# Patient Record
Sex: FEMALE | Race: WHITE | NOT HISPANIC OR LATINO | Employment: OTHER | URBAN - METROPOLITAN AREA
[De-identification: names, ages, dates, MRNs, and addresses within clinical notes are randomized per-mention and may not be internally consistent; named-entity substitution may affect disease eponyms.]

---

## 2017-05-08 ENCOUNTER — APPOINTMENT (OUTPATIENT)
Dept: RADIOLOGY | Facility: MEDICAL CENTER | Age: 82
DRG: 149 | End: 2017-05-08
Attending: EMERGENCY MEDICINE
Payer: MEDICARE

## 2017-05-08 ENCOUNTER — HOSPITAL ENCOUNTER (INPATIENT)
Facility: MEDICAL CENTER | Age: 82
LOS: 1 days | DRG: 149 | End: 2017-05-11
Attending: EMERGENCY MEDICINE | Admitting: FAMILY MEDICINE
Payer: MEDICARE

## 2017-05-08 ENCOUNTER — RESOLUTE PROFESSIONAL BILLING HOSPITAL PROF FEE (OUTPATIENT)
Dept: HOSPITALIST | Facility: MEDICAL CENTER | Age: 82
End: 2017-05-08
Payer: MEDICARE

## 2017-05-08 DIAGNOSIS — R55 SYNCOPE, UNSPECIFIED SYNCOPE TYPE: ICD-10-CM

## 2017-05-08 LAB
ALBUMIN SERPL BCP-MCNC: 3.7 G/DL (ref 3.2–4.9)
ALBUMIN/GLOB SERPL: 0.9 G/DL
ALP SERPL-CCNC: 93 U/L (ref 30–99)
ALT SERPL-CCNC: 13 U/L (ref 2–50)
ANION GAP SERPL CALC-SCNC: 15 MMOL/L (ref 0–11.9)
APPEARANCE UR: CLEAR
AST SERPL-CCNC: 22 U/L (ref 12–45)
BASOPHILS # BLD AUTO: 0.7 % (ref 0–1.8)
BASOPHILS # BLD: 0.09 K/UL (ref 0–0.12)
BILIRUB SERPL-MCNC: 0.7 MG/DL (ref 0.1–1.5)
BILIRUB UR QL STRIP.AUTO: NEGATIVE
BNP SERPL-MCNC: 96 PG/ML (ref 0–100)
BUN SERPL-MCNC: 23 MG/DL (ref 8–22)
CALCIUM SERPL-MCNC: 9.1 MG/DL (ref 8.4–10.2)
CHLORIDE SERPL-SCNC: 102 MMOL/L (ref 96–112)
CO2 SERPL-SCNC: 21 MMOL/L (ref 20–33)
COLOR UR: YELLOW
CREAT SERPL-MCNC: 1.43 MG/DL (ref 0.5–1.4)
CULTURE IF INDICATED INDCX: NO UA CULTURE
EKG IMPRESSION: NORMAL
EOSINOPHIL # BLD AUTO: 0.18 K/UL (ref 0–0.51)
EOSINOPHIL NFR BLD: 1.5 % (ref 0–6.9)
ERYTHROCYTE [DISTWIDTH] IN BLOOD BY AUTOMATED COUNT: 43.6 FL (ref 35.9–50)
GFR SERPL CREATININE-BSD FRML MDRD: 34 ML/MIN/1.73 M 2
GLOBULIN SER CALC-MCNC: 4.2 G/DL (ref 1.9–3.5)
GLUCOSE SERPL-MCNC: 156 MG/DL (ref 65–99)
GLUCOSE UR STRIP.AUTO-MCNC: NEGATIVE MG/DL
HCT VFR BLD AUTO: 40 % (ref 37–47)
HGB BLD-MCNC: 13.4 G/DL (ref 12–16)
IMM GRANULOCYTES # BLD AUTO: 0.07 K/UL (ref 0–0.11)
IMM GRANULOCYTES NFR BLD AUTO: 0.6 % (ref 0–0.9)
INR PPP: 1.05 (ref 0.87–1.13)
KETONES UR STRIP.AUTO-MCNC: NEGATIVE MG/DL
LACTATE BLD-SCNC: 1.49 MMOL/L (ref 0.5–2)
LACTATE BLD-SCNC: 2.17 MMOL/L (ref 0.5–2)
LEUKOCYTE ESTERASE UR QL STRIP.AUTO: NEGATIVE
LYMPHOCYTES # BLD AUTO: 2.19 K/UL (ref 1–4.8)
LYMPHOCYTES NFR BLD: 17.9 % (ref 22–41)
MCH RBC QN AUTO: 31 PG (ref 27–33)
MCHC RBC AUTO-ENTMCNC: 33.5 G/DL (ref 33.6–35)
MCV RBC AUTO: 92.6 FL (ref 81.4–97.8)
MICRO URNS: NORMAL
MONOCYTES # BLD AUTO: 0.87 K/UL (ref 0–0.85)
MONOCYTES NFR BLD AUTO: 7.1 % (ref 0–13.4)
NEUTROPHILS # BLD AUTO: 8.84 K/UL (ref 2–7.15)
NEUTROPHILS NFR BLD: 72.2 % (ref 44–72)
NITRITE UR QL STRIP.AUTO: NEGATIVE
NRBC # BLD AUTO: 0 K/UL
NRBC BLD AUTO-RTO: 0 /100 WBC
PH UR STRIP.AUTO: 5.5 [PH]
PLATELET # BLD AUTO: 240 K/UL (ref 164–446)
PMV BLD AUTO: 9.3 FL (ref 9–12.9)
POTASSIUM SERPL-SCNC: 3.6 MMOL/L (ref 3.6–5.5)
PROT SERPL-MCNC: 7.9 G/DL (ref 6–8.2)
PROT UR QL STRIP: NEGATIVE MG/DL
PROTHROMBIN TIME: 13.5 SEC (ref 12–14.6)
RBC # BLD AUTO: 4.32 M/UL (ref 4.2–5.4)
RBC UR QL AUTO: NEGATIVE
SODIUM SERPL-SCNC: 138 MMOL/L (ref 135–145)
SP GR UR STRIP.AUTO: 1.01
SPECIMEN DRAWN FROM PATIENT: ABNORMAL
SPECIMEN DRAWN FROM PATIENT: NORMAL
TROPONIN I SERPL-MCNC: <0.02 NG/ML (ref 0–0.04)
WBC # BLD AUTO: 12.2 K/UL (ref 4.8–10.8)

## 2017-05-08 PROCEDURE — G0378 HOSPITAL OBSERVATION PER HR: HCPCS

## 2017-05-08 PROCEDURE — 94760 N-INVAS EAR/PLS OXIMETRY 1: CPT

## 2017-05-08 PROCEDURE — 70450 CT HEAD/BRAIN W/O DYE: CPT

## 2017-05-08 PROCEDURE — 99220 PR INITIAL OBSERVATION CARE,LEVL III: CPT | Performed by: HOSPITALIST

## 2017-05-08 PROCEDURE — 99285 EMERGENCY DEPT VISIT HI MDM: CPT

## 2017-05-08 PROCEDURE — 700105 HCHG RX REV CODE 258: Performed by: EMERGENCY MEDICINE

## 2017-05-08 PROCEDURE — 93005 ELECTROCARDIOGRAM TRACING: CPT | Performed by: EMERGENCY MEDICINE

## 2017-05-08 PROCEDURE — 84484 ASSAY OF TROPONIN QUANT: CPT

## 2017-05-08 PROCEDURE — 71010 DX-CHEST-PORTABLE (1 VIEW): CPT

## 2017-05-08 PROCEDURE — 80053 COMPREHEN METABOLIC PANEL: CPT

## 2017-05-08 PROCEDURE — 85025 COMPLETE CBC W/AUTO DIFF WBC: CPT

## 2017-05-08 PROCEDURE — 83880 ASSAY OF NATRIURETIC PEPTIDE: CPT

## 2017-05-08 PROCEDURE — 96360 HYDRATION IV INFUSION INIT: CPT

## 2017-05-08 PROCEDURE — A9270 NON-COVERED ITEM OR SERVICE: HCPCS | Performed by: HOSPITALIST

## 2017-05-08 PROCEDURE — 700101 HCHG RX REV CODE 250: Performed by: HOSPITALIST

## 2017-05-08 PROCEDURE — 85610 PROTHROMBIN TIME: CPT

## 2017-05-08 PROCEDURE — 81003 URINALYSIS AUTO W/O SCOPE: CPT

## 2017-05-08 PROCEDURE — 700105 HCHG RX REV CODE 258: Performed by: HOSPITALIST

## 2017-05-08 PROCEDURE — 83605 ASSAY OF LACTIC ACID: CPT

## 2017-05-08 PROCEDURE — 700102 HCHG RX REV CODE 250 W/ 637 OVERRIDE(OP): Performed by: HOSPITALIST

## 2017-05-08 RX ORDER — BISACODYL 10 MG
10 SUPPOSITORY, RECTAL RECTAL
Status: DISCONTINUED | OUTPATIENT
Start: 2017-05-08 | End: 2017-05-11 | Stop reason: HOSPADM

## 2017-05-08 RX ORDER — MECLIZINE HYDROCHLORIDE 25 MG/1
12.5 TABLET ORAL 3 TIMES DAILY PRN
Status: DISCONTINUED | OUTPATIENT
Start: 2017-05-08 | End: 2017-05-11 | Stop reason: HOSPADM

## 2017-05-08 RX ORDER — ERYTHROMYCIN 5 MG/G
1 OINTMENT OPHTHALMIC NIGHTLY
COMMUNITY
End: 2017-11-01

## 2017-05-08 RX ORDER — ACETAMINOPHEN 500 MG
500 TABLET ORAL EVERY 6 HOURS PRN
Status: DISCONTINUED | OUTPATIENT
Start: 2017-05-08 | End: 2017-05-08

## 2017-05-08 RX ORDER — ACETAMINOPHEN 500 MG
500 TABLET ORAL EVERY 6 HOURS PRN
COMMUNITY
End: 2017-05-13

## 2017-05-08 RX ORDER — LATANOPROST 50 UG/ML
1 SOLUTION/ DROPS OPHTHALMIC NIGHTLY
Status: DISCONTINUED | OUTPATIENT
Start: 2017-05-08 | End: 2017-05-11 | Stop reason: HOSPADM

## 2017-05-08 RX ORDER — ONDANSETRON 4 MG/1
4 TABLET, ORALLY DISINTEGRATING ORAL EVERY 4 HOURS PRN
Status: DISCONTINUED | OUTPATIENT
Start: 2017-05-08 | End: 2017-05-11 | Stop reason: HOSPADM

## 2017-05-08 RX ORDER — POLYETHYLENE GLYCOL 3350 17 G/17G
1 POWDER, FOR SOLUTION ORAL
Status: DISCONTINUED | OUTPATIENT
Start: 2017-05-08 | End: 2017-05-11 | Stop reason: HOSPADM

## 2017-05-08 RX ORDER — SODIUM CHLORIDE 9 MG/ML
INJECTION, SOLUTION INTRAVENOUS CONTINUOUS
Status: DISCONTINUED | OUTPATIENT
Start: 2017-05-08 | End: 2017-05-11 | Stop reason: HOSPADM

## 2017-05-08 RX ORDER — ACETAMINOPHEN 325 MG/1
650 TABLET ORAL EVERY 6 HOURS PRN
Status: DISCONTINUED | OUTPATIENT
Start: 2017-05-08 | End: 2017-05-11 | Stop reason: HOSPADM

## 2017-05-08 RX ORDER — AMOXICILLIN 250 MG
2 CAPSULE ORAL 2 TIMES DAILY
Status: DISCONTINUED | OUTPATIENT
Start: 2017-05-08 | End: 2017-05-11 | Stop reason: HOSPADM

## 2017-05-08 RX ORDER — PROBENECID 500 MG/1
500 TABLET, FILM COATED ORAL DAILY
Status: DISCONTINUED | OUTPATIENT
Start: 2017-05-09 | End: 2017-05-11 | Stop reason: HOSPADM

## 2017-05-08 RX ORDER — ONDANSETRON 2 MG/ML
4 INJECTION INTRAMUSCULAR; INTRAVENOUS EVERY 4 HOURS PRN
Status: DISCONTINUED | OUTPATIENT
Start: 2017-05-08 | End: 2017-05-11 | Stop reason: HOSPADM

## 2017-05-08 RX ORDER — LATANOPROST 50 UG/ML
1 SOLUTION/ DROPS OPHTHALMIC NIGHTLY
Status: ON HOLD | COMMUNITY
End: 2019-06-05

## 2017-05-08 RX ORDER — SODIUM CHLORIDE 9 MG/ML
1000 INJECTION, SOLUTION INTRAVENOUS ONCE
Status: COMPLETED | OUTPATIENT
Start: 2017-05-08 | End: 2017-05-08

## 2017-05-08 RX ORDER — ERYTHROMYCIN 5 MG/G
1 OINTMENT OPHTHALMIC NIGHTLY
Status: DISCONTINUED | OUTPATIENT
Start: 2017-05-08 | End: 2017-05-11 | Stop reason: HOSPADM

## 2017-05-08 RX ADMIN — ERYTHROMYCIN 1 APPLICATION: 5 OINTMENT OPHTHALMIC at 21:43

## 2017-05-08 RX ADMIN — SODIUM CHLORIDE: 9 INJECTION, SOLUTION INTRAVENOUS at 17:00

## 2017-05-08 RX ADMIN — SODIUM CHLORIDE 1000 ML: 9 INJECTION, SOLUTION INTRAVENOUS at 14:19

## 2017-05-08 RX ADMIN — LATANOPROST 1 DROP: 50 SOLUTION/ DROPS OPHTHALMIC at 21:43

## 2017-05-08 RX ADMIN — MINERAL OIL AND WHITE PETROLATUM 1 APPLICATION: 150; 830 OINTMENT OPHTHALMIC at 21:43

## 2017-05-08 ASSESSMENT — LIFESTYLE VARIABLES
EVER_SMOKED: NEVER
ALCOHOL_USE: NO

## 2017-05-08 ASSESSMENT — PAIN SCALES - GENERAL
PAINLEVEL_OUTOF10: 0
PAINLEVEL_OUTOF10: 0

## 2017-05-08 NOTE — ED NOTES
Patient complains of being cold, re-took temperature, 98.0F, frequent wet productive cough with grey white thick sputum, patient states she feels better now, no dizziness

## 2017-05-08 NOTE — IP AVS SNAPSHOT
5/11/2017    Nora Hope  [address]  [place] [address]    Dear Nora:    [facility] wants to ensure your discharge home is safe and you or your loved ones have had all of your questions answered regarding your care after you leave the hospital.    Below is a list of resources and contact information should you have any questions regarding your hospital stay, follow-up instructions, or active medical symptoms.    Questions or Concerns Regarding… Contact   Medical Questions Related to Your Discharge  (7 days a week, 8am-5pm) Contact a Nurse Care Coordinator   [phone]   Medical Questions Not Related to Your Discharge  (24 hours a day / 7 days a week)  Contact the Nurse Health Line   [phone]    Medications or Discharge Instructions Refer to your discharge packet   or contact your [facility] Primary Care Provider   [phone]   Follow-up Appointment(s) Schedule your appointment via [employer]   or contact Scheduling [phone]   Billing Review your statement via [employer]  or contact Billing [phone]   Medical Records Review your records via [employer]   or contact Medical Records [phone]     You may receive a telephone call within two days of discharge. This call is to make certain you understand your discharge instructions and have the opportunity to have any questions answered. You can also easily access your medical information, test results and upcoming appointments via the [employer] free online health management tool. You can learn more and sign up at [employer]. For assistance setting up your [employer] account, please call [phone].    Once again, we want to ensure your discharge home is safe and that you have a clear understanding of any next steps in your care. If you have any questions or concerns, please do not hesitate to contact us, we are here for you. Thank you for choosing [facility] for your healthcare needs.    Sincerely,    Your [facility] Healthcare Team

## 2017-05-08 NOTE — IP AVS SNAPSHOT
[employer] Access Code: [email or web address]  Expires: 6/10/2017 12:21 PM    Your email address is not on file at [facility].  Email Addresses are required for you to sign up for [employer] please contact [phone] to verify your personal information and to provide your email address prior to attempting to register for [employer]    Nora Hope  [address] [facility] [address]  [place] [address]    [employer]  A secure, online tool to manage your health information     [employer]’s [employer]® is a secure, online tool that connects you to your personalized health information from the privacy of your home -- day or night - making it very easy for you to manage your healthcare. Once the activation process is completed, you can even access your medical information using the [employer] prosper, which is available for free in the Apple Prosper store or Google Play store.     To learn more about [employer] visit [email or web address] [email or web address] Document Preparation/[employer].    There are two levels of access available (as shown below):   My Chart Features  [facility] Primary Care Doctor [facility]  Specialists [facility]  Urgent  Care Non-[facility] Primary Care Doctor   Email your healthcare team securely and privately 24/7 X X X    Manage appointments: schedule your next appointment; view details of past/upcoming appointments X      Request prescription refills. X      View recent personal medical records, including lab and immunizations X X X X   View health record, including health history, allergies, medications X X X X   Read reports about your outpatient visits, procedures, consult and ER notes X X X X   See your discharge summary, which is a recap of your hospital and/or ER visit that includes your diagnosis, lab results, and care plan X X  X     How to register for [employer].:  Once your e-mail address has been verified, follow the following steps to sign up for [employer]     1. Go to  [email or web address]  2. Click on the Sign Up Now box, which takes you to the New Member Sign Up page. You  will need to provide the following information:  a. Enter your [employer] Access Code exactly as it appears at the top of this page. (You will not need to use this code after you’ve completed the sign-up process. If you do not sign up before the expiration date, you must request a new code.)   b. Enter your date of birth.   c. Enter your home email address.   d. Click Submit, and follow the next screen’s instructions.  3. Create a [employer] ID. This will be your [employer] login ID and cannot be changed, so think of one that is secure and easy to remember.  4. Create a [employer] password. You can change your password at any time.  5. Enter your Password Reset Question and Answer. This can be used at a later time if you forget your password.   6. Enter your e-mail address. This allows you to receive e-mail notifications when new information is available in [employer].  7. Click Sign Up. You can now view your health information.    For assistance activating your [employer] account, call [phone]

## 2017-05-08 NOTE — ED PROVIDER NOTES
ED Provider Note    CHIEF COMPLAINT  Chief Complaint   Patient presents with   • Weakness   • Syncope   • N/V       HPI  Nora Hope is a 89 y.o. female who presents for evaluation of passing out. The patient was playing slot machines at a local supermarket when she had the sudden onset of nausea vomiting and general weakness with  syncope. The patient is a very poor historian she appears somewhat confused. History is limited due to patient's poor historical ability. She is somewhat mumbling    REVIEW OF SYSTEMS  See HPI for further details. Limited due to patient's poor historical ability    PAST MEDICAL HISTORY  Past Medical History   Diagnosis Date   • Glaucoma    • Arthritis    • Cancer ([facility]) 2009     eye       FAMILY HISTORY  No family history on file. unobtainable at this time    SOCIAL HISTORY  Social History     Social History   • Marital Status: Single     Spouse Name: N/A   • Number of Children: N/A   • Years of Education: N/A     Social History Main Topics   • Smoking status: Never Smoker    • Smokeless tobacco: None   • Alcohol Use: No   • Drug Use: No   • Sexual Activity: Not Asked     Other Topics Concern   • None     Social History Narrative       SURGICAL HISTORY  Past Surgical History   Procedure Laterality Date   • Eye surgery       eye cancer 2009   • Other orthopedic surgery         CURRENT MEDICATIONS  Home Medications     Reviewed by Darshan Tiwari (Pharmacy Tech) on 05/08/17 at 1511  Med List Status: Complete    Medication Last Dose Status    acetaminophen (TYLENOL) 500 MG Tab > 2 days Active    artificial tear ointment (REFRESH,LACRI-LUBE) Ointment > 2 days Active    erythromycin 5 MG/GM Ointment 5/7/2017 Active    latanoprost (XALATAN) 0.005 % Solution 5/7/2017 Active    Multiple Vitamins-Minerals (MULTIVITAMIN PO) 5/8/2017 Active    probenecid (BENEMID) 500 MG TABS 5/8/2017 Active                 ALLERGIES  Allergies   Allergen Reactions   • Allopurinol Rash     Hot, red  face   • Aspirin      Cannot take due to medications   • Codeine Itching and Nausea   • Latex Rash     To tape/bandages containing latex       PHYSICAL EXAM  VITAL SIGNS: /68 mmHg  Pulse 83  Temp(Src) 36.7 °C (98 °F)  Resp 17  Wt 69.854 kg (154 lb)  SpO2 94%  Constitutional :  Well developed, thin elderly female No acute distress, Non-toxic appearance.   HENT: Head is atraumatic normocephalic dry mucous membranes  Eyes: No evidence of traumatic injury no evidence of infection  Neck: Normal range of motion, No tenderness, Supple, No stridor.   Lymphatic: No cervical adenopathy.   Cardiovascular: Normal heart rate, Normal rhythm, No murmurs, No rubs, No gallops.   Thorax & Lungs: Equal breath sounds bilaterally no rales rhonchi  Skin: Warm, Dry, No erythema, No rash.   Abdomen soft nontender no distention  Extremity no cyanosis or edema  Neurological the patient is awake. Somewhat somnolent and does not recall date or time initially this later clears  Psychiatric-flat affect denies hallucinations      DX-CHEST-PORTABLE (1 VIEW)   Final Result         1. No acute cardiopulmonary abnormalities are identified.      CT-HEAD W/O   Final Result         1. No acute intracranial findings. No evidence of acute intracranial hemorrhage or mass lesion.      2. White matter lucencies most consistent with chronic small vessel ischemic change (versus demyelination or gliosis).      3. Near-complete opacification of the paranasal sinuses, in keeping with sinusitis.      4. Please note, hyperacute ischemia can remain occult on CT. If ischemia is clinically suspected, MRI is suggested for further evaluation.          Results for orders placed or performed during the hospital encounter of 05/08/17   CBC w/ Differential   Result Value Ref Range    WBC 12.2 (H) 4.8 - 10.8 K/uL    RBC 4.32 4.20 - 5.40 M/uL    Hemoglobin 13.4 12.0 - 16.0 g/dL    Hematocrit 40.0 37.0 - 47.0 %    MCV 92.6 81.4 - 97.8 fL    MCH 31.0 27.0 - 33.0 pg     MCHC 33.5 (L) 33.6 - 35.0 g/dL    RDW 43.6 35.9 - 50.0 fL    Platelet Count 240 164 - 446 K/uL    MPV 9.3 9.0 - 12.9 fL    Neutrophils-Polys 72.20 (H) 44.00 - 72.00 %    Lymphocytes 17.90 (L) 22.00 - 41.00 %    Monocytes 7.10 0.00 - 13.40 %    Eosinophils 1.50 0.00 - 6.90 %    Basophils 0.70 0.00 - 1.80 %    Immature Granulocytes 0.60 0.00 - 0.90 %    Nucleated RBC 0.00 /100 WBC    Neutrophils (Absolute) 8.84 (H) 2.00 - 7.15 K/uL    Lymphs (Absolute) 2.19 1.00 - 4.80 K/uL    Monos (Absolute) 0.87 (H) 0.00 - 0.85 K/uL    Eos (Absolute) 0.18 0.00 - 0.51 K/uL    Baso (Absolute) 0.09 0.00 - 0.12 K/uL    Immature Granulocytes (abs) 0.07 0.00 - 0.11 K/uL    NRBC (Absolute) 0.00 K/uL   Complete Metabolic Panel (CMP)   Result Value Ref Range    Sodium 138 135 - 145 mmol/L    Potassium 3.6 3.6 - 5.5 mmol/L    Chloride 102 96 - 112 mmol/L    Co2 21 20 - 33 mmol/L    Anion Gap 15.0 (H) 0.0 - 11.9    Glucose 156 (H) 65 - 99 mg/dL    Bun 23 (H) 8 - 22 mg/dL    Creatinine 1.43 (H) 0.50 - 1.40 mg/dL    Calcium 9.1 8.4 - 10.2 mg/dL    AST(SGOT) 22 12 - 45 U/L    ALT(SGPT) 13 2 - 50 U/L    Alkaline Phosphatase 93 30 - 99 U/L    Total Bilirubin 0.7 0.1 - 1.5 mg/dL    Albumin 3.7 3.2 - 4.9 g/dL    Total Protein 7.9 6.0 - 8.2 g/dL    Globulin 4.2 (H) 1.9 - 3.5 g/dL    A-G Ratio 0.9 g/dL   Troponin STAT   Result Value Ref Range    Troponin I <0.02 0.00 - 0.04 ng/mL   Btype Natriuretic Peptide   Result Value Ref Range    B Natriuretic Peptide 96 0 - 100 pg/mL   LACTIC ACID   Result Value Ref Range    Lactic Acid 2.17 (H) 0.50 - 2.00 mmol/L    Specimen Venous    PT/INR   Result Value Ref Range    PT 13.5 12.0 - 14.6 sec    INR 1.05 0.87 - 1.13   Urinalysis, culture if indicated   Result Value Ref Range    Color Yellow     Character Clear     Specific Gravity 1.015 <1.035    Ph 5.5 5.0-8.0    Glucose Negative Negative mg/dL    Ketones Negative Negative mg/dL    Protein Negative Negative mg/dL    Bilirubin Negative Negative    Nitrite  Negative Negative    Leukocyte Esterase Negative Negative    Occult Blood Negative Negative    Micro Urine Req see below     Culture Indicated No UA Culture   ESTIMATED GFR   Result Value Ref Range    GFR If  42 (A) >60 mL/min/1.73 m 2    GFR If Non  34 (A) >60 mL/min/1.73 m 2   EKG NOW   Result Value Ref Range    Report       [facility] Emergency Dept.    Test Date:  2017  Pt Name:    DIAN RAMOS               Department: EDS  MRN:        [id number]                      [facility]  Gender:     F                            Technician: RADHA  :        [id number]                   Requested By:SABINA KINNEY  Order #:    [id number]                    Reading MD:    Measurements  Intervals                                Axis  Rate:       76                           P:          46  OR:         181                          QRS:        23  QRSD:       139                          T:          5  QT:         459  QTc:        517    Interpretive Statements  Sinus rhythm  Right bundle branch block  Compared to ECG 2015 16:42:50  Right bundle-branch block now present  Incomplete right bundle-branch block no longer present        EKG interpretation-12-lead tracing sinus rhythm rate 76 right bundle-branch pattern no acute ST elevation QT interval slightly elongated compared to EKG 2015 right bundle branch now present. No evidence of ischemia impression is abnormal EKG      COURSE & MEDICAL DECISION MAKING  Pertinent Labs & Imaging studies reviewed. (See chart for details)  The patient is presenting for evaluation of syncopal episode reportedly. Initially she is confused this may represent TIA, possible hypoxemia. She has no evidence of acute infarction or dysrhythmia. She does appear to have evidence of mild dehydration clinically and was treated with infusion of saline. This seemed to clear her mentation there is no evidence of obvious  infection. Given her age I recommend observation to rule out potential causes of syncope to include stroke possible dysrhythmia. I discussed case with the hospitalist will be admitting    FINAL IMPRESSION  1. Syncope etiology unclear  2. Dehydration  3.      Electronically signed by: Ken Jha, 5/8/2017

## 2017-05-08 NOTE — ED NOTES
Med rec updated and complete  Allergies reviewed  Called Brenda @ [phone] to verify all medications.  Went back and asked pt last time she took her medications.

## 2017-05-08 NOTE — ED NOTES
SEPIDEH roque from [employer]. Pt was sitiing playing a [id number] machine. Pt c/o sudden onset N/V and general weakness with near syncope. Pt denies CP/SOB.

## 2017-05-08 NOTE — IP AVS SNAPSHOT
" <p align=\"LEFT\"><IMG SRC=\"//EMRWB/blob$/[email or web address]\" alt=\"Image\" WIDTH=\"50%\" HEIGHT=\"200\" BORDER=\"\"></p>                   Name:Nora Hope  Medical Record Number:[id number]  CSN: [id number]    YOB: 1925   Age: 89 y.o.  Sex: female  HT:1.727 m (5' 7.99\") WT: 74 kg (163 lb 2.3 oz)          Admit Date: 5/8/2017     Discharge Date:   Today's Date: 5/11/2017  Attending Doctor:  Marcos Holden M.D.                  Allergies:  Allopurinol; Aspirin; Codeine; and Latex          Follow-up Information     1. Follow up with Joaquín SHERMAN M.D.. Schedule an appointment as soon as possible for a visit in 1 week.    Specialty:  Family Medicine    Why:   spoke with office. They request patient call to schedule follow up appointment.    Contact information    [address] [facility] [address] [phone]           Medication List      Take these Medications        Instructions    acetaminophen 500 MG Tabs   Commonly known as:  TYLENOL    Take 500 mg by mouth every 6 hours as needed for Moderate Pain.   Dose:  500 mg       amlodipine 2.5 MG Tabs   Commonly known as:  NORVASC    Take 1 Tab by mouth every day.   Dose:  2.5 mg       artificial tear ointment Oint   Commonly known as:  REFRESH,LACRI-LUBE    Place 1 Application in both eyes as needed (For Dry eye).   Dose:  1 Application       erythromycin 5 MG/GM Oint    Place 1 Application in both eyes every evening.   Dose:  1 Application       latanoprost 0.005 % Soln   Commonly known as:  XALATAN    Place 1 Drop in both eyes every evening.   Dose:  1 Drop       MULTIVITAMIN PO    Take 1 Tab by mouth every day.   Dose:  1 Tab       probenecid 500 MG Tabs   Commonly known as:  BENEMID    Take 1 Tab by mouth every day.   Dose:  500 mg         "

## 2017-05-08 NOTE — IP AVS SNAPSHOT
" Home Care Instructions                                                                                                                  Name:Nora Hope  Medical Record Number:[id number]  CSN: [id number]    YOB: 1925   Age: 89 y.o.  Sex: female  HT:1.727 m (5' 7.99\") WT: 74 kg (163 lb 2.3 oz)          Admit Date: 5/8/2017     Discharge Date:   Today's Date: 5/11/2017  Attending Doctor:  Marcos Holden M.D.                  Allergies:  Allopurinol; Aspirin; Codeine; and Latex            Discharge Instructions       Discharge Instructions    Discharged to home by [facility] van with self. Discharged via wheelchair, hospital escort: Yes.  Special equipment needed: Not Applicable    Be sure to schedule a follow-up appointment with your primary care doctor or any specialists as instructed.     Discharge Plan:   Diet Plan: Discussed  Activity Level: Discussed  Confirmed Follow up Appointment: Patient to Call and Schedule Appointment  Confirmed Symptoms Management: Discussed  Medication Reconciliation Updated: Yes  Influenza Vaccine Indication: Patient Refuses    I understand that a diet low in cholesterol, fat, and sodium is recommended for good health. Unless I have been given specific instructions below for another diet, I accept this instruction as my diet prescription.       Special Instructions: Syncope  Syncope is a medical term for fainting or passing out. This means you lose consciousness and drop to the ground. People are generally unconscious for less than 5 minutes. You may have some muscle twitches for up to 15 seconds before waking up and returning to normal. Syncope occurs more often in older adults, but it can happen to anyone. While most causes of syncope are not dangerous, syncope can be a sign of a serious medical problem. It is important to seek medical care.   CAUSES   Syncope is caused by a sudden drop in blood flow to the brain. The specific cause is often not determined. Factors that " can bring on syncope include:  · Taking medicines that lower blood pressure.  · Sudden changes in posture, such as standing up quickly.  · Taking more medicine than prescribed.  · Standing in one place for too long.  · Seizure disorders.  · Dehydration and excessive exposure to heat.  · Low blood sugar (hypoglycemia).  · Straining to have a bowel movement.  · Heart disease, irregular heartbeat, or other circulatory problems.  · Fear, emotional distress, seeing blood, or severe pain.  SYMPTOMS   Right before fainting, you may:  · Feel dizzy or light-headed.  · Feel nauseous.  · See all white or all black in your field of vision.  · Have cold, clammy skin.  DIAGNOSIS   Your health care provider will ask about your symptoms, perform a physical exam, and perform an electrocardiogram (ECG) to record the electrical activity of your heart. Your health care provider may also perform other heart or blood tests to determine the cause of your syncope which may include:  · Transthoracic echocardiogram (TTE). During echocardiography, sound waves are used to evaluate how blood flows through your heart.  · Transesophageal echocardiogram (DONNA).  · Cardiac monitoring. This allows your health care provider to monitor your heart rate and rhythm in real time.  · Holter monitor. This is a portable device that records your heartbeat and can help diagnose heart arrhythmias. It allows your health care provider to track your heart activity for several days, if needed.  · Stress tests by exercise or by giving medicine that makes the heart beat faster.  TREATMENT   In most cases, no treatment is needed. Depending on the cause of your syncope, your health care provider may recommend changing or stopping some of your medicines.  HOME CARE INSTRUCTIONS  · Have someone stay with you until you feel stable.  · Do not drive, use machinery, or play sports until your health care provider says it is okay.  · Keep all follow-up appointments as directed  by your health care provider.  · Lie down right away if you start feeling like you might faint. Breathe deeply and steadily. Wait until all the symptoms have passed.  · Drink enough fluids to keep your urine clear or pale yellow.  · If you are taking blood pressure or heart medicine, get up slowly and take several minutes to sit and then stand. This can reduce dizziness.  SEEK IMMEDIATE MEDICAL CARE IF:   · You have a severe headache.  · You have unusual pain in the chest, abdomen, or back.  · You are bleeding from your mouth or rectum, or you have black or tarry stool.  · You have an irregular or very fast heartbeat.  · You have pain with breathing.  · You have repeated fainting or seizure-like jerking during an episode.  · You faint when sitting or lying down.  · You have confusion.  · You have trouble walking.  · You have severe weakness.  · You have vision problems.  If you fainted, call your local emergency services (911 in [place] Do not drive yourself to the hospital.      This information is not intended to replace advice given to you by your health care provider. Make sure you discuss any questions you have with your health care provider.     Document Released: 12/18/2006 Document Revised: 05/03/2016 Document Reviewed: 02/15/2013  [employer] Interactive Patient Education ©2016 Elsevier Inc.  Amlodipine tablets  What is this medicine?  AMLODIPINE (am JEFFERY di penew) is a calcium-channel blocker. It affects the amount of calcium found in your heart and muscle cells. This relaxes your blood vessels, which can reduce the amount of work the heart has to do. This medicine is used to lower high blood pressure. It is also used to prevent chest pain.  This medicine may be used for other purposes; ask your health care provider or pharmacist if you have questions.  COMMON BRAND NAME(S): Norvasc  What should I tell my health care provider before I take this medicine?  They need to know if you have any of these  conditions:  -heart problems like heart failure or aortic stenosis  -liver disease  -an unusual or allergic reaction to amlodipine, other medicines, foods, dyes, or preservatives  -pregnant or trying to get pregnant  -breast-feeding  How should I use this medicine?  Take this medicine by mouth with a glass of water. Follow the directions on the prescription label. Take your medicine at regular intervals. Do not take more medicine than directed.  Talk to your pediatrician regarding the use of this medicine in children. Special care may be needed. This medicine has been used in children as young as 6.  Persons over 65 years old may have a stronger reaction to this medicine and need smaller doses.  Overdosage: If you think you have taken too much of this medicine contact a poison control center or emergency room at once.  NOTE: This medicine is only for you. Do not share this medicine with others.  What if I miss a dose?  If you miss a dose, take it as soon as you can. If it is almost time for your next dose, take only that dose. Do not take double or extra doses.  What may interact with this medicine?  -herbal or dietary supplements  -local or general anesthetics  -medicines for high blood pressure  -medicines for prostate problems  -rifampin  This list may not describe all possible interactions. Give your health care provider a list of all the medicines, herbs, non-prescription drugs, or dietary supplements you use. Also tell them if you smoke, drink alcohol, or use illegal drugs. Some items may interact with your medicine.  What should I watch for while using this medicine?  Visit your doctor or health care professional for regular check ups. Check your blood pressure and pulse rate regularly. Ask your health care professional what your blood pressure and pulse rate should be, and when you should contact him or her.  This medicine may make you feel confused, dizzy or lightheaded. Do not drive, use machinery, or do  anything that needs mental alertness until you know how this medicine affects you. To reduce the risk of dizzy or fainting spells, do not sit or stand up quickly, especially if you are an older patient. Avoid alcoholic drinks; they can make you more dizzy.  Do not suddenly stop taking amlodipine. Ask your doctor or health care professional how you can gradually reduce the dose.  What side effects may I notice from receiving this medicine?  Side effects that you should report to your doctor or health care professional as soon as possible:  -allergic reactions like skin rash, itching or hives, swelling of the face, lips, or tongue  -breathing problems  -changes in vision or hearing  -chest pain  -fast, irregular heartbeat  -swelling of legs or ankles  Side effects that usually do not require medical attention (report to your doctor or health care professional if they continue or are bothersome):  -dry mouth  -facial flushing  -nausea, vomiting  -stomach gas, pain  -tired, weak  -trouble sleeping  This list may not describe all possible side effects. Call your doctor for medical advice about side effects. You may report side effects to FDA at 4-783-FDA-3350.  Where should I keep my medicine?  Keep out of the reach of children.  Store at room temperature between 59 and 86 degrees F (15 and 30 degrees C). Protect from light. Keep container tightly closed. Throw away any unused medicine after the expiration date.  NOTE: This sheet is a summary. It may not cover all possible information. If you have questions about this medicine, talk to your doctor, pharmacist, or health care provider.  © 2014, Elsevier/Gold Standard. (11/15/2013 11:40:58 AM)        · Is patient discharged on Warfarin / Coumadin?   No     · Is patient Post Blood Transfusion?  No      Depression / Suicide Risk    As you are discharged from this [facility] facility, it is important to learn how to keep safe from harming yourself.    Recognize the warning  signs:  · Abrupt changes in personality, positive or negative- including increase in energy   · Giving away possessions  · Change in eating patterns- significant weight changes-  positive or negative  · Change in sleeping patterns- unable to sleep or sleeping all the time   · Unwillingness or inability to communicate  · Depression  · Unusual sadness, discouragement and loneliness  · Talk of wanting to die  · Neglect of personal appearance   · Rebelliousness- reckless behavior  · Withdrawal from people/activities they love  · Confusion- inability to concentrate     If you or a loved one observes any of these behaviors or has concerns about self-harm, here's what you can do:  · Talk about it- your feelings and reasons for harming yourself  · Remove any means that you might use to hurt yourself (examples: pills, rope, extension cords, firearm)  · Get professional help from the community (Mental Health, Substance Abuse, psychological counseling)  · Do not be alone:Call your Safe Contact- someone whom you trust who will be there for you.  · Call your local CRISIS HOTLINE 504-3319 or 462-129-6528  · Call your local Children's Mobile Crisis Response Team [place] (816) 567-8590 or www.[employer]  · Call the toll free National Suicide Prevention Hotlines   · National Suicide Prevention Lifeline 199-817-BOVA (5632)  · National Hope Line Network 800-SUICIDE (349-4245)        Follow-up Information     1. Follow up with Joaquín SHERMAN M.D.. Schedule an appointment as soon as possible for a visit in 1 week.    Specialty:  Family Medicine    Why:   spoke with office. They request patient call to schedule follow up appointment.    Contact information    [id number] [place] [id number] [phone]           Discharge Medication Instructions:    Below are the medications your physician expects you to take upon discharge:    Review all your home medications and newly ordered medications with your  doctor and/or pharmacist. Follow medication instructions as directed by your doctor and/or pharmacist.    Please keep your medication list with you and share with your physician.               Medication List      START taking these medications        Instructions    Morning Afternoon Evening Bedtime    amlodipine 2.5 MG Tabs   Commonly known as:  NORVASC        Take 1 Tab by mouth every day.   Dose:  2.5 mg                          CONTINUE taking these medications        Instructions    Morning Afternoon Evening Bedtime    acetaminophen 500 MG Tabs   Last time this was given:  650 mg on 5/10/2017  1:36 AM   Commonly known as:  TYLENOL        Take 500 mg by mouth every 6 hours as needed for Moderate Pain.   Dose:  500 mg                        artificial tear ointment Oint   Last time this was given:  1 Application on 5/10/2017  5:55 PM   Commonly known as:  REFRESH,LACRI-LUBE        Place 1 Application in both eyes as needed (For Dry eye).   Dose:  1 Application                        erythromycin 5 MG/GM Oint   Last time this was given:  1 Application on 5/10/2017  9:23 PM        Place 1 Application in both eyes every evening.   Dose:  1 Application                        latanoprost 0.005 % Soln   Last time this was given:  1 Drop on 5/10/2017  9:23 PM   Commonly known as:  XALATAN        Place 1 Drop in both eyes every evening.   Dose:  1 Drop                        MULTIVITAMIN PO        Take 1 Tab by mouth every day.   Dose:  1 Tab                        probenecid 500 MG Tabs   Last time this was given:  500 mg on 5/11/2017  9:08 AM   Commonly known as:  BENEMID        Take 1 Tab by mouth every day.   Dose:  500 mg                             Where to Get Your Medications      You can get these medications from any pharmacy     Bring a paper prescription for each of these medications    - amlodipine 2.5 MG Tabs            Orders for after discharge     REFERRAL TO HOME HEALTH    Complete by:  As directed     Home health will create and establish a plan of care             Instructions           Diet / Nutrition:    Follow any diet instructions given to you by your doctor or the dietician, including how much salt (sodium) you are allowed each day.    If you are overweight, talk to your doctor about a weight reduction plan.    Activity:    Remain physically active following your doctor's instructions about exercise and activity.    Rest often.     Any time you become even a little tired or short of breath, SIT DOWN and rest.    Worsening Symptoms:    Report any of the following signs and symptoms to the doctor's office immediately:    *Pain of jaw, arm, or neck  *Chest pain not relieved by medication                               *Dizziness or loss of consciousness  *Difficulty breathing even when at rest   *More tired than usual                                       *Bleeding drainage or swelling of surgical site  *Swelling of feet, ankles, legs or stomach                 *Fever (>100ºF)  *Pink or blood tinged sputum  *Weight gain (3lbs/day or 5lbs /week)           *Shock from internal defibrillator (if applicable)  *Palpitations or irregular heartbeats                *Cool and/or numb extremities    Stroke Awareness    Common Risk Factors for Stroke include:    Age  Atrial Fibrillation  Carotid Artery Stenosis  Diabetes Mellitus  Excessive alcohol consumption  High blood pressure  Overweight   Physical inactivity  Smoking    Warning signs and symptoms of a stroke include:    *Sudden numbness or weakness of the face, arm or leg (especially on one side of the body).  *Sudden confusion, trouble speaking or understanding.  *Sudden trouble seeing in one or both eyes.  *Sudden trouble walking, dizziness, loss of balance or coordination.Sudden severe headache with no known cause.    It is very important to get treatment quickly when a stroke occurs. If you experience any of the above warning signs, call 911 immediately.                    Disclaimer         Quit Smoking / Tobacco Use:    I understand the use of any tobacco products increases my chance of suffering from future heart disease or stroke and could cause other illnesses which may shorten my life. Quitting the use of tobacco products is the single most important thing I can do to improve my health. For further information on smoking / tobacco cessation call a Toll Free Quit Line at 1-215.719.7875 (*National Cancer [place]) or 1-426.909.9183 (American Lung Association) or you can access the web based program at www.lungusa.org.    [place] Tobacco Users Help Line:  (526) 469-5577       Toll Free: 1-349.439.8025  Quit Tobacco Program [facility] (127)308-3284    Crisis Hotline:    [place] Crisis Hotline:  2-960-YKFXNSN or 1-750.166.8261    [place] Crisis Hotline:    1-446.918.9234 or 838-653-4128    Discharge Survey:   Thank you for choosing [facility]. We hope we did everything we could to make your hospital stay a pleasant one. You may be receiving a phone survey and we would appreciate your time and participation in answering the questions. Your input is very valuable to us in our efforts to improve our service to our patients and their families.        My signature on this form indicates that:    1. I have reviewed and understand the above information.  2. My questions regarding this information have been answered to my satisfaction.  3. I have formulated a plan with my discharge nurse to obtain my prescribed medications for home.                  Disclaimer         __________________________________                     __________       ________                       Patient Signature                                                 Date                    Time

## 2017-05-09 PROCEDURE — G8988 SELF CARE GOAL STATUS: HCPCS | Mod: CI

## 2017-05-09 PROCEDURE — G0378 HOSPITAL OBSERVATION PER HR: HCPCS

## 2017-05-09 PROCEDURE — 700102 HCHG RX REV CODE 250 W/ 637 OVERRIDE(OP): Performed by: HOSPITALIST

## 2017-05-09 PROCEDURE — 99225 PR SUBSEQUENT OBSERVATION CARE,LEVEL II: CPT | Performed by: FAMILY MEDICINE

## 2017-05-09 PROCEDURE — G8987 SELF CARE CURRENT STATUS: HCPCS | Mod: CJ

## 2017-05-09 PROCEDURE — A9270 NON-COVERED ITEM OR SERVICE: HCPCS | Performed by: HOSPITALIST

## 2017-05-09 PROCEDURE — 97535 SELF CARE MNGMENT TRAINING: CPT

## 2017-05-09 PROCEDURE — 97165 OT EVAL LOW COMPLEX 30 MIN: CPT

## 2017-05-09 PROCEDURE — 700105 HCHG RX REV CODE 258: Performed by: HOSPITALIST

## 2017-05-09 PROCEDURE — 700111 HCHG RX REV CODE 636 W/ 250 OVERRIDE (IP): Performed by: FAMILY MEDICINE

## 2017-05-09 RX ORDER — HEPARIN SODIUM 5000 [USP'U]/ML
5000 INJECTION, SOLUTION INTRAVENOUS; SUBCUTANEOUS EVERY 8 HOURS
Status: DISCONTINUED | OUTPATIENT
Start: 2017-05-09 | End: 2017-05-11 | Stop reason: HOSPADM

## 2017-05-09 RX ADMIN — PROBENECID 500 MG: 500 TABLET, FILM COATED ORAL at 08:31

## 2017-05-09 RX ADMIN — MINERAL OIL AND WHITE PETROLATUM 1 APPLICATION: 150; 830 OINTMENT OPHTHALMIC at 13:35

## 2017-05-09 RX ADMIN — DOCUSATE SODIUM AND SENNOSIDES 1 TABLET: 8.6; 5 TABLET, FILM COATED ORAL at 21:16

## 2017-05-09 RX ADMIN — SODIUM CHLORIDE: 9 INJECTION, SOLUTION INTRAVENOUS at 21:11

## 2017-05-09 RX ADMIN — DOCUSATE SODIUM AND SENNOSIDES 1 TABLET: 8.6; 5 TABLET, FILM COATED ORAL at 08:29

## 2017-05-09 RX ADMIN — HEPARIN SODIUM 5000 UNITS: 5000 INJECTION, SOLUTION INTRAVENOUS; SUBCUTANEOUS at 13:36

## 2017-05-09 RX ADMIN — SODIUM CHLORIDE: 9 INJECTION, SOLUTION INTRAVENOUS at 06:15

## 2017-05-09 RX ADMIN — LATANOPROST 1 DROP: 50 SOLUTION/ DROPS OPHTHALMIC at 21:10

## 2017-05-09 RX ADMIN — HEPARIN SODIUM 5000 UNITS: 5000 INJECTION, SOLUTION INTRAVENOUS; SUBCUTANEOUS at 21:10

## 2017-05-09 RX ADMIN — SODIUM CHLORIDE: 9 INJECTION, SOLUTION INTRAVENOUS at 17:01

## 2017-05-09 RX ADMIN — ERYTHROMYCIN 1 APPLICATION: 5 OINTMENT OPHTHALMIC at 21:16

## 2017-05-09 RX ADMIN — ACETAMINOPHEN 650 MG: 325 TABLET, FILM COATED ORAL at 08:26

## 2017-05-09 ASSESSMENT — ENCOUNTER SYMPTOMS
ABDOMINAL PAIN: 0
PALPITATIONS: 0
WEIGHT LOSS: 0
DOUBLE VISION: 0
NAUSEA: 0
DIZZINESS: 0
TREMORS: 0
ORTHOPNEA: 0
NECK PAIN: 0
PHOTOPHOBIA: 0
HEADACHES: 0
COUGH: 0
TINGLING: 0
CHILLS: 0
BLURRED VISION: 0
SPUTUM PRODUCTION: 0
FEVER: 0
MYALGIAS: 0
BACK PAIN: 0
HEMOPTYSIS: 0
HEARTBURN: 0

## 2017-05-09 ASSESSMENT — ACTIVITIES OF DAILY LIVING (ADL): TOILETING: INDEPENDENT

## 2017-05-09 ASSESSMENT — PAIN SCALES - GENERAL
PAINLEVEL_OUTOF10: 0
PAINLEVEL_OUTOF10: 0

## 2017-05-09 NOTE — PROGRESS NOTES
Hospital Medicine Progress Note, Adult, Complex               Author: Marcos Holden Date & Time created: 5/9/2017  10:07 AM     Interval History:  89YR OLD WITH VERTIGO AND CONSTANTINO.    Review of Systems:  Review of Systems   Constitutional: Negative for fever, chills and weight loss.   HENT: Negative for ear pain and hearing loss.    Eyes: Negative for blurred vision, double vision and photophobia.   Respiratory: Negative for cough, hemoptysis and sputum production.    Cardiovascular: Negative for chest pain, palpitations and orthopnea.   Gastrointestinal: Negative for heartburn, nausea and abdominal pain.   Genitourinary: Negative for dysuria, urgency and frequency.   Musculoskeletal: Negative for myalgias, back pain and neck pain.   Skin: Negative for itching.   Neurological: Negative for dizziness, tingling, tremors and headaches.       Physical Exam:  Physical Exam   Constitutional: No distress.   HENT:   Head: Normocephalic and atraumatic.   Eyes: Right eye exhibits no discharge. Left eye exhibits no discharge.   Neck: Neck supple. No JVD present.   Cardiovascular: Normal rate and regular rhythm.    Pulmonary/Chest: No stridor. No respiratory distress. She has no wheezes. She has no rales.   Abdominal: She exhibits no distension. There is no tenderness. There is no rebound.   Neurological: She is alert.   Skin: Skin is warm and dry. She is not diaphoretic.       Labs:  Recent Labs      05/08/17   1420  05/08/17   1918   ISTATSPEC  Venous  Venous     Recent Labs      05/08/17   1420   TROPONINI  <0.02   BNPBTYPENAT  96     Recent Labs      05/08/17   1420   SODIUM  138   POTASSIUM  3.6   CHLORIDE  102   CO2  21   BUN  23*   CREATININE  1.43*   CALCIUM  9.1     Recent Labs      05/08/17   1420   ALTSGPT  13   ASTSGOT  22   ALKPHOSPHAT  93   TBILIRUBIN  0.7   GLUCOSE  156*     Recent Labs      05/08/17   1420   RBC  4.32   HEMOGLOBIN  13.4   HEMATOCRIT  40.0   PLATELETCT  240   PROTHROMBTM  13.5   INR  1.05      Recent Labs      17   1420   WBC  12.2*   NEUTSPOLYS  72.20*   LYMPHOCYTES  17.90*   MONOCYTES  7.10   EOSINOPHILS  1.50   BASOPHILS  0.70   ASTSGOT  22   ALTSGPT  13   ALKPHOSPHAT  93   TBILIRUBIN  0.7           Hemodynamics:  Temp (24hrs), Av.5 °C (97.7 °F), Min:36.1 °C (97 °F), Max:37.1 °C (98.7 °F)  Temperature: 36.3 °C (97.4 °F)  Pulse  Av.3  Min: 66  Max: 83Heart Rate (Monitored): 86  Blood Pressure : 114/76 mmHg, NIBP: 125/59 mmHg     Respiratory:    Respiration: 18, Pulse Oximetry: 92 %     Work Of Breathing / Effort: Increased Work of Breathing;Tachypnea  RUL Breath Sounds: Clear, RML Breath Sounds: Clear, RLL Breath Sounds: Diminished, MAGEN Breath Sounds: Clear, LLL Breath Sounds: Diminished  Fluids:    Intake/Output Summary (Last 24 hours) at 17 1007  Last data filed at 17 0900   Gross per 24 hour   Intake   1540 ml   Output      0 ml   Net   1540 ml     Weight: 71 kg (156 lb 8.4 oz)  GI/Nutrition:  Orders Placed This Encounter   Procedures   • Diet Order     Standing Status: Standing      Number of Occurrences: 1      Standing Expiration Date:      Order Specific Question:  Diet:     Answer:  Regular [1]     Medical Decision Making, by Problem:  Active Hospital Problems    Diagnosis   • Syncope [R55]     89YR OLD F WITH BENIGN POSITIONAL VERTIGO  HAS RESOLVED  MECLIZINE PRN  PT AND OT EVAL    ACUTE KIDNEY INJURY  MOST TRIANA 2ND TO DEHYDRATION  IV FLUID          DVT Prophylaxis: Heparin

## 2017-05-09 NOTE — PROGRESS NOTES
Bedside report received from Bridget ROLLE @ 0210. Assumed care. POC discussed. Pt resting comfortably in bed. Safety precautions in place.

## 2017-05-09 NOTE — PROGRESS NOTES
Bedside report received from Zenia ROLLE. Pt resting comfortably in bed. Safety precautions in place.

## 2017-05-09 NOTE — PROGRESS NOTES
Tele strip at 1854 shows sinus rhythm, RBBB w/ HR of 77.  Measurements: .18/.12/.46    Tele Shift Summary:  Rhythm: Sinus rhythm/sinus daniel, RBBB  Rate: 50's-60's  Ectopy: Per MT Harjinder, pt had no ectopy.    Telemetry monitoring strips placed in pt chart.

## 2017-05-09 NOTE — PROGRESS NOTES
Bedside report given to Delfino ROLLE. POC discussed. Pt resting comfortably in bed. Safety precautions in place.

## 2017-05-09 NOTE — H&P
"PRIMARY CARE PROVIDER:  None.    CHIEF COMPLAINT:  \"I am dizzy\".    HISTORY OF PRESENT ILLNESS:  This is a pleasant 89-year-old female who was   brought in by TABBY today from the local [employer] market.  Patient complained of   sudden onset dizziness associated with nausea and vomiting.  She did not lose   consciousness.  She says she has not been ill recently.  She has never had   this in the past.  She denies any headache.  She denies any ear ringing.    REVIEW OF SYSTEMS:  No fevers or chills.  She did vomit _____ during this   episode as above.  No change in bowel movements, no abdominal pain, and no   dysuria.    PAST MEDICAL HISTORY:  1.  Gout.  2.  Glaucoma.    ALLERGIES:  1.  ALLOPURINOL.  2.  ASPIRIN.  3.  CODEINE.  4.  LATEX.    CURRENT MEDICATIONS:  1.  Erythromycin ointment as needed to both eyes.  2.  Xalatan 0.005% one drop nightly, both eyes.  3.  Tylenol 500 mg every 6 hours as needed.  4.  Multivitamin.  5.  Probenecid one tablet a day.    PAST SURGICAL HISTORY:  1.  Hysterectomy.  2.  Right wrist surgery.  3.  Left leg surgery.  4.  Tonsillectomy.  5.  Appendectomy.    SOCIAL HISTORY:  No tobacco, alcohol, or drugs.    FAMILY HISTORY:  1.  Tuberculosis.  2.  Meningitis, unclear family member.    PHYSICAL EXAMINATION:  GENERAL:  Elderly female in no acute distress.  VITAL SIGNS:  Temperature 36.7, heart rate is 83, respirations are 17, pulse   oximetry 94% on 2 liters nasal cannula, and blood pressure 159/68.  LUNGS:  Clear to auscultation bilaterally.  HEART:  Regular rate and rhythm without murmur.  ABDOMEN:  Soft, nontender, and nondistended.  Normoactive bowel sounds.  No   organomegaly.  EXTREMITIES:  Joints are intact without drainage or effusion.  SKIN:  Intact without lesion or rash.  HEENT:  Oropharynx is clear.  Extraocular movements are intact.  NEUROLOGIC:  She is alert and oriented, otherwise nonfocal.  Sharla-Hallpike is   negative.    RESULTS REVIEW:  CBC shows a white blood cell count of " 12.2, creatinine is   1.43, BUN is 23, glucose 156, CO2 is 21, and lactic acid 2.17.  Cardiac   markers are normal including troponin and B peptide.  Coags are normal.    Urinalysis is normal.  CT of the head is normal.  Chest x-ray is unremarkable.    EKG shows normal sinus rhythm.  No ST changes.    ASSESSMENT:  1.  Acute onset dizziness associated with vomiting consistent with vertigo.  2.  Dehydration.  3.  Acute renal failure.  4.  Hyperglycemia with no history of diabetes, possibly reactive.  5.  Mild metabolic acidosis.  6.  Leukocytosis without evidence for bandemia or infection, likely reactive   versus hemoconcentration.    PLAN:  Patient will be admitted, hydrated.  She will be provided with as   needed antiemetics.  Laboratory work will be trended and creatinine will be   trended.  We will consider further workup without improvement.  She will be   continued on her previous medications.    Medical decision making is complex.       ____________________________________     MD LUISA ACOSTA / LEEANNA    DD:  05/08/2017 18:08:32  DT:  05/08/2017 20:09:04    D#:  [id number]  Job#:  [id number]

## 2017-05-09 NOTE — THERAPY
"Occupational Therapy Evaluation completed.   Functional Status:  A&Ox3. O2 @1L NC. Sup><sit with Sup. STS and ADL transfers with 4-wheeled walker, SBA. Dons socks with Luz. Toileting in BR with SBA. Grooming at sink with SBA.    Plan of Care: Plan to complete next treatment by 5/11.  Discharge Recommendations:  Equipment: TBD.  Life Alert? Meals on Wheels?    Discharge to home with home health for additional skilled therapy services.  Lives alone. Daughter lives in OR.  Fall hx.  Adament about returning to apt upon d/c.     See \"Rehab Therapy-Acute\" Patient Summary Report for complete documentation.    "

## 2017-05-09 NOTE — CARE PLAN
Problem: Communication  Goal: The ability to communicate needs accurately and effectively will improve  Outcome: PROGRESSING AS EXPECTED  Pt oriented to unit and call light system. POC discussed, questions/concerns addressed. Pt verbalizes understanding.    Problem: Safety  Goal: Will remain free from injury  Outcome: PROGRESSING AS EXPECTED  Safety precautions in place. Bed alarm on, CLIP, belongings in reach. Pt verbalizes understanding of safety.

## 2017-05-09 NOTE — CARE PLAN
Problem: Safety  Goal: Will remain free from falls  Outcome: PROGRESSING AS EXPECTED  Pt educated on importance of calling for assistance when needed. Furniture in place for ambulation. Safety precautions in place.    Problem: Bowel/Gastric:  Goal: Normal bowel function is maintained or improved  Outcome: PROGRESSING AS EXPECTED  POC discussed with pt. Concern raised with date of last BM. Pt agreed to take prescribed medication.

## 2017-05-09 NOTE — PROGRESS NOTES
Assessment and med pass completed. Pt AAOx4, no c/o pain. Admission completed by Nallely ROLLE. Pt provided with food/fluids, no problems swallowing. Skin intact. Pt states no other needs, bed alarm on, CLIP. Will monitor.

## 2017-05-09 NOTE — PROGRESS NOTES
Pt stated that she would like to rest for a while before ambulating again. Pt resting comfortably in bed. Safety precautions in place.

## 2017-05-09 NOTE — DISCHARGE PLANNING
Medical Social Work    Referral: Pt discussed at IDT rounds this AM.    Intervention: Per flowsheet, pt lives alone with children as support system and expects to d.c home.  Pt probable d.c home tomorrow.  No SS needs identified nor any requests for MARINA Gray during rounds.      Plan: SW available for any assistance with d.c planning.    Care Transition Team Assessment    Information Source  Orientation : Oriented x 4  Information Given By: Patient    Readmission Evaluation  Is this a readmission?: No    Elopement Risk  Legal Hold: No  Elopement Risk: Not at Risk for Elopement    Interdisciplinary Discharge Planning  Does Admitting Nurse Feel This Could be a Complex Discharge?: No  Primary Care Physician: N/A at this time  Lives with - Patient's Self Care Capacity: Alone and Able to Care For Self  Patient or legal guardian wants to designate a caregiver (see row info): No  Support Systems: Children  Housing / Facility: 1 Story Apartment / Condo  Do You Take your Prescribed Medications Regularly: Yes  Able to Return to Previous ADL's: Yes  Mobility Issues: Yes  Prior Services: None  Patient Expects to be Discharged to:: Home  Assistance Needed: Yes  Durable Medical Equipment: Walker    Discharge Preparedness  What is your plan after discharge?: Home with help  What are your discharge supports?: Child              Vision / Hearing Impairment  Vision Impairment : Yes  Right Eye Vision: Impaired, Wears Glasses (reading)  Left Eye Vision: Impaired, Wears Glasses (reading)  Hearing Impairment : No    Values / Beliefs / Concerns  Values / Beliefs Concerns : No  Special Hospitalization Concerns: N/A at this time    Advance Directive  Advance Directive?: None    Domestic Abuse  Have you ever been the victim of abuse or violence?: No  Physical Abuse or Sexual Abuse: No  Verbal Abuse or Emotional Abuse: No  Possible Abuse Reported to:: Not Applicable    Psychological Assessment  History of Substance Abuse: None          Anticipated Discharge Information  Anticipated discharge disposition: Home

## 2017-05-10 LAB
ANION GAP SERPL CALC-SCNC: 7 MMOL/L (ref 0–11.9)
BASOPHILS # BLD AUTO: 1 % (ref 0–1.8)
BASOPHILS # BLD: 0.08 K/UL (ref 0–0.12)
BUN SERPL-MCNC: 18 MG/DL (ref 8–22)
CALCIUM SERPL-MCNC: 8.3 MG/DL (ref 8.4–10.2)
CHLORIDE SERPL-SCNC: 112 MMOL/L (ref 96–112)
CO2 SERPL-SCNC: 21 MMOL/L (ref 20–33)
CREAT SERPL-MCNC: 1.25 MG/DL (ref 0.5–1.4)
EOSINOPHIL # BLD AUTO: 0.36 K/UL (ref 0–0.51)
EOSINOPHIL NFR BLD: 4.6 % (ref 0–6.9)
ERYTHROCYTE [DISTWIDTH] IN BLOOD BY AUTOMATED COUNT: 45.4 FL (ref 35.9–50)
GFR SERPL CREATININE-BSD FRML MDRD: 40 ML/MIN/1.73 M 2
GLUCOSE SERPL-MCNC: 105 MG/DL (ref 65–99)
HCT VFR BLD AUTO: 34.8 % (ref 37–47)
HGB BLD-MCNC: 11.5 G/DL (ref 12–16)
IMM GRANULOCYTES # BLD AUTO: 0.02 K/UL (ref 0–0.11)
IMM GRANULOCYTES NFR BLD AUTO: 0.3 % (ref 0–0.9)
LYMPHOCYTES # BLD AUTO: 2.58 K/UL (ref 1–4.8)
LYMPHOCYTES NFR BLD: 33.2 % (ref 22–41)
MCH RBC QN AUTO: 31 PG (ref 27–33)
MCHC RBC AUTO-ENTMCNC: 33 G/DL (ref 33.6–35)
MCV RBC AUTO: 93.8 FL (ref 81.4–97.8)
MONOCYTES # BLD AUTO: 1.03 K/UL (ref 0–0.85)
MONOCYTES NFR BLD AUTO: 13.3 % (ref 0–13.4)
NEUTROPHILS # BLD AUTO: 3.69 K/UL (ref 2–7.15)
NEUTROPHILS NFR BLD: 47.6 % (ref 44–72)
NRBC # BLD AUTO: 0 K/UL
NRBC BLD AUTO-RTO: 0 /100 WBC
PLATELET # BLD AUTO: 219 K/UL (ref 164–446)
PMV BLD AUTO: 9.7 FL (ref 9–12.9)
POTASSIUM SERPL-SCNC: 4.4 MMOL/L (ref 3.6–5.5)
RBC # BLD AUTO: 3.71 M/UL (ref 4.2–5.4)
SODIUM SERPL-SCNC: 140 MMOL/L (ref 135–145)
WBC # BLD AUTO: 7.8 K/UL (ref 4.8–10.8)

## 2017-05-10 PROCEDURE — 700105 HCHG RX REV CODE 258: Performed by: HOSPITALIST

## 2017-05-10 PROCEDURE — 94760 N-INVAS EAR/PLS OXIMETRY 1: CPT

## 2017-05-10 PROCEDURE — G8979 MOBILITY GOAL STATUS: HCPCS | Mod: CJ

## 2017-05-10 PROCEDURE — 97161 PT EVAL LOW COMPLEX 20 MIN: CPT

## 2017-05-10 PROCEDURE — 700111 HCHG RX REV CODE 636 W/ 250 OVERRIDE (IP): Performed by: FAMILY MEDICINE

## 2017-05-10 PROCEDURE — G8978 MOBILITY CURRENT STATUS: HCPCS | Mod: CJ

## 2017-05-10 PROCEDURE — 85025 COMPLETE CBC W/AUTO DIFF WBC: CPT

## 2017-05-10 PROCEDURE — 700102 HCHG RX REV CODE 250 W/ 637 OVERRIDE(OP): Performed by: HOSPITALIST

## 2017-05-10 PROCEDURE — 80048 BASIC METABOLIC PNL TOTAL CA: CPT

## 2017-05-10 PROCEDURE — 770020 HCHG ROOM/CARE - TELE (206)

## 2017-05-10 PROCEDURE — G8980 MOBILITY D/C STATUS: HCPCS | Mod: CJ

## 2017-05-10 PROCEDURE — A9270 NON-COVERED ITEM OR SERVICE: HCPCS | Performed by: HOSPITALIST

## 2017-05-10 PROCEDURE — 99232 SBSQ HOSP IP/OBS MODERATE 35: CPT | Performed by: FAMILY MEDICINE

## 2017-05-10 RX ADMIN — ACETAMINOPHEN 650 MG: 325 TABLET, FILM COATED ORAL at 01:36

## 2017-05-10 RX ADMIN — PROBENECID 500 MG: 500 TABLET, FILM COATED ORAL at 08:14

## 2017-05-10 RX ADMIN — LATANOPROST 1 DROP: 50 SOLUTION/ DROPS OPHTHALMIC at 21:23

## 2017-05-10 RX ADMIN — MINERAL OIL AND WHITE PETROLATUM 1 APPLICATION: 150; 830 OINTMENT OPHTHALMIC at 13:03

## 2017-05-10 RX ADMIN — SODIUM CHLORIDE: 9 INJECTION, SOLUTION INTRAVENOUS at 21:25

## 2017-05-10 RX ADMIN — HEPARIN SODIUM 5000 UNITS: 5000 INJECTION, SOLUTION INTRAVENOUS; SUBCUTANEOUS at 13:04

## 2017-05-10 RX ADMIN — POLYETHYLENE GLYCOL 3350 1 PACKET: 17 POWDER, FOR SOLUTION ORAL at 08:15

## 2017-05-10 RX ADMIN — MINERAL OIL AND WHITE PETROLATUM 1 APPLICATION: 150; 830 OINTMENT OPHTHALMIC at 08:15

## 2017-05-10 RX ADMIN — HEPARIN SODIUM 5000 UNITS: 5000 INJECTION, SOLUTION INTRAVENOUS; SUBCUTANEOUS at 06:10

## 2017-05-10 RX ADMIN — ERYTHROMYCIN 1 APPLICATION: 5 OINTMENT OPHTHALMIC at 21:23

## 2017-05-10 RX ADMIN — HEPARIN SODIUM 5000 UNITS: 5000 INJECTION, SOLUTION INTRAVENOUS; SUBCUTANEOUS at 21:23

## 2017-05-10 RX ADMIN — MINERAL OIL AND WHITE PETROLATUM 1 APPLICATION: 150; 830 OINTMENT OPHTHALMIC at 17:55

## 2017-05-10 ASSESSMENT — ENCOUNTER SYMPTOMS
CHILLS: 0
HEMOPTYSIS: 0
NECK PAIN: 0
DIZZINESS: 0
MYALGIAS: 0
PHOTOPHOBIA: 0
WEIGHT LOSS: 0
FEVER: 0
BLURRED VISION: 0
HEARTBURN: 0
NAUSEA: 0
HEADACHES: 0
VOMITING: 0
ORTHOPNEA: 0
DOUBLE VISION: 0
TREMORS: 0
TINGLING: 0
BACK PAIN: 0
COUGH: 0
PALPITATIONS: 0
SPUTUM PRODUCTION: 0

## 2017-05-10 ASSESSMENT — GAIT ASSESSMENTS
GAIT LEVEL OF ASSIST: SUPERVISED
DEVIATION: INCREASED BASE OF SUPPORT
ASSISTIVE DEVICE: 4 WHEEL WALKER
DISTANCE (FEET): 300

## 2017-05-10 ASSESSMENT — PAIN SCALES - GENERAL
PAINLEVEL_OUTOF10: 0
PAINLEVEL_OUTOF10: 0

## 2017-05-10 NOTE — DISCHARGE PLANNING
MARINA Gray met with pt to obtain  choice ([facility]) and faxed choice form to [facility] Miguelina for referral.  Pt to d.c tomorrow.  Pt needs SW to schedule a ride home with the [facility] van and a pair of pants size 14 or 16 tall.  MARINA called the  [phone] asking when they closet is available in order to see if they have her size pants for d.c tomorrow.

## 2017-05-10 NOTE — FACE TO FACE
Face to Face Supporting Documentation - Home Health    The encounter with this patient was in whole or in part the primary reason for home health admission.    Date of encounter:   Patient:                    MRN:                       YOB: 2017  Nora Hope  [id number]  8/6/1925     Home health to see patient for:  Skilled Nursing care for assessment, interventions & education    Skilled need for:  New Onset Medical Diagnosis FAILURE TO THRIVE    Skilled nursing interventions to include:  Comment: FAILURE TO THRIVE    Homebound status evidenced by:  Need the aid of supportive devices such as crutches, canes, wheelchairs or walkers. Leaving home requires a considerable and taxing effort. There is a normal inability to leave the home.    Community Physician to provide follow up care: Joaquín SHERMAN M.D.     Optional Interventions? Yes, Details: FAILURE TO THRIVE      I certify the face to face encounter for this home health care referral meets the CMS requirements and the encounter/clinical assessment with the patient was, in whole, or in part, for the medical condition(s) listed above, which is the primary reason for home health care. Based on my clinical findings: the service(s) are medically necessary, support the need for home health care, and the homebound criteria are met.  I certify that this patient has had a face to face encounter by myself.  Marcos Holden M.D. - NPI: [id number]

## 2017-05-10 NOTE — PROGRESS NOTES
Received report from day shift RN; care assumed. Pt is sitting up in bed-- denies current needs. CLIP, pt calling for assistance, fall precautions in place, will CTM.

## 2017-05-10 NOTE — THERAPY
"Physical Therapy Evaluation completed.   Bed Mobility:  Supine to Sit: Modified Independent  Transfers: Sit to Stand: Supervised  Gait: Level Of Assist: Supervised with 4-Wheel Walker       Plan of Care: Patient with no further skilled PT needs in the acute care setting at this time  Discharge Recommendations: Equipment: Will Continue to Assess for Equipment Needs. Post-acute therapy Discharge to home with outpatient or home health for additional skilled therapy services.    89 yo female admitted with syncope. Pt denies any symptoms at present. She is able to mobilize with her 4WW with supervision and appears to be near/at her baseline. She is adamant on refusing SNF rehab or increased services at home such as Meals on Wheels or Life Alert and appears to understand the benefits and risks. Pt to cont her HEP and will use her 4WW at all times. No further acute PT needs identified. Recommend Home Health Safety Evaluation to follow up. RN updated.     See \"Rehab Therapy-Acute\" Patient Summary Report for complete documentation.     "

## 2017-05-10 NOTE — PROGRESS NOTES
Hospital Medicine Progress Note, Adult, Complex               Author: Marcos Holden Date & Time created: 5/10/2017  11:56 AM     Interval History:  89YR OLD WITH VERTIGO AND CONSTANTINO    Review of Systems:  Review of Systems   Constitutional: Negative for fever, chills and weight loss.   HENT: Negative for hearing loss and tinnitus.    Eyes: Negative for blurred vision, double vision and photophobia.   Respiratory: Negative for cough, hemoptysis and sputum production.    Cardiovascular: Negative for chest pain, palpitations and orthopnea.   Gastrointestinal: Negative for heartburn, nausea and vomiting.   Genitourinary: Negative for dysuria, urgency and frequency.   Musculoskeletal: Negative for myalgias, back pain and neck pain.   Skin: Negative for itching and rash.   Neurological: Negative for dizziness, tingling, tremors and headaches.       Physical Exam:  Physical Exam   Constitutional: She is oriented to person, place, and time. No distress.   HENT:   Head: Normocephalic and atraumatic.   Eyes: Right eye exhibits no discharge. Left eye exhibits no discharge.   Neck: Neck supple. No JVD present.   Cardiovascular: Normal rate and regular rhythm.    Pulmonary/Chest: No stridor. No respiratory distress. She has no wheezes. She exhibits no tenderness.   Abdominal: She exhibits no distension. There is no tenderness. There is no rebound.   Musculoskeletal: She exhibits no edema or tenderness.   Neurological: She is alert and oriented to person, place, and time.   Skin: Skin is dry. She is not diaphoretic.       Labs:  Recent Labs      05/08/17   1420 05/08/17   1918   ISTATSPEC  Venous  Venous     Recent Labs      05/08/17   1420   TROPONINI  <0.02   BNPBTYPENAT  96     Recent Labs      05/08/17   1420  05/10/17   0424   SODIUM  138  140   POTASSIUM  3.6  4.4   CHLORIDE  102  112   CO2  21  21   BUN  23*  18   CREATININE  1.43*  1.25   CALCIUM  9.1  8.3*     Recent Labs      05/08/17   1420  05/10/17   0424    ALTSGPT  13   --    ASTSGOT  22   --    ALKPHOSPHAT  93   --    TBILIRUBIN  0.7   --    GLUCOSE  156*  105*     Recent Labs      17   1420  05/10/17   0424   RBC  4.32  3.71*   HEMOGLOBIN  13.4  11.5*   HEMATOCRIT  40.0  34.8*   PLATELETCT  240  219   PROTHROMBTM  13.5   --    INR  1.05   --      Recent Labs      17   1420  05/10/17   0424   WBC  12.2*  7.8   NEUTSPOLYS  72.20*  47.60   LYMPHOCYTES  17.90*  33.20   MONOCYTES  7.10  13.30   EOSINOPHILS  1.50  4.60   BASOPHILS  0.70  1.00   ASTSGOT  22   --    ALTSGPT  13   --    ALKPHOSPHAT  93   --    TBILIRUBIN  0.7   --            Hemodynamics:  Temp (24hrs), Av.4 °C (97.5 °F), Min:36.3 °C (97.3 °F), Max:36.4 °C (97.6 °F)  Temperature: 36.3 °C (97.3 °F)  Pulse  Av.4  Min: 60  Max: 83   Blood Pressure : 135/73 mmHg     Respiratory:    Respiration: 18, Pulse Oximetry: 94 %, O2 Daily Delivery Respiratory : Nasal Cannula     Work Of Breathing / Effort: Mild  RLL Breath Sounds: Diminished, LLL Breath Sounds: Diminished  Fluids:    Intake/Output Summary (Last 24 hours) at 05/10/17 1156  Last data filed at 05/10/17 0800   Gross per 24 hour   Intake   2960 ml   Output      0 ml   Net   2960 ml     Weight: 74 kg (163 lb 2.3 oz)  GI/Nutrition:  Orders Placed This Encounter   Procedures   • Diet Order     Standing Status: Standing      Number of Occurrences: 1      Standing Expiration Date:      Order Specific Question:  Diet:     Answer:  Regular [1]     Medical Decision Making, by Problem:  Active Hospital Problems    Diagnosis   • Syncope [R55]     89YR OLD F WITH BENIGN POSITIONAL VERTIGO  HAS RESOLVED  MECLIZINE PRN  PT AND OT EVAL    ACUTE KIDNEY INJURY  MOST LIKLEY 2ND TO DEHYDRATION  IV FLUID  RESOLVING    DISPOSITION  HOME TOMARROW  PT REFUSED ANY REHAB PLACEMENT        Valenzuela catheter: No Valenzuela      DVT Prophylaxis: Heparin

## 2017-05-10 NOTE — DISCHARGE PLANNING
Medical Social Work    Referral: Pt discussed at IDT rounds this AM.    Intervention: Per flowsheet, pt lives alone and expects to d.c home.  Pt recommended HH.  SW requested HH Face to face and order.  Pt probable d.c home tomorrow.      Plan: SW will obtain HH choice and submit once order is received.

## 2017-05-10 NOTE — PROGRESS NOTES
Bedside report received from [facility] nurseChayo RN. Assumed care. Pt resting in bed.

## 2017-05-10 NOTE — PROGRESS NOTES
NO acute changes in pt status. Pt is awake, watching tv in bed-- denies current needs. Pt verbalized tylenol and ice pack took care of her back pain. Call light in reach and bed alarm is on-- monitoring.     Telemetry Report: pt has been SR/SB.  HR: 50s-70s  Measurements: (16/10/40)  Ectopy: r PVC/PAC    Measurements and updates per Harjinder JACKSON.

## 2017-05-10 NOTE — CARE PLAN
Problem: Safety  Goal: Will remain free from injury  Outcome: PROGRESSING AS EXPECTED  Hourly rounding. Call light and personal possessions within reach. Pt encouraged to call for assistance. Bed in low position, bed alarm in use. Treaded slipper socks on pt. Appropriate signs in place.    Problem: Respiratory:  Goal: Respiratory status will improve  Outcome: PROGRESSING AS EXPECTED  Assess/monitor O2 saturation. Titrate oxygen to keep sats >90%. Encourage OOB and ambulate and use of IS. Pt encouraged to call for assistance for any respiratory distress.

## 2017-05-10 NOTE — CARE PLAN
"Problem: Safety  Goal: Will remain free from falls  Outcome: PROGRESSING AS EXPECTED  Pt educated regarding the use of call light when needing assistance. Pt demonstrated and verbalized the proper use of a call light and to call for assistance. Fall precautions in place, treaded slippers on, personal belongings/phone in reach. Pt is able to ambulate with SBA and agrees to call for assistance prior to getting OOB-- compliant thus far.     Problem: Venous Thromboembolism (VTW)/Deep Vein Thrombosis (DVT) Prevention:  Goal: Patient will participate in Venous Thrombosis (VTE)/Deep Vein Thrombosis (DVT)Prevention Measures  Outcome: PROGRESSING AS EXPECTED    05/10/17 0058   OTHER   Pharmacologic Prophylaxis Used Unfractionated Heparin         Problem: Bowel/Gastric:  Goal: Will not experience complications related to bowel motility  Outcome: PROGRESSING SLOWER THAN EXPECTED    05/09/17 2000   OTHER   Last BM 05/03/17  (per pt)     Pt claims she took multiple laxatives and had loose diarrhea on Wednesday and \"cleared her out\". Pt agreed to take 1 tablet of sennekot.         "

## 2017-05-10 NOTE — PROGRESS NOTES
Pt is a&oX4, pleasant and cooperative. No c/o pain or discomforts tonight. Pt verbalized she has not had a BM since last Wednesday and agrees to take one sennekot. RN attempted to wean pt off of supplemental O2, but pt is 88-89% on room air when sitting at the edge of the bed. Placed back on 2 L and pt is 92-94%. Denies any other needs or concerns. Call light is in reach.

## 2017-05-10 NOTE — PROGRESS NOTES
Assessment completed--see doc flowsheet. A&Ox4. Meds provided. Denies dizziness/lightheadedness at present. Miralax provided for constipation. Encouraged OOB and ambulate. POC discussed, verbalized understanding. Call light and personal possessions within reach, bed in low position, encouraged to call for assistance.

## 2017-05-11 ENCOUNTER — PATIENT OUTREACH (OUTPATIENT)
Dept: HEALTH INFORMATION MANAGEMENT | Facility: OTHER | Age: 82
End: 2017-05-11

## 2017-05-11 VITALS
HEART RATE: 81 BPM | DIASTOLIC BLOOD PRESSURE: 63 MMHG | WEIGHT: 163.14 LBS | SYSTOLIC BLOOD PRESSURE: 156 MMHG | BODY MASS INDEX: 24.73 KG/M2 | RESPIRATION RATE: 16 BRPM | TEMPERATURE: 97.3 F | OXYGEN SATURATION: 91 % | HEIGHT: 68 IN

## 2017-05-11 LAB
ANION GAP SERPL CALC-SCNC: 8 MMOL/L (ref 0–11.9)
BASOPHILS # BLD AUTO: 1 % (ref 0–1.8)
BASOPHILS # BLD: 0.09 K/UL (ref 0–0.12)
BUN SERPL-MCNC: 14 MG/DL (ref 8–22)
CALCIUM SERPL-MCNC: 8.8 MG/DL (ref 8.4–10.2)
CHLORIDE SERPL-SCNC: 111 MMOL/L (ref 96–112)
CO2 SERPL-SCNC: 21 MMOL/L (ref 20–33)
CREAT SERPL-MCNC: 1.25 MG/DL (ref 0.5–1.4)
EOSINOPHIL # BLD AUTO: 0.42 K/UL (ref 0–0.51)
EOSINOPHIL NFR BLD: 4.7 % (ref 0–6.9)
ERYTHROCYTE [DISTWIDTH] IN BLOOD BY AUTOMATED COUNT: 44.8 FL (ref 35.9–50)
GFR SERPL CREATININE-BSD FRML MDRD: 40 ML/MIN/1.73 M 2
GLUCOSE SERPL-MCNC: 94 MG/DL (ref 65–99)
HCT VFR BLD AUTO: 37.3 % (ref 37–47)
HGB BLD-MCNC: 12.4 G/DL (ref 12–16)
IMM GRANULOCYTES # BLD AUTO: 0.03 K/UL (ref 0–0.11)
IMM GRANULOCYTES NFR BLD AUTO: 0.3 % (ref 0–0.9)
LYMPHOCYTES # BLD AUTO: 3.53 K/UL (ref 1–4.8)
LYMPHOCYTES NFR BLD: 39.1 % (ref 22–41)
MCH RBC QN AUTO: 30.7 PG (ref 27–33)
MCHC RBC AUTO-ENTMCNC: 33.2 G/DL (ref 33.6–35)
MCV RBC AUTO: 92.3 FL (ref 81.4–97.8)
MONOCYTES # BLD AUTO: 0.96 K/UL (ref 0–0.85)
MONOCYTES NFR BLD AUTO: 10.6 % (ref 0–13.4)
NEUTROPHILS # BLD AUTO: 4 K/UL (ref 2–7.15)
NEUTROPHILS NFR BLD: 44.3 % (ref 44–72)
NRBC # BLD AUTO: 0 K/UL
NRBC BLD AUTO-RTO: 0 /100 WBC
PLATELET # BLD AUTO: 251 K/UL (ref 164–446)
PMV BLD AUTO: 9.9 FL (ref 9–12.9)
POTASSIUM SERPL-SCNC: 4.3 MMOL/L (ref 3.6–5.5)
RBC # BLD AUTO: 4.04 M/UL (ref 4.2–5.4)
SODIUM SERPL-SCNC: 140 MMOL/L (ref 135–145)
WBC # BLD AUTO: 9 K/UL (ref 4.8–10.8)

## 2017-05-11 PROCEDURE — A9270 NON-COVERED ITEM OR SERVICE: HCPCS | Performed by: HOSPITALIST

## 2017-05-11 PROCEDURE — 700111 HCHG RX REV CODE 636 W/ 250 OVERRIDE (IP): Performed by: FAMILY MEDICINE

## 2017-05-11 PROCEDURE — 85025 COMPLETE CBC W/AUTO DIFF WBC: CPT

## 2017-05-11 PROCEDURE — 99239 HOSP IP/OBS DSCHRG MGMT >30: CPT | Performed by: FAMILY MEDICINE

## 2017-05-11 PROCEDURE — 700102 HCHG RX REV CODE 250 W/ 637 OVERRIDE(OP): Performed by: HOSPITALIST

## 2017-05-11 PROCEDURE — 700105 HCHG RX REV CODE 258: Performed by: HOSPITALIST

## 2017-05-11 PROCEDURE — 80048 BASIC METABOLIC PNL TOTAL CA: CPT

## 2017-05-11 RX ORDER — AMLODIPINE BESYLATE 2.5 MG/1
2.5 TABLET ORAL DAILY
Qty: 30 TAB | Refills: 0 | Status: SHIPPED | OUTPATIENT
Start: 2017-05-11 | End: 2017-06-14 | Stop reason: SDUPTHER

## 2017-05-11 RX ADMIN — POLYETHYLENE GLYCOL 3350 1 PACKET: 17 POWDER, FOR SOLUTION ORAL at 08:55

## 2017-05-11 RX ADMIN — PROBENECID 500 MG: 500 TABLET, FILM COATED ORAL at 09:08

## 2017-05-11 RX ADMIN — SODIUM CHLORIDE: 9 INJECTION, SOLUTION INTRAVENOUS at 06:00

## 2017-05-11 RX ADMIN — HEPARIN SODIUM 5000 UNITS: 5000 INJECTION, SOLUTION INTRAVENOUS; SUBCUTANEOUS at 06:00

## 2017-05-11 ASSESSMENT — PAIN SCALES - GENERAL: PAINLEVEL_OUTOF10: 0

## 2017-05-11 ASSESSMENT — LIFESTYLE VARIABLES
EVER_SMOKED: NEVER
EVER_SMOKED: NEVER

## 2017-05-11 NOTE — PROGRESS NOTES
"Pt is a&ox4, pleasant and cooperative. Denies any pain or discomforts tonight. Pt has not had a BM \"since last Wednesday\", but is adamantly refusing to escalate the bowel protocol. RN attempted to educate the pt regarding bowel protocol but pt continues to refuse stating, \"I don't want to mix different medications! I want to take miralax\". Pt has taken miralax today and is only ordered as once daily. Pt requires 1 person assistance to and from bathroom. NO acute changes or concerns. Call light is in reach and BA is set.    "

## 2017-05-11 NOTE — PROGRESS NOTES
NO acute changes in pt status. Pt is assisted to and from the bathroom with CNA-- denies other needs. Call light in reach and bed alarm is set once back in bed-- monitoring.     Telemetry Report: pt has been SR/SB.  HR: 50s-70s  Measurements: (20/08/44)  Ectopy: r PVC    Measurements and updates per Ally JACKSON.

## 2017-05-11 NOTE — DISCHARGE PLANNING
MARINA Gray received a call from [facility] stating [facility] transport will  pt at 1330.  MARINA informed bs RN Idalia.

## 2017-05-11 NOTE — PROGRESS NOTES
Report given to NOC nurse, Chayo ROLLE. Pt remains calm and cooperative. All lines remain patent. Safety precautions remain in place. POC discussed with pt and RN at the bedside.

## 2017-05-11 NOTE — PROGRESS NOTES
Received report from day shift RN; care assumed. Pt is sitting up in bed, drinking tea-- denies current needs. CLIP, pt calling for assistance, fall precautions in place (bed alarm is set), will CTM.

## 2017-05-11 NOTE — DISCHARGE PLANNING
MARINA Gray received a call from Marina,  who offered to bring up a couple of pairs of pants in pt's size to see which ones fit best.  MARINA gave her pt's room number.

## 2017-05-11 NOTE — DISCHARGE SUMMARY
DATE OF ADMISSION:  05/08/2017    DATE OF DISCHARGE:  05/11/2017    HISTORY OF PRESENT ILLNESS:  This is a 89-year-old female who was brought to   the emergency room from [place].  Patient apparently had complaint of   sudden onset of dizziness associated with nausea and vomiting.  Patient was   diagnosed with probable benign positional vertigo, also was noted to be   dehydrated.  Admitted to the hospital for further management.    HOSPITAL PRESENTATION:  During the hospital stay, patient was found to have   acute kidney injury, was started on IV fluid.  Patient also was started on   meclizine, but has not required any meclizine since dizziness has resolved.    Patient also is noted to have elevated blood pressure, patient is started on   amlodipine 2.5 mg p.o. daily.    Patient worked with physical therapist; however, she is adamant that she is   not to undergo to any rehab place, I have spoken to her multiple times with   the nurses in the room, she does not want to go to the rehab, she has refused.    At this time, patient will be discharged home in stable condition.    TODAY'S PHYSICAL EXAMINATION:  VITAL SIGNS:  Temperature of 36.5, pulse of 70, respiratory rate of 18, blood   pressure of 152/64, O2 saturation 96% on room air.  GENERAL APPEARANCE:  Patient is awake, alert, oriented x3, in no acute   distress.  NEUROLOGIC:  Cranial nerves II-XII intact.  HEENT/NECK:  Supple.  Oral cavity is moist.  No jugular venous distention   noted.  No icterus noted in both eyes.  CARDIOVASCULAR:  S1, S2, regular.  LUNGS:  Decreased breath sounds, otherwise clear.  ABDOMEN:  Soft, nontender.  EXTREMITIES:  Lower extremities, no edema or rash noted.    LABORATORY DATA:  WBC of 9000, H and H 12.4 and 37.3, platelets of 251,000.    Sodium is 140, potassium 4.3, chloride 111, bicarbonate 21, BUN of 14,   creatinine 1.25, at the time of presentation was 1.43.  Troponins are negative   x2.  UA was done and it was negative on  the day of presentation.    CT of the head was done, no acute intracranial findings, no evidence of acute   intracranial hemorrhage or mass lesion.  White matter lucency most consistent   with chronic small vessel ischemic change versus demyelination or gliosis.    Near complete opacification of the paranasal sinuses in keeping with the   sinusitis.  Also, patient had a chest x-ray that was also negative.    ASSESSMENT AND PLAN:  This is a 89-year-old female with dizziness, acute   kidney injury and also question of benign positional vertigo and hypertension.  1.  For benign positional vertigo:  Has resolved.  2.  For dizziness:  Most likely secondary to dehydration.  3.  For cute kidney injury:  A.  Has improved.  B.  Status post IV hydration.  4.  For hypertension:  A.  Patient will be on Norvasc 2.5 mg p.o. daily.  B.  Needs to follow up with her primary care physician in 1 week.  5.  For rehab placement:  Patient adamantly refused to go to any rehab.    Patient is to followup with PCP in 1 week.       ____________________________________     MD ALEKSANDRA Marcos / LEEANNA    DD:  05/11/2017 09:03:26  DT:  05/11/2017 09:20:39    D#:  [id number]  Job#:  [id number]    cc: _____ _____

## 2017-05-11 NOTE — DISCHARGE INSTRUCTIONS
Discharge Instructions    Discharged to home by [facility] with self. Discharged via wheelchair, hospital escort: Yes.  Special equipment needed: Not Applicable    Be sure to schedule a follow-up appointment with your primary care doctor or any specialists as instructed.     Discharge Plan:   Diet Plan: Discussed  Activity Level: Discussed  Confirmed Follow up Appointment: Patient to Call and Schedule Appointment  Confirmed Symptoms Management: Discussed  Medication Reconciliation Updated: Yes  Influenza Vaccine Indication: Patient Refuses    I understand that a diet low in cholesterol, fat, and sodium is recommended for good health. Unless I have been given specific instructions below for another diet, I accept this instruction as my diet prescription.       Special Instructions: Syncope  Syncope is a medical term for fainting or passing out. This means you lose consciousness and drop to the ground. People are generally unconscious for less than 5 minutes. You may have some muscle twitches for up to 15 seconds before waking up and returning to normal. Syncope occurs more often in older adults, but it can happen to anyone. While most causes of syncope are not dangerous, syncope can be a sign of a serious medical problem. It is important to seek medical care.   CAUSES   Syncope is caused by a sudden drop in blood flow to the brain. The specific cause is often not determined. Factors that can bring on syncope include:  · Taking medicines that lower blood pressure.  · Sudden changes in posture, such as standing up quickly.  · Taking more medicine than prescribed.  · Standing in one place for too long.  · Seizure disorders.  · Dehydration and excessive exposure to heat.  · Low blood sugar (hypoglycemia).  · Straining to have a bowel movement.  · Heart disease, irregular heartbeat, or other circulatory problems.  · Fear, emotional distress, seeing blood, or severe pain.  SYMPTOMS   Right before fainting, you may:  · Feel  dizzy or light-headed.  · Feel nauseous.  · See all white or all black in your field of vision.  · Have cold, clammy skin.  DIAGNOSIS   Your health care provider will ask about your symptoms, perform a physical exam, and perform an electrocardiogram (ECG) to record the electrical activity of your heart. Your health care provider may also perform other heart or blood tests to determine the cause of your syncope which may include:  · Transthoracic echocardiogram (TTE). During echocardiography, sound waves are used to evaluate how blood flows through your heart.  · Transesophageal echocardiogram (DONNA).  · Cardiac monitoring. This allows your health care provider to monitor your heart rate and rhythm in real time.  · Holter monitor. This is a portable device that records your heartbeat and can help diagnose heart arrhythmias. It allows your health care provider to track your heart activity for several days, if needed.  · Stress tests by exercise or by giving medicine that makes the heart beat faster.  TREATMENT   In most cases, no treatment is needed. Depending on the cause of your syncope, your health care provider may recommend changing or stopping some of your medicines.  HOME CARE INSTRUCTIONS  · Have someone stay with you until you feel stable.  · Do not drive, use machinery, or play sports until your health care provider says it is okay.  · Keep all follow-up appointments as directed by your health care provider.  · Lie down right away if you start feeling like you might faint. Breathe deeply and steadily. Wait until all the symptoms have passed.  · Drink enough fluids to keep your urine clear or pale yellow.  · If you are taking blood pressure or heart medicine, get up slowly and take several minutes to sit and then stand. This can reduce dizziness.  SEEK IMMEDIATE MEDICAL CARE IF:   · You have a severe headache.  · You have unusual pain in the chest, abdomen, or back.  · You are bleeding from your mouth or  rectum, or you have black or tarry stool.  · You have an irregular or very fast heartbeat.  · You have pain with breathing.  · You have repeated fainting or seizure-like jerking during an episode.  · You faint when sitting or lying down.  · You have confusion.  · You have trouble walking.  · You have severe weakness.  · You have vision problems.  If you fainted, call your local emergency services (911 in [place] Do not drive yourself to the hospital.      This information is not intended to replace advice given to you by your health care provider. Make sure you discuss any questions you have with your health care provider.     Document Released: 12/18/2006 Document Revised: 05/03/2016 Document Reviewed: 02/15/2013  [employer] Patient Education ©2016 [employer]  Amlodipine tablets  What is this medicine?  AMLODIPINE (am JEFFERY di peen) is a calcium-channel blocker. It affects the amount of calcium found in your heart and muscle cells. This relaxes your blood vessels, which can reduce the amount of work the heart has to do. This medicine is used to lower high blood pressure. It is also used to prevent chest pain.  This medicine may be used for other purposes; ask your health care provider or pharmacist if you have questions.  COMMON BRAND NAME(S): Norvasc  What should I tell my health care provider before I take this medicine?  They need to know if you have any of these conditions:  -heart problems like heart failure or aortic stenosis  -liver disease  -an unusual or allergic reaction to amlodipine, other medicines, foods, dyes, or preservatives  -pregnant or trying to get pregnant  -breast-feeding  How should I use this medicine?  Take this medicine by mouth with a glass of water. Follow the directions on the prescription label. Take your medicine at regular intervals. Do not take more medicine than directed.  Talk to your pediatrician regarding the use of this medicine in children. Special care may be  needed. This medicine has been used in children as young as 6.  Persons over 65 years old may have a stronger reaction to this medicine and need smaller doses.  Overdosage: If you think you have taken too much of this medicine contact a poison control center or emergency room at once.  NOTE: This medicine is only for you. Do not share this medicine with others.  What if I miss a dose?  If you miss a dose, take it as soon as you can. If it is almost time for your next dose, take only that dose. Do not take double or extra doses.  What may interact with this medicine?  -herbal or dietary supplements  -local or general anesthetics  -medicines for high blood pressure  -medicines for prostate problems  -rifampin  This list may not describe all possible interactions. Give your health care provider a list of all the medicines, herbs, non-prescription drugs, or dietary supplements you use. Also tell them if you smoke, drink alcohol, or use illegal drugs. Some items may interact with your medicine.  What should I watch for while using this medicine?  Visit your doctor or health care professional for regular check ups. Check your blood pressure and pulse rate regularly. Ask your health care professional what your blood pressure and pulse rate should be, and when you should contact him or her.  This medicine may make you feel confused, dizzy or lightheaded. Do not drive, use machinery, or do anything that needs mental alertness until you know how this medicine affects you. To reduce the risk of dizzy or fainting spells, do not sit or stand up quickly, especially if you are an older patient. Avoid alcoholic drinks; they can make you more dizzy.  Do not suddenly stop taking amlodipine. Ask your doctor or health care professional how you can gradually reduce the dose.  What side effects may I notice from receiving this medicine?  Side effects that you should report to your doctor or health care professional as soon as  possible:  -allergic reactions like skin rash, itching or hives, swelling of the face, lips, or tongue  -breathing problems  -changes in vision or hearing  -chest pain  -fast, irregular heartbeat  -swelling of legs or ankles  Side effects that usually do not require medical attention (report to your doctor or health care professional if they continue or are bothersome):  -dry mouth  -facial flushing  -nausea, vomiting  -stomach gas, pain  -tired, weak  -trouble sleeping  This list may not describe all possible side effects. Call your doctor for medical advice about side effects. You may report side effects to FDA at 4-655-MWR-6534.  Where should I keep my medicine?  Keep out of the reach of children.  Store at room temperature between 59 and 86 degrees F (15 and 30 degrees C). Protect from light. Keep container tightly closed. Throw away any unused medicine after the expiration date.  NOTE: This sheet is a summary. It may not cover all possible information. If you have questions about this medicine, talk to your doctor, pharmacist, or health care provider.  © 2014, [employer]vier/Gold Standard. (11/15/2013 11:40:58 AM)        · Is patient discharged on Warfarin / Coumadin?   No     · Is patient Post Blood Transfusion?  No      Depression / Suicide Risk    As you are discharged from this [facility] facility, it is important to learn how to keep safe from harming yourself.    Recognize the warning signs:  · Abrupt changes in personality, positive or negative- including increase in energy   · Giving away possessions  · Change in eating patterns- significant weight changes-  positive or negative  · Change in sleeping patterns- unable to sleep or sleeping all the time   · Unwillingness or inability to communicate  · Depression  · Unusual sadness, discouragement and loneliness  · Talk of wanting to die  · Neglect of personal appearance   · Rebelliousness- reckless behavior  · Withdrawal from people/activities they  love  · Confusion- inability to concentrate     If you or a loved one observes any of these behaviors or has concerns about self-harm, here's what you can do:  · Talk about it- your feelings and reasons for harming yourself  · Remove any means that you might use to hurt yourself (examples: pills, rope, extension cords, firearm)  · Get professional help from the community (Mental Health, Substance Abuse, psychological counseling)  · Do not be alone:Call your Safe Contact- someone whom you trust who will be there for you.  · Call your local CRISIS HOTLINE 722-9338 or 766-897-7760  · Call your local Children's Mobile Crisis Response Team [place] (699) 796-9408 or www.PublicStuff  · Call the toll free National Suicide Prevention Hotlines   · National Suicide Prevention Lifeline 312-625-EUEB (5235)  · National Hope Line Network 800-SUICIDE (661-3597)

## 2017-05-11 NOTE — DISCHARGE PLANNING
[facility] has accepted patient. Received transport form from Marina(MARINA). Arranged patient's transport home at 1330 via [facility]. [facility](MARINA) notified of transport time via phone.    Task ID#[id number]

## 2017-05-11 NOTE — DISCHARGE PLANNING
Medical Social Work    Referral: Pt discussed at IDT rounds this AM.    Intervention: [facility] is pending.  Pt needs pants and disposable underwear prior to d.c.    Plan: MARINA will follow up to make sure [facility] accepted and will arrange transport via [facility] for 1100.   MARINA Gray faxed and then called [facility] Kaylee to request follow up with [facility] and to set up  time with [facility].  [facility] Kaylee will set up and call MARINA back.

## 2017-05-11 NOTE — CARE PLAN
"Problem: Safety  Goal: Will remain free from falls  Outcome: PROGRESSING AS EXPECTED  Pt educated regarding the use of call light when needing assistance. Pt demonstrated and verbalized the proper use of a call light and to call for assistance. Fall precautions in place, treaded slippers on, personal belongings/phone in reach. Pt has a steady gate ambulating with a FWW and one person assistance.  Bed alarm is set.     Problem: Bowel/Gastric:  Goal: Normal bowel function is maintained or improved  Outcome: PROGRESSING SLOWER THAN EXPECTED  Pt is constipated and has not had a BM since \"last Wednesday\". Pt refused escalating bowel protocol tonight.        "

## 2017-05-11 NOTE — PROGRESS NOTES
0815Report received, plan of care reviewed and discussed, assessment complete, oriented to room, bed alarm on, nonskid socks applied, advised to call for assistance.   1015 Discussed plan of care, encouraged and answered all questions, will continue to monitor.  Cardiac summary.  Sinus rhythm (0.18/0.10/0.40)

## 2017-05-13 ENCOUNTER — RESOLUTE PROFESSIONAL BILLING HOSPITAL PROF FEE (OUTPATIENT)
Dept: HOSPITALIST | Facility: MEDICAL CENTER | Age: 82
End: 2017-05-13
Payer: MEDICARE

## 2017-05-13 ENCOUNTER — APPOINTMENT (OUTPATIENT)
Dept: RADIOLOGY | Facility: MEDICAL CENTER | Age: 82
DRG: 312 | End: 2017-05-13
Attending: INTERNAL MEDICINE
Payer: MEDICARE

## 2017-05-13 ENCOUNTER — HOSPITAL ENCOUNTER (INPATIENT)
Facility: MEDICAL CENTER | Age: 82
LOS: 3 days | DRG: 312 | End: 2017-05-16
Attending: EMERGENCY MEDICINE | Admitting: INTERNAL MEDICINE
Payer: MEDICARE

## 2017-05-13 ENCOUNTER — APPOINTMENT (OUTPATIENT)
Dept: RADIOLOGY | Facility: MEDICAL CENTER | Age: 82
DRG: 312 | End: 2017-05-13
Attending: EMERGENCY MEDICINE
Payer: MEDICARE

## 2017-05-13 DIAGNOSIS — Z91.81 AT RISK FOR FALLS: ICD-10-CM

## 2017-05-13 DIAGNOSIS — R55 POSTURAL DIZZINESS WITH PRESYNCOPE: ICD-10-CM
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
## 2017-05-13 DIAGNOSIS — Z85.840 HISTORY OF EYE CANCER: ICD-10-CM

## 2017-05-13 DIAGNOSIS — R42 POSTURAL DIZZINESS WITH PRESYNCOPE: ICD-10-CM
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
## 2017-05-13 DIAGNOSIS — H35.30 MACULAR DEGENERATION OF LEFT EYE: ICD-10-CM

## 2017-05-13 LAB
ALBUMIN SERPL BCP-MCNC: 3.1 G/DL (ref 3.2–4.9)
ALBUMIN/GLOB SERPL: 0.8 G/DL
ALP SERPL-CCNC: 89 U/L (ref 30–99)
ALT SERPL-CCNC: 23 U/L (ref 2–50)
ANION GAP SERPL CALC-SCNC: 8 MMOL/L (ref 0–11.9)
AST SERPL-CCNC: 34 U/L (ref 12–45)
BASOPHILS # BLD AUTO: 0.7 % (ref 0–1.8)
BASOPHILS # BLD: 0.07 K/UL (ref 0–0.12)
BILIRUB SERPL-MCNC: 0.5 MG/DL (ref 0.1–1.5)
BUN SERPL-MCNC: 16 MG/DL (ref 8–22)
CALCIUM SERPL-MCNC: 9.4 MG/DL (ref 8.4–10.2)
CHLORIDE SERPL-SCNC: 107 MMOL/L (ref 96–112)
CO2 SERPL-SCNC: 23 MMOL/L (ref 20–33)
CREAT SERPL-MCNC: 1.19 MG/DL (ref 0.5–1.4)
EOSINOPHIL # BLD AUTO: 0.21 K/UL (ref 0–0.51)
EOSINOPHIL NFR BLD: 2.2 % (ref 0–6.9)
ERYTHROCYTE [DISTWIDTH] IN BLOOD BY AUTOMATED COUNT: 45 FL (ref 35.9–50)
GFR SERPL CREATININE-BSD FRML MDRD: 42 ML/MIN/1.73 M 2
GLOBULIN SER CALC-MCNC: 3.7 G/DL (ref 1.9–3.5)
GLUCOSE SERPL-MCNC: 96 MG/DL (ref 65–99)
HCT VFR BLD AUTO: 38.9 % (ref 37–47)
HGB BLD-MCNC: 12.9 G/DL (ref 12–16)
IMM GRANULOCYTES # BLD AUTO: 0.05 K/UL (ref 0–0.11)
IMM GRANULOCYTES NFR BLD AUTO: 0.5 % (ref 0–0.9)
LYMPHOCYTES # BLD AUTO: 2.65 K/UL (ref 1–4.8)
LYMPHOCYTES NFR BLD: 28.3 % (ref 22–41)
MAGNESIUM SERPL-MCNC: 2 MG/DL (ref 1.5–2.5)
MCH RBC QN AUTO: 30.9 PG (ref 27–33)
MCHC RBC AUTO-ENTMCNC: 33.2 G/DL (ref 33.6–35)
MCV RBC AUTO: 93.1 FL (ref 81.4–97.8)
MONOCYTES # BLD AUTO: 1.09 K/UL (ref 0–0.85)
MONOCYTES NFR BLD AUTO: 11.6 % (ref 0–13.4)
NEUTROPHILS # BLD AUTO: 5.29 K/UL (ref 2–7.15)
NEUTROPHILS NFR BLD: 56.7 % (ref 44–72)
NRBC # BLD AUTO: 0 K/UL
NRBC BLD AUTO-RTO: 0 /100 WBC
PLATELET # BLD AUTO: 243 K/UL (ref 164–446)
PMV BLD AUTO: 9.6 FL (ref 9–12.9)
POTASSIUM SERPL-SCNC: 3.8 MMOL/L (ref 3.6–5.5)
PROT SERPL-MCNC: 6.8 G/DL (ref 6–8.2)
RBC # BLD AUTO: 4.18 M/UL (ref 4.2–5.4)
SODIUM SERPL-SCNC: 138 MMOL/L (ref 135–145)
TSH SERPL DL<=0.005 MIU/L-ACNC: 2.66 UIU/ML (ref 0.35–5.5)
WBC # BLD AUTO: 9.4 K/UL (ref 4.8–10.8)

## 2017-05-13 PROCEDURE — 72131 CT LUMBAR SPINE W/O DYE: CPT

## 2017-05-13 PROCEDURE — 770020 HCHG ROOM/CARE - TELE (206)

## 2017-05-13 PROCEDURE — 99223 1ST HOSP IP/OBS HIGH 75: CPT | Mod: AI | Performed by: INTERNAL MEDICINE

## 2017-05-13 PROCEDURE — 700101 HCHG RX REV CODE 250: Performed by: INTERNAL MEDICINE

## 2017-05-13 PROCEDURE — 70450 CT HEAD/BRAIN W/O DYE: CPT

## 2017-05-13 PROCEDURE — 85025 COMPLETE CBC W/AUTO DIFF WBC: CPT

## 2017-05-13 PROCEDURE — 84443 ASSAY THYROID STIM HORMONE: CPT

## 2017-05-13 PROCEDURE — 71010 DX-CHEST-PORTABLE (1 VIEW): CPT

## 2017-05-13 PROCEDURE — 80053 COMPREHEN METABOLIC PANEL: CPT

## 2017-05-13 PROCEDURE — 700111 HCHG RX REV CODE 636 W/ 250 OVERRIDE (IP): Performed by: INTERNAL MEDICINE

## 2017-05-13 PROCEDURE — 83036 HEMOGLOBIN GLYCOSYLATED A1C: CPT

## 2017-05-13 PROCEDURE — 83735 ASSAY OF MAGNESIUM: CPT

## 2017-05-13 PROCEDURE — 36415 COLL VENOUS BLD VENIPUNCTURE: CPT

## 2017-05-13 PROCEDURE — 99285 EMERGENCY DEPT VISIT HI MDM: CPT

## 2017-05-13 PROCEDURE — 94760 N-INVAS EAR/PLS OXIMETRY 1: CPT

## 2017-05-13 RX ORDER — BISACODYL 10 MG
10 SUPPOSITORY, RECTAL RECTAL
Status: DISCONTINUED | OUTPATIENT
Start: 2017-05-13 | End: 2017-05-16 | Stop reason: HOSPADM

## 2017-05-13 RX ORDER — AMLODIPINE BESYLATE 2.5 MG/1
2.5 TABLET ORAL EVERY EVENING
COMMUNITY
End: 2018-03-16

## 2017-05-13 RX ORDER — POLYETHYLENE GLYCOL 3350 17 G/17G
1 POWDER, FOR SOLUTION ORAL
Status: DISCONTINUED | OUTPATIENT
Start: 2017-05-13 | End: 2017-05-16 | Stop reason: HOSPADM

## 2017-05-13 RX ORDER — ERYTHROMYCIN 5 MG/G
1 OINTMENT OPHTHALMIC NIGHTLY
Status: DISCONTINUED | OUTPATIENT
Start: 2017-05-13 | End: 2017-05-16 | Stop reason: HOSPADM

## 2017-05-13 RX ORDER — ERYTHROMYCIN 5 MG/G
OINTMENT OPHTHALMIC
Status: DISPENSED
Start: 2017-05-13 | End: 2017-05-14

## 2017-05-13 RX ORDER — HEPARIN SODIUM 5000 [USP'U]/ML
5000 INJECTION, SOLUTION INTRAVENOUS; SUBCUTANEOUS EVERY 8 HOURS
Status: DISCONTINUED | OUTPATIENT
Start: 2017-05-13 | End: 2017-05-16 | Stop reason: HOSPADM

## 2017-05-13 RX ORDER — AMOXICILLIN 250 MG
2 CAPSULE ORAL 2 TIMES DAILY PRN
Status: DISCONTINUED | OUTPATIENT
Start: 2017-05-13 | End: 2017-05-16 | Stop reason: HOSPADM

## 2017-05-13 RX ORDER — AMLODIPINE BESYLATE 5 MG/1
2.5 TABLET ORAL DAILY
Status: DISCONTINUED | OUTPATIENT
Start: 2017-05-14 | End: 2017-05-16 | Stop reason: HOSPADM

## 2017-05-13 RX ORDER — PROBENECID 500 MG/1
500 TABLET, FILM COATED ORAL DAILY
Status: DISCONTINUED | OUTPATIENT
Start: 2017-05-14 | End: 2017-05-16 | Stop reason: HOSPADM

## 2017-05-13 RX ORDER — ONDANSETRON 4 MG/1
4 TABLET, ORALLY DISINTEGRATING ORAL EVERY 4 HOURS PRN
Status: DISCONTINUED | OUTPATIENT
Start: 2017-05-13 | End: 2017-05-16 | Stop reason: HOSPADM

## 2017-05-13 RX ORDER — LATANOPROST 50 UG/ML
1 SOLUTION/ DROPS OPHTHALMIC NIGHTLY
Status: DISCONTINUED | OUTPATIENT
Start: 2017-05-13 | End: 2017-05-16 | Stop reason: HOSPADM

## 2017-05-13 RX ORDER — ONDANSETRON 2 MG/ML
4 INJECTION INTRAMUSCULAR; INTRAVENOUS EVERY 4 HOURS PRN
Status: DISCONTINUED | OUTPATIENT
Start: 2017-05-13 | End: 2017-05-16 | Stop reason: HOSPADM

## 2017-05-13 RX ADMIN — ERYTHROMYCIN 1 APPLICATION: 5 OINTMENT OPHTHALMIC at 22:31

## 2017-05-13 RX ADMIN — HEPARIN SODIUM 5000 UNITS: 5000 INJECTION, SOLUTION INTRAVENOUS; SUBCUTANEOUS at 21:57

## 2017-05-13 ASSESSMENT — PAIN SCALES - GENERAL
PAINLEVEL_OUTOF10: 2
PAINLEVEL_OUTOF10: 2

## 2017-05-13 ASSESSMENT — LIFESTYLE VARIABLES
EVER_SMOKED: NEVER
ALCOHOL_USE: NO
EVER_SMOKED: NEVER

## 2017-05-13 NOTE — ED PROVIDER NOTES
ED Provider Note    CHIEF COMPLAINT  Chief Complaint   Patient presents with   • T-5000 GLF       HPI  Nora Hope is a 89 y.o. female who presents for evaluation of ground-level fall. The patient is 89 years old. She was recently admitted at this facility and is discharged back home a few days ago. She rarely had a syncopal event and fall. Here she reports that she felt unsteady in her feet fell back and hit her head and low back. She does not take any anticoagulants. She lives alone and has no family in the area. She reports headache and low back pain but no numbness weakness tingling to the arms or legs or face. No chest pain or preceding shortness of breath and numbness weakness or tingling    REVIEW OF SYSTEMS  See HPI for further details. No night sweats weight loss numbness weakness or tingling All other systems are negative.     PAST MEDICAL HISTORY  Past Medical History   Diagnosis Date   • Glaucoma    • Arthritis    • Cancer ([facility]) 2009     eye       FAMILY HISTORY  No history of bleeding disorder    SOCIAL HISTORY  Social History     Social History   • Marital Status: Single     Spouse Name: N/A   • Number of Children: N/A   • Years of Education: N/A     Social History Main Topics   • Smoking status: Never Smoker    • Smokeless tobacco: Not on file   • Alcohol Use: No   • Drug Use: No   • Sexual Activity: Not on file     Other Topics Concern   • Not on file     Social History Narrative     Nonsmoker lives alone  SURGICAL HISTORY  Past Surgical History   Procedure Laterality Date   • Eye surgery       eye cancer 2009   • Other orthopedic surgery         CURRENT MEDICATIONS  No current facility-administered medications for this encounter.    Current outpatient prescriptions:   •  amlodipine (NORVASC) 2.5 MG Tab, Take 2.5 mg by mouth every evening., Disp: , Rfl:   •  amlodipine (NORVASC) 2.5 MG Tab, Take 1 Tab by mouth every day., Disp: 30 Tab, Rfl: 0  •  erythromycin 5 MG/GM Ointment, Place 1  "Application in both eyes every evening., Disp: , Rfl:   •  latanoprost (XALATAN) 0.005 % Solution, Place 1 Drop in both eyes every evening., Disp: , Rfl:   •  artificial tear ointment (REFRESH,LACRI-LUBE) Ointment, Place 1 Application in both eyes as needed (For Dry eye)., Disp: , Rfl:   •  Multiple Vitamins-Minerals (MULTIVITAMIN PO), Take 1 Tab by mouth every day., Disp: , Rfl:   •  probenecid (BENEMID) 500 MG TABS, Take 1 Tab by mouth every day., Disp: 30 Tab, Rfl: 2      ALLERGIES  Allergies   Allergen Reactions   • Allopurinol Rash     Hot, red face   • Aspirin      Cannot take due to medications   • Codeine Itching and Nausea   • Latex Rash     To tape/bandages containing latex       PHYSICAL EXAM  VITAL SIGNS: /75 mmHg  Pulse 88  Temp(Src) 37 °C (98.6 °F)  Resp 18  Ht 1.702 m (5' 7\")  Wt 73.936 kg (163 lb)  BMI 25.52 kg/m2  SpO2 95%      Constitutional: Well developed, Well nourished, No acute distress, Non-toxic appearance.   HENT: Normocephalic, Atraumatic, Bilateral external ears normal, Oropharynx moist, No oral exudates, Nose normal. No hemotympanum negative Ambrosio sign   Eyes: PERRLA, EOMI, Conjunctiva normal, No discharge.   Neck: Normal range of motion, No tenderness, Supple, No stridor.   Cardiovascular: Normal heart rate, Normal rhythm, No murmurs, No rubs, No gallops.   Thorax & Lungs: Normal breath sounds, No respiratory distress, No wheezing, No chest tenderness.   Abdomen: Bowel sounds normal, Soft, No tenderness, No masses, No pulsatile masses.   Skin: Warm, Dry, No erythema, No rash.   Back: Bony midline tenderness at L3-4 and 5 tenderness, No CVA tenderness.   Extremities: Superficial abrasion to the right elbow no bony tenderness   Neurologic: Alert & oriented x 3, Normal motor function, Normal sensory function, No focal deficits noted.   Psychiatric: Affect normal, Judgment normal, Mood normal.       RADIOLOGY/PROCEDURES  CT-LSPINE W/O PLUS RECONS   Final Result      1.  " Multilevel compression fractures which involve the T12, L1, L2, L3, and L5 vertebral bodies as described above. Discrete fracture lines are not seen suggesting presence of chronic fractures.      2.  Severe multilevel degenerative disc disease and facet degeneration.      3.  Osteopenia.      4.  Scoliosis.      CT-HEAD W/O   Final Result      No acute intracranial abnormality is identified.      Atrophy      There are periventricular and subcortical white matter changes present.  This finding is nonspecific and could be from previous small vessel ischemia, demyelination, or gliosis.      Right parietal and right cerebellar encephalomalacia is again seen.      Acute on chronic paranasal sinus disease.      Fluid in the mastoid air cells bilaterally.        Results for orders placed or performed during the hospital encounter of 05/13/17   CBC WITH DIFFERENTIAL   Result Value Ref Range    WBC 9.4 4.8 - 10.8 K/uL    RBC 4.18 (L) 4.20 - 5.40 M/uL    Hemoglobin 12.9 12.0 - 16.0 g/dL    Hematocrit 38.9 37.0 - 47.0 %    MCV 93.1 81.4 - 97.8 fL    MCH 30.9 27.0 - 33.0 pg    MCHC 33.2 (L) 33.6 - 35.0 g/dL    RDW 45.0 35.9 - 50.0 fL    Platelet Count 243 164 - 446 K/uL    MPV 9.6 9.0 - 12.9 fL    Neutrophils-Polys 56.70 44.00 - 72.00 %    Lymphocytes 28.30 22.00 - 41.00 %    Monocytes 11.60 0.00 - 13.40 %    Eosinophils 2.20 0.00 - 6.90 %    Basophils 0.70 0.00 - 1.80 %    Immature Granulocytes 0.50 0.00 - 0.90 %    Nucleated RBC 0.00 /100 WBC    Neutrophils (Absolute) 5.29 2.00 - 7.15 K/uL    Lymphs (Absolute) 2.65 1.00 - 4.80 K/uL    Monos (Absolute) 1.09 (H) 0.00 - 0.85 K/uL    Eos (Absolute) 0.21 0.00 - 0.51 K/uL    Baso (Absolute) 0.07 0.00 - 0.12 K/uL    Immature Granulocytes (abs) 0.05 0.00 - 0.11 K/uL    NRBC (Absolute) 0.00 K/uL   COMP METABOLIC PANEL   Result Value Ref Range    Sodium 138 135 - 145 mmol/L    Potassium 3.8 3.6 - 5.5 mmol/L    Chloride 107 96 - 112 mmol/L    Co2 23 20 - 33 mmol/L    Anion Gap 8.0 0.0  - 11.9    Glucose 96 65 - 99 mg/dL    Bun 16 8 - 22 mg/dL    Creatinine 1.19 0.50 - 1.40 mg/dL    Calcium 9.4 8.4 - 10.2 mg/dL    AST(SGOT) 34 12 - 45 U/L    ALT(SGPT) 23 2 - 50 U/L    Alkaline Phosphatase 89 30 - 99 U/L    Total Bilirubin 0.5 0.1 - 1.5 mg/dL    Albumin 3.1 (L) 3.2 - 4.9 g/dL    Total Protein 6.8 6.0 - 8.2 g/dL    Globulin 3.7 (H) 1.9 - 3.5 g/dL    A-G Ratio 0.8 g/dL   ESTIMATED GFR   Result Value Ref Range    GFR If  51 (A) >60 mL/min/1.73 m 2    GFR If Non  42 (A) >60 mL/min/1.73 m 2        COURSE & MEDICAL DECISION MAKING  Pertinent Labs & Imaging studies reviewed. (See chart for details)  Differential diagnosis included skull fracture and intracranial hemorrhage epidural hemorrhage subdural hemorrhage, acute fracture to the lumbar spine. Here her CT scan that is negative. CT scan of lumbar spine demonstrates where more likely chronic but rather severe compression fractures. The patient is 89 years old was alone she was recently admitted for syncope and falls earlier this week. She was home alone again and had another syncope versus likely mechanical fall. She is unable to ambulate and given her age and repeated falls she is a candidate for admission for pain management, visible therapy and potentially placement in a different living situation as her current living situation does not appear to be working out    FINAL IMPRESSION  1. Subacute lumbar compression fractures  2. Severe debility with repeated falls    Admission         Electronically signed by: Valerio Salomon, 5/13/2017 3:39 PM

## 2017-05-13 NOTE — ED NOTES
The Medication Reconciliation process has been completed by interviewing the patient    Allergies have been reviewed  Antibiotic use in 30 days - ERYC ointment every night    Home Pharmacy:  [facility]

## 2017-05-13 NOTE — IP AVS SNAPSHOT
" Home Care Instructions                                                                                                                  Name:Nora Hope  Medical Record Number:[id number]  CSN: [id number]    YOB: 1925   Age: 89 y.o.  Sex: female  HT:1.702 m (5' 7\") WT: 73.936 kg (163 lb)          Admit Date: 5/13/2017     Discharge Date:   Today's Date: 5/16/2017  Attending Doctor:  Brian Vizcaino M.D.                  Allergies:  Allopurinol; Aspirin; Codeine; and Latex            Discharge Instructions       Discharge Instructions    Discharged to home by car with relative. Discharged via wheelchair, hospital escort: Yes.  Special equipment needed: Not Applicable    Be sure to schedule a follow-up appointment with your primary care doctor or any specialists as instructed.     Discharge Plan:   Diet Plan: Discussed  Activity Level: Discussed  Confirmed Follow up Appointment: Patient to Call and Schedule Appointment  Confirmed Symptoms Management: Discussed  Medication Reconciliation Updated: Yes  Influenza Vaccine Indication: Patient Refuses    I understand that a diet low in cholesterol, fat, and sodium is recommended for good health. Unless I have been given specific instructions below for another diet, I accept this instruction as my diet prescription.   Other diet: Regular    Special Instructions: None    · Is patient discharged on Warfarin / Coumadin?   No     · Is patient Post Blood Transfusion?  No    Depression / Suicide Risk    As you are discharged from this [facility] facility, it is important to learn how to keep safe from harming yourself.    Recognize the warning signs:  · Abrupt changes in personality, positive or negative- including increase in energy   · Giving away possessions  · Change in eating patterns- significant weight changes-  positive or negative  · Change in sleeping patterns- unable to sleep or sleeping all the time   · Unwillingness or inability to " communicate  · Depression  · Unusual sadness, discouragement and loneliness  · Talk of wanting to die  · Neglect of personal appearance   · Rebelliousness- reckless behavior  · Withdrawal from people/activities they love  · Confusion- inability to concentrate     If you or a loved one observes any of these behaviors or has concerns about self-harm, here's what you can do:  · Talk about it- your feelings and reasons for harming yourself  · Remove any means that you might use to hurt yourself (examples: pills, rope, extension cords, firearm)  · Get professional help from the community (Mental Health, Substance Abuse, psychological counseling)  · Do not be alone:Call your Safe Contact- someone whom you trust who will be there for you.  · Call your local CRISIS HOTLINE 328-9302 or 977-481-5214  · Call your local Children's Mobile Crisis Response Team [place] (171) 768-9023 or www.Oonair  · Call the toll free National Suicide Prevention Hotlines   · National Suicide Prevention Lifeline 595-453-IXYF (9618)  · [place] [facility] Line Network 800-SUICIDE (022-1761)           Discharge Medication Instructions:    Below are the medications your physician expects you to take upon discharge:    Review all your home medications and newly ordered medications with your doctor and/or pharmacist. Follow medication instructions as directed by your doctor and/or pharmacist.    Please keep your medication list with you and share with your physician.               Medication List      CONTINUE taking these medications        Instructions    Morning Afternoon Evening Bedtime    * amlodipine 2.5 MG Tabs   Last time this was given:  2.5 mg on 5/16/2017  8:00 AM   Commonly known as:  NORVASC        Take 2.5 mg by mouth every evening.   Dose:  2.5 mg                        * amlodipine 2.5 MG Tabs   Last time this was given:  2.5 mg on 5/16/2017  8:00 AM   Commonly known as:  NORVASC        Take 1 Tab by mouth every day.   Dose:   2.5 mg                        artificial tear ointment Oint   Last time this was given:  1 Application on 5/15/2017  3:18 PM   Commonly known as:  REFRESH,LACRI-LUBE        Place 1 Application in both eyes as needed (For Dry eye).   Dose:  1 Application                        erythromycin 5 MG/GM Oint   Last time this was given:  1 Application on 5/15/2017  9:25 PM        Place 1 Application in both eyes every evening.   Dose:  1 Application                        latanoprost 0.005 % Soln   Last time this was given:  1 Drop on 5/15/2017  9:24 PM   Commonly known as:  XALATAN        Place 1 Drop in both eyes every evening.   Dose:  1 Drop                        MULTIVITAMIN PO        Take 1 Tab by mouth every day.   Dose:  1 Tab                        probenecid 500 MG Tabs   Last time this was given:  500 mg on 5/16/2017  8:00 AM   Commonly known as:  BENEMID        Take 1 Tab by mouth every day.   Dose:  500 mg                        * Notice:  This list has 2 medication(s) that are the same as other medications prescribed for you. Read the directions carefully, and ask your doctor or other care provider to review them with you.            Orders for after discharge     REFERRAL TO HOME HEALTH    Complete by:  As directed    Home health will create and establish a plan of care       REFERRAL TO HOME HEALTH    Complete by:  As directed    Home health will create and establish a plan of care       REFERRAL TO SKILLED NURSING FACILITY    Complete by:  As directed              Instructions           Diet / Nutrition:    Follow any diet instructions given to you by your doctor or the dietician, including how much salt (sodium) you are allowed each day.    If you are overweight, talk to your doctor about a weight reduction plan.    Activity:    Remain physically active following your doctor's instructions about exercise and activity.    Rest often.     Any time you become even a little tired or short of breath, SIT DOWN  and rest.    Worsening Symptoms:    Report any of the following signs and symptoms to the doctor's office immediately:    *Pain of jaw, arm, or neck  *Chest pain not relieved by medication                               *Dizziness or loss of consciousness  *Difficulty breathing even when at rest   *More tired than usual                                       *Bleeding drainage or swelling of surgical site  *Swelling of feet, ankles, legs or stomach                 *Fever (>100ºF)  *Pink or blood tinged sputum  *Weight gain (3lbs/day or 5lbs /week)           *Shock from internal defibrillator (if applicable)  *Palpitations or irregular heartbeats                *Cool and/or numb extremities    Stroke Awareness    Common Risk Factors for Stroke include:    Age  Atrial Fibrillation  Carotid Artery Stenosis  Diabetes Mellitus  Excessive alcohol consumption  High blood pressure  Overweight   Physical inactivity  Smoking    Warning signs and symptoms of a stroke include:    *Sudden numbness or weakness of the face, arm or leg (especially on one side of the body).  *Sudden confusion, trouble speaking or understanding.  *Sudden trouble seeing in one or both eyes.  *Sudden trouble walking, dizziness, loss of balance or coordination.Sudden severe headache with no known cause.    It is very important to get treatment quickly when a stroke occurs. If you experience any of the above warning signs, call 911 immediately.                   Disclaimer         Quit Smoking / Tobacco Use:    I understand the use of any tobacco products increases my chance of suffering from future heart disease or stroke and could cause other illnesses which may shorten my life. Quitting the use of tobacco products is the single most important thing I can do to improve my health. For further information on smoking / tobacco cessation call a Toll Free Quit Line at 1-530.538.3643 (*National Cancer [place]) or 1-791.200.7067 (American Lung Association) or  you can access the web based program at www.lungusa.org.    [place] Tobacco Users Help Line:  (229) 606-2359       Toll Free: 1-920.318.2406  Quit Tobacco Program [facility] (600)360-4139    Crisis Hotline:    [place] Crisis Hotline:  8-036-OMNROLD or 1-756.751.1382    [place] Crisis Hotline:    1-675.930.9290 or 099-123-3542    Discharge Survey:   Thank you for choosing [facility]. We hope we did everything we could to make your hospital stay a pleasant one. You may be receiving a phone survey and we would appreciate your time and participation in answering the questions. Your input is very valuable to us in our efforts to improve our service to our patients and their families.        My signature on this form indicates that:    1. I have reviewed and understand the above information.  2. My questions regarding this information have been answered to my satisfaction.  3. I have formulated a plan with my discharge nurse to obtain my prescribed medications for home.                  Disclaimer         __________________________________                     __________       ________                       Patient Signature                                                 Date                    Time

## 2017-05-13 NOTE — ED NOTES
Pt to ed by mya. Pt states had mechanical glf , fell backwards hit head . Denies neck ain. No trauma noted   Small abrasion to right elbow, c/o low back pain

## 2017-05-13 NOTE — IP AVS SNAPSHOT
5/16/2017    Nora Hope  [address]  [place] [address]    Dear Nora:    [facility] wants to ensure your discharge home is safe and you or your loved ones have had all of your questions answered regarding your care after you leave the hospital.    Below is a list of resources and contact information should you have any questions regarding your hospital stay, follow-up instructions, or active medical symptoms.    Questions or Concerns Regarding… Contact   Medical Questions Related to Your Discharge  (7 days a week, 8am-5pm) Contact a Nurse Care Coordinator   [phone]   Medical Questions Not Related to Your Discharge  (24 hours a day / 7 days a week)  Contact the Nurse Health Line   [phone]    Medications or Discharge Instructions Refer to your discharge packet   or contact your [facility] Primary Care Provider   [phone]   Follow-up Appointment(s) Schedule your appointment via [email or web address]   or contact Scheduling [phone]   Billing Review your statement via [email or web address]  or contact Billing [phone]   Medical Records Review your records via [email or web address]   or contact Medical Records [phone]     You may receive a telephone call within two days of discharge. This call is to make certain you understand your discharge instructions and have the opportunity to have any questions answered. You can also easily access your medical information, test results and upcoming appointments via the [email or web address].tv free online health management tool. You can learn more and sign up at [email or web address] - [email or web address] With [email or web address]. For assistance setting up your [email or web address].tv account, please call [phone].    Once again, we want to ensure your discharge home is safe and that you have a clear understanding of any next steps in your care. If you have any questions or concerns, please do not hesitate to contact us, we are here for you. Thank you for choosing [facility] for your healthcare needs.    Sincerely,    Your [facility] Healthcare Team

## 2017-05-13 NOTE — ED NOTES
1632All results back, chart up for MD for re evaluation. poc update given to pt. No further questions at this time. Further orders and dispo pending  1635 md at bedside, new orders recieved

## 2017-05-13 NOTE — IP AVS SNAPSHOT
Bootleg Market Access Code: [email or web address]  Expires: 6/10/2017 12:21 PM    Your email address is not on file at [facility].  Email Addresses are required for you to sign up for Bootleg Market, please contact [phone] to verify your personal information and to provide your email address prior to attempting to register for Bootleg Market.    Nora Hope  [address] [facility] [address]  [place] [address]    Attributort  A secure, online tool to manage your health information     [facility]’s Bootleg Market® is a secure, online tool that connects you to your personalized health information from the privacy of your home -- day or night - making it very easy for you to manage your healthcare. Once the activation process is completed, you can even access your medical information using the Bootleg Market prosper, which is available for free in the Apple Prosper store or Google Play store.     To learn more about Bootleg Market, visit [email or web address]    There are two levels of access available (as shown below):   My Chart Features  [facility] Primary Care Doctor [facility]  Specialists [facility]  Urgent  Care Non-[facility] Primary Care Doctor   Email your healthcare team securely and privately 24/7 X X X    Manage appointments: schedule your next appointment; view details of past/upcoming appointments X      Request prescription refills. X      View recent personal medical records, including lab and immunizations X X X X   View health record, including health history, allergies, medications X X X X   Read reports about your outpatient visits, procedures, consult and ER notes X X X X   See your discharge summary, which is a recap of your hospital and/or ER visit that includes your diagnosis, lab results, and care plan X X  X     How to register for Bootleg Market:  Once your e-mail address has been verified, follow the following steps to sign up for Bootleg Market.     1. Go to  [email or web address]  2. Click on the Sign Up Now box, which takes you to the New Member Sign Up page. You  will need to provide the following information:  a. Enter your [email or web address] Access Code exactly as it appears at the top of this page. (You will not need to use this code after you’ve completed the sign-up process. If you do not sign up before the expiration date, you must request a new code.)   b. Enter your date of birth.   c. Enter your home email address.   d. Click Submit, and follow the next screen’s instructions.  3. Create a [employer]t ID. This will be your [email or web address] login ID and cannot be changed, so think of one that is secure and easy to remember.  4. Create a Idc917 password. You can change your password at any time.  5. Enter your Password Reset Question and Answer. This can be used at a later time if you forget your password.   6. Enter your e-mail address. This allows you to receive e-mail notifications when new information is available in Idc917.  7. Click Sign Up. You can now view your health information.    For assistance activating your [email or web address] account, call [phone]

## 2017-05-13 NOTE — IP AVS SNAPSHOT
" <p align=\"LEFT\"><IMG SRC=\"//[email or web address]$/[email or web address]\" alt=\"Image\" WIDTH=\"50%\" HEIGHT=\"200\" BORDER=\"\"></p>                   Name:Nora Hope  Medical Record Number:[id number]  CSN: [id number]    YOB: 1925   Age: 89 y.o.  Sex: female  HT:1.702 m (5' 7\") WT: 73.936 kg (163 lb)          Admit Date: 5/13/2017     Discharge Date:   Today's Date: 5/16/2017  Attending Doctor:  Brian Vizcaino M.D.                  Allergies:  Allopurinol; Aspirin; Codeine; and Latex             Medication List      Take these Medications        Instructions    * amlodipine 2.5 MG Tabs   Commonly known as:  NORVASC    Take 2.5 mg by mouth every evening.   Dose:  2.5 mg       * amlodipine 2.5 MG Tabs   Commonly known as:  NORVASC    Take 1 Tab by mouth every day.   Dose:  2.5 mg       artificial tear ointment Oint   Commonly known as:  REFRESH,LACRI-LUBE    Place 1 Application in both eyes as needed (For Dry eye).   Dose:  1 Application       erythromycin 5 MG/GM Oint    Place 1 Application in both eyes every evening.   Dose:  1 Application       latanoprost 0.005 % Soln   Commonly known as:  XALATAN    Place 1 Drop in both eyes every evening.   Dose:  1 Drop       MULTIVITAMIN PO    Take 1 Tab by mouth every day.   Dose:  1 Tab       probenecid 500 MG Tabs   Commonly known as:  BENEMID    Take 1 Tab by mouth every day.   Dose:  500 mg       * Notice:  This list has 2 medication(s) that are the same as other medications prescribed for you. Read the directions carefully, and ask your doctor or other care provider to review them with you.      "

## 2017-05-14 LAB
ANION GAP SERPL CALC-SCNC: 8 MMOL/L (ref 0–11.9)
APPEARANCE UR: CLEAR
BASOPHILS # BLD AUTO: 1.1 % (ref 0–1.8)
BASOPHILS # BLD: 0.09 K/UL (ref 0–0.12)
BILIRUB UR QL STRIP.AUTO: NEGATIVE
BUN SERPL-MCNC: 14 MG/DL (ref 8–22)
CALCIUM SERPL-MCNC: 8.7 MG/DL (ref 8.4–10.2)
CHLORIDE SERPL-SCNC: 108 MMOL/L (ref 96–112)
CO2 SERPL-SCNC: 24 MMOL/L (ref 20–33)
COLOR UR: YELLOW
CREAT SERPL-MCNC: 1.15 MG/DL (ref 0.5–1.4)
CULTURE IF INDICATED INDCX: NO UA CULTURE
EOSINOPHIL # BLD AUTO: 0.43 K/UL (ref 0–0.51)
EOSINOPHIL NFR BLD: 5.2 % (ref 0–6.9)
ERYTHROCYTE [DISTWIDTH] IN BLOOD BY AUTOMATED COUNT: 45.1 FL (ref 35.9–50)
EST. AVERAGE GLUCOSE BLD GHB EST-MCNC: 111 MG/DL
GFR SERPL CREATININE-BSD FRML MDRD: 44 ML/MIN/1.73 M 2
GLUCOSE SERPL-MCNC: 99 MG/DL (ref 65–99)
GLUCOSE UR STRIP.AUTO-MCNC: NEGATIVE MG/DL
HBA1C MFR BLD: 5.5 % (ref 0–5.6)
HCT VFR BLD AUTO: 34.3 % (ref 37–47)
HGB BLD-MCNC: 11.7 G/DL (ref 12–16)
IMM GRANULOCYTES # BLD AUTO: 0.03 K/UL (ref 0–0.11)
IMM GRANULOCYTES NFR BLD AUTO: 0.4 % (ref 0–0.9)
KETONES UR STRIP.AUTO-MCNC: NEGATIVE MG/DL
LEUKOCYTE ESTERASE UR QL STRIP.AUTO: NEGATIVE
LV EJECT FRACT  99904: 60
LV EJECT FRACT MOD 2C 99903: 59.5
LV EJECT FRACT MOD 4C 99902: 54.24
LV EJECT FRACT MOD BP 99901: 57.29
LYMPHOCYTES # BLD AUTO: 2.62 K/UL (ref 1–4.8)
LYMPHOCYTES NFR BLD: 31.6 % (ref 22–41)
MCH RBC QN AUTO: 31.7 PG (ref 27–33)
MCHC RBC AUTO-ENTMCNC: 34.1 G/DL (ref 33.6–35)
MCV RBC AUTO: 93 FL (ref 81.4–97.8)
MICRO URNS: NORMAL
MONOCYTES # BLD AUTO: 0.85 K/UL (ref 0–0.85)
MONOCYTES NFR BLD AUTO: 10.2 % (ref 0–13.4)
NEUTROPHILS # BLD AUTO: 4.28 K/UL (ref 2–7.15)
NEUTROPHILS NFR BLD: 51.5 % (ref 44–72)
NITRITE UR QL STRIP.AUTO: NEGATIVE
NRBC # BLD AUTO: 0 K/UL
NRBC BLD AUTO-RTO: 0 /100 WBC
PH UR STRIP.AUTO: 7.5 [PH]
PLATELET # BLD AUTO: 223 K/UL (ref 164–446)
PMV BLD AUTO: 9.8 FL (ref 9–12.9)
POTASSIUM SERPL-SCNC: 3.7 MMOL/L (ref 3.6–5.5)
PROT UR QL STRIP: NEGATIVE MG/DL
RBC # BLD AUTO: 3.69 M/UL (ref 4.2–5.4)
RBC UR QL AUTO: NEGATIVE
SODIUM SERPL-SCNC: 140 MMOL/L (ref 135–145)
SP GR UR STRIP.AUTO: 1.01
WBC # BLD AUTO: 8.3 K/UL (ref 4.8–10.8)

## 2017-05-14 PROCEDURE — 93306 TTE W/DOPPLER COMPLETE: CPT

## 2017-05-14 PROCEDURE — G8980 MOBILITY D/C STATUS: HCPCS | Mod: CJ

## 2017-05-14 PROCEDURE — G8979 MOBILITY GOAL STATUS: HCPCS | Mod: CJ

## 2017-05-14 PROCEDURE — 80048 BASIC METABOLIC PNL TOTAL CA: CPT

## 2017-05-14 PROCEDURE — 93306 TTE W/DOPPLER COMPLETE: CPT | Mod: 26 | Performed by: INTERNAL MEDICINE

## 2017-05-14 PROCEDURE — 700111 HCHG RX REV CODE 636 W/ 250 OVERRIDE (IP): Performed by: INTERNAL MEDICINE

## 2017-05-14 PROCEDURE — 700102 HCHG RX REV CODE 250 W/ 637 OVERRIDE(OP): Performed by: INTERNAL MEDICINE

## 2017-05-14 PROCEDURE — A9270 NON-COVERED ITEM OR SERVICE: HCPCS | Performed by: INTERNAL MEDICINE

## 2017-05-14 PROCEDURE — 770020 HCHG ROOM/CARE - TELE (206)

## 2017-05-14 PROCEDURE — 81003 URINALYSIS AUTO W/O SCOPE: CPT

## 2017-05-14 PROCEDURE — G8978 MOBILITY CURRENT STATUS: HCPCS | Mod: CJ

## 2017-05-14 PROCEDURE — 85025 COMPLETE CBC W/AUTO DIFF WBC: CPT

## 2017-05-14 PROCEDURE — 36415 COLL VENOUS BLD VENIPUNCTURE: CPT

## 2017-05-14 PROCEDURE — 99232 SBSQ HOSP IP/OBS MODERATE 35: CPT | Performed by: INTERNAL MEDICINE

## 2017-05-14 PROCEDURE — 97161 PT EVAL LOW COMPLEX 20 MIN: CPT

## 2017-05-14 RX ADMIN — LATANOPROST 1 DROP: 50 SOLUTION/ DROPS OPHTHALMIC at 08:39

## 2017-05-14 RX ADMIN — AMLODIPINE BESYLATE 2.5 MG: 5 TABLET ORAL at 08:35

## 2017-05-14 RX ADMIN — LATANOPROST 1 DROP: 50 SOLUTION/ DROPS OPHTHALMIC at 20:47

## 2017-05-14 RX ADMIN — ERYTHROMYCIN 1 APPLICATION: 5 OINTMENT OPHTHALMIC at 20:48

## 2017-05-14 RX ADMIN — PROBENECID 500 MG: 500 TABLET, FILM COATED ORAL at 08:35

## 2017-05-14 RX ADMIN — HEPARIN SODIUM 5000 UNITS: 5000 INJECTION, SOLUTION INTRAVENOUS; SUBCUTANEOUS at 20:48

## 2017-05-14 RX ADMIN — HEPARIN SODIUM 5000 UNITS: 5000 INJECTION, SOLUTION INTRAVENOUS; SUBCUTANEOUS at 06:06

## 2017-05-14 ASSESSMENT — ENCOUNTER SYMPTOMS
BACK PAIN: 0
HEADACHES: 0
DIZZINESS: 1
NAUSEA: 0
VOMITING: 0
ABDOMINAL PAIN: 0
LOSS OF CONSCIOUSNESS: 0
COUGH: 0
DIARRHEA: 0
SHORTNESS OF BREATH: 0
CHILLS: 0
FEVER: 0

## 2017-05-14 ASSESSMENT — GAIT ASSESSMENTS
DEVIATION: STEP TO
DISTANCE (FEET): 100
ASSISTIVE DEVICE: FRONT WHEEL WALKER
GAIT LEVEL OF ASSIST: STAND BY ASSIST

## 2017-05-14 ASSESSMENT — PATIENT HEALTH QUESTIONNAIRE - PHQ9
SUM OF ALL RESPONSES TO PHQ9 QUESTIONS 1 AND 2: 0
SUM OF ALL RESPONSES TO PHQ QUESTIONS 1-9: 0
1. LITTLE INTEREST OR PLEASURE IN DOING THINGS: NOT AT ALL

## 2017-05-14 ASSESSMENT — PAIN SCALES - GENERAL: PAINLEVEL_OUTOF10: 0

## 2017-05-14 NOTE — PROGRESS NOTES
Pt  admitted to room 209-1 via transport in [facility] at 8:15pm.  Pt is A&Ox4. Pt on 2L of oxygen via nasal cannula. Pt c/o of no pain on a scale of 0-10. Assessment completed, lab noted, VSS. Oriented to room call light and smoking policy. Reviewed plan of care (equipment, incentive spirometer, sequential compression devices, medications, activity, diet, fall precautions, skin care, and pain) with patient and family.

## 2017-05-14 NOTE — FLOWSHEET NOTE
05/13/17 213   Type of Assessment   Assessment Yes   Patient History   Pulmonary Diagnosis no pulm hx   Home O2 Use Prior To Admission? No   Home Treatments/Frequency No   COPD Risk Screening   Do you have a history of COPD? No   Smoking History   Have you Ever Smoked Never   Level Of Consciousness   Level of Consciousness Alert   Respiratory WDL   Respiratory (WDL) WDL   Chest Exam   Respiration 16   Pulse 74   Breath Sounds   RUL Breath Sounds Clear   RML Breath Sounds Clear   RLL Breath Sounds Clear   MAGEN Breath Sounds Clear   LLL Breath Sounds Clear   Protocol Pathways   Protocol Pathways Yes   O2 Protocol   Pulse Oximetry 95 %   O2 (LPM) 2   O2 Protocol   O2 Protocol Indications Room Air SpO2 Less Than 90%;Acute Care situation where Hypoxemia is suspected or possible   Continuous oxygen initiated for: SpO2 90-95% on Oxygen   O2 Protocol Goals/Outcome Normoxemia on Oxygen, SpO2 greater than 90%

## 2017-05-14 NOTE — PROGRESS NOTES
Hospital Medicine Progress Note, Adult, Complex               Author: Trey Baker Date & Time created: 5/14/2017  11:21 AM     Interval History:  89yo admitted 5/13 after GLF.  Admitted last week after another GLF.  Trauma w/u neg.      This am c/o some soreness in her L elbow where she fell otherwise has no complaints    Review of Systems:  Review of Systems   Constitutional: Negative for fever and chills.   Respiratory: Negative for cough and shortness of breath.    Cardiovascular: Negative for chest pain.   Gastrointestinal: Negative for nausea, vomiting, abdominal pain and diarrhea.   Musculoskeletal: Negative for back pain.   Skin: Negative for rash.   Neurological: Positive for dizziness. Negative for loss of consciousness and headaches.       Physical Exam:  Physical Exam   Constitutional: She is oriented to person, place, and time. She appears well-developed and well-nourished. No distress.   HENT:   Head: Normocephalic and atraumatic.   Neck: No JVD present.   Cardiovascular: Normal rate and regular rhythm.    Murmur heard.  Pulmonary/Chest: Effort normal. No stridor. No respiratory distress. She has no wheezes. She has no rales.   Abdominal: Soft. There is no tenderness. There is no rebound and no guarding.   Musculoskeletal: She exhibits no edema.   Neurological: She is oriented to person, place, and time.   Skin: Skin is warm and dry. No rash noted. She is not diaphoretic.   Psychiatric: She has a normal mood and affect. Thought content normal.   Nursing note and vitals reviewed.      Labs:        Invalid input(s): [id number]      Recent Labs      05/13/17   1723  05/14/17   0505   SODIUM  138  140   POTASSIUM  3.8  3.7   CHLORIDE  107  108   CO2  23  24   BUN  16  14   CREATININE  1.19  1.15   MAGNESIUM  2.0   --    CALCIUM  9.4  8.7     Recent Labs      05/13/17   1723  05/14/17   0505   ALTSGPT  23   --    ASTSGOT  34   --    ALKPHOSPHAT  89   --    TBILIRUBIN  0.5   --    GLUCOSE  96   99     Recent Labs      17   1723  17   0505   RBC  4.18*  3.69*   HEMOGLOBIN  12.9  11.7*   HEMATOCRIT  38.9  34.3*   PLATELETCT  243  223     Recent Labs      17   1723  17   0505   WBC  9.4  8.3   NEUTSPOLYS  56.70  51.50   LYMPHOCYTES  28.30  31.60   MONOCYTES  11.60  10.20   EOSINOPHILS  2.20  5.20   BASOPHILS  0.70  1.10   ASTSGOT  34   --    ALTSGPT  23   --    ALKPHOSPHAT  89   --    TBILIRUBIN  0.5   --            Hemodynamics:  Temp (24hrs), Av.7 °C (98.1 °F), Min:36.5 °C (97.7 °F), Max:37 °C (98.6 °F)  Temperature: 36.7 °C (98.1 °F)  Pulse  Av.9  Min: 62  Max: 88   Blood Pressure : 131/58 mmHg     Respiratory:    Respiration: 18, Pulse Oximetry: 94 %, O2 Daily Delivery Respiratory : Silicone Nasal Cannula        RUL Breath Sounds: Expiratory Wheezes, RML Breath Sounds: Clear, RLL Breath Sounds: Diminished, MAGEN Breath Sounds: Expiratory Wheezes, LLL Breath Sounds: Diminished  Fluids:    Intake/Output Summary (Last 24 hours) at 17 1121  Last data filed at 17 1029   Gross per 24 hour   Intake    440 ml   Output    651 ml   Net   -211 ml     Weight: 73.936 kg (163 lb)  GI/Nutrition:  Orders Placed This Encounter   Procedures   • Diet Order     Standing Status: Standing      Number of Occurrences: 1      Standing Expiration Date:      Order Specific Question:  Diet:     Answer:  Cardiac [6]     Medical Decision Making, by Problem:  Active Hospital Problems    Diagnosis   • Fall [W19.XXXA]  Now second fall in last 2 weeks  Had a long d/w pt.  I do not think she is safe at home and I don't think she will be anytime soon without extensive Rehab.  I explained to her that her odds of significant trauma (ICH, hip Fx etc) from further GLF's is exceedingly high and that should that occur her odds of death in the following 6 months is>80%.  I recommended ECF/Rehab or assisted living setting as my partner did last week.  She states it is in God's hands and refuses to go  anywhere but home on DC.    Pt is clearly cognizant of the risks involved and in my oppinion competent to make her own decisions however risky.  Unfortunately as noted I do not see anything we can fix to make her safe at home without a Rehab stay which she is declining  PT  Arrange HH on dc   • Postural dizziness with presyncope [R42, R55]  Cont Tele and therapies   • Glaucoma [H40.9]   • Gout [M10.9]       Medications reviewed, Labs reviewed, Radiology images reviewed and EKG reviewed  Valenzuela catheter: No Valenzuela      DVT Prophylaxis: Heparin  DVT prophylaxis - mechanical: SCDs  Ulcer prophylaxis: Not indicated

## 2017-05-14 NOTE — CARE PLAN
Problem: Venous Thromboembolism (VTW)/Deep Vein Thrombosis (DVT) Prevention:  Goal: Patient will participate in Venous Thrombosis (VTE)/Deep Vein Thrombosis (DVT)Prevention Measures    05/14/17 0712   OTHER   Risk Assessment Score 3   VTE RISK High   Pharmacologic Prophylaxis Used Unfractionated Heparin         Problem: Knowledge Deficit  Goal: Knowledge of disease process/condition, treatment plan, diagnostic tests, and medications will improve  The plan of care for today discussed with patient (Rest, movement with physical and occupational therapy today)

## 2017-05-14 NOTE — PROGRESS NOTES
Pt's tele summary: sinus rhythm w/rare PVC's, and PAC's.    HR 65        WV interval 0.20  QRS complex 0.10  QT interval 0.42      Primary nurse to perform primary monitoring and keep in communication w/monitor tech.

## 2017-05-14 NOTE — PROGRESS NOTES
Telemetry Summary    Rhythm Interpretation: Sinus Rhythm with occasional PVC's, PAC's  Heart Rate: 65  NM Interval: 0.20  QRS Interval: 0.10  QT Interval: 0.42

## 2017-05-14 NOTE — PROGRESS NOTES
Tele Note:    Rhythm: Sinus rhythm  Rate: 50-70s  WI: 0.16  QRS: 0.10  QT: 0.40  Ectopy: None    Primary pt care not given. Interpretation of strip only.

## 2017-05-14 NOTE — DISCHARGE PLANNING
Care Transition Team Assessment    Information Source  Orientation : Oriented x 4  Information Given By: Patient  Who is responsible for making decisions for patient? : Patient    Readmission Evaluation  Is this a readmission?: No    Elopement Risk  Legal Hold: No  Ambulatory or Self Mobile in Wheelchair: Yes  Disoriented: No  Psychiatric Symptoms: None  History of Wandering: No  Elopement this Admit: No  Vocalizing Wanting to Leave: No  Displays Behaviors, Body Language Wanting to Leave: No-Not at Risk for Elopement  Elopement Risk: Not at Risk for Elopement    Interdisciplinary Discharge Planning  Does Admitting Nurse Feel This Could be a Complex Discharge?: No  Lives with - Patient's Self Care Capacity: Alone and Able to Care For Self  Patient or legal guardian wants to designate a caregiver (see row info): No  Support Systems: None (Pt had daughter living in [place] )  Housing / Facility: [address]  Do You Take your Prescribed Medications Regularly: Yes  Able to Return to Previous ADL's: Yes  Mobility Issues: Yes  Prior Services: None    Discharge Preparedness  What is your plan after discharge?: Home with help, Home health care  What are your discharge supports?: Child (who lives in [place])  Prior Functional Level: Independent with Activities of Daily Living    Functional Assesment  Prior Functional Level: Independent with Activities of Daily Living    Finances  Financial Barriers to Discharge: No  Prescription Coverage: Yes    Vision / Hearing Impairment  Vision Impairment : Yes  Right Eye Vision: Impaired, Wears Glasses  Left Eye Vision: Impaired, Wears Glasses  Hearing Impairment : No         Advance Directive  Advance Directive?: None    Domestic Abuse  Have you ever been the victim of abuse or violence?: No    Psychological Assessment  History of Substance Abuse: None  History of Psychiatric Problems: No         Anticipated Discharge Information  Anticipated discharge disposition: Home

## 2017-05-14 NOTE — H&P
ADMITTING ATTENDING:  Brian Gaston MD    PRIMARY CARE PHYSICIAN:  Joaquín Langston MD    CHIEF COMPLAINT:  Dizziness and fall.    HISTORY OF PRESENTING ILLNESS:  This is a 89-year-old female with a past   medical history of arthritis, who presents with complaints of dizziness and   fall.  Patient states she was in the bathroom when she felt slightly dizzy and   her walker slipped causing her to fall, hitting the back of her head.    Patient denies any loss of consciousness.  At that time, she reported some   headache and also right elbow pain.  She denies any chest pain, shortness of   breath or palpitations.  She also denies any numbness, tingling, weakness of   her muscles or slurring of speech.  She does report a productive cough that   started 3 days ago.  She denies any fevers.  Patient lives alone and has no   family in the area.  In the ER, her vitals were found to be stable.  Patient   was recently admitted to the hospital on 5/8/17 for dizziness with vomiting   concerning for vertigo along with some dehydration and acute kidney injury and   she was discharged on the 13th with recommendations for further physical   therapy and rehab placement; however, the patient declined and wanted to go   home.    Pertinent workup in the ER revealed WBC of 9.4, hemoglobin of 12.9, platelets   of 243.  Sodium 138, potassium 3.8, creatinine 1.19, BUN 16.  CT head without   contrast revealed no acute intracranial abnormality.  CT lumbar spine revealed   chronic T12-L1, L2- L3 and L5 vertebral compression fractures and severe   multilevel degenerative disk disease with facet degeneration.  REVIEW OF SYSTEMS  A complete review of system was done. All other systems were negative.    PMH/PSH/FMH: I personally reviewed all ancillary histories as noted.    PAST MEDICAL HISTORY:  Past Medical History   Diagnosis Date   • Glaucoma    • Arthritis    • Cancer ([facility]) 2009     eye       PAST SURGICAL HISTORY:  Past Surgical  "History   Procedure Laterality Date   • Eye surgery       eye cancer 2009   • Other orthopedic surgery         PERSONAL/SOCIAL HISTORY:  Social History   Substance Use Topics   • Smoking status: Never Smoker    • Smokeless tobacco: Not on file   • Alcohol Use: No       FAMILY MEDICAL HISTORY:  No pertinent family history    ALLERGIES:  Allopurinol; Aspirin; Codeine; and Latex    HOME MEDICATIONS:  Prior to Admission medications    Medication Sig Start Date End Date Taking? Authorizing Provider   amlodipine (NORVASC) 2.5 MG Tab Take 2.5 mg by mouth every evening.   Yes Not In System Provider   amlodipine (NORVASC) 2.5 MG Tab Take 1 Tab by mouth every day. 5/11/17   Marcos Holden M.D.   erythromycin 5 MG/GM Ointment Place 1 Application in both eyes every evening.    Not In System Provider   latanoprost (XALATAN) 0.005 % Solution Place 1 Drop in both eyes every evening.    Not In System Provider   artificial tear ointment (REFRESH,LACRI-LUBE) Ointment Place 1 Application in both eyes as needed (For Dry eye).    Not In System Provider   Multiple Vitamins-Minerals (MULTIVITAMIN PO) Take 1 Tab by mouth every day.    Not In System Provider   probenecid (BENEMID) 500 MG TABS Take 1 Tab by mouth every day. 6/22/15   Mary Medina A.P.R.N.         PHYSICAL EXAMINATION:  Vital Signs: Last Filed Vitals Signs: 24 Hour Range  Blood pressure 138/71, pulse 74, temperature 37 °C (98.6 °F), resp. rate 16, height 1.702 m (5' 7\"), weight 73.936 kg (163 lb), SpO2 95 %.    General: Elderly female in no apparent distress  HEENT: Atraumatic head, PERRLA, normal appearing sclera and conjunctiva, external ears normal appearing, hearing grossly intact. Nares patent and free of discharge. Mucous membranes moist.   Neck: Supple with no lymphadenopathy.  Respiratory: Normal effort. Clear to auscultation bilaterally, no wheezes or crackles.  Cardiovascular: Regular rate and rhythm no murmurs audible.  GI/Abdomen: Abdomen is soft, non " tender and non distended. Positive bowel sounds in all four quadrants. No masses, organomegaly or hernias.  Extremities:  Radial and Dorsalis Pedis pulses 2+. No cyanosis or edema noted.   Neurology: CN II - XII intact, no focal deficits. Motor strength 5/5 bilaterally. Sensation intact and equal bilaterally. Rapid alternating movements and finger to nose normal.   Psychiatry: Alert and oriented to time, place and person. Normal mood and effect.  Skin: No rashes or ulcers seen. Capillary refill less than 3 seconds  Musculoskeletal: No gross deformities or misalignment of the joints of upper and lower extremities. Normal ROM without pain.    LAB DATA REVIEW:  Objective  Recent Results (from the past 24 hour(s))   CBC WITH DIFFERENTIAL    Collection Time: 05/13/17  5:23 PM   Result Value Ref Range    WBC 9.4 4.8 - 10.8 K/uL    RBC 4.18 (L) 4.20 - 5.40 M/uL    Hemoglobin 12.9 12.0 - 16.0 g/dL    Hematocrit 38.9 37.0 - 47.0 %    MCV 93.1 81.4 - 97.8 fL    MCH 30.9 27.0 - 33.0 pg    MCHC 33.2 (L) 33.6 - 35.0 g/dL    RDW 45.0 35.9 - 50.0 fL    Platelet Count 243 164 - 446 K/uL    MPV 9.6 9.0 - 12.9 fL    Neutrophils-Polys 56.70 44.00 - 72.00 %    Lymphocytes 28.30 22.00 - 41.00 %    Monocytes 11.60 0.00 - 13.40 %    Eosinophils 2.20 0.00 - 6.90 %    Basophils 0.70 0.00 - 1.80 %    Immature Granulocytes 0.50 0.00 - 0.90 %    Nucleated RBC 0.00 /100 WBC    Neutrophils (Absolute) 5.29 2.00 - 7.15 K/uL    Lymphs (Absolute) 2.65 1.00 - 4.80 K/uL    Monos (Absolute) 1.09 (H) 0.00 - 0.85 K/uL    Eos (Absolute) 0.21 0.00 - 0.51 K/uL    Baso (Absolute) 0.07 0.00 - 0.12 K/uL    Immature Granulocytes (abs) 0.05 0.00 - 0.11 K/uL    NRBC (Absolute) 0.00 K/uL   COMP METABOLIC PANEL    Collection Time: 05/13/17  5:23 PM   Result Value Ref Range    Sodium 138 135 - 145 mmol/L    Potassium 3.8 3.6 - 5.5 mmol/L    Chloride 107 96 - 112 mmol/L    Co2 23 20 - 33 mmol/L    Anion Gap 8.0 0.0 - 11.9    Glucose 96 65 - 99 mg/dL    Bun 16 8 - 22  mg/dL    Creatinine 1.19 0.50 - 1.40 mg/dL    Calcium 9.4 8.4 - 10.2 mg/dL    AST(SGOT) 34 12 - 45 U/L    ALT(SGPT) 23 2 - 50 U/L    Alkaline Phosphatase 89 30 - 99 U/L    Total Bilirubin 0.5 0.1 - 1.5 mg/dL    Albumin 3.1 (L) 3.2 - 4.9 g/dL    Total Protein 6.8 6.0 - 8.2 g/dL    Globulin 3.7 (H) 1.9 - 3.5 g/dL    A-G Ratio 0.8 g/dL   ESTIMATED GFR    Collection Time: 05/13/17  5:23 PM   Result Value Ref Range    GFR If  51 (A) >60 mL/min/1.73 m 2    GFR If Non  42 (A) >60 mL/min/1.73 m 2   TSH (Thyroid Stimulating Hormone)    Collection Time: 05/13/17  5:23 PM   Result Value Ref Range    TSH 2.660 0.350 - 5.500 uIU/mL   Magnesium    Collection Time: 05/13/17  5:23 PM   Result Value Ref Range    Magnesium 2.0 1.5 - 2.5 mg/dL          IMAGING REVIEW:  DX-CHEST-PORTABLE (1 VIEW)   Final Result         1. Low lung volume with hypoventilatory change.      2. Left basilar opacity, atelectasis versus consolidation      CT-LSPINE W/O PLUS RECONS   Final Result      1.  Multilevel compression fractures which involve the T12, L1, L2, L3, and L5 vertebral bodies as described above. Discrete fracture lines are not seen suggesting presence of chronic fractures.      2.  Severe multilevel degenerative disc disease and facet degeneration.      3.  Osteopenia.      4.  Scoliosis.      CT-HEAD W/O   Final Result      No acute intracranial abnormality is identified.      Atrophy      There are periventricular and subcortical white matter changes present.  This finding is nonspecific and could be from previous small vessel ischemia, demyelination, or gliosis.      Right parietal and right cerebellar encephalomalacia is again seen.      Acute on chronic paranasal sinus disease.      Fluid in the mastoid air cells bilaterally.      Echocardiogram Comp W/O Cont    (Results Pending)         ASSESSMENT AND PLAN:  1.  Dizziness and fall.  There is concern for presyncope.  Patient will be   admitted to the  inpatient service on the telemetry unit with close cardiac   monitoring.  We will check for orthostatics.  We will place on cardiac   monitoring and evaluate for any dysrhythmias.  We will also order a 2D echo.    At this time, her neuro exam is intact.  She does not appear to complain of   any vertigo.  We will provide the patient with gentle IV fluid hydration.  The   patient will undergo PT, OT evaluation and may benefit from placement to an   assisted living or a skilled nursing facility.  We will provide the patient   with pain control.  2.  Hypertension.  Currently, patient's blood pressure is well controlled.  We   will continue her on amlodipine.  3.  Cough.  This is likely from bronchitis.  I will check a chest x-ray and if   there is any concern for pneumonia, we will start her on antibiotics.  4.  Deep vein thrombosis prophylaxis with heparin subQ 5000 units t.i.d.  5.  Code status:  Do not resuscitate.       ____________________________________     MD LV Floyd / LEEANNA    DD:  05/13/2017 18:47:57  DT:  05/13/2017 19:18:45    D#:  [id number]  Job#:  [id number]

## 2017-05-15 PROCEDURE — 770020 HCHG ROOM/CARE - TELE (206)

## 2017-05-15 PROCEDURE — 99231 SBSQ HOSP IP/OBS SF/LOW 25: CPT | Performed by: HOSPITALIST

## 2017-05-15 PROCEDURE — G8988 SELF CARE GOAL STATUS: HCPCS | Mod: CI

## 2017-05-15 PROCEDURE — A9270 NON-COVERED ITEM OR SERVICE: HCPCS | Performed by: INTERNAL MEDICINE

## 2017-05-15 PROCEDURE — 700111 HCHG RX REV CODE 636 W/ 250 OVERRIDE (IP): Performed by: INTERNAL MEDICINE

## 2017-05-15 PROCEDURE — 97165 OT EVAL LOW COMPLEX 30 MIN: CPT

## 2017-05-15 PROCEDURE — G8987 SELF CARE CURRENT STATUS: HCPCS | Mod: CJ

## 2017-05-15 PROCEDURE — 97535 SELF CARE MNGMENT TRAINING: CPT

## 2017-05-15 PROCEDURE — A6206 CONTACT LAYER <= 16 SQ IN: HCPCS | Performed by: HOSPITALIST

## 2017-05-15 PROCEDURE — 700102 HCHG RX REV CODE 250 W/ 637 OVERRIDE(OP): Performed by: INTERNAL MEDICINE

## 2017-05-15 RX ADMIN — HEPARIN SODIUM 5000 UNITS: 5000 INJECTION, SOLUTION INTRAVENOUS; SUBCUTANEOUS at 06:51

## 2017-05-15 RX ADMIN — PROBENECID 500 MG: 500 TABLET, FILM COATED ORAL at 08:10

## 2017-05-15 RX ADMIN — MINERAL OIL AND WHITE PETROLATUM 1 APPLICATION: 150; 830 OINTMENT OPHTHALMIC at 15:18

## 2017-05-15 RX ADMIN — HEPARIN SODIUM 5000 UNITS: 5000 INJECTION, SOLUTION INTRAVENOUS; SUBCUTANEOUS at 21:24

## 2017-05-15 RX ADMIN — AMLODIPINE BESYLATE 2.5 MG: 5 TABLET ORAL at 08:09

## 2017-05-15 RX ADMIN — HEPARIN SODIUM 5000 UNITS: 5000 INJECTION, SOLUTION INTRAVENOUS; SUBCUTANEOUS at 14:10

## 2017-05-15 RX ADMIN — ERYTHROMYCIN 1 APPLICATION: 5 OINTMENT OPHTHALMIC at 21:25

## 2017-05-15 RX ADMIN — LATANOPROST 1 DROP: 50 SOLUTION/ DROPS OPHTHALMIC at 21:24

## 2017-05-15 ASSESSMENT — ENCOUNTER SYMPTOMS
HEADACHES: 0
VOMITING: 0
ABDOMINAL PAIN: 0
CHILLS: 0
FEVER: 0
BACK PAIN: 0
LOSS OF CONSCIOUSNESS: 0
DIZZINESS: 1
DIARRHEA: 0
NAUSEA: 0
COUGH: 0
SHORTNESS OF BREATH: 0

## 2017-05-15 ASSESSMENT — ACTIVITIES OF DAILY LIVING (ADL): TOILETING: INDEPENDENT

## 2017-05-15 ASSESSMENT — PAIN SCALES - GENERAL
PAINLEVEL_OUTOF10: 0
PAINLEVEL_OUTOF10: 0

## 2017-05-15 ASSESSMENT — LIFESTYLE VARIABLES: DO YOU DRINK ALCOHOL: NO

## 2017-05-15 NOTE — FACE TO FACE
Face to Face Supporting Documentation - Home Health    The encounter with this patient was in whole or in part the primary reason for home health admission.    Date of encounter:   Patient:                    MRN:                       YOB: 2017  Nora Hope  [id number]  8/6/1925     Home health to see patient for:  Physical Therapy evaluation and treatment    Skilled need for:  Recent Deterioration of Health Status repeated ground level falls and Comment: repeated ground level falls    Skilled nursing interventions to include:  Comment: physical therapy: home safety assesment, gait and mobility training    Homebound status evidenced by:  Need the aid of supportive devices such as crutches, canes, wheelchairs or walkers or Require the use of special transportation. Leaving home requires a considerable and taxing effort. There is a normal inability to leave the home.    Community Physician to provide follow up care: Joaquín SHERMAN M.D.     Optional Interventions? No      I certify the face to face encounter for this home health care referral meets the CMS requirements and the encounter/clinical assessment with the patient was, in whole, or in part, for the medical condition(s) listed above, which is the primary reason for home health care. Based on my clinical findings: the service(s) are medically necessary, support the need for home health care, and the homebound criteria are met.  I certify that this patient has had a face to face encounter by myself.  Trey Baker D.O. - NPI: [id number]

## 2017-05-15 NOTE — CARE PLAN
Problem: Mobility  Goal: Risk for activity intolerance will decrease  Intervention: Assess and monitor signs of activity intolerance  Patient up to edge of bed and standing at edge of bed a reports a fear of falling  Intervention: Provide rest periods  Provided rest in between mobilization from edge of bed to standing  Intervention: Encourage patient to increase activity level in collaboration with Interdisciplinary Team  Patient encouraged to mobilize with assistance of staff

## 2017-05-15 NOTE — PROGRESS NOTES
Encouraged patient to mobilize to chair at edge of bed for lunchtime, patient declined, will reattempt

## 2017-05-15 NOTE — PROGRESS NOTES
Tele strip at 1901 shows sinus rhythm w/ HR of 71.  Measurements: .16/.10/.42    Tele Shift Summary:  Rhythm: Sinus rhythm/sinus bradycardia  Rate: 60's-70's, down to 52  Ectopy: Per MANUEL [place], pt had occasional PAC, rare PVC's.    Telemetry monitoring strips faxed to primary RN. Tele strip summary only. Further assessment and monitoring to be done by primary RN.

## 2017-05-15 NOTE — PROGRESS NOTES
"Hospital Medicine Progress Note, Adult, Complex               Author: Trey Baker Date & Time created: 5/15/2017  11:16 AM     Interval History:  89yo admitted 5/13 after GLF.  Admitted last week after another GLF.  Trauma w/u neg.      Pt states she feels \"OK\".  Elbow still sore but she's able to use it without issues.  No other complaints    Review of Systems:  Review of Systems   Constitutional: Negative for fever and chills.   Respiratory: Negative for cough and shortness of breath.    Cardiovascular: Negative for chest pain.   Gastrointestinal: Negative for nausea, vomiting, abdominal pain and diarrhea.   Musculoskeletal: Negative for back pain.   Skin: Negative for rash.   Neurological: Positive for dizziness. Negative for loss of consciousness and headaches.       Physical Exam:  Physical Exam   Constitutional: She is oriented to person, place, and time. She appears well-developed and well-nourished. No distress.   HENT:   Head: Normocephalic and atraumatic.   Neck: No JVD present.   Cardiovascular: Normal rate and regular rhythm.    Murmur heard.  Pulmonary/Chest: Effort normal. No stridor. No respiratory distress. She has no wheezes. She has no rales.   Abdominal: Soft. There is no tenderness. There is no rebound and no guarding.   Musculoskeletal: She exhibits no edema.   Neurological: She is oriented to person, place, and time.   Skin: Skin is warm and dry. No rash noted. She is not diaphoretic.   Psychiatric: She has a normal mood and affect. Thought content normal.   Nursing note and vitals reviewed.      Labs:        Invalid input(s): [id number]      Recent Labs      05/13/17   1723  05/14/17   0505   SODIUM  138  140   POTASSIUM  3.8  3.7   CHLORIDE  107  108   CO2  23  24   BUN  16  14   CREATININE  1.19  1.15   MAGNESIUM  2.0   --    CALCIUM  9.4  8.7     Recent Labs      05/13/17   1723  05/14/17   0505   ALTSGPT  23   --    ASTSGOT  34   --    ALKPHOSPHAT  89   --    TBILIRUBIN  " "0.5   --    GLUCOSE  96  99     Recent Labs      17   1723  17   0505   RBC  4.18*  3.69*   HEMOGLOBIN  12.9  11.7*   HEMATOCRIT  38.9  34.3*   PLATELETCT  243  223     Recent Labs      17   1723  17   0505   WBC  9.4  8.3   NEUTSPOLYS  56.70  51.50   LYMPHOCYTES  28.30  31.60   MONOCYTES  11.60  10.20   EOSINOPHILS  2.20  5.20   BASOPHILS  0.70  1.10   ASTSGOT  34   --    ALTSGPT  23   --    ALKPHOSPHAT  89   --    TBILIRUBIN  0.5   --            Hemodynamics:  Temp (24hrs), Av.8 °C (98.2 °F), Min:36.6 °C (97.9 °F), Max:37.2 °C (98.9 °F)  Temperature: 36.6 °C (97.9 °F)  Pulse  Av.2  Min: 58  Max: 88   Blood Pressure : 116/57 mmHg     Respiratory:    Respiration: 19, Pulse Oximetry: 96 %        RUL Breath Sounds: Diminished, RML Breath Sounds: Diminished, RLL Breath Sounds: Diminished, MAGEN Breath Sounds: Diminished, LLL Breath Sounds: Diminished  Fluids:    Intake/Output Summary (Last 24 hours) at 05/15/17 1116  Last data filed at 05/15/17 0849   Gross per 24 hour   Intake    980 ml   Output    450 ml   Net    530 ml        GI/Nutrition:  Orders Placed This Encounter   Procedures   • Diet Order     Standing Status: Standing      Number of Occurrences: 1      Standing Expiration Date:      Order Specific Question:  Diet:     Answer:  Cardiac [6]     Medical Decision Making, by Problem:  Active Hospital Problems    Diagnosis   • Fall [W19.XXXA]  Now second fall in last 2 weeks  Again reviewed exceedingly high risk of fall and subsquent serious injury with pt.  She verbalized understanding but stated she will not go to Rehab or ECF.  \"It's in God's hands\"  PT  Arrange HH on dc   • Postural dizziness with presyncope [R42, R55]  Cont Tele and therapies   • Glaucoma [H40.9]   • Gout [M10.9]       Medications reviewed, Labs reviewed, Radiology images reviewed and EKG reviewed  Valenzuela catheter: No Valenzuela      DVT Prophylaxis: Heparin  DVT prophylaxis - mechanical: SCDs  Ulcer prophylaxis: Not " indicated

## 2017-05-15 NOTE — THERAPY
"Occupational Therapy Evaluation completed.   Functional Status:  Met with pt early this morning as she was getting setup for breakfast.  Pt c/o weakness in hands and inability to cut her food.  Set pt up with built up  to try and help her overcome inability to manage her silverware, but she didn't like the built up  and asked OT to remove them.  Returned later and pt was up in chair with nursing.  Pt agreeable to therapy eval.  She was able to stand from chair with SBA, walk with FWW with SBA, and toilet with SBA on elevated toilet with B rails.  She stood 5 min at sink to wash hands with SBA.  Pt demo's fair safety awareness with basic mobility and ADL's, but is forgetful.  Over the course of the eval, she repeated herself at least 3 times, describing the setup in her bathroom in great detail.  Given this, pt may be questionable with managing meds etc at home.  She would be a good candidate for Meals on Wheels as well, as she expressed frustration and physical hardship with having to use RTC access to go grocery shopping and being limited to only 3 bags of groceries per trip.  Plan of Care: Will benefit from Occupational Therapy 3 times per week  Discharge Recommendations:  Equipment: No Equipment Needed. Post-acute therapy Discharge to home with outpatient or home health for additional skilled therapy services.    Pt would greatly benefit from home health follow-up if she goes home.  She will need oversight with her med mgmt, bill pay, IADL's, and simple ADL's at home.  She was supposed to have home health after her last discharge, however she reports the nurse never came to the apt.  OT will follow while in house.    See \"Rehab Therapy-Acute\" Patient Summary Report for complete documentation.    "

## 2017-05-15 NOTE — DISCHARGE PLANNING
Received choice form for Home Health services, referral has been sent to [facility] per patient and choice form request.

## 2017-05-15 NOTE — PROGRESS NOTES
Assumed care of patient, complains of discomfort on right arm where skin tear present [id number] applied, A&O x4, orthostatic BP performed, bed in lowest locked position with bed alarm in use

## 2017-05-15 NOTE — DISCHARGE PLANNING
CCS spoke to Pretty at [facility] at home. Per Pretty the order will need to include an RN. SW on floor Tamela has been notified.

## 2017-05-15 NOTE — DISCHARGE PLANNING
Patient discussed in morning rounds.  Per Hospitalist this patient would like to have HH again, but does not want to have the same RN come out.  MAGY met with this patient bedside.  She stated that she believes that [employer] is a good company however she does not want to have Maria Luz come out.  Magy completed the choice form verbally for [employer].  Magy called [employer] and informed them of this patients request.  This SW will fax choice form to [facility] once order is received.

## 2017-05-15 NOTE — PROGRESS NOTES
Patient uses refresh optiv gel for dry eyes and the supplied refresh we carry is all that is available from Pharmacy

## 2017-05-16 VITALS
TEMPERATURE: 97.7 F | DIASTOLIC BLOOD PRESSURE: 49 MMHG | OXYGEN SATURATION: 92 % | BODY MASS INDEX: 25.58 KG/M2 | RESPIRATION RATE: 18 BRPM | WEIGHT: 163 LBS | SYSTOLIC BLOOD PRESSURE: 111 MMHG | HEART RATE: 63 BPM | HEIGHT: 67 IN

## 2017-05-16 PROBLEM — R55 POSTURAL DIZZINESS WITH PRESYNCOPE: Status: RESOLVED | Noted: 2017-05-13 | Resolved: 2017-05-16

## 2017-05-16 PROBLEM — R42 POSTURAL DIZZINESS WITH PRESYNCOPE: Status: RESOLVED | Noted: 2017-05-13 | Resolved: 2017-05-16

## 2017-05-16 PROCEDURE — 99239 HOSP IP/OBS DSCHRG MGMT >30: CPT | Performed by: INTERNAL MEDICINE

## 2017-05-16 PROCEDURE — 700102 HCHG RX REV CODE 250 W/ 637 OVERRIDE(OP): Performed by: INTERNAL MEDICINE

## 2017-05-16 PROCEDURE — 700111 HCHG RX REV CODE 636 W/ 250 OVERRIDE (IP): Performed by: INTERNAL MEDICINE

## 2017-05-16 PROCEDURE — A9270 NON-COVERED ITEM OR SERVICE: HCPCS | Performed by: INTERNAL MEDICINE

## 2017-05-16 RX ADMIN — HEPARIN SODIUM 5000 UNITS: 5000 INJECTION, SOLUTION INTRAVENOUS; SUBCUTANEOUS at 05:12

## 2017-05-16 RX ADMIN — PROBENECID 500 MG: 500 TABLET, FILM COATED ORAL at 08:00

## 2017-05-16 RX ADMIN — AMLODIPINE BESYLATE 2.5 MG: 5 TABLET ORAL at 08:00

## 2017-05-16 ASSESSMENT — ENCOUNTER SYMPTOMS
FEVER: 0
LOSS OF CONSCIOUSNESS: 0
ABDOMINAL PAIN: 0
DIARRHEA: 0
COUGH: 0
BACK PAIN: 0
SHORTNESS OF BREATH: 0
CHILLS: 0
DIZZINESS: 0
HEADACHES: 0
VOMITING: 0
NAUSEA: 0

## 2017-05-16 ASSESSMENT — PAIN SCALES - GENERAL: PAINLEVEL_OUTOF10: 0

## 2017-05-16 NOTE — DISCHARGE PLANNING
MARINA informed by floor Rn that this patient does not have a way home.  MARINA met with this patient at bedside and confirmed that she will need a ride home.  MARINA completed transportation request and faxed to [facility] for follow up.

## 2017-05-16 NOTE — CARE PLAN
Problem: Safety  Goal: Will remain free from falls  Outcome: PROGRESSING AS EXPECTED  Pt educated on fall risks and to call staff before getting out of bed. Fall precautions in place, hourly rounding implemented, bed alarm on, call light in reach.    Problem: Knowledge Deficit  Goal: Knowledge of the prescribed therapeutic regimen will improve  Outcome: PROGRESSING AS EXPECTED  Discussed POC with pt, answered all questions, educated pt on medications and side effects. Pt verbalized understanding.

## 2017-05-16 NOTE — PROGRESS NOTES
Hospital Medicine Progress Note, Adult, Complex               Author: Brian Vizcaino Date & Time created: 5/16/2017  8:29 AM     Interval History:  89yo admitted 5/13 after GLF.  Admitted last week after another GLF.  Trauma w/u neg.      Resting comfortably.  No acute issues or complaints overnight.     Review of Systems:  Review of Systems   Constitutional: Negative for fever and chills.   Respiratory: Negative for cough and shortness of breath.    Cardiovascular: Negative for chest pain.   Gastrointestinal: Negative for nausea, vomiting, abdominal pain and diarrhea.   Musculoskeletal: Negative for back pain.   Skin: Negative for rash.   Neurological: Negative for dizziness, loss of consciousness and headaches.       Physical Exam:  Physical Exam   Constitutional: She is oriented to person, place, and time. She appears well-developed and well-nourished. No distress.   HENT:   Head: Normocephalic and atraumatic.   Neck: No JVD present.   Cardiovascular: Normal rate and regular rhythm.    Murmur heard.  Pulmonary/Chest: Effort normal. No stridor. No respiratory distress. She has no wheezes. She has no rales.   Abdominal: Soft. There is no tenderness. There is no rebound and no guarding.   Musculoskeletal: She exhibits no edema.   Neurological: She is oriented to person, place, and time.   Skin: Skin is warm and dry. No rash noted. She is not diaphoretic.   Psychiatric: She has a normal mood and affect. Thought content normal.   Nursing note and vitals reviewed.      Labs:        Invalid input(s): [id number]      Recent Labs      05/13/17   1723  05/14/17   0505   SODIUM  138  140   POTASSIUM  3.8  3.7   CHLORIDE  107  108   CO2  23  24   BUN  16  14   CREATININE  1.19  1.15   MAGNESIUM  2.0   --    CALCIUM  9.4  8.7     Recent Labs      05/13/17   1723  05/14/17   0505   ALTSGPT  23   --    ASTSGOT  34   --    ALKPHOSPHAT  89   --    TBILIRUBIN  0.5   --    GLUCOSE  96  99     Recent Labs      05/13/17   1723   "17   0505   RBC  4.18*  3.69*   HEMOGLOBIN  12.9  11.7*   HEMATOCRIT  38.9  34.3*   PLATELETCT  243  223     Recent Labs      17   1723  17   0505   WBC  9.4  8.3   NEUTSPOLYS  56.70  51.50   LYMPHOCYTES  28.30  31.60   MONOCYTES  11.60  10.20   EOSINOPHILS  2.20  5.20   BASOPHILS  0.70  1.10   ASTSGOT  34   --    ALTSGPT  23   --    ALKPHOSPHAT  89   --    TBILIRUBIN  0.5   --            Hemodynamics:  Temp (24hrs), Av.6 °C (97.9 °F), Min:36.4 °C (97.5 °F), Max:37.1 °C (98.7 °F)  Temperature: 36.5 °C (97.7 °F)  Pulse  Av.2  Min: 58  Max: 88   Blood Pressure : 111/49 mmHg     Respiratory:    Respiration: 18, Pulse Oximetry: 92 %        RUL Breath Sounds: Clear;Diminished, RML Breath Sounds: Diminished, RLL Breath Sounds: Diminished, MAGEN Breath Sounds: Clear;Diminished, LLL Breath Sounds: Diminished  Fluids:    Intake/Output Summary (Last 24 hours) at 17 0829  Last data filed at 17 0600   Gross per 24 hour   Intake   1040 ml   Output   1300 ml   Net   -260 ml        GI/Nutrition:  Orders Placed This Encounter   Procedures   • Diet Order     Standing Status: Standing      Number of Occurrences: 1      Standing Expiration Date:      Order Specific Question:  Diet:     Answer:  Cardiac [6]     Medical Decision Making, by Problem:  Active Hospital Problems    Diagnosis   • Fall [W19.XXXA]  Now second fall in last 2 weeks  Again reviewed exceedingly high risk of fall and subsquent serious injury with pt.  She verbalized understanding but stated she will not go to Rehab or ECF.  \"It's in God's hands\"  PT  HHC arranged; to be d/c'ed      • Postural dizziness with presyncope [R42, R55]  - resolved     • Glaucoma [H40.9]   • Gout [M10.9]       Medications reviewed, Labs reviewed, Radiology images reviewed and EKG reviewed  Valenzuela catheter: No Valenzuela      DVT Prophylaxis: Heparin  DVT prophylaxis - mechanical: SCDs  Ulcer prophylaxis: Not indicated            "

## 2017-05-16 NOTE — PROGRESS NOTES
Pt discharged to home. Discharge instructions given to pt. Pt verbalizes understanding of instructions at this time. IV removed per policy. Dressing applied to site. Pt tolerated well. Escorted to facility entrance by staff via wheelchair.  Transported via private vehicle.

## 2017-05-16 NOTE — DISCHARGE PLANNING
CCS received a transport form to discharge the patient home. [facility] has emailed the transport form to the dispatch team. [facility] waiting for a call from Dispatch

## 2017-05-16 NOTE — PROGRESS NOTES
Received report from day RN. Pt resting in bed no signs of distress. VSS. AOx4 discussed POC, pt denies pain and needs at this time. Educated on fall risks and to use call light before getting out of bed. Fall precautions in place, call light and personal belongings in reach will monitor.

## 2017-05-16 NOTE — DISCHARGE INSTRUCTIONS
Discharge Instructions    Discharged to home by car with relative. Discharged via wheelchair, hospital escort: Yes.  Special equipment needed: Not Applicable    Be sure to schedule a follow-up appointment with your primary care doctor or any specialists as instructed.     Discharge Plan:   Diet Plan: Discussed  Activity Level: Discussed  Confirmed Follow up Appointment: Patient to Call and Schedule Appointment  Confirmed Symptoms Management: Discussed  Medication Reconciliation Updated: Yes  Influenza Vaccine Indication: Patient Refuses    I understand that a diet low in cholesterol, fat, and sodium is recommended for good health. Unless I have been given specific instructions below for another diet, I accept this instruction as my diet prescription.   Other diet: Regular    Special Instructions: None    · Is patient discharged on Warfarin / Coumadin?   No     · Is patient Post Blood Transfusion?  No    Depression / Suicide Risk    As you are discharged from this [facility] facility, it is important to learn how to keep safe from harming yourself.    Recognize the warning signs:  · Abrupt changes in personality, positive or negative- including increase in energy   · Giving away possessions  · Change in eating patterns- significant weight changes-  positive or negative  · Change in sleeping patterns- unable to sleep or sleeping all the time   · Unwillingness or inability to communicate  · Depression  · Unusual sadness, discouragement and loneliness  · Talk of wanting to die  · Neglect of personal appearance   · Rebelliousness- reckless behavior  · Withdrawal from people/activities they love  · Confusion- inability to concentrate     If you or a loved one observes any of these behaviors or has concerns about self-harm, here's what you can do:  · Talk about it- your feelings and reasons for harming yourself  · Remove any means that you might use to hurt yourself (examples: pills, rope, extension cords,  firearm)  · Get professional help from the community (Mental Health, Substance Abuse, psychological counseling)  · Do not be alone:Call your Safe Contact- someone whom you trust who will be there for you.  · Call your local CRISIS HOTLINE 632-6889 or 541-172-9856  · Call your local Children's Mobile Crisis Response Team [place] (660) 900-5181 or www.[employer]  · Call the toll free National Suicide Prevention Hotlines   · National Suicide Prevention Lifeline 681-714-ZBEQ (6928)  · National Hope Line Network 800-SUICIDE (986-0937)

## 2017-05-16 NOTE — DISCHARGE PLANNING
[facility]S received a message from Jeremi in [facility] dispatch. Transportation has been arranged to discharge the patient home via the [facility] van at [address] [id number]. SW on floor Tamela has been notified via voicemail.

## 2017-05-16 NOTE — DISCHARGE SUMMARY
DISCHARGE SUMMARY     ADMIT DATE:  5/13/2017         DISCHARGE DATE:  5/16/2017    PATIENT ID:  Name: Nora Hope   YOB: 1925  Age: 89 y.o. female   MRN: [id number]  Address: [address] [place] [id number]  Phone: [phone] (home)    DISCHARGE DIAGNOSIS:  Recurrent Ground Level Fall  Postural Dizziness with Presyncope  Glaucoma  Gout    CONSULTANTS:  NONE    CONDITION: Fair    DISPOSITION:Home with Home Health Care    DIET: Regular    ACTIVITY: As tolerated     HPI/HOSPITAL COURSE:  Please see H&P for details regarding initial hospital presentation.  In summary, 89yo F with PMHx of Gout, glaucoma, and recurrent ground level falls was admitted for another ground level fall (2nd time in two week span) with associated dizziness prior to fall.  Initial evaluation, labs, and imaging were unremarkable for acute abnormalities associated with dizziness and ground level fall.  Patient was evaluated by PT and OT who recommended for patient to be discharged to SNF/rehab.  Given recurrence of falls at home, we did not think it would be safe for patient to be discharged home.  Our concerns were verbalized with patient.  She was able to demonstrate capacity and confirm understanding of the risks and benefits of going home versus SNF/rehab.  She decided to go home.  Therefore home health care has been provided for the patient upon discharge. All other issues were stable during hospital course.     PROCEDURES:  NONE    RADIOLOGY:  Echocardiogram Comp W/O Cont   Final Result      DX-CHEST-PORTABLE (1 VIEW)   Final Result         1. Low lung volume with hypoventilatory change.      2. Left basilar opacity, atelectasis versus consolidation      CT-LSPINE W/O PLUS RECONS   Final Result      1.  Multilevel compression fractures which involve the T12, L1, L2, L3, and L5 vertebral bodies as described above. Discrete fracture lines are not seen suggesting presence of chronic fractures.      2.  Severe  multilevel degenerative disc disease and facet degeneration.      3.  Osteopenia.      4.  Scoliosis.      CT-HEAD W/O   Final Result      No acute intracranial abnormality is identified.      Atrophy      There are periventricular and subcortical white matter changes present.  This finding is nonspecific and could be from previous small vessel ischemia, demyelination, or gliosis.      Right parietal and right cerebellar encephalomalacia is again seen.      Acute on chronic paranasal sinus disease.      Fluid in the mastoid air cells bilaterally.          DISCHARGE LABS:    Recent Labs      05/13/17   1723  05/14/17   0505   WBC  9.4  8.3   RBC  4.18*  3.69*   HEMOGLOBIN  12.9  11.7*   HEMATOCRIT  38.9  34.3*   MCV  93.1  93.0   MCH  30.9  31.7   RDW  45.0  45.1   PLATELETCT  243  223   MPV  9.6  9.8   NEUTSPOLYS  56.70  51.50   LYMPHOCYTES  28.30  31.60   MONOCYTES  11.60  10.20   EOSINOPHILS  2.20  5.20   BASOPHILS  0.70  1.10     No results found for: [id number], FOLATE, FERRITIN, IRON, TOTIRONBC    Estimated GFR/CRCL = Estimated Creatinine Clearance: 31 mL/min (by C-G formula based on Cr of 1.15).  Recent Labs      05/13/17   1723  05/14/17   0505   SODIUM  138  140   POTASSIUM  3.8  3.7   CHLORIDE  107  108   CO2  23  24   BUN  16  14   CREATININE  1.19  1.15   CALCIUM  9.4  8.7   MAGNESIUM  2.0   --    ALBUMIN  3.1*   --        Recent Labs      05/13/17   1723   ALTSGPT  23   ASTSGOT  34   ALKPHOSPHAT  89   TBILIRUBIN  0.5   ALBUMIN  3.1*   GLOBULIN  3.7*       @PNLABRCNT(GLUCOSE:3,POCGLUCOSE:3)  )  Lab Results   Component Value Date    HBA1C 5.5 05/13/2017       DISCHARGE MEDICATIONS:  (X)  Medication Reconciliation Completed   Nora Hope   Home Medication Instructions BONNIE:[id number]    Printed on:05/16/17 0956   Medication Information                      amlodipine (NORVASC) 2.5 MG Tab  Take 1 Tab by mouth every day.             amlodipine (NORVASC) 2.5 MG Tab  Take 2.5 mg by mouth every evening.              artificial tear ointment (REFRESH,LACRI-LUBE) Ointment  Place 1 Application in both eyes as needed (For Dry eye).             erythromycin 5 MG/GM Ointment  Place 1 Application in both eyes every evening.             latanoprost (XALATAN) 0.005 % Solution  Place 1 Drop in both eyes every evening.             Multiple Vitamins-Minerals (MULTIVITAMIN PO)  Take 1 Tab by mouth every day.             probenecid (BENEMID) 500 MG TABS  Take 1 Tab by mouth every day.                 INSTRUCTIONS:   The patient was instructed to return to the ER in the event of worsening symptoms. I have counseled the patient on the importance of compliance and the patient has agreed to proceed with all medical recommendations and follow up plan indicated above.   The patient understands that all medications come with benefits and risks. Risks may include permanent injury or death and these risks can be minimized with close reassessment and monitoring.        Follow up appointment details :     F/U with PCP in one week    PENDING STUDIES:  NONE    Primary Care Provider: Joaquín Rodrigez MD      Time spent on discharge day patient visit, preparing discharge paperwork and arranging for patient follow up 43 mins.

## 2017-05-17 NOTE — DOCUMENTATION QUERY
DOCUMENTATION QUERY    PROVIDERS: Please select “Cosign w/ note”to reply to query.    To better represent the severity of illness of your patient, please review the following information and exercise your independent professional judgment in responding to this query.     Cough, likely from bronchitis; concern for PNA  is documented in the History and Physical. Based upon the clinical findings, risk factors, and treatment, please specify if this condition was present on admission or developed after admission    • Bronchitis/Pneumonia was present on admission.  • Bronchitis/Pneumonia developed after admission  • Bronchitis/Pneumonia has been ruled out   • Unable to determine        The medical record reflects the following:   Clinical Findings  89 yr old female admitted for dizziness ? Presyncope and GLF w/o loss of consciousness.  Noted cough likely from bronchitis, concern for Pneumonia   Treatment Abx, support care    Risk Factors  pt age, repeated falls, concern for syncope    Location within medical record  History and Physical     Thank you,   Nusrat Sandoval RN  Clinical   Ext [phone]

## 2017-05-30 NOTE — DOCUMENTATION QUERY
"DOCUMENTATION QUERY    PROVIDERS: Please select “Cosign w/ note”to reply to query.    To better represent the severity of illness of your patient, please review the following information and exercise your independent professional judgment in responding to this query.     \"Bronchitis/pneumonia was present on admission\" is documented in response to [facility] documentation query. However, bronchitis and pneumonia are separate diagnoses and denote 2 different codes. \"if there is any concern for pneumonia, we will start her on antibiotics.\" is documented in the H&P but not elsewhere in the medical record.  Based upon the clinical findings, risk factors, and treatment, can you specify if patient had bronchitis or pneumonia?    • Bronchitis  (please specify if acute, chronic, viral, etc)   • Pneumonia (please specify organism if known)    • Other explanation of clinical findings (please document)  • Unable to determine      The medical record reflects the following:   Clinical Findings  [facility] Documentation Query, Nusrat Sandoval RN  Attestation signed by Brian Gaston M.D. at 5/24/2017 7:37 PM   Bronchitis/Pneumonia was present on admission.            H&P 5/13, Brian Gaston MD  3.  Cough.  This is likely from bronchitis.  I will check a chest x-ray and if there is any concern for pneumonia, we will start her on antibiotics.    DX-CHEST-PORTABLE (1 VIEW) [[id number]]   FINDINGS:    Low lung volume. Patchy left basilar opacity.  No pleural effusion. No pneumothorax.  Stable cardiopericardial silhouette.     Impression:          1. Low lung volume with hypoventilatory change.  2. Left basilar opacity, atelectasis versus consolidation        Treatment  CXR, no record of antibx given   Risk Factors  Bronchitis, cough   Location within medical record  H&P     Thank you,   [email or web address]        "

## 2017-06-14 ENCOUNTER — OFFICE VISIT (OUTPATIENT)
Dept: MEDICAL GROUP | Facility: MEDICAL CENTER | Age: 82
End: 2017-06-14
Payer: MEDICARE

## 2017-06-14 VITALS
WEIGHT: 151 LBS | OXYGEN SATURATION: 98 % | TEMPERATURE: 98.5 F | DIASTOLIC BLOOD PRESSURE: 64 MMHG | RESPIRATION RATE: 18 BRPM | HEART RATE: 82 BPM | HEIGHT: 68 IN | SYSTOLIC BLOOD PRESSURE: 112 MMHG | BODY MASS INDEX: 22.88 KG/M2

## 2017-06-14 DIAGNOSIS — I10 ESSENTIAL HYPERTENSION: ICD-10-CM

## 2017-06-14 PROCEDURE — 99214 OFFICE O/P EST MOD 30 MIN: CPT | Performed by: PHYSICIAN ASSISTANT

## 2017-06-14 RX ORDER — AMLODIPINE BESYLATE 2.5 MG/1
2.5 TABLET ORAL DAILY
Qty: 90 TAB | Refills: 3 | Status: SHIPPED | OUTPATIENT
Start: 2017-06-14 | End: 2017-10-26

## 2017-06-14 ASSESSMENT — PATIENT HEALTH QUESTIONNAIRE - PHQ9: CLINICAL INTERPRETATION OF PHQ2 SCORE: 0

## 2017-06-14 NOTE — PROGRESS NOTES
Nora Hope is a 89 y.o. new female here for establishing care and amlodipine 2.5 refill.  HPI:  Patient is extremely upset to be at our office and refuses to provide history and states she just needs her amlodipine 2.5 mg refill refilled. per patient she was put on amlodipine during hospital stay. Currently she has no dizziness or lower leg swelling. She had no prior PCP and I assume she was sent here for hospital follow-up and establishing care. Patient walks with a walker care and has bad balance with instability. States she had no means of getting here and she had to get a taxi which was expensive and also pay for office visits and this whole thing has been a huge inconvenience to her.   Patient lives alone at home and refuses any further labs to workup or preventive care such as mammogram.        Current medicines (including changes today)  Current Outpatient Prescriptions   Medication Sig Dispense Refill   • amlodipine (NORVASC) 2.5 MG Tab Take 1 Tab by mouth every day. 90 Tab 3   • erythromycin 5 MG/GM Ointment Place 1 Application in both eyes every evening.     • latanoprost (XALATAN) 0.005 % Solution Place 1 Drop in both eyes every evening.     • artificial tear ointment (REFRESH,LACRI-LUBE) Ointment Place 1 Application in both eyes as needed (For Dry eye).     • Multiple Vitamins-Minerals (MULTIVITAMIN PO) Take 1 Tab by mouth every day.     • probenecid (BENEMID) 500 MG TABS Take 1 Tab by mouth every day. 30 Tab 2   • amlodipine (NORVASC) 2.5 MG Tab Take 2.5 mg by mouth every evening.       No current facility-administered medications for this visit.     She  has a past medical history of Glaucoma; Arthritis; and Cancer (CMS-HCC) (2009). She also has no past medical history of Hyperlipidemia or Hypertension.  She  has past surgical history that includes eye surgery and other orthopedic surgery.  Social History   Substance Use Topics   • Smoking status: Never Smoker    • Smokeless tobacco: Not on file  "  • Alcohol Use: No     Social History     Social History Narrative     No family history on file.  No family status information on file.       Past medical, surgical, is reviewed in Epic chart by me today.   Medications and allergies reviewed in Epic chart by me today.       ROS    As documented in history of present illness               Objective:     Blood pressure 112/64, pulse 82, temperature 36.9 °C (98.5 °F), resp. rate 18, height 1.727 m (5' 7.99\"), weight 68.493 kg (151 lb), SpO2 98 %. Body mass index is 22.96 kg/(m^2).  Physical Exam:    Constitutional: Well-developed  In no acute distress. Very upset  Cardiovascular: Regular rate and rhythm, distant heart sounds No lower extremity edema.   Lung:  Clear to auscultation throughout w/o any wheeze or rhonchi. No adventitious sounds.        Assessment and Plan:   The following treatment plan was discussed    1. Essential hypertension       I didn't find any prior diagnosis of hypertension in patient's charts. She was started on amlodipine in hospital due to elevated blood pressure in hospital after a ground level fall.  I don't think patient is hypertensive. Patient is on a very low-dose of amlodipine 2.5 and doesn't have any dizziness or lower leg edema.    Prescription for amlodipine was sent to her [facility] pharmacy.          Followup: Return if symptoms worsen or fail to improve.         Please note that this dictation was created using voice recognition software. I have made every reasonable attempt to correct obvious errors, but I expect that there are errors of grammar and possibly content that I did not discover before finalizing the note    "

## 2017-06-14 NOTE — MR AVS SNAPSHOT
"Nora Hope   2017 10:40 AM   Office Visit   MRN: [id number]    Department:  [facility]   Dept Phone:  [phone]    Description:  Female : 1925   Provider:  Ifeoma Jorge PA-C           Reason for Visit     Other PT. here to get a prescription refill for BP medication.      Allergies as of 2017     Allergen Noted Reactions    Allopurinol 2015   Rash    Hot, red face    Aspirin 2015       Cannot take due to medications    Codeine 2015   Itching, Nausea    Latex 2015   Rash    To tape/bandages containing latex      Vital Signs     Blood Pressure Pulse Temperature Respirations Height Weight    112/64 mmHg 82 36.9 °C (98.5 °F) 18 1.727 m (5' 7.99\") 68.493 kg (151 lb)    Body Mass Index Oxygen Saturation Smoking Status             22.96 kg/m2 98% Never Smoker          Basic Information     Date Of Birth Sex Race Ethnicity Preferred Language    1925 Female White Non- English      Your appointments     2017 10:40 AM   New Patient with Ifeoma Jorge PA-C   [facility] (--)    [address]  [address]  [facility] [id number] [phone]           Please bring Photo ID, Insurance Cards, All Medication Bottles and copies of any legal documents (such as Living Will, Power of ) If speaking a language besides English please bring an adult . Please arrive 30 minutes prior for check in and registration. You will be receiving a confirmation call a few days before your appointment from our automated call confirmation system.              Problem List              ICD-10-CM Priority Class Noted - Resolved    Glaucoma H40.9   2015 - Present    History of eye cancer Z85.840   2015 - Present    Macular degeneration of left eye H35.30   2015 - Present    At risk for falls Z91.81   2015 - Present    Gout M10.9   2015 - Present    Chest pain R07.9   2015 - Present    Syncope " R55   5/8/2017 - Present    Fall W19.XXXA   5/13/2017 - Present      Health Maintenance        Date Due Completion Dates    IMM DTaP/Tdap/Td Vaccine (1 - Tdap) 8/6/1944 ---    MAMMOGRAM 8/6/1965 ---    COLONOSCOPY 8/6/1975 ---    IMM ZOSTER VACCINE 8/6/1985 ---    BONE DENSITY 8/6/1990 ---    IMM PNEUMOCOCCAL 65+ (ADULT) LOW/MEDIUM RISK SERIES (1 of 2 - PCV13) 8/6/1990 ---            Current Immunizations     No immunizations on file.      Below and/or attached are the medications your provider expects you to take. Review all of your home medications and newly ordered medications with your provider and/or pharmacist. Follow medication instructions as directed by your provider and/or pharmacist. Please keep your medication list with you and share with your provider. Update the information when medications are discontinued, doses are changed, or new medications (including over-the-counter products) are added; and carry medication information at all times in the event of emergency situations     Allergies:  ALLOPURINOL - Rash     ASPIRIN - (reactions not documented)     CODEINE - Itching,Nausea     LATEX - Rash               Medications  Valid as of: June 14, 2017 - 10:29 AM    Generic Name Brand Name Tablet Size Instructions for use    AmLODIPine Besylate (Tab) NORVASC 2.5 MG Take 2.5 mg by mouth every evening.        AmLODIPine Besylate (Tab) NORVASC 2.5 MG Take 1 Tab by mouth every day.        Artificial Tear Ointment (Ointment) REFRESH,LACRI-LUBE  Place 1 Application in both eyes as needed (For Dry eye).        Erythromycin (Ointment) erythromycin 5 MG/GM Place 1 Application in both eyes every evening.        Latanoprost (Solution) XALATAN 0.005 % Place 1 Drop in both eyes every evening.        Multiple Vitamins-Minerals   Take 1 Tab by mouth every day.        Probenecid (Tab) BENEMID 500 MG Take 1 Tab by mouth every day.        .                 Medicines prescribed today were sent to:     [id number] -  [place], [place] - [address]    [address] [place] [address]    Phone: [phone] Fax: [phone]    Open 24 Hours?: No      Medication refill instructions:       If your prescription bottle indicates you have medication refills left, it is not necessary to call your provider’s office. Please contact your pharmacy and they will refill your medication.    If your prescription bottle indicates you do not have any refills left, you may request refills at any time through one of the following ways: The online Player X system (except Urgent Care), by calling your provider’s office, or by asking your pharmacy to contact your provider’s office with a refill request. Medication refills are processed only during regular business hours and may not be available until the next business day. Your provider may request additional information or to have a follow-up visit with you prior to refilling your medication.   *Please Note: Medication refills are assigned a new Rx number when refilled electronically. Your pharmacy may indicate that no refills were authorized even though a new prescription for the same medication is available at the pharmacy. Please request the medicine by name with the pharmacy before contacting your provider for a refill.           [employer] Status: Patient Declined

## 2017-09-12 RX ORDER — PROBENECID 500 MG/1
500 TABLET, FILM COATED ORAL DAILY
Qty: 30 TAB | Refills: 2 | Status: SHIPPED | OUTPATIENT
Start: 2017-09-12 | End: 2018-03-01 | Stop reason: SDUPTHER

## 2017-09-14 ENCOUNTER — TELEPHONE (OUTPATIENT)
Dept: MEDICAL GROUP | Age: 82
End: 2017-09-14

## 2017-09-14 NOTE — TELEPHONE ENCOUNTER
ESTABLISHED PATIENT PRE-VISIT PLANNING     Note: Patient will not be contacted if there is no indication to call.     1.  Reviewed notes from the last few office visits within the medical group: Yes    2.  If any orders were placed at last visit or intended to be done for this visit (i.e. 6 mos follow-up), do we have Results/Consult Notes?        •  Labs - Labs were not ordered at last office visit.       •  Imaging - Imaging was not ordered at last office visit.       •  Referrals - No referrals were ordered at last office visit.    3. Is this appointment scheduled as a Hospital Follow-Up? No    4.  Immunizations were updated in Epic using WebIZ?: Epic matches WebIZ       •  Web Iz Recommendations: FLU, PREVNAR (PCV13) , TDAP and ZOSTAVAX (Shingles)    5.  Patient is due for the following Health Maintenance Topics:   Health Maintenance Due   Topic Date Due   • Annual Wellness Visit  08/06/1925   • IMM DTaP/Tdap/Td Vaccine (1 - Tdap) 08/06/1944   • MAMMOGRAM  08/06/1965   • COLONOSCOPY  08/06/1975   • BONE DENSITY  08/06/1990   • IMM PNEUMOCOCCAL 65+ (ADULT) LOW/MEDIUM RISK SERIES (1 of 2 - PCV13) 08/06/1990   • IMM INFLUENZA (1) 09/01/2017           6.  Patient was NOT informed to arrive 15 min prior to their scheduled appointment and bring in their medication bottles.

## 2017-10-26 ENCOUNTER — OFFICE VISIT (OUTPATIENT)
Dept: MEDICAL GROUP | Age: 82
End: 2017-10-26
Payer: MEDICARE

## 2017-10-26 VITALS
HEART RATE: 80 BPM | BODY MASS INDEX: 22.28 KG/M2 | HEIGHT: 68 IN | SYSTOLIC BLOOD PRESSURE: 130 MMHG | DIASTOLIC BLOOD PRESSURE: 74 MMHG | WEIGHT: 147 LBS | TEMPERATURE: 98.1 F | OXYGEN SATURATION: 95 %

## 2017-10-26 DIAGNOSIS — Z91.81 AT RISK FOR FALLS: ICD-10-CM

## 2017-10-26 DIAGNOSIS — Z00.00 PREVENTATIVE HEALTH CARE: ICD-10-CM

## 2017-10-26 DIAGNOSIS — M10.00 ACUTE IDIOPATHIC GOUT, UNSPECIFIED SITE: ICD-10-CM

## 2017-10-26 DIAGNOSIS — Z23 NEED FOR VACCINATION: ICD-10-CM

## 2017-10-26 DIAGNOSIS — I10 ESSENTIAL HYPERTENSION: ICD-10-CM

## 2017-10-26 PROCEDURE — 99214 OFFICE O/P EST MOD 30 MIN: CPT | Performed by: FAMILY MEDICINE

## 2017-10-26 NOTE — ASSESSMENT & PLAN NOTE
Amlodipine 2.5mg po q24h      Patient has been diagnosed with essential hypertension for years. Has been compliant with medications and tolerating them with no mention of any adverse events.   The patient has been tollerating the BP meds without any issues,also monitors BP at home. No tunnel vision, no cough, no changes in vision, no lightheadedness, no fatigue, no syncopal or presyncopal episodes, no edema, no new rashes. Patient also  denied chest pain, headache, change in vision, dyspnea on exertion, shortness of breath, PND, back pain, numbness or tingling anywhere. He is tolerating his medication well, he denies any lightheadedness, no tunnel vision, no syncopal episodes, no fatigue, no leg weakness no falls.

## 2017-10-26 NOTE — ASSESSMENT & PLAN NOTE
Lobster and shrimp trigger this  The probenecid prevents any attacks  She also avoids sea food  Her last gout attack was   Years ago        She believes it was from spinach

## 2017-10-26 NOTE — PROGRESS NOTES
This medical record contains text that has been entered with the assistance of computer voice recognition and dictation software.  Therefore, it may contain unintended errors in text, spelling, punctuation, or grammar    Chief Complaint   Patient presents with   • Other     see reason for visit       Nora Hope is a 89 y.o. female here evaluation and management of: Establish care, gout, risk for falls, hypertension      HPI:     Preventative health care  The patient is a very pleasant 89-year-old female who presents to clinic to establish care. She has a significant past medical history of gout glaucoma essential hypertension history of falls.   The patient denied any chest pain, no sob, no tuttle, no  pnd, no orthopnea, no headache, no changes in vision, no numbness or tingling, no nausea, no diarrhea, no abdominal pain, no fevers, no chills, no bright red blood per rectum, no  difficulty urinating, no burning during micturition, no depressed mood, no other concerns.        Lives alone, all of her friends have passed on  No she has aquaintances who are in ''worse shape than me, she laughs''  She does the [place] for exercise     Her   at age 61     Gout  Lobster and shrimp trigger this  The probenecid prevents any attacks  She also avoids sea food  Her last gout attack was   Years ago        She believes it was from spinach    At risk for falls  Use 4 wheel walker with brakes  Last fall was in 2015        Essential hypertension  Amlodipine 2.5mg po q24h      Patient has been diagnosed with essential hypertension for years. Has been compliant with medications and tolerating them with no mention of any adverse events.   The patient has been tollerating the BP meds without any issues,also monitors BP at home. No tunnel vision, no cough, no changes in vision, no lightheadedness, no fatigue, no syncopal or presyncopal episodes, no edema, no new rashes. Patient also  denied chest pain, headache, change  "in vision, dyspnea on exertion, shortness of breath, PND, back pain, numbness or tingling anywhere. He is tolerating his medication well, he denies any lightheadedness, no tunnel vision, no syncopal episodes, no fatigue, no leg weakness no falls.        Current medicines (including changes today)  Current Outpatient Prescriptions   Medication Sig Dispense Refill   • probenecid (BENEMID) 500 MG Tab Take 1 Tab by mouth every day. 30 Tab 2   • latanoprost (XALATAN) 0.005 % Solution Place 1 Drop in both eyes every evening.     • Multiple Vitamins-Minerals (MULTIVITAMIN PO) Take 1 Tab by mouth every day.     • amlodipine (NORVASC) 2.5 MG Tab Take 2.5 mg by mouth every evening.     • erythromycin 5 MG/GM Ointment Place 1 Application in both eyes every evening.     • artificial tear ointment (REFRESH,LACRI-LUBE) Ointment Place 1 Application in both eyes as needed (For Dry eye).       No current facility-administered medications for this visit.      She  has a past medical history of Arthritis; Cancer (CMS-[facility]) (2009); Essential hypertension (10/26/2017); and Glaucoma. She also has no past medical history of Hyperlipidemia.  She  has a past surgical history that includes eye surgery and other orthopedic surgery.  Social History   Substance Use Topics   • Smoking status: Never Smoker   • Smokeless tobacco: Never Used   • Alcohol use No     Social History     Social History Narrative   • No narrative on file     History reviewed. No pertinent family history.  No family status information on file.         ROS    Please see hpi     All other systems reviewed and are negative     Objective:     Blood pressure 130/74, pulse 80, temperature 36.7 °C (98.1 °F), height 1.727 m (5' 7.99\"), weight 66.7 kg (147 lb), SpO2 95 %. Body mass index is 22.36 kg/m².  Physical Exam:    Constitutional: Alert, no distress.  Skin: Warm, dry, good turgor, no rashes in visible areas.  Eye: Equal, round and reactive, conjunctiva clear, lids " normal.  ENMT: Lips without lesions, good dentition, oropharynx clear.  Neck: Trachea midline, no masses, no thyromegaly. No cervical or supraclavicular lymphadenopathy.  Respiratory: Unlabored respiratory effort, lungs clear to auscultation, no wheezes, no ronchi.  Cardiovascular: Normal S1, S2, no murmur, no edema.  Abdomen: Soft, non-tender, no masses, no hepatosplenomegaly.  Psych: Alert and oriented x3, normal affect and mood.          Assessment and Plan:   The following treatment plan was discussed      1. Need for vaccination  Refused all vaccines    2. Acute idiopathic gout, unspecified site  encouraged to avoid known triggers     3. At risk for falls  She will need a home health safety evaluation    - REFERRAL TO HOME HEALTH    4. Preventative health care  Care has been established  We need baseline labs to establish a clinical profile    Requested Medical records to be sent to us  Denies intimate partner viloence        5. Essential hypertension  Patient has been stable with current management  We will make no changes for now          HEALTH MAINTENANCE:    Instructed to Follow up in clinic or ER for worsening symptoms, difficulty breathing, lack of expected recovery, or should new symptoms or problems arise.    Followup: Return in about 3 months (around 1/26/2018) for Reevaluation.       Once again this medical record contains text that has been entered with the assistance of computer voice recognition and dictation software.  Therefore, it may contain unintended errors in text, spelling, punctuation, or grammar

## 2017-10-26 NOTE — ASSESSMENT & PLAN NOTE
The patient is a very pleasant 89-year-old female who presents to clinic to establish care. She has a significant past medical history of gout glaucoma essential hypertension history of falls.   The patient denied any chest pain, no sob, no tuttle, no  pnd, no orthopnea, no headache, no changes in vision, no numbness or tingling, no nausea, no diarrhea, no abdominal pain, no fevers, no chills, no bright red blood per rectum, no  difficulty urinating, no burning during micturition, no depressed mood, no other concerns.        Lives alone, all of her friends have passed on  No she has aquaintances who are in ''worse shape than me, she laughs''  She does the [place] for exercise     Her   at age 61

## 2017-10-30 ENCOUNTER — HOME HEALTH ADMISSION (OUTPATIENT)
Dept: HOME HEALTH SERVICES | Facility: HOME HEALTHCARE | Age: 82
End: 2017-10-30
Payer: MEDICARE

## 2017-11-01 ENCOUNTER — HOME CARE VISIT (OUTPATIENT)
Dept: HOME HEALTH SERVICES | Facility: HOME HEALTHCARE | Age: 82
End: 2017-11-01
Payer: MEDICARE

## 2017-11-01 VITALS
HEART RATE: 74 BPM | TEMPERATURE: 99.3 F | HEIGHT: 68 IN | RESPIRATION RATE: 18 BRPM | DIASTOLIC BLOOD PRESSURE: 62 MMHG | OXYGEN SATURATION: 97 % | WEIGHT: 147 LBS | BODY MASS INDEX: 22.28 KG/M2 | SYSTOLIC BLOOD PRESSURE: 126 MMHG

## 2017-11-01 PROCEDURE — 665998 HH PPS REVENUE CREDIT

## 2017-11-01 PROCEDURE — 665999 HH PPS REVENUE DEBIT

## 2017-11-01 PROCEDURE — G0162 HHC RN E&M PLAN SVS, 15 MIN: HCPCS

## 2017-11-01 PROCEDURE — 665001 SOC-HOME HEALTH

## 2017-11-01 ASSESSMENT — ENCOUNTER SYMPTOMS
POOR JUDGMENT: 1
DIFFICULTY THINKING: 1

## 2017-11-02 PROCEDURE — 665998 HH PPS REVENUE CREDIT

## 2017-11-02 PROCEDURE — 665999 HH PPS REVENUE DEBIT

## 2017-11-03 ENCOUNTER — HOME CARE VISIT (OUTPATIENT)
Dept: HOME HEALTH SERVICES | Facility: HOME HEALTHCARE | Age: 82
End: 2017-11-03
Payer: MEDICARE

## 2017-11-03 VITALS
OXYGEN SATURATION: 96 % | RESPIRATION RATE: 16 BRPM | HEART RATE: 67 BPM | DIASTOLIC BLOOD PRESSURE: 60 MMHG | TEMPERATURE: 99.3 F | SYSTOLIC BLOOD PRESSURE: 110 MMHG

## 2017-11-03 VITALS
TEMPERATURE: 99.3 F | SYSTOLIC BLOOD PRESSURE: 110 MMHG | DIASTOLIC BLOOD PRESSURE: 60 MMHG | OXYGEN SATURATION: 96 % | RESPIRATION RATE: 16 BRPM | HEART RATE: 67 BPM

## 2017-11-03 PROCEDURE — 665999 HH PPS REVENUE DEBIT

## 2017-11-03 PROCEDURE — 665998 HH PPS REVENUE CREDIT

## 2017-11-03 PROCEDURE — G0299 HHS/HOSPICE OF RN EA 15 MIN: HCPCS

## 2017-11-03 PROCEDURE — G0151 HHCP-SERV OF PT,EA 15 MIN: HCPCS

## 2017-11-03 SDOH — ECONOMIC STABILITY: HOUSING INSECURITY: UNSAFE APPLIANCES: 0

## 2017-11-03 SDOH — ECONOMIC STABILITY: HOUSING INSECURITY
HOME SAFETY: VE A FIRE ESCAPE PLAN DEVELOPED. PATIENT DOES NOT HAVE FLAMMABLE MATERIALS PRESENT IN THE HOME PRESENTING A FIRE HAZARD. NO EVIDENCE FOUND OF SMOKING MATERIALS PRESENT IN THE HOME. IS NOT ON SUPPLIMENTAL O2.

## 2017-11-03 SDOH — ECONOMIC STABILITY: HOUSING INSECURITY
HOME SAFETY: OXYGEN SAFETY RISK ASSESSMENT PERFORMED. PATIENT DOES NOT HAVE A NO SMOKING SIGN POSTED IN THE HOME. PATIENT DOES HAVE A WORKING FIRE EXTINGUISHER PRESENT IN THE HOME. SMOKE ALARMS ARE PRESENT AND FUNCTIONAL ON EACH LEVEL OF THE HOME. PATIENT DOES HA

## 2017-11-03 SDOH — ECONOMIC STABILITY: HOUSING INSECURITY: UNSAFE COOKING RANGE AREA: 0

## 2017-11-03 ASSESSMENT — ACTIVITIES OF DAILY LIVING (ADL)
HOME_HEALTH_OASIS: 04
DRESSING_LB_ASSISTANCE: 0
LAUNDRY_ASSISTANCE: 0
OASIS_M1830: 02
HOUSEKEEPING_ASSISTANCE: 0
SHOPPING_ASSISTANCE: 0
DRESSING_UB_ASSISTANCE: 0
EATING_ASSISTANCE: 0
TOILETING_ASSISTANCE: 0
BATHING_ASSISTANCE: 0
TELEPHONE_ASSISTANCE: 0
GROOMING_ASSISTANCE: 0
HOME_HEALTH_OASIS: 02
ORAL_CARE_ASSISTANCE: 0
MEAL_PREP_ASSISTANCE: 0

## 2017-11-03 ASSESSMENT — ENCOUNTER SYMPTOMS
NAUSEA: DENIES
VOMITING: DENIES
NAUSEA: DENIES

## 2017-11-03 ASSESSMENT — PATIENT HEALTH QUESTIONNAIRE - PHQ9
1. LITTLE INTEREST OR PLEASURE IN DOING THINGS: 00
2. FEELING DOWN, DEPRESSED, IRRITABLE, OR HOPELESS: 00

## 2017-11-04 PROCEDURE — 665998 HH PPS REVENUE CREDIT

## 2017-11-04 PROCEDURE — 665999 HH PPS REVENUE DEBIT

## 2017-11-05 PROCEDURE — 665999 HH PPS REVENUE DEBIT

## 2017-11-05 PROCEDURE — 665998 HH PPS REVENUE CREDIT

## 2017-11-06 PROCEDURE — 665998 HH PPS REVENUE CREDIT

## 2017-11-06 PROCEDURE — 665999 HH PPS REVENUE DEBIT

## 2017-11-07 ENCOUNTER — HOME CARE VISIT (OUTPATIENT)
Dept: HOME HEALTH SERVICES | Facility: HOME HEALTHCARE | Age: 82
End: 2017-11-07
Payer: MEDICARE

## 2017-11-07 VITALS
RESPIRATION RATE: 16 BRPM | OXYGEN SATURATION: 97 % | DIASTOLIC BLOOD PRESSURE: 70 MMHG | TEMPERATURE: 98.7 F | HEART RATE: 68 BPM | SYSTOLIC BLOOD PRESSURE: 112 MMHG

## 2017-11-07 PROCEDURE — 665998 HH PPS REVENUE CREDIT

## 2017-11-07 PROCEDURE — 665999 HH PPS REVENUE DEBIT

## 2017-11-07 PROCEDURE — G0494 LPN CARE EA 15MIN HH/HOSPICE: HCPCS

## 2017-11-08 PROCEDURE — 665999 HH PPS REVENUE DEBIT

## 2017-11-08 PROCEDURE — 665998 HH PPS REVENUE CREDIT

## 2017-11-09 ENCOUNTER — HOME CARE VISIT (OUTPATIENT)
Dept: HOME HEALTH SERVICES | Facility: HOME HEALTHCARE | Age: 82
End: 2017-11-09
Payer: MEDICARE

## 2017-11-09 VITALS
TEMPERATURE: 97.6 F | HEART RATE: 77 BPM | RESPIRATION RATE: 16 BRPM | OXYGEN SATURATION: 99 % | SYSTOLIC BLOOD PRESSURE: 118 MMHG | DIASTOLIC BLOOD PRESSURE: 62 MMHG

## 2017-11-09 PROCEDURE — 665998 HH PPS REVENUE CREDIT

## 2017-11-09 PROCEDURE — G0299 HHS/HOSPICE OF RN EA 15 MIN: HCPCS

## 2017-11-09 PROCEDURE — 665999 HH PPS REVENUE DEBIT

## 2017-11-09 ASSESSMENT — ENCOUNTER SYMPTOMS
RESPIRATORY SYMPTOMS COMMENTS: DENIES SOB, RESP EVEN AND UNLABORED, BILAT CHEST RISE
DIFFICULTY THINKING: 1
NAUSEA: DENIES
POOR JUDGMENT: 1

## 2017-11-10 ENCOUNTER — HOME CARE VISIT (OUTPATIENT)
Dept: HOME HEALTH SERVICES | Facility: HOME HEALTHCARE | Age: 82
End: 2017-11-10
Payer: MEDICARE

## 2017-11-10 ENCOUNTER — TELEPHONE (OUTPATIENT)
Dept: VASCULAR LAB | Facility: MEDICAL CENTER | Age: 82
End: 2017-11-10

## 2017-11-10 PROCEDURE — 665999 HH PPS REVENUE DEBIT

## 2017-11-10 PROCEDURE — 665998 HH PPS REVENUE CREDIT

## 2017-11-10 NOTE — TELEPHONE ENCOUNTER
11/10/2017    Medication list reviewed. No significant drug/drug interactions or medication related issues noted.    Bolivar Scherer, PharmD

## 2017-11-11 PROCEDURE — 665998 HH PPS REVENUE CREDIT

## 2017-11-11 PROCEDURE — 665999 HH PPS REVENUE DEBIT

## 2017-11-12 PROCEDURE — 665999 HH PPS REVENUE DEBIT

## 2017-11-12 PROCEDURE — 665998 HH PPS REVENUE CREDIT

## 2017-11-13 ENCOUNTER — HOME CARE VISIT (OUTPATIENT)
Dept: HOME HEALTH SERVICES | Facility: HOME HEALTHCARE | Age: 82
End: 2017-11-13
Payer: MEDICARE

## 2017-11-13 PROCEDURE — 665999 HH PPS REVENUE DEBIT

## 2017-11-13 PROCEDURE — 665998 HH PPS REVENUE CREDIT

## 2017-11-13 PROCEDURE — G0162 HHC RN E&M PLAN SVS, 15 MIN: HCPCS

## 2017-11-14 VITALS
RESPIRATION RATE: 16 BRPM | HEART RATE: 66 BPM | TEMPERATURE: 97.9 F | DIASTOLIC BLOOD PRESSURE: 62 MMHG | SYSTOLIC BLOOD PRESSURE: 120 MMHG | OXYGEN SATURATION: 98 %

## 2017-11-14 PROCEDURE — 665999 HH PPS REVENUE DEBIT

## 2017-11-14 PROCEDURE — 665998 HH PPS REVENUE CREDIT

## 2017-11-14 SDOH — ECONOMIC STABILITY: HOUSING INSECURITY: UNSAFE APPLIANCES: 0

## 2017-11-14 SDOH — ECONOMIC STABILITY: HOUSING INSECURITY: UNSAFE COOKING RANGE AREA: 0

## 2017-11-14 SDOH — ECONOMIC STABILITY: HOUSING INSECURITY
HOME SAFETY: VE A FIRE ESCAPE PLAN DEVELOPED. PATIENT DOES NOT HAVE FLAMMABLE MATERIALS PRESENT IN THE HOME PRESENTING A FIRE HAZARD. NO EVIDENCE FOUND OF SMOKING MATERIALS PRESENT IN THE HOME.

## 2017-11-14 ASSESSMENT — ACTIVITIES OF DAILY LIVING (ADL)
HOME_HEALTH_OASIS: 00
OASIS_M1830: 01

## 2017-11-14 ASSESSMENT — ENCOUNTER SYMPTOMS
POOR JUDGMENT: 1
DIFFICULTY THINKING: 1

## 2017-11-15 PROCEDURE — 665999 HH PPS REVENUE DEBIT

## 2017-11-15 PROCEDURE — 665998 HH PPS REVENUE CREDIT

## 2017-11-16 PROCEDURE — 665998 HH PPS REVENUE CREDIT

## 2017-11-16 PROCEDURE — 665999 HH PPS REVENUE DEBIT

## 2017-11-17 PROCEDURE — 665999 HH PPS REVENUE DEBIT

## 2017-11-17 PROCEDURE — 665998 HH PPS REVENUE CREDIT

## 2017-11-18 PROCEDURE — 665999 HH PPS REVENUE DEBIT

## 2017-11-18 PROCEDURE — 665998 HH PPS REVENUE CREDIT

## 2017-11-19 PROCEDURE — 665998 HH PPS REVENUE CREDIT

## 2017-11-19 PROCEDURE — 665999 HH PPS REVENUE DEBIT

## 2017-11-20 PROCEDURE — 665998 HH PPS REVENUE CREDIT

## 2017-11-20 PROCEDURE — 665999 HH PPS REVENUE DEBIT

## 2017-11-21 PROCEDURE — 665999 HH PPS REVENUE DEBIT

## 2017-11-21 PROCEDURE — 665998 HH PPS REVENUE CREDIT

## 2017-11-21 PROCEDURE — G0180 MD CERTIFICATION HHA PATIENT: HCPCS | Performed by: FAMILY MEDICINE

## 2017-11-22 PROCEDURE — 665999 HH PPS REVENUE DEBIT

## 2017-11-22 PROCEDURE — 665998 HH PPS REVENUE CREDIT

## 2017-11-23 PROCEDURE — 665998 HH PPS REVENUE CREDIT

## 2017-11-23 PROCEDURE — 665999 HH PPS REVENUE DEBIT

## 2017-11-24 PROCEDURE — 665999 HH PPS REVENUE DEBIT

## 2017-11-24 PROCEDURE — 665998 HH PPS REVENUE CREDIT

## 2017-11-25 PROCEDURE — 665998 HH PPS REVENUE CREDIT

## 2017-11-25 PROCEDURE — 665999 HH PPS REVENUE DEBIT

## 2017-11-26 PROCEDURE — 665998 HH PPS REVENUE CREDIT

## 2017-11-26 PROCEDURE — 665999 HH PPS REVENUE DEBIT

## 2017-11-27 PROCEDURE — 665998 HH PPS REVENUE CREDIT

## 2017-11-27 PROCEDURE — 665999 HH PPS REVENUE DEBIT

## 2018-01-30 ENCOUNTER — OFFICE VISIT (OUTPATIENT)
Dept: MEDICAL GROUP | Age: 83
End: 2018-01-30
Payer: MEDICARE

## 2018-01-30 VITALS
HEIGHT: 68 IN | WEIGHT: 145 LBS | BODY MASS INDEX: 21.98 KG/M2 | SYSTOLIC BLOOD PRESSURE: 136 MMHG | OXYGEN SATURATION: 95 % | DIASTOLIC BLOOD PRESSURE: 80 MMHG | HEART RATE: 84 BPM | TEMPERATURE: 98.1 F

## 2018-01-30 DIAGNOSIS — L57.0 AK (ACTINIC KERATOSIS): ICD-10-CM

## 2018-01-30 DIAGNOSIS — Z91.81 AT RISK FOR FALLS: ICD-10-CM

## 2018-01-30 DIAGNOSIS — I10 ESSENTIAL HYPERTENSION: ICD-10-CM

## 2018-01-30 DIAGNOSIS — R60.9 PERIPHERAL EDEMA: ICD-10-CM

## 2018-01-30 DIAGNOSIS — B07.8 FLAT WART: ICD-10-CM

## 2018-01-30 PROCEDURE — 17110 DESTRUCTION B9 LES UP TO 14: CPT | Performed by: FAMILY MEDICINE

## 2018-01-30 PROCEDURE — 17003 DESTRUCT PREMALG LES 2-14: CPT | Performed by: FAMILY MEDICINE

## 2018-01-30 PROCEDURE — 17000 DESTRUCT PREMALG LESION: CPT | Mod: 59 | Performed by: FAMILY MEDICINE

## 2018-01-30 PROCEDURE — 99214 OFFICE O/P EST MOD 30 MIN: CPT | Mod: 25 | Performed by: FAMILY MEDICINE

## 2018-01-30 NOTE — ASSESSMENT & PLAN NOTE
Patient states that she had this wart like growth on her right hand which keeps him picking at it it itches and flakes.

## 2018-01-30 NOTE — ASSESSMENT & PLAN NOTE
Amlodipine 2.5 mg by mouth daily  The patient has been tollerating the BP meds without any issues. No tunnel vision, no cough, no changes in vision, no lightheadedness, no fatigue, no syncopal or presyncopal episodes, no edema, no new rashes.

## 2018-01-30 NOTE — ASSESSMENT & PLAN NOTE
The patient continues to use a 4 wheeled walker with brakes  Her last fall was in 2015 and November.  She states that she does have home health care and they did do a safety evaluation.

## 2018-01-30 NOTE — PROGRESS NOTES
This medical record contains text that has been entered with the assistance of computer voice recognition and dictation software.  Therefore, it may contain unintended errors in text, spelling, punctuation, or grammar    Chief Complaint   Patient presents with   • Other     see reason for visit       Nora Hope is a 89 y.o. female here evaluation and management of: edema, htn, risk for falls, AK      HPI:     Peripheral edema    She states that bilateral foot swelling is annoying, left  Greater than right.   She is on a calcium channel blocker she has no history of congestive heart failure liver disease or renal disease. She states she's been using compression stockings and trying to elevate her leg when she can.    Essential hypertension  Amlodipine 2.5 mg by mouth daily  The patient has been tollerating the BP meds without any issues. No tunnel vision, no cough, no changes in vision, no lightheadedness, no fatigue, no syncopal or presyncopal episodes, no edema, no new rashes.       At risk for falls  The patient continues to use a 4 wheeled walker with brakes  Her last fall was in 2015 and November.  She states that she does have home health care and they did do a safety evaluation.    Flat wart  Patient states that she had this wart like growth on her right hand which keeps him picking at it it itches and flakes.    Current medicines (including changes today)  Current Outpatient Prescriptions   Medication Sig Dispense Refill   • probenecid (BENEMID) 500 MG Tab Take 1 Tab by mouth every day. 30 Tab 2   • amlodipine (NORVASC) 2.5 MG Tab Take 2.5 mg by mouth every evening.     • latanoprost (XALATAN) 0.005 % Solution Place 1 Drop in both eyes every evening.     • artificial tear ointment (REFRESH,LACRI-LUBE) Ointment Place 1 Application in both eyes as needed (For Dry eye).     • Multiple Vitamins-Minerals (MULTIVITAMIN PO) Take 1 Tab by mouth every day.       No current facility-administered medications for  "this visit.      She  has a past medical history of Arthritis; Cancer (CMS-HCC) (2009); Essential hypertension (10/26/2017); and Glaucoma. She also has no past medical history of Hyperlipidemia.  She  has a past surgical history that includes eye surgery and other orthopedic surgery.  Social History   Substance Use Topics   • Smoking status: Never Smoker   • Smokeless tobacco: Never Used   • Alcohol use No     Social History     Social History Narrative   • No narrative on file     History reviewed. No pertinent family history.  No family status information on file.         ROS    Please see hpi     All other systems reviewed and are negative     Objective:     Blood pressure 136/80, pulse 84, temperature 36.7 °C (98.1 °F), height 1.727 m (5' 7.99\"), weight 65.8 kg (145 lb), SpO2 95 %. Body mass index is 22.05 kg/m².  Physical Exam:    Constitutional: Alert, no distress.  Skin: Warm, dry, good turgor, no rashes in visible areas.  Eye: Equal, round and reactive, conjunctiva clear, lids normal.  ENMT: Lips without lesions, good dentition, oropharynx clear.  Neck: Trachea midline, no masses, no thyromegaly. No cervical or supraclavicular lymphadenopathy.  Respiratory: Unlabored respiratory effort, lungs clear to auscultation, no wheezes, no ronchi.  Cardiovascular: Normal S1, S2, no murmur, no edema.  Abdomen: Soft, non-tender, no masses, no hepatosplenomegaly.  Psych: Alert and oriented x3, normal affect and mood.      SKIN EXAM  Several well-demarcated,  verrucous surface and irregular stuck-on appearance on right hand      multiple lesions on bilateral forearms, hands, and chest, with evidence of of solar damage present , spotty hyperpigmentation, scattered telangiectasias, and  Xerosis      PROCEDURE: CRYOTHERAPY  Discussed risks and benefits of cryotherapy including but not limited to scarring, hyperpigmentation, hypopigmentation, hypertrophic scarring, keiloid scarring, incomplete or no resolution of lesions " treated,pain, undesirable cosemetic result, blistering, potential need for additional treatment including more invasive treatment. Patient expresses understanding and verbally acknowledges risks and consent to treatment. 2  applications of cryotherapy with 3 second freeze thaw cycle was applied to  5AK's and one flat wart Patient tolerated procedure well. There were no complications. Aftercare instructions given.            Assessment and Plan:   The following treatment plan was discussed      1. At risk for falls  Recommended repeating PT    2. Peripheral edema  She is not interested in removing CCB  Continue with compression stockings  And elevating legs    3. Essential hypertension    Patient has been stable with current management  We will make no changes for now    - COMP METABOLIC PANEL    4. AK (actinic keratosis)  Patient tollerrated procedure well  There were no adverse events  Patient was given post procedure precautions     5. Flat wart  Patient tollerrated procedure well  There were no adverse events  Patient was given post procedure precautions         HEALTH MAINTENANCE:    Instructed to Follow up in clinic or ER for worsening symptoms, difficulty breathing, lack of expected recovery, or should new symptoms or problems arise.    Followup: Return in about 2 weeks (around 2/13/2018) for EXCISION.       Once again this medical record contains text that has been entered with the assistance of computer voice recognition and dictation software.  Therefore, it may contain unintended errors in text, spelling, punctuation, or grammar

## 2018-01-30 NOTE — ASSESSMENT & PLAN NOTE
She states that bilateral foot swelling is annoying, left  Greater than right.   She is on a calcium channel blocker she has no history of congestive heart failure liver disease or renal disease. She states she's been using compression stockings and trying to elevate her leg when she can.

## 2018-02-01 ENCOUNTER — TELEPHONE (OUTPATIENT)
Dept: MEDICAL GROUP | Age: 83
End: 2018-02-01

## 2018-02-01 NOTE — TELEPHONE ENCOUNTER
----- Message from Altagracia Lozano sent at 1/30/2018  5:08 PM PST -----  Patient states you were going to write a script for feet swelling   Please send to pharmacy,

## 2018-02-02 RX ORDER — FUROSEMIDE 20 MG/1
TABLET ORAL
Qty: 30 TAB | Refills: 0 | Status: ON HOLD | OUTPATIENT
Start: 2018-02-02 | End: 2018-03-17

## 2018-02-02 RX ORDER — POTASSIUM CHLORIDE 750 MG/1
TABLET, FILM COATED, EXTENDED RELEASE ORAL
Qty: 60 TAB | Refills: 0 | Status: ON HOLD | OUTPATIENT
Start: 2018-02-02 | End: 2018-03-17

## 2018-02-08 ENCOUNTER — TELEPHONE (OUTPATIENT)
Dept: MEDICAL GROUP | Age: 83
End: 2018-02-08

## 2018-02-09 NOTE — TELEPHONE ENCOUNTER
1. Caller Name: Nora L Allison                                         Call Back Number: [phone] (home)         Patient approves a detailed voicemail message: N\A    Patient called stating she would like a different blood pressure medication that will not cause swelling please advise.

## 2018-02-14 ENCOUNTER — HOME HEALTH ADMISSION (OUTPATIENT)
Dept: HOME HEALTH SERVICES | Facility: HOME HEALTHCARE | Age: 83
End: 2018-02-14
Payer: MEDICARE

## 2018-02-14 ENCOUNTER — TELEPHONE (OUTPATIENT)
Dept: MEDICAL GROUP | Age: 83
End: 2018-02-14

## 2018-02-14 DIAGNOSIS — R41.0 CONFUSION: ICD-10-CM

## 2018-02-14 RX ORDER — HYDROCHLOROTHIAZIDE 12.5 MG/1
12.5 CAPSULE, GELATIN COATED ORAL DAILY
Qty: 90 CAP | Refills: 0 | Status: SHIPPED | OUTPATIENT
Start: 2018-02-14 | End: 2018-03-16

## 2018-02-14 NOTE — TELEPHONE ENCOUNTER
1. Caller Name: Nora L Allison                                           Call Back Number: [phone] (home)         Patient approves a detailed voicemail message: yes    Patient called very upset because she stated that Dr. Boyd told her he was going to perscribre a new blood pressure medication that would stop her feet from swelling. The telephone encouter that was to you on 2/1/18 was for feet swelling, patient states that at that time you sent a RX for Laxis for the swelling. Patient states that she can not take this medication as she can not walk very fast and she has accidents and has to wear pads. On 2/8/18 another call was made requesting a differnt BP medication that would not make her feet swell. Patient states that she has not heard from anyone in regards to this request.  patient also stated that at her appt on 1/30/18 she discussed this issue with you and her understanding was you were going to perscribe something differnt. Patient was very upset and requests that this be addressed  as soon as possible.   Patient also stated that she could not complete labs because it was going to cost her to much money and she did not understand why she needs this test. Please advise

## 2018-02-14 NOTE — TELEPHONE ENCOUNTER
Called patient and informed her that a new RX was sent to [facility] for her BP medication.  Dr. Boyd wants patient to monitor BP for the next 2 weeks and bring in at future appt. Patient stated that she can not do that as she lives by herself. Patient seems somewhat confused and is repeating the same information over and backwards . Patient stated that she wanted to cancel appt for biopsy as she went to a specialist at [facility] and she was told that it was not cancer. Patient has scheduled new appt with Dr. Cali on 3/15/18. Patient stated that she understood instructions from Dr. Boyd regarding when she should start taking new medication.

## 2018-03-01 RX ORDER — PROBENECID 500 MG/1
500 TABLET, FILM COATED ORAL DAILY
Qty: 30 TAB | Refills: 0 | Status: ON HOLD | OUTPATIENT
Start: 2018-03-01 | End: 2018-03-17

## 2018-03-15 ENCOUNTER — TELEPHONE (OUTPATIENT)
Dept: MEDICAL GROUP | Age: 83
End: 2018-03-15

## 2018-03-15 ENCOUNTER — APPOINTMENT (OUTPATIENT)
Dept: MEDICAL GROUP | Age: 83
End: 2018-03-15
Payer: MEDICARE

## 2018-03-15 NOTE — TELEPHONE ENCOUNTER
VOICEMAIL  1. Caller Name: Nora Allison                      Call Back Number: [phone]    2. Message: Pt was very upset and wishes to receive a call prior to any Rx being sent to the pharmacy.    3. Patient approves office to leave a detailed voicemail/MyChart message: N\A

## 2018-03-16 ENCOUNTER — RESOLUTE PROFESSIONAL BILLING HOSPITAL PROF FEE (OUTPATIENT)
Dept: HOSPITALIST | Facility: MEDICAL CENTER | Age: 83
End: 2018-03-16
Payer: MEDICARE

## 2018-03-16 ENCOUNTER — APPOINTMENT (OUTPATIENT)
Dept: RADIOLOGY | Facility: MEDICAL CENTER | Age: 83
DRG: 683 | End: 2018-03-16
Attending: EMERGENCY MEDICINE
Payer: MEDICARE

## 2018-03-16 ENCOUNTER — HOSPITAL ENCOUNTER (INPATIENT)
Facility: MEDICAL CENTER | Age: 83
LOS: 3 days | DRG: 683 | End: 2018-03-19
Attending: EMERGENCY MEDICINE | Admitting: INTERNAL MEDICINE
Payer: MEDICARE

## 2018-03-16 DIAGNOSIS — N28.9 RENAL INSUFFICIENCY: ICD-10-CM

## 2018-03-16 DIAGNOSIS — H35.30 MACULAR DEGENERATION OF LEFT EYE: ICD-10-CM

## 2018-03-16 DIAGNOSIS — S50.311A ELBOW ABRASION, RIGHT, INITIAL ENCOUNTER: ICD-10-CM

## 2018-03-16 DIAGNOSIS — S09.90XA CLOSED HEAD INJURY, INITIAL ENCOUNTER: ICD-10-CM

## 2018-03-16 DIAGNOSIS — N39.0 URINARY TRACT INFECTION WITHOUT HEMATURIA, SITE UNSPECIFIED: ICD-10-CM
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
## 2018-03-16 DIAGNOSIS — Z91.81 AT RISK FOR FALLS: ICD-10-CM

## 2018-03-16 DIAGNOSIS — M25.551 HIP PAIN, ACUTE, RIGHT: ICD-10-CM

## 2018-03-16 DIAGNOSIS — R42 DIZZY: ICD-10-CM

## 2018-03-16 PROBLEM — Z66 DNR (DO NOT RESUSCITATE): Status: ACTIVE | Noted: 2018-03-16

## 2018-03-16 PROBLEM — N17.9 ACUTE KIDNEY INJURY (HCC): Status: ACTIVE | Noted: 2018-03-16

## 2018-03-16 PROBLEM — F41.9 ANXIETY: Status: ACTIVE | Noted: 2018-03-16

## 2018-03-16 LAB
ALBUMIN SERPL BCP-MCNC: 3.5 G/DL (ref 3.2–4.9)
ALBUMIN/GLOB SERPL: 0.9 G/DL
ALP SERPL-CCNC: 92 U/L (ref 30–99)
ALT SERPL-CCNC: 11 U/L (ref 2–50)
ANION GAP SERPL CALC-SCNC: 8 MMOL/L (ref 0–11.9)
APPEARANCE UR: CLEAR
AST SERPL-CCNC: 22 U/L (ref 12–45)
BACTERIA #/AREA URNS HPF: ABNORMAL /HPF
BASOPHILS # BLD AUTO: 1.1 % (ref 0–1.8)
BASOPHILS # BLD: 0.09 K/UL (ref 0–0.12)
BILIRUB SERPL-MCNC: 0.9 MG/DL (ref 0.1–1.5)
BILIRUB UR QL STRIP.AUTO: NEGATIVE
BUN SERPL-MCNC: 25 MG/DL (ref 8–22)
CALCIUM SERPL-MCNC: 9.4 MG/DL (ref 8.4–10.2)
CHLORIDE SERPL-SCNC: 108 MMOL/L (ref 96–112)
CK SERPL-CCNC: 69 U/L (ref 0–154)
CO2 SERPL-SCNC: 23 MMOL/L (ref 20–33)
COLOR UR: YELLOW
CREAT SERPL-MCNC: 1.49 MG/DL (ref 0.5–1.4)
EOSINOPHIL # BLD AUTO: 0.26 K/UL (ref 0–0.51)
EOSINOPHIL NFR BLD: 3.2 % (ref 0–6.9)
EPI CELLS #/AREA URNS HPF: ABNORMAL /HPF
ERYTHROCYTE [DISTWIDTH] IN BLOOD BY AUTOMATED COUNT: 41.6 FL (ref 35.9–50)
GLOBULIN SER CALC-MCNC: 3.8 G/DL (ref 1.9–3.5)
GLUCOSE SERPL-MCNC: 101 MG/DL (ref 65–99)
GLUCOSE UR STRIP.AUTO-MCNC: NEGATIVE MG/DL
HCT VFR BLD AUTO: 40.4 % (ref 37–47)
HGB BLD-MCNC: 13.8 G/DL (ref 12–16)
IMM GRANULOCYTES # BLD AUTO: 0.02 K/UL (ref 0–0.11)
IMM GRANULOCYTES NFR BLD AUTO: 0.2 % (ref 0–0.9)
KETONES UR STRIP.AUTO-MCNC: NEGATIVE MG/DL
LEUKOCYTE ESTERASE UR QL STRIP.AUTO: ABNORMAL
LYMPHOCYTES # BLD AUTO: 2.09 K/UL (ref 1–4.8)
LYMPHOCYTES NFR BLD: 25.6 % (ref 22–41)
MCH RBC QN AUTO: 30.8 PG (ref 27–33)
MCHC RBC AUTO-ENTMCNC: 34.2 G/DL (ref 33.6–35)
MCV RBC AUTO: 90.2 FL (ref 81.4–97.8)
MICRO URNS: ABNORMAL
MONOCYTES # BLD AUTO: 0.75 K/UL (ref 0–0.85)
MONOCYTES NFR BLD AUTO: 9.2 % (ref 0–13.4)
MUCOUS THREADS #/AREA URNS HPF: ABNORMAL /HPF
NEUTROPHILS # BLD AUTO: 4.94 K/UL (ref 2–7.15)
NEUTROPHILS NFR BLD: 60.7 % (ref 44–72)
NITRITE UR QL STRIP.AUTO: NEGATIVE
NRBC # BLD AUTO: 0 K/UL
NRBC BLD-RTO: 0 /100 WBC
PH UR STRIP.AUTO: 6 [PH]
PLATELET # BLD AUTO: 253 K/UL (ref 164–446)
PMV BLD AUTO: 9.2 FL (ref 9–12.9)
POTASSIUM SERPL-SCNC: 3.9 MMOL/L (ref 3.6–5.5)
PROT SERPL-MCNC: 7.3 G/DL (ref 6–8.2)
PROT UR QL STRIP: NEGATIVE MG/DL
RBC # BLD AUTO: 4.48 M/UL (ref 4.2–5.4)
RBC # URNS HPF: ABNORMAL /HPF
RBC UR QL AUTO: NEGATIVE
SODIUM SERPL-SCNC: 139 MMOL/L (ref 135–145)
SP GR UR STRIP.AUTO: 1.01
TROPONIN I SERPL-MCNC: <0.02 NG/ML (ref 0–0.04)
TSH SERPL DL<=0.005 MIU/L-ACNC: 3.87 UIU/ML (ref 0.38–5.33)
WBC # BLD AUTO: 8.2 K/UL (ref 4.8–10.8)
WBC #/AREA URNS HPF: ABNORMAL /HPF

## 2018-03-16 PROCEDURE — 84443 ASSAY THYROID STIM HORMONE: CPT

## 2018-03-16 PROCEDURE — 700105 HCHG RX REV CODE 258: Performed by: EMERGENCY MEDICINE

## 2018-03-16 PROCEDURE — 96374 THER/PROPH/DIAG INJ IV PUSH: CPT

## 2018-03-16 PROCEDURE — 99285 EMERGENCY DEPT VISIT HI MDM: CPT

## 2018-03-16 PROCEDURE — 700105 HCHG RX REV CODE 258: Performed by: INTERNAL MEDICINE

## 2018-03-16 PROCEDURE — 70450 CT HEAD/BRAIN W/O DYE: CPT

## 2018-03-16 PROCEDURE — 93005 ELECTROCARDIOGRAM TRACING: CPT | Performed by: EMERGENCY MEDICINE

## 2018-03-16 PROCEDURE — 99223 1ST HOSP IP/OBS HIGH 75: CPT | Mod: AI,25 | Performed by: INTERNAL MEDICINE

## 2018-03-16 PROCEDURE — 700102 HCHG RX REV CODE 250 W/ 637 OVERRIDE(OP): Performed by: INTERNAL MEDICINE

## 2018-03-16 PROCEDURE — 73522 X-RAY EXAM HIPS BI 3-4 VIEWS: CPT

## 2018-03-16 PROCEDURE — 94760 N-INVAS EAR/PLS OXIMETRY 1: CPT

## 2018-03-16 PROCEDURE — 82550 ASSAY OF CK (CPK): CPT

## 2018-03-16 PROCEDURE — 96361 HYDRATE IV INFUSION ADD-ON: CPT

## 2018-03-16 PROCEDURE — 85025 COMPLETE CBC W/AUTO DIFF WBC: CPT

## 2018-03-16 PROCEDURE — 700111 HCHG RX REV CODE 636 W/ 250 OVERRIDE (IP): Performed by: INTERNAL MEDICINE

## 2018-03-16 PROCEDURE — 770020 HCHG ROOM/CARE - TELE (206)

## 2018-03-16 PROCEDURE — A9270 NON-COVERED ITEM OR SERVICE: HCPCS | Performed by: INTERNAL MEDICINE

## 2018-03-16 PROCEDURE — 71045 X-RAY EXAM CHEST 1 VIEW: CPT

## 2018-03-16 PROCEDURE — 84484 ASSAY OF TROPONIN QUANT: CPT

## 2018-03-16 PROCEDURE — 99497 ADVNCD CARE PLAN 30 MIN: CPT | Performed by: INTERNAL MEDICINE

## 2018-03-16 PROCEDURE — 80053 COMPREHEN METABOLIC PANEL: CPT

## 2018-03-16 PROCEDURE — 700101 HCHG RX REV CODE 250: Performed by: INTERNAL MEDICINE

## 2018-03-16 PROCEDURE — 81001 URINALYSIS AUTO W/SCOPE: CPT

## 2018-03-16 RX ORDER — LORAZEPAM 2 MG/ML
INJECTION INTRAMUSCULAR
Status: COMPLETED
Start: 2018-03-16 | End: 2018-03-16

## 2018-03-16 RX ORDER — LABETALOL HYDROCHLORIDE 5 MG/ML
10 INJECTION, SOLUTION INTRAVENOUS EVERY 4 HOURS PRN
Status: DISCONTINUED | OUTPATIENT
Start: 2018-03-16 | End: 2018-03-19 | Stop reason: HOSPADM

## 2018-03-16 RX ORDER — CEFTRIAXONE 500 MG/1
INJECTION, POWDER, FOR SOLUTION INTRAMUSCULAR; INTRAVENOUS
Status: DISPENSED
Start: 2018-03-16 | End: 2018-03-17

## 2018-03-16 RX ORDER — ONDANSETRON 2 MG/ML
4 INJECTION INTRAMUSCULAR; INTRAVENOUS EVERY 4 HOURS PRN
Status: DISCONTINUED | OUTPATIENT
Start: 2018-03-16 | End: 2018-03-19 | Stop reason: HOSPADM

## 2018-03-16 RX ORDER — BISACODYL 10 MG
10 SUPPOSITORY, RECTAL RECTAL
Status: DISCONTINUED | OUTPATIENT
Start: 2018-03-16 | End: 2018-03-19 | Stop reason: HOSPADM

## 2018-03-16 RX ORDER — POTASSIUM CHLORIDE 750 MG/1
10 TABLET, FILM COATED, EXTENDED RELEASE ORAL DAILY
Status: DISCONTINUED | OUTPATIENT
Start: 2018-03-16 | End: 2018-03-16

## 2018-03-16 RX ORDER — POLYETHYLENE GLYCOL 3350 17 G/17G
1 POWDER, FOR SOLUTION ORAL
Status: DISCONTINUED | OUTPATIENT
Start: 2018-03-16 | End: 2018-03-19 | Stop reason: HOSPADM

## 2018-03-16 RX ORDER — HEPARIN SODIUM 5000 [USP'U]/ML
5000 INJECTION, SOLUTION INTRAVENOUS; SUBCUTANEOUS EVERY 8 HOURS
Status: DISCONTINUED | OUTPATIENT
Start: 2018-03-16 | End: 2018-03-19 | Stop reason: HOSPADM

## 2018-03-16 RX ORDER — SODIUM CHLORIDE 9 MG/ML
500 INJECTION, SOLUTION INTRAVENOUS ONCE
Status: COMPLETED | OUTPATIENT
Start: 2018-03-16 | End: 2018-03-16

## 2018-03-16 RX ORDER — ACETAMINOPHEN 325 MG/1
650 TABLET ORAL EVERY 6 HOURS PRN
Status: DISCONTINUED | OUTPATIENT
Start: 2018-03-16 | End: 2018-03-19 | Stop reason: HOSPADM

## 2018-03-16 RX ORDER — POTASSIUM CHLORIDE 20 MEQ/1
10 TABLET, EXTENDED RELEASE ORAL DAILY
Status: DISCONTINUED | OUTPATIENT
Start: 2018-03-16 | End: 2018-03-18

## 2018-03-16 RX ORDER — LATANOPROST 50 UG/ML
1 SOLUTION/ DROPS OPHTHALMIC NIGHTLY
Status: DISCONTINUED | OUTPATIENT
Start: 2018-03-16 | End: 2018-03-19 | Stop reason: HOSPADM

## 2018-03-16 RX ORDER — AMOXICILLIN 250 MG
2 CAPSULE ORAL 2 TIMES DAILY
Status: DISCONTINUED | OUTPATIENT
Start: 2018-03-16 | End: 2018-03-19 | Stop reason: HOSPADM

## 2018-03-16 RX ORDER — CEFTRIAXONE 1 G/1
INJECTION, POWDER, FOR SOLUTION INTRAMUSCULAR; INTRAVENOUS
Status: DISPENSED
Start: 2018-03-16 | End: 2018-03-17

## 2018-03-16 RX ORDER — SODIUM CHLORIDE 9 MG/ML
INJECTION, SOLUTION INTRAVENOUS CONTINUOUS
Status: DISCONTINUED | OUTPATIENT
Start: 2018-03-16 | End: 2018-03-17

## 2018-03-16 RX ORDER — ONDANSETRON 4 MG/1
4 TABLET, ORALLY DISINTEGRATING ORAL EVERY 4 HOURS PRN
Status: DISCONTINUED | OUTPATIENT
Start: 2018-03-16 | End: 2018-03-19 | Stop reason: HOSPADM

## 2018-03-16 RX ADMIN — SODIUM CHLORIDE: 9 INJECTION, SOLUTION INTRAVENOUS at 13:52

## 2018-03-16 RX ADMIN — POLYETHYLENE GLYCOL 3350 1 PACKET: 17 POWDER, FOR SOLUTION ORAL at 23:14

## 2018-03-16 RX ADMIN — HEPARIN SODIUM 5000 UNITS: 5000 INJECTION, SOLUTION INTRAVENOUS; SUBCUTANEOUS at 13:45

## 2018-03-16 RX ADMIN — ACETAMINOPHEN 650 MG: 325 TABLET, FILM COATED ORAL at 23:10

## 2018-03-16 RX ADMIN — HEPARIN SODIUM 5000 UNITS: 5000 INJECTION, SOLUTION INTRAVENOUS; SUBCUTANEOUS at 20:40

## 2018-03-16 RX ADMIN — ACETAMINOPHEN 650 MG: 325 TABLET, FILM COATED ORAL at 13:41

## 2018-03-16 RX ADMIN — SODIUM CHLORIDE: 9 INJECTION, SOLUTION INTRAVENOUS at 23:04

## 2018-03-16 RX ADMIN — POTASSIUM CHLORIDE 10 MEQ: 1500 TABLET, EXTENDED RELEASE ORAL at 13:42

## 2018-03-16 RX ADMIN — DOCUSATE SODIUM AND SENNOSIDES 2 TABLET: 8.6; 5 TABLET, FILM COATED ORAL at 20:38

## 2018-03-16 RX ADMIN — DOCUSATE SODIUM AND SENNOSIDES 1 TABLET: 8.6; 5 TABLET, FILM COATED ORAL at 13:39

## 2018-03-16 RX ADMIN — LATANOPROST 1 DROP: 50 SOLUTION/ DROPS OPHTHALMIC at 15:43

## 2018-03-16 RX ADMIN — SODIUM CHLORIDE 500 ML: 9 INJECTION, SOLUTION INTRAVENOUS at 10:17

## 2018-03-16 RX ADMIN — WATER 1 G: 1000 INJECTION, SOLUTION INTRAVENOUS at 12:11

## 2018-03-16 RX ADMIN — LATANOPROST 1 DROP: 50 SOLUTION/ DROPS OPHTHALMIC at 20:43

## 2018-03-16 ASSESSMENT — ENCOUNTER SYMPTOMS
FEVER: 0
RESPIRATORY NEGATIVE: 1
VOMITING: 0
MUSCULOSKELETAL NEGATIVE: 1
ABDOMINAL PAIN: 0
COUGH: 0
NEUROLOGICAL NEGATIVE: 1
NAUSEA: 0
NERVOUS/ANXIOUS: 1
SHORTNESS OF BREATH: 0
GASTROINTESTINAL NEGATIVE: 1
EYES NEGATIVE: 1

## 2018-03-16 ASSESSMENT — LIFESTYLE VARIABLES
EVER_SMOKED: NEVER
ALCOHOL_USE: NO

## 2018-03-16 ASSESSMENT — PATIENT HEALTH QUESTIONNAIRE - PHQ9
SUM OF ALL RESPONSES TO PHQ QUESTIONS 1-9: 0
2. FEELING DOWN, DEPRESSED, IRRITABLE, OR HOPELESS: NOT AT ALL
SUM OF ALL RESPONSES TO PHQ9 QUESTIONS 1 AND 2: 0
1. LITTLE INTEREST OR PLEASURE IN DOING THINGS: NOT AT ALL

## 2018-03-16 ASSESSMENT — PAIN SCALES - GENERAL: PAINLEVEL_OUTOF10: 6

## 2018-03-16 NOTE — ED NOTES
"Chief Complaint   Patient presents with   • GLF     pt fell last night, was unable to get off floor. Neighbors called EMS this AM. pt doesn't remember when she fell.   • Hip Pain     BLE pain     /56   Pulse 72   Temp 36.8 °C (98.2 °F)   Resp 18   Ht 1.727 m (5' 8\")   Wt 65.8 kg (145 lb)   SpO2 96%   BMI 22.05 kg/m²     "

## 2018-03-16 NOTE — PROGRESS NOTES
Pt arrived from ED about 1245 and transferred using a slide board; pt asking where her walker was.  Maite, ED RN, followed up with the nurse who accepted her and she did not have her walker or her upper dentures.  Admit profile and poc discussed with pt about 1300.  Bedside swallow eval done and pt had no issues with water.  Pt swallowed her medications with no difficulty.  Pt has scabbed over scraps on her LE from where she hit the bed frame.  Her LE is red and pt states she recently went to the doctor for the leg swelling and he put her on water pills.  Medicated with tylenol for her right hip pain.  Pt has been incontinent of urine x3 thus far.  Waffle mattress pad placed.  VSS.      Tele strip at 1327 shows SR at 67.      Measurements from am strip were as follows:  OH=0.20  QRS=0.12  QT=0.44    Tele Shift Summary:    Rhythm : SR  Rate : 60-70s  Ectopy : Per CCT Angie, pt had frequent PVCs and rare bigeminy.       Telemetry monitoring strips placed in pt chart.

## 2018-03-16 NOTE — ED PROVIDER NOTES
ED Provider Note    ED Provider Note    Primary care provider: Pio Reid M.D.  Means of arrival: EMS  History obtained from: Patient  History limited by: none    CHIEF COMPLAINT  Chief Complaint   Patient presents with   • GLF     pt fell last night, was unable to get off floor. Neighbors called EMS this AM. pt doesn't remember when she fell.   • Hip Pain     BLE pain       HPI  Nora Hope is a 89 y.o. female who presents to the Emergency Department EMS with a chief complaint of a ground-level fall, right head pain and right hip pain. Patient states she fell yesterday. She denies loss of consciousness or preceding symptoms. She states she was sitting on her bed, changing the sheets when she fell forward, landing on her right side. She was unable to get up and walk. She tried opening and closing the closet door which was within reach, to alert someone nearby at her apartment complex that she could not walk area and they were able to talk to her through the door and called 911.    REVIEW OF SYSTEMS  Review of Systems   Constitutional: Negative for fever.   HENT: Negative for congestion.    Respiratory: Negative for cough and shortness of breath.    Cardiovascular: Negative for chest pain.   Gastrointestinal: Negative for abdominal pain, nausea and vomiting.       PAST MEDICAL HISTORY   has a past medical history of Arthritis; Cancer (CMS-HCC) (2009); Essential hypertension (10/26/2017); and Glaucoma.    SURGICAL HISTORY   has a past surgical history that includes eye surgery and other orthopedic surgery.    SOCIAL HISTORY  Social History   Substance Use Topics   • Smoking status: Never Smoker   • Smokeless tobacco: Never Used   • Alcohol use No      History   Drug Use No       FAMILY HISTORY  No family history on file.    CURRENT MEDICATIONS  Home Medications     Reviewed by Darshan Ross (Pharmacy Tech) on 03/16/18 at 1045  Med List Status: Complete   Medication Last Dose Status  "  artificial tear ointment (REFRESH,LACRI-LUBE) Ointment 3/15/2018 Active   furosemide (LASIX) 20 MG Tab 3/15/2018 Active   latanoprost (XALATAN) 0.005 % Solution 3/15/2018 Active   Multiple Vitamins-Minerals (MULTIVITAMIN PO) 3/15/2018 Active   potassium chloride ER (KLOR-CON) 10 MEQ tablet 3/15/2018 Active   probenecid (BENEMID) 500 MG Tab 3/15/2018 Active                ALLERGIES  Allergies   Allergen Reactions   • Allopurinol Rash     Hot, red face   • Aspirin      Cannot take due to medications   • Codeine Itching and Nausea   • Latex Rash     To tape/bandages containing latex       PHYSICAL EXAM  VITAL SIGNS: /56   Pulse 75   Temp 36.8 °C (98.2 °F)   Resp 18   Ht 1.727 m (5' 8\")   Wt 68 kg (149 lb 14.6 oz)   SpO2 99%   BMI 22.79 kg/m²   Vitals reviewed.  Constitutional: Patient is oriented to person, place. Given age Appears well-developed and well-nourished. Mild distress, anxious.    Head: Normocephalic and atraumatic. Tenderness to the right side of the head but no overlying contusions or abrasions.   Ears: Normal external ears bilaterally.   Mouth/Throat: Oropharynx is clear with dry mucous membranes.  Eyes: Conjunctivae are normal. Pupils are equal, round, and reactive to light.   Neck: Normal range of motion. Neck supple. Midline tenderness or step-offs  Cardiovascular: Normal rate, regular rhythm and normal heart sounds. Normal peripheral pulses.  Pulmonary/Chest: Effort normal and breath sounds normal. No respiratory distress, no wheezes, rhonchi, or rales. No chest wall tenderness.  Abdominal: Soft. Bowel sounds are normal. There is no tenderness. No rebound or guarding, or peritoneal signs.   Musculoskeletal: No edema. There Is diffuse tenderness over her bilateral hips, she has limited flexion. No shortening or rotation.  Lymphadenopathy: No cervical adenopathy.   Neurological: No focal deficits.  normal motor and sensory exam grossly. Cn2-12 intact.  Skin: Skin is warm and dry. No " erythema. No pallor.   Psychiatric: Patient has a normal mood and affect.     LABS  Results for orders placed or performed during the hospital encounter of 03/16/18   CBC WITH DIFFERENTIAL   Result Value Ref Range    WBC 8.2 4.8 - 10.8 K/uL    RBC 4.48 4.20 - 5.40 M/uL    Hemoglobin 13.8 12.0 - 16.0 g/dL    Hematocrit 40.4 37.0 - 47.0 %    MCV 90.2 81.4 - 97.8 fL    MCH 30.8 27.0 - 33.0 pg    MCHC 34.2 33.6 - 35.0 g/dL    RDW 41.6 35.9 - 50.0 fL    Platelet Count 253 164 - 446 K/uL    MPV 9.2 9.0 - 12.9 fL    Neutrophils-Polys 60.70 44.00 - 72.00 %    Lymphocytes 25.60 22.00 - 41.00 %    Monocytes 9.20 0.00 - 13.40 %    Eosinophils 3.20 0.00 - 6.90 %    Basophils 1.10 0.00 - 1.80 %    Immature Granulocytes 0.20 0.00 - 0.90 %    Nucleated RBC 0.00 /100 WBC    Neutrophils (Absolute) 4.94 2.00 - 7.15 K/uL    Lymphs (Absolute) 2.09 1.00 - 4.80 K/uL    Monos (Absolute) 0.75 0.00 - 0.85 K/uL    Eos (Absolute) 0.26 0.00 - 0.51 K/uL    Baso (Absolute) 0.09 0.00 - 0.12 K/uL    Immature Granulocytes (abs) 0.02 0.00 - 0.11 K/uL    NRBC (Absolute) 0.00 K/uL   TROPONIN   Result Value Ref Range    Troponin I <0.02 0.00 - 0.04 ng/mL   CMP   Result Value Ref Range    Sodium 139 135 - 145 mmol/L    Potassium 3.9 3.6 - 5.5 mmol/L    Chloride 108 96 - 112 mmol/L    Co2 23 20 - 33 mmol/L    Anion Gap 8.0 0.0 - 11.9    Glucose 101 (H) 65 - 99 mg/dL    Bun 25 (H) 8 - 22 mg/dL    Creatinine 1.49 (H) 0.50 - 1.40 mg/dL    Calcium 9.4 8.4 - 10.2 mg/dL    AST(SGOT) 22 12 - 45 U/L    ALT(SGPT) 11 2 - 50 U/L    Alkaline Phosphatase 92 30 - 99 U/L    Total Bilirubin 0.9 0.1 - 1.5 mg/dL    Albumin 3.5 3.2 - 4.9 g/dL    Total Protein 7.3 6.0 - 8.2 g/dL    Globulin 3.8 (H) 1.9 - 3.5 g/dL    A-G Ratio 0.9 g/dL   URINALYSIS   Result Value Ref Range    Color Yellow     Character Clear     Specific Gravity 1.010 <1.035    Ph 6.0 5.0 - 8.0    Glucose Negative Negative mg/dL    Ketones Negative Negative mg/dL    Protein Negative Negative mg/dL     Bilirubin Negative Negative    Nitrite Negative Negative    Leukocyte Esterase Moderate (A) Negative    Occult Blood Negative Negative    Micro Urine Req Microscopic    URINE MICROSCOPIC (W/UA)   Result Value Ref Range    WBC 5-10 (A) /hpf    RBC 0-2 /hpf    Bacteria Few (A) None /hpf    Epithelial Cells Rare Few /hpf    Mucous Threads Few /hpf   ESTIMATED GFR   Result Value Ref Range    GFR If  40 (A) >60 mL/min/1.73 m 2    GFR If Non  33 (A) >60 mL/min/1.73 m 2   CREATINE KINASE   Result Value Ref Range    CPK Total 69 0 - 154 U/L   TSH (Thyroid Stimulating Hormone)   Result Value Ref Range    TSH 3.870 0.380 - 5.330 uIU/mL       All labs reviewed by me.    EKG Interpretation  Interpreted by me    Rhythm: normal sinus   Rate: 75  Axis: normal  Ectopy: none  Conduction: Right bundle branch block  ST Segments: no acute change  T Waves: no acute change  Q Waves: none    Clinical Impression: Comparison made to prior EKG from May 2017. At that time, patient was in a sinus rhythm at a rate of 76. Right bundle branch block present at that time as well. Compared with today, no significant changes noted.    RADIOLOGY  DX-CHEST-PORTABLE (1 VIEW)   Final Result      Mild cardiomegaly with interstitial opacities, unchanged from comparison.      DX-HIP-BILATERAL-WITH PELVIS-3/4 VIEWS   Final Result      1.  No evidence of acute fracture.      2.  Old posttraumatic and surgical changes of the left proximal femur, stable.      3.  Mild degenerative change of the hips.      4.  Degenerative disc disease involving the lumbar spine.      CT-HEAD W/O   Final Result      1.  No evidence of acute intracranial process.      2.  Cerebral atrophy as well as periventricular chronic small vessel ischemic change.        The radiologist's interpretation of all radiological studies have been reviewed by me.    COURSE & MEDICAL DECISION MAKING  Pertinent Labs & Imaging studies reviewed. (See chart for  "details)    Obtained and reviewed past medical records. Multiple outpatient encounters, no prior ED visits.     8:08 AM - Patient seen and examined at bedside. Is a pleasant albeit, anxious, 89-year-old female who suffered a fall last night. She states she slipped off the end of her bed. She was not syncopal. However, she was unable to get up and walk and she alerted neighbors in her apartment complex which is a senior living center, through banging open and close, the closet door. Patient presents with chief complaint of bilateral hip pain, right greater than left. She has right sided head pain. No neck pain. She does have dry mucous membranes and for this, she'll be given IV fluids.    Will obtain a CT of her head as well as x-rays of her hips.    The differential diagnoses include but are not limited to: Cranial hemorrhage, closed head injury, dehydration, UTI, hip fracture versus strain    On return from imaging, patient complained of having \"an anxiety attack and feeling dizzy \", she reports pressure in her chest. At this time, an IV was established, labs drawn and EKG done. EKG was negative acutely. Awaiting labs.     Labs reviewed, there is evidence of urinary tract infection. In addition, she has an elevated creatinine which is above her baseline per review of prior labs. Now, patient's complaining of shortness of breath. I have ordered a chest x-ray. As time, I do not think the patient will be able to be discharged home to her care facility as she is not able to do the things she previously was including simple ambulation and activities of daily living.    10:36 AM, hospitalist paged    11:08 AM hospitalist at bedside    This with the hospitalist, patient's condition and concern for returning to previous living situation. She does have evidence of a urinary tract infection. Likely some dehydration as her mucous membranes are dry and her creatinine is elevated. CPK normal. Chest x-ray unrevealing. No " evidence of hip fracture. CT shows atrophy but otherwise negative acutely. He agrees to admit the patient to their service.    A she is admitted in stable but guarded condition.    FINAL IMPRESSION  1. Dizzy    2. Urinary tract infection without hematuria, site unspecified    3. Fall, initial encounter    4. Hip pain, acute, right    5. Closed head injury, initial encounter    6. Elbow abrasion, right, initial encounter    7. Renal insufficiency

## 2018-03-16 NOTE — PROGRESS NOTES
Received patient after receiving report from ED. Patient is awake and alert with periods of confusion. Concerned about eye gtts for glaucoma, called pharmacy. Will continue to monitor.

## 2018-03-16 NOTE — ED NOTES
Med rec complete per patient and calling [facility]  Allergies reviewed  No PO antibiotics in last 30 days    Patient is not on blood pressure medications, she was told by parametics that she has anxiety not a blood pressure issue. Called pharmacy and it's been several months that a BP med was filled

## 2018-03-17 LAB
25(OH)D3 SERPL-MCNC: 18 NG/ML (ref 30–100)
ALBUMIN SERPL BCP-MCNC: 2.8 G/DL (ref 3.2–4.9)
ALBUMIN/GLOB SERPL: 0.8 G/DL
ALP SERPL-CCNC: 80 U/L (ref 30–99)
ALT SERPL-CCNC: 13 U/L (ref 2–50)
ANION GAP SERPL CALC-SCNC: 8 MMOL/L (ref 0–11.9)
AST SERPL-CCNC: 26 U/L (ref 12–45)
BASOPHILS # BLD AUTO: 1.3 % (ref 0–1.8)
BASOPHILS # BLD: 0.11 K/UL (ref 0–0.12)
BILIRUB SERPL-MCNC: 0.6 MG/DL (ref 0.1–1.5)
BUN SERPL-MCNC: 26 MG/DL (ref 8–22)
CALCIUM SERPL-MCNC: 8.7 MG/DL (ref 8.4–10.2)
CHLORIDE SERPL-SCNC: 114 MMOL/L (ref 96–112)
CO2 SERPL-SCNC: 19 MMOL/L (ref 20–33)
CREAT SERPL-MCNC: 1.28 MG/DL (ref 0.5–1.4)
EOSINOPHIL # BLD AUTO: 0.5 K/UL (ref 0–0.51)
EOSINOPHIL NFR BLD: 6 % (ref 0–6.9)
ERYTHROCYTE [DISTWIDTH] IN BLOOD BY AUTOMATED COUNT: 44.2 FL (ref 35.9–50)
GLOBULIN SER CALC-MCNC: 3.4 G/DL (ref 1.9–3.5)
GLUCOSE SERPL-MCNC: 96 MG/DL (ref 65–99)
HCT VFR BLD AUTO: 37.8 % (ref 37–47)
HGB BLD-MCNC: 12.3 G/DL (ref 12–16)
IMM GRANULOCYTES # BLD AUTO: 0.01 K/UL (ref 0–0.11)
IMM GRANULOCYTES NFR BLD AUTO: 0.1 % (ref 0–0.9)
LYMPHOCYTES # BLD AUTO: 2.96 K/UL (ref 1–4.8)
LYMPHOCYTES NFR BLD: 35.2 % (ref 22–41)
MAGNESIUM SERPL-MCNC: 2 MG/DL (ref 1.5–2.5)
MCH RBC QN AUTO: 30.4 PG (ref 27–33)
MCHC RBC AUTO-ENTMCNC: 32.5 G/DL (ref 33.6–35)
MCV RBC AUTO: 93.6 FL (ref 81.4–97.8)
MONOCYTES # BLD AUTO: 0.96 K/UL (ref 0–0.85)
MONOCYTES NFR BLD AUTO: 11.4 % (ref 0–13.4)
NEUTROPHILS # BLD AUTO: 3.86 K/UL (ref 2–7.15)
NEUTROPHILS NFR BLD: 46 % (ref 44–72)
NRBC # BLD AUTO: 0 K/UL
NRBC BLD-RTO: 0 /100 WBC
PLATELET # BLD AUTO: 227 K/UL (ref 164–446)
PMV BLD AUTO: 9.9 FL (ref 9–12.9)
POTASSIUM SERPL-SCNC: 4 MMOL/L (ref 3.6–5.5)
PROT SERPL-MCNC: 6.2 G/DL (ref 6–8.2)
RBC # BLD AUTO: 4.04 M/UL (ref 4.2–5.4)
SODIUM SERPL-SCNC: 141 MMOL/L (ref 135–145)
VIT B12 SERPL-MCNC: 354 PG/ML (ref 211–911)
WBC # BLD AUTO: 8.4 K/UL (ref 4.8–10.8)

## 2018-03-17 PROCEDURE — 97165 OT EVAL LOW COMPLEX 30 MIN: CPT

## 2018-03-17 PROCEDURE — G8978 MOBILITY CURRENT STATUS: HCPCS | Mod: CI

## 2018-03-17 PROCEDURE — 80053 COMPREHEN METABOLIC PANEL: CPT

## 2018-03-17 PROCEDURE — 97161 PT EVAL LOW COMPLEX 20 MIN: CPT

## 2018-03-17 PROCEDURE — G8989 SELF CARE D/C STATUS: HCPCS | Mod: CJ

## 2018-03-17 PROCEDURE — 82607 VITAMIN B-12: CPT

## 2018-03-17 PROCEDURE — 82306 VITAMIN D 25 HYDROXY: CPT

## 2018-03-17 PROCEDURE — 83735 ASSAY OF MAGNESIUM: CPT

## 2018-03-17 PROCEDURE — 97535 SELF CARE MNGMENT TRAINING: CPT

## 2018-03-17 PROCEDURE — 99232 SBSQ HOSP IP/OBS MODERATE 35: CPT | Performed by: INTERNAL MEDICINE

## 2018-03-17 PROCEDURE — 700105 HCHG RX REV CODE 258: Performed by: INTERNAL MEDICINE

## 2018-03-17 PROCEDURE — 700111 HCHG RX REV CODE 636 W/ 250 OVERRIDE (IP): Performed by: INTERNAL MEDICINE

## 2018-03-17 PROCEDURE — G8980 MOBILITY D/C STATUS: HCPCS | Mod: CI

## 2018-03-17 PROCEDURE — A9270 NON-COVERED ITEM OR SERVICE: HCPCS | Performed by: INTERNAL MEDICINE

## 2018-03-17 PROCEDURE — G8987 SELF CARE CURRENT STATUS: HCPCS | Mod: CJ

## 2018-03-17 PROCEDURE — 770020 HCHG ROOM/CARE - TELE (206)

## 2018-03-17 PROCEDURE — G8988 SELF CARE GOAL STATUS: HCPCS | Mod: CJ

## 2018-03-17 PROCEDURE — G8979 MOBILITY GOAL STATUS: HCPCS | Mod: CI

## 2018-03-17 PROCEDURE — 85025 COMPLETE CBC W/AUTO DIFF WBC: CPT

## 2018-03-17 PROCEDURE — 700101 HCHG RX REV CODE 250: Performed by: INTERNAL MEDICINE

## 2018-03-17 PROCEDURE — 700102 HCHG RX REV CODE 250 W/ 637 OVERRIDE(OP): Performed by: INTERNAL MEDICINE

## 2018-03-17 RX ORDER — CEFUROXIME AXETIL 250 MG/1
250 TABLET ORAL 2 TIMES DAILY
Qty: 4 TAB | Refills: 0 | Status: SHIPPED | OUTPATIENT
Start: 2018-03-17 | End: 2018-03-19

## 2018-03-17 RX ORDER — LISINOPRIL 5 MG/1
5 TABLET ORAL DAILY
Qty: 30 TAB | Refills: 0 | Status: SHIPPED | OUTPATIENT
Start: 2018-03-17 | End: 2018-04-02 | Stop reason: SDUPTHER

## 2018-03-17 RX ADMIN — HEPARIN SODIUM 5000 UNITS: 5000 INJECTION, SOLUTION INTRAVENOUS; SUBCUTANEOUS at 05:35

## 2018-03-17 RX ADMIN — WATER 1 G: 1000 INJECTION, SOLUTION INTRAVENOUS at 08:15

## 2018-03-17 RX ADMIN — LATANOPROST 1 DROP: 50 SOLUTION/ DROPS OPHTHALMIC at 20:27

## 2018-03-17 RX ADMIN — HEPARIN SODIUM 5000 UNITS: 5000 INJECTION, SOLUTION INTRAVENOUS; SUBCUTANEOUS at 20:25

## 2018-03-17 RX ADMIN — MAGNESIUM HYDROXIDE 30 ML: 400 SUSPENSION ORAL at 08:12

## 2018-03-17 RX ADMIN — HEPARIN SODIUM 5000 UNITS: 5000 INJECTION, SOLUTION INTRAVENOUS; SUBCUTANEOUS at 16:07

## 2018-03-17 RX ADMIN — SODIUM CHLORIDE: 9 INJECTION, SOLUTION INTRAVENOUS at 09:04

## 2018-03-17 RX ADMIN — POTASSIUM CHLORIDE 10 MEQ: 1500 TABLET, EXTENDED RELEASE ORAL at 08:15

## 2018-03-17 ASSESSMENT — GAIT ASSESSMENTS
ASSISTIVE DEVICE: FRONT WHEEL WALKER
DISTANCE (FEET): 200
GAIT LEVEL OF ASSIST: CONTACT GUARD ASSIST
DEVIATION: SHUFFLED GAIT;DECREASED HEEL STRIKE;DECREASED TOE OFF;DECREASED BASE OF SUPPORT

## 2018-03-17 ASSESSMENT — PATIENT HEALTH QUESTIONNAIRE - PHQ9
2. FEELING DOWN, DEPRESSED, IRRITABLE, OR HOPELESS: NOT AT ALL
1. LITTLE INTEREST OR PLEASURE IN DOING THINGS: NOT AT ALL
SUM OF ALL RESPONSES TO PHQ QUESTIONS 1-9: 0
SUM OF ALL RESPONSES TO PHQ9 QUESTIONS 1 AND 2: 0

## 2018-03-17 ASSESSMENT — ENCOUNTER SYMPTOMS
MUSCULOSKELETAL NEGATIVE: 1
NEUROLOGICAL NEGATIVE: 1
EYES NEGATIVE: 1
CONSTITUTIONAL NEGATIVE: 1
GASTROINTESTINAL NEGATIVE: 1
CARDIOVASCULAR NEGATIVE: 1
PSYCHIATRIC NEGATIVE: 1
RESPIRATORY NEGATIVE: 1

## 2018-03-17 ASSESSMENT — ACTIVITIES OF DAILY LIVING (ADL): TOILETING: INDEPENDENT

## 2018-03-17 ASSESSMENT — PAIN SCALES - GENERAL: PAINLEVEL_OUTOF10: 2

## 2018-03-17 NOTE — PROGRESS NOTES
Alert and oriented  to person,place and time.On 2 L of O2 sat 94%. Denied pain at this time.Due medication given per MAR.States understanding of POC. Bed in low position and locked, call light and belongings in reach.

## 2018-03-17 NOTE — H&P
Hospital Medicine History and Physical    Date of Service  3/16/2018    Chief Complaint  Chief Complaint   Patient presents with   • GLF     pt fell last night, was unable to get off floor. Neighbors called EMS this AM. pt doesn't remember when she fell.   • Hip Pain     BLE pain       History of Presenting Illness  89 y.o. female with history of hypertension, arthritis, glaucoma, anxiety, eye cancer who presented 3/16/2018 after ground level fall with right hip pain and right hip pain. She slipped from her bed and  Fell on the floor on the right side, hitting right hip and the right side of the head. She was unable to stand up because of osteoarthritis of both hips with history of hip replacement, therefore she kept crawling  over the floor, trying to open up the door, but it was locked and she was unable to reach the key   for long periods of time.  finally she was able to talk to somebody through the door and they called 911..   Patient denies loss of consciousness, seizure, focal weakness, vision changes, dizziness, lightheadedness, palpitation, chest pain. Is emergency Department, she had episodes of anxiety associated with rapid shallow breathing, which is resolved. She still looks anxious. She feels upset about whole situation inability to stand up and walk after she fell and needed to do seek help of neighbors.  Review of system positive for frequency, the right-sided dull headache and right hip pain. It is negative for chills, fever, etc. see above, negative for diarrhea, nausea, vomiting, abdominal pain    Primary Care Physician  Pio Reid M.D.    Consultants  None    Code Status  DNR    Review of Systems  Review of Systems   Constitutional: Positive for malaise/fatigue.   HENT: Negative.    Eyes: Negative.    Respiratory: Negative.    Cardiovascular: Positive for leg swelling.   Gastrointestinal: Negative.    Genitourinary: Positive for frequency.   Musculoskeletal: Negative.    Skin:  Negative.    Neurological: Negative.    Endo/Heme/Allergies: Negative.    Psychiatric/Behavioral: The patient is nervous/anxious.    All other systems reviewed and are negative.    Please see HPI, all other systems were reviewed and are negative (AMA/CMS criteria)       Past Medical History  Past Medical History:   Diagnosis Date   • Essential hypertension 10/26/2017   • Cancer (CMS-HCC)     eye   • Arthritis    • Glaucoma        Surgical History  Past Surgical History:   Procedure Laterality Date   • EYE SURGERY      eye cancer    • OTHER ORTHOPEDIC SURGERY         Medications  No current facility-administered medications on file prior to encounter.      Current Outpatient Prescriptions on File Prior to Encounter   Medication Sig Dispense Refill   • probenecid (BENEMID) 500 MG Tab Take 1 Tab by mouth every day. 30 Tab 0   • furosemide (LASIX) 20 MG Tab Take one tab po q24h prn swelling 30 Tab 0   • potassium chloride ER (KLOR-CON) 10 MEQ tablet Take one tab po bid when taking Lasix 60 Tab 0   • latanoprost (XALATAN) 0.005 % Solution Place 1 Drop in both eyes every evening.     • artificial tear ointment (REFRESH,LACRI-LUBE) Ointment Place 1 Application in both eyes as needed (For Dry eye).     • Multiple Vitamins-Minerals (MULTIVITAMIN PO) Take 1 Tab by mouth every day.         Family History    Patient denies family history of heart disease, diabetes or cancer.      Social History  Social History   Substance Use Topics   • Smoking status: Never Smoker   • Smokeless tobacco: Never Used   • Alcohol use No       Allergies  Allergies   Allergen Reactions   • Allopurinol Rash     Hot, red face   • Aspirin      Cannot take due to medications   • Codeine Itching and Nausea   • Latex Rash     To tape/bandages containing latex        Physical Exam  Laboratory   Hemodynamics  Temp (24hrs), Av.6 °C (97.9 °F), Min:36.2 °C (97.2 °F), Max:36.8 °C (98.2 °F)   Temperature: 36.2 °C (97.2 °F)  Pulse  Av.8  Min: 67   "Max: 78 Heart Rate (Monitored): 78  Blood Pressure : 151/57, NIBP: (!) 166/79      Respiratory      Respiration: 18, Pulse Oximetry: 95 %, O2 Daily Delivery Respiratory : Silicone Nasal Cannula        RLL Breath Sounds: Diminished, LLL Breath Sounds: Diminished    Physical Exam  Vitals/ General Appearance:   Weight/BMI: Body mass index is 23.2 kg/m².  Blood pressure 151/57, pulse 67, temperature 36.2 °C (97.2 °F), resp. rate 18, height 1.727 m (5' 8\"), weight 69.2 kg (152 lb 8.9 oz), SpO2 95 %. Vitals:    03/16/18 1151 03/16/18 1216 03/16/18 1300 03/16/18 1600   BP: 135/56   151/57   Pulse: 71 78  67   Resp: 16   18   Temp: 36.6 °C (97.9 °F)   36.2 °C (97.2 °F)   SpO2: 98% 98%  95%   Weight: 69.2 kg (152 lb 8.9 oz)      Height: 1.727 m (5' 8\")  1.727 m (5' 8\")     Oxygen Therapy:  Pulse Oximetry: 95 %, O2 (LPM): 2, O2 Delivery: Nasal Cannula    Constitutional:  well developed, well nourished  HENMT: Normocephalic, atraumatic, b/l ears normal, nose normal. Alopecia.  Eyes:  EOMI, conjunctiva normal, no discharge  Neck: no tracheal deviation, supple  Cardiovascular: normal heart rate,  Rhythm regular, no murmurs, no rubs or gallops; no cyanosis, clubbing or edema  Lungs: Respiratory effort is normal, normal breath sounds, breath sounds clear to auscultation b/l, no rales, rhonchi or wheezing  Abdomen: soft, non-tender, no guarding or rebound  Skin: warm, dry, no erythema, no rash  Neurologic: Alert and oriented, no focal deficits, CN II-XII normal  Psychiatric: Some anxiety or depression  MSK: Tenderness to palpation over the right lateral upper hip.   Recent Labs      03/16/18   0915   WBC  8.2   RBC  4.48   HEMOGLOBIN  13.8   HEMATOCRIT  40.4   MCV  90.2   MCH  30.8   MCHC  34.2   RDW  41.6   PLATELETCT  253   MPV  9.2     Recent Labs      03/16/18   0915   SODIUM  139   POTASSIUM  3.9   CHLORIDE  108   CO2  23   GLUCOSE  101*   BUN  25*   CREATININE  1.49*   CALCIUM  9.4     Recent Labs      03/16/18   0915 "   ALTSGPT  11   ASTSGOT  22   ALKPHOSPHAT  92   TBILIRUBIN  0.9   GLUCOSE  101*                 Lab Results   Component Value Date    TROPONINI <0.02 03/16/2018     Urinalysis:    Lab Results  Component Value Date/Time   SPECGRAVITY 1.010 03/16/2018 0915   GLUCOSEUR Negative 03/16/2018 0915   KETONES Negative 03/16/2018 0915   NITRITE Negative 03/16/2018 0915   WBCURINE 5-10 (A) 03/16/2018 0915   RBCURINE 0-2 03/16/2018 0915   BACTERIA Few (A) 03/16/2018 0915   EPITHELCELL Rare 03/16/2018 0915        Imaging  DX-CHEST-PORTABLE (1 VIEW)   Final Result      Mild cardiomegaly with interstitial opacities, unchanged from comparison.      DX-HIP-BILATERAL-WITH PELVIS-3/4 VIEWS   Final Result      1.  No evidence of acute fracture.      2.  Old posttraumatic and surgical changes of the left proximal femur, stable.      3.  Mild degenerative change of the hips.      4.  Degenerative disc disease involving the lumbar spine.      CT-HEAD W/O   Final Result      1.  No evidence of acute intracranial process.      2.  Cerebral atrophy as well as periventricular chronic small vessel ischemic change.         Assessment/Plan     I anticipate this patient will require at least two midnights for appropriate medical management, necessitating inpatient admission.    Fall- (present on admission)   Assessment & Plan    Fell and was unable to stand up. Has chronic osteoarthritis, history of hip replacement. Will need to be evaluated by PT and OT for home safety. Full precautions        DNR (do not resuscitate)   Assessment & Plan    Total time spent on advanced care planning, excluding time spent on daily care: 12 minutes.    1st 30 minutes [id number]   Each add’l 30 minutes [id number]              Anxiety   Assessment & Plan    Comfort patient verbally. Avoid benzodiazepines        UTI (urinary tract infection)   Assessment & Plan    UA positive. Start ceftriaxone. 3 days        Acute kidney injury (CMS-HCC)   Assessment & Plan    Question from  mild hypovolemia. He'll hold Lasix. Give IV hydration. Repeat chemistry tomorrow morning. Check bladder scan        Essential hypertension- (present on admission)   Assessment & Plan    Will start lisinopril. Continue to monitor. Labetalol as needed            Prophylaxis: heparin drip       I spent 80 minutes evaluating Nora Hope, reviewing the chart, vitals, labs and imaging, discussing the case with ED physician, medication reconciliation, placing orders and enacting the plan above.    Sections of this note have been dictated using Dragon Speech recognition software. While care and attention has been placed to ensure the accuracy of this note, there can be occasional typographical errors that may be missed and I apologize if this situation occurs. Please take these errors into context. If questions occur please do not hesitate to call or notify the author of this note for clarification.

## 2018-03-17 NOTE — THERAPY
"Occupational Therapy Evaluation completed.   Functional Status:  SBA supine to sit up to L side of bed.  CGA sit to stand.  Close SBA/CGA with FWW to walk in room and approx 200 ft out into hallway with slow, slightly shuffled gait.  Pt reports this is her \"normal\"  Pt c/o mild pain with AROM to RLE in bed, but no c/o of pain once up and moving.  Pt seated up in chair after therapy, grooming with setup.  Pt lives alone in a senior apt.  She reports that it is setup well for her with AE in the bathroom etc for safety.  She manages all her own ADL's and IADL's including using [employer] to get groceries.  She reports several falls over the last year, and the last 2 involved \"sliding off the edge of the bed.\"  This current fall occurred while she was sitting on EOB, leaning over to try and change the fitted sheet on her queen size bed.  She slid off and landed on her R side.  A previous fall happened when she was sitting EOB to get dressed and she slid off.  She reports that her bed is slightly higher than average.  Pt has family in town, but refuses to ask or \"burden\" them with the task of helping her change her linens on a weekly or biweekly basis.  Pt would benefit from home health intervention to check safety with dynamic ADL's (laundry, linens, cooking etc) and her help her formulate a safer routine.  She also would benefit from assist with finding long term assist for bed linens etc.  No further skilled OT needs in this setting but pt would benefit from OT inpt in the home setting.    Plan of Care: Patient with no further skilled OT needs in the acute care setting at this time  Discharge Recommendations:  Equipment: No Equipment Needed. Post-acute therapy Discharge to home with outpatient or home health for additional skilled therapy services.    See \"Rehab Therapy-Acute\" Patient Summary Report for complete documentation.    "

## 2018-03-17 NOTE — ASSESSMENT & PLAN NOTE
Total time spent on advanced care planning, excluding time spent on daily care: 12 minutes.    1st 30 minutes [id number]   Each add’l 30 minutes [id number]

## 2018-03-17 NOTE — DISCHARGE PLANNING
Medical Social Work    Referral: HHC referral     Intervention: [facility] is pending and a confirmation can be achieved until 3/19/2018 at the earliest. THis writer spoke to attending who reports that pt is having getting gout of her bed unassisted and the pt will not be medically cleared until 3/19/2018    Plan: Wait for [facility] accept the pt.

## 2018-03-17 NOTE — PROGRESS NOTES
Telemetry Report: pt has been SR/SB.  HR: 56-74  Measurements: (.20/.14/.46)  Ectopy: f PVC, r Bigem.    Measurements and updates per Ally JACKSON

## 2018-03-17 NOTE — PROGRESS NOTES
Bedside report given to  Leigha ROLLE.Pt  Resting in the bed.Safety precautions in place and all the lines remain patent.

## 2018-03-17 NOTE — ASSESSMENT & PLAN NOTE
Fell and was unable to stand up. Has chronic osteoarthritis, history of hip replacement.  Fall precautions  Evaluated by PT, recommended home health. We will discharge home after home health set up

## 2018-03-17 NOTE — DISCHARGE PLANNING
Medical Social Work    Referral: HHC referral      Intervention: Pt signed HHC choice for [facility]. Pt signed HHC choice. Per nurse pt is alert and oriented times 4 . This writer faxed choice to [facility]       Plan: Wait for [facility] to accept the pt.

## 2018-03-17 NOTE — FACE TO FACE
Face to Face Supporting Documentation - Home Health    The encounter with this patient was in whole or in part the primary reason for home health admission.    Date of encounter:   Patient:                    MRN:                       YOB: 2018  Nora Hpoe  [id number]  8/6/1925     Home health to see patient for:  Skilled Nursing care for assessment, interventions & education, Physical Therapy evaluation and treatment and Occupational therapy evaluation and treatment    Skilled need for:  Comment: GLF    Skilled nursing interventions to include:  Comment: P/OT    Homebound status evidenced by:  Need the aid of supportive devices such as crutches, canes, wheelchairs or walkers. Leaving home requires a considerable and taxing effort. There is a normal inability to leave the home.    Community Physician to provide follow up care: Pio Reid M.D.     Optional Interventions? No      I certify the face to face encounter for this home health care referral meets the CMS requirements and the encounter/clinical assessment with the patient was, in whole, or in part, for the medical condition(s) listed above, which is the primary reason for home health care. Based on my clinical findings: the service(s) are medically necessary, support the need for home health care, and the homebound criteria are met.  I certify that this patient has had a face to face encounter by myself.  Giorgi Pena M.D. - NPI: [id number]

## 2018-03-17 NOTE — THERAPY
"Physical Therapy Evaluation completed.   Bed Mobility:  Supine to Sit: Stand by Assist  Transfers: Sit to Stand: Contact Guard Assist  Gait: Level Of Assist: Contact Guard Assist with Front-Wheel Walker     X 200 ft  Plan of Care: Patient with no further skilled PT needs in the acute care setting at this time  Discharge Recommendations: Equipment: No Equipment Needed. Post-acute therapy Discharge to home with outpatient or home health for additional skilled therapy services.    See \"Rehab Therapy-Acute\" Patient Summary Report for complete documentation.   Pt is a 89 y.o.f. referred to PT secondary to impaired mobility s/p fall with c/o right hip pain.  At time of eval the pt had minimal c/o pain.  She is alert and able to follow all commands.  The pt states she falls at home, but not weekly.  Pt presents with mild LE weakness bilat.  Pt demonstrated a shuffling gait pattern, with wide MARINA and lacking heelstrike to toe off.  Pt stated this is her normal gait.  Pt had no c/o pain during bed mobility.  Pt pulls on walker with sit to stand, but she was not receptive to instruction for safer technique.  Pt had no LOB with gait.  Pt able to change directions with fww w/o LOB.  Pt at PLOF.  No further skilled PT indicated in acute care setting, but PT would recommend HHPT for safety eval d/t reported falls in bedroom.    "

## 2018-03-17 NOTE — PROGRESS NOTES
[facility] Hospitalist Progress Note    Date of Service: 3/17/2018    Chief Complaint  89 y.o. female with history of anxiety, admitted 3/16/2018 with ground-level fall, acute kidney injury, UTI    Interval Problem Update  Patient feels okay. Up to chair. PT OT evaluation done, patient needs home health for discharge.  Home health referral placed.    Consultants/Specialty  None    Disposition  Home when home health set up        Review of Systems   Constitutional: Negative.    HENT: Negative.    Eyes: Negative.    Respiratory: Negative.    Cardiovascular: Negative.    Gastrointestinal: Negative.    Genitourinary: Negative.    Musculoskeletal: Negative.    Skin: Negative.    Neurological: Negative.    Endo/Heme/Allergies: Negative.    Psychiatric/Behavioral: Negative.       Physical Exam  Laboratory/Imaging   Hemodynamics  Temp (24hrs), Av.4 °C (97.5 °F), Min:36.3 °C (97.4 °F), Max:36.4 °C (97.6 °F)   Temperature: 36.3 °C (97.4 °F)  Pulse  Av.8  Min: 60  Max: 78    Blood Pressure : 112/92      Respiratory      Respiration: 20, Pulse Oximetry: 96 %        RLL Breath Sounds: Diminished, LLL Breath Sounds: Diminished    Fluids    Intake/Output Summary (Last 24 hours) at 18 1635  Last data filed at 18 1500   Gross per 24 hour   Intake             2660 ml   Output                0 ml   Net             2660 ml       Nutrition  Orders Placed This Encounter   Procedures   • Diet Order     Standing Status:   Standing     Number of Occurrences:   1     Order Specific Question:   Diet:     Answer:   Regular [1]     Physical Exam   Constitutional: She is oriented to person, place, and time. She appears well-developed and well-nourished.   HENT:   Head: Normocephalic and atraumatic.   Eyes: EOM are normal. Pupils are equal, round, and reactive to light.   Neck: Normal range of motion. Neck supple.   Cardiovascular: Normal rate and regular rhythm.    Pulmonary/Chest: Effort normal and breath sounds normal.    Abdominal: Soft. Bowel sounds are normal. There is no tenderness.   Musculoskeletal: Normal range of motion.   Neurological: She is alert and oriented to person, place, and time. No cranial nerve deficit.   Skin: Skin is warm and dry.   Nursing note and vitals reviewed.      Recent Labs      03/16/18   0915  03/17/18   0520   WBC  8.2  8.4   RBC  4.48  4.04*   HEMOGLOBIN  13.8  12.3   HEMATOCRIT  40.4  37.8   MCV  90.2  93.6   MCH  30.8  30.4   MCHC  34.2  32.5*   RDW  41.6  44.2   PLATELETCT  253  227   MPV  9.2  9.9     Recent Labs      03/16/18   0915  03/17/18   0520   SODIUM  139  141   POTASSIUM  3.9  4.0   CHLORIDE  108  114*   CO2  23  19*   GLUCOSE  101*  96   BUN  25*  26*   CREATININE  1.49*  1.28   CALCIUM  9.4  8.7                      Assessment/Plan     Fall- (present on admission)   Assessment & Plan    Fell and was unable to stand up. Has chronic osteoarthritis, history of hip replacement.  Fall precautions  Evaluated by PT, recommended home health. We will discharge home after home health set up        DNR (do not resuscitate)   Assessment & Plan    Total time spent on advanced care planning, excluding time spent on daily care: 12 minutes.    1st 30 minutes [id number]   Each add’l 30 minutes 21795              Anxiety   Assessment & Plan    Comfort patient verbally. Avoid benzodiazepines        UTI (urinary tract infection)   Assessment & Plan    UA positive. Start ceftriaxone. 3 days        Acute kidney injury (CMS-HCC)   Assessment & Plan    Improved with IV hydration. dc Lasix.         Essential hypertension- (present on admission)   Assessment & Plan    Will start lisinopril. Continue to monitor. Labetalol as needed          Quality-Core Measures

## 2018-03-18 PROCEDURE — A9270 NON-COVERED ITEM OR SERVICE: HCPCS | Performed by: INTERNAL MEDICINE

## 2018-03-18 PROCEDURE — 770006 HCHG ROOM/CARE - MED/SURG/GYN SEMI*

## 2018-03-18 PROCEDURE — 700111 HCHG RX REV CODE 636 W/ 250 OVERRIDE (IP): Performed by: INTERNAL MEDICINE

## 2018-03-18 PROCEDURE — 700102 HCHG RX REV CODE 250 W/ 637 OVERRIDE(OP): Performed by: INTERNAL MEDICINE

## 2018-03-18 PROCEDURE — 99232 SBSQ HOSP IP/OBS MODERATE 35: CPT | Performed by: INTERNAL MEDICINE

## 2018-03-18 PROCEDURE — 700101 HCHG RX REV CODE 250: Performed by: INTERNAL MEDICINE

## 2018-03-18 RX ADMIN — LATANOPROST 1 DROP: 50 SOLUTION/ DROPS OPHTHALMIC at 21:05

## 2018-03-18 RX ADMIN — HEPARIN SODIUM 5000 UNITS: 5000 INJECTION, SOLUTION INTRAVENOUS; SUBCUTANEOUS at 05:35

## 2018-03-18 RX ADMIN — WATER 1 G: 1000 INJECTION, SOLUTION INTRAVENOUS at 08:10

## 2018-03-18 RX ADMIN — HEPARIN SODIUM 5000 UNITS: 5000 INJECTION, SOLUTION INTRAVENOUS; SUBCUTANEOUS at 15:08

## 2018-03-18 RX ADMIN — POTASSIUM CHLORIDE 10 MEQ: 1500 TABLET, EXTENDED RELEASE ORAL at 08:04

## 2018-03-18 RX ADMIN — HEPARIN SODIUM 5000 UNITS: 5000 INJECTION, SOLUTION INTRAVENOUS; SUBCUTANEOUS at 21:05

## 2018-03-18 ASSESSMENT — ENCOUNTER SYMPTOMS
NEUROLOGICAL NEGATIVE: 1
RESPIRATORY NEGATIVE: 1
PSYCHIATRIC NEGATIVE: 1
GASTROINTESTINAL NEGATIVE: 1
CONSTITUTIONAL NEGATIVE: 1
MUSCULOSKELETAL NEGATIVE: 1
EYES NEGATIVE: 1
CARDIOVASCULAR NEGATIVE: 1

## 2018-03-18 ASSESSMENT — PATIENT HEALTH QUESTIONNAIRE - PHQ9
SUM OF ALL RESPONSES TO PHQ9 QUESTIONS 1 AND 2: 0
1. LITTLE INTEREST OR PLEASURE IN DOING THINGS: NOT AT ALL
2. FEELING DOWN, DEPRESSED, IRRITABLE, OR HOPELESS: NOT AT ALL
SUM OF ALL RESPONSES TO PHQ QUESTIONS 1-9: 0

## 2018-03-18 ASSESSMENT — PAIN SCALES - GENERAL
PAINLEVEL_OUTOF10: 0
PAINLEVEL_OUTOF10: 0

## 2018-03-18 ASSESSMENT — LIFESTYLE VARIABLES: DO YOU DRINK ALCOHOL: NO

## 2018-03-18 NOTE — PROGRESS NOTES
Pt brushed teeth this AM, up to bathroom, cleaned up. Back to bed safely. Sitting up for lunch, refusing to get back into chair. No other needs, will CTM.

## 2018-03-18 NOTE — PROGRESS NOTES
Bedside report given to  Zenia ROLLE.Pt  Resting in the bed.Safety precautions in place and all the lines remain patent.

## 2018-03-18 NOTE — PROGRESS NOTES
Telemetry Report: pt has been SR.  HR: 60s-70  Measurements: (.18/.10/.40)  Ectopy: o PAC, o PVC.    Measurements and updates per Tessa JACKSON

## 2018-03-18 NOTE — PROGRESS NOTES
[facility] Hospitalist Progress Note    Date of Service: 3/18/2018    Chief Complaint  89 y.o. female with history of anxiety, admitted 3/16/2018 with ground-level fall, acute kidney injury, UTI    Interval Problem Update  Patient feels okay. Up to chair. PT OT evaluation done, patient needs home health for discharge.  Home health referral placed.  3/18. Patient feels okay. No new issues, except chronic episodes of anxiety, associated with gasps for breathing, which the patient  learned to self manage by holding her breath.  Vitals stable. We'll be planning for discharge tomorrow home with home health    Consultants/Specialty  None    Disposition  Home with home health tomorrow        Review of Systems   Constitutional: Negative.    HENT: Negative.    Eyes: Negative.    Respiratory: Negative.    Cardiovascular: Negative.    Gastrointestinal: Negative.    Genitourinary: Negative.    Musculoskeletal: Negative.    Skin: Negative.    Neurological: Negative.    Endo/Heme/Allergies: Negative.    Psychiatric/Behavioral: Negative.       Physical Exam  Laboratory/Imaging   Hemodynamics  Temp (24hrs), Av.6 °C (97.9 °F), Min:36.5 °C (97.7 °F), Max:36.8 °C (98.3 °F)   Temperature: 36.5 °C (97.7 °F)  Pulse  Av.9  Min: 60  Max: 78    Blood Pressure : 102/67      Respiratory      Respiration: 18, Pulse Oximetry: 96 %        RLL Breath Sounds: Diminished, LLL Breath Sounds: Diminished    Fluids    Intake/Output Summary (Last 24 hours) at 18 1622  Last data filed at 18 1300   Gross per 24 hour   Intake              660 ml   Output                0 ml   Net              660 ml       Nutrition  Orders Placed This Encounter   Procedures   • Diet Order     Standing Status:   Standing     Number of Occurrences:   1     Order Specific Question:   Diet:     Answer:   Regular [1]     Physical Exam   Constitutional: She is oriented to person, place, and time. She appears well-developed and well-nourished.   HENT:   Head:  Normocephalic and atraumatic.   Eyes: EOM are normal. Pupils are equal, round, and reactive to light.   Neck: Normal range of motion. Neck supple.   Cardiovascular: Normal rate and regular rhythm.    Pulmonary/Chest: Effort normal and breath sounds normal.   Abdominal: Soft. Bowel sounds are normal. There is no tenderness.   Musculoskeletal: Normal range of motion.   Neurological: She is alert and oriented to person, place, and time. No cranial nerve deficit.   Skin: Skin is warm and dry.   Nursing note and vitals reviewed.      Recent Labs      03/16/18   0915  03/17/18   0520   WBC  8.2  8.4   RBC  4.48  4.04*   HEMOGLOBIN  13.8  12.3   HEMATOCRIT  40.4  37.8   MCV  90.2  93.6   MCH  30.8  30.4   MCHC  34.2  32.5*   RDW  41.6  44.2   PLATELETCT  253  227   MPV  9.2  9.9     Recent Labs      03/16/18   0915  03/17/18   0520   SODIUM  139  141   POTASSIUM  3.9  4.0   CHLORIDE  108  114*   CO2  23  19*   GLUCOSE  101*  96   BUN  25*  26*   CREATININE  1.49*  1.28   CALCIUM  9.4  8.7                      Assessment/Plan     Fall- (present on admission)   Assessment & Plan    Fell and was unable to stand up. Has chronic osteoarthritis, history of hip replacement.  Fall precautions  Evaluated by PT, recommended home health. We will discharge home after home health set up        DNR (do not resuscitate)   Assessment & Plan    Total time spent on advanced care planning, excluding time spent on daily care: 12 minutes.    1st 30 minutes 00051   Each add’l 30 minutes 56797              Anxiety   Assessment & Plan    Comfort patient verbally. Avoid benzodiazepines        UTI (urinary tract infection)   Assessment & Plan    UA positive. Start ceftriaxone. 3 days        Acute kidney injury (CMS-HCC)   Assessment & Plan    Improved with IV hydration. dc Lasix.         Essential hypertension- (present on admission)   Assessment & Plan    Will start lisinopril. Continue to monitor. Labetalol as needed          Quality-Core  Measures

## 2018-03-18 NOTE — PROGRESS NOTES
Alert and oriented  to person,place and time.on room air sat 97%.Denied pain at this time.Due medication given per MAR. States understanding of POC.

## 2018-03-18 NOTE — PROGRESS NOTES
Assessment completed. Pt AAOx4, knows year but slightly confused on how long she's been at the hospital. Due meds given. No c/o of pain, n/v. Pt up in cardiac chair eating breakfast. Call light in reach. Warm blankets provided. No other needs, will monitor.

## 2018-03-18 NOTE — DISCHARGE PLANNING
Medical Social Work    SW reviewed chart and met with pt at bedside.  Pt reports concerns about d/c transportation.  Pt states she normally takes [facility], however they drop her off 2 1/2 blocks away from her apartment and she does not have her FWW with her at this time.  Pt reports no family/friends that can assist with transport, SW attempted to contact [facility] however no answer and VM is full.  SW to leave in report request for transportation tomorrow at d/c.  SW to monitor.

## 2018-03-18 NOTE — PROGRESS NOTES
Tele strip at 0734 shows SR w/ HR of 66  Measurements: .18/.12/.44    Tele Shift Summary:  Rhythm: SR BBB  Rate: 60's-70's  Ectopy: Per CCT Makenzie, pt had occ to freq PVC, rare bigeminy.    Telemetry monitoring strips placed in pt chart.

## 2018-03-18 NOTE — PROGRESS NOTES
Bedside report received from Tyson ROLLE. Assumed care. POC discussed. Pt resting comfortably in bed. Safety precautions in place.

## 2018-03-19 VITALS
SYSTOLIC BLOOD PRESSURE: 127 MMHG | HEIGHT: 68 IN | HEART RATE: 69 BPM | TEMPERATURE: 97.4 F | DIASTOLIC BLOOD PRESSURE: 57 MMHG | OXYGEN SATURATION: 96 % | RESPIRATION RATE: 16 BRPM | WEIGHT: 152.56 LBS | BODY MASS INDEX: 23.12 KG/M2

## 2018-03-19 PROCEDURE — A9270 NON-COVERED ITEM OR SERVICE: HCPCS | Performed by: INTERNAL MEDICINE

## 2018-03-19 PROCEDURE — 700101 HCHG RX REV CODE 250: Performed by: INTERNAL MEDICINE

## 2018-03-19 PROCEDURE — 99239 HOSP IP/OBS DSCHRG MGMT >30: CPT | Performed by: INTERNAL MEDICINE

## 2018-03-19 PROCEDURE — 700102 HCHG RX REV CODE 250 W/ 637 OVERRIDE(OP): Performed by: INTERNAL MEDICINE

## 2018-03-19 PROCEDURE — 700111 HCHG RX REV CODE 636 W/ 250 OVERRIDE (IP): Performed by: INTERNAL MEDICINE

## 2018-03-19 RX ADMIN — DOCUSATE SODIUM AND SENNOSIDES 2 TABLET: 8.6; 5 TABLET, FILM COATED ORAL at 08:01

## 2018-03-19 RX ADMIN — HEPARIN SODIUM 5000 UNITS: 5000 INJECTION, SOLUTION INTRAVENOUS; SUBCUTANEOUS at 05:50

## 2018-03-19 RX ADMIN — WATER 1 G: 1000 INJECTION, SOLUTION INTRAVENOUS at 08:01

## 2018-03-19 RX ADMIN — ACETAMINOPHEN 650 MG: 325 TABLET, FILM COATED ORAL at 09:54

## 2018-03-19 ASSESSMENT — PAIN SCALES - GENERAL
PAINLEVEL_OUTOF10: 0
PAINLEVEL_OUTOF10: 4

## 2018-03-19 NOTE — PROGRESS NOTES
Pt d/c'd with [facility] transport via . IV removed. D/c instructions reviewed with pt including Rx and appts. Questions/concerns addressed.

## 2018-03-19 NOTE — PROGRESS NOTES
Received report from day shift nurse. Assumed patient's cares Pt is up in bed. Pt denies any pain, discomfort, or any needs at this time. Encouraged Pt to call for assistance if needed. Bed alarm is on.

## 2018-03-19 NOTE — CARE PLAN
Problem: Discharge Barriers/Planning  Goal: Patient's continuum of care needs will be met  Outcome: PROGRESSING AS EXPECTED  Pt to d/c home with home health today. [facility] accepted. Transportation arranged with [facility] van. Pt verbalizes understanding of discharge plans.    Problem: Mobility  Goal: Risk for activity intolerance will decrease  Outcome: PROGRESSING AS EXPECTED  Pt up to bathroom and cardiac chair x1 assist with FWW. Tolerates well. Calls appropriately. Rest periods provided. Pt needs some reinforcement on mobility/safety needs.      
Problem: Safety  Goal: Will remain free from falls    Intervention: Implement fall precautions  Patient instructed to call for assistance when needed.      Problem: Knowledge Deficit  Goal: Knowledge of the prescribed therapeutic regimen will improve    Intervention: Discuss information regarding therpeutic regimen and document in education  Discussed plan of care.        
Problem: Safety  Goal: Will remain free from falls  Outcome: PROGRESSING AS EXPECTED  Pt encouraged to call for assistance as needed. Bed in low position, upper side rails up, anti slippery socks on, call light within reach.       Problem: Infection  Goal: Will remain free from infection  Outcome: PROGRESSING AS EXPECTED  Assess for signs and symptoms of infection. Implement standard precautions. Perform hand washing.      
Problem: Safety  Goal: Will remain free from injury  Outcome: PROGRESSING AS EXPECTED  Hourly rounding.  Non-skid socks. Bed locked & in low position. Personal belongings and call light  within reach. .      Problem: Skin Integrity  Goal: Risk for impaired skin integrity will decrease  Outcome: PROGRESSING AS EXPECTED  Kept dry and clean. place pillows on bony prominences to prevent skin breakdown.         
Problem: Safety  Goal: Will remain free from injury  Outcome: PROGRESSING AS EXPECTED  Hourly rounding.  Non-skid socks. Bed locked & in low position. Personal belongings and call light  within reach. .      Problem: Skin Integrity  Goal: Risk for impaired skin integrity will decrease  Outcome: PROGRESSING AS EXPECTED  Patient able to turn self,,kept dry and clean. place pillows on bony prominences to prevent skin breakdown.         
Problem: Safety  Goal: Will remain free from injury  Outcome: PROGRESSING AS EXPECTED  Safety precautions in place. Pt assisted to bed, chair and up to bathroom with x1 assistance. HH ordered for d/c. Pt calls appropriately. Verbalizes understanding of safety.    Problem: Mobility  Goal: Risk for activity intolerance will decrease  Outcome: PROGRESSING AS EXPECTED  Pt encouraged to get up to chair for meals. Assisted x1 person when mobilizing. Provided with rest periods as needed. Pt needs some reinforcement with mobility needs.      
Problem: Skin Integrity  Goal: Risk for impaired skin integrity will decrease    Intervention: Assess and monitor skin integrity, appearance and/or temperature  Pt has scraps down both shins from where she hit the bed frame      Problem: Urinary Elimination:  Goal: Ability to reestablish a normal urinary elimination pattern will improve    Intervention: Assess and monitor for signs and symptoms of urinary retention  Pt has urine incontinence and not willing to transfer to [facility].  Been using barrier cream when finished cleaning.        
no abdominal distension/no hematuria/no chills/no diarrhea/no fever/no blood in stool/no burning urination

## 2018-03-19 NOTE — PROGRESS NOTES
Assessment completed. Due meds given. Pt AAOx4, no complaints this AM. Sitting up in cardiac chair for breakfast. No needs, will monitor.

## 2018-03-19 NOTE — PROGRESS NOTES
Pt up to bathroom x1 assist FWW. Back to bed. Refused to sit in chair for dinner. Sitting up in bed. C/o food, RN attempted several times to order pt new food and provide snacks. No other changes in pt status. Bedside report given to Nora ROLLE. POC discussed. Pt resting comfortably in bed. Safety precautions in place.

## 2018-03-19 NOTE — PROGRESS NOTES
Bedside report received from Nora ROLLE. Assumed care. POC discussed. Pt resting comfortably in bed. Safety precautions in place.

## 2018-03-19 NOTE — DISCHARGE INSTRUCTIONS
Discharge Instructions    Discharged to home by [facility] with self. Discharged via wheelchair, hospital escort: Yes.  Special equipment needed: Not Applicable    Be sure to schedule a follow-up appointment with your primary care doctor or any specialists as instructed.     Discharge Plan:   Diet Plan: Discussed  Activity Level: Discussed  Confirmed Follow up Appointment: Appointment Scheduled  Confirmed Symptoms Management: Discussed  Medication Reconciliation Updated: Yes  Influenza Vaccine Indication: Patient Refuses    I understand that a diet low in cholesterol, fat, and sodium is recommended for good health. Unless I have been given specific instructions below for another diet, I accept this instruction as my diet prescription.   Other diet: Regular    Special Instructions: None    · Is patient discharged on Warfarin / Coumadin?   No         Urinary Tract Infection, Adult  Introduction  A urinary tract infection (UTI) is an infection of any part of the urinary tract. The urinary tract includes the:  · Kidneys.  · Ureters.  · Bladder.  · Urethra.  These organs make, store, and get rid of pee (urine) in the body.  Follow these instructions at home:  · Take over-the-counter and prescription medicines only as told by your doctor.  · If you were prescribed an antibiotic medicine, take it as told by your doctor. Do not stop taking the antibiotic even if you start to feel better.  · Avoid the following drinks:  ¨ Alcohol.  ¨ Caffeine.  ¨ Tea.  ¨ Carbonated drinks.  · Drink enough fluid to keep your pee clear or pale yellow.  · Keep all follow-up visits as told by your doctor. This is important.  · Make sure to:  ¨ Empty your bladder often and completely. Do not to hold pee for long periods of time.  ¨ Empty your bladder before and after sex.  ¨ Wipe from front to back after a bowel movement if you are female. Use each tissue one time when you wipe.  Contact a doctor if:  · You have back pain.  · You have a  fever.  · You feel sick to your stomach (nauseous).  · You throw up (vomit).  · Your symptoms do not get better after 3 days.  · Your symptoms go away and then come back.  Get help right away if:  · You have very bad back pain.  · You have very bad lower belly (abdominal) pain.  · You are throwing up and cannot keep down any medicines or water.  This information is not intended to replace advice given to you by your health care provider. Make sure you discuss any questions you have with your health care provider.  Document Released: 06/05/2009 Document Revised: 05/25/2017 Document Reviewed: 11/07/2016  © 2017 [employer]      Acute Kidney Injury, Adult  Acute kidney injury (CONSTANTINO) occurs when there is sudden (acute) damage to the kidneys. A small amount of kidney damage may not cause problems, but a large amount of damage may make it difficult or impossible for the kidneys to work the way they should. CONSTANTINO may develop into long-lasting (chronic) kidney disease. Early detection and treatment of CONSTANTINO may prevent kidney damage from becoming permanent or getting worse.  What are the causes?  Common causes of this condition include:  · A problem with blood flow to the kidneys. This may be caused by:  ¨ Blood loss.  ¨ Heart and blood vessel (cardiovascular) disease.  ¨ Severe burns.  ¨ Liver disease.  · Direct damage to the kidneys. This may be caused by:  ¨ Some medicines.  ¨ A kidney infection.  ¨ Poisoning.  ¨ Being around or in contact with poisonous (toxic) substances.  ¨ A surgical wound.  ¨ A hard, direct force to the kidney area.  · A sudden blockage of urine flow. This may be caused by:  ¨ Cancer.  ¨ Kidney stones.  ¨ Enlarged prostate in males.  What are the signs or symptoms?  Symptoms develop slowly and may not be obvious until the kidney damage becomes severe. It is possible to have CONSTANTINO for years without showing any symptoms. Symptoms of this condition can include:  · Swelling (edema) of the face, legs, ankles, or  feet.  · Numbness, tingling, or loss of feeling (sensation) in the hands or feet.  · Tiredness (lethargy).  · Nausea or vomiting.  · Confusion or trouble concentrating.  · Problems with urination, such as:  ¨ Painful or burning feeling during urination.  ¨ Decreased urine production.  ¨ Frequent urination, especially at night.  ¨ Bloody urine.  · Muscle twitches and cramps, especially in the legs.  · Shortness of breath.  · Weakness.  · Constant itchiness.  · Loss of appetite.  · Metallic taste in the mouth.  · Trouble sleeping.  · Pale lining of the eyelids and surface of the eye (conjunctiva).  How is this diagnosed?  This condition may be diagnosed with various tests. Tests may include:  · Blood tests.  · Urine tests.  · Imaging tests.  · A test in which a sample of tissue is removed from the kidneys to be looked at under a microscope (kidney biopsy).  How is this treated?  Treatment of CONSTANTINO varies depending on the cause and severity of the kidney damage. In mild cases, treatment may not be needed. The kidneys may heal on their own.  If CONSTANTINO is more severe, your health care provider will treat the cause of the kidney damage, help the kidneys heal, and prevent problems from occurring. Severe cases may require a procedure to remove toxic wastes from the body (dialysis) or surgery to repair kidney damage. Surgery may involve:  · Repair of a torn kidney.  · Removal of a urine flow obstruction.  Follow these instructions at home:  · Follow your prescribed diet.  · Take over-the-counter and prescription medicines only as told by your health care provider.  ¨ Do not take any new medicines unless approved by your health care provider. Many medicines can worsen your kidney damage.  ¨ Do not take any vitamin and mineral supplements unless approved by your health care provider. Many nutritional supplements can worsen your kidney damage.  ¨ The dose of some medicines that you take may need to be adjusted.  · Do not use any  tobacco products, such as cigarettes, chewing tobacco, and e-cigarettes. If you need help quitting, ask your health care provider.  · Keep all follow-up visits as told by your health care provider. This is important.  · Keep track of your blood pressure. Report changes in your blood pressure as told by your health care provider.  · Achieve and maintain a healthy weight. If you need help with this, ask your health care provider.  · Start or continue an exercise plan. Try to exercise at least 30 minutes a day, 5 days a week.  · Stay current with immunizations as told by your health care provider.  Where to find more information:  · American Association of Kidney Patients: www.aakp.org  · National Kidney Foundation: www.kidney.org  · American Kidney Fund: www.akfinc.org  · Life Options Rehabilitation Program: www.lifeoptions.org and www.kidneyschool.org  Contact a health care provider if:  · Your symptoms get worse.  · You develop new symptoms.  Get help right away if:  · You develop symptoms of end-stage kidney disease, which include:  ¨ Headaches.  ¨ Abnormally dark or light skin.  ¨ Numbness in the hands or feet.  ¨ Easy bruising.  ¨ Frequent hiccups.  ¨ Chest pain.  ¨ Shortness of breath.  ¨ End of menstruation in women.  · You have a fever.  · You have decreased urine production.  · You have pain or bleeding when you urinate.  This information is not intended to replace advice given to you by your health care provider. Make sure you discuss any questions you have with your health care provider.  Document Released: 07/02/2012 Document Revised: 07/27/2017 Document Reviewed: 08/16/2013  Elsevier Interactive Patient Education © 2017 Elsevier Inc.    Depression / Suicide Risk    As you are discharged from this [facility] facility, it is important to learn how to keep safe from harming yourself.    Recognize the warning signs:  · Abrupt changes in personality, positive or negative- including increase in energy    · Giving away possessions  · Change in eating patterns- significant weight changes-  positive or negative  · Change in sleeping patterns- unable to sleep or sleeping all the time   · Unwillingness or inability to communicate  · Depression  · Unusual sadness, discouragement and loneliness  · Talk of wanting to die  · Neglect of personal appearance   · Rebelliousness- reckless behavior  · Withdrawal from people/activities they love  · Confusion- inability to concentrate     If you or a loved one observes any of these behaviors or has concerns about self-harm, here's what you can do:  · Talk about it- your feelings and reasons for harming yourself  · Remove any means that you might use to hurt yourself (examples: pills, rope, extension cords, firearm)  · Get professional help from the community (Mental Health, Substance Abuse, psychological counseling)  · Do not be alone:Call your Safe Contact- someone whom you trust who will be there for you.  · Call your local CRISIS HOTLINE 597-5769 or 676-063-1348  · Call your local Children's Mobile Crisis Response Team [place] (013) 799-1541 or www.Sibaritus  · Call the toll free National Suicide Prevention Hotlines   · National Suicide Prevention Lifeline 960-125-RPZR (6653)  · National Hope Line Network 800-SUICIDE (674-1170)

## 2018-03-19 NOTE — DISCHARGE PLANNING
Per Sarah Beth at [facility], referral has been accepted. [facility] Gissell notified.

## 2018-03-19 NOTE — DISCHARGE PLANNING
Transport has been arranged with Layne at [facility] for 11:00 AM today. Pt to discharge to [facility] in [place]. [facility] Gissell notified.

## 2018-03-19 NOTE — DISCHARGE PLANNING
Received Transport Communication form,     E-mail sent to [facility] to arrange transport.

## 2018-03-20 ENCOUNTER — PATIENT OUTREACH (OUTPATIENT)
Dept: HEALTH INFORMATION MANAGEMENT | Facility: OTHER | Age: 83
End: 2018-03-20

## 2018-03-20 NOTE — DISCHARGE SUMMARY
CHIEF COMPLAINT ON ADMISSION  Chief Complaint   Patient presents with   • GLF     pt fell last night, was unable to get off floor. Neighbors called EMS this AM. pt doesn't remember when she fell.   • Hip Pain     BLE pain       CODE STATUS  DNR    HPI & HOSPITAL COURSE  89 y.o. female with history of anxiety, admitted 3/16/2018 with ground-level fall, acute kidney injury, UTI. For details see H&P note. Imagings of the head, cervical C-spine, and chest were negative for acute findings  Patient was treated with antibiotics, fluids. Patient's hospital stay was uncomplicated. Physical therapy evaluated her. Recommended home health physical therapy. Referral was placed. When home health was arranged, patient was discharged home    The patient met 2-midnight criteria for an inpatient stay at the time of discharge.    Therefore, she is discharged in fair and stable condition with close outpatient follow-up.    SPECIFIC OUTPATIENT FOLLOW-UP  pcp    DISCHARGE PROBLEM LIST  Active Problems:    Fall POA: Yes    Essential hypertension POA: Yes    Acute kidney injury (CMS-HCC) POA: Unknown    UTI (urinary tract infection) POA: Unknown    Anxiety POA: Unknown    DNR (do not resuscitate) POA: Unknown  Resolved Problems:    * No resolved hospital problems. *      FOLLOW UP  Future Appointments  Date Time Provider Department Center   3/20/2018 2:40 PM CARE MANAGER [facility]   3/21/2018 11:00 AM VENITA Diana   4/2/2018 1:00 PM Kori Cali M.D. 25M [facility]  [address] [place] [id number] [phone]          MEDICATIONS ON DISCHARGE   Nora Hope   Home Medication Instructions BONNIE:[id number]    Printed on:03/19/18 4608   Medication Information                      artificial tear ointment (REFRESH,LACRI-LUBE) Ointment  Place 1 Application in both eyes as needed (For Dry eye).             latanoprost (XALATAN) 0.005 % Solution  Place 1 Drop in both eyes every evening.              lisinopril (PRINIVIL) 5 MG Tab  Take 1 Tab by mouth every day.             Multiple Vitamins-Minerals (MULTIVITAMIN PO)  Take 1 Tab by mouth every day.                 DIET  No orders of the defined types were placed in this encounter.      ACTIVITY  As tolerated.  Weight bearing as tolerated      CONSULTATIONS  none    PROCEDURES  none    LABORATORY  Lab Results   Component Value Date/Time    SODIUM 141 03/17/2018 05:20 AM    POTASSIUM 4.0 03/17/2018 05:20 AM    CHLORIDE 114 (H) 03/17/2018 05:20 AM    CO2 19 (L) 03/17/2018 05:20 AM    GLUCOSE 96 03/17/2018 05:20 AM    BUN 26 (H) 03/17/2018 05:20 AM    CREATININE 1.28 03/17/2018 05:20 AM        Lab Results   Component Value Date/Time    WBC 8.4 03/17/2018 05:20 AM    HEMOGLOBIN 12.3 03/17/2018 05:20 AM    HEMATOCRIT 37.8 03/17/2018 05:20 AM    PLATELETCT 227 03/17/2018 05:20 AM      DX-CHEST-PORTABLE (1 VIEW)   Final Result      Mild cardiomegaly with interstitial opacities, unchanged from comparison.      DX-HIP-BILATERAL-WITH PELVIS-3/4 VIEWS   Final Result      1.  No evidence of acute fracture.      2.  Old posttraumatic and surgical changes of the left proximal femur, stable.      3.  Mild degenerative change of the hips.      4.  Degenerative disc disease involving the lumbar spine.      CT-HEAD W/O   Final Result      1.  No evidence of acute intracranial process.      2.  Cerebral atrophy as well as periventricular chronic small vessel ischemic change.            Total time of the discharge process exceeds 45 minutes

## 2018-03-26 LAB — EKG IMPRESSION: NORMAL

## 2018-03-27 RX ORDER — AMLODIPINE BESYLATE 2.5 MG/1
2.5 TABLET ORAL EVERY EVENING
Qty: 30 TAB | Refills: 0 | Status: SHIPPED | OUTPATIENT
Start: 2018-03-27 | End: 2018-04-02

## 2018-03-30 ENCOUNTER — TELEPHONE (OUTPATIENT)
Dept: MEDICAL GROUP | Age: 83
End: 2018-03-30

## 2018-03-30 NOTE — TELEPHONE ENCOUNTER
Future Appointments       Provider Department Center    4/2/2018 1:00 PM Kori Cali M.D. [facility] [address]        ESTABLISHED PATIENT PRE-VISIT PLANNING     Note: Patient will not be contacted if there is no indication to call.     1.  Reviewed notes from the last few office visits within the medical group: Yes    2.  If any orders were placed at last visit or intended to be done for this visit (i.e. 6 mos follow-up), do we have Results/Consult Notes?        •  Labs - Labs ordered, completed on 3/18/18 and results are in chart.   Note: If patient appointment is for lab review and patient did not complete labs, check with provider if OK to reschedule patient until labs completed.       •  Imaging - Imaging was not ordered at last office visit.       •  Referrals - No referrals were ordered at last office visit.    3. Is this appointment scheduled as a Hospital Follow-Up? No    4.  Immunizations were updated in Epic using WebIZ?: Epic matches WebIZ       •  Web Iz Recommendations: Patient is up to date on all vaccines    5.  Patient is due for the following Health Maintenance Topics:   Health Maintenance Due   Topic Date Due   • Annual Wellness Visit  08/06/1925       - Patient is up-to-date on all Health Maintenance topics. No records have been requested at this time.    6.  MDX printed for Provider? NO    7.  Patient was NOT informed to arrive 15 min prior to their scheduled appointment and bring in their medication bottles.

## 2018-04-01 PROBLEM — E55.9 VITAMIN D DEFICIENCY: Status: ACTIVE | Noted: 2018-04-01

## 2018-04-02 ENCOUNTER — OFFICE VISIT (OUTPATIENT)
Dept: MEDICAL GROUP | Age: 83
End: 2018-04-02
Payer: MEDICARE

## 2018-04-02 VITALS
HEIGHT: 66 IN | DIASTOLIC BLOOD PRESSURE: 60 MMHG | HEART RATE: 77 BPM | SYSTOLIC BLOOD PRESSURE: 130 MMHG | WEIGHT: 149 LBS | TEMPERATURE: 97.7 F | BODY MASS INDEX: 23.95 KG/M2 | OXYGEN SATURATION: 90 %

## 2018-04-02 DIAGNOSIS — M10.00 ACUTE IDIOPATHIC GOUT, UNSPECIFIED SITE: ICD-10-CM

## 2018-04-02 DIAGNOSIS — E55.9 VITAMIN D DEFICIENCY: ICD-10-CM

## 2018-04-02 DIAGNOSIS — I10 ESSENTIAL HYPERTENSION: ICD-10-CM

## 2018-04-02 PROBLEM — B07.8 FLAT WART: Status: RESOLVED | Noted: 2018-01-30 | Resolved: 2018-04-02

## 2018-04-02 PROBLEM — R55 SYNCOPE: Status: RESOLVED | Noted: 2017-05-08 | Resolved: 2018-04-02

## 2018-04-02 PROBLEM — N17.9 ACUTE KIDNEY INJURY (HCC): Status: RESOLVED | Noted: 2018-03-16 | Resolved: 2018-04-02

## 2018-04-02 PROBLEM — N39.0 UTI (URINARY TRACT INFECTION): Status: RESOLVED | Noted: 2018-03-16 | Resolved: 2018-04-02

## 2018-04-02 PROBLEM — L57.0 AK (ACTINIC KERATOSIS): Status: RESOLVED | Noted: 2018-01-30 | Resolved: 2018-04-02

## 2018-04-02 PROCEDURE — 99204 OFFICE O/P NEW MOD 45 MIN: CPT | Performed by: INTERNAL MEDICINE

## 2018-04-02 RX ORDER — LISINOPRIL 2.5 MG/1
2.5 TABLET ORAL DAILY
Qty: 90 TAB | Refills: 3 | Status: ON HOLD | OUTPATIENT
Start: 2018-04-02 | End: 2019-06-05

## 2018-04-02 RX ORDER — ERGOCALCIFEROL 1.25 MG/1
50000 CAPSULE ORAL
Qty: 12 CAP | Refills: 3 | Status: SHIPPED | OUTPATIENT
Start: 2018-04-02 | End: 2018-11-13

## 2018-04-03 NOTE — PROGRESS NOTES
Nora Hope is a 89 y.o. female here to establish care and the evaluation and management of:      HPI:    Vitamin D deficiency  Patient has very low vitamin D level at 18 on recent blood test on 3/17/18. She is not taking vitamin D supplements, except multivitamins once a day. I discussed with patient to try ergocalciferol 50,000 units to take one capsule once a week. Patient agreed to try ergocalciferol.    Gout  She reported that she used to take probenecid for gout. She was told to stop taking it. She has not had gone attack for many years. She has not been checked for uric acid and she wants to know her uric acid level.    Essential hypertension  Patient was treated with amlodipine 2.5 mg in the past and lisinopril 5 mg daily. She stopped taking all her blood pressure medications for a few months and stated that her blood pressure is not high anymore. She stated that she has blood pressure monitor at home, but she does not check her blood pressure regularly. Given her age, I do not want her blood pressure too low. I discussed with patient to discontinue amlodipine and only take lisinopril 2.5 mg once a day. I also advised patient to check her blood pressure and possible regularly at home. We discussed to hold lisinopril if her blood pressure less than 120/60. I also advised patient to increase the dose of lisinopril to 5 mg daily if her blood pressure is above 150/90.    Current medicines (including changes today)  Current Outpatient Prescriptions   Medication Sig Dispense Refill   • lisinopril (PRINIVIL) 2.5 MG Tab Take 1 Tab by mouth every day. 90 Tab 3   • ergocalciferol (DRISDOL) 43995 UNIT capsule Take 1 Cap by mouth every 7 days. 12 Cap 3   • latanoprost (XALATAN) 0.005 % Solution Place 1 Drop in both eyes every evening.     • artificial tear ointment (REFRESH,LACRI-LUBE) Ointment Place 1 Application in both eyes as needed (For Dry eye).     • Multiple Vitamins-Minerals (MULTIVITAMIN PO) Take 1 Tab by  "mouth every day.       No current facility-administered medications for this visit.      She  has a past medical history of Arthritis; Cancer (CMS-[facility]) (2009); Essential hypertension (10/26/2017); and Glaucoma. She also has no past medical history of Hyperlipidemia.  She  has a past surgical history that includes eye surgery; other orthopedic surgery; and sinus trephine frontal.  Social History   Substance Use Topics   • Smoking status: Never Smoker   • Smokeless tobacco: Never Used   • Alcohol use No     Social History     Social History Narrative   • No narrative on file     Family History   Problem Relation Age of Onset   • Cancer Mother      Family Status   Relation Status   • Mother  at age 33    Tuberculosis     Health Maintenance Topics with due status: Overdue       Topic Date Due    Annual Wellness Visit 1925         ROS    Gen.: Denied weight change, appetite change, fatigue.  ENT: Denied sinus tenderness, nasal congestion, runny nose, or sore throat  CVS: Denied chest pain, palpitations, legs swelling.  Respiratory: Denied cough, shortness of breath, wheezing.  GI: Denied abdominal pain, constipation or diarrhea.  Endocrine: Denied temperature intolerance, increased frequency of urination, polyphagia or polydipsia.  Musculoskeletal: Denied back pain or joint pain.    All other systems reviewed and are negative     Objective:     Blood pressure 130/60, pulse 77, temperature 36.5 °C (97.7 °F), height 1.676 m (5' 6\"), weight 67.6 kg (149 lb), SpO2 90 %, not currently breastfeeding. Body mass index is 24.05 kg/m².  Physical Exam:    Constitutional: Well nourished and Well developed, Alert, no distress.  Skin: Warm, dry, good turgor, no rashes in visible areas.  Eye: Equal, round and reactive, conjunctiva clear, lids normal.  ENMT: Lips without lesions, good dentition, oropharynx clear.  Neck: Trachea midline, no masses, no thyromegaly. No cervical or supraclavicular " lymphadenopathy.  Respiratory: Unlabored respiratory effort, lungs clear to auscultation, no wheezes, no ronchi.  Cardiovascular: Normal S1, S2, no murmur, no edema.   Abdomen: Soft, non distended, non-tender, no masses, no hepatosplenomegaly. Bowel sound normal.  Extremities: No edema, no clubbing, no cyanosis.  Psych: Alert and oriented x3, normal affect and mood.        Assessment and Plan:   The following treatment plan was discussed       1. Essential hypertension  - Well-controlled. Continue lisinopril 2.5 mg daily. I reviewed and discussed with patient that the target blood pressure for her age is 150/90 and below.  - Patient is advised to increase lisinopril to 5 mg daily if her blood pressure is above 150/90.  - Patient is advised to hold lisinopril if her blood pressure is less than 120/60.   - lisinopril (PRINIVIL) 2.5 MG Tab; Take 1 Tab by mouth every day.  Dispense: 90 Tab; Refill: 3  - COMP METABOLIC PANEL; Future    2. Vitamin D deficiency  - Prescribed ergocalciferol to take 50,000 units one capsule once a week. Recheck vitamin D in 6 months.  - ergocalciferol (DRISDOL) 27038 UNIT capsule; Take 1 Cap by mouth every 7 days.  Dispense: 12 Cap; Refill: 3  - VITAMIN D,25 HYDROXY; Future    3. Acute idiopathic gout, unspecified site  - Discussed to avoid eating high purine diet. Recommend to increase water intake.  - She does not have any recent gout attack. Advised patient to take Tylenol as needed when she has joint pain or ankle pain. She can also apply ice on her joint if she has any acute pain. We will have uric acid level checked in 6 months.  - URIC ACID; Future        Records requested.  Followup: Return in about 6 months (around 10/2/2018), or if symptoms worsen or fail to improve, for hypertension, gout, vitamin D insufficiency, lab review. sooner should new symptoms or problems arise.      Please note that this dictation was created using voice recognition software. I have made every reasonable  attempt to correct obvious errors, but I expect that there may have unintended errors in text, spelling, punctuation, or grammar that I did not discover.

## 2018-04-03 NOTE — ASSESSMENT & PLAN NOTE
Patient has very low vitamin D level at 18 on recent blood test on 3/17/18. She is not taking vitamin D supplements, except multivitamins once a day. I discussed with patient to try ergocalciferol 50,000 units to take one capsule once a week. Patient agreed to try ergocalciferol.

## 2018-04-03 NOTE — ASSESSMENT & PLAN NOTE
Patient was treated with amlodipine 2.5 mg in the past and lisinopril 5 mg daily. She stopped taking all her blood pressure medications for a few months and stated that her blood pressure is not high anymore. She stated that she has blood pressure monitor at home, but she does not check her blood pressure regularly. Given her age, I do not want her blood pressure too low. I discussed with patient to discontinue amlodipine and only take lisinopril 2.5 mg once a day. I also advised patient to check her blood pressure and possible regularly at home. We discussed to hold lisinopril if her blood pressure less than 120/60. I also advised patient to increase the dose of lisinopril to 5 mg daily if her blood pressure is above 150/90.

## 2018-04-03 NOTE — ASSESSMENT & PLAN NOTE
She reported that she used to take probenecid for gout. She was told to stop taking it. She has not had gone attack for many years. She has not been checked for uric acid and she wants to know her uric acid level.

## 2018-04-30 ENCOUNTER — TELEPHONE (OUTPATIENT)
Dept: MEDICAL GROUP | Age: 83
End: 2018-04-30

## 2018-04-30 NOTE — TELEPHONE ENCOUNTER
1. Caller Name: Plan of care- [facility]                                       Fax Back Number: [phone]       2. [facility] paperwork received from Plan of Care requiring provider signature.     3. All appropriate fields completed by Medical Assistant: N/A CMA printed and distributed to MA    4. Paperwork placed in MA folder/basket to process.

## 2018-05-03 NOTE — TELEPHONE ENCOUNTER
2. Home Health paperwork received from [facility] requiring provider signature.     3. All appropriate fields completed by Medical Assistant: Yes    4. Paperwork handed to provider to sign then scanned to patient's chart and faxed to [facility]

## 2018-05-04 ENCOUNTER — TELEPHONE (OUTPATIENT)
Dept: MEDICAL GROUP | Age: 83
End: 2018-05-04

## 2018-05-04 NOTE — TELEPHONE ENCOUNTER
1. Caller Name: [facility]                                         Call Back Number: [phone]        2. Physical Therapy paperwork received from [facility] requiring provider signature.     3. All appropriate fields completed by Medical Assistant: Yes    4. Paperwork Ppaerwork ready to be signed by provide. Placed in Dr. Cali's basket 05/04/18

## 2018-05-04 NOTE — TELEPHONE ENCOUNTER
2. Home Health paperwork received from [facility] requiring provider signature.     3. All appropriate fields completed by Medical Assistant: Yes    4. Paperwork handed to provider to sign then scanned to patient's chart and faxed to [phone]. MA scanned and faxed on 5/3/18

## 2018-07-09 ENCOUNTER — APPOINTMENT (OUTPATIENT)
Dept: RADIOLOGY | Facility: MEDICAL CENTER | Age: 83
End: 2018-07-09
Attending: EMERGENCY MEDICINE
Payer: MEDICARE

## 2018-07-09 ENCOUNTER — HOSPITAL ENCOUNTER (EMERGENCY)
Facility: MEDICAL CENTER | Age: 83
End: 2018-07-09
Attending: EMERGENCY MEDICINE
Payer: MEDICARE

## 2018-07-09 VITALS
TEMPERATURE: 99.1 F | RESPIRATION RATE: 16 BRPM | HEIGHT: 68 IN | OXYGEN SATURATION: 95 % | SYSTOLIC BLOOD PRESSURE: 124 MMHG | DIASTOLIC BLOOD PRESSURE: 67 MMHG | HEART RATE: 78 BPM

## 2018-07-09 DIAGNOSIS — S09.90XA CLOSED HEAD INJURY, INITIAL ENCOUNTER: ICD-10-CM

## 2018-07-09 DIAGNOSIS — S50.02XA CONTUSION OF LEFT ELBOW, INITIAL ENCOUNTER: ICD-10-CM

## 2018-07-09 PROCEDURE — 70450 CT HEAD/BRAIN W/O DYE: CPT

## 2018-07-09 PROCEDURE — 71045 X-RAY EXAM CHEST 1 VIEW: CPT

## 2018-07-09 PROCEDURE — 99284 EMERGENCY DEPT VISIT MOD MDM: CPT

## 2018-07-09 PROCEDURE — 73080 X-RAY EXAM OF ELBOW: CPT | Mod: LT

## 2018-07-09 ASSESSMENT — PAIN SCALES - GENERAL: PAINLEVEL_OUTOF10: 6

## 2018-07-10 ENCOUNTER — HOSPITAL ENCOUNTER (EMERGENCY)
Facility: MEDICAL CENTER | Age: 83
End: 2018-07-10
Attending: EMERGENCY MEDICINE
Payer: MEDICARE

## 2018-07-10 ENCOUNTER — APPOINTMENT (OUTPATIENT)
Dept: RADIOLOGY | Facility: MEDICAL CENTER | Age: 83
End: 2018-07-10
Attending: EMERGENCY MEDICINE
Payer: MEDICARE

## 2018-07-10 ENCOUNTER — TELEPHONE (OUTPATIENT)
Dept: MEDICAL GROUP | Age: 83
End: 2018-07-10

## 2018-07-10 VITALS
BODY MASS INDEX: 22.43 KG/M2 | SYSTOLIC BLOOD PRESSURE: 166 MMHG | DIASTOLIC BLOOD PRESSURE: 80 MMHG | TEMPERATURE: 97 F | WEIGHT: 148 LBS | OXYGEN SATURATION: 97 % | HEIGHT: 68 IN | HEART RATE: 86 BPM | RESPIRATION RATE: 16 BRPM
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
## 2018-07-10 DIAGNOSIS — Z91.81 AT RISK FOR FALLS: ICD-10-CM

## 2018-07-10 DIAGNOSIS — S40.022A CONTUSION OF LEFT UPPER EXTREMITY, INITIAL ENCOUNTER: ICD-10-CM

## 2018-07-10 DIAGNOSIS — M54.50 ACUTE MIDLINE LOW BACK PAIN WITHOUT SCIATICA: ICD-10-CM

## 2018-07-10 LAB
ALBUMIN SERPL BCP-MCNC: 3.8 G/DL (ref 3.2–4.9)
ALBUMIN/GLOB SERPL: 1 G/DL
ALP SERPL-CCNC: 83 U/L (ref 30–99)
ALT SERPL-CCNC: 10 U/L (ref 2–50)
ANION GAP SERPL CALC-SCNC: 12 MMOL/L (ref 0–11.9)
AST SERPL-CCNC: 24 U/L (ref 12–45)
BASOPHILS # BLD AUTO: 1.1 % (ref 0–1.8)
BASOPHILS # BLD: 0.1 K/UL (ref 0–0.12)
BILIRUB SERPL-MCNC: 1 MG/DL (ref 0.1–1.5)
BUN SERPL-MCNC: 28 MG/DL (ref 8–22)
CALCIUM SERPL-MCNC: 9.1 MG/DL (ref 8.4–10.2)
CHLORIDE SERPL-SCNC: 108 MMOL/L (ref 96–112)
CO2 SERPL-SCNC: 17 MMOL/L (ref 20–33)
CREAT SERPL-MCNC: 1.47 MG/DL (ref 0.5–1.4)
EKG IMPRESSION: NORMAL
EOSINOPHIL # BLD AUTO: 0.19 K/UL (ref 0–0.51)
EOSINOPHIL NFR BLD: 2 % (ref 0–6.9)
ERYTHROCYTE [DISTWIDTH] IN BLOOD BY AUTOMATED COUNT: 44.7 FL (ref 35.9–50)
GLOBULIN SER CALC-MCNC: 3.8 G/DL (ref 1.9–3.5)
GLUCOSE SERPL-MCNC: 100 MG/DL (ref 65–99)
HCT VFR BLD AUTO: 38.9 % (ref 37–47)
HGB BLD-MCNC: 12.9 G/DL (ref 12–16)
IMM GRANULOCYTES # BLD AUTO: 0.03 K/UL (ref 0–0.11)
IMM GRANULOCYTES NFR BLD AUTO: 0.3 % (ref 0–0.9)
LACTATE BLD-SCNC: 1.9 MMOL/L (ref 0.5–2)
LYMPHOCYTES # BLD AUTO: 2.57 K/UL (ref 1–4.8)
LYMPHOCYTES NFR BLD: 27 % (ref 22–41)
MCH RBC QN AUTO: 30.4 PG (ref 27–33)
MCHC RBC AUTO-ENTMCNC: 33.2 G/DL (ref 33.6–35)
MCV RBC AUTO: 91.7 FL (ref 81.4–97.8)
MONOCYTES # BLD AUTO: 0.75 K/UL (ref 0–0.85)
MONOCYTES NFR BLD AUTO: 7.9 % (ref 0–13.4)
NEUTROPHILS # BLD AUTO: 5.87 K/UL (ref 2–7.15)
NEUTROPHILS NFR BLD: 61.7 % (ref 44–72)
NRBC # BLD AUTO: 0 K/UL
NRBC BLD-RTO: 0 /100 WBC
PLATELET # BLD AUTO: 246 K/UL (ref 164–446)
PMV BLD AUTO: 9.1 FL (ref 9–12.9)
POTASSIUM SERPL-SCNC: 3.7 MMOL/L (ref 3.6–5.5)
PROT SERPL-MCNC: 7.6 G/DL (ref 6–8.2)
RBC # BLD AUTO: 4.24 M/UL (ref 4.2–5.4)
SODIUM SERPL-SCNC: 137 MMOL/L (ref 135–145)
WBC # BLD AUTO: 9.5 K/UL (ref 4.8–10.8)

## 2018-07-10 PROCEDURE — 93005 ELECTROCARDIOGRAM TRACING: CPT | Performed by: EMERGENCY MEDICINE

## 2018-07-10 PROCEDURE — 83605 ASSAY OF LACTIC ACID: CPT

## 2018-07-10 PROCEDURE — 87040 BLOOD CULTURE FOR BACTERIA: CPT

## 2018-07-10 PROCEDURE — 99284 EMERGENCY DEPT VISIT MOD MDM: CPT

## 2018-07-10 PROCEDURE — 72131 CT LUMBAR SPINE W/O DYE: CPT

## 2018-07-10 PROCEDURE — 80053 COMPREHEN METABOLIC PANEL: CPT

## 2018-07-10 PROCEDURE — 36415 COLL VENOUS BLD VENIPUNCTURE: CPT

## 2018-07-10 PROCEDURE — 85025 COMPLETE CBC W/AUTO DIFF WBC: CPT

## 2018-07-10 NOTE — ED PROVIDER NOTES
ED Provider Note    CHIEF COMPLAINT  Chief Complaint   Patient presents with   • Other   • Failure to Thrive       HPI  Nora Hope is a 89 y.o. female who presents with history of fall yesterday, slipped, lost her balance, no loss of consciousness no neck pain, complains of left elbow pain and low back pain. Long history of low back pain is somewhat worse after the fall. No IV drug use. No fever no chills. No bowel or bladder problems.. Evidently she was seen here yesterday taken home by ambulance put in a chair. She says she was too weak to get out of the chair so sat in the chair all night long until then and was came back and picked her up again and no fever no chills no dysuria urgency or frequency. No coughing no headache no neck pain    REVIEW OF SYSTEMS  See HPI for further details. History of arthritis cancer hypertension glaucoma Denies other G.I., G.U.. endrocine, cardiovascular, respriatory or neurological problems. All other systems are negative.     PAST MEDICAL HISTORY  Past Medical History:   Diagnosis Date   • Arthritis    • Cancer (HCC) 2009    eye   • Essential hypertension 10/26/2017   • Glaucoma        FAMILY HISTORY  Family History   Problem Relation Age of Onset   • Cancer Mother        SOCIAL HISTORY she lives by herself and is unable to care for herself at home  Social History     Social History   • Marital status: Single     Spouse name: N/A   • Number of children: N/A   • Years of education: N/A     Social History Main Topics   • Smoking status: Never Smoker   • Smokeless tobacco: Never Used   • Alcohol use No   • Drug use: No   • Sexual activity: Not Currently     Other Topics Concern   • Not on file     Social History Narrative   • No narrative on file       SURGICAL HISTORY  Past Surgical History:   Procedure Laterality Date   • EYE SURGERY      eye cancer 2009   • OTHER ORTHOPEDIC SURGERY     • SINUS TREPHINE FRONTAL      windows       CURRENT MEDICATIONS  Home Medications      "Reviewed by Darshan Calle (Pharmacy Tech) on 07/10/18 at 1412  Med List Status: Complete   Medication Last Dose Status   artificial tear ointment (REFRESH,LACRI-LUBE) Ointment unknown Active   ergocalciferol (DRISDOL) 82253 UNIT capsule unknown Active   latanoprost (XALATAN) 0.005 % Solution unknown Active   lisinopril (PRINIVIL) 2.5 MG Tab unknown Active   Multiple Vitamins-Minerals (MULTIVITAMIN PO) unknown Active                ALLERGIES  Allergies   Allergen Reactions   • Allopurinol Rash     Hot, red face   • Aspirin      Cannot take due to medications   • Codeine Itching and Nausea   • Latex Rash     To tape/bandages containing latex   • Tape Rash     adhessive in the tape, causes, swollen rash           PHYSICAL EXAM  VITAL SIGNS: /70   Pulse 68   Temp 37.1 °C (98.8 °F)   Resp 18   Ht 1.727 m (5' 8\")   Wt 67.1 kg (148 lb)   SpO2 98%   BMI 22.50 kg/m²    Constitutional: She appears to be chronically debilitated No acute distress, Non-toxic appearance.   HENT: Normocephalic, Atraumatic, Bilateral external ears normal, Oropharynx moist, No oral exudates, Nose normal.   Eyes: PERRL, EOMI, Conjunctiva normal, No discharge.   Neck: Normal range of motion, No tenderness, Supple, No stridor.   Lymphatic: No lymphadenopathy noted.   Cardiovascular: Normal heart rate, Normal rhythm, No murmurs, No rubs, No gallops.   Thorax & Lungs: Normal breath sounds, No respiratory distress, No wheezing, No chest tenderness.   Abdomen:  No tenderness, no guarding no rigidity and the abdomen is soft.  No masses, No pulsatile masses.  Skin: Warm, Dry, No erythema, No rash.   Back: No tenderness, No CVA tenderness.   Extremities: Intact distal pulses, No edema, No tenderness, No cyanosis, No clubbing.   Musculoskeletal: Good range of motion in all major joints. No tenderness to palpation or major deformities noted.   Neurologic: Alert & oriented x 3, Normal motor function, Normal sensory function, No focal " deficits noted.   Psychiatric: Affect normal, Judgment normal, Mood normal.   EKG Interpretation    Interpreted by me    Rhythm: normal sinus   Rate: normal  Axis: normal  Ectopy: none  Conduction: Right bundle branch block  ST Segments: no acute change  T Waves: no acute change  Q Waves: none    Clinical Impression: I do have an old EKG to compare to and I do not see any significant change      RADIOLOGY/PROCEDURES  CT-LSPINE W/O PLUS RECONS   Final Result      1.  Multilevel compression fractures seen at T12, L1, L2, L3 and L5. These are unchanged from comparison consistent with chronic process.      2.  Severe multilevel degenerative disc disease and facet degeneration.      3.  Scoliosis.      4.  Osteopenia.            COMPARISON: 3/16/2018.    FINDINGS:  There is cerebral volume loss.    There is stable right cerebellar encephalomalacia. Lesser encephalomalacia is again seen in the left frontal lobe. There are advanced white matter hypodensities scattered throughout.    The ventricular system is normal in size and position for the degree of cerebral volume loss.    No mass or hemorrhage is present.    Visualized paranasal sinuses are stable with chronic partial left maxillary and frontal opacification.   Impression       No noncontrast CT evidence of acute intracranial hemorrhage.    Stable advanced white matter disease, parenchymal atrophy and small areas of encephalomalacia    Chronic sinus inflammatory disease   Reading Provider Reading Date   Jaden Alcazar M.D. Jul 9, 2018   Signing Provider Signing Date Signing Time   Jaden Alcazar M.D. Jul 9, 2018  7:17 PM   Order Detail Report     CT-HEAD W/O (Order #[id number]) on 7/9/18      Narrative       7/9/2018 6:09 PM    HISTORY/REASON FOR EXAM: Pain/Deformity Following Trauma  Left elbow pain. Ground-level fall.    TECHNIQUE/EXAM DESCRIPTION AND NUMBER OF VIEWS:  3 views of the LEFT elbow.    COMPARISON:    FINDINGS: Bone mineralization is osteopenic.  There is no evidence of fracture or dislocation. Soft tissues are normal.   Impression       No evidence of acute fracture or dislocation     Narrative       7/9/2018 6:09 PM    HISTORY/REASON FOR EXAM: trauma. Follow-up. Left-sided pain.    TECHNIQUE/EXAM DESCRIPTION AND NUMBER OF VIEWS:  Single AP view of the chest.    COMPARISON: 3/16/2018    FINDINGS:  No acute displaced fracture is detected    Lungs: No consolidation detected. Large volumes    Pleura:  No pleural space process is seen.    Heart and mediastinum: The cardiomediastinal contours are stable.   Impression       Stable hyperinflation without acute consolidation is identified   Reading Provider Reading Date   Jaden Alcazar M.D. Jul 9, 2018   Signing Provider Signing Date Signing Time   Jaden Alcazar M.D. Jul 9, 2018  7:23 PM       CT-LSPINE W/O PLUS RECONS   Final Result      1.  Multilevel compression fractures seen at T12, L1, L2, L3 and L5. These are unchanged from comparison consistent with chronic process.      2.  Severe multilevel degenerative disc disease and facet degeneration.      3.  Scoliosis.      4.  Osteopenia.          COURSE & MEDICAL DECISION MAKING  Pertinent Labs & Imaging studies reviewed. (See chart for details) white count normal hematocrit and normal platelet count normal. There is no shift, electrolytes, bicarb low at 17. Renal function, BUN and creatinine both elevated.    Was here yesterday, taken home, but in a chair. Sat in a chair all night because of too weak to get out of the chair, called ambulance again today and they picked her up out of the same chair that they put her in a day ago    She was unable to tolerate going home so I have talked with the hospitalist about admission.  FINAL IMPRESSION  1.   1. Acute midline low back pain without sciatica    2. Contusion of left upper extremity, initial encounter        2.   3.     Disposition  Will be admitted by hospitalist  Electronically signed by: Horacio HUGHES  Phuong, 7/10/2018 2:13 PM

## 2018-07-10 NOTE — ED NOTES
Per [facility] crew the pt's house had rotten food scattered everywhere. Pt's house was disheveled and she hadn't taken any medications since being d/c'd. Pt also reporting that she was wearing soiled diapers because she couldn't walk to the bathroom.

## 2018-07-10 NOTE — DISCHARGE INSTRUCTIONS
Elbow Injury  You or your child has an elbow injury. X-rays and exam today do not show evidence of a fracture (broken bone). That means that only a sling or splint may be required for a brief period of time as directed by your caregiver.  HOME CARE INSTRUCTIONS  · Only take over-the-counter or prescription medicines for pain, discomfort, or fever as directed by your caregiver.   · If you have a splint held on with an elastic wrap or a cast, watch your hand or fingers. If they become numb or cold and blue, loosen the wrap and reapply more loosely. See your caregiver if there is no relief.   · You may use ice on your elbow for 15-20 minutes, 3-4 times per day, for the first 2 to 3 days.   · Use your elbow as directed.   · See your caregiver as directed. It is very important to keep all follow-up referrals and appointments in order to avoid any long-term problems with your elbow including chronic pain or inability to move the elbow normally.   SEEK IMMEDIATE MEDICAL CARE IF:   · There is swelling or increasing pain in your elbow which is not relieved with medications.   · You begin to lose feeling in your hand or fingers, or develop swelling of the hand and fingers.   · You get a cold or blue hand or fingers on injured side.   · If your elbow remains sore, your caregiver may want to x-ray it again. A hairline fracture may not show up on the first x-rays and may only be seen on repeat x-rays ten days to two weeks later. A specialist (radiologist) may examine your x-rays at a later time. In order to get results from the radiologist or their department, make sure you know how and when you are to get that information. It is your responsibility to get results of any tests you may have had.   MAKE SURE YOU:   · Understand these instructions.   · Will watch your condition.   · Will get help right away if you are not doing well or get worse.   Document Released: 03/09/2005 Document Revised: 03/11/2013 Document Reviewed:  08/05/2009  ExitCare® Patient Information ©2013 [employer]  Bone Bruise   A bone bruise is a small hidden fracture of the bone. It typically occurs with bones located close to the surface of the skin.   SYMPTOMS  · The pain lasts longer than a normal bruise.  · The bruised area is difficult to use.  · There may be discoloration or swelling of the bruised area.  · When a bone bruise is found with injury to the anterior cruciate ligament (in the knee) there is often an increased:  · Amount of fluid in the knee  · Time the fluid in the knee lasts.  · Number of days until you are walking normally and regaining the motion you had before the injury.  · Number of days with pain from the injury.  DIAGNOSIS   It can only be seen on X-rays known as MRIs. This stands for magnetic resonance imaging. A regular X-ray taken of a bone bruise would appear to be normal. A bone bruise is a common injury in the knee and the heel bone (calcaneus). The problems are similar to those produced by stress fractures, which are bone injuries caused by overuse. A bone bruise may also be a sign of other injuries. For example, bone bruises are commonly found where an anterior cruciate ligament (ACL) in the knee has been pulled away from the bone (ruptured). A ligament is a tough fibrous material that connects bones together to make our joints stable. Bruises of the bone last a lot longer than bruises of the muscle or tissues beneath the skin. Bone bruises can last from days to months and are often more severe and painful than other bruises.  TREATMENT  Because bone bruises are sudden injuries you cannot often prevent them, other than by being extremely careful. Some things you can do to improve the condition are:  · Apply ice to the sore area for 15-20 minutes, 3-4 times per day while awake for the first 2 days. Put the ice in a plastic bag, and place a towel between the bag of ice and your skin.  · Keep your bruised area raised (elevated)  when possible to lessen swelling.  · For activity:  · Use crutches when necessary; do not put weight on the injured leg until you are no longer tender.  · You may walk on your affected part as the pain allows, or as instructed.  · Start weight bearing gradually on the bruised part.  · Continue to use crutches or a cane until you can stand without causing pain, or as instructed.  · If a plaster splint was applied, wear the splint until you are seen for a follow-up examination. Rest it on nothing harder than a pillow the first 24 hours. Do not put weight on it. Do not get it wet. You may take it off to take a shower or bath.  · If an air splint was applied, more air may be blown into or out of the splint as needed for comfort. You may take it off at night and to take a shower or bath.  · Wiggle your toes in the splint several times per day if you are able.  · You may have been given an elastic bandage to use with the plaster splint or alone. The splint is too tight if you have numbness, tingling or if your foot becomes cold and blue. Adjust the bandage to make it comfortable.  · Only take over-the-counter or prescription medicines for pain, discomfort, or fever as directed by your caregiver.  · Follow all instructions for follow up with your caregiver. This includes any orthopedic referrals, physical therapy, and rehabilitation. Any delay in obtaining necessary care could result in a delay or failure of the bones to heal.  SEEK MEDICAL CARE IF:   · You have an increase in bruising, swelling, or pain.  · You notice coldness of your toes.  · You do not get pain relief with medications.  SEEK IMMEDIATE MEDICAL CARE IF:   · Your toes are numb or blue.  · You have severe pain not controlled with medications.  · If any of the problems that caused you to seek care are becoming worse.  Document Released: 03/09/2005 Document Revised: 03/11/2013 Document Reviewed: 07/22/2009  ExitCare® Patient Information ©2014 [facility]  [facility]

## 2018-07-10 NOTE — ED NOTES
"Raphael MCNAIR from [facility] for FTT. Pt was seen yesterday after a GLF and d/c back to home. Per [facility] neighbors called [facility] \"because she needs more help.\"   "

## 2018-07-10 NOTE — ED NOTES
DC instructions given to pt/[facility]. Pt transferred to [facility] by [facility] team. Pt dcd home via [facility] with 0 s/s distress with [facility] for non-emergent transport.

## 2018-07-10 NOTE — ED NOTES
[facility] non-emergent transport to be here about 2130. Pt resting in a position of comfort with eyes closed, and blankets pulled over face. 0 s/s distress noted.

## 2018-07-10 NOTE — TELEPHONE ENCOUNTER
1. Caller Name: Nora L Allison                                           Call Back Number: [phone] (home)         Patient approves a detailed voicemail message: yes    2. SPECIFIC Action To Be Taken: Referral pending, please sign.    3. Diagnosis/Clinical Reason for Request: ground level fall     4. Specialty & Provider Name/Lab/Imaging Location: [facility] at home    5. Is appointment scheduled for requested order/referral: no    Patient was informed they will receive a return phone call from the office ONLY if there are any questions before processing their request. Advised to call back if they haven't received a call from the referral department in 5 days.

## 2018-07-10 NOTE — ED NOTES
"Pt called RN in to room upset and angry stating, \"I know something phony is going on here! 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 Why?\" RN attempted to reorient pt to ED room and update her on her current status. Pt continuing to express her suspicions. Pt stating, \"There's the doctor. He was saying my name in the room over there. I heard him. There's something phony going on here and I don't like it. Why am I hear.\" Again RN reoriented pt and updated her on her current status. ERP notified   "

## 2018-07-10 NOTE — ED NOTES
Unit clerk arranging non-emergent transport home. Pt resting in a position of comfort with 0 s/s distress noted.

## 2018-07-10 NOTE — ED NOTES
Attempted to call [facility] - No answer.  Attempted to call Emergency Contact - Evita Galaviz - No anwer  Called [facility] to verify pick-up address.

## 2018-07-10 NOTE — ED PROVIDER NOTES
ED Provider Note    CHIEF COMPLAINT  Chief Complaint   Patient presents with   • GLF     Fell in kitchen at home ([place])  Hit head, Left elbow and Left back  Denies loc       HPI  Nora Hope is a 89 y.o. female who presents complaining of a ground-level fall at home.  Patient lives in assisted living.  She struck her head left elbow and right flank.  She denies neck pain or back pain.  She denies abdominal pain.  She is not sure if she passed out.  She does not fully remember the event.  No further detailed the history of present illness could be obtained within the constraints of the urgency of the patient's clinical condition    REVIEW OF SYSTEMS  See HPI for further details.  No fever no chills.  No cough or cold symptoms.  Positive chest pain.  No abdominal pain.  No neck or back pain.  All other systems are negative.    PAST MEDICAL HISTORY  Past Medical History:   Diagnosis Date   • Arthritis    • Cancer (HCC) 2009    eye   • Essential hypertension 10/26/2017   • Glaucoma        FAMILY HISTORY  Family History   Problem Relation Age of Onset   • Cancer Mother        SOCIAL HISTORY  Social History     Social History   • Marital status: Single     Spouse name: N/A   • Number of children: N/A   • Years of education: N/A     Social History Main Topics   • Smoking status: Never Smoker   • Smokeless tobacco: Never Used   • Alcohol use No   • Drug use: No   • Sexual activity: Not Currently     Other Topics Concern   • Not on file     Social History Narrative   • No narrative on file       SURGICAL HISTORY  Past Surgical History:   Procedure Laterality Date   • EYE SURGERY      eye cancer 2009   • OTHER ORTHOPEDIC SURGERY     • SINUS TREPHINE FRONTAL      windows       CURRENT MEDICATIONS  Home Medications    **Home medications have not yet been reviewed for this encounter**         ALLERGIES  Allergies   Allergen Reactions   • Allopurinol Rash     Hot, red face   • Aspirin      Cannot take  "due to medications   • Codeine Itching and Nausea   • Latex Rash     To tape/bandages containing latex   • Tape Rash     adhessive in the tape, causes, swollen rash           PHYSICAL EXAM  VITAL SIGNS: /73   Pulse 86   Temp 37 °C (98.6 °F)   Resp 16   Ht 1.727 m (5' 8\")   Constitutional:  Well developed, Well nourished, No acute distress, Non-toxic appearance.  Elderly.  Pleasant.  HENT: No hemotympanum.  Normocephalic atraumatic.  No head lacerations  Eyes: PERRLA, EOMI,   Neck: Normal range of motion, No tenderness,   Lymphatic: No lymphadenopathy noted.   Cardiovascular: Normal heart rate, Normal rhythm, No murmurs,   Thorax & Lungs: Normal breath sounds, No respiratory distress, No wheezing, positive right sided chest wall tenderness  Skin: Warm, Dry, No erythema, No rash.  Contusion over left olecranon process  Extremities: No edema, No tenderness, No cyanosis, No clubbing. Dorsalis pedis pulses 2+ equal bilaterally. Radial pulses 2+ equal bilaterally  Neurologic: Alert & oriented x 2, Normal motor function, Normal sensory function, No focal deficits noted.   Psychiatric: Affect normal, Judgment normal, Mood normal.     RADIOLOGY/PROCEDURES  DX-ELBOW-COMPLETE 3+ LEFT   Final Result      No evidence of acute fracture or dislocation.      DX-CHEST-PORTABLE (1 VIEW)   Final Result      Stable hyperinflation without acute consolidation is identified      CT-HEAD W/O   Final Result      No noncontrast CT evidence of acute intracranial hemorrhage.      Stable advanced white matter disease, parenchymal atrophy and small areas of encephalomalacia      Chronic sinus inflammatory disease            COURSE & MEDICAL DECISION MAKING  Pertinent Labs & Imaging studies reviewed. (See chart for details)  Patient is status post ground-level fall.  CT scan of the head was unremarkable.  Chest x-ray did not show any obvious displaced rib fracture.  Her left elbow is unremarkable.  Patient was reassured and will be " discharged home.    FINAL IMPRESSION  1.  Fall, recurrent  2.  Head injury   3.  elbow contusion        Electronically signed by: Calvin Suresh, 7/9/2018 8:04 PM

## 2018-07-11 NOTE — ED NOTES
"Pt yelling at RN asking, \"Why am I here! I want to go home. I have gout and you aren't giving me water. This is ridiculous.\" RN acknowledge pt concerns and apologized. RN reoriented pt and explained ED process. Pt still upset stating, \"I want to go home! I keep seeing the doctor go in and out of rooms. I want to talk to him. You tell him I want to talk to him immediately!\" ERP notified  Pt given water  "

## 2018-07-11 NOTE — ED NOTES
Case manger at [facility] called to discuss pt status and need for transportation home.  to call RN

## 2018-07-11 NOTE — ED NOTES
Attempted to call home phone, granddaughter, and [facility]. No answer from any number. Pt reporting she doesn't know the phone numbers or full names of a local friends and family to transport her home.

## 2018-07-11 NOTE — ED NOTES
"Pt calling RN stating, \"I want to go home! I know you're busy, but if you were in the same situation you'd say something too. I want to leave.\" RN acknowledged pt concerns. ERP notified.   "

## 2018-07-11 NOTE — DISCHARGE PLANNING
Medical Social Work    MSW received report from VINCENT Travis who states that bedside RN called from [facility] requesting assistance with pt requesting to leave AMA but needs a ride.  MSW reviewed pt's chart and attempted to contact [facility] ([phone]) with no answer.  MSW contacted bedside RN and informed him that pt cannot be provided with [facility] transport or other transport if leaving AMA.  Per RN pt is in need of wheelchair and walker for D/C home.  Pt has no friends or family able to pick her up at this time.  Due to triage note of condition of pt's home and self MSW advised bedside RN that an EPS report should be made as well.

## 2018-07-11 NOTE — DISCHARGE INSTRUCTIONS
Back Exercises  Back exercises help treat and prevent back injuries. The goal is to increase your strength in your belly (abdominal) and back muscles. These exercises can also help with flexibility. Start these exercises when told by your doctor.  HOME CARE  Back exercises include:  Pelvic Tilt.  · Lie on your back with your knees bent. Tilt your pelvis until the lower part of your back is against the floor. Hold this position 5 to 10 sec. Repeat this exercise 5 to 10 times.  Knee to Chest.  · Pull 1 knee up against your chest and hold for 20 to 30 seconds. Repeat this with the other knee. This may be done with the other leg straight or bent, whichever feels better. Then, pull both knees up against your chest.  Sit-Ups or Curl-Ups.  · Bend your knees 90 degrees. Start with tilting your pelvis, and do a partial, slow sit-up. Only lift your upper half 30 to 45 degrees off the floor. Take at least 2 to 3 seonds for each sit-up. Do not do sit-ups with your knees out straight. If partial sit-ups are difficult, simply do the above but with only tightening your belly (abdominal) muscles and holding it as told.  Hip-Lift.  · Lie on your back with your knees flexed 90 degrees. Push down with your feet and shoulders as you raise your hips 2 inches off the floor. Hold for 10 seconds, repeat 5 to 10 times.  Back Arches.  · Lie on your stomach. Prop yourself up on bent elbows. Slowly press on your hands, causing an arch in your low back. Repeat 3 to 5 times.  Shoulder-Lifts.  · Lie face down with arms beside your body. Keep hips and belly pressed to floor as you slowly lift your head and shoulders off the floor.  Do not overdo your exercises. Be careful in the beginning. Exercises may cause you some mild back discomfort. If the pain lasts for more than 15 minutes, stop the exercises until you see your doctor. Improvement with exercise for back problems is slow.      This information is not intended to replace advice given to you  by your health care provider. Make sure you discuss any questions you have with your health care provider.     Document Released: 01/20/2012 Document Revised: 03/11/2013 Document Reviewed: 02/11/2016  [employer] Interactive Patient Education ©2016 [employer]  Return if fever, vomiting or if no better in 12 hours.

## 2018-07-11 NOTE — ED NOTES
Charge RN Bree spoke with pt and encouraged pt to stay. Pt absolutely refusing to stay and be admitted. Pt states she will pay the  to get her walker from her house. Pt notified we can assist her to the taxi but once in, we will be unable to assist her. Pt verbalized understanding. Pt signed AMA form and placed in chart. Taxi called for pt and pt wheeled to lobby.

## 2018-07-11 NOTE — ED NOTES
"Pt calling RN into room yelling at RN, \"This is ridiculous. I figured it out. You guys just want money! I want to go home!\" RN acknowledged pt concerns and apologized. Pt yelling out into the hallway at [facility]. Winston at bedside.   "

## 2018-07-11 NOTE — ED PROVIDER NOTES
ED Provider Note    . I have made arrangements for admission of this patient however she now refuses to be admitted. She is awake alert and oriented ×4 and can be responsible for her actions. She now says that she can ambulate without difficulty. We will have her sign a form against medical adviceThis patient has chosen to sign against medical advise. He understands that there may be a severe medical problem that may cause disability or death. The patient understands that they may return to the emergency room any time if they change their mind. I have strongly recommended that this patient stay in the emergency room for completion of the workup and to be admitted, but the patient refuses to stay and refuses to be admitted. I have given them alternative followup with the [facility] clinic where they will not require an appointment. Prior to leaving I have once again strongly recommended that the patient stay for admission. The patient does understand that the consequences of these actions may result in disability or death. I have offered to talk with family in addition.    Discharge instructions are understood. This patient is to return if fever vomiting or no better in 12 hours. Follow up with the [facility] clinic or private physician. Information sheets on back pain, arm contusion.  has also interviewed her

## 2018-07-12 NOTE — TELEPHONE ENCOUNTER
Phone Number Called: [phone] (home)       Message: informed patient of status.    Left Message for patient to call back: no

## 2018-07-15 LAB
BACTERIA BLD CULT: NORMAL
SIGNIFICANT IND 70042: NORMAL
SITE SITE: NORMAL
SOURCE SOURCE: NORMAL

## 2018-07-17 ENCOUNTER — TELEPHONE (OUTPATIENT)
Dept: MEDICAL GROUP | Age: 83
End: 2018-07-17

## 2018-07-17 DIAGNOSIS — Z91.81 AT RISK FOR FALLS: ICD-10-CM

## 2018-07-17 DIAGNOSIS — F41.9 ANXIETY: ICD-10-CM

## 2018-07-17 DIAGNOSIS — R53.81 PHYSICAL DECONDITIONING: ICD-10-CM

## 2018-07-17 DIAGNOSIS — I10 ESSENTIAL HYPERTENSION: ICD-10-CM

## 2018-07-17 NOTE — TELEPHONE ENCOUNTER
Spoke with Jen, Pharmacist.  Jen stated that patient call pharmacy frequently.  Pharmacy is concerning that patient is not able to take care of herself at home.  Patient only has one prescription medication lisinopril.  Patient did not want to take any blood pressure medication as she is concerning of low blood pressure from taking it.  I advised patient to take lisinopril 2.5 mg to 5 mg once a day if her blood pressure is above 150/90.  Patient was recently evaluated in ER on 7/9/18 for ground-level fall at home.   she was discharged to home and she came back to ER on 7/10/18 as she was not able to take care of herself.  Patient declined to place on skilled nursing facility and she signed AMA.  So I ordered home health on 7/10/18.      Please call referral team to process home health quicker.  Please arrange follow-up appointment with me sooner.  I also placed a referral to complex care management today.    Kori Cali M.D.

## 2018-07-17 NOTE — TELEPHONE ENCOUNTER
Phone Number Called: Referral [phone].     Message: Left VM for Nora regarding patient and referral beinf processed. LVM for her to contact Dr. Cali message so we can process referral for patient.     Left Message for patient to call back: yes

## 2018-07-17 NOTE — TELEPHONE ENCOUNTER
1. Caller Name: [facility] pharmacy                                         Call Back Number: [phone] ext. [phone]      Patient approves a detailed voicemail message: N\A    Pharmacist called expressing concerns about the patient and whether she is able to take care of her own medications. Described how patient went in with old medications asking for a refill that she no longer is taking. Once pharmacist tried to explain it to her, she had a difficult time and doesn't think violet understood her. Asks to have a conversation with you regarding this patient, her callback number is above.

## 2018-07-18 ENCOUNTER — PATIENT OUTREACH (OUTPATIENT)
Dept: HEALTH INFORMATION MANAGEMENT | Facility: OTHER | Age: 83
End: 2018-07-18

## 2018-07-18 DIAGNOSIS — Z91.81 AT RISK FOR FALLS: ICD-10-CM

## 2018-07-18 DIAGNOSIS — I10 ESSENTIAL HYPERTENSION: ICD-10-CM

## 2018-07-18 DIAGNOSIS — Z65.9 SOCIAL PROBLEM: ICD-10-CM

## 2018-07-19 ENCOUNTER — PATIENT OUTREACH (OUTPATIENT)
Dept: HEALTH INFORMATION MANAGEMENT | Facility: OTHER | Age: 83
End: 2018-07-19

## 2018-07-19 NOTE — PROGRESS NOTES
Received referral from PCP for care coordination services. Outbound call to Nora for medication review. No answer/ no VM. Will attempt to contact patient another date/ time.     Liberty Bobby, PharmD

## 2018-07-20 ENCOUNTER — PATIENT OUTREACH (OUTPATIENT)
Dept: HEALTH INFORMATION MANAGEMENT | Facility: OTHER | Age: 83
End: 2018-07-20

## 2018-07-20 ENCOUNTER — TELEPHONE (OUTPATIENT)
Dept: HEALTH INFORMATION MANAGEMENT | Facility: OTHER | Age: 83
End: 2018-07-20

## 2018-07-20 NOTE — PROGRESS NOTES
"Received referral to  care coordination by PCP Dr. Cali with report of concern related to self care deficit and fall risk.  Referral also placed to home health care and [facility] confirmed [facility] is currently in place and is order for RN services for 2x weekly for 3 weeks then 1x weekly for 6 weeks. Agency reported their  is out and maybe difficult for agency to follow up on social needs/concerns.     Outreach call to pt to introduce self, discuss role of [facility] services and pt's identified needs. Assessment initiated with pt over the phone. Overall pt reports she is doing well but seems to minimize her needs AEB reporting she can complete tasks independently such as bathing, dressing, grooming, mobility and other ADLs but will then comment on frequent falls she's been having, assistance she is obtaining from the home health agency with bath aides, and difficulties she has with putting on shoes; thus, wears slip-ons/flats.     Discussed pt's IADL needs. She reported she takes as taxi if she has to go anywhere and indicated she does have difficulties with cooking, house cleaning, laundry, etc. She reported she doesn't really cook but is able to make snacks like crackers and cheese or a PB&J sandwich. She reported she is currently receiving Meals on Wheels and eats the meals throughout the week.      Discussed if she has any support in the community such as friends/family that are available to assist her as needed. Pt reported she has a few \"friendly acquaintances\" with the senior apartments she lives in. She indicated some will come by and check on her from time to time. However she also indicated she keeps people at a distance and discussed concerns about people's motives and potential of being taken advantage of. She reported she has one daughter that lives in [place], who sends her money to help with taxi fair and other needs. She reported good relationship with daughter but " "does not give consent for care coordination team to discuss her health care issues with daughter. She reported she does not want her daughter to be concerned with her health or to worry.     Discussed community programs/resources that maybe available to pt to help provide care giving services in her home ongoing explaining that Home Health care is typically a temporary services met to help pt with skilled/rehabilitation needs. Pt upset by this information explaining she was under the impression her insurance covered the services. LSW explained her insurance does cover the services but not forever. Discussed if she felt she would need continued assistance with bathing and other needs on an ongoing bases. Pt unable to answer stating, \"I don't know what I'll need in the future.\"  LSW attempted to clarify with pt and discuss trends but pt unable to decide or determine if she felt she would need ongoing care services. Discussed mailing her information on programs and resources for her to review and consider. Pt agreeable to receiving the information.     Lastly discussed if pt has completed an Advanced Directive. Pt declined. Discussed importance of such documents and workshops offered by [facility] if pt is interested in pursuing. LSW will also mail pt information on the workshops offered.     Plan:  · Pt added to  CC and care plan initiated.   · Collaboration with RN with [facility] health care  · Resources mailed to pt on Advanced Directives and HCBW program.   "

## 2018-07-20 NOTE — TELEPHONE ENCOUNTER
Collaboration with RN ANGELITA) with [facility] regarding continuity of care and social needs. RN reported pt's medications are running low and indicated she has about a week left on her medication. He doesn't believe she has refills. Informed RN that LSW would notify pt's PCP. He indicated he also wanted PCP to know that pt has prescription of amlodipine. He reported he has educated on not taking this medications and has attempted to have pt remove the medication but she is not wanting to remove this medication. He reported he is not aware if she is also taking this medication but wanted the provider to be aware. LSW informed RN that PCP would be updated.     Plan:  · Message routed to PCP to provide update of information above.

## 2018-07-21 NOTE — TELEPHONE ENCOUNTER
Please inform patient that we already discussed to discontinue amlodipine in previous visit on 4/2/18. Patient is advised to take lisinopril 2.5 mg daily for high blood pressure. We discussed to take low dose of lisinopril as patient concerned that her blood pressure will be very low if she take medication.     Please advise patient that she can take lisinopril up to 5 mg if blood pressure is above 150/90 constantly.     I refilled lisinopril 2.5 mg 90 tabs with 3 refills on 4/2/18. I also spoke with [facility] pharmacy and pharmacy has the prescription to refill.     Please advise patient to  the new prescription Lisinopril and does not take amlodipine.      Thanks!    Kori Cali M.D.

## 2018-07-23 NOTE — TELEPHONE ENCOUNTER
Phone Number Called: [phone] (home)       Message: patient informed of message below.     Left Message for patient to call back: no

## 2018-07-26 ENCOUNTER — PATIENT OUTREACH (OUTPATIENT)
Dept: HEALTH INFORMATION MANAGEMENT | Facility: OTHER | Age: 83
End: 2018-07-26

## 2018-07-26 NOTE — PROGRESS NOTES
2nd attempt to reach Nora for medication review. See completed medication review pharmacy follow-up [facility] for additional documentation.     Patient did not have high-dose vitamin D. Spoke with Nasreen at [facility] pharmacy who states it was last filled for 84 day supply on 4/2/18. She will refill this for patient. Left VM for patient and spoke with AQUILINO WESLEY from [facility].      RN states patient is resistant to utilizing a weekly pill container and is running low on lisinopril. Spoke with Gabriela at [facility] pharmacy and she will refill lisinopril.     Liberty Bobby, PharmD

## 2018-07-27 ENCOUNTER — TELEPHONE (OUTPATIENT)
Dept: MEDICAL GROUP | Age: 83
End: 2018-07-27

## 2018-07-31 NOTE — TELEPHONE ENCOUNTER
"· Home Health paperwork received from [facility] requiring provider signature.     · All appropriate fields completed by Medical Assistant: Yes    · Paperwork placed in \"MA to Provider\" folder/basket. Awaiting provider completion/signature. Dr. Cali   "
· FMLA paperwork received from [facility] requiring provider signature.     · All appropriate fields completed by Medical Assistant: Yes    · Paperwork handed to provider to sign then scanned to patient's chart and faxed to [phone]  
4

## 2018-08-06 ENCOUNTER — PATIENT OUTREACH (OUTPATIENT)
Dept: HEALTH INFORMATION MANAGEMENT | Facility: OTHER | Age: 83
End: 2018-08-06

## 2018-08-06 NOTE — PROGRESS NOTES
Outreach call to pt to follow up and discuss if she received resources mailed to her by LSW. Pt answered the phone and reported she received paperwork. LSW was able to hear another voice in the background and pt requested LSW speak to individual at the home. Individual that came to the phone was [facility] velez worker, Bree, who reported she was currently meeting with pt and reviewing resources mailed to her by this LSW.     Discussed that pt is working on completing her Advanced Directive POA and indicated she would like her daughter to be her primary agent and granddaughter to be her 1st Alternative. [facility]  helped her completed this document. She reported plan is for pt to get document finalized/notarized at her bank.  also reported pt is wanting to complete the POLST which form was provided to pt by [facility]  to take into her next appt with her PCP on 10/02/18. Furthermore, [facility]  reported pt is interested in continuing to receive a bath/shower aid and is open to a referral for the Home and Community Based Waiver program. [facility]  reported that she would be placing referral.     Plan:  · LSW will continue to follow up with pt regarding status and will follow up with [facility] regarding status of referral being placed by [facility] .

## 2018-08-21 ENCOUNTER — PATIENT OUTREACH (OUTPATIENT)
Dept: HEALTH INFORMATION MANAGEMENT | Facility: OTHER | Age: 83
End: 2018-08-21

## 2018-08-21 NOTE — PROGRESS NOTES
"Referred by PCP (Dr. Cali). \"Needs CC services for fall risk and HTN\".  Verified with [facility] that the patient is receiving their services at this time.  Plan: will monitor for discharge from .  "

## 2018-08-22 ENCOUNTER — PATIENT OUTREACH (OUTPATIENT)
Dept: HEALTH INFORMATION MANAGEMENT | Facility: OTHER | Age: 83
End: 2018-08-22

## 2018-08-22 NOTE — PROGRESS NOTES
Outreach call to [facility] to follow up regarding referral placed by [facility]  for the HCBW program. [facility] reported case was assigned to , Eric. MSW transferred to . She reported she attempted to reach pt on the 17th but was unable to connect. Reported she will be reach out again this week likely on Friday. Follow up call to pt regarding  care plan/interventions and to notify that referral was submitted and  would be reaching out. Pt answered and reported she is doing well. She confirmed [place] is still coming to the home to assist. Informed her that [facility] would be reaching out to set up appt and review program. MSW also reviewed/reminded pt of HCBW program and services. Pt reported still be interested in program and would be on alert to receive call.     Plan:  LSW will continue to follow up and assist pt as needed with care plan and interventions.

## 2018-08-29 ENCOUNTER — PATIENT OUTREACH (OUTPATIENT)
Dept: HEALTH INFORMATION MANAGEMENT | Facility: OTHER | Age: 83
End: 2018-08-29

## 2018-08-29 NOTE — PROGRESS NOTES
Verified with [facility] that the patient is receiving their services at this time.  Plan: will monitor for discharge from .

## 2018-08-31 ENCOUNTER — PATIENT OUTREACH (OUTPATIENT)
Dept: HEALTH INFORMATION MANAGEMENT | Facility: OTHER | Age: 83
End: 2018-08-31

## 2018-08-31 NOTE — PROGRESS NOTES
Follow up call to pt to determine if she has heard from [facility] regarding her referral for the HCBW program. Pt reported she does not recall hearing from [facility] worker Eric. LSW encouraged pt to try to reach out to worker regarding referral and provided her [facility] worker Eric's number [phone].  Pt reported she will try to call her next Tuesday. Pt denied any other concerns or issues at this time.    Outreach call to [facility] worker to follow up and determine if she has been able to connect with pt. Received VM and message left.     Plan:  · LSW will continue to follow up with pt and assist with care plan.

## 2018-09-11 ENCOUNTER — PATIENT OUTREACH (OUTPATIENT)
Dept: HEALTH INFORMATION MANAGEMENT | Facility: OTHER | Age: 83
End: 2018-09-11

## 2018-09-11 NOTE — PROGRESS NOTES
Verified with [facility] that the patient has been discharged from their services.  Plan: will reach out to the patient for cc services.

## 2018-09-20 ENCOUNTER — PATIENT OUTREACH (OUTPATIENT)
Dept: HEALTH INFORMATION MANAGEMENT | Facility: OTHER | Age: 83
End: 2018-09-20

## 2018-09-20 NOTE — PROGRESS NOTES
"Outreach call to the patient for cc services.  The patient answers the phone and reports that she is not able to talk at this time because she is \"getting ready to call my daughter\".  The patient is requesting a call at a later time.  Plan: will reach out at a later time/date  "

## 2018-09-21 ENCOUNTER — TELEPHONE (OUTPATIENT)
Dept: MEDICAL GROUP | Age: 83
End: 2018-09-21

## 2018-09-21 NOTE — TELEPHONE ENCOUNTER
"· Home Health paperwork received from [facility]  requiring provider signature.     · All appropriate fields completed by Medical Assistant: Yes    · Paperwork placed in \"MA to Provider\" folder/basket. Awaiting provider completion/signature. Dr. Cali   "

## 2018-09-21 NOTE — TELEPHONE ENCOUNTER
Fax Received From: [facility]     Message: Progress note for patient. For your records review only. Forms scanned to media.

## 2018-09-24 NOTE — TELEPHONE ENCOUNTER
· Home Health paperwork received from [facility] requiring provider signature.     · All appropriate fields completed by Medical Assistant: Yes    · Paperwork handed to provider to sign then faxed to [facility]

## 2018-09-25 ENCOUNTER — PATIENT OUTREACH (OUTPATIENT)
Dept: HEALTH INFORMATION MANAGEMENT | Facility: OTHER | Age: 83
End: 2018-09-25

## 2018-09-25 NOTE — PROGRESS NOTES
LSW received follow up call from [facility]  Eric [phone]. She reported she was able to meet with pt at her home and collect some of the items required to apply for the HCBW program but explained she still needed bank statements and pt's cash surrender value letters for her Life Insurance Policy. She also reported she needs another copy of her social security award letter explaining the copy she obtained with her scanner did not go through well. Furthermore, she reported she believes pt has 4 policies based on paperwork. She reported she is working on obtaining the cash surrender value (CSV) letters on behalf of pt and has received one that showed a CSV of $3000. This would put pt over asset for the Home and Community Based Waiver (HCBW) program but will potentially look into applying for the Community Options Program for the Elderly (COPE) program for pt as she is over asset for HCBW. She explained she will still need the same documentation as required for HCBW.       Follow up call to pt to determine if she has received supporting information such as her bank statements. Pt had some confusion over the phone remembering this LSW and what she needed to complete. LSW had to provide multiple reminders/cues. Pt was able to communicate that she contacted her bank but has not received statements. She can not recall when she made contact with the bank. Discussed with pt other concerns she addressed with RN regarding her account being overdrawn from her insurance premium. Pt reported the matter hasn't been resolved but she was able to connect with her daughter and receive additional money to help her this week from her daughter. Pt also reported her daughter will be paying someone $135/month to help her manage her finances. Pt could not provide additional insight. LSW again inquired if pt would like LSW to reach out to her daughter to potential go over the resources we are working on or provide her daughter  with additional resources that maybe able to assist pt and potential save her daughter money. Pt declined reporting she did not want to interfere with her daughter's decision and does not want this LSW disturbing her daughter. LSW voiced understanding.     Pt did report that she did not receive her Meals on Wheels delivery and isn't sure why. She reported she has 2 meals left and has attempted to call but has not reached program. LSW offered to call Meals [employer] Wheels, on pt's behalf, to determine why pt has not received her meals. Pt reported this would be helpful and is agreeable to LSW contacting Meals [employer] Wheels.     Lastly LSW encouraged pt to follow up with her bank if she has not received her bank statements by the end of the week. Pt voiced understanding.     Plan:  · Outreach call placed to MOW program 09/25/2018, VM was left to check status of delivered meals  · LSW will continue to follow up and assist pt with care plan/interventions as needed.

## 2018-10-08 ENCOUNTER — PATIENT OUTREACH (OUTPATIENT)
Dept: HEALTH INFORMATION MANAGEMENT | Facility: OTHER | Age: 83
End: 2018-10-08

## 2018-10-08 NOTE — PROGRESS NOTES
"Received incoming call from [facility]  regarding the HBCW referral. She reported she did go out and see pt last week and completed paperwork/application for Community Options Program for the Elderly (COPE). She reported she has received 3 of 4 of pt's Cash Surrender Value (CSV) amounts for her life insurance which are as follows: $3658.35, $1952.46 and $2786. Due to the CSV of these life insurance policies pt is over asset for the Medicaid funded HCBW program which only allows for $2000 in assets. Due to this representative discussed transferring referral to the COPE program.     She reported the application was completed but program does have a long wait-list. Representative was unable to provide any information related to how long pt maybe on wait-list. She reported agency will still need pt's bank statements but this will not be needed until pt comes up on wait-list so they are current. Thus she reported she is not going to \"worry\" pt with obtaining the documents at this time. This LSW voiced understanding. Once pt becomes eligible for COPE program off the wait-list she will need to supply 3 month bank statements, copy of income ie SSA award letter and pension award letter, and possibly pt's 4th life insurance CSV letter.     Plan:  · LSW will continue to assist pt with care plan and [facility].   "

## 2018-10-18 ENCOUNTER — PATIENT OUTREACH (OUTPATIENT)
Dept: HEALTH INFORMATION MANAGEMENT | Facility: OTHER | Age: 83
End: 2018-10-18

## 2018-10-18 NOTE — PROGRESS NOTES
Outreach call to the patient for cc services.  The patient answered but was not able to hear well and requested a call at a later time.  Plan: will reach out at a later date/time.

## 2018-11-12 ENCOUNTER — PATIENT OUTREACH (OUTPATIENT)
Dept: HEALTH INFORMATION MANAGEMENT | Facility: OTHER | Age: 83
End: 2018-11-12

## 2018-11-13 ENCOUNTER — APPOINTMENT (OUTPATIENT)
Dept: RADIOLOGY | Facility: MEDICAL CENTER | Age: 83
DRG: 280 | End: 2018-11-13
Attending: EMERGENCY MEDICINE
Payer: MEDICARE

## 2018-11-13 ENCOUNTER — APPOINTMENT (OUTPATIENT)
Dept: CARDIOLOGY | Facility: MEDICAL CENTER | Age: 83
DRG: 280 | End: 2018-11-13
Attending: EMERGENCY MEDICINE
Payer: MEDICARE

## 2018-11-13 ENCOUNTER — APPOINTMENT (OUTPATIENT)
Dept: RADIOLOGY | Facility: MEDICAL CENTER | Age: 83
DRG: 280 | End: 2018-11-13
Attending: INTERNAL MEDICINE
Payer: MEDICARE

## 2018-11-13 ENCOUNTER — HOSPITAL ENCOUNTER (INPATIENT)
Facility: MEDICAL CENTER | Age: 83
LOS: 205 days | DRG: 280 | End: 2019-06-06
Attending: EMERGENCY MEDICINE | Admitting: INTERNAL MEDICINE
Payer: MEDICARE

## 2018-11-13 DIAGNOSIS — S70.02XA CONTUSION OF LEFT HIP, INITIAL ENCOUNTER: ICD-10-CM

## 2018-11-13 DIAGNOSIS — M25.552 PAIN OF LEFT HIP JOINT: ICD-10-CM

## 2018-11-13 DIAGNOSIS — N18.30 CHRONIC KIDNEY DISEASE (CKD), STAGE III (MODERATE) (HCC): ICD-10-CM

## 2018-11-13 DIAGNOSIS — I36.1 NON-RHEUMATIC TRICUSPID VALVE INSUFFICIENCY: ICD-10-CM

## 2018-11-13 DIAGNOSIS — Z66 DO NOT RESUSCITATE: ICD-10-CM

## 2018-11-13 DIAGNOSIS — N28.9 RENAL INSUFFICIENCY: ICD-10-CM

## 2018-11-13 DIAGNOSIS — I21.11 ST ELEVATION MYOCARDIAL INFARCTION INVOLVING RIGHT CORONARY ARTERY (HCC): ICD-10-CM

## 2018-11-13 DIAGNOSIS — I10 ESSENTIAL HYPERTENSION: ICD-10-CM

## 2018-11-13 DIAGNOSIS — I21.3 ACUTE ST ELEVATION MYOCARDIAL INFARCTION (STEMI), UNSPECIFIED ARTERY (HCC): ICD-10-CM

## 2018-11-13 PROBLEM — R79.89 ELEVATED BRAIN NATRIURETIC PEPTIDE (BNP) LEVEL: Status: ACTIVE | Noted: 2018-11-13

## 2018-11-13 PROBLEM — M62.82 RHABDOMYOLYSIS: Status: ACTIVE | Noted: 2018-11-13

## 2018-11-13 PROBLEM — D72.829 LEUKOCYTOSIS: Status: ACTIVE | Noted: 2018-11-13

## 2018-11-13 PROBLEM — Z71.89 ADVANCED DIRECTIVES, COUNSELING/DISCUSSION: Status: ACTIVE | Noted: 2018-11-13

## 2018-11-13 LAB
ALBUMIN SERPL BCP-MCNC: 3.7 G/DL (ref 3.2–4.9)
ALBUMIN/GLOB SERPL: 1.1 G/DL
ALP SERPL-CCNC: 89 U/L (ref 30–99)
ALT SERPL-CCNC: 47 U/L (ref 2–50)
ANION GAP SERPL CALC-SCNC: 12 MMOL/L (ref 0–11.9)
APPEARANCE UR: CLEAR
APTT PPP: 27.2 SEC (ref 24.7–36)
AST SERPL-CCNC: 198 U/L (ref 12–45)
BACTERIA #/AREA URNS HPF: NEGATIVE /HPF
BASOPHILS # BLD AUTO: 0.5 % (ref 0–1.8)
BASOPHILS # BLD: 0.07 K/UL (ref 0–0.12)
BILIRUB SERPL-MCNC: 0.9 MG/DL (ref 0.1–1.5)
BILIRUB UR QL STRIP.AUTO: NEGATIVE
BNP SERPL-MCNC: 690 PG/ML (ref 0–100)
BUN SERPL-MCNC: 44 MG/DL (ref 8–22)
CALCIUM SERPL-MCNC: 9.5 MG/DL (ref 8.5–10.5)
CHLORIDE SERPL-SCNC: 105 MMOL/L (ref 96–112)
CK SERPL-CCNC: 1158 U/L (ref 0–154)
CO2 SERPL-SCNC: 22 MMOL/L (ref 20–33)
COLOR UR: YELLOW
CREAT SERPL-MCNC: 1.52 MG/DL (ref 0.5–1.4)
EKG IMPRESSION: NORMAL
EKG IMPRESSION: NORMAL
EOSINOPHIL # BLD AUTO: 0.01 K/UL (ref 0–0.51)
EOSINOPHIL NFR BLD: 0.1 % (ref 0–6.9)
EPI CELLS #/AREA URNS HPF: NEGATIVE /HPF
ERYTHROCYTE [DISTWIDTH] IN BLOOD BY AUTOMATED COUNT: 47 FL (ref 35.9–50)
GLOBULIN SER CALC-MCNC: 3.4 G/DL (ref 1.9–3.5)
GLUCOSE SERPL-MCNC: 102 MG/DL (ref 65–99)
GLUCOSE UR STRIP.AUTO-MCNC: NEGATIVE MG/DL
HCT VFR BLD AUTO: 41.5 % (ref 37–47)
HGB BLD-MCNC: 13.5 G/DL (ref 12–16)
HYALINE CASTS #/AREA URNS LPF: ABNORMAL /LPF
IMM GRANULOCYTES # BLD AUTO: 0.05 K/UL (ref 0–0.11)
IMM GRANULOCYTES NFR BLD AUTO: 0.4 % (ref 0–0.9)
INR PPP: 1.12 (ref 0.87–1.13)
KETONES UR STRIP.AUTO-MCNC: NEGATIVE MG/DL
LEUKOCYTE ESTERASE UR QL STRIP.AUTO: NEGATIVE
LV EJECT FRACT  99904: 50
LV EJECT FRACT MOD 2C 99903: 66.93
LV EJECT FRACT MOD 4C 99902: 49.67
LV EJECT FRACT MOD BP 99901: 58.86
LYMPHOCYTES # BLD AUTO: 1.88 K/UL (ref 1–4.8)
LYMPHOCYTES NFR BLD: 14 % (ref 22–41)
MCH RBC QN AUTO: 30.1 PG (ref 27–33)
MCHC RBC AUTO-ENTMCNC: 32.5 G/DL (ref 33.6–35)
MCV RBC AUTO: 92.6 FL (ref 81.4–97.8)
MICRO URNS: ABNORMAL
MONOCYTES # BLD AUTO: 1.61 K/UL (ref 0–0.85)
MONOCYTES NFR BLD AUTO: 12 % (ref 0–13.4)
NEUTROPHILS # BLD AUTO: 9.81 K/UL (ref 2–7.15)
NEUTROPHILS NFR BLD: 73 % (ref 44–72)
NITRITE UR QL STRIP.AUTO: NEGATIVE
NRBC # BLD AUTO: 0 K/UL
NRBC BLD-RTO: 0 /100 WBC
PH UR STRIP.AUTO: 5 [PH]
PLATELET # BLD AUTO: 210 K/UL (ref 164–446)
PMV BLD AUTO: 9.9 FL (ref 9–12.9)
POTASSIUM SERPL-SCNC: 3.9 MMOL/L (ref 3.6–5.5)
PROT SERPL-MCNC: 7.1 G/DL (ref 6–8.2)
PROT UR QL STRIP: 100 MG/DL
PROTHROMBIN TIME: 14.5 SEC (ref 12–14.6)
RBC # BLD AUTO: 4.48 M/UL (ref 4.2–5.4)
RBC # URNS HPF: ABNORMAL /HPF
RBC UR QL AUTO: NEGATIVE
SODIUM SERPL-SCNC: 139 MMOL/L (ref 135–145)
SP GR UR STRIP.AUTO: 1.02
T4 FREE SERPL-MCNC: 1.06 NG/DL (ref 0.53–1.43)
TROPONIN I SERPL-MCNC: 64.25 NG/ML (ref 0–0.04)
TROPONIN I SERPL-MCNC: >50 NG/ML (ref 0–0.04)
TSH SERPL DL<=0.005 MIU/L-ACNC: 2.92 UIU/ML (ref 0.38–5.33)
UROBILINOGEN UR STRIP.AUTO-MCNC: 0.2 MG/DL
WBC # BLD AUTO: 13.4 K/UL (ref 4.8–10.8)
WBC #/AREA URNS HPF: ABNORMAL /HPF

## 2018-11-13 PROCEDURE — 36415 COLL VENOUS BLD VENIPUNCTURE: CPT

## 2018-11-13 PROCEDURE — 85025 COMPLETE CBC W/AUTO DIFF WBC: CPT

## 2018-11-13 PROCEDURE — 84443 ASSAY THYROID STIM HORMONE: CPT

## 2018-11-13 PROCEDURE — 85730 THROMBOPLASTIN TIME PARTIAL: CPT

## 2018-11-13 PROCEDURE — 71045 X-RAY EXAM CHEST 1 VIEW: CPT

## 2018-11-13 PROCEDURE — 93306 TTE W/DOPPLER COMPLETE: CPT | Mod: 26 | Performed by: INTERNAL MEDICINE

## 2018-11-13 PROCEDURE — 73502 X-RAY EXAM HIP UNI 2-3 VIEWS: CPT | Mod: LT

## 2018-11-13 PROCEDURE — 93005 ELECTROCARDIOGRAM TRACING: CPT | Performed by: INTERNAL MEDICINE

## 2018-11-13 PROCEDURE — 84484 ASSAY OF TROPONIN QUANT: CPT

## 2018-11-13 PROCEDURE — 93005 ELECTROCARDIOGRAM TRACING: CPT | Performed by: EMERGENCY MEDICINE

## 2018-11-13 PROCEDURE — 700102 HCHG RX REV CODE 250 W/ 637 OVERRIDE(OP): Performed by: INTERNAL MEDICINE

## 2018-11-13 PROCEDURE — 700105 HCHG RX REV CODE 258: Performed by: INTERNAL MEDICINE

## 2018-11-13 PROCEDURE — 73700 CT LOWER EXTREMITY W/O DYE: CPT | Mod: LT

## 2018-11-13 PROCEDURE — 93306 TTE W/DOPPLER COMPLETE: CPT

## 2018-11-13 PROCEDURE — 700105 HCHG RX REV CODE 258: Performed by: EMERGENCY MEDICINE

## 2018-11-13 PROCEDURE — 770022 HCHG ROOM/CARE - ICU (200)

## 2018-11-13 PROCEDURE — A9270 NON-COVERED ITEM OR SERVICE: HCPCS | Performed by: INTERNAL MEDICINE

## 2018-11-13 PROCEDURE — 80053 COMPREHEN METABOLIC PANEL: CPT

## 2018-11-13 PROCEDURE — 81001 URINALYSIS AUTO W/SCOPE: CPT

## 2018-11-13 PROCEDURE — 84439 ASSAY OF FREE THYROXINE: CPT

## 2018-11-13 PROCEDURE — 93010 ELECTROCARDIOGRAM REPORT: CPT | Performed by: INTERNAL MEDICINE

## 2018-11-13 PROCEDURE — 93010 ELECTROCARDIOGRAM REPORT: CPT | Mod: 77 | Performed by: INTERNAL MEDICINE

## 2018-11-13 PROCEDURE — 99291 CRITICAL CARE FIRST HOUR: CPT | Performed by: INTERNAL MEDICINE

## 2018-11-13 PROCEDURE — 700111 HCHG RX REV CODE 636 W/ 250 OVERRIDE (IP): Performed by: INTERNAL MEDICINE

## 2018-11-13 PROCEDURE — 99291 CRITICAL CARE FIRST HOUR: CPT

## 2018-11-13 PROCEDURE — 82550 ASSAY OF CK (CPK): CPT

## 2018-11-13 PROCEDURE — 83880 ASSAY OF NATRIURETIC PEPTIDE: CPT

## 2018-11-13 PROCEDURE — 99223 1ST HOSP IP/OBS HIGH 75: CPT | Performed by: INTERNAL MEDICINE

## 2018-11-13 PROCEDURE — 85610 PROTHROMBIN TIME: CPT

## 2018-11-13 PROCEDURE — 70450 CT HEAD/BRAIN W/O DYE: CPT

## 2018-11-13 RX ORDER — ACETAMINOPHEN 325 MG/1
650 TABLET ORAL EVERY 6 HOURS PRN
Status: DISCONTINUED | OUTPATIENT
Start: 2018-11-13 | End: 2018-11-17

## 2018-11-13 RX ORDER — ACETAMINOPHEN 500 MG
1000 TABLET ORAL EVERY 6 HOURS PRN
COMMUNITY

## 2018-11-13 RX ORDER — POLYETHYLENE GLYCOL 3350 17 G/17G
1 POWDER, FOR SOLUTION ORAL
Status: DISCONTINUED | OUTPATIENT
Start: 2018-11-13 | End: 2018-11-17

## 2018-11-13 RX ORDER — ERGOCALCIFEROL 1.25 MG/1
50000 CAPSULE ORAL
COMMUNITY

## 2018-11-13 RX ORDER — SODIUM CHLORIDE, SODIUM LACTATE, POTASSIUM CHLORIDE, CALCIUM CHLORIDE 600; 310; 30; 20 MG/100ML; MG/100ML; MG/100ML; MG/100ML
1000 INJECTION, SOLUTION INTRAVENOUS ONCE
Status: COMPLETED | OUTPATIENT
Start: 2018-11-13 | End: 2018-11-13

## 2018-11-13 RX ORDER — CLOPIDOGREL BISULFATE 75 MG/1
75 TABLET ORAL DAILY
Status: DISCONTINUED | OUTPATIENT
Start: 2018-11-14 | End: 2018-11-13

## 2018-11-13 RX ORDER — LISINOPRIL 5 MG/1
2.5 TABLET ORAL DAILY
Status: DISCONTINUED | OUTPATIENT
Start: 2018-11-14 | End: 2018-11-16

## 2018-11-13 RX ORDER — CLOPIDOGREL BISULFATE 75 MG/1
75 TABLET ORAL DAILY
Status: DISCONTINUED | OUTPATIENT
Start: 2018-11-13 | End: 2018-11-17

## 2018-11-13 RX ORDER — CLOPIDOGREL BISULFATE 75 MG/1
300 TABLET ORAL ONCE
Status: DISCONTINUED | OUTPATIENT
Start: 2018-11-13 | End: 2018-11-13

## 2018-11-13 RX ORDER — MORPHINE SULFATE 4 MG/ML
2-4 INJECTION, SOLUTION INTRAMUSCULAR; INTRAVENOUS
Status: DISCONTINUED | OUTPATIENT
Start: 2018-11-13 | End: 2018-11-17

## 2018-11-13 RX ORDER — ONDANSETRON 2 MG/ML
4 INJECTION INTRAMUSCULAR; INTRAVENOUS EVERY 4 HOURS PRN
Status: DISCONTINUED | OUTPATIENT
Start: 2018-11-13 | End: 2018-11-17

## 2018-11-13 RX ORDER — ONDANSETRON 4 MG/1
4 TABLET, ORALLY DISINTEGRATING ORAL EVERY 4 HOURS PRN
Status: DISCONTINUED | OUTPATIENT
Start: 2018-11-13 | End: 2018-11-17

## 2018-11-13 RX ORDER — LATANOPROST 50 UG/ML
1 SOLUTION/ DROPS OPHTHALMIC NIGHTLY
Status: DISCONTINUED | OUTPATIENT
Start: 2018-11-13 | End: 2018-11-17

## 2018-11-13 RX ORDER — ATORVASTATIN CALCIUM 80 MG/1
80 TABLET, FILM COATED ORAL EVERY EVENING
Status: DISCONTINUED | OUTPATIENT
Start: 2018-11-13 | End: 2018-11-17

## 2018-11-13 RX ORDER — SODIUM CHLORIDE 9 MG/ML
INJECTION, SOLUTION INTRAVENOUS CONTINUOUS
Status: DISCONTINUED | OUTPATIENT
Start: 2018-11-13 | End: 2018-11-15

## 2018-11-13 RX ORDER — SODIUM CHLORIDE 9 MG/ML
INJECTION, SOLUTION INTRAVENOUS CONTINUOUS
Status: DISCONTINUED | OUTPATIENT
Start: 2018-11-13 | End: 2018-11-13

## 2018-11-13 RX ORDER — HEPARIN SODIUM 1000 [USP'U]/ML
1800 INJECTION, SOLUTION INTRAVENOUS; SUBCUTANEOUS PRN
Status: DISCONTINUED | OUTPATIENT
Start: 2018-11-13 | End: 2018-11-16

## 2018-11-13 RX ORDER — AMOXICILLIN 250 MG
2 CAPSULE ORAL 2 TIMES DAILY
Status: DISCONTINUED | OUTPATIENT
Start: 2018-11-13 | End: 2018-11-17

## 2018-11-13 RX ORDER — ASPIRIN 300 MG/1
300 SUPPOSITORY RECTAL DAILY
Status: DISCONTINUED | OUTPATIENT
Start: 2018-11-13 | End: 2018-11-13

## 2018-11-13 RX ORDER — BISACODYL 10 MG
10 SUPPOSITORY, RECTAL RECTAL
Status: DISCONTINUED | OUTPATIENT
Start: 2018-11-13 | End: 2018-11-17

## 2018-11-13 RX ORDER — ASPIRIN 325 MG
325 TABLET ORAL DAILY
Status: DISCONTINUED | OUTPATIENT
Start: 2018-11-13 | End: 2018-11-13

## 2018-11-13 RX ORDER — CARVEDILOL 3.12 MG/1
3.12 TABLET ORAL 2 TIMES DAILY WITH MEALS
Status: DISCONTINUED | OUTPATIENT
Start: 2018-11-14 | End: 2018-11-16

## 2018-11-13 RX ORDER — NITROGLYCERIN 0.4 MG/1
0.4 TABLET SUBLINGUAL
Status: DISCONTINUED | OUTPATIENT
Start: 2018-11-13 | End: 2018-11-17

## 2018-11-13 RX ORDER — ASPIRIN 81 MG/1
324 TABLET, CHEWABLE ORAL DAILY
Status: DISCONTINUED | OUTPATIENT
Start: 2018-11-13 | End: 2018-11-13

## 2018-11-13 RX ORDER — CARVEDILOL 6.25 MG/1
6.25 TABLET ORAL 2 TIMES DAILY WITH MEALS
Status: DISCONTINUED | OUTPATIENT
Start: 2018-11-13 | End: 2018-11-13

## 2018-11-13 RX ADMIN — GLUCAGON HYDROCHLORIDE 1 MG: 1 INJECTION, POWDER, FOR SOLUTION INTRAMUSCULAR; INTRAVENOUS; SUBCUTANEOUS at 22:02

## 2018-11-13 RX ADMIN — HEPARIN SODIUM 750 UNITS/HR: 5000 INJECTION, SOLUTION INTRAVENOUS at 18:34

## 2018-11-13 RX ADMIN — SODIUM CHLORIDE: 9 INJECTION, SOLUTION INTRAVENOUS at 17:45

## 2018-11-13 RX ADMIN — STANDARDIZED SENNA CONCENTRATE AND DOCUSATE SODIUM 2 TABLET: 8.6; 5 TABLET, FILM COATED ORAL at 17:52

## 2018-11-13 RX ADMIN — LATANOPROST 1 DROP: 50 SOLUTION OPHTHALMIC at 20:51

## 2018-11-13 RX ADMIN — CLOPIDOGREL 75 MG: 75 TABLET, FILM COATED ORAL at 17:54

## 2018-11-13 RX ADMIN — ATORVASTATIN CALCIUM 80 MG: 80 TABLET, FILM COATED ORAL at 17:53

## 2018-11-13 RX ADMIN — CARVEDILOL 6.25 MG: 6.25 TABLET, FILM COATED ORAL at 17:52

## 2018-11-13 RX ADMIN — SODIUM CHLORIDE: 9 INJECTION, SOLUTION INTRAVENOUS at 13:45

## 2018-11-13 RX ADMIN — SODIUM CHLORIDE, POTASSIUM CHLORIDE, SODIUM LACTATE AND CALCIUM CHLORIDE 1000 ML: 600; 310; 30; 20 INJECTION, SOLUTION INTRAVENOUS at 21:22

## 2018-11-13 ASSESSMENT — ENCOUNTER SYMPTOMS
COLOR CHANGE: 0
EYE PAIN: 0
MYALGIAS: 0
ARTHRALGIAS: 0
HEADACHES: 0
HEARTBURN: 0
SPUTUM PRODUCTION: 0
EYE REDNESS: 0
PHOTOPHOBIA: 0
INSOMNIA: 0
CHEST TIGHTNESS: 0
WEAKNESS: 1
WHEEZING: 0
DEPRESSION: 0
DIZZINESS: 0
DIAPHORESIS: 0
CHILLS: 0
STRIDOR: 0
BLURRED VISION: 0
CONFUSION: 0
FALLS: 1
BRUISES/BLEEDS EASILY: 0
SPEECH DIFFICULTY: 0
SEIZURES: 0
ABDOMINAL PAIN: 0
NAUSEA: 0
ABDOMINAL DISTENTION: 0
NECK PAIN: 0
FEVER: 0
FATIGUE: 0
DIARRHEA: 0
AGITATION: 0
FOCAL WEAKNESS: 0
TROUBLE SWALLOWING: 0
VOMITING: 0
COUGH: 0
ACTIVITY CHANGE: 1
ADENOPATHY: 0
EYE DISCHARGE: 0
SHORTNESS OF BREATH: 0
PALPITATIONS: 0
WEIGHT LOSS: 0
ORTHOPNEA: 0
BACK PAIN: 0
NERVOUS/ANXIOUS: 0

## 2018-11-13 ASSESSMENT — PAIN SCALES - GENERAL
PAINLEVEL_OUTOF10: 0

## 2018-11-13 ASSESSMENT — COPD QUESTIONNAIRES
DURING THE PAST 4 WEEKS HOW MUCH DID YOU FEEL SHORT OF BREATH: SOME OF THE TIME
DO YOU EVER COUGH UP ANY MUCUS OR PHLEGM?: NO/ONLY WITH OCCASIONAL COLDS OR INFECTIONS
HAVE YOU SMOKED AT LEAST 100 CIGARETTES IN YOUR ENTIRE LIFE: NO/DON'T KNOW
COPD SCREENING SCORE: 4

## 2018-11-13 ASSESSMENT — LIFESTYLE VARIABLES: EVER_SMOKED: NEVER

## 2018-11-13 NOTE — CONSULTS
Cardiology Consult Note:    Benny Ogden  Date & Time note created:    11/13/2018   2:25 PM     Referring MD:  Dr. Gansert    Patient ID:   Name:             Nora Hope   YOB: 1925  Age:                 89 y.o.  female   MRN:               [id number]                                                             Reason for Consult:      STEMI    History of Present Illness:    89-year-old female the past medical history of hypertension who takes medication but cannot remember the name of this.  She does not take any other medications.  She was in her normal state of health at home when she had a ground-level fall last night approximately 9 PM.  She says about noon today she was found down in her home after yelling for help.  She was brought in for further evaluation.  The fall was not related to syncope chest pain but is simply weakness peripherally.  She was brought in for this reason had an ECG which showed normal sinus rhythm with 2 mm ST elevation in inferior leads.  She denied chest pain pressure heartburn jaw pain neck pain.  For this reason a stat bedside echocardiogram was obtained that showed some mild hypokinesis of the inferior wall with a hinge point if the apical inferior wall.  There were no significant valvular abnormalities.  Reviewing her prior labs she does appear to have a history of chronic kidney disease with a creatinine which ranges from 1.2 with 8 all the way to 1.52 giving her an estimated GFR of anywhere from 39-32.  She denies a past medical history of chronic kidney disease.  Because of a ground level fall a hip x-ray was obtained that showed a old left femur fracture but no new processes present.  Her chest x-ray was normal.  She denied chest pain pressure shortness of breath or PND.  She does not have an advance directive on file.  She does not have a living will.  Her next of kin is not available to discuss her medical care.  She is competent to discuss her  current condition.  We discussed her wishes as well as her age.  We have also discussed the possible medical treatments that we could be having over the next couple of days per    Review of Systems:      Constitutional: Denies fevers, Denies weight changes  Eyes: Denies changes in vision, no eye pain  Ears/Nose/Throat/Mouth: Denies nasal congestion or sore throat   Cardiovascular: no chest pain, no palpitations   Respiratory: no shortness of breath , Denies cough  Gastrointestinal/Hepatic: Denies abdominal pain, nausea, vomiting, diarrhea, constipation or GI bleeding   Genitourinary: Denies dysuria or frequency  Musculoskeletal/Rheum: Denies  joint pain and swelling, no edema  Skin: Denies rash  Neurological: Denies headache, confusion, memory loss or focal weakness/parasthesias  Psychiatric: denies mood disorder   Endocrine: Melyssa thyroid problems  Heme/Oncology/Lymph Nodes: Denies enlarged lymph nodes, denies brusing or known bleeding disorder  All other systems were reviewed and are negative (AMA/CMS criteria)                Past Medical History:   Past Medical History:   Diagnosis Date   • Arthritis    • Cancer (HCC) 2009    eye   • Essential hypertension 10/26/2017   • Glaucoma      There are no active hospital problems to display for this patient.      Past Surgical History:  Past Surgical History:   Procedure Laterality Date   • EYE SURGERY      eye cancer 2009   • OTHER ORTHOPEDIC SURGERY     • SINUS TREPHINE FRONTAL      windows       Hospital Medications:  Current Facility-Administered Medications   Medication Dose   • NS infusion       Current Outpatient Prescriptions   Medication   • lisinopril (PRINIVIL) 2.5 MG Tab   • ergocalciferol (DRISDOL) 91518 UNIT capsule   • latanoprost (XALATAN) 0.005 % Solution   • artificial tear ointment (REFRESH,LACRI-LUBE) Ointment   • Multiple Vitamins-Minerals (MULTIVITAMIN PO)         Current Outpatient Medications:    (Not in a hospital admission)    Medication  "Allergy:  Allergies   Allergen Reactions   • Allopurinol Rash     Hot, red face   • Aspirin      Cannot take due to medications   • Codeine Itching and Nausea   • Latex Rash     To tape/bandages containing latex   • Tape Rash     adhessive in the tape, causes, swollen rash           Family History:  Family History   Problem Relation Age of Onset   • Cancer Mother        Social History:  Social History     Social History   • Marital status: Single     Spouse name: N/A   • Number of children: N/A   • Years of education: N/A     Occupational History   • Not on file.     Social History Main Topics   • Smoking status: Never Smoker   • Smokeless tobacco: Never Used   • Alcohol use No   • Drug use: No   • Sexual activity: Not Currently     Other Topics Concern   • Not on file     Social History Narrative   • No narrative on file         Physical Exam:  Vitals/ General Appearance:   Weight/BMI: Body mass index is 15.08 kg/m².  Blood pressure 112/67, pulse 68, temperature 36.3 °C (97.3 °F), resp. rate 16, height 1.727 m (5' 8\"), weight 45 kg (99 lb 3.3 oz), not currently breastfeeding.  Vitals:    11/13/18 1246 11/13/18 1247   BP:  112/67   Pulse:  68   Resp:  16   Temp:  36.3 °C (97.3 °F)   Weight: 45 kg (99 lb 3.3 oz)    Height: 1.727 m (5' 8\")      Oxygen Therapy:       Constitutional:   Well developed, Well nourished, No acute distress  HENMT:  Normocephalic, Atraumatic, Oropharynx moist mucous membranes, No oral exudates, Nose normal.  No thyromegaly.  Eyes:  EOMI, Conjunctiva normal, No discharge.  Neck:  Normal range of motion, No cervical tenderness,  no JVD.  Cardiovascular:  Normal heart rate, Normal rhythm, No murmurs, No rubs, No gallops.   Extremitites with intact distal pulses, no cyanosis, or edema.  Lungs:  Normal breath sounds, breath sounds clear to auscultation bilaterally,  no crackles, no wheezing.   Abdomen: Bowel sounds normal, Soft, No tenderness, No guarding, No rebound, No masses, No " hepatosplenomegaly.  Skin: Warm, Dry, No erythema, No rash, no induration.  Neurologic: Alert & oriented x 3, No focal deficits noted, cranial nerves II through X are grossly intact.  Psychiatric: Affect normal, Judgment normal, Mood normal.      MDM (Data Review):     Records reviewed and summarized in current documentation    Lab Data Review:  Recent Results (from the past 24 hour(s))   EKG - STAT    Collection Time: 18  1:33 PM   Result Value Ref Range    Report       [facility] Emergency Dept[facility]    Test Date:  2018  Pt Name:    DIAN RAMOS               Department: ER  MRN:        [id number]                      Room:       [facility]  Gender:     Female                       Technician: [id number]  :        [id number]                   Requested By:GUY G GANSERT  Order #:    [id number]                    Reading MD:    Measurements  Intervals                                Axis  Rate:       88                           P:          61  LA:         180                          QRS:        85  QRSD:       86                           T:          76  QT:         392  QTc:        475    Interpretive Statements  SINUS RHYTHM  CONSIDER RVH W/ SECONDARY REPOL ABNORMALITY  INFERIOR INFARCT, ACUTE  Compared to ECG 07/10/2018 17:20:23  Myocardial infarct finding now present  Right bundle-branch block no longer present     CBC w/ Differential    Collection Time: 18  1:45 PM   Result Value Ref Range    WBC 13.4 (H) 4.8 - 10.8 K/uL    RBC 4.48 4.20 - 5.40 M/uL    Hemoglobin 13.5 12.0 - 16.0 g/dL    Hematocrit 41.5 37.0 - 47.0 %    MCV 92.6 81.4 - 97.8 fL    MCH 30.1 27.0 - 33.0 pg    MCHC 32.5 (L) 33.6 - 35.0 g/dL    RDW 47.0 35.9 - 50.0 fL    Platelet Count 210 164 - 446 K/uL    MPV 9.9 9.0 - 12.9 fL    Neutrophils-Polys 73.00 (H) 44.00 - 72.00 %    Lymphocytes 14.00 (L) 22.00 - 41.00 %    Monocytes 12.00 0.00 - 13.40 %    Eosinophils 0.10 0.00 - 6.90 %    Basophils 0.50 0.00 - 1.80 %     Immature Granulocytes 0.40 0.00 - 0.90 %    Nucleated RBC 0.00 /100 WBC    Neutrophils (Absolute) 9.81 (H) 2.00 - 7.15 K/uL    Lymphs (Absolute) 1.88 1.00 - 4.80 K/uL    Monos (Absolute) 1.61 (H) 0.00 - 0.85 K/uL    Eos (Absolute) 0.01 0.00 - 0.51 K/uL    Baso (Absolute) 0.07 0.00 - 0.12 K/uL    Immature Granulocytes (abs) 0.05 0.00 - 0.11 K/uL    NRBC (Absolute) 0.00 K/uL   Complete Metabolic Panel (CMP)    Collection Time: 11/13/18  1:45 PM   Result Value Ref Range    Sodium 139 135 - 145 mmol/L    Potassium 3.9 3.6 - 5.5 mmol/L    Chloride 105 96 - 112 mmol/L    Co2 22 20 - 33 mmol/L    Anion Gap 12.0 (H) 0.0 - 11.9    Glucose 102 (H) 65 - 99 mg/dL    Bun 44 (H) 8 - 22 mg/dL    Creatinine 1.52 (H) 0.50 - 1.40 mg/dL    Calcium 9.5 8.5 - 10.5 mg/dL    AST(SGOT) 198 (H) 12 - 45 U/L    ALT(SGPT) 47 2 - 50 U/L    Alkaline Phosphatase 89 30 - 99 U/L    Total Bilirubin 0.9 0.1 - 1.5 mg/dL    Albumin 3.7 3.2 - 4.9 g/dL    Total Protein 7.1 6.0 - 8.2 g/dL    Globulin 3.4 1.9 - 3.5 g/dL    A-G Ratio 1.1 g/dL   APTT    Collection Time: 11/13/18  1:45 PM   Result Value Ref Range    APTT 27.2 24.7 - 36.0 sec   PT/INR    Collection Time: 11/13/18  1:45 PM   Result Value Ref Range    PT 14.5 12.0 - 14.6 sec    INR 1.12 0.87 - 1.13   ESTIMATED GFR    Collection Time: 11/13/18  1:45 PM   Result Value Ref Range    GFR If  39 (A) >60 mL/min/1.73 m 2    GFR If Non  32 (A) >60 mL/min/1.73 m 2       Imaging/Procedures Review:    Chest Xray:  Reviewed    EKG:   Personally viewed by myself showing normal sinus rhythm with an inferior infarct pattern Q waves in leads II, III and aVF.    ECHO:  Performed stat at bedside and personally viewed by myself showing no significant valvular is normal LV systolic function with a possible mild hypokinesis of the inferior wall.    MDM (Assessment and Plan):     There are no active hospital problems to display for this patient.    89-year-old female with a possible  inferior STEMI with no symptoms and a mildly abnormal echocardiogram.  I have discussed with her the risks benefits and alternatives of proceeding to invasive angiogram.  Given her current estimated GFR of 32 there is a significant risk of kidney disease and contrast induced nephropathy.  Furthermore given her age and comorbidities there is a high risk of bleeding stroke infection or further heart attack with a highly calcified lesions present in her coronaries making coronary intervention problematic at best.  At this point she would like to hold off on invasive approach to her STEMI.  Furthermore given her age and comorbidities I do not feel that a thrombolytic would be low enough risk.  It is possible that she could be having an atypical presentation of a stress-induced cardiomyopathy.  However this is a diagnosis of exclusion not knowing what her coronary arteries look like.  She reiterated numerous times that she is 89 years of age and is not going to live forever and if she comes to this end STEMI that is acceptable.    We will admit her to the ICU and started on aspirin and heparin.  We will send a stat troponin.  We will continue to follow.    1. Inferior STEMI    - asa    - stat trop    - heparin drip    - CCU admission    Thank for you allowing me to take part in your patient's care, please call should you have any questions or would like to discuss this patient.    Addendum: Initial troponin returned Positive greater than 50.  A stat CPK level was ordered which was only elevated at 1158.  I went over the results of all testing with the patient who again declines intervention.  I further discussed the case with the ER attending.  We both agree the patient is a poor candidate for TPA as well.  She will be transported to the ICU on a heparin drip as well as aspirin.  The severity of illness and the high risk of decompensation was discussed with the patient as well as the risk of death.  However she again  would have declined intervention given that she does not want any procedures performed.  She kept quoting again that she was 89 years of age and would not live forever.      This patient is critically ill with a life threatening illness as noted above requiring my direct presence, involvement and maximal preparedness. I have personally spend 35 minutes providing critical care to this patient. Time spend on critical care excludes time spend on procedures.

## 2018-11-13 NOTE — ED TRIAGE NOTES
"Patient presents to ED via EMS from her home where she states she fell last night as she was trying to get into bed. Patient states she was on the floor all night until one of her neighbors came to check on her and called EMS. Patient states she was on her left side while on the floor. States left hip pain that doesn't hurt \"when I sit still\"  "

## 2018-11-13 NOTE — ED PROVIDER NOTES
ED Provider Note    CHIEF COMPLAINT  Chief Complaint   Patient presents with   • GLF       HPI  Nora Hope is a 89 y.o. female who presents for evaluation after ground-level fall.  The patient states she fell last night and was unable to get up secondary to pain in the left hip.  Patient states she laid on the ground all night.  Patient was brought in by EMS for evaluation.  She indicates that when she is lying still she is not having pain but any attempted ambulation will cause severe pain.  Patient denies: Head pain, head trauma, neck pain, back pain, rib pain, difficult breathing, abdominal pain, other extremity pain or trauma.  She denies any recent illness including: Fever, chills, URI symptoms, cardiorespiratory symptoms, gastrointestinal symptoms.  Patient had no symptoms to suggest syncope or seizure activity.  No other acute symptomatology or complaints.    REVIEW OF SYSTEMS  See HPI for further details. All other systems negative.    PAST MEDICAL HISTORY  Past Medical History:   Diagnosis Date   • Arthritis    • Cancer (HCC) 2009    eye   • Essential hypertension 10/26/2017   • Glaucoma        FAMILY HISTORY  Family History   Problem Relation Age of Onset   • Cancer Mother        SOCIAL HISTORY  Patient indicates she lives alone in an apartment;    SURGICAL HISTORY  Past Surgical History:   Procedure Laterality Date   • EYE SURGERY      eye cancer 2009   • OTHER ORTHOPEDIC SURGERY     • SINUS TREPHINE FRONTAL      windows       CURRENT MEDICATIONS  Home Medications     Reviewed by Daxa Santana, R.N. (Registered Nurse) on 11/13/18 at 1248  Med List Status: Partial   Medication Last Dose Status   artificial tear ointment (REFRESH,LACRI-LUBE) Ointment unknown Active   ergocalciferol (DRISDOL) 92146 UNIT capsule unknown Active   latanoprost (XALATAN) 0.005 % Solution unknown Active   lisinopril (PRINIVIL) 2.5 MG Tab unknown Active   Multiple Vitamins-Minerals (MULTIVITAMIN PO) unknown Active           "      ALLERGIES  Allergies   Allergen Reactions   • Allopurinol Rash     Hot, red face   • Aspirin      Cannot take due to medications   • Codeine Itching and Nausea   • Latex Rash     To tape/bandages containing latex   • Tape Rash     adhessive in the tape, causes, swollen rash           PHYSICAL EXAM  VITAL SIGNS: /67   Pulse 68   Temp 36.3 °C (97.3 °F)   Resp 16   Ht 1.727 m (5' 8\")   Wt 45 kg (99 lb 3.3 oz)   BMI 15.08 kg/m²    Constitutional: 89-year-old female, awake, oriented x3  HENT: ,Atraumatic, Bilateral external ears normal, tympanic membranes clear, Oropharynx: Mildly dry, No oral exudates, Nose normal.   Eyes: PERRL, EOMI, Conjunctiva normal, No discharge.   Neck: Normal range of motion, No tenderness, Supple, No stridor.   Lymphatic: No lymphadenopathy noted.   Cardiovascular: Normal heart rate, Normal rhythm, No murmurs, No rubs, No gallops.   Thorax & Lungs: Normal Equal breath sounds, No respiratory distress, No wheezing, no stridor, no rales. No chest tenderness.   Abdomen: Soft, nontender, nondistended, no organomegaly, positive bowel sounds normal in quality. No guarding or rebound.  Skin: Decreased skin turgor, pink, warm, dry. No rashes, petechiae, purpura. Normal capillary refill.   Back: No tenderness, No CVA tenderness.   Extremities: Intact distal pulses, No edema, No tenderness, No cyanosis, No clubbing. Vascular: Pulses are 2+, symmetric in the upper and lower extremities.  Musculoskeletal: Diffuse arthritic changes with diffuse muscular atrophy; no tenderness to the upper extremities or right lower extremity; left lower extremity reveals tenderness to the right lateral hip area but no obvious deformity or shortening identified with no tenderness to the left knee leg ankle or foot; motor, sensory, vascular intact in the left lower extremity in all extremities;  Neurologic: Alert & oriented x 3, Normal motor function, Normal sensory function, No gross focal deficits noted. "   Psychiatric: Affect normal, Judgment normal, Mood normal.     EKG  I have interpreted: Rate 90, rhythm sinus, axis normal, CT QRS QT intervals normal, acute ST elevation in lead II, 3, aVF, this is a change compared to previous tracing;    RADIOLOGY/PROCEDURES  EC-ECHOCARDIOGRAM COMPLETE W/O CONT         DX-HIP-COMPLETE - UNILATERAL 2+ LEFT   Final Result      1.  No definite acute osseous abnormality. In setting of demineralization, an occult fracture is difficult to exclude. If there is strong clinical suspicion, CT or MRI could be obtained for further evaluation.   2.  Postsurgical changes of the left femur with broken screw fragment redemonstrated.      DX-CHEST-PORTABLE (1 VIEW)   Final Result      No acute cardiopulmonary process is seen.      Atherosclerotic plaque.      CT-HIP W/O PLUS RECONS LEFT    (Results Pending)         COURSE & MEDICAL DECISION MAKING  Pertinent Labs & Imaging studies reviewed. (See chart for details)  1.  Monitor  2.  IV normal saline; IV fluids administer for clinical dehydration as well as the patient requiring n.p.o. Status; reevaluation revealed stable condition;    Laboratory studies: CBC shows white count of 13.4, 73% neutrophils, 14% lymphocytes, 12% monocytes, hemoglobin 13.5, hematocrit 41.5; coags within normal; CMP shows a BUN of 44, creatinine 1.52,  otherwise within normal; CPK total 1158; troponin greater than 50; ;    Discussion/consultation: At this time, the patient presents after ground-level fall last night.  She does not describe any symptoms to suggest a syncopal episode.  The patient has been having no chest pain or any tightness in the neck jaw or arm or back or associated shortness of breath.  Patient was noted to have an EKG which showed an acute ST elevation myocardial infarction in leads II, 3, aVF.  I emergently contacted cardiology who evaluated the patient and a stat echocardiogram was performed.  After discussion the patient did not want  any intervention performed.  The patient will be treated medically.  In addition, the patient underwent a workup for her fall which shows no evidence of fracture on plain radiographs and a CT will be obtained also.  I spent 30 minutes bedside attendance evaluated the patient, reassessing the patient, reviewing laboratory and imaging studies, speaking with consultants, speaking with the patient.  Patient will be admitted for further monitoring, treatment, and care.    FINAL IMPRESSION  1. Acute ST elevation myocardial infarction (STEMI), unspecified artery (HCC)    2. Contusion of left hip, initial encounter    3. Renal insufficiency    4. Do not resuscitate    5.     Critical care time, 30 minutes       PLAN  1.  The patient will be admitted for further monitoring, treatment, and care.    Electronically signed by: Guy G Gansert, 11/13/2018 1:11 PM

## 2018-11-13 NOTE — ED NOTES
Med rec updated and complete  Allergies reviewed  Pt was not sure when she took her medications last.  Called [facility] @ [phone] to verify pts medications.  Per pt and pharmacy reports no antibiotics in the last 30 days.  Pt reports that she tries to remember when she took her VITAMIN D 50,000, but is not sure when she took it last or what day she takes it on.

## 2018-11-13 NOTE — ED NOTES
pepito from Lab called with critical result of troponin of 70.63 at 1428. Critical lab result read back to pepito.   Dr. gansert notified of critical lab result at 1448.  Critical lab result read back by Dr. gansert.

## 2018-11-13 NOTE — H&P
Hospital Medicine History and Physical      Date of Service  11/13/2018    Chief Complaint  Chief Complaint   Patient presents with   • GLF       History of Presenting Illness  Herminia is a 89 y.o. female PMH of essential hypertension, who presents with ground-level fall last night around 9 PM.  She stated that she has a bad left hip and fell because of that.  The patient denied palpitation, dizziness, syncope episode.  She presented to ER today and unfortunately she was found to have acute ST elevation myocardial infarction with troponin over 50.  Because of that cardiology was consulted in ER.  She stated that she has one episode of chest pain last night over the sternum area, lasted for a few minutes and it went away on its own.  Currently the patient denies any chest pain.  She was explained in detail about her medical condition and she stated that she does not want any aggressive intervention including angiogram and wanted to be treated only medically.  I also had a long discussion with the patient regarding her CODE STATUS and she does not want to be intubated or had CPR.  She was started on heparin drip in ER and she will be admitted to cardiac intensive care unit in critical condition.    Primary Care Physician  Kori Cali M.D.      Code Status  Full code    Review of Systems  Review of Systems   Constitutional: Negative for chills, fever and weight loss.   HENT: Negative for congestion and nosebleeds.    Eyes: Negative for blurred vision, pain, discharge and redness.   Respiratory: Negative for cough, sputum production, shortness of breath and stridor.    Cardiovascular: Positive for chest pain. Negative for palpitations and orthopnea.   Gastrointestinal: Negative for abdominal pain, diarrhea, heartburn, nausea and vomiting.   Genitourinary: Negative for dysuria, frequency and urgency.   Musculoskeletal: Positive for falls. Negative for back pain, myalgias and neck pain.   Skin: Negative for itching  and rash.   Neurological: Negative for dizziness, focal weakness, seizures and headaches.   Psychiatric/Behavioral: Negative for depression. The patient is not nervous/anxious and does not have insomnia.      Please see HPI, all other systems were reviewed and are negative (AMA/CMS criteria)     Past Medical History  Past Medical History:   Diagnosis Date   • Essential hypertension 10/26/2017   • Cancer (HCC)     eye   • Arthritis    • Glaucoma        Surgical History  Past Surgical History:   Procedure Laterality Date   • EYE SURGERY      eye cancer    • OTHER ORTHOPEDIC SURGERY     • SINUS TREPHINE FRONTAL      windows       Medications  No current facility-administered medications on file prior to encounter.      Current Outpatient Prescriptions on File Prior to Encounter   Medication Sig Dispense Refill   • lisinopril (PRINIVIL) 2.5 MG Tab Take 1 Tab by mouth every day. 90 Tab 3   • latanoprost (XALATAN) 0.005 % Solution Place 1 Drop in both eyes every evening.     • artificial tear ointment (REFRESH,LACRI-LUBE) Ointment Place 1 Application in both eyes as needed (For Dry eye).     • Multiple Vitamins-Minerals (MULTIVITAMIN PO) Take 1 Tab by mouth every day.       Family History  Family History   Problem Relation Age of Onset   • Cancer Mother          Social History  Social History   Substance Use Topics   • Smoking status: Never Smoker   • Smokeless tobacco: Never Used   • Alcohol use No       Allergies  Allergies   Allergen Reactions   • Allopurinol Rash     Hot, red face   • Aspirin      Cannot take due to medications   • Codeine Itching and Nausea   • Latex Rash     To tape/bandages containing latex   • Tape Rash     adhessive in the tape, causes, swollen rash            Physical Exam  Laboratory   Hemodynamics  Temp (24hrs), Av.3 °C (97.3 °F), Min:36.3 °C (97.3 °F), Max:36.3 °C (97.3 °F)   Temperature: 36.3 °C (97.3 °F)  Pulse  Av  Min: 68  Max: 68    Blood Pressure : 112/67       Respiratory      Respiration: 16             Physical Exam   Constitutional: She is oriented to person, place, and time. No distress.   HENT:   Head: Normocephalic and atraumatic.   Mouth/Throat: Oropharynx is clear and moist.   Eyes: Pupils are equal, round, and reactive to light. Conjunctivae and EOM are normal.   Neck: Normal range of motion. Neck supple. No tracheal deviation present. No thyromegaly present.   Cardiovascular: Normal rate and regular rhythm.    No murmur heard.  Pulmonary/Chest: Effort normal and breath sounds normal. No respiratory distress. She has no wheezes.   Abdominal: Soft. Bowel sounds are normal. She exhibits no distension. There is no tenderness.   Musculoskeletal: She exhibits no edema or tenderness.   Neurological: She is alert and oriented to person, place, and time. No cranial nerve deficit.   Skin: Skin is warm and dry. She is not diaphoretic. No erythema.   Psychiatric: She has a normal mood and affect. Her behavior is normal. Thought content normal.       Recent Labs      11/13/18   1345   WBC  13.4*   RBC  4.48   HEMOGLOBIN  13.5   HEMATOCRIT  41.5   MCV  92.6   MCH  30.1   MCHC  32.5*   RDW  47.0   PLATELETCT  210   MPV  9.9     Recent Labs      11/13/18   1345   SODIUM  139   POTASSIUM  3.9   CHLORIDE  105   CO2  22   GLUCOSE  102*   BUN  44*   CREATININE  1.52*   CALCIUM  9.5     Recent Labs      11/13/18   1345   ALTSGPT  47   ASTSGOT  198*   ALKPHOSPHAT  89   TBILIRUBIN  0.9   GLUCOSE  102*     Recent Labs      11/13/18   1345   APTT  27.2   INR  1.12     Recent Labs      11/13/18   1345   BNPBTYPENAT  690*         Lab Results   Component Value Date    TROPONINI >50.00 (H) 11/13/2018       Imaging  EC-ECHOCARDIOGRAM COMPLETE W/O CONT         DX-HIP-COMPLETE - UNILATERAL 2+ LEFT   Final Result      1.  No definite acute osseous abnormality. In setting of demineralization, an occult fracture is difficult to exclude. If there is strong clinical suspicion, CT or MRI could  be obtained for further evaluation.   2.  Postsurgical changes of the left femur with broken screw fragment redemonstrated.      DX-CHEST-PORTABLE (1 VIEW)   Final Result      No acute cardiopulmonary process is seen.      Atherosclerotic plaque.      CT-HIP W/O PLUS RECONS LEFT    (Results Pending)     EKG  per my independant read:  QTc: 475, HR: 88, ST elevation in inferior leads     Assessment/Plan     I anticipate this patient will require at least two midnights for appropriate medical management, necessitating inpatient admission.    ST elevation myocardial infarction involving right coronary artery (HCC)- (present on admission)   Assessment & Plan    Started on heparin infusion  Monitor on telemetry closely  The patient does not want any aggressive intervention including angiogram  We will try to manage medically  Started on aspirin, statin  Check echocardiogram  Cardiology on     Advanced directives, counseling/discussion- (present on admission)   Assessment & Plan    The patient wanted to be DNR and DNI  Billable code 62633     Chronic kidney disease (CKD), stage III (moderate) (HCC)- (present on admission)   Assessment & Plan    Stable creatinine     Elevated brain natriuretic peptide (BNP) level- (present on admission)   Assessment & Plan    No signs of fluid overload  Echocardiogram showed LVEF 50%     Leukocytosis- (present on admission)   Assessment & Plan    Likely reactive     At risk for falls- (present on admission)   Assessment & Plan    Patient had a fall last night  PT and OT  Fall precaution     Critical care time spent 45 minutes without overlap, excluding procedure time.    Prophylaxis:  heparin drip

## 2018-11-14 ENCOUNTER — APPOINTMENT (OUTPATIENT)
Dept: RADIOLOGY | Facility: MEDICAL CENTER | Age: 83
DRG: 280 | End: 2018-11-14
Attending: INTERNAL MEDICINE
Payer: MEDICARE

## 2018-11-14 LAB
ALBUMIN SERPL BCP-MCNC: 2.8 G/DL (ref 3.2–4.9)
ALBUMIN SERPL BCP-MCNC: 2.9 G/DL (ref 3.2–4.9)
ALBUMIN/GLOB SERPL: 1 G/DL
ALBUMIN/GLOB SERPL: 1.1 G/DL
ALP SERPL-CCNC: 80 U/L (ref 30–99)
ALP SERPL-CCNC: 86 U/L (ref 30–99)
ALT SERPL-CCNC: 37 U/L (ref 2–50)
ALT SERPL-CCNC: 46 U/L (ref 2–50)
ANION GAP SERPL CALC-SCNC: 10 MMOL/L (ref 0–11.9)
ANION GAP SERPL CALC-SCNC: 7 MMOL/L (ref 0–11.9)
APTT PPP: 112.6 SEC (ref 24.7–36)
APTT PPP: 63.9 SEC (ref 24.7–36)
APTT PPP: 71.3 SEC (ref 24.7–36)
APTT PPP: 73.3 SEC (ref 24.7–36)
AST SERPL-CCNC: 117 U/L (ref 12–45)
AST SERPL-CCNC: 128 U/L (ref 12–45)
BASOPHILS # BLD AUTO: 0.4 % (ref 0–1.8)
BASOPHILS # BLD: 0.06 K/UL (ref 0–0.12)
BILIRUB SERPL-MCNC: 0.6 MG/DL (ref 0.1–1.5)
BILIRUB SERPL-MCNC: 0.7 MG/DL (ref 0.1–1.5)
BNP SERPL-MCNC: 604 PG/ML (ref 0–100)
BUN SERPL-MCNC: 43 MG/DL (ref 8–22)
BUN SERPL-MCNC: 47 MG/DL (ref 8–22)
CALCIUM SERPL-MCNC: 8 MG/DL (ref 8.5–10.5)
CALCIUM SERPL-MCNC: 8.1 MG/DL (ref 8.5–10.5)
CHLORIDE SERPL-SCNC: 109 MMOL/L (ref 96–112)
CHLORIDE SERPL-SCNC: 110 MMOL/L (ref 96–112)
CK SERPL-CCNC: 653 U/L (ref 0–154)
CO2 SERPL-SCNC: 17 MMOL/L (ref 20–33)
CO2 SERPL-SCNC: 20 MMOL/L (ref 20–33)
CREAT SERPL-MCNC: 1.39 MG/DL (ref 0.5–1.4)
CREAT SERPL-MCNC: 1.4 MG/DL (ref 0.5–1.4)
EKG IMPRESSION: NORMAL
EOSINOPHIL # BLD AUTO: 0.07 K/UL (ref 0–0.51)
EOSINOPHIL NFR BLD: 0.5 % (ref 0–6.9)
ERYTHROCYTE [DISTWIDTH] IN BLOOD BY AUTOMATED COUNT: 47.7 FL (ref 35.9–50)
ERYTHROCYTE [DISTWIDTH] IN BLOOD BY AUTOMATED COUNT: 49.1 FL (ref 35.9–50)
GLOBULIN SER CALC-MCNC: 2.6 G/DL (ref 1.9–3.5)
GLOBULIN SER CALC-MCNC: 3 G/DL (ref 1.9–3.5)
GLUCOSE SERPL-MCNC: 109 MG/DL (ref 65–99)
GLUCOSE SERPL-MCNC: 120 MG/DL (ref 65–99)
HCT VFR BLD AUTO: 34.5 % (ref 37–47)
HCT VFR BLD AUTO: 36.1 % (ref 37–47)
HGB BLD-MCNC: 11 G/DL (ref 12–16)
HGB BLD-MCNC: 11.5 G/DL (ref 12–16)
IMM GRANULOCYTES # BLD AUTO: 0.08 K/UL (ref 0–0.11)
IMM GRANULOCYTES NFR BLD AUTO: 0.6 % (ref 0–0.9)
LYMPHOCYTES # BLD AUTO: 2.85 K/UL (ref 1–4.8)
LYMPHOCYTES NFR BLD: 21.1 % (ref 22–41)
MAGNESIUM SERPL-MCNC: 1.8 MG/DL (ref 1.5–2.5)
MCH RBC QN AUTO: 29.9 PG (ref 27–33)
MCH RBC QN AUTO: 30.2 PG (ref 27–33)
MCHC RBC AUTO-ENTMCNC: 31.9 G/DL (ref 33.6–35)
MCHC RBC AUTO-ENTMCNC: 31.9 G/DL (ref 33.6–35)
MCV RBC AUTO: 93.8 FL (ref 81.4–97.8)
MCV RBC AUTO: 94.8 FL (ref 81.4–97.8)
MONOCYTES # BLD AUTO: 1.67 K/UL (ref 0–0.85)
MONOCYTES NFR BLD AUTO: 12.3 % (ref 0–13.4)
NEUTROPHILS # BLD AUTO: 8.8 K/UL (ref 2–7.15)
NEUTROPHILS NFR BLD: 65.1 % (ref 44–72)
NRBC # BLD AUTO: 0 K/UL
NRBC BLD-RTO: 0 /100 WBC
PHOSPHATE SERPL-MCNC: 2.3 MG/DL (ref 2.5–4.5)
PLATELET # BLD AUTO: 180 K/UL (ref 164–446)
PLATELET # BLD AUTO: 182 K/UL (ref 164–446)
PMV BLD AUTO: 10.4 FL (ref 9–12.9)
PMV BLD AUTO: 10.7 FL (ref 9–12.9)
POTASSIUM SERPL-SCNC: 3.7 MMOL/L (ref 3.6–5.5)
POTASSIUM SERPL-SCNC: 4.4 MMOL/L (ref 3.6–5.5)
PROT SERPL-MCNC: 5.4 G/DL (ref 6–8.2)
PROT SERPL-MCNC: 5.9 G/DL (ref 6–8.2)
RBC # BLD AUTO: 3.68 M/UL (ref 4.2–5.4)
RBC # BLD AUTO: 3.81 M/UL (ref 4.2–5.4)
SODIUM SERPL-SCNC: 136 MMOL/L (ref 135–145)
SODIUM SERPL-SCNC: 137 MMOL/L (ref 135–145)
TROPONIN I SERPL-MCNC: 59.42 NG/ML (ref 0–0.04)
TROPONIN I SERPL-MCNC: 63.74 NG/ML (ref 0–0.04)
TROPONIN I SERPL-MCNC: 72.03 NG/ML (ref 0–0.04)
WBC # BLD AUTO: 13.3 K/UL (ref 4.8–10.8)
WBC # BLD AUTO: 13.5 K/UL (ref 4.8–10.8)

## 2018-11-14 PROCEDURE — 99233 SBSQ HOSP IP/OBS HIGH 50: CPT | Performed by: HOSPITALIST

## 2018-11-14 PROCEDURE — 700111 HCHG RX REV CODE 636 W/ 250 OVERRIDE (IP): Performed by: HOSPITALIST

## 2018-11-14 PROCEDURE — 82552 ASSAY OF CPK IN BLOOD: CPT

## 2018-11-14 PROCEDURE — 83880 ASSAY OF NATRIURETIC PEPTIDE: CPT

## 2018-11-14 PROCEDURE — 99291 CRITICAL CARE FIRST HOUR: CPT | Performed by: INTERNAL MEDICINE

## 2018-11-14 PROCEDURE — 85027 COMPLETE CBC AUTOMATED: CPT

## 2018-11-14 PROCEDURE — 700105 HCHG RX REV CODE 258: Performed by: INTERNAL MEDICINE

## 2018-11-14 PROCEDURE — A9270 NON-COVERED ITEM OR SERVICE: HCPCS | Performed by: INTERNAL MEDICINE

## 2018-11-14 PROCEDURE — 85025 COMPLETE CBC W/AUTO DIFF WBC: CPT

## 2018-11-14 PROCEDURE — 84484 ASSAY OF TROPONIN QUANT: CPT | Mod: 91

## 2018-11-14 PROCEDURE — 83735 ASSAY OF MAGNESIUM: CPT

## 2018-11-14 PROCEDURE — 700111 HCHG RX REV CODE 636 W/ 250 OVERRIDE (IP)

## 2018-11-14 PROCEDURE — 71045 X-RAY EXAM CHEST 1 VIEW: CPT

## 2018-11-14 PROCEDURE — 99233 SBSQ HOSP IP/OBS HIGH 50: CPT | Performed by: INTERNAL MEDICINE

## 2018-11-14 PROCEDURE — 700105 HCHG RX REV CODE 258

## 2018-11-14 PROCEDURE — 85730 THROMBOPLASTIN TIME PARTIAL: CPT

## 2018-11-14 PROCEDURE — 84100 ASSAY OF PHOSPHORUS: CPT

## 2018-11-14 PROCEDURE — 80053 COMPREHEN METABOLIC PANEL: CPT

## 2018-11-14 PROCEDURE — 700101 HCHG RX REV CODE 250: Performed by: INTERNAL MEDICINE

## 2018-11-14 PROCEDURE — 82550 ASSAY OF CK (CPK): CPT

## 2018-11-14 PROCEDURE — 51798 US URINE CAPACITY MEASURE: CPT

## 2018-11-14 PROCEDURE — 700102 HCHG RX REV CODE 250 W/ 637 OVERRIDE(OP): Performed by: INTERNAL MEDICINE

## 2018-11-14 PROCEDURE — 770022 HCHG ROOM/CARE - ICU (200)

## 2018-11-14 RX ORDER — POTASSIUM CHLORIDE 7.45 MG/ML
INJECTION INTRAVENOUS
Status: COMPLETED
Start: 2018-11-14 | End: 2018-11-14

## 2018-11-14 RX ORDER — MAGNESIUM SULFATE HEPTAHYDRATE 40 MG/ML
2 INJECTION, SOLUTION INTRAVENOUS ONCE
Status: COMPLETED | OUTPATIENT
Start: 2018-11-14 | End: 2018-11-14

## 2018-11-14 RX ORDER — SODIUM CHLORIDE 9 MG/ML
INJECTION, SOLUTION INTRAVENOUS
Status: COMPLETED
Start: 2018-11-14 | End: 2018-11-14

## 2018-11-14 RX ORDER — SODIUM CHLORIDE 9 MG/ML
1000 INJECTION, SOLUTION INTRAVENOUS ONCE
Status: DISCONTINUED | OUTPATIENT
Start: 2018-11-14 | End: 2018-11-14

## 2018-11-14 RX ORDER — MAGNESIUM SULFATE HEPTAHYDRATE 40 MG/ML
INJECTION, SOLUTION INTRAVENOUS
Status: COMPLETED
Start: 2018-11-14 | End: 2018-11-14

## 2018-11-14 RX ORDER — POTASSIUM CHLORIDE 7.45 MG/ML
10 INJECTION INTRAVENOUS
Status: COMPLETED | OUTPATIENT
Start: 2018-11-14 | End: 2018-11-14

## 2018-11-14 RX ADMIN — POTASSIUM CHLORIDE 10 MEQ: 7.46 INJECTION, SOLUTION INTRAVENOUS at 10:30

## 2018-11-14 RX ADMIN — SODIUM CHLORIDE: 9 INJECTION, SOLUTION INTRAVENOUS at 14:58

## 2018-11-14 RX ADMIN — CLOPIDOGREL 75 MG: 75 TABLET, FILM COATED ORAL at 06:02

## 2018-11-14 RX ADMIN — ATORVASTATIN CALCIUM 80 MG: 80 TABLET, FILM COATED ORAL at 17:54

## 2018-11-14 RX ADMIN — MAGNESIUM SULFATE HEPTAHYDRATE 2 G: 40 INJECTION, SOLUTION INTRAVENOUS at 10:30

## 2018-11-14 RX ADMIN — MAGNESIUM SULFATE 2 G: 2 INJECTION INTRAVENOUS at 10:30

## 2018-11-14 RX ADMIN — SODIUM CHLORIDE: 9 INJECTION, SOLUTION INTRAVENOUS at 10:30

## 2018-11-14 RX ADMIN — SODIUM CHLORIDE: 9 INJECTION, SOLUTION INTRAVENOUS at 05:45

## 2018-11-14 RX ADMIN — POTASSIUM CHLORIDE 10 MEQ: 7.46 INJECTION, SOLUTION INTRAVENOUS at 12:37

## 2018-11-14 RX ADMIN — LATANOPROST 1 DROP: 50 SOLUTION OPHTHALMIC at 21:51

## 2018-11-14 RX ADMIN — NOREPINEPHRINE BITARTRATE 2 MCG/MIN: 1 INJECTION INTRAVENOUS at 00:46

## 2018-11-14 RX ADMIN — POTASSIUM CHLORIDE 10 MEQ: 7.46 INJECTION, SOLUTION INTRAVENOUS at 14:53

## 2018-11-14 ASSESSMENT — ENCOUNTER SYMPTOMS
BLURRED VISION: 0
STRIDOR: 0
SPEECH CHANGE: 0
ABDOMINAL DISTENTION: 0
ENDOCRINE NEGATIVE: 1
NUMBNESS: 0
HEARTBURN: 0
ALLERGIC/IMMUNOLOGIC NEGATIVE: 1
FEVER: 0
DEPRESSION: 0
TINGLING: 0
GASTROINTESTINAL NEGATIVE: 1
PHOTOPHOBIA: 0
BLOOD IN STOOL: 0
COUGH: 0
SINUS PAIN: 0
HEMOPTYSIS: 0
SPUTUM PRODUCTION: 0
POLYDIPSIA: 0
ORTHOPNEA: 0
DIAPHORESIS: 0
LIGHT-HEADEDNESS: 0
TREMORS: 1
MYALGIAS: 0
RESPIRATORY NEGATIVE: 1
FOCAL WEAKNESS: 0
HEMATOLOGIC/LYMPHATIC NEGATIVE: 1
SHORTNESS OF BREATH: 0
PSYCHIATRIC NEGATIVE: 1
DOUBLE VISION: 0
NECK PAIN: 0
BACK PAIN: 0
FLANK PAIN: 0
VOMITING: 0
NAUSEA: 0
BRUISES/BLEEDS EASILY: 0
WHEEZING: 0
CHILLS: 0
CARDIOVASCULAR NEGATIVE: 1
HEADACHES: 0
CONSTITUTIONAL NEGATIVE: 1
EYES NEGATIVE: 1
WEIGHT LOSS: 0
DIZZINESS: 0
ARTHRALGIAS: 0
NEUROLOGICAL NEGATIVE: 1
PALPITATIONS: 0
SENSORY CHANGE: 1
NERVOUS/ANXIOUS: 0

## 2018-11-14 ASSESSMENT — PAIN SCALES - GENERAL
PAINLEVEL_OUTOF10: 0

## 2018-11-14 NOTE — ASSESSMENT & PLAN NOTE
Likely secondary to OA. Currently denies pain.  As needed pain meds available, no adjustments needed right now

## 2018-11-14 NOTE — PROGRESS NOTES
Critical Care Progress Note    Date of admission  11/13/2018    Chief Complaint  89 y.o. female admitted 11/13/2018 with STEMI.  89 y.o. female who presented 11/13/2018 with With fall at home and being found down.  She fell at approximately 9 PM last night, and says she was found 4 hours later.  She is alert and oriented, she denies any acute or motor or sensory changes.  She takes lisinopril at home.  She denies any chest pain but does say that she has been feeling weak.  In the ER she had a CT of her left hip due to fall and some pain, however L showed an old femur fracture without any acute fracture.  Her chest x-ray had a normal size heart along with no significant consolidations or edema.  She denies any history of heart or lung disease.  She does have chronic kidney disease with a GFR in the 30s.  She was seen by cardiology in the ER, but refused to have any procedures such as a cath.  Her EKG showed ST elevation in the inferior leads, stat echo showed apical inferior wall hypokinesis.  Did not show any valvular disorders.  She says that she had a fall in October where she had her head.  This fall she says was because she felt weak and her legs gave out on her.  She did not have any prior dizziness, she did not lose consciousness which she is aware but she does state that she hit her head though she thinks that she had the pillow on the way down.  CPK elevated 1000.  Troponin 50.  BNP elevated to 800.      Hospital Course    11/13 admitted for stemi  11/14 ongoing rise troponin, fluid resuscitation, cpk downtrended      Interval Problem Update  Reviewed last 24 hour events:  Troponin increased  Resting comfortably, no chest pain  Received IVF ns  70-80s sbp overnight, on levophed for map > 65, weaned off this am  iincreased ns to 125 hr for a liter, then go back to 50 cc/hr  Poor urinary output, bladder full per us bedside, may need catheter or small dose lasix if no output by this pm  Hr 70s  Hypotension ?  Due to bb  Regular diet ok  Patient defers heart cath  bm am  Regular diet  Glucagon overnight for low bp  Continue heparin drip  Ct head overnight no lesions/bleed    Review of Systems  Review of Systems   Constitutional: Negative for chills, diaphoresis, fever, malaise/fatigue and weight loss.        Difficulty eating w/o assistance due to chronic hand tremor   HENT: Negative for congestion and sinus pain.    Eyes: Negative for blurred vision, double vision and photophobia.   Respiratory: Negative for cough, hemoptysis, sputum production, shortness of breath, wheezing and stridor.    Cardiovascular: Negative for chest pain, palpitations and leg swelling.   Gastrointestinal: Negative for blood in stool, heartburn, melena and vomiting.   Genitourinary: Negative for dysuria, flank pain, frequency and urgency.   Musculoskeletal: Negative for back pain, myalgias and neck pain.   Skin: Negative for itching and rash.   Neurological: Positive for tremors and sensory change. Negative for dizziness, tingling, speech change, focal weakness and headaches.        Chronic bilateral tingling of fingers   Endo/Heme/Allergies: Negative for polydipsia. Does not bruise/bleed easily.   Psychiatric/Behavioral: Negative for depression. The patient is not nervous/anxious.         Vital Signs for last 24 hours   Temp:  [36 °C (96.8 °F)-36.3 °C (97.3 °F)] 36 °C (96.8 °F)  Pulse:  [59-96] 71  Resp:  [10-24] 19  BP: (112)/(67) 112/67    Hemodynamic parameters for last 24 hours       Respiratory       Physical Exam   Physical Exam   Constitutional: She is oriented to person, place, and time. She appears well-developed and well-nourished. No distress.   Resting comfortably   HENT:   Head: Normocephalic and atraumatic.   Right Ear: External ear normal.   Left Ear: External ear normal.   Mouth/Throat: No oropharyngeal exudate.   Eyes: Pupils are equal, round, and reactive to light. Conjunctivae and EOM are normal. Right eye exhibits no  discharge. Left eye exhibits no discharge.   Neck: No JVD present. No tracheal deviation present.   Cardiovascular: Normal rate, regular rhythm and normal heart sounds.    No murmur heard.  Pulmonary/Chest: Effort normal. No stridor. No respiratory distress. She has no wheezes. She has rales. She exhibits no tenderness.   Bedside us, moderate ef left vent, rt heart normal size, iVC mild compressibility, bilateral lungs no significant b lines, scattered, can handle some addl fluids   Abdominal: She exhibits no mass. There is no tenderness. There is no rebound and no guarding.   Bedside us enlarged/full bladder, no pain on palpation   Musculoskeletal: She exhibits no edema, tenderness or deformity.   Neurological: She is alert and oriented to person, place, and time. She displays normal reflexes. No cranial nerve deficit. Coordination normal.   Skin: Skin is warm and dry. No rash noted. She is not diaphoretic. No erythema.   Psychiatric: She has a normal mood and affect. Her behavior is normal.       Medications  Current Facility-Administered Medications   Medication Dose Route Frequency Provider Last Rate Last Dose   • artificial tear ointment (REFRESH,LACRI-LUBE) 1 Application  1 Application Both Eyes PRN Tra Garsia M.D.       • latanoprost (XALATAN) 0.005 % ophthalmic solution 1 Drop  1 Drop Both Eyes Nightly Tra Garsia M.D.   1 Drop at 11/13/18 2051   • lisinopril (PRINIVIL) tablet 2.5 mg  2.5 mg Oral DAILY Paxton Campos M.D.   Stopped at 11/14/18 0545   • senna-docusate (PERICOLACE or SENOKOT S) 8.6-50 MG per tablet 2 Tab  2 Tab Oral BID Tra Garsia M.D.   Stopped at 11/14/18 0600    And   • polyethylene glycol/lytes (MIRALAX) PACKET 1 Packet  1 Packet Oral QDAY PRN Tra Garsia M.D.        And   • magnesium hydroxide (MILK OF MAGNESIA) suspension 30 mL  30 mL Oral QDAY PRN Tra Garsia M.D.        And   • bisacodyl (DULCOLAX) suppository 10 mg  10 mg Rectal QDAY PRN Tra Garsia M.D.       • acetaminophen  (TYLENOL) tablet 650 mg  650 mg Oral Q6HRS PRN Tra Garsia M.D.       • nitroglycerin (NITROSTAT) tablet 0.4 mg  0.4 mg Sublingual Q5 MIN PRN Tra Garsia M.D.       • morphine (pf) 4 mg/ml injection 2-4 mg  2-4 mg Intravenous Q5 MIN PRN Tra Garsia M.D.       • atorvastatin (LIPITOR) tablet 80 mg  80 mg Oral Q EVENING Tra Garsia M.D.   80 mg at 11/13/18 1753   • ondansetron (ZOFRAN) syringe/vial injection 4 mg  4 mg Intravenous Q4HRS PRN Tra Garsia M.D.       • ondansetron (ZOFRAN ODT) dispertab 4 mg  4 mg Oral Q4HRS PRN Tra Garsia M.D.       • heparin injection 1,800 Units  1,800 Units Intravenous PRN Tra Garsia M.D.        And   • heparin infusion 25,000 units in 500 ml 0.45% nacl   Intravenous Continuous Tra Garsia M.D. 13 mL/hr at 11/14/18 0759 650 Units/hr at 11/14/18 0759   • clopidogrel (PLAVIX) tablet 75 mg  75 mg Oral DAILY Bruce Ramirez M.D.   75 mg at 11/14/18 0602   • MD Alert...ICU Electrolyte Replacement per Pharmacy   Other pharmacy to dose Bruce Ramirez M.D.       • Respiratory Care per Protocol   Nebulization Continuous RT Bruce Ramirez M.D.       • NS infusion   Intravenous Continuous Paxton Campos M.D. 75 mL/hr at 11/14/18 0545     • carvedilol (COREG) tablet 3.125 mg  3.125 mg Oral BID WITH MEALS Paxton Campos M.D.   Stopped at 11/14/18 0730   • norepinephrine (LEVOPHED) 8 mg in  mL Infusion  0-30 mcg/min Intravenous Continuous Paxton Campos M.D.   Stopped at 11/14/18 0500       Fluids    Intake/Output Summary (Last 24 hours) at 11/14/18 0820  Last data filed at 11/14/18 0000   Gross per 24 hour   Intake          2607.95 ml   Output                0 ml   Net          2607.95 ml       Laboratory      Recent Labs      11/13/18   1345  11/13/18   1840  11/14/18   0555   CPKTOTAL  1158*   --   653*   TROPONINI  >50.00*  64.25*   --    BNPBTYPENAT  690*   --    --      Recent Labs      11/13/18   1345  11/14/18   0555   SODIUM  139  136   POTASSIUM  3.9  3.7   CHLORIDE  105   109   CO2  22  20   BUN  44*  47*   CREATININE  1.52*  1.40   MAGNESIUM   --   1.8   PHOSPHORUS   --   2.3*   CALCIUM  9.5  8.0*     Recent Labs      11/13/18   1345  11/14/18   0555   ALTSGPT  47  37   ASTSGOT  198*  117*   ALKPHOSPHAT  89  80   TBILIRUBIN  0.9  0.7   GLUCOSE  102*  120*     Recent Labs      11/13/18   1345  11/14/18   0555   WBC  13.4*  13.5*   NEUTSPOLYS  73.00*  65.10   LYMPHOCYTES  14.00*  21.10*   MONOCYTES  12.00  12.30   EOSINOPHILS  0.10  0.50   BASOPHILS  0.50  0.40   ASTSGOT  198*  117*   ALTSGPT  47  37   ALKPHOSPHAT  89  80   TBILIRUBIN  0.9  0.7     Recent Labs      11/13/18   1345  11/14/18   0046  11/14/18   0555   RBC  4.48   --   3.68*   HEMOGLOBIN  13.5   --   11.0*   HEMATOCRIT  41.5   --   34.5*   PLATELETCT  210   --   180   PROTHROMBTM  14.5   --    --    APTT  27.2  73.3*  112.6*   INR  1.12   --    --        Imaging  X-Ray:  I have personally reviewed the images and compared with prior images. and My impression is: no consolidations for infection, no significant edema, mild.  normal heart size  EKG:  I have personally reviewed the images and compared with prior images. and No film today  CT:    Reviewed  Echo:   Reviewed    Assessment/Plan  ST elevation myocardial infarction involving right coronary artery (HCC)- (present on admission)   Assessment & Plan    Significant stemi change on ekg  Patient deferred intervention for heart catheterization recommended per cardiology  Refuses aspirin due to prior allergy  Started on plavix 75 mg daily, no load given age  Continue on heparin  Ct head s/p fall no bleeding noted  On statin  Beta blocker, poorly tolerated overnight, reduced dose  Chest xray no significant effusions  Echo in er severe tricuspid regurg, ef 50%, some reduced motion/conduction left vent  Keep map > 65  Trend troponin until decreases, continued rise       Fall- (present on admission)   Assessment & Plan    Fall prior to admission  Ct head no bleeding noted, no  infarct  Likely cause of rhabdo  Patient denied seizure activity  Likely secondary to MI  Will need pt/ot  Mobilize today     Pain of left hip joint- (present on admission)   Assessment & Plan    Ct of hip, no acute fractures noted  No signifiant findings on ct imaging of pelvis.     Rhabdomyolysis- (present on admission)   Assessment & Plan    Patient had fall and stayed on ground for at least 4 hours per patient, maybe longer  Gentle hydration in setting of STEMI  cpk improved overnight, downtrended  Continue fluid resuscitation     Chronic kidney disease (CKD), stage III (moderate) (HCC)- (present on admission)   Assessment & Plan    Slightly less than expected for age  Avoid nephrotoxic agents  Patient deferred heart cath  Poor urinarly output  Give additional liter today ns, then 50 cc/hr  Encourage po intake  Lasix small dose if cont poor intake     Elevated brain natriuretic peptide (BNP) level- (present on admission)   Assessment & Plan    Secondary to MI with STEMI and partially due to age  Careful fluid resuscitation     Leukocytosis- (present on admission)   Assessment & Plan    Secondary to stemi  Continue to monitor  Daily cbc  No signs of infection today, no fever          VTE:  Heparin  Ulcer: Not Indicated  Lines: None    I have performed a physical exam and reviewed and updated ROS and Plan today (11/14/2018). In review of yesterday's note (11/13/2018), there are no changes except as documented above.     Discussed patient condition and risk of morbidity and/or mortality with Hospitalist, Family, RN, RT, Pharmacy, , Charge nurse / hot rounds, Patient and cardiology     The patient remains critically ill.  Continues on heparin drip in setting of STEMI, continue IVF resuscitation as tolerated, troponin continued upward trend.  Without close monitoring and assessement and reassessment for hemodynamic stability, there is high probability of clinical worsening and high likelihood of death.   Critical care time = 35 minutes in directly providing and coordinating critical care and extensive data review.  No time overlap and excludes procedures.

## 2018-11-14 NOTE — ASSESSMENT & PLAN NOTE
No dyspnea or symptoms of heart failure  Continue with Lasix for comfort  Monitor for signs of dehydration and poor intake.

## 2018-11-14 NOTE — CONSULTS
Critical Care Consultation    Date of consult: 11/13/2018    Referring Physician  Seen by cardiology    Reason for Consultation  STEMI    History of Presenting Illness  89 y.o. female who presented 11/13/2018 with With fall at home and being found down.  She fell at approximately 9 PM last night, and says she was found 4 hours later.  She is alert and oriented, she denies any acute or motor or sensory changes.  She takes lisinopril at home.  She denies any chest pain but does say that she has been feeling weak.  In the ER she had a CT of her left hip due to fall and some pain, however L showed an old femur fracture without any acute fracture.  Her chest x-ray had a normal size heart along with no significant consolidations or edema.  She denies any history of heart or lung disease.  She does have chronic kidney disease with a GFR in the 30s.  She was seen by cardiology in the ER, but refused to have any procedures such as a cath.  Her EKG showed ST elevation in the inferior leads, stat echo showed apical inferior wall hypokinesis.  Did not show any valvular disorders.  She says that she had a fall in October where she had her head.  This fall she says was because she felt weak and her legs gave out on her.  She did not have any prior dizziness, she did not lose consciousness which she is aware but she does state that she hit her head though she thinks that she had the pillow on the way down.  CPK elevated 1000.  Troponin 50.  BNP elevated to 800.      Code Status  DNAR/DNI    Review of Systems  Review of Systems   Constitutional: Positive for activity change. Negative for chills, diaphoresis, fatigue and fever.   HENT: Negative for congestion and trouble swallowing.    Eyes: Negative for photophobia and visual disturbance.   Respiratory: Negative for cough, chest tightness, shortness of breath and wheezing.    Cardiovascular: Negative for chest pain, palpitations and leg swelling.   Gastrointestinal: Negative for  abdominal distention, abdominal pain and nausea.   Endocrine: Negative for cold intolerance and heat intolerance.   Genitourinary: Negative for difficulty urinating and dysuria.   Musculoskeletal: Negative for arthralgias and back pain.   Skin: Negative for color change and rash.   Neurological: Positive for syncope and weakness. Negative for dizziness, seizures, speech difficulty and headaches.   Hematological: Negative for adenopathy. Does not bruise/bleed easily.   Psychiatric/Behavioral: Negative for agitation and confusion.       Past Medical History   has a past medical history of Arthritis; Cancer ([facility]) (2009); Essential hypertension (10/26/2017); and Glaucoma. She also has no past medical history of Hyperlipidemia.    Surgical History   has a past surgical history that includes eye surgery; other orthopedic surgery; and sinus trephine frontal.    Family History  family history includes Cancer in her mother.    Social History   reports that she has never smoked. She has never used smokeless tobacco. She reports that she does not drink alcohol or use drugs.    Medications  Home Medications     Reviewed by Darshan Godwin (Pharmacy Tech) on 11/13/18 at 1511  Med List Status: Complete   Medication Last Dose Status   acetaminophen (TYLENOL) 500 MG Tab Unknown Active   artificial tear ointment (REFRESH,LACRI-LUBE) Ointment unknown Active   latanoprost (XALATAN) 0.005 % Solution unknown Active   lisinopril (PRINIVIL) 2.5 MG Tab unknown Active   Multiple Vitamins-Minerals (MULTIVITAMIN PO) unknown Active   vitamin D, Ergocalciferol, (DRISDOL) 45745 units Cap capsule Unknown Active              Current Facility-Administered Medications   Medication Dose Route Frequency Provider Last Rate Last Dose   • artificial tear ointment (REFRESH,LACRI-LUBE) 1 Application  1 Application Both Eyes PRN Tra Garsia M.D.       • latanoprost (XALATAN) 0.005 % ophthalmic solution 1 Drop  1 Drop Both Eyes Nightly Tra Garsia  M.D.       • [START ON 11/14/2018] lisinopril (PRINIVIL) tablet 2.5 mg  2.5 mg Oral DAILY Tra Garsia M.D.       • senna-docusate (PERICOLACE or SENOKOT S) 8.6-50 MG per tablet 2 Tab  2 Tab Oral BID Tra Garsia M.D.        And   • polyethylene glycol/lytes (MIRALAX) PACKET 1 Packet  1 Packet Oral QDAY PRN Tra Garsia M.D.        And   • magnesium hydroxide (MILK OF MAGNESIA) suspension 30 mL  30 mL Oral QDAY PRN Tra Garsia M.D.        And   • bisacodyl (DULCOLAX) suppository 10 mg  10 mg Rectal QDAY PRN Tra Garsia M.D.       • acetaminophen (TYLENOL) tablet 650 mg  650 mg Oral Q6HRS PRN Tra Garsia M.D.       • nitroglycerin (NITROSTAT) tablet 0.4 mg  0.4 mg Sublingual Q5 MIN PRN Tra Garsia M.D.       • morphine (pf) 4 mg/ml injection 2-4 mg  2-4 mg Intravenous Q5 MIN PRN Tra Garsia M.D.       • carvedilol (COREG) tablet 6.25 mg  6.25 mg Oral BID WITH MEALS Tra Garsia M.D.       • atorvastatin (LIPITOR) tablet 80 mg  80 mg Oral Q EVENING Tra Garsia M.D.       • aspirin (ASA) tablet 325 mg  325 mg Oral DAILY Tra Garsia M.D.        Or   • aspirin (ASA) chewable tab 324 mg  324 mg Oral DAILY Tra Garsia M.D.        Or   • aspirin (ASA) suppository 300 mg  300 mg Rectal DAILY Tra Garsia M.D.       • ondansetron (ZOFRAN) syringe/vial injection 4 mg  4 mg Intravenous Q4HRS PRN Tra Garsia M.D.       • ondansetron (ZOFRAN ODT) dispertab 4 mg  4 mg Oral Q4HRS PRN Tra Garsia M.D.       • heparin injection 1,800 Units  1,800 Units Intravenous PRN Tra Garsia M.D.        And   • heparin infusion 25,000 units in 500 ml 0.45% nacl   Intravenous Continuous Tra Garsia M.D.           Allergies  Allergies   Allergen Reactions   • Allopurinol Rash     Hot, red face   • Aspirin      Cannot take due to medications   • Codeine Itching and Nausea   • Latex Rash     To tape/bandages containing latex   • Tape Rash     adhessive in the tape, causes, swollen rash           Vital Signs last 24 hours  Temp:  [36.3 °C (97.3 °F)] 36.3  °C (97.3 °F)  Pulse:  [68] 68  Resp:  [16-18] 18  BP: (112)/(67) 112/67    Physical Exam  Physical Exam   Constitutional: She is oriented to person, place, and time. She appears well-developed. No distress.   Thin, resting comfortably   HENT:   Head: Normocephalic and atraumatic.   Right Ear: External ear normal.   Left Ear: External ear normal.   Mouth/Throat: No oropharyngeal exudate.   Eyes: Pupils are equal, round, and reactive to light. Conjunctivae and EOM are normal. Right eye exhibits no discharge. Left eye exhibits no discharge.   Neck: No JVD present. No tracheal deviation present.   Cardiovascular: Normal rate, regular rhythm and normal heart sounds.    No murmur heard.  Pulmonary/Chest: Effort normal and breath sounds normal. No stridor. No respiratory distress. She has no wheezes. She has no rales. She exhibits no tenderness.   Abdominal: She exhibits no mass. There is no tenderness. There is no rebound and no guarding.   Musculoskeletal: She exhibits no edema, tenderness or deformity.   Neurological: She is alert and oriented to person, place, and time. She displays normal reflexes. No cranial nerve deficit. Coordination normal.   Skin: Skin is warm and dry. No rash noted. She is not diaphoretic. No erythema.   Psychiatric: She has a normal mood and affect. Her behavior is normal.   Patient did not have significant diaphoresis or discomfort.  No significant rhonchi on pulmonary exam.  No crackles.  No respiratory distress noted.    Fluids  No intake or output data in the 24 hours ending 18 1706    Laboratory  Recent Results (from the past 48 hour(s))   EKG - STAT    Collection Time: 18  1:33 PM   Result Value Ref Range    Report       [facility] Emergency Dept.    Test Date:  2018  Pt Name:    DIAN RAMOS               Department: ER  MRN:        [id number]                      Room:       [facility]  Gender:     Female                       Technician: [id number]  :         [id number]                   Requested By:GUY G GANSERT  Order #:    [id number]                    Reading MD:    Measurements  Intervals                                Axis  Rate:       88                           P:          61  AL:         180                          QRS:        85  QRSD:       86                           T:          76  QT:         392  QTc:        475    Interpretive Statements  SINUS RHYTHM  CONSIDER RVH W/ SECONDARY REPOL ABNORMALITY  INFERIOR INFARCT, ACUTE  Compared to ECG 07/10/2018 17:20:23  Myocardial infarct finding now present  Right bundle-branch block no longer present     CBC w/ Differential    Collection Time: 11/13/18  1:45 PM   Result Value Ref Range    WBC 13.4 (H) 4.8 - 10.8 K/uL    RBC 4.48 4.20 - 5.40 M/uL    Hemoglobin 13.5 12.0 - 16.0 g/dL    Hematocrit 41.5 37.0 - 47.0 %    MCV 92.6 81.4 - 97.8 fL    MCH 30.1 27.0 - 33.0 pg    MCHC 32.5 (L) 33.6 - 35.0 g/dL    RDW 47.0 35.9 - 50.0 fL    Platelet Count 210 164 - 446 K/uL    MPV 9.9 9.0 - 12.9 fL    Neutrophils-Polys 73.00 (H) 44.00 - 72.00 %    Lymphocytes 14.00 (L) 22.00 - 41.00 %    Monocytes 12.00 0.00 - 13.40 %    Eosinophils 0.10 0.00 - 6.90 %    Basophils 0.50 0.00 - 1.80 %    Immature Granulocytes 0.40 0.00 - 0.90 %    Nucleated RBC 0.00 /100 WBC    Neutrophils (Absolute) 9.81 (H) 2.00 - 7.15 K/uL    Lymphs (Absolute) 1.88 1.00 - 4.80 K/uL    Monos (Absolute) 1.61 (H) 0.00 - 0.85 K/uL    Eos (Absolute) 0.01 0.00 - 0.51 K/uL    Baso (Absolute) 0.07 0.00 - 0.12 K/uL    Immature Granulocytes (abs) 0.05 0.00 - 0.11 K/uL    NRBC (Absolute) 0.00 K/uL   Complete Metabolic Panel (CMP)    Collection Time: 11/13/18  1:45 PM   Result Value Ref Range    Sodium 139 135 - 145 mmol/L    Potassium 3.9 3.6 - 5.5 mmol/L    Chloride 105 96 - 112 mmol/L    Co2 22 20 - 33 mmol/L    Anion Gap 12.0 (H) 0.0 - 11.9    Glucose 102 (H) 65 - 99 mg/dL    Bun 44 (H) 8 - 22 mg/dL    Creatinine 1.52 (H) 0.50 - 1.40 mg/dL    Calcium 9.5 8.5 -  10.5 mg/dL    AST(SGOT) 198 (H) 12 - 45 U/L    ALT(SGPT) 47 2 - 50 U/L    Alkaline Phosphatase 89 30 - 99 U/L    Total Bilirubin 0.9 0.1 - 1.5 mg/dL    Albumin 3.7 3.2 - 4.9 g/dL    Total Protein 7.1 6.0 - 8.2 g/dL    Globulin 3.4 1.9 - 3.5 g/dL    A-G Ratio 1.1 g/dL   Troponin STAT    Collection Time: 11/13/18  1:45 PM   Result Value Ref Range    Troponin I >50.00 (H) 0.00 - 0.04 ng/mL   Btype Natriuretic Peptide    Collection Time: 11/13/18  1:45 PM   Result Value Ref Range    B Natriuretic Peptide 690 (H) 0 - 100 pg/mL   APTT    Collection Time: 11/13/18  1:45 PM   Result Value Ref Range    APTT 27.2 24.7 - 36.0 sec   PT/INR    Collection Time: 11/13/18  1:45 PM   Result Value Ref Range    PT 14.5 12.0 - 14.6 sec    INR 1.12 0.87 - 1.13   Free Thyroxine (add to TSH for patients with existing thyroid dysfunction)    Collection Time: 11/13/18  1:45 PM   Result Value Ref Range    Free T-4 1.06 0.53 - 1.43 ng/dL   TSH (for screening thyroid dysfunction)    Collection Time: 11/13/18  1:45 PM   Result Value Ref Range    TSH 2.920 0.380 - 5.330 uIU/mL   CREATINE KINASE    Collection Time: 11/13/18  1:45 PM   Result Value Ref Range    CPK Total 1158 (H) 0 - 154 U/L   ESTIMATED GFR    Collection Time: 11/13/18  1:45 PM   Result Value Ref Range    GFR If  39 (A) >60 mL/min/1.73 m 2    GFR If Non  32 (A) >60 mL/min/1.73 m 2   URINALYSIS    Collection Time: 11/13/18  2:42 PM   Result Value Ref Range    Color Yellow     Character Clear     Specific Gravity 1.020 <1.035    Ph 5.0 5.0 - 8.0    Glucose Negative Negative mg/dL    Ketones Negative Negative mg/dL    Protein 100 (A) Negative mg/dL    Bilirubin Negative Negative    Urobilinogen, Urine 0.2 Negative    Nitrite Negative Negative    Leukocyte Esterase Negative Negative    Occult Blood Negative Negative    Micro Urine Req Microscopic    URINE MICROSCOPIC (W/UA)    Collection Time: 11/13/18  2:42 PM   Result Value Ref Range    WBC 0-2 /hpf     RBC 2-5 (A) /hpf    Bacteria Negative None /hpf    Epithelial Cells Negative /hpf    Hyaline Cast 0-2 /lpf   EC-ECHOCARDIOGRAM COMPLETE W/O CONT    Collection Time: 11/13/18  2:42 PM   Result Value Ref Range    Eject.Frac. MOD BP 58.86     Eject.Frac. MOD 4C 49.67     Eject.Frac. MOD 2C 66.93     Left Ventrical Ejection Fraction 50        Imaging  CT-HEAD W/O   Final Result      1.  No evidence of acute intracranial process.      2.  There is again seen interstitial pulmonary edema and bibasilar atelectasis.      CT-HIP W/O PLUS RECONS LEFT   Final Result      1.  No evidence of acute fracture or malalignment. No evidence of hardware failure or complication.   2.  Postsurgical changes of the left femur with broken screw fragment redemonstrated.   3.  Demineralization.   4.  Scattered colonic diverticula.   5.  Atherosclerosis.   6.  Small fat-containing umbilical hernia.      EC-ECHOCARDIOGRAM COMPLETE W/O CONT   Final Result      DX-HIP-COMPLETE - UNILATERAL 2+ LEFT   Final Result      1.  No definite acute osseous abnormality. In setting of demineralization, an occult fracture is difficult to exclude. If there is strong clinical suspicion, CT or MRI could be obtained for further evaluation.   2.  Postsurgical changes of the left femur with broken screw fragment redemonstrated.      DX-CHEST-PORTABLE (1 VIEW)   Final Result      No acute cardiopulmonary process is seen.      Atherosclerotic plaque.      DX-CHEST-PORTABLE (1 VIEW)    (Results Pending)       Assessment/Plan  ST elevation myocardial infarction involving right coronary artery (HCC)- (present on admission)   Assessment & Plan    Significant stemi change on ekg  Patient deferred intervention for heart catheterization recommended per cardiology  Refuses aspirin due to prior allergy  Started on plavix 75 mg daily, no load given age  Started heparin drip  Ct head s/p fall no bleeding noted  On statin  Beta blocker  Chest xray no significant  effusions  Echo in er severe tricuspid regurg, ef 50%, some reduced motion/conduction left vent       Fall- (present on admission)   Assessment & Plan    Fall prior to admission  Ct head no bleeding noted, no infarct  Likely cause of rhabdo  Patient denied seizure activity  Likely secondary to MI  Will need pt/ot if survives Stemi     Pain of left hip joint   Assessment & Plan    Ct of hip, no acute fractures noted  No signifiant findings on ct imaging of pelvis.     Rhabdomyolysis   Assessment & Plan    Patient had fall and stayed on ground for at least 4 hours per patient, maybe longer  Gentle hydration in setting of STEMI  Repeat cpk in am       Chronic kidney disease (CKD), stage III (moderate) (HCC)- (present on admission)   Assessment & Plan    Slightly less than expected for age  Avoid nephrotoxic agents  Patient deferred heart cath     Elevated brain natriuretic peptide (BNP) level- (present on admission)   Assessment & Plan    Secondary to MI with STEMI and partially due to age  Careful fluid resuscitation     Leukocytosis- (present on admission)   Assessment & Plan    Secondary to stemi  No signs of infection or recent infection  Continue to monitor  Daily cbc         Discussed patient condition and risk of morbidity and/or mortality with Hospitalist, RN, Pharmacy, Patient and cardiology.    The patient remains critically ill.  She is started on heparin drip and plavix for significant STEMI of which she opted for medical care instead of invasive procedures.  There is a very high probbability of clinical decompensation and death even with close monitoring and aggressive therapy in the ICU.  Critical care time = 45 minutes in directly providing and coordinating critical care and extensive data review.  No time overlap and excludes procedures.

## 2018-11-14 NOTE — CARE PLAN
Problem: Infection  Goal: Will remain free from infection  Outcome: PROGRESSING AS EXPECTED  Hand washing encouraged. No S/s of infection noted.     Problem: Stemi  Goal: Optimal Care of the Stemi Patient  Outcome: PROGRESSING AS EXPECTED  Pt admitted to ICU. Per pt no interventions.   Trop elevated. Dr. Tsai notified. No additional testing.   Pt is on heparin drip and IV NS.   Intervention: Vital Signs and Cardiac Monitoring  See flow sheet  Intervention: Troponin, CBC, Chem Panel, Lipid Profile, INR, PT, PTT, Chest X-Ray per orders  See orders and flow sheet  Intervention: EKG on admission and every 6 hours: obtain with all episodes of chest pain  Denies CP currently.  Noted ST elevations in 2,3, and AVF  Intervention: Morphine, Oxygen, Nitrates and Aspirin  Oxygen at 3L via NC  Nitro not applicable per inferior MI  Intervention: Consider emergent cath or fibrinolytic  Heparin drip  Intervention: Echo if signs and symptoms of CHF or hemodynamic instability  Echo completed.   Intervention: Consider aspirin, beta blocker, ACE inhibitor, statin per order  Plavix. ASA refused by pt.

## 2018-11-14 NOTE — PROGRESS NOTES
Cardiology Follow Up Progress Note    Date of Service  11/14/2018    Attending Physician  Wagner Galvan M.D.    Chief Complaint   STEMI    HPI  Nora Hope is a 89 y.o. female admitted 11/13/2018 with STEMI    Interim Events  Trop last in the 60s  Chest pain free  No SOB  Hypotensive overnights into the 70s systolic  Small boluses last night of saline  Briefly on norepi overnight    Review of Systems  Review of Systems   Constitutional: Negative.    HENT: Negative.    Eyes: Negative.    Respiratory: Negative.  Negative for shortness of breath.    Cardiovascular: Negative.  Negative for chest pain, palpitations and leg swelling.   Gastrointestinal: Negative.  Negative for abdominal distention and nausea.   Endocrine: Negative.    Genitourinary: Negative.    Musculoskeletal: Negative for arthralgias.   Skin: Negative.    Allergic/Immunologic: Negative.    Neurological: Negative.  Negative for dizziness, light-headedness and numbness.   Hematological: Negative.    Psychiatric/Behavioral: Negative.        Vital signs in last 24 hours  Temp:  [36 °C (96.8 °F)-36.3 °C (97.3 °F)] 36 °C (96.8 °F)  Pulse:  [59-96] 71  Resp:  [10-24] 19  BP: (112)/(67) 112/67    Physical Exam  Physical Exam   Constitutional: She is oriented to person, place, and time. She appears well-developed and well-nourished. No distress.   HENT:   Head: Normocephalic.   Mouth/Throat: Oropharynx is clear and moist.   Eyes: Pupils are equal, round, and reactive to light. EOM are normal. Right eye exhibits no discharge. Left eye exhibits no discharge. No scleral icterus.   Neck: Normal range of motion. Neck supple. No JVD present. No tracheal deviation present.   Cardiovascular: Normal rate, regular rhythm, S1 normal, S2 normal, normal heart sounds, intact distal pulses and normal pulses.  Exam reveals no gallop, no S3, no S4 and no friction rub.    No murmur heard.   No systolic murmur is present    No diastolic murmur is present   Pulses:        Carotid pulses are 2+ on the right side, and 2+ on the left side.       Radial pulses are 2+ on the right side, and 2+ on the left side.        Dorsalis pedis pulses are 2+ on the right side, and 2+ on the left side.        Posterior tibial pulses are 2+ on the right side, and 2+ on the left side.   Pulmonary/Chest: Effort normal and breath sounds normal. No respiratory distress. She has no wheezes. She has no rales.   Abdominal: Soft. Bowel sounds are normal. She exhibits no distension and no mass. There is no tenderness. There is no rebound and no guarding.   Musculoskeletal: She exhibits no edema.   Neurological: She is alert and oriented to person, place, and time. No cranial nerve deficit.   Skin: Skin is warm and dry. She is not diaphoretic. No pallor.   Psychiatric: She has a normal mood and affect. Her behavior is normal. Judgment and thought content normal.   Nursing note and vitals reviewed.      Lab Review  Lab Results   Component Value Date/Time    WBC 13.5 (H) 11/14/2018 05:55 AM    RBC 3.68 (L) 11/14/2018 05:55 AM    HEMOGLOBIN 11.0 (L) 11/14/2018 05:55 AM    HEMATOCRIT 34.5 (L) 11/14/2018 05:55 AM    MCV 93.8 11/14/2018 05:55 AM    MCH 29.9 11/14/2018 05:55 AM    MCHC 31.9 (L) 11/14/2018 05:55 AM    MPV 10.4 11/14/2018 05:55 AM      Lab Results   Component Value Date/Time    SODIUM 136 11/14/2018 05:55 AM    POTASSIUM 3.7 11/14/2018 05:55 AM    CHLORIDE 109 11/14/2018 05:55 AM    CO2 20 11/14/2018 05:55 AM    GLUCOSE 120 (H) 11/14/2018 05:55 AM    BUN 47 (H) 11/14/2018 05:55 AM    CREATININE 1.40 11/14/2018 05:55 AM      Lab Results   Component Value Date/Time    ASTSGOT 117 (H) 11/14/2018 05:55 AM    ALTSGPT 37 11/14/2018 05:55 AM     Lab Results   Component Value Date/Time    TROPONINI 72.03 (H) 11/14/2018 05:55 AM       Recent Labs      11/13/18   1345   BNPBTYPENAT  690*       Cardiac Imaging and Procedures Review  EKG:   Personally viewed by myself showing normal sinus rhythm with an inferior  infarct pattern Q waves in leads II, III and aVF.     ECHO:  Performed stat at bedside and personally viewed by myself showing no significant valvular is normal LV systolic function with a possible mild hypokinesis of the inferior wall.    Imaging  Chest X-Ray:  N/A    Stress Test:  N/A    Assessment/Plan  No new Assessment & Plan notes have been filed under this hospital service since the last note was generated.  Service: Cardiology    1. Inferior STEMI    - asa allergy    - plavix 75 daily    - trend trop    - heparin drip    - atorva 80      2. CKD    - daily BMP, BNP    Thank you for allowing me to participate in the care of this patient.  I will continue to follow this patient    Please contact me with any questions.    Benny Ogden M.D.   Cardiologist, [facility]  [phone]

## 2018-11-14 NOTE — ASSESSMENT & PLAN NOTE
Patient had fall and stayed on ground for at least 4 hours per patient, maybe longer  Gentle hydration in setting of STEMI  cpk improved\  Adequate fluid resuscitation

## 2018-11-14 NOTE — PROGRESS NOTES
.14/.10/.30  70-90s SR, paroxymal afib, ST elevation, MD aware, asymptomatic     12 hour cc    2 RN skin check

## 2018-11-14 NOTE — DISCHARGE PLANNING
Care Transition Team Discharge Planning     11/14/18  Phone call placed to daughter Wild Jacobson [phone]. Left voice mail for return call to discuss discharge plans for patient. Patient with intermittent confusion and unable to discuss discharge planning.  Discharge Plan:  MIRANDA

## 2018-11-14 NOTE — ASSESSMENT & PLAN NOTE
Significant stemi change on ekg  Patient deferred intervention for heart catheterization recommended per cardiology  Refuses aspirin due to prior allergy  Started on plavix 75 mg daily, no load given age  Continue on heparin  Ct head s/p fall no bleeding noted  On statin  Beta blocker, poorly tolerated overnight, reduced dose  Chest xray no significant effusions  Echo in er severe tricuspid regurg, ef 50%, some reduced motion/conduction left vent  Keep map > 65  High troponin overnight to 500  downtrended  Possible collaterals  High risk of arrythmia given on clot remova/stenting

## 2018-11-14 NOTE — ASSESSMENT & PLAN NOTE
Secondary to stemi  Continue to monitor  Daily cbc  No signs of infection today, no fever  No signs infection cxray  No need for antibiotics or infectious work up

## 2018-11-14 NOTE — ASSESSMENT & PLAN NOTE
Secondary to MI with STEMI and partially due to age  Diuresis with 20 mg lasix today  Discontinued fluids

## 2018-11-14 NOTE — PROGRESS NOTES
Dr. Tsai notified of elevated trop. No need to call per Dr. Tsai. No need to continue to check trops.

## 2018-11-14 NOTE — PROGRESS NOTES
Critical PTT lab result was 112.6 at 0756. This value is within the defined limits of the Heparin Weight Based Protocol ordered by the physician for this patient. Heparin Weight Based Protocol will be followed for any adjustments, physician was not notified per protocol instructions.

## 2018-11-14 NOTE — PROGRESS NOTES
[facility] Medicine Daily Progress Note    Date of Service  11/14/2018    Chief Complaint  89 y.o. female admitted 11/13/2018 with a fall    Hospital Course    Ms Hope has a history of hypertension.  Patient was brought to the emergency room after a fall.  Evaluation in the emergency room included an EKG with acute ST segment elevation in the troponin greater than 50.  The patient admitted to some substernal chest pain the night prior to admission.  The patient chose not to proceed with cardiac catheterization.  She was started on heparin drip and admitted to the ICU.      Interval Problem Update  Patient adamantly denies chest pain since admission  Denies pleurisy, she is not short of breath, denies orthopnea  Systolic blood pressures in the 110s, heart rate in the 70s  On heparin drip, no epistaxis or signs of GI bleed  Complains of pain in her right elbow, there is some swelling, also pain at right hip, worse when she tries to stand up    Consultants/Specialty  Cardiology, I discussed the case with Dr. Ogden this morning, we discussed continuation of heparin drip    Code Status  DNAR/DNI    Disposition  Keep in ICU today  Patient may benefit from more supervised living situation, social work is aware    Review of Systems  Review of Systems   Constitutional: Negative for chills and malaise/fatigue.   Respiratory: Negative for cough, hemoptysis and sputum production.    Cardiovascular: Negative for chest pain, palpitations and orthopnea.   Gastrointestinal: Negative for nausea and vomiting.   Musculoskeletal: Negative for back pain and neck pain.   Skin: Negative for itching and rash.   Neurological: Negative for dizziness.   All other systems reviewed and are negative.       Physical Exam  Temp:  [36 °C (96.8 °F)-36.3 °C (97.3 °F)] 36 °C (96.8 °F)  Pulse:  [59-96] 71  Resp:  [10-24] 19  BP: (112)/(67) 112/67    Physical Exam   Constitutional: She is oriented to person, place, and time.   Frail elderly woman, no  apparent distress   HENT:   Head: Normocephalic and atraumatic.   Eyes: Conjunctivae and EOM are normal. Right eye exhibits no discharge. Left eye exhibits no discharge.   Cardiovascular: Normal rate, regular rhythm and intact distal pulses.    No murmur heard.  2+ Radial Pulses  Brisk Capillary Refill   Pulmonary/Chest: Effort normal and breath sounds normal. No respiratory distress. She has no wheezes.   Abdominal: Soft. Bowel sounds are normal. She exhibits no distension. There is no tenderness. There is no rebound.   Musculoskeletal: Normal range of motion. She exhibits no edema.   Right elbow, there is swelling and ecchymosis   Neurological: She is alert and oriented to person, place, and time. No cranial nerve deficit.   Skin: Skin is warm and dry. No erythema.   Skin is warm and well perfused   Psychiatric: She has a normal mood and affect. Her behavior is normal. Thought content normal.       Fluids    Intake/Output Summary (Last 24 hours) at 11/14/18 0758  Last data filed at 11/14/18 0000   Gross per 24 hour   Intake          2607.95 ml   Output                0 ml   Net          2607.95 ml       Laboratory  Recent Labs      11/13/18   1345  11/14/18   0555   WBC  13.4*  13.5*   RBC  4.48  3.68*   HEMOGLOBIN  13.5  11.0*   HEMATOCRIT  41.5  34.5*   MCV  92.6  93.8   MCH  30.1  29.9   MCHC  32.5*  31.9*   RDW  47.0  47.7   PLATELETCT  210  180   MPV  9.9  10.4     Recent Labs      11/13/18   1345  11/14/18   0555   SODIUM  139  136   POTASSIUM  3.9  3.7   CHLORIDE  105  109   CO2  22  20   GLUCOSE  102*  120*   BUN  44*  47*   CREATININE  1.52*  1.40   CALCIUM  9.5  8.0*     Recent Labs      11/13/18   1345  11/14/18   0046  11/14/18   0555   APTT  27.2  73.3*  112.6*   INR  1.12   --    --      Recent Labs      11/13/18   1345   BNPBTYPENAT  690*           Imaging  DX-CHEST-PORTABLE (1 VIEW)   Final Result      No acute cardiopulmonary disease.      CT-HEAD W/O   Final Result      1.  No evidence of acute  intracranial process.      2.  There is again seen interstitial pulmonary edema and bibasilar atelectasis.      CT-HIP W/O PLUS RECONS LEFT   Final Result      1.  No evidence of acute fracture or malalignment. No evidence of hardware failure or complication.   2.  Postsurgical changes of the left femur with broken screw fragment redemonstrated.   3.  Demineralization.   4.  Scattered colonic diverticula.   5.  Atherosclerosis.   6.  Small fat-containing umbilical hernia.      EC-ECHOCARDIOGRAM COMPLETE W/O CONT   Final Result      DX-HIP-COMPLETE - UNILATERAL 2+ LEFT   Final Result      1.  No definite acute osseous abnormality. In setting of demineralization, an occult fracture is difficult to exclude. If there is strong clinical suspicion, CT or MRI could be obtained for further evaluation.   2.  Postsurgical changes of the left femur with broken screw fragment redemonstrated.      DX-CHEST-PORTABLE (1 VIEW)   Final Result      No acute cardiopulmonary process is seen.      Atherosclerotic plaque.           Assessment/Plan  ST elevation myocardial infarction involving right coronary artery (HCC)- (present on admission)   Assessment & Plan    Remarkably asymptomatic, no chest pain  She declined angiogram and PCI when she presented  Cardiology following,  Heparin drip  Rate of heparin drip will be adjusted based on serial pTT measurements to ensure adequate anticoagulation and to avoid potential complications of bleeding  Coreg and lisinopril       Pain of left hip joint- (present on admission)   Assessment & Plan    Imaging including CT scan of the hip shows no evidence of fracture  PT/OT when more stable     Rhabdomyolysis- (present on admission)   Assessment & Plan    Due to fall  Gentle IV fluid hydration  Repeat CPK in the morning     Chronic kidney disease (CKD), stage III (moderate) (HCC)- (present on admission)   Assessment & Plan    Stable creatinine     Elevated brain natriuretic peptide (BNP) level-  (present on admission)   Assessment & Plan    No signs of fluid overload  Echocardiogram showed LVEF 50%     Leukocytosis- (present on admission)   Assessment & Plan    Likely reactive  Repeat labs in a.m.     At risk for falls- (present on admission)   Assessment & Plan    PT/OT     Advanced directives, counseling/discussion- (present on admission)   Assessment & Plan    DNAR/DNI per patient's wishes          VTE prophylaxis: on heparin drip, no need for additional DVT prophylaxis

## 2018-11-14 NOTE — CARE PLAN
Problem: Nutritional:  Goal: Achieve adequate nutritional intake  Patient will consume ~50% of small meals and snacks.  Outcome: PROGRESSING AS EXPECTED

## 2018-11-14 NOTE — ASSESSMENT & PLAN NOTE
Fall prior to admission  Ct head no bleeding noted, no infarct  Likely cause of rhabdo  Patient denied seizure activity  Likely secondary to MI  Will need pt/ot  Mobilize today to chair and hallway

## 2018-11-14 NOTE — ASSESSMENT & PLAN NOTE
less than expected for age  Avoid nephrotoxic agents  Patient deferred heart cath  Poor urinarly output  Stopped fluids today  Diuresis with 20 mg lasix  adequte urinary ouptut per nursing but not measurable due to incontinence  Patient refuses catheter  Encourage po intake

## 2018-11-15 ENCOUNTER — APPOINTMENT (OUTPATIENT)
Dept: RADIOLOGY | Facility: MEDICAL CENTER | Age: 83
DRG: 280 | End: 2018-11-15
Attending: INTERNAL MEDICINE
Payer: MEDICARE

## 2018-11-15 LAB
ANION GAP SERPL CALC-SCNC: 11 MMOL/L (ref 0–11.9)
APTT PPP: 81.2 SEC (ref 24.7–36)
BASOPHILS # BLD AUTO: 0.6 % (ref 0–1.8)
BASOPHILS # BLD: 0.08 K/UL (ref 0–0.12)
BUN SERPL-MCNC: 40 MG/DL (ref 8–22)
CALCIUM SERPL-MCNC: 8 MG/DL (ref 8.5–10.5)
CHLORIDE SERPL-SCNC: 111 MMOL/L (ref 96–112)
CK SERPL-CCNC: 377 U/L (ref 0–154)
CO2 SERPL-SCNC: 15 MMOL/L (ref 20–33)
CREAT SERPL-MCNC: 1.31 MG/DL (ref 0.5–1.4)
EKG IMPRESSION: NORMAL
EOSINOPHIL # BLD AUTO: 0.17 K/UL (ref 0–0.51)
EOSINOPHIL NFR BLD: 1.2 % (ref 0–6.9)
ERYTHROCYTE [DISTWIDTH] IN BLOOD BY AUTOMATED COUNT: 50.3 FL (ref 35.9–50)
GLUCOSE SERPL-MCNC: 130 MG/DL (ref 65–99)
HCT VFR BLD AUTO: 35.8 % (ref 37–47)
HGB BLD-MCNC: 11.3 G/DL (ref 12–16)
IMM GRANULOCYTES # BLD AUTO: 0.08 K/UL (ref 0–0.11)
IMM GRANULOCYTES NFR BLD AUTO: 0.6 % (ref 0–0.9)
LYMPHOCYTES # BLD AUTO: 2.48 K/UL (ref 1–4.8)
LYMPHOCYTES NFR BLD: 18 % (ref 22–41)
MAGNESIUM SERPL-MCNC: 2.1 MG/DL (ref 1.5–2.5)
MCH RBC QN AUTO: 30.1 PG (ref 27–33)
MCHC RBC AUTO-ENTMCNC: 31.6 G/DL (ref 33.6–35)
MCV RBC AUTO: 95.5 FL (ref 81.4–97.8)
MONOCYTES # BLD AUTO: 1.53 K/UL (ref 0–0.85)
MONOCYTES NFR BLD AUTO: 11.1 % (ref 0–13.4)
NEUTROPHILS # BLD AUTO: 9.46 K/UL (ref 2–7.15)
NEUTROPHILS NFR BLD: 68.5 % (ref 44–72)
NRBC # BLD AUTO: 0 K/UL
NRBC BLD-RTO: 0 /100 WBC
PHOSPHATE SERPL-MCNC: 2 MG/DL (ref 2.5–4.5)
PLATELET # BLD AUTO: 197 K/UL (ref 164–446)
PMV BLD AUTO: 10.7 FL (ref 9–12.9)
POTASSIUM SERPL-SCNC: 4 MMOL/L (ref 3.6–5.5)
RBC # BLD AUTO: 3.75 M/UL (ref 4.2–5.4)
SODIUM SERPL-SCNC: 137 MMOL/L (ref 135–145)
TROPONIN I SERPL-MCNC: 47.74 NG/ML (ref 0–0.04)
TROPONIN I SERPL-MCNC: >500 NG/ML (ref 0–0.04)
WBC # BLD AUTO: 13.8 K/UL (ref 4.8–10.8)

## 2018-11-15 PROCEDURE — 99291 CRITICAL CARE FIRST HOUR: CPT | Performed by: INTERNAL MEDICINE

## 2018-11-15 PROCEDURE — 700102 HCHG RX REV CODE 250 W/ 637 OVERRIDE(OP): Performed by: INTERNAL MEDICINE

## 2018-11-15 PROCEDURE — 99233 SBSQ HOSP IP/OBS HIGH 50: CPT | Performed by: INTERNAL MEDICINE

## 2018-11-15 PROCEDURE — A9270 NON-COVERED ITEM OR SERVICE: HCPCS | Performed by: INTERNAL MEDICINE

## 2018-11-15 PROCEDURE — 84484 ASSAY OF TROPONIN QUANT: CPT

## 2018-11-15 PROCEDURE — 83735 ASSAY OF MAGNESIUM: CPT

## 2018-11-15 PROCEDURE — 770020 HCHG ROOM/CARE - TELE (206)

## 2018-11-15 PROCEDURE — 82550 ASSAY OF CK (CPK): CPT

## 2018-11-15 PROCEDURE — 93005 ELECTROCARDIOGRAM TRACING: CPT | Performed by: INTERNAL MEDICINE

## 2018-11-15 PROCEDURE — 84100 ASSAY OF PHOSPHORUS: CPT

## 2018-11-15 PROCEDURE — 700111 HCHG RX REV CODE 636 W/ 250 OVERRIDE (IP): Performed by: INTERNAL MEDICINE

## 2018-11-15 PROCEDURE — 99233 SBSQ HOSP IP/OBS HIGH 50: CPT | Performed by: HOSPITALIST

## 2018-11-15 PROCEDURE — 80048 BASIC METABOLIC PNL TOTAL CA: CPT

## 2018-11-15 PROCEDURE — 36415 COLL VENOUS BLD VENIPUNCTURE: CPT

## 2018-11-15 PROCEDURE — 85025 COMPLETE CBC W/AUTO DIFF WBC: CPT

## 2018-11-15 PROCEDURE — 85730 THROMBOPLASTIN TIME PARTIAL: CPT

## 2018-11-15 PROCEDURE — 93010 ELECTROCARDIOGRAM REPORT: CPT | Performed by: INTERNAL MEDICINE

## 2018-11-15 PROCEDURE — 71045 X-RAY EXAM CHEST 1 VIEW: CPT

## 2018-11-15 RX ORDER — ASPIRIN 81 MG/1
81 TABLET, CHEWABLE ORAL DAILY
Status: DISCONTINUED | OUTPATIENT
Start: 2018-11-15 | End: 2018-11-17

## 2018-11-15 RX ORDER — FUROSEMIDE 10 MG/ML
20 INJECTION INTRAMUSCULAR; INTRAVENOUS
Status: DISCONTINUED | OUTPATIENT
Start: 2018-11-15 | End: 2018-11-17

## 2018-11-15 RX ADMIN — CLOPIDOGREL 75 MG: 75 TABLET, FILM COATED ORAL at 06:00

## 2018-11-15 RX ADMIN — ATORVASTATIN CALCIUM 80 MG: 80 TABLET, FILM COATED ORAL at 17:42

## 2018-11-15 RX ADMIN — HEPARIN SODIUM 650 UNITS/HR: 5000 INJECTION, SOLUTION INTRAVENOUS at 08:03

## 2018-11-15 RX ADMIN — FUROSEMIDE 20 MG: 10 INJECTION, SOLUTION INTRAMUSCULAR; INTRAVENOUS at 10:31

## 2018-11-15 RX ADMIN — CARVEDILOL 3.12 MG: 3.12 TABLET, FILM COATED ORAL at 17:43

## 2018-11-15 RX ADMIN — ASPIRIN 81 MG: 81 TABLET, CHEWABLE ORAL at 10:31

## 2018-11-15 RX ADMIN — LATANOPROST 1 DROP: 50 SOLUTION OPHTHALMIC at 20:09

## 2018-11-15 ASSESSMENT — ENCOUNTER SYMPTOMS
DIAPHORESIS: 0
PSYCHIATRIC NEGATIVE: 1
VOMITING: 0
ARTHRALGIAS: 0
MYALGIAS: 0
FEVER: 0
CHILLS: 0
NECK PAIN: 0
COUGH: 0
HEADACHES: 0
EYES NEGATIVE: 1
TINGLING: 0
HEMOPTYSIS: 0
POLYDIPSIA: 0
HEMATOLOGIC/LYMPHATIC NEGATIVE: 1
CARDIOVASCULAR NEGATIVE: 1
WHEEZING: 0
ALLERGIC/IMMUNOLOGIC NEGATIVE: 1
LIGHT-HEADEDNESS: 0
SPEECH CHANGE: 0
BLURRED VISION: 0
SHORTNESS OF BREATH: 0
RESPIRATORY NEGATIVE: 1
NAUSEA: 0
HEARTBURN: 0
STRIDOR: 0
BLOOD IN STOOL: 0
TREMORS: 1
ABDOMINAL DISTENTION: 0
PHOTOPHOBIA: 0
BRUISES/BLEEDS EASILY: 0
FOCAL WEAKNESS: 0
BACK PAIN: 0
WEIGHT LOSS: 0
DEPRESSION: 0
PALPITATIONS: 0
SENSORY CHANGE: 1
GASTROINTESTINAL NEGATIVE: 1
SINUS PAIN: 0
DOUBLE VISION: 0
DIZZINESS: 0
SPUTUM PRODUCTION: 0
ENDOCRINE NEGATIVE: 1
FLANK PAIN: 0
NUMBNESS: 0
NERVOUS/ANXIOUS: 0
CONSTITUTIONAL NEGATIVE: 1
NEUROLOGICAL NEGATIVE: 1

## 2018-11-15 ASSESSMENT — COGNITIVE AND FUNCTIONAL STATUS - GENERAL
SUGGESTED CMS G CODE MODIFIER DAILY ACTIVITY: CJ
WALKING IN HOSPITAL ROOM: A LOT
CLIMB 3 TO 5 STEPS WITH RAILING: A LOT
DAILY ACTIVITIY SCORE: 20
DRESSING REGULAR UPPER BODY CLOTHING: A LITTLE
HELP NEEDED FOR BATHING: A LITTLE
MOVING TO AND FROM BED TO CHAIR: A LOT
TURNING FROM BACK TO SIDE WHILE IN FLAT BAD: A LITTLE
TOILETING: A LITTLE
MOVING FROM LYING ON BACK TO SITTING ON SIDE OF FLAT BED: A LOT
DRESSING REGULAR LOWER BODY CLOTHING: A LITTLE
SUGGESTED CMS G CODE MODIFIER MOBILITY: CL
STANDING UP FROM CHAIR USING ARMS: A LOT
MOBILITY SCORE: 13

## 2018-11-15 ASSESSMENT — PAIN SCALES - GENERAL
PAINLEVEL_OUTOF10: 0

## 2018-11-15 ASSESSMENT — LIFESTYLE VARIABLES: EVER_SMOKED: NEVER

## 2018-11-15 ASSESSMENT — PATIENT HEALTH QUESTIONNAIRE - PHQ9
1. LITTLE INTEREST OR PLEASURE IN DOING THINGS: NOT AT ALL
SUM OF ALL RESPONSES TO PHQ9 QUESTIONS 1 AND 2: 0
2. FEELING DOWN, DEPRESSED, IRRITABLE, OR HOPELESS: NOT AT ALL

## 2018-11-15 NOTE — PROGRESS NOTES
0200- Bedside report received from AQUILINO Patrick. Pt resting at this time, no signs/symptoms of pain or discomfort. Levophed, Heparin and NS currently infusion per active orders. Bed alarm set, bed in lowest and locked position, call light and personal belongings within reach.

## 2018-11-15 NOTE — PROGRESS NOTES
Assumed care at 1140AM. Pt A&O x 3 to self, place and situation. Pt sinus rhythm (86) on monitor. First assessment completed, call light within reach, bed locked and in low position. All questions asked. Will continue to monitor.

## 2018-11-15 NOTE — PROGRESS NOTES
Cardiology Follow Up Progress Note    Date of Service  11/15/2018    Attending Physician  Wagner Galvan M.D.    Chief Complaint   STEMI    HPI  Nora Hope is a 89 y.o. female admitted 11/13/2018 with STEMI    Interim Events  Trop peaked at greater than 500  JVD this morning with no SOB  Continues on low dose levophed  Getting NS for low urine output  Patient does not have allergy to aspirin, she was told she can't take it with gout    Review of Systems  Review of Systems   Constitutional: Negative.    HENT: Negative.    Eyes: Negative.    Respiratory: Negative.  Negative for shortness of breath.    Cardiovascular: Negative.  Negative for chest pain, palpitations and leg swelling.   Gastrointestinal: Negative.  Negative for abdominal distention and nausea.   Endocrine: Negative.    Genitourinary: Negative.    Musculoskeletal: Negative for arthralgias.   Skin: Negative.    Allergic/Immunologic: Negative.    Neurological: Negative.  Negative for dizziness, light-headedness and numbness.   Hematological: Negative.    Psychiatric/Behavioral: Negative.        Vital signs in last 24 hours  Temp:  [36.2 °C (97.2 °F)-36.6 °C (97.8 °F)] 36.6 °C (97.8 °F)  Pulse:  [63-86] 74  Resp:  [14-28] 26    Physical Exam  Physical Exam   Constitutional: She is oriented to person, place, and time. She appears well-developed and well-nourished. No distress.   HENT:   Head: Normocephalic.   Mouth/Throat: Oropharynx is clear and moist.   Eyes: Pupils are equal, round, and reactive to light. EOM are normal. Right eye exhibits no discharge. Left eye exhibits no discharge. No scleral icterus.   Neck: Normal range of motion. Neck supple. No JVD present. No tracheal deviation present.   Cardiovascular: Normal rate, regular rhythm, S1 normal, S2 normal, normal heart sounds, intact distal pulses and normal pulses.  Exam reveals no gallop, no S3, no S4 and no friction rub.    No murmur heard.   No systolic murmur is present    No diastolic  murmur is present   Pulses:       Carotid pulses are 2+ on the right side, and 2+ on the left side.       Radial pulses are 2+ on the right side, and 2+ on the left side.        Dorsalis pedis pulses are 2+ on the right side, and 2+ on the left side.        Posterior tibial pulses are 2+ on the right side, and 2+ on the left side.   JVD to angle of jaw laying in bed at 30 degrees   Pulmonary/Chest: Effort normal and breath sounds normal. No respiratory distress. She has no wheezes. She has no rales.   Abdominal: Soft. Bowel sounds are normal. She exhibits no distension and no mass. There is no tenderness. There is no rebound and no guarding.   Musculoskeletal: She exhibits no edema.   Neurological: She is alert and oriented to person, place, and time. No cranial nerve deficit.   Skin: Skin is warm and dry. She is not diaphoretic. No pallor.   Psychiatric: She has a normal mood and affect. Her behavior is normal. Judgment and thought content normal.   Nursing note and vitals reviewed.      Lab Review  Lab Results   Component Value Date/Time    WBC 13.8 (H) 11/15/2018 06:30 AM    RBC 3.75 (L) 11/15/2018 06:30 AM    HEMOGLOBIN 11.3 (L) 11/15/2018 06:30 AM    HEMATOCRIT 35.8 (L) 11/15/2018 06:30 AM    MCV 95.5 11/15/2018 06:30 AM    MCH 30.1 11/15/2018 06:30 AM    MCHC 31.6 (L) 11/15/2018 06:30 AM    MPV 10.7 11/15/2018 06:30 AM      Lab Results   Component Value Date/Time    SODIUM 137 11/14/2018 05:56 PM    POTASSIUM 4.4 11/14/2018 05:56 PM    CHLORIDE 110 11/14/2018 05:56 PM    CO2 17 (L) 11/14/2018 05:56 PM    GLUCOSE 109 (H) 11/14/2018 05:56 PM    BUN 43 (H) 11/14/2018 05:56 PM    CREATININE 1.39 11/14/2018 05:56 PM      Lab Results   Component Value Date/Time    ASTSGOT 128 (H) 11/14/2018 05:56 PM    ALTSGPT 46 11/14/2018 05:56 PM     Lab Results   Component Value Date/Time    TROPONINI 47.74 (H) 11/15/2018 06:30 AM       Recent Labs      11/13/18   1345  11/14/18   1756   BNPBTYPENAT  690*  604*       Cardiac  Imaging and Procedures Review  Personally viewed by myself showing normal sinus rhythm with an inferior infarct pattern Q waves in leads II, III and aVF.     ECHO:  Performed stat at bedside and personally viewed by myself showing no significant valvular is normal LV systolic function with a possible mild hypokinesis of the inferior wall.     Imaging  Chest X-Ray:  N/A     Stress Test:  N/A    Assessment/Plan  No new Assessment & Plan notes have been filed under this hospital service since the last note was generated.  Service: Cardiology    1. Inferior STEMI    - restart asa    - plavix 75 daily    - heparin drip    - atorva 80       2. CKD    - daily BMP, BNP    3. JVD, concern for CHF    - stop fluids    - lasix 20 IV x 1    Thank you for allowing me to participate in the care of this patient.  I will continue to follow this patient    Please contact me with any questions.    Benny Ogden M.D.   Cardiologist, [facility]  [phone]

## 2018-11-15 NOTE — PROGRESS NOTES
2 RN Skin check done with AQUILINO Price    Patient bilateral ears red but blanching. Ear pads placed for comfort. Patient bilateral elbows pink and blanching. Small scabbed skin tear noted to right elbow. Dressing in place. Patient bilateral heels red and blanching. Floated. Patient sacrum red and miguel angel, slightly slow to megan. Mepilex replaced. Pt continent at this time. Waffle mattress in place.

## 2018-11-15 NOTE — CARE PLAN
Problem: Bowel/Gastric:  Goal: Normal bowel function is maintained or improved  Outcome: PROGRESSING AS EXPECTED  Multiple bowel movements on shift.    Problem: Discharge Barriers/Planning  Goal: Patient's continuum of care needs will be met  Outcome: PROGRESSING AS EXPECTED  Discussed patient condition with daughter on phone. Emergency contact information updated in EPIC. Per patient's mother, the patients previous living arrangements (private apartment in [facility] community) will not be appropriate post-discharge; this RN agrees. Social work notified.    Problem: Fluid Volume:  Goal: Will maintain balanced intake and output  Outcome: PROGRESSING AS EXPECTED  Patient voiding to bedpan.

## 2018-11-15 NOTE — PROGRESS NOTES
MS    SR. Rare short bursts (5-10 beats) of AFib.  Sinus rate 60-80. Afib rate 140-150.  0.14 / 0.10 / 0.32

## 2018-11-15 NOTE — PROGRESS NOTES
[facility] Medicine Daily Progress Note    Date of Service  11/15/2018    Chief Complaint  89 y.o. female admitted 11/13/2018 with a fall    Hospital Course    Ms Hope has a history of hypertension.  Patient was brought to the emergency room after a fall.  Evaluation in the emergency room included an EKG with acute ST segment elevation in the troponin greater than 50.  The patient admitted to some substernal chest pain the night prior to admission.  The patient chose not to proceed with cardiac catheterization.  She was started on heparin drip and admitted to the ICU.      Interval Problem Update  She rested comfortably overnight, no chest pain, no shortness of breath  Has pain at right elbow and right hip, worse when she moves her arm  Tolerating heparin drip, no epistaxis, no signs of GI bleed    Consultants/Specialty  Cardiology, Dr. Ogden  Critical Care, I discussed the patient's condition with Dr. Ramirez today on ICU Rounds    Code Status  DNAR/DNI    Disposition  OK to transfer to telemetry, orders written on 11/15/18  Patient may benefit from more supervised living situation, social work is aware    Review of Systems  Review of Systems   Constitutional: Negative for malaise/fatigue and weight loss.   Respiratory: Negative for cough and hemoptysis.    Musculoskeletal: Positive for joint pain. Negative for back pain and neck pain.   Skin: Negative for itching and rash.        Physical Exam  Temp:  [36.2 °C (97.2 °F)-36.6 °C (97.8 °F)] 36.6 °C (97.8 °F)  Pulse:  [63-86] 74  Resp:  [14-28] 26    Physical Exam   Constitutional: She is oriented to person, place, and time.   Frail elderly woman, no apparent distress   HENT:   Head: Normocephalic and atraumatic.   Eyes: Conjunctivae and EOM are normal. Right eye exhibits no discharge. Left eye exhibits no discharge.   Cardiovascular: Normal rate, regular rhythm and intact distal pulses.    No murmur heard.  2+ Radial Pulses  Brisk Capillary Refill   Pulmonary/Chest:  Effort normal and breath sounds normal. No respiratory distress. She has no wheezes.   Abdominal: Soft. Bowel sounds are normal. She exhibits no distension. There is no tenderness. There is no rebound.   Musculoskeletal: Normal range of motion. She exhibits no edema.   Right elbow, there is swelling and ecchymosis   Neurological: She is alert and oriented to person, place, and time. No cranial nerve deficit.   Skin: Skin is warm and dry. No erythema.   Skin is warm and well perfused   Psychiatric: She has a normal mood and affect. Her behavior is normal. Thought content normal.       Fluids    Intake/Output Summary (Last 24 hours) at 11/15/18 0944  Last data filed at 11/15/18 0600   Gross per 24 hour   Intake          2174.51 ml   Output              150 ml   Net          2024.51 ml       Laboratory  Recent Labs      11/14/18   0555  11/14/18   1756  11/15/18   0630   WBC  13.5*  13.3*  13.8*   RBC  3.68*  3.81*  3.75*   HEMOGLOBIN  11.0*  11.5*  11.3*   HEMATOCRIT  34.5*  36.1*  35.8*   MCV  93.8  94.8  95.5   MCH  29.9  30.2  30.1   MCHC  31.9*  31.9*  31.6*   RDW  47.7  49.1  50.3*   PLATELETCT  180  182  197   MPV  10.4  10.7  10.7     Recent Labs      11/14/18   0555  11/14/18   1756  11/15/18   0630   SODIUM  136  137  137   POTASSIUM  3.7  4.4  4.0   CHLORIDE  109  110  111   CO2  20  17*  15*   GLUCOSE  120*  109*  130*   BUN  47*  43*  40*   CREATININE  1.40  1.39  1.31   CALCIUM  8.0*  8.1*  8.0*     Recent Labs      11/13/18   1345   11/14/18   0555  11/14/18   1234  11/14/18   1756   APTT  27.2   < >  112.6*  71.3*  63.9*   INR  1.12   --    --    --    --     < > = values in this interval not displayed.     Recent Labs      11/13/18   1345  11/14/18   1756   BNPBTYPENAT  690*  604*           Imaging  DX-CHEST-PORTABLE (1 VIEW)   Final Result      No acute cardiopulmonary abnormality. No interval change.      DX-CHEST-PORTABLE (1 VIEW)   Final Result      No acute cardiopulmonary disease.      CT-HEAD  W/O   Final Result      1.  No evidence of acute intracranial process.      2.  There is again seen interstitial pulmonary edema and bibasilar atelectasis.      CT-HIP W/O PLUS RECONS LEFT   Final Result      1.  No evidence of acute fracture or malalignment. No evidence of hardware failure or complication.   2.  Postsurgical changes of the left femur with broken screw fragment redemonstrated.   3.  Demineralization.   4.  Scattered colonic diverticula.   5.  Atherosclerosis.   6.  Small fat-containing umbilical hernia.      EC-ECHOCARDIOGRAM COMPLETE W/O CONT   Final Result      DX-HIP-COMPLETE - UNILATERAL 2+ LEFT   Final Result      1.  No definite acute osseous abnormality. In setting of demineralization, an occult fracture is difficult to exclude. If there is strong clinical suspicion, CT or MRI could be obtained for further evaluation.   2.  Postsurgical changes of the left femur with broken screw fragment redemonstrated.      DX-CHEST-PORTABLE (1 VIEW)   Final Result      No acute cardiopulmonary process is seen.      Atherosclerotic plaque.           Assessment/Plan  ST elevation myocardial infarction involving right coronary artery (HCC)- (present on admission)   Assessment & Plan    Remarkably asymptomatic, no chest pain  She declined angiogram and PCI when she presented  Cardiology following,  Heparin drip  Rate of heparin drip will be adjusted based on serial pTT measurements to ensure adequate anticoagulation and to avoid potential complications of bleeding  Coreg and lisinopril, aspirin and plavix, atorvastatin  OK to transfer to telemetry floor       Pain of left hip joint- (present on admission)   Assessment & Plan    Imaging including CT scan of the hip shows no evidence of fracture  PT/OT     Rhabdomyolysis- (present on admission)   Assessment & Plan    Due to fall  Improved, repeat CPK tomorrow     Chronic kidney disease (CKD), stage III (moderate) (HCC)- (present on admission)   Assessment & Plan     Renally dose medications     Elevated brain natriuretic peptide (BNP) level- (present on admission)   Assessment & Plan    No signs of fluid overload  Echocardiogram showed LVEF 50%     Leukocytosis- (present on admission)   Assessment & Plan    Suspect this is reactive, no fevers  Follow daily CBC     At risk for falls- (present on admission)   Assessment & Plan    PT/OT     Advanced directives, counseling/discussion- (present on admission)   Assessment & Plan    DNAR/DNI per patient's wishes          VTE prophylaxis: on heparin drip, no need for additional DVT prophylaxis

## 2018-11-15 NOTE — PROGRESS NOTES
Critical Care Progress Note    Date of admission  11/13/2018    Chief Complaint  89 y.o. female admitted 11/13/2018 with STEMI.  89 y.o. female who presented 11/13/2018 with With fall at home and being found down.  She fell at approximately 9 PM last night, and says she was found 4 hours later.  She is alert and oriented, she denies any acute or motor or sensory changes.  She takes lisinopril at home.  She denies any chest pain but does say that she has been feeling weak.  In the ER she had a CT of her left hip due to fall and some pain, however L showed an old femur fracture without any acute fracture.  Her chest x-ray had a normal size heart along with no significant consolidations or edema.  She denies any history of heart or lung disease.  She does have chronic kidney disease with a GFR in the 30s.  She was seen by cardiology in the ER, but refused to have any procedures such as a cath.  Her EKG showed ST elevation in the inferior leads, stat echo showed apical inferior wall hypokinesis.  Did not show any valvular disorders.  She says that she had a fall in October where she had her head.  This fall she says was because she felt weak and her legs gave out on her.  She did not have any prior dizziness, she did not lose consciousness which she is aware but she does state that she hit her head though she thinks that she had the pillow on the way down.  CPK elevated 1000.  Troponin 50.  BNP elevated to 800.      Hospital Course    11/13 admitted for stemi  11/14 ongoing rise troponin, fluid resuscitation, cpk downtrended      Interval Problem Update  Reviewed last 24 hour events:  Heparin drip  Lasix 20 mg once  Stop fluids  Troponin to 500 overnight, improved this am  Resting comfortably  Mobilize  ekg st elevations remain, not as dramatic  Continue bb  sbp  sbp  Hr 60-70s  Output not accurate as no sousa, incontinent    Review of Systems  Review of Systems   Constitutional: Negative for chills, diaphoresis,  fever, malaise/fatigue and weight loss.        Baseline hand tremor  Resting comfortably  pleasant   HENT: Negative for congestion and sinus pain.    Eyes: Negative for blurred vision, double vision and photophobia.   Respiratory: Negative for cough, hemoptysis, sputum production, shortness of breath, wheezing and stridor.    Cardiovascular: Negative for chest pain, palpitations and leg swelling.   Gastrointestinal: Negative for blood in stool, heartburn, melena and vomiting.   Genitourinary: Negative for dysuria, flank pain, frequency and urgency.   Musculoskeletal: Negative for back pain, myalgias and neck pain.   Skin: Negative for itching and rash.   Neurological: Positive for tremors and sensory change. Negative for dizziness, tingling, speech change, focal weakness and headaches.        Chronic bilateral tingling of fingers   Endo/Heme/Allergies: Negative for polydipsia. Does not bruise/bleed easily.   Psychiatric/Behavioral: Negative for depression. The patient is not nervous/anxious.         Vital Signs for last 24 hours   Temp:  [36.2 °C (97.2 °F)-36.6 °C (97.8 °F)] 36.6 °C (97.8 °F)  Pulse:  [63-86] 74  Resp:  [14-28] 26    Hemodynamic parameters for last 24 hours       Respiratory       Physical Exam   Physical Exam   Constitutional: She is oriented to person, place, and time. She appears well-developed and well-nourished. No distress.   Patient remains calm and resting comfortably,  Mobilized to chair   HENT:   Head: Normocephalic and atraumatic.   Right Ear: External ear normal.   Left Ear: External ear normal.   Mouth/Throat: No oropharyngeal exudate.   Eyes: Pupils are equal, round, and reactive to light. Conjunctivae and EOM are normal. Right eye exhibits no discharge. Left eye exhibits no discharge.   Neck: No JVD present. No tracheal deviation present.   Cardiovascular: Normal rate, regular rhythm and normal heart sounds.    No murmur heard.  Pulmonary/Chest: Effort normal. No stridor. No  respiratory distress. She has no wheezes. She has rales. She exhibits no tenderness.   Bedside us, moderate ef left vent, rt heart normal size, iVC mild compressibility, bilateral lungs no significant b lines, scattered, can handle some addl fluids   Abdominal: She exhibits no mass. There is no tenderness. There is no rebound and no guarding.   Bedside us enlarged/full bladder, no pain on palpation   Musculoskeletal: She exhibits no edema, tenderness or deformity.   Neurological: She is alert and oriented to person, place, and time. She displays normal reflexes. No cranial nerve deficit. Coordination normal.   Skin: Skin is warm and dry. No rash noted. She is not diaphoretic. No erythema.   Psychiatric: She has a normal mood and affect. Her behavior is normal.       Medications  Current Facility-Administered Medications   Medication Dose Route Frequency Provider Last Rate Last Dose   • aspirin (ASA) chewable tab 81 mg  81 mg Oral DAILY Benny Ogden M.D.       • furosemide (LASIX) injection 20 mg  20 mg Intravenous Q DAY Benny Ogden M.D.       • artificial tear ointment (REFRESH,LACRI-LUBE) 1 Application  1 Application Both Eyes PRN Tra Garsia M.D.       • latanoprost (XALATAN) 0.005 % ophthalmic solution 1 Drop  1 Drop Both Eyes Nightly Tra Garsia M.D.   1 Drop at 11/14/18 2151   • lisinopril (PRINIVIL) tablet 2.5 mg  2.5 mg Oral DAILY Paxton Campos M.D.   Stopped at 11/14/18 0545   • senna-docusate (PERICOLACE or SENOKOT S) 8.6-50 MG per tablet 2 Tab  2 Tab Oral BID Tra Garsia M.D.   Stopped at 11/14/18 0600    And   • polyethylene glycol/lytes (MIRALAX) PACKET 1 Packet  1 Packet Oral QDAY PRN Tra Garsia M.D.        And   • magnesium hydroxide (MILK OF MAGNESIA) suspension 30 mL  30 mL Oral QDAY PRN Tra Garsia M.D.        And   • bisacodyl (DULCOLAX) suppository 10 mg  10 mg Rectal QDAY PRN Tra Garsia M.D.       • acetaminophen (TYLENOL) tablet 650 mg  650 mg Oral Q6HRS PRN Tra Garsia,  M.D.       • nitroglycerin (NITROSTAT) tablet 0.4 mg  0.4 mg Sublingual Q5 MIN PRN Tra Garsia M.D.       • morphine (pf) 4 mg/ml injection 2-4 mg  2-4 mg Intravenous Q5 MIN PRN Tra Garsia M.D.       • atorvastatin (LIPITOR) tablet 80 mg  80 mg Oral Q EVENING Tra Garsia M.D.   80 mg at 11/14/18 1754   • ondansetron (ZOFRAN) syringe/vial injection 4 mg  4 mg Intravenous Q4HRS PRN Tra Garsia M.D.       • ondansetron (ZOFRAN ODT) dispertab 4 mg  4 mg Oral Q4HRS PRN Tra Garsia M.D.       • heparin injection 1,800 Units  1,800 Units Intravenous PRN Tra Garsia M.D.        And   • heparin infusion 25,000 units in 500 ml 0.45% nacl   Intravenous Continuous Tra Garsia M.D. 13 mL/hr at 11/15/18 0803 650 Units/hr at 11/15/18 0803   • clopidogrel (PLAVIX) tablet 75 mg  75 mg Oral DAILY Bruce Ramirez M.D.   75 mg at 11/15/18 0600   • MD Alert...ICU Electrolyte Replacement per Pharmacy   Other pharmacy to dose Bruce Ramirez M.D.       • Respiratory Care per Protocol   Nebulization Continuous RT Bruce Ramirez M.D.       • carvedilol (COREG) tablet 3.125 mg  3.125 mg Oral BID WITH MEALS Paxton Campos M.D.   Stopped at 11/14/18 0730       Fluids    Intake/Output Summary (Last 24 hours) at 11/15/18 0951  Last data filed at 11/15/18 0600   Gross per 24 hour   Intake          2174.51 ml   Output              150 ml   Net          2024.51 ml       Laboratory      Recent Labs      11/13/18   1345   11/14/18   0555   11/14/18   1756  11/14/18   2330  11/15/18   0630   CPKTOTAL  1158*   --   653*   --    --    --    --    TROPONINI  >50.00*   < >  72.03*   < >  63.74*  >500.00*  47.74*   BNPBTYPENAT  690*   --    --    --   604*   --    --     < > = values in this interval not displayed.     Recent Labs      11/14/18   0555  11/14/18   1756  11/15/18   0630   SODIUM  136  137  137   POTASSIUM  3.7  4.4  4.0   CHLORIDE  109  110  111   CO2  20  17*  15*   BUN  47*  43*  40*   CREATININE  1.40  1.39  1.31   MAGNESIUM  1.8   --     --    PHOSPHORUS  2.3*   --    --    CALCIUM  8.0*  8.1*  8.0*     Recent Labs      11/13/18   1345  11/14/18   0555  11/14/18   1756  11/15/18   0630   ALTSGPT  47  37  46   --    ASTSGOT  198*  117*  128*   --    ALKPHOSPHAT  89  80  86   --    TBILIRUBIN  0.9  0.7  0.6   --    GLUCOSE  102*  120*  109*  130*     Recent Labs      11/13/18   1345  11/14/18   0555  11/14/18   1756  11/15/18   0630   WBC  13.4*  13.5*  13.3*  13.8*   NEUTSPOLYS  73.00*  65.10   --   68.50   LYMPHOCYTES  14.00*  21.10*   --   18.00*   MONOCYTES  12.00  12.30   --   11.10   EOSINOPHILS  0.10  0.50   --   1.20   BASOPHILS  0.50  0.40   --   0.60   ASTSGOT  198*  117*  128*   --    ALTSGPT  47  37  46   --    ALKPHOSPHAT  89  80  86   --    TBILIRUBIN  0.9  0.7  0.6   --      Recent Labs      11/13/18   1345   11/14/18   0555  11/14/18   1234  11/14/18   1756  11/15/18   0630   RBC  4.48   --   3.68*   --   3.81*  3.75*   HEMOGLOBIN  13.5   --   11.0*   --   11.5*  11.3*   HEMATOCRIT  41.5   --   34.5*   --   36.1*  35.8*   PLATELETCT  210   --   180   --   182  197   PROTHROMBTM  14.5   --    --    --    --    --    APTT  27.2   < >  112.6*  71.3*  63.9*   --    INR  1.12   --    --    --    --    --     < > = values in this interval not displayed.       Imaging  X-Ray:  I have personally reviewed the images and compared with prior images. and My impression is: no significant pleural effusions, juan francisco mild edema, no focal consolidaitons noted  EKG:  I have personally reviewed the images and compared with prior images. and My impression is: continued st elevations 2 boxes inferior leads, other non specific st/t changes  CT:    Reviewed  Echo:   Reviewed    Assessment/Plan  ST elevation myocardial infarction involving right coronary artery (HCC)- (present on admission)   Assessment & Plan    Significant stemi change on ekg  Patient deferred intervention for heart catheterization recommended per cardiology  Refuses aspirin due to prior  allergy  Started on plavix 75 mg daily, no load given age  Continue on heparin  Ct head s/p fall no bleeding noted  On statin  Beta blocker, poorly tolerated overnight, reduced dose  Chest xray no significant effusions  Echo in er severe tricuspid regurg, ef 50%, some reduced motion/conduction left vent  Keep map > 65  High troponin overnight to 500  downtrended  Possible collaterals  High risk of arrythmia given on clot remova/stenting       Fall- (present on admission)   Assessment & Plan    Fall prior to admission  Ct head no bleeding noted, no infarct  Likely cause of rhabdo  Patient denied seizure activity  Likely secondary to MI  Will need pt/ot  Mobilize today to chair and hallway     Pain of left hip joint- (present on admission)   Assessment & Plan    Ct of hip, no acute fractures noted  No signifiant findings on ct imaging of pelvis.     Rhabdomyolysis- (present on admission)   Assessment & Plan    Patient had fall and stayed on ground for at least 4 hours per patient, maybe longer  Gentle hydration in setting of STEMI  cpk improved\  Adequate fluid resuscitation       Chronic kidney disease (CKD), stage III (moderate) (HCC)- (present on admission)   Assessment & Plan     less than expected for age  Avoid nephrotoxic agents  Patient deferred heart cath  Poor urinarly output  Stopped fluids today  Diuresis with 20 mg lasix  adequte urinary ouptut per nursing but not measurable due to incontinence  Patient refuses catheter  Encourage po intake       Elevated brain natriuretic peptide (BNP) level- (present on admission)   Assessment & Plan    Secondary to MI with STEMI and partially due to age  Diuresis with 20 mg lasix today  Discontinued fluids     Leukocytosis- (present on admission)   Assessment & Plan    Secondary to stemi  Continue to monitor  Daily cbc  No signs of infection today, no fever  No signs infection cxray  No need for antibiotics or infectious work up     At risk for falls- (present on  admission)   Assessment & Plan    Pt/ot     Advanced directives, counseling/discussion- (present on admission)   Assessment & Plan    Discussed with patient, continue dnr/dni and medication therapy only  Palliative consult          VTE:  Heparin  Ulcer: Not Indicated  Lines: None    I have performed a physical exam and reviewed and updated ROS and Plan today (11/15/2018). In review of yesterday's note (11/14/2018), there are no changes except as documented above.     Discussed patient condition and risk of morbidity and/or mortality with Hospitalist, Family, RN, RT, Pharmacy, , Charge nurse / hot rounds, Patient and cardiology     The patient remains critically ill.  Continues on heparin drip of which I am managing in setting of STEMI, troponin up to 500 overnight.  Very high risk of death secondary to cardiac arrythmia leading to cardiac arrest.  Without close monitoring and assessement and reassessment for hemodynamic stability, there is high probability of clinical worsening and high likelihood of death.  Critical care time = 32 minutes in directly providing and coordinating critical care and extensive data review.  No time overlap and excludes procedures.

## 2018-11-15 NOTE — PROGRESS NOTES
Cardiology Follow Up Progress Note    Date of Service  11/15/2018    Attending Physician  Wagner Galvan M.D.    Reason for consultation     ST elevation inferior MI    History of   Hypertension , chronic kidney disease      Admitted with a ground-level fall, EKG incidentally showed ST elevation inferior MI, declined any intervention        Interim Events    11/16/18 , hypotensive overnight, received 500 cc bolus , dyspnea, unable to give lasix, hold ACE and BB, no rhythm issues overnight     Review of Systems  Review of Systems   Respiratory: Positive for shortness of breath. Negative for apnea, cough, choking, chest tightness, wheezing and stridor.    Cardiovascular: Negative for chest pain, palpitations and leg swelling.       Vital signs in last 24 hours  Temp:  [36.2 °C (97.2 °F)-36.8 °C (98.2 °F)] 36.8 °C (98.2 °F)  Pulse:  [63-87] 87  Resp:  [14-29] 16  BP: (101)/(62) 101/62    Physical Exam  Physical Exam   Constitutional: She is oriented to person, place, and time.   HENT:   Head: Normocephalic.   Eyes: Conjunctivae are normal.   Neck: No JVD present. No thyromegaly present.   Cardiovascular: Normal rate and regular rhythm.    Pulses:       Carotid pulses are 2+ on the right side, and 2+ on the left side.       Radial pulses are 2+ on the right side, and 2+ on the left side.   Pulmonary/Chest: She has no wheezes.   Abdominal: Soft.   Musculoskeletal: She exhibits no edema.   Neurological: She is alert and oriented to person, place, and time.   Skin: Skin is warm and dry.       Lab Review  Lab Results   Component Value Date/Time    WBC 13.8 (H) 11/15/2018 06:30 AM    RBC 3.75 (L) 11/15/2018 06:30 AM    HEMOGLOBIN 11.3 (L) 11/15/2018 06:30 AM    HEMATOCRIT 35.8 (L) 11/15/2018 06:30 AM    MCV 95.5 11/15/2018 06:30 AM    MCH 30.1 11/15/2018 06:30 AM    MCHC 31.6 (L) 11/15/2018 06:30 AM    MPV 10.7 11/15/2018 06:30 AM      Lab Results   Component Value Date/Time    SODIUM 137 11/15/2018 06:30 AM    POTASSIUM 4.0  "11/15/2018 06:30 AM    CHLORIDE 111 11/15/2018 06:30 AM    CO2 15 (L) 11/15/2018 06:30 AM    GLUCOSE 130 (H) 11/15/2018 06:30 AM    BUN 40 (H) 11/15/2018 06:30 AM    CREATININE 1.31 11/15/2018 06:30 AM      Lab Results   Component Value Date/Time    ASTSGOT 128 (H) 11/14/2018 05:56 PM    ALTSGPT 46 11/14/2018 05:56 PM     Lab Results   Component Value Date/Time    TROPONINI 47.74 (H) 11/15/2018 06:30 AM       Recent Labs      11/13/18   1345  11/14/18   1756   BNPBTYPENAT  690*  604*         Assessment    Inferior STEMI, The patient chose not to proceed with cardiac catheterization.  Ground-level fall secondary to a    \"bad left hip \"  Hypertension  Echocardiogram 11/13/20, LVEF 50%, hypokinesis of mid to apical anterior septum and basal to mid inferior wall, severe TR, no significant valvular abnormalities    Severe TR    Plan    Plan for medical therapy, ASA, Lipitor, and Plavix  EF 50%  Hold lisinopril and carvedilol secondary to hypotension.  No rhythm issues overnight.  Dyspnea, continue with low dose  lasix if BP permits.  PT today if blood pressure permits  Follow-up appointment with her cardiology office in 2-3 weeks, will arrange      "

## 2018-11-15 NOTE — THERAPY
Per last troponin draw >500. Will continue to defer PT until these levels have stabilized and cardiac POC established.

## 2018-11-16 ENCOUNTER — APPOINTMENT (OUTPATIENT)
Dept: RADIOLOGY | Facility: MEDICAL CENTER | Age: 83
DRG: 280 | End: 2018-11-16
Attending: INTERNAL MEDICINE
Payer: MEDICARE

## 2018-11-16 PROBLEM — R04.0 NOSEBLEED: Status: ACTIVE | Noted: 2018-11-16

## 2018-11-16 LAB
ANION GAP SERPL CALC-SCNC: 7 MMOL/L (ref 0–11.9)
APTT PPP: 29.9 SEC (ref 24.7–36)
BASOPHILS # BLD AUTO: 0.6 % (ref 0–1.8)
BASOPHILS # BLD: 0.06 K/UL (ref 0–0.12)
BUN SERPL-MCNC: 40 MG/DL (ref 8–22)
CALCIUM SERPL-MCNC: 8 MG/DL (ref 8.5–10.5)
CHLORIDE SERPL-SCNC: 112 MMOL/L (ref 96–112)
CK SERPL-CCNC: 214 U/L (ref 0–154)
CO2 SERPL-SCNC: 19 MMOL/L (ref 20–33)
CREAT SERPL-MCNC: 1.41 MG/DL (ref 0.5–1.4)
EOSINOPHIL # BLD AUTO: 0.18 K/UL (ref 0–0.51)
EOSINOPHIL NFR BLD: 1.9 % (ref 0–6.9)
ERYTHROCYTE [DISTWIDTH] IN BLOOD BY AUTOMATED COUNT: 50.7 FL (ref 35.9–50)
GLUCOSE SERPL-MCNC: 119 MG/DL (ref 65–99)
HCT VFR BLD AUTO: 31.9 % (ref 37–47)
HGB BLD-MCNC: 10.2 G/DL (ref 12–16)
IMM GRANULOCYTES # BLD AUTO: 0.05 K/UL (ref 0–0.11)
IMM GRANULOCYTES NFR BLD AUTO: 0.5 % (ref 0–0.9)
LYMPHOCYTES # BLD AUTO: 2.16 K/UL (ref 1–4.8)
LYMPHOCYTES NFR BLD: 22.6 % (ref 22–41)
MAGNESIUM SERPL-MCNC: 2 MG/DL (ref 1.5–2.5)
MCH RBC QN AUTO: 30.9 PG (ref 27–33)
MCHC RBC AUTO-ENTMCNC: 32 G/DL (ref 33.6–35)
MCV RBC AUTO: 96.7 FL (ref 81.4–97.8)
MONOCYTES # BLD AUTO: 1.15 K/UL (ref 0–0.85)
MONOCYTES NFR BLD AUTO: 12.1 % (ref 0–13.4)
NEUTROPHILS # BLD AUTO: 5.94 K/UL (ref 2–7.15)
NEUTROPHILS NFR BLD: 62.3 % (ref 44–72)
NRBC # BLD AUTO: 0 K/UL
NRBC BLD-RTO: 0 /100 WBC
PHOSPHATE SERPL-MCNC: 2.2 MG/DL (ref 2.5–4.5)
PLATELET # BLD AUTO: 187 K/UL (ref 164–446)
PMV BLD AUTO: 10.6 FL (ref 9–12.9)
POTASSIUM SERPL-SCNC: 4.3 MMOL/L (ref 3.6–5.5)
RBC # BLD AUTO: 3.3 M/UL (ref 4.2–5.4)
SODIUM SERPL-SCNC: 138 MMOL/L (ref 135–145)
WBC # BLD AUTO: 9.5 K/UL (ref 4.8–10.8)

## 2018-11-16 PROCEDURE — 700105 HCHG RX REV CODE 258

## 2018-11-16 PROCEDURE — 99233 SBSQ HOSP IP/OBS HIGH 50: CPT | Performed by: INTERNAL MEDICINE

## 2018-11-16 PROCEDURE — 97162 PT EVAL MOD COMPLEX 30 MIN: CPT

## 2018-11-16 PROCEDURE — 84100 ASSAY OF PHOSPHORUS: CPT

## 2018-11-16 PROCEDURE — 97165 OT EVAL LOW COMPLEX 30 MIN: CPT

## 2018-11-16 PROCEDURE — G8979 MOBILITY GOAL STATUS: HCPCS | Mod: CI

## 2018-11-16 PROCEDURE — 700102 HCHG RX REV CODE 250 W/ 637 OVERRIDE(OP): Performed by: INTERNAL MEDICINE

## 2018-11-16 PROCEDURE — 83735 ASSAY OF MAGNESIUM: CPT

## 2018-11-16 PROCEDURE — A9270 NON-COVERED ITEM OR SERVICE: HCPCS | Performed by: INTERNAL MEDICINE

## 2018-11-16 PROCEDURE — G8978 MOBILITY CURRENT STATUS: HCPCS | Mod: CK

## 2018-11-16 PROCEDURE — 99232 SBSQ HOSP IP/OBS MODERATE 35: CPT | Performed by: INTERNAL MEDICINE

## 2018-11-16 PROCEDURE — 36415 COLL VENOUS BLD VENIPUNCTURE: CPT

## 2018-11-16 PROCEDURE — 85730 THROMBOPLASTIN TIME PARTIAL: CPT

## 2018-11-16 PROCEDURE — 82550 ASSAY OF CK (CPK): CPT

## 2018-11-16 PROCEDURE — 770020 HCHG ROOM/CARE - TELE (206)

## 2018-11-16 PROCEDURE — 71045 X-RAY EXAM CHEST 1 VIEW: CPT

## 2018-11-16 PROCEDURE — 80048 BASIC METABOLIC PNL TOTAL CA: CPT

## 2018-11-16 PROCEDURE — 85025 COMPLETE CBC W/AUTO DIFF WBC: CPT

## 2018-11-16 PROCEDURE — G8988 SELF CARE GOAL STATUS: HCPCS | Mod: CI

## 2018-11-16 PROCEDURE — G8987 SELF CARE CURRENT STATUS: HCPCS | Mod: CK

## 2018-11-16 RX ORDER — OXYMETAZOLINE HYDROCHLORIDE 0.05 G/100ML
2 SPRAY NASAL 2 TIMES DAILY
Status: DISCONTINUED | OUTPATIENT
Start: 2018-11-16 | End: 2018-11-17

## 2018-11-16 RX ORDER — SODIUM CHLORIDE 9 MG/ML
INJECTION, SOLUTION INTRAVENOUS
Status: COMPLETED
Start: 2018-11-16 | End: 2018-11-16

## 2018-11-16 RX ADMIN — ATORVASTATIN CALCIUM 80 MG: 80 TABLET, FILM COATED ORAL at 16:30

## 2018-11-16 RX ADMIN — SODIUM CHLORIDE: 9 INJECTION, SOLUTION INTRAVENOUS at 08:15

## 2018-11-16 RX ADMIN — LATANOPROST 1 DROP: 50 SOLUTION OPHTHALMIC at 21:48

## 2018-11-16 RX ADMIN — OXYMETAZOLINE HYDROCHLORIDE 2 SPRAY: 5 SPRAY NASAL at 16:29

## 2018-11-16 RX ADMIN — ASPIRIN 81 MG: 81 TABLET, CHEWABLE ORAL at 05:15

## 2018-11-16 RX ADMIN — CLOPIDOGREL 75 MG: 75 TABLET, FILM COATED ORAL at 05:15

## 2018-11-16 ASSESSMENT — COGNITIVE AND FUNCTIONAL STATUS - GENERAL
TOILETING: A LOT
SUGGESTED CMS G CODE MODIFIER DAILY ACTIVITY: CK
MOVING TO AND FROM BED TO CHAIR: UNABLE
MOVING FROM LYING ON BACK TO SITTING ON SIDE OF FLAT BED: UNABLE
MOBILITY SCORE: 12
STANDING UP FROM CHAIR USING ARMS: A LITTLE
DRESSING REGULAR LOWER BODY CLOTHING: A LOT
EATING MEALS: A LITTLE
PERSONAL GROOMING: A LITTLE
DAILY ACTIVITIY SCORE: 15
CLIMB 3 TO 5 STEPS WITH RAILING: A LOT
TURNING FROM BACK TO SIDE WHILE IN FLAT BAD: A LITTLE
HELP NEEDED FOR BATHING: A LOT
DRESSING REGULAR UPPER BODY CLOTHING: A LITTLE
WALKING IN HOSPITAL ROOM: A LOT
SUGGESTED CMS G CODE MODIFIER MOBILITY: CL

## 2018-11-16 ASSESSMENT — ENCOUNTER SYMPTOMS
WHEEZING: 0
COUGH: 0
WEIGHT LOSS: 0
HEMOPTYSIS: 0
DOUBLE VISION: 0
NAUSEA: 0
BACK PAIN: 0
NECK PAIN: 0
PHOTOPHOBIA: 0
DEPRESSION: 0
HEADACHES: 0
TINGLING: 0
SHORTNESS OF BREATH: 1
CHEST TIGHTNESS: 0
POLYDIPSIA: 0
DIZZINESS: 0
VOMITING: 0
BLURRED VISION: 0
STRIDOR: 0
PALPITATIONS: 0
APNEA: 0
ORTHOPNEA: 0
HEARTBURN: 0
CHOKING: 0
BRUISES/BLEEDS EASILY: 0

## 2018-11-16 ASSESSMENT — ACTIVITIES OF DAILY LIVING (ADL): TOILETING: INDEPENDENT

## 2018-11-16 ASSESSMENT — PAIN SCALES - GENERAL
PAINLEVEL_OUTOF10: 0

## 2018-11-16 ASSESSMENT — LIFESTYLE VARIABLES: SUBSTANCE_ABUSE: 0

## 2018-11-16 ASSESSMENT — GAIT ASSESSMENTS
DISTANCE (FEET): 5
ASSISTIVE DEVICE: HAND HELD ASSIST
DEVIATION: DECREASED BASE OF SUPPORT;BRADYKINETIC;SHUFFLED GAIT
GAIT LEVEL OF ASSIST: MODERATE ASSIST

## 2018-11-16 NOTE — THERAPY
"Occupational Therapy Evaluation completed.   Functional Status: Max A toileting, Max A LB dressing, CGA UB dressing, SPV seated grooming  Plan of Care: Will benefit from Occupational Therapy 3 times per week  Discharge Recommendations:  Equipment: Will Continue to Assess for Equipment Needs. Post-acute therapy Discharge to a transitional care facility for continued skilled therapy services.    See \"Rehab Therapy-Acute\" Patient Summary Report for complete documentation.    Pt is a 89 y/o female who presents to acute 2/2 GLF, found to have STEMI. Troponins are trending down. RN aware of initiating OT eval. Pt reports that she lives alone in a [address] apartment community. PLOF is independent w/ BADLS and IADLs. Pt found attempting to crawl out of bed to go to the bathroom. Pt's blood pressure after session was 100/65. Pt demo'd decreased balance, functional mobility, activity tolerance, and generalized weakness impacting independence w/ BADLS. Will follow for Acute OT services while in house. Recommend subacute placement for continued inpt therapy prior to DC home.     "

## 2018-11-16 NOTE — PROGRESS NOTES
MD Pena Notified pt developed a nose-bleed/ lasting greater than 10min, Ice applied, changed to humidified O2, applied pressure, MD ordered nasal packing and nasal spray

## 2018-11-16 NOTE — CARE PLAN
Problem: Communication  Goal: The ability to communicate needs accurately and effectively will improve  Outcome: PROGRESSING AS EXPECTED  Pt updated on plan of care, white board updated    Problem: Safety  Goal: Will remain free from injury  Outcome: PROGRESSING AS EXPECTED  Pt instructed to use call light, fall educations provided

## 2018-11-16 NOTE — THERAPY
"Physical Therapy Evaluation completed.   Bed Mobility:  Supine to Sit: Contact Guard Assist  Transfers: Sit to Stand: Minimal Assist  Gait: Level Of Assist: Moderate Assist with Front-Wheel Walker       Plan of Care: Will benefit from Physical Therapy 4 times per week  Discharge Recommendations: Equipment: Will Continue to Assess for Equipment Needs. Post-acute therapy Discharge to a transitional care facility for continued skilled therapy services prior to DC home/ placement.    See \"Rehab Therapy-Acute\" Patient Summary Report for complete documentation.     "

## 2018-11-16 NOTE — PROGRESS NOTES
[facility] Medicine Daily Progress Note    Date of Service  11/16/2018    Chief Complaint  89 y.o. female admitted 11/13/2018 with a fall    Hospital Course    Ms Hope has a history of hypertension.  Patient was brought to the emergency room after a fall.  Evaluation in the emergency room included an EKG with acute ST segment elevation in the troponin greater than 50.  The patient admitted to some substernal chest pain the night prior to admission.  The patient chose not to proceed with cardiac catheterization.  She was started on heparin drip and admitted to the ICU.     She was transferred from ICU 11/15.  Heparin drip discontinued.  Patient had nosebleed.  Cardiology signed off    Interval Problem Update  Had low blood pressure 81/50.  He received 500 cc of bolus.  Cardiology discontinued carvedilol and lisinopril.  Patient is experiencing nosebleed.  She is of heparin for more than 2 hours, so reversal of heparin is not needed.  Ordered oxymetazoline nasal spray, nasal packing  Denies chest pain or shortness of breath.    Consultants/Specialty  Cardiology, Dr. Ogden, signed off      Code Status  DNAR/DNI    Disposition  Ordered PT OT evaluation for discharge planning.  Patient is frail    Review of Systems  Review of Systems   Constitutional: Negative for malaise/fatigue and weight loss.   HENT: Positive for nosebleeds. Negative for ear pain, hearing loss and tinnitus.    Eyes: Negative for blurred vision, double vision and photophobia.   Respiratory: Negative for cough and hemoptysis.    Cardiovascular: Negative for chest pain and orthopnea.   Gastrointestinal: Negative for heartburn, nausea and vomiting.   Genitourinary: Negative for dysuria, frequency and urgency.   Musculoskeletal: Positive for joint pain. Negative for back pain and neck pain.   Skin: Negative for itching and rash.   Neurological: Negative for dizziness, tingling and headaches.   Endo/Heme/Allergies: Negative for polydipsia. Does not  bruise/bleed easily.   Psychiatric/Behavioral: Negative for depression, substance abuse and suicidal ideas.        Physical Exam  Temp:  [36.2 °C (97.1 °F)-37.3 °C (99.1 °F)] 36.8 °C (98.2 °F)  Pulse:  [69-87] 81  Resp:  [16-18] 17  BP: ()/(47-69) 107/54    Physical Exam   Constitutional: She is oriented to person, place, and time.   Frail elderly woman, no apparent distress   HENT:   Head: Normocephalic and atraumatic.   Eyes: Conjunctivae and EOM are normal. Right eye exhibits no discharge. Left eye exhibits no discharge.   Cardiovascular: Normal rate, regular rhythm and intact distal pulses.    No murmur heard.  2+ Radial Pulses  Brisk Capillary Refill   Pulmonary/Chest: Effort normal and breath sounds normal. No respiratory distress. She has no wheezes.   Abdominal: Soft. Bowel sounds are normal. She exhibits no distension. There is no tenderness. There is no rebound.   Musculoskeletal: Normal range of motion. She exhibits no edema.   Right elbow, there is swelling and ecchymosis   Neurological: She is alert and oriented to person, place, and time. No cranial nerve deficit.   Skin: Skin is warm and dry. No erythema.   Skin is warm and well perfused   Psychiatric: She has a normal mood and affect. Her behavior is normal. Thought content normal.       Fluids    Intake/Output Summary (Last 24 hours) at 11/16/18 1335  Last data filed at 11/16/18 0900   Gross per 24 hour   Intake             1323 ml   Output                0 ml   Net             1323 ml       Laboratory  Recent Labs      11/14/18   1756  11/15/18   0630  11/16/18   0440   WBC  13.3*  13.8*  9.5   RBC  3.81*  3.75*  3.30*   HEMOGLOBIN  11.5*  11.3*  10.2*   HEMATOCRIT  36.1*  35.8*  31.9*   MCV  94.8  95.5  96.7   MCH  30.2  30.1  30.9   MCHC  31.9*  31.6*  32.0*   RDW  49.1  50.3*  50.7*   PLATELETCT  182  197  187   MPV  10.7  10.7  10.6     Recent Labs      11/14/18   1756  11/15/18   0630  11/16/18   0440   SODIUM  137  137  138   POTASSIUM   4.4  4.0  4.3   CHLORIDE  110  111  112   CO2  17*  15*  19*   GLUCOSE  109*  130*  119*   BUN  43*  40*  40*   CREATININE  1.39  1.31  1.41*   CALCIUM  8.1*  8.0*  8.0*     Recent Labs      11/13/18   1345   11/14/18   1234  11/14/18   1756  11/15/18   1757   APTT  27.2   < >  71.3*  63.9*  81.2*   INR  1.12   --    --    --    --     < > = values in this interval not displayed.     Recent Labs      11/13/18   1345  11/14/18   1756   BNPBTYPENAT  690*  604*           Imaging  DX-CHEST-PORTABLE (1 VIEW)   Final Result      No acute cardiopulmonary abnormality identified.      DX-CHEST-PORTABLE (1 VIEW)   Final Result      No acute cardiopulmonary abnormality. No interval change.      DX-CHEST-PORTABLE (1 VIEW)   Final Result      No acute cardiopulmonary disease.      CT-HEAD W/O   Final Result      1.  No evidence of acute intracranial process.      2.  There is again seen interstitial pulmonary edema and bibasilar atelectasis.      CT-HIP W/O PLUS RECONS LEFT   Final Result      1.  No evidence of acute fracture or malalignment. No evidence of hardware failure or complication.   2.  Postsurgical changes of the left femur with broken screw fragment redemonstrated.   3.  Demineralization.   4.  Scattered colonic diverticula.   5.  Atherosclerosis.   6.  Small fat-containing umbilical hernia.      EC-ECHOCARDIOGRAM COMPLETE W/O CONT   Final Result      DX-HIP-COMPLETE - UNILATERAL 2+ LEFT   Final Result      1.  No definite acute osseous abnormality. In setting of demineralization, an occult fracture is difficult to exclude. If there is strong clinical suspicion, CT or MRI could be obtained for further evaluation.   2.  Postsurgical changes of the left femur with broken screw fragment redemonstrated.      DX-CHEST-PORTABLE (1 VIEW)   Final Result      No acute cardiopulmonary process is seen.      Atherosclerotic plaque.           Assessment/Plan  ST elevation myocardial infarction involving right coronary artery  ([facility])- (present on admission)   Assessment & Plan    Remarkably asymptomatic, no chest pain  She declined angiogram and PCI when she presented  Cardiology signed off  Was on heparin, it was stopped 11/16  Coreg and lisinopril, aspirin and plavix, atorvastatin  Okay for discharge per cardiology standpoint       Fall- (present on admission)   Assessment & Plan    PT OT evaluation     Nosebleed   Assessment & Plan    Nasal spray and nasal packing.  Heparin was stopped hours ago, no reversal needed.  If continues, will consider ENT consult     Pain of left hip joint- (present on admission)   Assessment & Plan    Imaging including CT scan of the hip shows no evidence of fracture  PT/OT     Rhabdomyolysis- (present on admission)   Assessment & Plan    Due to fall  Improved, repeat CPK tomorrow     Chronic kidney disease (CKD), stage III (moderate) ([facility])- (present on admission)   Assessment & Plan    Renally dose medications     Elevated brain natriuretic peptide (BNP) level- (present on admission)   Assessment & Plan    No signs of fluid overload  Echocardiogram showed LVEF 50%     Leukocytosis- (present on admission)   Assessment & Plan    Suspect this is reactive, no fevers  Follow daily CBC     At risk for falls- (present on admission)   Assessment & Plan    PT/OT     Advanced directives, counseling/discussion- (present on admission)   Assessment & Plan    DNAR/DNI per patient's wishes          VTE prophylaxis: on heparin drip, no need for additional DVT prophylaxis

## 2018-11-16 NOTE — PROGRESS NOTES
Cardiovascular Nurse Navigator ([phone]) Note:    Reviewed ACS/PCI medications:  Dual Antiplatelet Therapy (DAPT):  aspirin + clopidogrel  Beta-Blocker:  N/A (Hypotension)  Statin:  Atorvastatin  ACEI/ARB:  N/A (Hypotension)  Aldosterone blocking agent:   N/A (EF 50%)    Intensive Cardiac Rehab (ICR) Referral:  Referred on 11/13; has current inpatient orders for nutrition consult & PT for Phase I ICR  ICR follow-up and contact info added to AVS:  Yes    Cardiology Follow-Up:  11/29/18    Inpatient & Discharge Patient Education:  Bedside nursing to continually provide patient education on ACS meds, signs and symptoms to monitor for, and risk factor modification. Also at discharge please complete the “ACS” special instructions on the AVS.  Thank you and please call with questions.

## 2018-11-16 NOTE — CARE PLAN
Problem: Safety  Goal: Will remain free from falls  Outcome: PROGRESSING AS EXPECTED  Bed alarm on, call light within reach. Educated pt to call for assistance as needed.     Problem: Venous Thromboembolism (VTW)/Deep Vein Thrombosis (DVT) Prevention:  Goal: Patient will participate in Venous Thrombosis (VTE)/Deep Vein Thrombosis (DVT)Prevention Measures  Outcome: PROGRESSING AS EXPECTED  Pt has heparin. Pt is able to move legs and arms, able to turn with assist.

## 2018-11-17 ENCOUNTER — APPOINTMENT (OUTPATIENT)
Dept: RADIOLOGY | Facility: MEDICAL CENTER | Age: 83
DRG: 280 | End: 2018-11-17
Attending: INTERNAL MEDICINE
Payer: MEDICARE

## 2018-11-17 LAB
ANION GAP SERPL CALC-SCNC: 6 MMOL/L (ref 0–11.9)
BASOPHILS # BLD AUTO: 0.5 % (ref 0–1.8)
BASOPHILS # BLD: 0.05 K/UL (ref 0–0.12)
BUN SERPL-MCNC: 41 MG/DL (ref 8–22)
CALCIUM SERPL-MCNC: 8 MG/DL (ref 8.5–10.5)
CHLORIDE SERPL-SCNC: 113 MMOL/L (ref 96–112)
CK BB CFR SERPL ELPH: 0 % (ref 0–0)
CK MACRO1 CFR SERPL: 0 % (ref 0–0)
CK MACRO2 CFR SERPL: 0 % (ref 0–0)
CK MB CFR SERPL ELPH: 1 % (ref 0–4)
CK MM CFR SERPL ELPH: 99 % (ref 96–100)
CK SERPL-CCNC: 196 U/L (ref 0–154)
CK SERPL-CCNC: 708 U/L (ref 20–180)
CO2 SERPL-SCNC: 18 MMOL/L (ref 20–33)
CREAT SERPL-MCNC: 1.34 MG/DL (ref 0.5–1.4)
EOSINOPHIL # BLD AUTO: 0.35 K/UL (ref 0–0.51)
EOSINOPHIL NFR BLD: 3.6 % (ref 0–6.9)
ERYTHROCYTE [DISTWIDTH] IN BLOOD BY AUTOMATED COUNT: 49.8 FL (ref 35.9–50)
GLUCOSE SERPL-MCNC: 122 MG/DL (ref 65–99)
HCT VFR BLD AUTO: 31.3 % (ref 37–47)
HGB BLD-MCNC: 10.1 G/DL (ref 12–16)
IMM GRANULOCYTES # BLD AUTO: 0.06 K/UL (ref 0–0.11)
IMM GRANULOCYTES NFR BLD AUTO: 0.6 % (ref 0–0.9)
LYMPHOCYTES # BLD AUTO: 2.33 K/UL (ref 1–4.8)
LYMPHOCYTES NFR BLD: 23.9 % (ref 22–41)
MCH RBC QN AUTO: 30.7 PG (ref 27–33)
MCHC RBC AUTO-ENTMCNC: 32.3 G/DL (ref 33.6–35)
MCV RBC AUTO: 95.1 FL (ref 81.4–97.8)
MONOCYTES # BLD AUTO: 1.26 K/UL (ref 0–0.85)
MONOCYTES NFR BLD AUTO: 12.9 % (ref 0–13.4)
NEUTROPHILS # BLD AUTO: 5.7 K/UL (ref 2–7.15)
NEUTROPHILS NFR BLD: 58.5 % (ref 44–72)
NRBC # BLD AUTO: 0 K/UL
NRBC BLD-RTO: 0 /100 WBC
PLATELET # BLD AUTO: 186 K/UL (ref 164–446)
PMV BLD AUTO: 10.6 FL (ref 9–12.9)
POTASSIUM SERPL-SCNC: 4.1 MMOL/L (ref 3.6–5.5)
RBC # BLD AUTO: 3.29 M/UL (ref 4.2–5.4)
SODIUM SERPL-SCNC: 137 MMOL/L (ref 135–145)
WBC # BLD AUTO: 9.8 K/UL (ref 4.8–10.8)

## 2018-11-17 PROCEDURE — 700102 HCHG RX REV CODE 250 W/ 637 OVERRIDE(OP): Performed by: INTERNAL MEDICINE

## 2018-11-17 PROCEDURE — A9270 NON-COVERED ITEM OR SERVICE: HCPCS | Performed by: INTERNAL MEDICINE

## 2018-11-17 PROCEDURE — 700111 HCHG RX REV CODE 636 W/ 250 OVERRIDE (IP): Performed by: INTERNAL MEDICINE

## 2018-11-17 PROCEDURE — A9270 NON-COVERED ITEM OR SERVICE: HCPCS | Performed by: FAMILY MEDICINE

## 2018-11-17 PROCEDURE — 770001 HCHG ROOM/CARE - MED/SURG/GYN PRIV*

## 2018-11-17 PROCEDURE — 700102 HCHG RX REV CODE 250 W/ 637 OVERRIDE(OP): Performed by: FAMILY MEDICINE

## 2018-11-17 PROCEDURE — 36415 COLL VENOUS BLD VENIPUNCTURE: CPT

## 2018-11-17 PROCEDURE — 71045 X-RAY EXAM CHEST 1 VIEW: CPT

## 2018-11-17 PROCEDURE — 99232 SBSQ HOSP IP/OBS MODERATE 35: CPT | Performed by: FAMILY MEDICINE

## 2018-11-17 PROCEDURE — 80048 BASIC METABOLIC PNL TOTAL CA: CPT

## 2018-11-17 PROCEDURE — 82550 ASSAY OF CK (CPK): CPT

## 2018-11-17 PROCEDURE — 85025 COMPLETE CBC W/AUTO DIFF WBC: CPT

## 2018-11-17 RX ORDER — ATROPINE SULFATE 10 MG/ML
2 SOLUTION/ DROPS OPHTHALMIC EVERY 4 HOURS PRN
Status: DISCONTINUED | OUTPATIENT
Start: 2018-11-17 | End: 2019-06-06 | Stop reason: HOSPADM

## 2018-11-17 RX ORDER — FUROSEMIDE 20 MG/1
20 TABLET ORAL
Status: DISCONTINUED | OUTPATIENT
Start: 2018-11-17 | End: 2019-06-06 | Stop reason: HOSPADM

## 2018-11-17 RX ORDER — ATORVASTATIN CALCIUM 40 MG/1
40 TABLET, FILM COATED ORAL EVERY EVENING
Status: DISCONTINUED | OUTPATIENT
Start: 2018-11-17 | End: 2018-11-17

## 2018-11-17 RX ADMIN — ASPIRIN 81 MG: 81 TABLET, CHEWABLE ORAL at 06:11

## 2018-11-17 RX ADMIN — FUROSEMIDE 20 MG: 20 TABLET ORAL at 10:30

## 2018-11-17 RX ADMIN — OXYMETAZOLINE HYDROCHLORIDE 2 SPRAY: 5 SPRAY NASAL at 06:19

## 2018-11-17 RX ADMIN — CLOPIDOGREL 75 MG: 75 TABLET, FILM COATED ORAL at 06:13

## 2018-11-17 RX ADMIN — FUROSEMIDE 20 MG: 10 INJECTION, SOLUTION INTRAMUSCULAR; INTRAVENOUS at 06:15

## 2018-11-17 ASSESSMENT — ENCOUNTER SYMPTOMS
HEADACHES: 0
DOUBLE VISION: 0
SHORTNESS OF BREATH: 1
CHOKING: 0
PALPITATIONS: 0
BLURRED VISION: 0
APNEA: 0
MYALGIAS: 0
HEARTBURN: 0
BACK PAIN: 0
DIAPHORESIS: 0
CLAUDICATION: 0
DIZZINESS: 0
NAUSEA: 0
FEVER: 0
TINGLING: 0
CHEST TIGHTNESS: 0
SPUTUM PRODUCTION: 0
WHEEZING: 0
HEMOPTYSIS: 0
CHILLS: 0
NECK PAIN: 0
COUGH: 0
PHOTOPHOBIA: 0
STRIDOR: 0
VOMITING: 0

## 2018-11-17 ASSESSMENT — PAIN SCALES - GENERAL
PAINLEVEL_OUTOF10: 0

## 2018-11-17 NOTE — PROGRESS NOTES
Cardiology Follow Up Progress Note    Date of Service  11/17/2018    Attending Physician  Wagner Galvan M.D.    Reason for consultation     ST elevation inferior MI    History of   Hypertension , chronic kidney disease III      Admitted with a ground-level fall, EKG incidentally showed ST elevation inferior MI, declined any intervention        Interim Events  11/17/18, no rhythm issues overnight , dyspnea better  11/16/18 , hypotensive overnight, received 500 cc bolus , dyspnea, unable to give lasix, hold ACE and BB, no rhythm issues overnight     Review of Systems  Review of Systems   Respiratory: Positive for shortness of breath. Negative for apnea, cough, choking, chest tightness, wheezing and stridor.    Cardiovascular: Negative for chest pain, palpitations and leg swelling.       Vital signs in last 24 hours  Temp:  [36.7 °C (98.1 °F)-37.5 °C (99.5 °F)] 36.9 °C (98.4 °F)  Pulse:  [77-87] 77  Resp:  [17-18] 18  BP: ()/(51-96) 105/64    Physical Exam  Physical Exam   Constitutional: She is oriented to person, place, and time.   HENT:   Head: Normocephalic.   Eyes: Conjunctivae are normal.   Neck: No JVD present. No thyromegaly present.   Cardiovascular: Normal rate and regular rhythm.    Pulses:       Carotid pulses are 2+ on the right side, and 2+ on the left side.       Radial pulses are 2+ on the right side, and 2+ on the left side.   Pulmonary/Chest: She has no wheezes.   Abdominal: Soft.   Musculoskeletal: She exhibits no edema.   Neurological: She is alert and oriented to person, place, and time.   Skin: Skin is warm and dry.       Lab Review  Lab Results   Component Value Date/Time    WBC 9.8 11/17/2018 04:41 AM    RBC 3.29 (L) 11/17/2018 04:41 AM    HEMOGLOBIN 10.1 (L) 11/17/2018 04:41 AM    HEMATOCRIT 31.3 (L) 11/17/2018 04:41 AM    MCV 95.1 11/17/2018 04:41 AM    MCH 30.7 11/17/2018 04:41 AM    MCHC 32.3 (L) 11/17/2018 04:41 AM    MPV 10.6 11/17/2018 04:41 AM      Lab Results   Component Value  "Date/Time    SODIUM 137 11/17/2018 04:41 AM    POTASSIUM 4.1 11/17/2018 04:41 AM    CHLORIDE 113 (H) 11/17/2018 04:41 AM    CO2 18 (L) 11/17/2018 04:41 AM    GLUCOSE 122 (H) 11/17/2018 04:41 AM    BUN 41 (H) 11/17/2018 04:41 AM    CREATININE 1.34 11/17/2018 04:41 AM      Lab Results   Component Value Date/Time    ASTSGOT 128 (H) 11/14/2018 05:56 PM    ALTSGPT 46 11/14/2018 05:56 PM     Lab Results   Component Value Date/Time    TROPONINI 47.74 (H) 11/15/2018 06:30 AM       Recent Labs      11/14/18   1756   BNPBTYPENAT  604*         Assessment    Inferior STEMI, The patient chose not to proceed with cardiac catheterization.  Ground-level fall secondary to a    \"bad left hip \"  Hypertension  Echocardiogram 11/13/20, LVEF 50%, hypokinesis of mid to apical anterior septum and basal to mid inferior wall, severe TR, no significant valvular abnormalities    Severe TR    Plan    Plan for medical therapy, ASA, Lipitor, and Plavix  EF 50%  Hold lisinopril and carvedilol secondary to hypotension.will evaluate as out patient   Home on low dose lasix secondary to severe TR  No rhythm issues overnight.  PT  & OT  DC planning   Discussion about comfort care with family this am in  process  Follow-up appointment with our cardiology office on 11/28/18 at 8:45 am   Cardiology will sign off       "

## 2018-11-17 NOTE — PROGRESS NOTES
[facility] Hospitalist Progress Note    Date of Service: 2018    Chief Complaint  89 y.o. female admitted 2018 with inferior MI    Interval Problem Update  Comfort care only    Consultants/Specialty  Cardiology  hospice    Disposition  none        Review of Systems   Constitutional: Negative for chills, diaphoresis and fever.   HENT: Negative for ear discharge, ear pain and tinnitus.    Eyes: Negative for blurred vision, double vision and photophobia.   Respiratory: Negative for cough, hemoptysis and sputum production.    Cardiovascular: Positive for chest pain (with movements). Negative for palpitations and claudication.   Gastrointestinal: Negative for heartburn, nausea and vomiting.   Genitourinary: Negative for dysuria, frequency and urgency.   Musculoskeletal: Negative for back pain, myalgias and neck pain.   Skin: Negative for itching and rash.   Neurological: Negative for dizziness, tingling and headaches.      Physical Exam  Laboratory/Imaging   Hemodynamics  Temp (24hrs), Av.9 °C (98.5 °F), Min:36.7 °C (98 °F), Max:37.5 °C (99.5 °F)   Temperature: 36.7 °C (98 °F)  Pulse  Av.3  Min: 59  Max: 96    Blood Pressure : (!) 89/51 (nurse notified)      Respiratory      Respiration: 16, Pulse Oximetry: 92 %     Work Of Breathing / Effort: Mild  RUL Breath Sounds: Clear, RML Breath Sounds: Clear, RLL Breath Sounds: Diminished, MAGEN Breath Sounds: Clear, LLL Breath Sounds: Diminished    Fluids  No intake or output data in the 24 hours ending 18 1007    Nutrition  Orders Placed This Encounter   Procedures   • Diet Order Regular     Standing Status:   Standing     Number of Occurrences:   1     Order Specific Question:   Diet:     Answer:   Regular [1]     Order Specific Question:   Texture/Fiber modifications:     Answer:   Dysphagia 3(Mechanical Soft)specify fluid consistency(question 6) [3]     Order Specific Question:   Consistency/Fluid modifications:     Answer:   Thin Liquids [3]      Physical Exam   Constitutional: She is oriented to person, place, and time. No distress.   HENT:   Head: Normocephalic and atraumatic.   Eyes: Pupils are equal, round, and reactive to light. Conjunctivae are normal.   Neck: Normal range of motion. Neck supple.   Cardiovascular: Normal rate and regular rhythm.    Pulmonary/Chest: Effort normal and breath sounds normal.   Abdominal: Soft. Bowel sounds are normal.   Musculoskeletal: She exhibits no edema or tenderness.   Neurological: She is alert and oriented to person, place, and time.   Skin: Skin is warm and dry. She is not diaphoretic.       Recent Labs      11/15/18   0630  11/16/18   0440  11/17/18   0441   WBC  13.8*  9.5  9.8   RBC  3.75*  3.30*  3.29*   HEMOGLOBIN  11.3*  10.2*  10.1*   HEMATOCRIT  35.8*  31.9*  31.3*   MCV  95.5  96.7  95.1   MCH  30.1  30.9  30.7   MCHC  31.6*  32.0*  32.3*   RDW  50.3*  50.7*  49.8   PLATELETCT  197  187  186   MPV  10.7  10.6  10.6     Recent Labs      11/15/18   0630  11/16/18   0440  11/17/18   0441   SODIUM  137  138  137   POTASSIUM  4.0  4.3  4.1   CHLORIDE  111  112  113*   CO2  15*  19*  18*   GLUCOSE  130*  119*  122*   BUN  40*  40*  41*   CREATININE  1.31  1.41*  1.34   CALCIUM  8.0*  8.0*  8.0*     Recent Labs      11/14/18   1756  11/15/18   1757  11/16/18   1811   APTT  63.9*  81.2*  29.9     Recent Labs      11/14/18   1756   BNPBTYPENAT  604*              Assessment/Plan     ST elevation myocardial infarction involving right coronary artery (HCC)- (present on admission)   Assessment & Plan    Asa  plavix  Decreased lipitor due to cpk         Fall- (present on admission)   Assessment & Plan    PT OT evaluation     Nosebleed   Assessment & Plan    resolved     Pain of left hip joint- (present on admission)   Assessment & Plan    Imaging including CT scan of the hip shows no evidence of fracture       Rhabdomyolysis- (present on admission)   Assessment & Plan    Due to fall  cpk 196 has trended down      Chronic kidney disease (CKD), stage III (moderate) (HCC)- (present on admission)   Assessment & Plan    Renally dose medications     Elevated brain natriuretic peptide (BNP) level- (present on admission)   Assessment & Plan    No signs of fluid overload  Echocardiogram showed LVEF 50%     Leukocytosis- (present on admission)   Assessment & Plan    resolved     Advanced directives, counseling/discussion- (present on admission)   Assessment & Plan    D/w the pt and explained to her comfort care and no medical intervention  With the nurse in the room.she stated clearly that she does not want any medical  Intervention and she likes to be on comfort care only.  I also contacted the pt's daugther and she stated that her mother has stated  The same to her with comfort care only.  I have consulted hospice  She jose be transferred to medical floor  Comfort care order set is initiated.         Quality-Core Measures   Valenzuela catheter::  No Valenzuela

## 2018-11-17 NOTE — PROGRESS NOTES
Bedside report received from previous nurse regarding prior 12 hours.  Pt complaining of CP.  Dr. Holden notified.  Will go to bedside.  No orders rec'd at this time. Call light within reach.

## 2018-11-17 NOTE — CARE PLAN
Problem: Communication  Goal: The ability to communicate needs accurately and effectively will improve  Outcome: PROGRESSING AS EXPECTED  Communication board updated. All pt questions answered at this time and no further questions. Pt encouraged to voice feelings and ask questions as they arise.     Problem: Safety  Goal: Will remain free from injury  Outcome: PROGRESSING AS EXPECTED  Patient educated about risk of falls and reasoning for use of tread socks, calling for help, and bed alarms.

## 2018-11-17 NOTE — PROGRESS NOTES
Patient care assumed, bedside report received, POC discussed, verbalizes understanding. Safety measures in place, call light in place.

## 2018-11-18 ENCOUNTER — APPOINTMENT (OUTPATIENT)
Dept: RADIOLOGY | Facility: MEDICAL CENTER | Age: 83
DRG: 280 | End: 2018-11-18
Attending: INTERNAL MEDICINE
Payer: MEDICARE

## 2018-11-18 ENCOUNTER — HOME CARE VISIT (OUTPATIENT)
Dept: HOSPICE | Facility: HOSPICE | Age: 83
End: 2018-11-18
Payer: MEDICARE

## 2018-11-18 LAB
ANION GAP SERPL CALC-SCNC: 6 MMOL/L (ref 0–11.9)
BASOPHILS # BLD AUTO: 0.5 % (ref 0–1.8)
BASOPHILS # BLD: 0.05 K/UL (ref 0–0.12)
BUN SERPL-MCNC: 38 MG/DL (ref 8–22)
CALCIUM SERPL-MCNC: 8.2 MG/DL (ref 8.5–10.5)
CHLORIDE SERPL-SCNC: 113 MMOL/L (ref 96–112)
CK SERPL-CCNC: 172 U/L (ref 0–154)
CO2 SERPL-SCNC: 19 MMOL/L (ref 20–33)
CREAT SERPL-MCNC: 1.3 MG/DL (ref 0.5–1.4)
EOSINOPHIL # BLD AUTO: 0.49 K/UL (ref 0–0.51)
EOSINOPHIL NFR BLD: 5.2 % (ref 0–6.9)
ERYTHROCYTE [DISTWIDTH] IN BLOOD BY AUTOMATED COUNT: 50 FL (ref 35.9–50)
GLUCOSE SERPL-MCNC: 124 MG/DL (ref 65–99)
HCT VFR BLD AUTO: 32.1 % (ref 37–47)
HGB BLD-MCNC: 10.5 G/DL (ref 12–16)
IMM GRANULOCYTES # BLD AUTO: 0.05 K/UL (ref 0–0.11)
IMM GRANULOCYTES NFR BLD AUTO: 0.5 % (ref 0–0.9)
LYMPHOCYTES # BLD AUTO: 1.73 K/UL (ref 1–4.8)
LYMPHOCYTES NFR BLD: 18.2 % (ref 22–41)
MCH RBC QN AUTO: 31.1 PG (ref 27–33)
MCHC RBC AUTO-ENTMCNC: 32.7 G/DL (ref 33.6–35)
MCV RBC AUTO: 95 FL (ref 81.4–97.8)
MONOCYTES # BLD AUTO: 1.22 K/UL (ref 0–0.85)
MONOCYTES NFR BLD AUTO: 12.9 % (ref 0–13.4)
NEUTROPHILS # BLD AUTO: 5.95 K/UL (ref 2–7.15)
NEUTROPHILS NFR BLD: 62.7 % (ref 44–72)
NRBC # BLD AUTO: 0 K/UL
NRBC BLD-RTO: 0 /100 WBC
PLATELET # BLD AUTO: 186 K/UL (ref 164–446)
PMV BLD AUTO: 10.5 FL (ref 9–12.9)
POTASSIUM SERPL-SCNC: 4.3 MMOL/L (ref 3.6–5.5)
RBC # BLD AUTO: 3.38 M/UL (ref 4.2–5.4)
SODIUM SERPL-SCNC: 138 MMOL/L (ref 135–145)
WBC # BLD AUTO: 9.5 K/UL (ref 4.8–10.8)

## 2018-11-18 PROCEDURE — 36415 COLL VENOUS BLD VENIPUNCTURE: CPT

## 2018-11-18 PROCEDURE — 82550 ASSAY OF CK (CPK): CPT

## 2018-11-18 PROCEDURE — 80048 BASIC METABOLIC PNL TOTAL CA: CPT

## 2018-11-18 PROCEDURE — 85025 COMPLETE CBC W/AUTO DIFF WBC: CPT

## 2018-11-18 PROCEDURE — 99231 SBSQ HOSP IP/OBS SF/LOW 25: CPT | Performed by: FAMILY MEDICINE

## 2018-11-18 PROCEDURE — 770001 HCHG ROOM/CARE - MED/SURG/GYN PRIV*

## 2018-11-18 PROCEDURE — 71045 X-RAY EXAM CHEST 1 VIEW: CPT

## 2018-11-18 ASSESSMENT — ENCOUNTER SYMPTOMS
STRIDOR: 0
DIAPHORESIS: 0
FEVER: 0
TREMORS: 0
FLANK PAIN: 0
SHORTNESS OF BREATH: 1
EYE PAIN: 0
NECK PAIN: 0
EYE DISCHARGE: 0
WHEEZING: 0
SHORTNESS OF BREATH: 0
ORTHOPNEA: 0
DIARRHEA: 0
CHEST TIGHTNESS: 0
APNEA: 0
SENSORY CHANGE: 0
COUGH: 0
VOMITING: 0
ABDOMINAL PAIN: 0
PHOTOPHOBIA: 0
FALLS: 0
BACK PAIN: 0
SPUTUM PRODUCTION: 0
TINGLING: 0
CHOKING: 0
PALPITATIONS: 0

## 2018-11-18 ASSESSMENT — PAIN SCALES - GENERAL
PAINLEVEL_OUTOF10: 0

## 2018-11-18 NOTE — DISCHARGE PLANNING
LSW spoke with patient's daughter Wild ([phone]) over the phone to discuss patient's plan of care. Per Wild, patient was living independently in a senior apartment complex prior to her admission. Wild stated that she was unsure of patient's d/c plan and isn't sure patient will be able to live alone. However, Wild does not live in [place]. Wild is waiting to hear more about patient's prognosis, but was open to LSW sending a hospice referral to [facility]. Wild plans on talking with patient when she wakes up regarding d/c plan.     LSW faxed referral to [facility] with Wild's verbal consent.

## 2018-11-18 NOTE — CARE PLAN
Problem: Safety  Goal: Will remain free from injury  Outcome: PROGRESSING AS EXPECTED  Patient educated about risk of falls and reasoning for use of tread socks, calling for help, and bed alarms.     Problem: Knowledge Deficit  Goal: Knowledge of disease process/condition, treatment plan, diagnostic tests, and medications will improve  Outcome: PROGRESSING AS EXPECTED  Patient educated regarding medications, procedures and plan of care.

## 2018-11-18 NOTE — DISCHARGE PLANNING
LSW spoke with patient and granddaughter at bedside. Patient and granddaughter agreed to having [facility] speak with patient at bedside regarding hospice care. LSW answered questions regarding hospice VS SNF. Per request, LSW provided patient a list of in-home care giving services to help with ADLs at home. Patient and granddaughter are open to discussing additional plans of care, but right now are considering private care giving and potential hospice.

## 2018-11-18 NOTE — PROGRESS NOTES
[facility] Hospitalist Progress Note    Date of Service: 2018    Chief Complaint  89 y.o. female admitted 2018 with inferior MI    Interval Problem Update  Comfort care only    Consultants/Specialty  Cardiology  hospice    Disposition  none        Review of Systems   Constitutional: Negative for diaphoresis, fever and malaise/fatigue.   HENT: Negative for congestion, ear discharge and nosebleeds.    Eyes: Negative for photophobia, pain and discharge.   Respiratory: Negative for sputum production, shortness of breath and wheezing.    Cardiovascular: Negative for chest pain (with movements), palpitations and orthopnea.   Gastrointestinal: Negative for abdominal pain, diarrhea and vomiting.   Genitourinary: Negative for flank pain, frequency and hematuria.   Musculoskeletal: Negative for back pain, falls, joint pain and neck pain.   Skin: Negative for itching and rash.   Neurological: Negative for tingling, tremors and sensory change.      Physical Exam  Laboratory/Imaging   Hemodynamics  Temp (24hrs), Av.7 °C (98 °F), Min:36.6 °C (97.9 °F), Max:36.8 °C (98.2 °F)   Temperature: 36.6 °C (97.9 °F)  Pulse  Av.4  Min: 59  Max: 96    Blood Pressure : 101/51      Respiratory      Respiration: 16, Pulse Oximetry: 93 %     Work Of Breathing / Effort: Mild  RUL Breath Sounds: Clear, RML Breath Sounds: Clear, RLL Breath Sounds: Diminished, MAGEN Breath Sounds: Clear, LLL Breath Sounds: Diminished    Fluids  No intake or output data in the 24 hours ending 18 0809    Nutrition  Orders Placed This Encounter   Procedures   • Diet Order Regular     Standing Status:   Standing     Number of Occurrences:   1     Order Specific Question:   Diet:     Answer:   Regular [1]     Order Specific Question:   Texture/Fiber modifications:     Answer:   Dysphagia 3(Mechanical Soft)specify fluid consistency(question 6) [3]     Order Specific Question:   Consistency/Fluid modifications:     Answer:   Thin Liquids [3]      Physical Exam   Constitutional: She is oriented to person, place, and time. No distress.   HENT:   Right Ear: External ear normal.   Left Ear: External ear normal.   Eyes: Right eye exhibits no discharge. Left eye exhibits no discharge.   Neck: No JVD present.   Cardiovascular: Normal heart sounds.    Pulmonary/Chest: No stridor. No respiratory distress. She has no wheezes. She has no rales.   Abdominal: She exhibits no distension. There is no tenderness. There is no rebound.   Musculoskeletal: She exhibits no edema or tenderness.   Neurological: She is alert and oriented to person, place, and time.   Skin: Skin is warm and dry. She is not diaphoretic.       Recent Labs      11/16/18   0440  11/17/18   0441  11/18/18   0456   WBC  9.5  9.8  9.5   RBC  3.30*  3.29*  3.38*   HEMOGLOBIN  10.2*  10.1*  10.5*   HEMATOCRIT  31.9*  31.3*  32.1*   MCV  96.7  95.1  95.0   MCH  30.9  30.7  31.1   MCHC  32.0*  32.3*  32.7*   RDW  50.7*  49.8  50.0   PLATELETCT  187  186  186   MPV  10.6  10.6  10.5     Recent Labs      11/16/18   0440  11/17/18   0441  11/18/18   0456   SODIUM  138  137  138   POTASSIUM  4.3  4.1  4.3   CHLORIDE  112  113*  113*   CO2  19*  18*  19*   GLUCOSE  119*  122*  124*   BUN  40*  41*  38*   CREATININE  1.41*  1.34  1.30   CALCIUM  8.0*  8.0*  8.2*     Recent Labs      11/15/18   1757  11/16/18   1811   APTT  81.2*  29.9                  Assessment/Plan     ST elevation myocardial infarction involving right coronary artery (HCC)- (present on admission)   Assessment & Plan    Asa  plavix  Decreased lipitor due to cpk         Fall- (present on admission)   Assessment & Plan    PT OT evaluation     Nosebleed   Assessment & Plan    resolved     Pain of left hip joint- (present on admission)   Assessment & Plan    Imaging including CT scan of the hip shows no evidence of fracture       Rhabdomyolysis- (present on admission)   Assessment & Plan    Due to fall  cpk 196 has trended down     Chronic kidney  disease (CKD), stage III (moderate) (HCC)- (present on admission)   Assessment & Plan    Renally dose medications     Elevated brain natriuretic peptide (BNP) level- (present on admission)   Assessment & Plan    No signs of fluid overload  Echocardiogram showed LVEF 50%     Leukocytosis- (present on admission)   Assessment & Plan    resolved     Advanced directives, counseling/discussion- (present on admission)   Assessment & Plan    D/w the pt and explained to her comfort care and no medical intervention  With the nurse in the room.she stated clearly that she does not want any medical  Intervention and she likes to be on comfort care only.  I also contacted the pt's daugther and she stated that her mother has stated  The same to her with comfort care only.  I have consulted hospice  She is seen at the bed side and has no complaints today  Comfort care order set is initiated.         Quality-Core Measures   Valenzuela catheter::  No Valenzuela

## 2018-11-18 NOTE — PROGRESS NOTES
2 RN skin check with Chris RN  Ears slightly red but blanching foam pads put in place  Skin tear to R elbow dressing in place  Bruising throughout upper extremities   Sacrum slightly red but blanching, using barrier cream and no mepilex due to incontinence   Heels boggy but blanching and floated with pillows

## 2018-11-18 NOTE — PROGRESS NOTES
Cardiology Follow Up Progress Note    Date of Service  11/18/2018    Attending Physician  Wagner Galvan M.D.    Reason for consultation     ST elevation inferior MI    History of   Hypertension , chronic kidney disease III      Admitted with a ground-level fall, EKG incidentally showed ST elevation inferior MI, declined any intervention        Interim Events  1118/18, comfort care  11/17/18, no rhythm issues overnight , dyspnea better  11/16/18 , hypotensive overnight, received 500 cc bolus , dyspnea, unable to give lasix, hold ACE and BB, no rhythm issues overnight     Review of Systems  Review of Systems   Respiratory: Positive for shortness of breath. Negative for apnea, cough, choking, chest tightness, wheezing and stridor.    Cardiovascular: Negative for chest pain, palpitations and leg swelling.       Vital signs in last 24 hours  Temp:  [36.6 °C (97.9 °F)-37 °C (98.6 °F)] 37 °C (98.6 °F)  Pulse:  [74-81] 74  Resp:  [16-18] 18  BP: ()/(51-73) 101/60    Physical Exam  Physical Exam   Constitutional: She is oriented to person, place, and time.   HENT:   Head: Normocephalic.   Eyes: Conjunctivae are normal.   Neck: No JVD present. No thyromegaly present.   Cardiovascular: Normal rate and regular rhythm.    Pulses:       Carotid pulses are 2+ on the right side, and 2+ on the left side.       Radial pulses are 2+ on the right side, and 2+ on the left side.   Pulmonary/Chest: She has no wheezes.   Abdominal: Soft.   Musculoskeletal: She exhibits no edema.   Neurological: She is alert and oriented to person, place, and time.   Skin: Skin is warm and dry.       Lab Review  Lab Results   Component Value Date/Time    WBC 9.5 11/18/2018 04:56 AM    RBC 3.38 (L) 11/18/2018 04:56 AM    HEMOGLOBIN 10.5 (L) 11/18/2018 04:56 AM    HEMATOCRIT 32.1 (L) 11/18/2018 04:56 AM    MCV 95.0 11/18/2018 04:56 AM    MCH 31.1 11/18/2018 04:56 AM    MCHC 32.7 (L) 11/18/2018 04:56 AM    MPV 10.5 11/18/2018 04:56 AM      Lab Results  "  Component Value Date/Time    SODIUM 138 11/18/2018 04:56 AM    POTASSIUM 4.3 11/18/2018 04:56 AM    CHLORIDE 113 (H) 11/18/2018 04:56 AM    CO2 19 (L) 11/18/2018 04:56 AM    GLUCOSE 124 (H) 11/18/2018 04:56 AM    BUN 38 (H) 11/18/2018 04:56 AM    CREATININE 1.30 11/18/2018 04:56 AM      Lab Results   Component Value Date/Time    ASTSGOT 128 (H) 11/14/2018 05:56 PM    ALTSGPT 46 11/14/2018 05:56 PM     Lab Results   Component Value Date/Time    TROPONINI 47.74 (H) 11/15/2018 06:30 AM               Assessment    Inferior STEMI, The patient chose not to proceed with cardiac catheterization.  Ground-level fall secondary to a    \"bad left hip \"  Hypertension  Echocardiogram 11/13/20, LVEF 50%, hypokinesis of mid to apical anterior septum and basal to mid inferior wall, severe TR, no significant valvular abnormalities  Severe TR    Plan  Hospice consult  Comfort care  Cardiology will sign off       "

## 2018-11-18 NOTE — DISCHARGE PLANNING
Received Choice form at 0417  Agency/Facility Name: [facility]  Referral sent per Choice form @ 5707

## 2018-11-18 NOTE — PROGRESS NOTES
Bedside report received from previous nurse regarding prior 12 hours.  Pt denies pain.  Fall precautions in place. White board updated.  Call light within reach.

## 2018-11-19 ENCOUNTER — HOSPICE ADMISSION (OUTPATIENT)
Dept: HOSPICE | Facility: HOSPICE | Age: 83
End: 2018-11-19
Payer: MEDICARE

## 2018-11-19 ENCOUNTER — HOME CARE VISIT (OUTPATIENT)
Dept: HOSPICE | Facility: HOSPICE | Age: 83
End: 2018-11-19
Payer: MEDICARE

## 2018-11-19 ENCOUNTER — APPOINTMENT (OUTPATIENT)
Dept: RADIOLOGY | Facility: MEDICAL CENTER | Age: 83
DRG: 280 | End: 2018-11-19
Attending: INTERNAL MEDICINE
Payer: MEDICARE

## 2018-11-19 LAB
ALBUMIN SERPL BCP-MCNC: 2.5 G/DL (ref 3.2–4.9)
ALBUMIN/GLOB SERPL: 0.9 G/DL
ALP SERPL-CCNC: 99 U/L (ref 30–99)
ALT SERPL-CCNC: 22 U/L (ref 2–50)
ANION GAP SERPL CALC-SCNC: 5 MMOL/L (ref 0–11.9)
AST SERPL-CCNC: 23 U/L (ref 12–45)
BILIRUB SERPL-MCNC: 0.5 MG/DL (ref 0.1–1.5)
BUN SERPL-MCNC: 32 MG/DL (ref 8–22)
CALCIUM SERPL-MCNC: 8.1 MG/DL (ref 8.5–10.5)
CHLORIDE SERPL-SCNC: 113 MMOL/L (ref 96–112)
CO2 SERPL-SCNC: 20 MMOL/L (ref 20–33)
CREAT SERPL-MCNC: 1.14 MG/DL (ref 0.5–1.4)
GLOBULIN SER CALC-MCNC: 2.9 G/DL (ref 1.9–3.5)
GLUCOSE SERPL-MCNC: 122 MG/DL (ref 65–99)
POTASSIUM SERPL-SCNC: 4 MMOL/L (ref 3.6–5.5)
PROT SERPL-MCNC: 5.4 G/DL (ref 6–8.2)
SODIUM SERPL-SCNC: 138 MMOL/L (ref 135–145)

## 2018-11-19 PROCEDURE — 80053 COMPREHEN METABOLIC PANEL: CPT

## 2018-11-19 PROCEDURE — 36415 COLL VENOUS BLD VENIPUNCTURE: CPT

## 2018-11-19 PROCEDURE — G8988 SELF CARE GOAL STATUS: HCPCS | Mod: CI

## 2018-11-19 PROCEDURE — 770001 HCHG ROOM/CARE - MED/SURG/GYN PRIV*

## 2018-11-19 PROCEDURE — G8989 SELF CARE D/C STATUS: HCPCS | Mod: CK

## 2018-11-19 PROCEDURE — 99239 HOSP IP/OBS DSCHRG MGMT >30: CPT | Performed by: FAMILY MEDICINE

## 2018-11-19 PROCEDURE — 71045 X-RAY EXAM CHEST 1 VIEW: CPT

## 2018-11-19 RX ORDER — FUROSEMIDE 20 MG/1
20 TABLET ORAL DAILY
Qty: 30 TAB | Refills: 0 | Status: SHIPPED | OUTPATIENT
Start: 2018-11-19 | End: 2019-06-05

## 2018-11-19 ASSESSMENT — PAIN SCALES - GENERAL
PAINLEVEL_OUTOF10: 0
PAINLEVEL_OUTOF10: 0

## 2018-11-19 NOTE — THERAPY
Occupational Therapy Contact Note:    Pt has been moved to comfort care, will sign off for Acute OT services. Please Re-order if POC changes.     Beckie Dang OTR/L  Pager: [phone]

## 2018-11-19 NOTE — PROGRESS NOTES
Bedside report received. Pt asleep at this time. Call light within reach, fall precautions in place.

## 2018-11-19 NOTE — PROGRESS NOTES
Received bedside report. Patient alert and oriented. No complaints of pain. No overnight events. Call light within reach

## 2018-11-19 NOTE — CARE PLAN
Problem: Communication  Goal: The ability to communicate needs accurately and effectively will improve  Outcome: PROGRESSING AS EXPECTED  Pt communicates needs effectively and calls for assistance appropriately.    Problem: Pain Management  Goal: Pain level will decrease to patient's comfort goal  Outcome: PROGRESSING AS EXPECTED  Pt currently experiencing no pain per self report. Pt currently at stated comfort goal.

## 2018-11-19 NOTE — CARE PLAN
Problem: Safety  Goal: Will remain free from injury  Outcome: PROGRESSING AS EXPECTED  Patient will use call light and wait for assistance before getting up

## 2018-11-19 NOTE — HOSPICE
Hospice nurse visit - Met with patient who states desire to live as independently as possible.  Patient not currently able to walk beyond very small distance.  Hospice to contact family.

## 2018-11-19 NOTE — DISCHARGE PLANNING
Anticipated Discharge Disposition: Hospice    Action: LSW contacted [facility] with request for updates regarding pt's referral. Per MD in IDT rounds, pt's daughter cannot care for her at home. LSW anticipates this to be a barrier to timely d/c if there is no alternative. LSW left message.    Barriers to Discharge: Potentially no care provider.     Plan: Await return call from Hospice RN.

## 2018-11-19 NOTE — DISCHARGE SUMMARY
Discharge Summary    CHIEF COMPLAINT ON ADMISSION  Chief Complaint   Patient presents with   • GLF       Reason for Admission  Other     Admission Date  11/13/2018    CODE STATUS  Comfort Care/DNR    HPI & HOSPITAL COURSE  This is a 89 y.o. female here with  essential hypertension, who presents with ground-level fall last night around 9 PM.  She stated that she has a bad left hip and fell because of that.  The patient denied palpitation, dizziness, syncope episode.  She presented to ER today and unfortunately she was found to have acute ST elevation myocardial infarction with troponin over 50.  Because of that cardiology was consulted in ER.  She stated that she has one episode of chest pain last night over the sternum area, lasted for a few minutes and it went away on its own.  Currently the patient denies any chest pain.  She was explained in detail about her medical condition and she stated that she does not want any aggressive intervention including angiogram and wanted to be treated only medically.  I also had a long discussion with the patient regarding her CODE STATUS and she does not want to be intubated or had CPR.  She was started on heparin drip in ER and she will be admitted to cardiac intensive care unit in critical condition.  She was seen by cardiology she was also started on plavix and  Liptor.she was noted to have reactive leukocytosis which resolved.she was alos started on lasix.i had a d/w the pt and explained to her the situation and explained about comfort care and she stated that she would want to be comfort care only.i also spoke to the pt's daugther and she stated that she had a conversation with her mother and her mothe also has made it clear to her na she would like to be comfort care only and she agrees with that decision.i have consulted hospice for transfer of care.she needs inpt hospice.       Therefore, she is discharged in guarded and stable condition     The patient met 2-midnight  criteria for an inpatient stay at the time of discharge.    Discharge Date  11/19/2018    FOLLOW UP ITEMS POST DISCHARGE  hospice    DISCHARGE DIAGNOSES  Active Problems:    Fall POA: Yes    ST elevation myocardial infarction involving right coronary artery (HCC) POA: Yes    Advanced directives, counseling/discussion POA: Yes    At risk for falls POA: Yes      Overview: Wheeled walker    Leukocytosis POA: Yes    Elevated brain natriuretic peptide (BNP) level POA: Yes    Chronic kidney disease (CKD), stage III (moderate) (HCC) POA: Yes    Rhabdomyolysis POA: Yes    Pain of left hip joint POA: Yes    Nosebleed POA: Unknown  Resolved Problems:    * No resolved hospital problems. *      FOLLOW UP  Future Appointments  Date Time Provider Department Center   11/28/2018 8:45 AM Sunil Matthews M.D. [facility] None     [facility]  [address]  [address]  [facility] [address]  [phone]  Schedule an appointment as soon as possible for a visit  Your doctor has referred you to cardiac rehab, which is important for your recovery.  Please call to make an appointment.      MEDICATIONS ON DISCHARGE     Medication List      START taking these medications      Instructions   furosemide 20 MG Tabs  Commonly known as:  LASIX   Take 1 Tab by mouth every day.  Dose:  20 mg        CONTINUE taking these medications      Instructions   acetaminophen 500 MG Tabs  Commonly known as:  TYLENOL   Take 1,000 mg by mouth every 6 hours as needed for Moderate Pain.  Dose:  1000 mg     artificial tear ointment Oint  Commonly known as:  REFRESH,LACRI-LUBE   Place 1 Application in both eyes as needed (For Dry eye).  Dose:  1 Application     latanoprost 0.005 % Soln  Commonly known as:  XALATAN   Place 1 Drop in both eyes every evening.  Dose:  1 Drop     lisinopril 2.5 MG Tabs  Commonly known as:  PRINIVIL   Take 1 Tab by mouth every day.  Dose:  2.5 mg     MULTIVITAMIN PO   Take 1 Tab by mouth every day.  Dose:  1 Tab      vitamin D (Ergocalciferol) 42779 units Caps capsule  Commonly known as:  DRISDOL   Take 50,000 Units by mouth every 7 days. Can't remember what day  Dose:  11768 Units            Allergies  Allergies   Allergen Reactions   • Allopurinol Rash     Hot, red face   • Aspirin      Cannot take due to medications   • Codeine Itching and Nausea   • Latex Rash     To tape/bandages containing latex   • Tape Rash     adhessive in the tape, causes, swollen rash           DIET  Orders Placed This Encounter   Procedures   • Diet Order Regular     Standing Status:   Standing     Number of Occurrences:   1     Order Specific Question:   Diet:     Answer:   Regular [1]     Order Specific Question:   Texture/Fiber modifications:     Answer:   Dysphagia 3(Mechanical Soft)specify fluid consistency(question 6) [3]     Order Specific Question:   Consistency/Fluid modifications:     Answer:   Thin Liquids [3]       ACTIVITY  As tolerated.  Weight bearing as tolerated    CONSULTATIONS  Cardiology  pulmonary    PROCEDURES  none    LABORATORY  Lab Results   Component Value Date    SODIUM 138 11/19/2018    POTASSIUM 4.0 11/19/2018    CHLORIDE 113 (H) 11/19/2018    CO2 20 11/19/2018    GLUCOSE 122 (H) 11/19/2018    BUN 32 (H) 11/19/2018    CREATININE 1.14 11/19/2018        Lab Results   Component Value Date    WBC 9.5 11/18/2018    HEMOGLOBIN 10.5 (L) 11/18/2018    HEMATOCRIT 32.1 (L) 11/18/2018    PLATELETCT 186 11/18/2018        Total time of the discharge process exceeds 35 minutes.

## 2018-11-20 ENCOUNTER — APPOINTMENT (OUTPATIENT)
Dept: RADIOLOGY | Facility: MEDICAL CENTER | Age: 83
DRG: 280 | End: 2018-11-20
Attending: INTERNAL MEDICINE
Payer: MEDICARE

## 2018-11-20 PROCEDURE — 99232 SBSQ HOSP IP/OBS MODERATE 35: CPT | Performed by: SPECIALIST

## 2018-11-20 PROCEDURE — 86480 TB TEST CELL IMMUN MEASURE: CPT

## 2018-11-20 PROCEDURE — 36415 COLL VENOUS BLD VENIPUNCTURE: CPT

## 2018-11-20 PROCEDURE — 770001 HCHG ROOM/CARE - MED/SURG/GYN PRIV*

## 2018-11-20 PROCEDURE — 71045 X-RAY EXAM CHEST 1 VIEW: CPT

## 2018-11-20 ASSESSMENT — PAIN SCALES - GENERAL
PAINLEVEL_OUTOF10: 0
PAINLEVEL_OUTOF10: 0

## 2018-11-20 NOTE — CARE PLAN
Problem: Bowel/Gastric:  Goal: Will not experience complications related to bowel motility  Outcome: PROGRESSING AS EXPECTED  Discussed current medications with pt. Pt verbalizes understanding and has no questions at this time.    Problem: Psychosocial Needs:  Goal: Level of anxiety will decrease  Outcome: PROGRESSING AS EXPECTED  Pt currently experiencing no anxiety per self report.

## 2018-11-20 NOTE — DISCHARGE PLANNING
"Anticipated Discharge Disposition: Hospice    Action: LSW spoke with pt at bedside to discuss discharge planning. LSW discussed group home with pt and gave pt a list of group homes in the area. Pt stated \"I just want to go home\" and would not like to go to a group home. LSW recognized pt's feelings and discussed personal care giving with pt. Pt stated she receives a total of $1,842 a month from her SSI and her [email or web address] company. LSW informed [facility] of pt's income.      Barriers to Discharge: No placement at this time. Pt is unable to pay for a full time caregiver or a group home.     Plan: Continue to work towards finding a placement for pt. Discuss discharge options with SW Supervisor.     "

## 2018-11-20 NOTE — HOSPICE
Checked in with pt this am and asked RN to get an order for a Quantiferon gold for Possible discharge with hospice to Group home.

## 2018-11-20 NOTE — CARE PLAN
Problem: Safety  Goal: Will remain free from injury  Outcome: PROGRESSING AS EXPECTED  Patient will call and wait for assistance before getting up

## 2018-11-20 NOTE — PROGRESS NOTES
Received bedside report. Patient alert and oriented. No complaints of pain. Call light within reach

## 2018-11-21 ENCOUNTER — APPOINTMENT (OUTPATIENT)
Dept: RADIOLOGY | Facility: MEDICAL CENTER | Age: 83
DRG: 280 | End: 2018-11-21
Attending: INTERNAL MEDICINE
Payer: MEDICARE

## 2018-11-21 ENCOUNTER — PATIENT OUTREACH (OUTPATIENT)
Dept: HEALTH INFORMATION MANAGEMENT | Facility: OTHER | Age: 83
End: 2018-11-21

## 2018-11-21 LAB
ANION GAP SERPL CALC-SCNC: 7 MMOL/L (ref 0–11.9)
BASOPHILS # BLD AUTO: 0.5 % (ref 0–1.8)
BASOPHILS # BLD: 0.05 K/UL (ref 0–0.12)
BUN SERPL-MCNC: 26 MG/DL (ref 8–22)
CALCIUM SERPL-MCNC: 8.1 MG/DL (ref 8.5–10.5)
CHLORIDE SERPL-SCNC: 111 MMOL/L (ref 96–112)
CO2 SERPL-SCNC: 22 MMOL/L (ref 20–33)
CREAT SERPL-MCNC: 1.23 MG/DL (ref 0.5–1.4)
EOSINOPHIL # BLD AUTO: 0.56 K/UL (ref 0–0.51)
EOSINOPHIL NFR BLD: 5.5 % (ref 0–6.9)
ERYTHROCYTE [DISTWIDTH] IN BLOOD BY AUTOMATED COUNT: 49.7 FL (ref 35.9–50)
GLUCOSE SERPL-MCNC: 127 MG/DL (ref 65–99)
HCT VFR BLD AUTO: 30.5 % (ref 37–47)
HGB BLD-MCNC: 9.8 G/DL (ref 12–16)
IMM GRANULOCYTES # BLD AUTO: 0.06 K/UL (ref 0–0.11)
IMM GRANULOCYTES NFR BLD AUTO: 0.6 % (ref 0–0.9)
LYMPHOCYTES # BLD AUTO: 2.78 K/UL (ref 1–4.8)
LYMPHOCYTES NFR BLD: 27.4 % (ref 22–41)
M TB TUBERC IFN-G BLD QL: POSITIVE
M TB TUBERC IFN-G/MITOGEN IGNF BLD: 2.76
M TB TUBERC IGNF/MITOGEN IGNF CONTROL: 50.35 [IU]/ML
MCH RBC QN AUTO: 30.3 PG (ref 27–33)
MCHC RBC AUTO-ENTMCNC: 32.1 G/DL (ref 33.6–35)
MCV RBC AUTO: 94.4 FL (ref 81.4–97.8)
MITOGEN IGNF BCKGRD COR BLD-ACNC: 0.02 [IU]/ML
MONOCYTES # BLD AUTO: 1.05 K/UL (ref 0–0.85)
MONOCYTES NFR BLD AUTO: 10.4 % (ref 0–13.4)
NEUTROPHILS # BLD AUTO: 5.64 K/UL (ref 2–7.15)
NEUTROPHILS NFR BLD: 55.6 % (ref 44–72)
NRBC # BLD AUTO: 0 K/UL
NRBC BLD-RTO: 0 /100 WBC
PLATELET # BLD AUTO: 227 K/UL (ref 164–446)
PMV BLD AUTO: 9.9 FL (ref 9–12.9)
POTASSIUM SERPL-SCNC: 4.4 MMOL/L (ref 3.6–5.5)
RBC # BLD AUTO: 3.23 M/UL (ref 4.2–5.4)
SODIUM SERPL-SCNC: 140 MMOL/L (ref 135–145)
WBC # BLD AUTO: 10.1 K/UL (ref 4.8–10.8)

## 2018-11-21 PROCEDURE — 85025 COMPLETE CBC W/AUTO DIFF WBC: CPT

## 2018-11-21 PROCEDURE — 71045 X-RAY EXAM CHEST 1 VIEW: CPT

## 2018-11-21 PROCEDURE — 770027 HCHG ROOM/CARE - NEGATIVE PRESSURE ISOLATION

## 2018-11-21 PROCEDURE — 302240 PAPR UNIT: Performed by: HOSPITALIST

## 2018-11-21 PROCEDURE — 99232 SBSQ HOSP IP/OBS MODERATE 35: CPT | Performed by: SPECIALIST

## 2018-11-21 PROCEDURE — 36415 COLL VENOUS BLD VENIPUNCTURE: CPT

## 2018-11-21 PROCEDURE — 80048 BASIC METABOLIC PNL TOTAL CA: CPT

## 2018-11-21 ASSESSMENT — ENCOUNTER SYMPTOMS
VOMITING: 0
ABDOMINAL PAIN: 0
NAUSEA: 0
HEADACHES: 0
SHORTNESS OF BREATH: 0
COUGH: 0
FEVER: 0

## 2018-11-21 ASSESSMENT — PAIN SCALES - GENERAL
PAINLEVEL_OUTOF10: 0

## 2018-11-21 NOTE — PROGRESS NOTES
Assumed care at 0700. Pt A&O x 4. Pt is medical and not being monitored. First assessment completed, call light within reach, bed locked and in low position. All questions asked. Will continue to monitor.

## 2018-11-21 NOTE — PROGRESS NOTES
Per chart review pt is currently admitted at [facility]. Pt was admitted to on 11/13/2018 brought in by ambulance after ground-level fall. Per chart review appears inpatient SW has been working with pt regarding discharge plans and discussion related to group home and/or hospice options. Outreach call to inpatient , [facility] for floor SW at ext [phone] to collaborate care/coordination.     Plan:  · LSW will continue to monitor pt while admitted at [facility] and collaborate with care team members at hospital for continuity of care.   · Message routed to RN CC to update/information.

## 2018-11-21 NOTE — DISCHARGE SUMMARY
Discharge Summary    CHIEF COMPLAINT ON ADMISSION  Chief Complaint   Patient presents with   • GLF       Reason for Admission  Other     Admission Date  11/13/2018    CODE STATUS  Comfort Care/DNR    HPI & HOSPITAL COURSE  This is a 89 y.o. female here with  essential hypertension, who presents with ground-level fall.  She stated that she has a bad left hip and fell because of that.  The patient denied palpitation, dizziness, syncope episode.  She presented to ER and was found to have acute ST elevation myocardial infarction with troponin over 50. She stated that she does not want any aggressive intervention including angiogram and wanted to be treated only medically.    She was started on heparin drip in ER and she will be admitted to cardiac intensive care unit in critical condition.  She was seen by cardiology she was also started on plavix and  Liptor.she was noted to have reactive leukocytosis which resolved. She was alos started on lasix.  Prior discussions with the family have led to a change in CODE STATUS to comfort care.  Inpatient hospice consultation is pursued.  TB QuantiFERON test has been requested for possible group home placement and this likely will delay the patient's transfer there.     DISCHARGE DIAGNOSES  Active Problems:    Fall POA: Yes    ST elevation myocardial infarction involving right coronary artery (HCC) POA: Yes    Advanced directives, counseling/discussion POA: Yes    At risk for falls POA: Yes      Overview: Wheeled walker    Leukocytosis POA: Yes    Elevated brain natriuretic peptide (BNP) level POA: Yes    Chronic kidney disease (CKD), stage III (moderate) (HCC) POA: Yes    Rhabdomyolysis POA: Yes    Pain of left hip joint POA: Yes    Nosebleed POA: Unknown  Resolved Problems:    * No resolved hospital problems. *      FOLLOW UP  Future Appointments  Date Time Provider Department Center   11/28/2018 8:45 AM Sunil Matthews M.D. [facility] None     [facility]  [address]  [address]  [facility] [address]  [phone]  Schedule an appointment as soon as possible for a visit  Your doctor has referred you to cardiac rehab, which is important for your recovery.  Please call to make an appointment.      MEDICATIONS ON DISCHARGE     Medication List      START taking these medications      Instructions   furosemide 20 MG Tabs  Commonly known as:  LASIX   Take 1 Tab by mouth every day.  Dose:  20 mg        CONTINUE taking these medications      Instructions   acetaminophen 500 MG Tabs  Commonly known as:  TYLENOL   Take 1,000 mg by mouth every 6 hours as needed for Moderate Pain.  Dose:  1000 mg     artificial tear ointment Oint  Commonly known as:  REFRESH,LACRI-LUBE   Place 1 Application in both eyes as needed (For Dry eye).  Dose:  1 Application     latanoprost 0.005 % Soln  Commonly known as:  XALATAN   Place 1 Drop in both eyes every evening.  Dose:  1 Drop     lisinopril 2.5 MG Tabs  Commonly known as:  PRINIVIL   Take 1 Tab by mouth every day.  Dose:  2.5 mg     MULTIVITAMIN PO   Take 1 Tab by mouth every day.  Dose:  1 Tab     vitamin D (Ergocalciferol) 46619 units Caps capsule  Commonly known as:  DRISDOL   Take 50,000 Units by mouth every 7 days. Can't remember what day  Dose:  27093 Units            Allergies  Allergies   Allergen Reactions   • Allopurinol Rash     Hot, red face   • Aspirin      Cannot take due to medications   • Codeine Itching and Nausea   • Latex Rash     To tape/bandages containing latex   • Tape Rash     adhessive in the tape, causes, swollen rash           DIET  Orders Placed This Encounter   Procedures   • Diet Order Regular     Standing Status:   Standing     Number of Occurrences:   1     Order Specific Question:   Diet:     Answer:   Regular [1]     Order Specific Question:   Texture/Fiber modifications:     Answer:   Dysphagia 3(Mechanical Soft)specify fluid consistency(question 6) [3]     Order Specific Question:   Consistency/Fluid  modifications:     Answer:   Thin Liquids [3]       ACTIVITY  As tolerated.  Weight bearing as tolerated    CONSULTATIONS  Cardiology  pulmonary    PROCEDURES  none    LABORATORY  Lab Results   Component Value Date    SODIUM 138 11/19/2018    POTASSIUM 4.0 11/19/2018    CHLORIDE 113 (H) 11/19/2018    CO2 20 11/19/2018    GLUCOSE 122 (H) 11/19/2018    BUN 32 (H) 11/19/2018    CREATININE 1.14 11/19/2018        Lab Results   Component Value Date    WBC 9.5 11/18/2018    HEMOGLOBIN 10.5 (L) 11/18/2018    HEMATOCRIT 32.1 (L) 11/18/2018    PLATELETCT 186 11/18/2018        On exam patient is resting comfortably and in no acute distress  Vital signs are stable; afebrile  No icterus, conjunctivitis, thrush, pharyngitis, lymphadenopathy, nuchal rigidity  Heart is regular rate and rhythm  Lungs show shallow respirations but even anteriorly  Abdomen shows no guarding or rigidity with positive bowel sounds  No septic joints/DVT/rash/embolic lesions  Patient is diffusely weak but without acute focal neurologic deficits    Plan: Await hospice evaluation  Obtain TB QuantiFERON  Work closely with case management to finalize disposition  Continue comfort care

## 2018-11-21 NOTE — DISCHARGE PLANNING
Anticipated Discharge Disposition: Hospice    Action: Staff with [facility] came to [facility] to meet with pt. LSW confirmed with pt that she is okay meeting with  staff so they can answer any questions or concerns she may have. LSW informed by  staff that pt is appropriate for their home and they can accept her, the monthly cost is $3,000/mo.     Barriers to Discharge: Pt cannot afford GH.    Plan: LSW to notify Hospital Care Management leadership.

## 2018-11-21 NOTE — CARE PLAN
Problem: Knowledge Deficit  Goal: Knowledge of the prescribed therapeutic regimen will improve  Outcome: PROGRESSING AS EXPECTED  Pt verbalizes understanding of medications and has no questions at this time.    Problem: Mobility  Goal: Risk for activity intolerance will decrease  Outcome: PROGRESSING AS EXPECTED  Pt able to mobilize from bed to chair with 2x assistance.

## 2018-11-21 NOTE — CARE PLAN
Problem: Skin Integrity  Goal: Risk for impaired skin integrity will decrease  Outcome: PROGRESSING SLOWER THAN EXPECTED  Skin tear noted to R elbow. Bony prominences blanchable. Pt able to turn well with SBA.     Problem: Respiratory:  Goal: Respiratory status will improve  Outcome: PROGRESSING AS EXPECTED  No c/o SOB and no dyspnea noted. Pt removes O2 NC at times and has no c/o SOB.

## 2018-11-22 ENCOUNTER — APPOINTMENT (OUTPATIENT)
Dept: RADIOLOGY | Facility: MEDICAL CENTER | Age: 83
DRG: 280 | End: 2018-11-22
Attending: INTERNAL MEDICINE
Payer: MEDICARE

## 2018-11-22 PROBLEM — R76.12 POSITIVE QUANTIFERON-TB GOLD TEST: Status: ACTIVE | Noted: 2018-11-22

## 2018-11-22 PROCEDURE — 99232 SBSQ HOSP IP/OBS MODERATE 35: CPT | Performed by: SPECIALIST

## 2018-11-22 PROCEDURE — 71045 X-RAY EXAM CHEST 1 VIEW: CPT

## 2018-11-22 PROCEDURE — 770027 HCHG ROOM/CARE - NEGATIVE PRESSURE ISOLATION

## 2018-11-22 ASSESSMENT — ENCOUNTER SYMPTOMS
SHORTNESS OF BREATH: 0
FEVER: 0
SPUTUM PRODUCTION: 0
NAUSEA: 0
HEMOPTYSIS: 0
HEADACHES: 0
ABDOMINAL PAIN: 0
DIAPHORESIS: 0
COUGH: 0
VOMITING: 0

## 2018-11-22 ASSESSMENT — PAIN SCALES - GENERAL
PAINLEVEL_OUTOF10: 0

## 2018-11-22 NOTE — PROGRESS NOTES
[facility] Medicine Daily Progress Note    Date of Service  11/22/2018    Chief Complaint  89 y.o. female admitted 11/13/2018 with fall and noted to have ST elevation myocardial infarction    Hospital Course    presents with ground-level fall.  She stated that she has a bad left hip and fell because of that.  The patient denied palpitation, dizziness, syncope episode.  She presented to ER and was found to have acute ST elevation myocardial infarction with troponin over 50. She stated that she does not want any aggressive intervention including angiogram and wanted to be treated only medically.    She was started on heparin drip in ER and she will be admitted to cardiac intensive care unit in critical condition.  She was seen by cardiology she was also started on plavix and  Liptor.she was noted to have reactive leukocytosis which resolved. She was alos started on lasix.  Prior discussions with the family have led to a change in CODE STATUS to comfort care.  Inpatient hospice consultation is pursued.  TB QuantiFERON test has been requested for possible group home placement and this likely will delay the patient's transfer there.      Interval Problem Update  TB quantiferon test is +  Pt place in isolation; sputum for AFB ordered; CXR shows left basilar changes but no overt appearance of TB    Consultants/Specialty  Cardiology  Hospice    Code Status  Comfort care    Disposition  Pending    Review of Systems  Review of Systems   Constitutional: Negative for diaphoresis and fever.   Respiratory: Negative for cough, hemoptysis, sputum production and shortness of breath.    Cardiovascular: Negative for chest pain.   Gastrointestinal: Negative for abdominal pain, nausea and vomiting.   Genitourinary: Negative for dysuria.   Skin: Negative for rash.   Neurological: Negative for headaches.        Physical Exam  Temp:  [36.6 °C (97.9 °F)-37.3 °C (99.2 °F)] 36.7 °C (98.1 °F)  Pulse:  [78-91] 78  Resp:  [16-18] 16  BP:  (100-116)/(57-73) 100/64    Physical Exam   Constitutional: No distress.   Elderly and frail   HENT:   Head: Normocephalic and atraumatic.   Mouth/Throat: No oropharyngeal exudate.   Eyes: Pupils are equal, round, and reactive to light. Conjunctivae and EOM are normal. No scleral icterus.   Neck: Normal range of motion. Neck supple.   Cardiovascular: Normal rate and regular rhythm.    Pulmonary/Chest: Effort normal and breath sounds normal. No respiratory distress.   Abdominal: Soft. Bowel sounds are normal. She exhibits no distension. There is no tenderness.   Musculoskeletal: She exhibits no tenderness.   No septic joints, DVT, rash, embolic lesions   Lymphadenopathy:     She has no cervical adenopathy.   Neurological: She is alert. No cranial nerve deficit.   Skin: Skin is warm. No rash noted. No erythema.   Psychiatric: She has a normal mood and affect.       Fluids    Intake/Output Summary (Last 24 hours) at 11/22/18 1528  Last data filed at 11/21/18 1800   Gross per 24 hour   Intake              100 ml   Output                0 ml   Net              100 ml       Laboratory  Recent Labs      11/21/18   0019   WBC  10.1   RBC  3.23*   HEMOGLOBIN  9.8*   HEMATOCRIT  30.5*   MCV  94.4   MCH  30.3   MCHC  32.1*   RDW  49.7   PLATELETCT  227   MPV  9.9     Recent Labs      11/21/18   0019   SODIUM  140   POTASSIUM  4.4   CHLORIDE  111   CO2  22   GLUCOSE  127*   BUN  26*   CREATININE  1.23   CALCIUM  8.1*                   Imaging  DX-CHEST-PORTABLE (1 VIEW)   Final Result      1.  Left basilar opacification may be secondary to atelectasis. Developing pneumonia cannot be excluded.         DX-CHEST-PORTABLE (1 VIEW)   Final Result      1.  Unchanged mild interstitial pulmonary edema   2.  Unchanged bibasilar atelectasis, alveolar edema or pneumonia      DX-CHEST-PORTABLE (1 VIEW)   Final Result      1.  Decreased pulmonary edema   2.  Unchanged bibasilar atelectasis, alveolar edema or pneumonia      DX-CHEST-PORTABLE  (1 VIEW)   Final Result      1.  There is diffuse interstitial and alveolar density in the right mid and lower lung zone. This is increased since the previous study. This may represent mild pulmonary edema/consolidation.   2.  Mildly enlarged cardiomediastinal silhouette when compared with the previous chest x-ray.      DX-CHEST-PORTABLE (1 VIEW)   Final Result      Stable mild pulmonary edema.   New left basilar atelectasis.      DX-CHEST-PORTABLE (1 VIEW)   Final Result      Stable chest appearance compared with 11/16.      DX-CHEST-PORTABLE (1 VIEW)   Final Result      No acute cardiopulmonary abnormality identified.      DX-CHEST-PORTABLE (1 VIEW)   Final Result      No acute cardiopulmonary abnormality. No interval change.      DX-CHEST-PORTABLE (1 VIEW)   Final Result      No acute cardiopulmonary disease.      CT-HEAD W/O   Final Result      1.  No evidence of acute intracranial process.      2.  There is again seen interstitial pulmonary edema and bibasilar atelectasis.      CT-HIP W/O PLUS RECONS LEFT   Final Result      1.  No evidence of acute fracture or malalignment. No evidence of hardware failure or complication.   2.  Postsurgical changes of the left femur with broken screw fragment redemonstrated.   3.  Demineralization.   4.  Scattered colonic diverticula.   5.  Atherosclerosis.   6.  Small fat-containing umbilical hernia.      EC-ECHOCARDIOGRAM COMPLETE W/O CONT   Final Result      DX-HIP-COMPLETE - UNILATERAL 2+ LEFT   Final Result      1.  No definite acute osseous abnormality. In setting of demineralization, an occult fracture is difficult to exclude. If there is strong clinical suspicion, CT or MRI could be obtained for further evaluation.   2.  Postsurgical changes of the left femur with broken screw fragment redemonstrated.      DX-CHEST-PORTABLE (1 VIEW)   Final Result      No acute cardiopulmonary process is seen.      Atherosclerotic plaque.           Assessment/Plan  Positive  QuantiFERON-TB Gold test   Assessment & Plan    Chest x-ray shows left basilar changes but no overt TB findings  Sputum for AFB stains and culture ordered  Currently in isolation  Presently doubt active tuberculosis, possibly latent tuberculosis     ST elevation myocardial infarction involving right coronary artery (HCC)- (present on admission)   Assessment & Plan    Asa  plavix  Decreased lipitor due to cpk         Fall- (present on admission)   Assessment & Plan    PT OT evaluation     Nosebleed   Assessment & Plan    resolved     Pain of left hip joint- (present on admission)   Assessment & Plan    Imaging including CT scan of the hip shows no evidence of fracture       Rhabdomyolysis- (present on admission)   Assessment & Plan    Due to fall  Last Cpk 196      Chronic kidney disease (CKD), stage III (moderate) ([facility])- (present on admission)   Assessment & Plan    Renally dose medications     Elevated brain natriuretic peptide (BNP) level- (present on admission)   Assessment & Plan    No signs of fluid overload  Echocardiogram showed LVEF 50%     Leukocytosis- (present on admission)   Assessment & Plan    resolved     Advanced directives, counseling/discussion- (present on admission)   Assessment & Plan    Comfort care  Discharge disposition pending            VTE prophylaxis: Discontinued as a result of comfort care status and pursuing hospice

## 2018-11-22 NOTE — PROGRESS NOTES
Bedside report received from AQUILINO Moore. Initial patient assessment performed, chart and eMAR reviewed. See relevant flowsheet for details. Patient updated on plan of care for the day, with all questions answered. Call light and personal belongings are within reach, and bed is in the lowest position.

## 2018-11-22 NOTE — PROGRESS NOTES
[facility] Medicine Daily Progress Note    Date of Service  11/21/2018    Chief Complaint  89 y.o. female admitted 11/13/2018 with fall and noted to have ST elevation myocardial infarction    Hospital Course    presents with ground-level fall.  She stated that she has a bad left hip and fell because of that.  The patient denied palpitation, dizziness, syncope episode.  She presented to ER and was found to have acute ST elevation myocardial infarction with troponin over 50. She stated that she does not want any aggressive intervention including angiogram and wanted to be treated only medically.    She was started on heparin drip in ER and she will be admitted to cardiac intensive care unit in critical condition.  She was seen by cardiology she was also started on plavix and  Liptor.she was noted to have reactive leukocytosis which resolved. She was alos started on lasix.  Prior discussions with the family have led to a change in CODE STATUS to comfort care.  Inpatient hospice consultation is pursued.  TB QuantiFERON test has been requested for possible group home placement and this likely will delay the patient's transfer there.      Interval Problem Update  Efforts to obtain placement in a group home have forestalled    Consultants/Specialty  Cardiology  Hospice    Code Status  Comfort care    Disposition  Pending    Review of Systems  Review of Systems   Constitutional: Negative for fever.   Respiratory: Negative for cough and shortness of breath.    Cardiovascular: Negative for chest pain.   Gastrointestinal: Negative for abdominal pain, nausea and vomiting.   Genitourinary: Negative for dysuria.   Skin: Negative for rash.   Neurological: Negative for headaches.        Physical Exam  Temp:  [36.5 °C (97.7 °F)-36.7 °C (98.1 °F)] 36.7 °C (98.1 °F)  Pulse:  [85-86] 85  Resp:  [16] 16  BP: (103-104)/(48-62) 103/48    Physical Exam   Constitutional: No distress.   Elderly and frail   HENT:   Head: Normocephalic and  atraumatic.   Mouth/Throat: No oropharyngeal exudate.   Eyes: Pupils are equal, round, and reactive to light. Conjunctivae and EOM are normal. No scleral icterus.   Neck: Normal range of motion. Neck supple.   Cardiovascular: Normal rate and regular rhythm.    Pulmonary/Chest: Effort normal and breath sounds normal.   Abdominal: Soft. Bowel sounds are normal. She exhibits no distension. There is no tenderness.   Musculoskeletal: She exhibits no tenderness.   No septic joints, DVT, rash, embolic lesions   Lymphadenopathy:     She has no cervical adenopathy.   Neurological: She is alert. No cranial nerve deficit.   Skin: Skin is warm.   Psychiatric: She has a normal mood and affect.       Fluids    Intake/Output Summary (Last 24 hours) at 11/21/18 1617  Last data filed at 11/21/18 0857   Gross per 24 hour   Intake              160 ml   Output                0 ml   Net              160 ml       Laboratory  Recent Labs      11/21/18   0019   WBC  10.1   RBC  3.23*   HEMOGLOBIN  9.8*   HEMATOCRIT  30.5*   MCV  94.4   MCH  30.3   MCHC  32.1*   RDW  49.7   PLATELETCT  227   MPV  9.9     Recent Labs      11/19/18   0317  11/21/18   0019   SODIUM  138  140   POTASSIUM  4.0  4.4   CHLORIDE  113*  111   CO2  20  22   GLUCOSE  122*  127*   BUN  32*  26*   CREATININE  1.14  1.23   CALCIUM  8.1*  8.1*                   Imaging  DX-CHEST-PORTABLE (1 VIEW)   Final Result      1.  Unchanged mild interstitial pulmonary edema   2.  Unchanged bibasilar atelectasis, alveolar edema or pneumonia      DX-CHEST-PORTABLE (1 VIEW)   Final Result      1.  Decreased pulmonary edema   2.  Unchanged bibasilar atelectasis, alveolar edema or pneumonia      DX-CHEST-PORTABLE (1 VIEW)   Final Result      1.  There is diffuse interstitial and alveolar density in the right mid and lower lung zone. This is increased since the previous study. This may represent mild pulmonary edema/consolidation.   2.  Mildly enlarged cardiomediastinal silhouette when  compared with the previous chest x-ray.      DX-CHEST-PORTABLE (1 VIEW)   Final Result      Stable mild pulmonary edema.   New left basilar atelectasis.      DX-CHEST-PORTABLE (1 VIEW)   Final Result      Stable chest appearance compared with 11/16.      DX-CHEST-PORTABLE (1 VIEW)   Final Result      No acute cardiopulmonary abnormality identified.      DX-CHEST-PORTABLE (1 VIEW)   Final Result      No acute cardiopulmonary abnormality. No interval change.      DX-CHEST-PORTABLE (1 VIEW)   Final Result      No acute cardiopulmonary disease.      CT-HEAD W/O   Final Result      1.  No evidence of acute intracranial process.      2.  There is again seen interstitial pulmonary edema and bibasilar atelectasis.      CT-HIP W/O PLUS RECONS LEFT   Final Result      1.  No evidence of acute fracture or malalignment. No evidence of hardware failure or complication.   2.  Postsurgical changes of the left femur with broken screw fragment redemonstrated.   3.  Demineralization.   4.  Scattered colonic diverticula.   5.  Atherosclerosis.   6.  Small fat-containing umbilical hernia.      EC-ECHOCARDIOGRAM COMPLETE W/O CONT   Final Result      DX-HIP-COMPLETE - UNILATERAL 2+ LEFT   Final Result      1.  No definite acute osseous abnormality. In setting of demineralization, an occult fracture is difficult to exclude. If there is strong clinical suspicion, CT or MRI could be obtained for further evaluation.   2.  Postsurgical changes of the left femur with broken screw fragment redemonstrated.      DX-CHEST-PORTABLE (1 VIEW)   Final Result      No acute cardiopulmonary process is seen.      Atherosclerotic plaque.           Assessment/Plan  ST elevation myocardial infarction involving right coronary artery (HCC)- (present on admission)   Assessment & Plan    Asa  plavix  Decreased lipitor due to cpk         Fall- (present on admission)   Assessment & Plan    PT OT evaluation     Nosebleed   Assessment & Plan    resolved     Pain of  left hip joint- (present on admission)   Assessment & Plan    Imaging including CT scan of the hip shows no evidence of fracture       Rhabdomyolysis- (present on admission)   Assessment & Plan    Due to fall  cpk 196 has trended down     Chronic kidney disease (CKD), stage III (moderate) (HCC)- (present on admission)   Assessment & Plan    Renally dose medications     Elevated brain natriuretic peptide (BNP) level- (present on admission)   Assessment & Plan    No signs of fluid overload  Echocardiogram showed LVEF 50%     Leukocytosis- (present on admission)   Assessment & Plan    resolved     Advanced directives, counseling/discussion- (present on admission)   Assessment & Plan    D/w the pt and explained to her comfort care and no medical intervention  With the nurse in the room.she stated clearly that she does not want any medical  Intervention and she likes to be on comfort care only.  I also contacted the pt's daugther and she stated that her mother has stated  The same to her with comfort care only.  I have consulted hospice  She is seen at the bed side and has no complaints today  Comfort care order set is initiated.            VTE prophylaxis: Discontinued as a result of comfort care status and pursuing hospice

## 2018-11-22 NOTE — CARE PLAN
Problem: Communication  Goal: The ability to communicate needs accurately and effectively will improve  Outcome: PROGRESSING AS EXPECTED  Pt communicates needs effectively and calls for assistance appropriately.    Problem: Safety  Goal: Will remain free from falls  Outcome: PROGRESSING AS EXPECTED  Pt verbalizes understanding of fall precautions and calls for assistance appropriately. Bed in lowest position and locked. Pt wearing non-slip socks, call light within reach.

## 2018-11-22 NOTE — PROGRESS NOTES
Paged Dr. Edgar regarding a new positive Quantiferon gold result. Dr. Edgar to immediately place pt on airborne precautions, sputum culture ordered.

## 2018-11-23 ENCOUNTER — APPOINTMENT (OUTPATIENT)
Dept: RADIOLOGY | Facility: MEDICAL CENTER | Age: 83
DRG: 280 | End: 2018-11-23
Attending: INTERNAL MEDICINE
Payer: MEDICARE

## 2018-11-23 LAB
RHODAMINE-AURAMINE STN SPEC: NORMAL
SIGNIFICANT IND 70042: NORMAL
SITE SITE: NORMAL
SOURCE SOURCE: NORMAL

## 2018-11-23 PROCEDURE — A9270 NON-COVERED ITEM OR SERVICE: HCPCS | Performed by: FAMILY MEDICINE

## 2018-11-23 PROCEDURE — 87206 SMEAR FLUORESCENT/ACID STAI: CPT

## 2018-11-23 PROCEDURE — 87116 MYCOBACTERIA CULTURE: CPT

## 2018-11-23 PROCEDURE — 700102 HCHG RX REV CODE 250 W/ 637 OVERRIDE(OP): Performed by: FAMILY MEDICINE

## 2018-11-23 PROCEDURE — 99231 SBSQ HOSP IP/OBS SF/LOW 25: CPT | Performed by: SPECIALIST

## 2018-11-23 PROCEDURE — 71045 X-RAY EXAM CHEST 1 VIEW: CPT

## 2018-11-23 PROCEDURE — 87015 SPECIMEN INFECT AGNT CONCNTJ: CPT

## 2018-11-23 PROCEDURE — 770027 HCHG ROOM/CARE - NEGATIVE PRESSURE ISOLATION

## 2018-11-23 RX ADMIN — FUROSEMIDE 20 MG: 20 TABLET ORAL at 06:01

## 2018-11-23 ASSESSMENT — ENCOUNTER SYMPTOMS
SPUTUM PRODUCTION: 0
SHORTNESS OF BREATH: 0
VOMITING: 0
HEADACHES: 0
HEMOPTYSIS: 0
COUGH: 0
FEVER: 0
NAUSEA: 0
DIAPHORESIS: 0

## 2018-11-23 ASSESSMENT — PAIN SCALES - GENERAL
PAINLEVEL_OUTOF10: 0
PAINLEVEL_OUTOF10: ASSUMED PAIN PRESENT

## 2018-11-23 NOTE — CARE PLAN
Problem: Communication  Goal: The ability to communicate needs accurately and effectively will improve  Outcome: PROGRESSING AS EXPECTED      Problem: Safety  Goal: Will remain free from injury  Outcome: PROGRESSING AS EXPECTED      Problem: Knowledge Deficit  Goal: Knowledge of the prescribed therapeutic regimen will improve  Outcome: PROGRESSING AS EXPECTED      Problem: Skin Integrity  Goal: Risk for impaired skin integrity will decrease  Outcome: PROGRESSING AS EXPECTED

## 2018-11-23 NOTE — PROGRESS NOTES
Received report from NOC RN and assumed care of pt at 0700. Pt AOx4, resting comfortably in bed. Plan of care discussed with patient. Bed in low and locked position. Call light and personal belongings in reach.

## 2018-11-24 ENCOUNTER — APPOINTMENT (OUTPATIENT)
Dept: RADIOLOGY | Facility: MEDICAL CENTER | Age: 83
DRG: 280 | End: 2018-11-24
Attending: INTERNAL MEDICINE
Payer: MEDICARE

## 2018-11-24 LAB
ANION GAP SERPL CALC-SCNC: 7 MMOL/L (ref 0–11.9)
BASOPHILS # BLD AUTO: 0.5 % (ref 0–1.8)
BASOPHILS # BLD: 0.05 K/UL (ref 0–0.12)
BUN SERPL-MCNC: 19 MG/DL (ref 8–22)
CALCIUM SERPL-MCNC: 8.2 MG/DL (ref 8.5–10.5)
CHLORIDE SERPL-SCNC: 110 MMOL/L (ref 96–112)
CO2 SERPL-SCNC: 22 MMOL/L (ref 20–33)
CREAT SERPL-MCNC: 1.23 MG/DL (ref 0.5–1.4)
EOSINOPHIL # BLD AUTO: 0.41 K/UL (ref 0–0.51)
EOSINOPHIL NFR BLD: 4.5 % (ref 0–6.9)
ERYTHROCYTE [DISTWIDTH] IN BLOOD BY AUTOMATED COUNT: 50.3 FL (ref 35.9–50)
GLUCOSE SERPL-MCNC: 97 MG/DL (ref 65–99)
HCT VFR BLD AUTO: 31.3 % (ref 37–47)
HGB BLD-MCNC: 9.7 G/DL (ref 12–16)
IMM GRANULOCYTES # BLD AUTO: 0.04 K/UL (ref 0–0.11)
IMM GRANULOCYTES NFR BLD AUTO: 0.4 % (ref 0–0.9)
LYMPHOCYTES # BLD AUTO: 2.07 K/UL (ref 1–4.8)
LYMPHOCYTES NFR BLD: 22.6 % (ref 22–41)
MAGNESIUM SERPL-MCNC: 2.2 MG/DL (ref 1.5–2.5)
MCH RBC QN AUTO: 29.8 PG (ref 27–33)
MCHC RBC AUTO-ENTMCNC: 31 G/DL (ref 33.6–35)
MCV RBC AUTO: 96.3 FL (ref 81.4–97.8)
MONOCYTES # BLD AUTO: 0.87 K/UL (ref 0–0.85)
MONOCYTES NFR BLD AUTO: 9.5 % (ref 0–13.4)
NEUTROPHILS # BLD AUTO: 5.71 K/UL (ref 2–7.15)
NEUTROPHILS NFR BLD: 62.5 % (ref 44–72)
NRBC # BLD AUTO: 0 K/UL
NRBC BLD-RTO: 0 /100 WBC
PLATELET # BLD AUTO: 265 K/UL (ref 164–446)
PMV BLD AUTO: 9.5 FL (ref 9–12.9)
POTASSIUM SERPL-SCNC: 4.3 MMOL/L (ref 3.6–5.5)
RBC # BLD AUTO: 3.25 M/UL (ref 4.2–5.4)
RHODAMINE-AURAMINE STN SPEC: NORMAL
RHODAMINE-AURAMINE STN SPEC: NORMAL
SIGNIFICANT IND 70042: NORMAL
SIGNIFICANT IND 70042: NORMAL
SITE SITE: NORMAL
SITE SITE: NORMAL
SODIUM SERPL-SCNC: 139 MMOL/L (ref 135–145)
SOURCE SOURCE: NORMAL
SOURCE SOURCE: NORMAL
WBC # BLD AUTO: 9.2 K/UL (ref 4.8–10.8)

## 2018-11-24 PROCEDURE — 80048 BASIC METABOLIC PNL TOTAL CA: CPT

## 2018-11-24 PROCEDURE — A9270 NON-COVERED ITEM OR SERVICE: HCPCS | Performed by: FAMILY MEDICINE

## 2018-11-24 PROCEDURE — 87015 SPECIMEN INFECT AGNT CONCNTJ: CPT

## 2018-11-24 PROCEDURE — 71045 X-RAY EXAM CHEST 1 VIEW: CPT

## 2018-11-24 PROCEDURE — 83735 ASSAY OF MAGNESIUM: CPT

## 2018-11-24 PROCEDURE — 87206 SMEAR FLUORESCENT/ACID STAI: CPT

## 2018-11-24 PROCEDURE — 700102 HCHG RX REV CODE 250 W/ 637 OVERRIDE(OP): Performed by: FAMILY MEDICINE

## 2018-11-24 PROCEDURE — 99232 SBSQ HOSP IP/OBS MODERATE 35: CPT | Performed by: SPECIALIST

## 2018-11-24 PROCEDURE — 36415 COLL VENOUS BLD VENIPUNCTURE: CPT

## 2018-11-24 PROCEDURE — 770027 HCHG ROOM/CARE - NEGATIVE PRESSURE ISOLATION

## 2018-11-24 PROCEDURE — 85025 COMPLETE CBC W/AUTO DIFF WBC: CPT

## 2018-11-24 PROCEDURE — 87116 MYCOBACTERIA CULTURE: CPT

## 2018-11-24 RX ADMIN — FUROSEMIDE 20 MG: 20 TABLET ORAL at 06:23

## 2018-11-24 ASSESSMENT — PAIN SCALES - GENERAL
PAINLEVEL_OUTOF10: 0

## 2018-11-24 ASSESSMENT — ENCOUNTER SYMPTOMS
SPUTUM PRODUCTION: 0
ABDOMINAL PAIN: 0
MYALGIAS: 0
HEMOPTYSIS: 0
NAUSEA: 0
HEADACHES: 0
DIARRHEA: 0
FEVER: 0
SHORTNESS OF BREATH: 0
COUGH: 0
DIAPHORESIS: 0
VOMITING: 0

## 2018-11-24 NOTE — CARE PLAN
Problem: Communication  Goal: The ability to communicate needs accurately and effectively will improve  Outcome: PROGRESSING AS EXPECTED  Pt. Is oriented to call light system and calls when assistance is needed. Pt. Updated on plan of care. Pt. Encouraged to communicate any needs or concerns. Questions have been addressed.     Problem: Safety  Goal: Will remain free from injury  Outcome: PROGRESSING AS EXPECTED  Pt. educated on fall precautions and instructed to use the call light for assistance. Bed locked, in lowest position. Upper side rails up. Appropriate signs posted. Call light and personal belongings within reach. Pt. has no other needs at this time.

## 2018-11-24 NOTE — PROGRESS NOTES
Bedside report received from day shift RN. Pt. Is in bed.  Pt.s bed is locked, in lowest position, with bed alarms on. All fall precautions in place. Pt. Has no other needs at this time.

## 2018-11-24 NOTE — PROGRESS NOTES
Hospital Medicine Daily Progress Note    Date of Service  11/24/2018    Chief Complaint  89 y.o. female admitted 11/13/2018 with fall and noted to have ST elevation myocardial infarction    Hospital Course    presents with ground-level fall.  She stated that she has a bad left hip and fell because of that.  The patient denied palpitation, dizziness, syncope episode.  She presented to ER and was found to have acute ST elevation myocardial infarction with troponin over 50. She stated that she does not want any aggressive intervention including angiogram and wanted to be treated only medically.    She was started on heparin drip in ER and she will be admitted to cardiac intensive care unit in critical condition.  She was seen by cardiology she was also started on plavix and  Liptor.she was noted to have reactive leukocytosis which resolved. She was alos started on lasix.  Prior discussions with the family have led to a change in CODE STATUS to comfort care.  Inpatient hospice consultation is pursued.  TB QuantiFERON test has been requested for possible group home placement and this likely will delay the patient's transfer there.      Interval Problem Update  11/22:TB quantiferon test is +  Pt place in isolation; sputum for AFB ordered; CXR shows left basilar changes but no overt appearance of TB  11/23: Patient resting comfortably, no new acute complaints; first sputum is AFB negative  11/24: awake, seated upright in bed; AFB stains/Cxs in progress for +quantiferon test  Consultants/Specialty  Cardiology  Hospice    Code Status  Comfort care    Disposition  Pending    Review of Systems  Review of Systems   Constitutional: Negative for diaphoresis and fever.   Respiratory: Negative for cough, hemoptysis, sputum production and shortness of breath.    Cardiovascular: Negative for chest pain.   Gastrointestinal: Negative for abdominal pain, diarrhea, nausea and vomiting.   Genitourinary: Negative for dysuria.    Musculoskeletal: Negative for myalgias.   Skin: Negative for rash.   Neurological: Negative for headaches.        Physical Exam  Temp:  [37 °C (98.6 °F)-37.2 °C (99 °F)] 37 °C (98.6 °F)  Pulse:  [84-88] 84  Resp:  [16-17] 16  BP: (102-110)/(56-57) 102/57    Physical Exam   Constitutional: No distress.   Elderly and frail   HENT:   Head: Normocephalic and atraumatic.   Mouth/Throat: No oropharyngeal exudate.   Eyes: Pupils are equal, round, and reactive to light. Conjunctivae and EOM are normal. No scleral icterus.   Neck: Normal range of motion. Neck supple.   Cardiovascular: Normal rate and regular rhythm.    Pulmonary/Chest: Effort normal and breath sounds normal. No respiratory distress.   No respiratory distress   Abdominal: Soft. Bowel sounds are normal. She exhibits no distension. There is no tenderness.   Musculoskeletal: She exhibits no edema or tenderness.   No septic joints, DVT, rash, embolic lesions   Lymphadenopathy:     She has no cervical adenopathy.   Neurological: She is alert. No cranial nerve deficit.   Skin: Skin is warm. No rash noted. No erythema.   Psychiatric: She has a normal mood and affect.       Fluids    Intake/Output Summary (Last 24 hours) at 11/24/18 1400  Last data filed at 11/24/18 0800   Gross per 24 hour   Intake              300 ml   Output              300 ml   Net                0 ml       Laboratory  Recent Labs      11/24/18   0233   WBC  9.2   RBC  3.25*   HEMOGLOBIN  9.7*   HEMATOCRIT  31.3*   MCV  96.3   MCH  29.8   MCHC  31.0*   RDW  50.3*   PLATELETCT  265   MPV  9.5     Recent Labs      11/24/18   0233   SODIUM  139   POTASSIUM  4.3   CHLORIDE  110   CO2  22   GLUCOSE  97   BUN  19   CREATININE  1.23   CALCIUM  8.2*                   Imaging  DX-CHEST-PORTABLE (1 VIEW)   Final Result      Ill-defined infrahilar interstitial opacities, atelectasis versus mild edema. No significant pleural effusions.      DX-CHEST-PORTABLE (1 VIEW)   Final Result      Mild left basilar  atelectasis, improved since prior. No new consolidation or large pleural effusions.      DX-CHEST-PORTABLE (1 VIEW)   Final Result      1.  Left basilar opacification may be secondary to atelectasis. Developing pneumonia cannot be excluded.         DX-CHEST-PORTABLE (1 VIEW)   Final Result      1.  Unchanged mild interstitial pulmonary edema   2.  Unchanged bibasilar atelectasis, alveolar edema or pneumonia      DX-CHEST-PORTABLE (1 VIEW)   Final Result      1.  Decreased pulmonary edema   2.  Unchanged bibasilar atelectasis, alveolar edema or pneumonia      DX-CHEST-PORTABLE (1 VIEW)   Final Result      1.  There is diffuse interstitial and alveolar density in the right mid and lower lung zone. This is increased since the previous study. This may represent mild pulmonary edema/consolidation.   2.  Mildly enlarged cardiomediastinal silhouette when compared with the previous chest x-ray.      DX-CHEST-PORTABLE (1 VIEW)   Final Result      Stable mild pulmonary edema.   New left basilar atelectasis.      DX-CHEST-PORTABLE (1 VIEW)   Final Result      Stable chest appearance compared with 11/16.      DX-CHEST-PORTABLE (1 VIEW)   Final Result      No acute cardiopulmonary abnormality identified.      DX-CHEST-PORTABLE (1 VIEW)   Final Result      No acute cardiopulmonary abnormality. No interval change.      DX-CHEST-PORTABLE (1 VIEW)   Final Result      No acute cardiopulmonary disease.      CT-HEAD W/O   Final Result      1.  No evidence of acute intracranial process.      2.  There is again seen interstitial pulmonary edema and bibasilar atelectasis.      CT-HIP W/O PLUS RECONS LEFT   Final Result      1.  No evidence of acute fracture or malalignment. No evidence of hardware failure or complication.   2.  Postsurgical changes of the left femur with broken screw fragment redemonstrated.   3.  Demineralization.   4.  Scattered colonic diverticula.   5.  Atherosclerosis.   6.  Small fat-containing umbilical hernia.       EC-ECHOCARDIOGRAM COMPLETE W/O CONT   Final Result      DX-HIP-COMPLETE - UNILATERAL 2+ LEFT   Final Result      1.  No definite acute osseous abnormality. In setting of demineralization, an occult fracture is difficult to exclude. If there is strong clinical suspicion, CT or MRI could be obtained for further evaluation.   2.  Postsurgical changes of the left femur with broken screw fragment redemonstrated.      DX-CHEST-PORTABLE (1 VIEW)   Final Result      No acute cardiopulmonary process is seen.      Atherosclerotic plaque.           Assessment/Plan  Positive QuantiFERON-TB Gold test   Assessment & Plan    Chest x-ray shows left basilar changes but no overt TB findings  Sputum for AFB stains and culture ordered  Currently in isolation  Presently doubt active tuberculosis, possibly latent tuberculosis  Not sure the patient would tolerate prolonged treatment for latent TB infection (higher than normal risk for hepatotoxicity with INH) and not likely to be pursued given comfort care status; ruling out ACTIVE TB opens up options for placement though  Once it is known that 3 sputum specimens are AFB-negative, then can discontinue isolation     ST elevation myocardial infarction involving right coronary artery (HCC)- (present on admission)   Assessment & Plan    Asa  plavix  lipitor  Currently withheld in light of comfort care status         Fall- (present on admission)   Assessment & Plan    PT OT evaluation     Nosebleed   Assessment & Plan    resolved     Pain of left hip joint- (present on admission)   Assessment & Plan    Imaging including CT scan of the hip shows no evidence of fracture       Rhabdomyolysis- (present on admission)   Assessment & Plan    Due to fall  Last Cpk 196      Chronic kidney disease (CKD), stage III (moderate) (HCC)- (present on admission)   Assessment & Plan    Renally dose medications     Elevated brain natriuretic peptide (BNP) level- (present on admission)   Assessment & Plan    No  signs of fluid overload  Echocardiogram showed LVEF 50%     Leukocytosis- (present on admission)   Assessment & Plan    resolved     Advanced directives, counseling/discussion- (present on admission)   Assessment & Plan    Comfort care  Discharge disposition pending            VTE prophylaxis: Discontinued as a result of comfort care status and pursuing hospice

## 2018-11-24 NOTE — PROGRESS NOTES
Hospital Medicine Daily Progress Note    Date of Service  11/23/2018    Chief Complaint  89 y.o. female admitted 11/13/2018 with fall and noted to have ST elevation myocardial infarction    Hospital Course    presents with ground-level fall.  She stated that she has a bad left hip and fell because of that.  The patient denied palpitation, dizziness, syncope episode.  She presented to ER and was found to have acute ST elevation myocardial infarction with troponin over 50. She stated that she does not want any aggressive intervention including angiogram and wanted to be treated only medically.    She was started on heparin drip in ER and she will be admitted to cardiac intensive care unit in critical condition.  She was seen by cardiology she was also started on plavix and  Liptor.she was noted to have reactive leukocytosis which resolved. She was alos started on lasix.  Prior discussions with the family have led to a change in CODE STATUS to comfort care.  Inpatient hospice consultation is pursued.  TB QuantiFERON test has been requested for possible group home placement and this likely will delay the patient's transfer there.      Interval Problem Update  11/22:TB quantiferon test is +  Pt place in isolation; sputum for AFB ordered; CXR shows left basilar changes but no overt appearance of TB  11/23: Patient resting comfortably, no new acute complaints; first sputum is AFB negative  Consultants/Specialty  Cardiology  Hospice    Code Status  Comfort care    Disposition  Pending    Review of Systems  Review of Systems   Constitutional: Negative for diaphoresis and fever.   Respiratory: Negative for cough, hemoptysis, sputum production and shortness of breath.    Cardiovascular: Negative for chest pain.   Gastrointestinal: Negative for nausea and vomiting.   Genitourinary: Negative for dysuria.   Skin: Negative for rash.   Neurological: Negative for headaches.        Physical Exam  Temp:  [36.6 °C (97.8 °F)-36.8 °C  (98.3 °F)] 36.7 °C (98.1 °F)  Pulse:  [78-83] 78  Resp:  [16-18] 18  BP: ()/(45-66) 103/61    Physical Exam   Constitutional: No distress.   Elderly and frail   HENT:   Head: Normocephalic and atraumatic.   Mouth/Throat: No oropharyngeal exudate.   Eyes: Pupils are equal, round, and reactive to light. Conjunctivae and EOM are normal. No scleral icterus.   Neck: Normal range of motion. Neck supple.   Cardiovascular: Normal rate and regular rhythm.    Pulmonary/Chest: Effort normal and breath sounds normal. No respiratory distress.   Abdominal: Soft. Bowel sounds are normal. She exhibits no distension. There is no tenderness.   Musculoskeletal: She exhibits no edema or tenderness.   No septic joints, DVT, rash, embolic lesions   Lymphadenopathy:     She has no cervical adenopathy.   Neurological: She is alert. No cranial nerve deficit.   Skin: Skin is warm. No rash noted. No erythema.       Fluids    Intake/Output Summary (Last 24 hours) at 11/23/18 1701  Last data filed at 11/23/18 0800   Gross per 24 hour   Intake              400 ml   Output                0 ml   Net              400 ml       Laboratory  Recent Labs      11/21/18   0019   WBC  10.1   RBC  3.23*   HEMOGLOBIN  9.8*   HEMATOCRIT  30.5*   MCV  94.4   MCH  30.3   MCHC  32.1*   RDW  49.7   PLATELETCT  227   MPV  9.9     Recent Labs      11/21/18   0019   SODIUM  140   POTASSIUM  4.4   CHLORIDE  111   CO2  22   GLUCOSE  127*   BUN  26*   CREATININE  1.23   CALCIUM  8.1*                   Imaging  DX-CHEST-PORTABLE (1 VIEW)   Final Result      Mild left basilar atelectasis, improved since prior. No new consolidation or large pleural effusions.      DX-CHEST-PORTABLE (1 VIEW)   Final Result      1.  Left basilar opacification may be secondary to atelectasis. Developing pneumonia cannot be excluded.         DX-CHEST-PORTABLE (1 VIEW)   Final Result      1.  Unchanged mild interstitial pulmonary edema   2.  Unchanged bibasilar atelectasis, alveolar  edema or pneumonia      DX-CHEST-PORTABLE (1 VIEW)   Final Result      1.  Decreased pulmonary edema   2.  Unchanged bibasilar atelectasis, alveolar edema or pneumonia      DX-CHEST-PORTABLE (1 VIEW)   Final Result      1.  There is diffuse interstitial and alveolar density in the right mid and lower lung zone. This is increased since the previous study. This may represent mild pulmonary edema/consolidation.   2.  Mildly enlarged cardiomediastinal silhouette when compared with the previous chest x-ray.      DX-CHEST-PORTABLE (1 VIEW)   Final Result      Stable mild pulmonary edema.   New left basilar atelectasis.      DX-CHEST-PORTABLE (1 VIEW)   Final Result      Stable chest appearance compared with 11/16.      DX-CHEST-PORTABLE (1 VIEW)   Final Result      No acute cardiopulmonary abnormality identified.      DX-CHEST-PORTABLE (1 VIEW)   Final Result      No acute cardiopulmonary abnormality. No interval change.      DX-CHEST-PORTABLE (1 VIEW)   Final Result      No acute cardiopulmonary disease.      CT-HEAD W/O   Final Result      1.  No evidence of acute intracranial process.      2.  There is again seen interstitial pulmonary edema and bibasilar atelectasis.      CT-HIP W/O PLUS RECONS LEFT   Final Result      1.  No evidence of acute fracture or malalignment. No evidence of hardware failure or complication.   2.  Postsurgical changes of the left femur with broken screw fragment redemonstrated.   3.  Demineralization.   4.  Scattered colonic diverticula.   5.  Atherosclerosis.   6.  Small fat-containing umbilical hernia.      EC-ECHOCARDIOGRAM COMPLETE W/O CONT   Final Result      DX-HIP-COMPLETE - UNILATERAL 2+ LEFT   Final Result      1.  No definite acute osseous abnormality. In setting of demineralization, an occult fracture is difficult to exclude. If there is strong clinical suspicion, CT or MRI could be obtained for further evaluation.   2.  Postsurgical changes of the left femur with broken screw  fragment redemonstrated.      DX-CHEST-PORTABLE (1 VIEW)   Final Result      No acute cardiopulmonary process is seen.      Atherosclerotic plaque.           Assessment/Plan  Positive QuantiFERON-TB Gold test   Assessment & Plan    Chest x-ray shows left basilar changes but no overt TB findings  Sputum for AFB stains and culture ordered  Currently in isolation  Presently doubt active tuberculosis, possibly latent tuberculosis  Not sure the patient would tolerate prolonged treatment for latent TB infection (higher than normal risk for hepatotoxicity with INH) and not likely to be pursued given comfort care status; ruling out ACTIVE TB opens up options for placement though     ST elevation myocardial infarction involving right coronary artery (HCC)- (present on admission)   Assessment & Plan    Asa  plavix  lipitor  Currently withheld in light of comfort care status         Fall- (present on admission)   Assessment & Plan    PT OT evaluation     Nosebleed   Assessment & Plan    resolved     Pain of left hip joint- (present on admission)   Assessment & Plan    Imaging including CT scan of the hip shows no evidence of fracture       Rhabdomyolysis- (present on admission)   Assessment & Plan    Due to fall  Last Cpk 196      Chronic kidney disease (CKD), stage III (moderate) (HCC)- (present on admission)   Assessment & Plan    Renally dose medications     Elevated brain natriuretic peptide (BNP) level- (present on admission)   Assessment & Plan    No signs of fluid overload  Echocardiogram showed LVEF 50%     Leukocytosis- (present on admission)   Assessment & Plan    resolved     Advanced directives, counseling/discussion- (present on admission)   Assessment & Plan    Comfort care  Discharge disposition pending            VTE prophylaxis: Discontinued as a result of comfort care status and pursuing hospice

## 2018-11-24 NOTE — CARE PLAN
Problem: Safety  Goal: Will remain free from injury  Outcome: PROGRESSING AS EXPECTED  Pt calls appropriately, bed alarm on    Problem: Skin Integrity  Goal: Risk for impaired skin integrity will decrease  Outcome: PROGRESSING AS EXPECTED  Pt turns self in bed    Problem: Pain Management  Goal: Pain level will decrease to patient's comfort goal  Outcome: PROGRESSING AS EXPECTED  Pt declines pain

## 2018-11-24 NOTE — CARE PLAN
Problem: Bowel/Gastric:  Goal: Normal bowel function is maintained or improved  Outcome: PROGRESSING AS EXPECTED  Pt tolerating regular diet. Bowel protocol.     Problem: Mobility  Goal: Risk for activity intolerance will decrease  Outcome: PROGRESSING AS EXPECTED  Patient is able to mobilize up to chair for meals. Weak and requires 1xassist.

## 2018-11-25 ENCOUNTER — APPOINTMENT (OUTPATIENT)
Dept: RADIOLOGY | Facility: MEDICAL CENTER | Age: 83
DRG: 280 | End: 2018-11-25
Attending: INTERNAL MEDICINE
Payer: MEDICARE

## 2018-11-25 PROCEDURE — 87206 SMEAR FLUORESCENT/ACID STAI: CPT

## 2018-11-25 PROCEDURE — 87015 SPECIMEN INFECT AGNT CONCNTJ: CPT

## 2018-11-25 PROCEDURE — 700102 HCHG RX REV CODE 250 W/ 637 OVERRIDE(OP): Performed by: FAMILY MEDICINE

## 2018-11-25 PROCEDURE — 99232 SBSQ HOSP IP/OBS MODERATE 35: CPT | Performed by: SPECIALIST

## 2018-11-25 PROCEDURE — 71045 X-RAY EXAM CHEST 1 VIEW: CPT

## 2018-11-25 PROCEDURE — 770001 HCHG ROOM/CARE - MED/SURG/GYN PRIV*

## 2018-11-25 PROCEDURE — A9270 NON-COVERED ITEM OR SERVICE: HCPCS | Performed by: FAMILY MEDICINE

## 2018-11-25 PROCEDURE — 87116 MYCOBACTERIA CULTURE: CPT

## 2018-11-25 RX ADMIN — FUROSEMIDE 20 MG: 20 TABLET ORAL at 05:39

## 2018-11-25 ASSESSMENT — PAIN SCALES - GENERAL
PAINLEVEL_OUTOF10: 0

## 2018-11-25 ASSESSMENT — ENCOUNTER SYMPTOMS
NAUSEA: 0
VOMITING: 0
HEADACHES: 0
DIARRHEA: 0
SHORTNESS OF BREATH: 0
ABDOMINAL PAIN: 0
FEVER: 0

## 2018-11-25 NOTE — CARE PLAN
Problem: Safety  Goal: Will remain free from injury  Outcome: PROGRESSING AS EXPECTED  Pt remains free from falls or injuries. Bed alarm and strip alarm set and in place. Treaded socks in place. Fall risk wristband in place. Fall precautions signage in place outside pt's room. Pt 1-1 assist to commode. Pt calls for assistance appropriately.     Problem: Skin Integrity  Goal: Risk for impaired skin integrity will decrease  Outcome: PROGRESSING AS EXPECTED  No new s/sx of skin breakdown. Foam ear pads applied to oxygen tubing. Barrier cream applied to sacrum. Waffle mattress and Q2H turns in place.

## 2018-11-25 NOTE — DISCHARGE PLANNING
LSW spoke with patient's daughter Wild about patient's group home placement. Per Wild, she is unsure of patient's income and how much she could help financially as she lives in [place]. Wild believes patient needs placement and was aware that the patient had been speaking to [facility]. LSW provided contact information for  and Wild reported she would call them to discuss cost.

## 2018-11-25 NOTE — PROGRESS NOTES
Hospital Medicine Daily Progress Note    Date of Service  11/25/2018    Chief Complaint  89 y.o. female admitted 11/13/2018 with fall and noted to have ST elevation myocardial infarction    Hospital Course    presents with ground-level fall.  She stated that she has a bad left hip and fell because of that.  The patient denied palpitation, dizziness, syncope episode.  She presented to ER and was found to have acute ST elevation myocardial infarction with troponin over 50. She stated that she does not want any aggressive intervention including angiogram and wanted to be treated only medically.    She was started on heparin drip in ER and she will be admitted to cardiac intensive care unit in critical condition.  She was seen by cardiology she was also started on plavix and  Liptor.she was noted to have reactive leukocytosis which resolved. She was alos started on lasix.  Prior discussions with the family have led to a change in CODE STATUS to comfort care.  Inpatient hospice consultation is pursued.  TB QuantiFERON test has been requested for possible group home placement and this likely will delay the patient's transfer there.      Interval Problem Update  11/22:TB quantiferon test is +  Pt place in isolation; sputum for AFB ordered; CXR shows left basilar changes but no overt appearance of TB  11/23: Patient resting comfortably, no new acute complaints; first sputum is AFB negative  11/24: awake, seated upright in bed; AFB stains/Cxs in progress for +quantiferon test  11/25: 3 sputum gram stains are negative for AFB; discontinue isolation. No evidence for active TB.  Not inclined to treat LTBI as pt is elderly and higher risk for hepatotoxicity  Can now renew efforts for subsequent placement    Consultants/Specialty  Cardiology  Hospice    Code Status  Comfort care    Disposition  Pending    Review of Systems  Review of Systems   Constitutional: Negative for fever.   Respiratory: Negative for shortness of breath.     Cardiovascular: Negative for chest pain.   Gastrointestinal: Negative for abdominal pain, diarrhea, nausea and vomiting.   Genitourinary: Negative for dysuria.   Neurological: Negative for headaches.        Physical Exam  Temp:  [36.9 °C (98.5 °F)-37.5 °C (99.5 °F)] 37.5 °C (99.5 °F)  Pulse:  [69-86] 83  Resp:  [16-20] 16  BP: ()/(55-69) 106/65    Physical Exam   Constitutional: No distress.   Elderly and frail   HENT:   Head: Normocephalic and atraumatic.   Mouth/Throat: No oropharyngeal exudate.   Eyes: Pupils are equal, round, and reactive to light. Conjunctivae and EOM are normal. No scleral icterus.   Neck: Normal range of motion. Neck supple.   Cardiovascular: Normal rate and regular rhythm.    Pulmonary/Chest: Effort normal and breath sounds normal. No respiratory distress.   No respiratory distress   Abdominal: Soft. Bowel sounds are normal. She exhibits no distension. There is no tenderness.   Genitourinary:   Genitourinary Comments: Incontinent of urine   Musculoskeletal: She exhibits no edema or tenderness.   No septic joints, DVT, rash, embolic lesions   Lymphadenopathy:     She has no cervical adenopathy.   Neurological: She is alert. No cranial nerve deficit.   Skin: Skin is warm. No rash noted. No erythema.   Psychiatric: She has a normal mood and affect.       Fluids    Intake/Output Summary (Last 24 hours) at 11/25/18 1406  Last data filed at 11/25/18 0300   Gross per 24 hour   Intake              240 ml   Output              370 ml   Net             -130 ml       Laboratory  Recent Labs      11/24/18   0233   WBC  9.2   RBC  3.25*   HEMOGLOBIN  9.7*   HEMATOCRIT  31.3*   MCV  96.3   MCH  29.8   MCHC  31.0*   RDW  50.3*   PLATELETCT  265   MPV  9.5     Recent Labs      11/24/18   0233   SODIUM  139   POTASSIUM  4.3   CHLORIDE  110   CO2  22   GLUCOSE  97   BUN  19   CREATININE  1.23   CALCIUM  8.2*                   Imaging  DX-CHEST-PORTABLE (1 VIEW)   Final Result      Stable interstitial  edema and/or interstitial lung disease with probable small pleural fluid      DX-CHEST-PORTABLE (1 VIEW)   Final Result      Ill-defined infrahilar interstitial opacities, atelectasis versus mild edema. No significant pleural effusions.      DX-CHEST-PORTABLE (1 VIEW)   Final Result      Mild left basilar atelectasis, improved since prior. No new consolidation or large pleural effusions.      DX-CHEST-PORTABLE (1 VIEW)   Final Result      1.  Left basilar opacification may be secondary to atelectasis. Developing pneumonia cannot be excluded.         DX-CHEST-PORTABLE (1 VIEW)   Final Result      1.  Unchanged mild interstitial pulmonary edema   2.  Unchanged bibasilar atelectasis, alveolar edema or pneumonia      DX-CHEST-PORTABLE (1 VIEW)   Final Result      1.  Decreased pulmonary edema   2.  Unchanged bibasilar atelectasis, alveolar edema or pneumonia      DX-CHEST-PORTABLE (1 VIEW)   Final Result      1.  There is diffuse interstitial and alveolar density in the right mid and lower lung zone. This is increased since the previous study. This may represent mild pulmonary edema/consolidation.   2.  Mildly enlarged cardiomediastinal silhouette when compared with the previous chest x-ray.      DX-CHEST-PORTABLE (1 VIEW)   Final Result      Stable mild pulmonary edema.   New left basilar atelectasis.      DX-CHEST-PORTABLE (1 VIEW)   Final Result      Stable chest appearance compared with 11/16.      DX-CHEST-PORTABLE (1 VIEW)   Final Result      No acute cardiopulmonary abnormality identified.      DX-CHEST-PORTABLE (1 VIEW)   Final Result      No acute cardiopulmonary abnormality. No interval change.      DX-CHEST-PORTABLE (1 VIEW)   Final Result      No acute cardiopulmonary disease.      CT-HEAD W/O   Final Result      1.  No evidence of acute intracranial process.      2.  There is again seen interstitial pulmonary edema and bibasilar atelectasis.      CT-HIP W/O PLUS RECONS LEFT   Final Result      1.  No  evidence of acute fracture or malalignment. No evidence of hardware failure or complication.   2.  Postsurgical changes of the left femur with broken screw fragment redemonstrated.   3.  Demineralization.   4.  Scattered colonic diverticula.   5.  Atherosclerosis.   6.  Small fat-containing umbilical hernia.      EC-ECHOCARDIOGRAM COMPLETE W/O CONT   Final Result      DX-HIP-COMPLETE - UNILATERAL 2+ LEFT   Final Result      1.  No definite acute osseous abnormality. In setting of demineralization, an occult fracture is difficult to exclude. If there is strong clinical suspicion, CT or MRI could be obtained for further evaluation.   2.  Postsurgical changes of the left femur with broken screw fragment redemonstrated.      DX-CHEST-PORTABLE (1 VIEW)   Final Result      No acute cardiopulmonary process is seen.      Atherosclerotic plaque.           Assessment/Plan  Positive QuantiFERON-TB Gold test   Assessment & Plan    Chest x-ray shows left basilar changes but no overt TB findings  Sputum for AFB stains and culture ordered  Currently in isolation  Presently doubt active tuberculosis, possibly latent tuberculosis  Not sure the patient would tolerate prolonged treatment for latent TB infection (higher than normal risk for hepatotoxicity with INH) and not likely to be pursued given comfort care status; ruling out ACTIVE TB opens up options for placement though  3 sputum specimens are AFB-negative, discontinue isolation     ST elevation myocardial infarction involving right coronary artery (HCC)- (present on admission)   Assessment & Plan    Asa  plavix  lipitor  Currently withheld in light of comfort care status         Fall- (present on admission)   Assessment & Plan    PT OT evaluation     Nosebleed   Assessment & Plan    resolved     Pain of left hip joint- (present on admission)   Assessment & Plan    Imaging including CT scan of the hip shows no evidence of fracture       Rhabdomyolysis- (present on admission)    Assessment & Plan    Due to fall  Last Cpk 196      Chronic kidney disease (CKD), stage III (moderate) (HCC)- (present on admission)   Assessment & Plan    Renally dose medications     Elevated brain natriuretic peptide (BNP) level- (present on admission)   Assessment & Plan    No signs of fluid overload  Echocardiogram showed LVEF 50%     Leukocytosis- (present on admission)   Assessment & Plan    resolved     Advanced directives, counseling/discussion- (present on admission)   Assessment & Plan    Comfort care  Discharge disposition pending            VTE prophylaxis: Discontinued as a result of comfort care status and pursuing hospice

## 2018-11-25 NOTE — PROGRESS NOTES
Pt care assumed. Pt lying comfortably in bed. A&O x 4. No distress present; no pain. Call light, phone, and bedside table within reach. White board updated and plan of care discussed with patient. Bed alarm and strip alarm set and in place. No concerns present at this time. Will continue to monitor.

## 2018-11-25 NOTE — CARE PLAN
Problem: Skin Integrity  Goal: Risk for impaired skin integrity will decrease    Intervention: Assess risk factors for impaired skin integrity and/or pressure ulcers  Risk factors for impaired skin integrity assessed.   Intervention: Assess and monitor skin integrity, appearance and/or temperature  Skin integrity, appearance and temperature monitored

## 2018-11-25 NOTE — PROGRESS NOTES
Received report from previous shift. Plan of care discussed with patient. no concerns voiced at this time. Patient is A&O 4, bed alarm on.  Patient educated on the importance of calling if in need of assistance.  rosaleet verbalized understanding.  Bed controls on, bed locked and in lowest position, call light with patient. Patient without pain at this time. Will continue to monitor for safety and comfort.

## 2018-11-26 ENCOUNTER — APPOINTMENT (OUTPATIENT)
Dept: RADIOLOGY | Facility: MEDICAL CENTER | Age: 83
DRG: 280 | End: 2018-11-26
Attending: INTERNAL MEDICINE
Payer: MEDICARE

## 2018-11-26 ENCOUNTER — HOME CARE VISIT (OUTPATIENT)
Dept: HOSPICE | Facility: HOSPICE | Age: 83
End: 2018-11-26
Payer: MEDICARE

## 2018-11-26 LAB
ALBUMIN SERPL BCP-MCNC: 2.6 G/DL (ref 3.2–4.9)
ALBUMIN/GLOB SERPL: 0.8 G/DL
ALP SERPL-CCNC: 92 U/L (ref 30–99)
ALT SERPL-CCNC: 10 U/L (ref 2–50)
ANION GAP SERPL CALC-SCNC: 7 MMOL/L (ref 0–11.9)
AST SERPL-CCNC: 14 U/L (ref 12–45)
BILIRUB SERPL-MCNC: 0.4 MG/DL (ref 0.1–1.5)
BUN SERPL-MCNC: 18 MG/DL (ref 8–22)
CALCIUM SERPL-MCNC: 8.3 MG/DL (ref 8.5–10.5)
CHLORIDE SERPL-SCNC: 107 MMOL/L (ref 96–112)
CO2 SERPL-SCNC: 24 MMOL/L (ref 20–33)
CREAT SERPL-MCNC: 1.22 MG/DL (ref 0.5–1.4)
GLOBULIN SER CALC-MCNC: 3.1 G/DL (ref 1.9–3.5)
GLUCOSE SERPL-MCNC: 100 MG/DL (ref 65–99)
POTASSIUM SERPL-SCNC: 4.5 MMOL/L (ref 3.6–5.5)
PROT SERPL-MCNC: 5.7 G/DL (ref 6–8.2)
SODIUM SERPL-SCNC: 138 MMOL/L (ref 135–145)

## 2018-11-26 PROCEDURE — 36415 COLL VENOUS BLD VENIPUNCTURE: CPT

## 2018-11-26 PROCEDURE — 770001 HCHG ROOM/CARE - MED/SURG/GYN PRIV*

## 2018-11-26 PROCEDURE — A9270 NON-COVERED ITEM OR SERVICE: HCPCS | Performed by: FAMILY MEDICINE

## 2018-11-26 PROCEDURE — 99232 SBSQ HOSP IP/OBS MODERATE 35: CPT | Performed by: SPECIALIST

## 2018-11-26 PROCEDURE — 80053 COMPREHEN METABOLIC PANEL: CPT

## 2018-11-26 PROCEDURE — 71045 X-RAY EXAM CHEST 1 VIEW: CPT

## 2018-11-26 PROCEDURE — 700102 HCHG RX REV CODE 250 W/ 637 OVERRIDE(OP): Performed by: FAMILY MEDICINE

## 2018-11-26 RX ADMIN — FUROSEMIDE 20 MG: 20 TABLET ORAL at 06:28

## 2018-11-26 ASSESSMENT — ENCOUNTER SYMPTOMS
FEVER: 0
ABDOMINAL PAIN: 0
VOMITING: 0
DEPRESSION: 0
DIARRHEA: 0
HEADACHES: 0
NAUSEA: 0
SHORTNESS OF BREATH: 0

## 2018-11-26 ASSESSMENT — PATIENT HEALTH QUESTIONNAIRE - PHQ9
1. LITTLE INTEREST OR PLEASURE IN DOING THINGS: NOT AT ALL
2. FEELING DOWN, DEPRESSED, IRRITABLE, OR HOPELESS: NOT AT ALL
SUM OF ALL RESPONSES TO PHQ9 QUESTIONS 1 AND 2: 0

## 2018-11-26 ASSESSMENT — PAIN SCALES - GENERAL
PAINLEVEL_OUTOF10: 0
PAINLEVEL_OUTOF10: 0

## 2018-11-26 NOTE — PROGRESS NOTES
Received report at 1900 at bedside. Pt A&O x 4 no C/O pain. Completed assessment. POC discussed with patient. Bed alarm in use, belongings and call light within reach.

## 2018-11-26 NOTE — DISCHARGE PLANNING
Anticipated Discharge Disposition: Hospice    Action: LSW discussed pt's placement concerns with Hospital Care Management leadership. Pt cannot afford a GH, but she cannot return home safely by herself. Pt's family reside outside of [place] and are unable to help. LSW awaiting response from leadership regarding d/c planning/assistance.    Barriers to Discharge: Pt unable to d/c home alone, cannot afford GH.    Plan: Pending direction from Hospital Care Management leadership.

## 2018-11-26 NOTE — PROGRESS NOTES
Hospital Medicine Daily Progress Note    Date of Service  11/26/2018    Chief Complaint  89 y.o. female admitted 11/13/2018 with fall and noted to have ST elevation myocardial infarction    Hospital Course    presents with ground-level fall.  She stated that she has a bad left hip and fell because of that.  The patient denied palpitation, dizziness, syncope episode.  She presented to ER and was found to have acute ST elevation myocardial infarction with troponin over 50. She stated that she does not want any aggressive intervention including angiogram and wanted to be treated only medically.    She was started on heparin drip in ER and she will be admitted to cardiac intensive care unit in critical condition.  She was seen by cardiology she was also started on plavix and  Liptor.she was noted to have reactive leukocytosis which resolved. She was alos started on lasix.  Prior discussions with the family have led to a change in CODE STATUS to comfort care.  Inpatient hospice consultation is pursued.  TB QuantiFERON test has been requested for possible group home placement and this likely will delay the patient's transfer there.      Interval Problem Update  11/22:TB quantiferon test is +  Pt place in isolation; sputum for AFB ordered; CXR shows left basilar changes but no overt appearance of TB  11/23: Patient resting comfortably, no new acute complaints; first sputum is AFB negative  11/24: awake, seated upright in bed; AFB stains/Cxs in progress for +quantiferon test  11/25: 3 sputum gram stains are negative for AFB; discontinue isolation. No evidence for active TB.  Not inclined to treat LTBI as pt is elderly and higher risk for hepatotoxicity  Can now renew efforts for subsequent placement    Consultants/Specialty  Cardiology  Hospice    Code Status  Comfort care    Disposition  Pending    Review of Systems  Review of Systems   Constitutional: Negative for fever.   Respiratory: Negative for shortness of breath.     Cardiovascular: Negative for chest pain.   Gastrointestinal: Negative for abdominal pain, diarrhea, nausea and vomiting.   Genitourinary: Negative for dysuria.        Occasionally incontinent   Neurological: Negative for headaches.   Psychiatric/Behavioral: Negative for depression.        Physical Exam  Temp:  [37 °C (98.6 °F)-37.5 °C (99.5 °F)] 37.5 °C (99.5 °F)  Pulse:  [78-87] 78  Resp:  [16-18] 18  BP: ()/(54-75) 92/54    Physical Exam   Constitutional: No distress.   Elderly and frail   HENT:   Head: Normocephalic and atraumatic.   Mouth/Throat: No oropharyngeal exudate.   Moderately severe dental loss   Eyes: Pupils are equal, round, and reactive to light. Conjunctivae and EOM are normal. No scleral icterus.   Neck: Normal range of motion. Neck supple.   Cardiovascular: Normal rate and regular rhythm.    Pulmonary/Chest: Effort normal and breath sounds normal. No respiratory distress.   No respiratory distress   Abdominal: Soft. Bowel sounds are normal. She exhibits no distension. There is no tenderness.   Genitourinary:   Genitourinary Comments: Incontinent of urine   Musculoskeletal: She exhibits no edema or tenderness.   No septic joints, DVT, rash, embolic lesions   Lymphadenopathy:     She has no cervical adenopathy.   Neurological: She is alert. No cranial nerve deficit.   Skin: Skin is warm. No rash noted. No erythema.   Psychiatric: She has a normal mood and affect.       Fluids    Intake/Output Summary (Last 24 hours) at 11/26/18 1034  Last data filed at 11/25/18 1819   Gross per 24 hour   Intake              290 ml   Output              360 ml   Net              -70 ml       Laboratory  Recent Labs      11/24/18   0233   WBC  9.2   RBC  3.25*   HEMOGLOBIN  9.7*   HEMATOCRIT  31.3*   MCV  96.3   MCH  29.8   MCHC  31.0*   RDW  50.3*   PLATELETCT  265   MPV  9.5     Recent Labs      11/24/18   0233  11/26/18   0305   SODIUM  139  138   POTASSIUM  4.3  4.5   CHLORIDE  110  107   CO2  22  24    GLUCOSE  97  100*   BUN  19  18   CREATININE  1.23  1.22   CALCIUM  8.2*  8.3*                   Imaging  DX-CHEST-PORTABLE (1 VIEW)   Final Result      Moderate interstitial edema or interstitial lung disease and small left pleural effusion similar to previous.      DX-CHEST-PORTABLE (1 VIEW)   Final Result      Stable interstitial edema and/or interstitial lung disease with probable small pleural fluid      DX-CHEST-PORTABLE (1 VIEW)   Final Result      Ill-defined infrahilar interstitial opacities, atelectasis versus mild edema. No significant pleural effusions.      DX-CHEST-PORTABLE (1 VIEW)   Final Result      Mild left basilar atelectasis, improved since prior. No new consolidation or large pleural effusions.      DX-CHEST-PORTABLE (1 VIEW)   Final Result      1.  Left basilar opacification may be secondary to atelectasis. Developing pneumonia cannot be excluded.         DX-CHEST-PORTABLE (1 VIEW)   Final Result      1.  Unchanged mild interstitial pulmonary edema   2.  Unchanged bibasilar atelectasis, alveolar edema or pneumonia      DX-CHEST-PORTABLE (1 VIEW)   Final Result      1.  Decreased pulmonary edema   2.  Unchanged bibasilar atelectasis, alveolar edema or pneumonia      DX-CHEST-PORTABLE (1 VIEW)   Final Result      1.  There is diffuse interstitial and alveolar density in the right mid and lower lung zone. This is increased since the previous study. This may represent mild pulmonary edema/consolidation.   2.  Mildly enlarged cardiomediastinal silhouette when compared with the previous chest x-ray.      DX-CHEST-PORTABLE (1 VIEW)   Final Result      Stable mild pulmonary edema.   New left basilar atelectasis.      DX-CHEST-PORTABLE (1 VIEW)   Final Result      Stable chest appearance compared with 11/16.      DX-CHEST-PORTABLE (1 VIEW)   Final Result      No acute cardiopulmonary abnormality identified.      DX-CHEST-PORTABLE (1 VIEW)   Final Result      No acute cardiopulmonary abnormality. No  interval change.      DX-CHEST-PORTABLE (1 VIEW)   Final Result      No acute cardiopulmonary disease.      CT-HEAD W/O   Final Result      1.  No evidence of acute intracranial process.      2.  There is again seen interstitial pulmonary edema and bibasilar atelectasis.      CT-HIP W/O PLUS RECONS LEFT   Final Result      1.  No evidence of acute fracture or malalignment. No evidence of hardware failure or complication.   2.  Postsurgical changes of the left femur with broken screw fragment redemonstrated.   3.  Demineralization.   4.  Scattered colonic diverticula.   5.  Atherosclerosis.   6.  Small fat-containing umbilical hernia.      EC-ECHOCARDIOGRAM COMPLETE W/O CONT   Final Result      DX-HIP-COMPLETE - UNILATERAL 2+ LEFT   Final Result      1.  No definite acute osseous abnormality. In setting of demineralization, an occult fracture is difficult to exclude. If there is strong clinical suspicion, CT or MRI could be obtained for further evaluation.   2.  Postsurgical changes of the left femur with broken screw fragment redemonstrated.      DX-CHEST-PORTABLE (1 VIEW)   Final Result      No acute cardiopulmonary process is seen.      Atherosclerotic plaque.           Assessment/Plan  Positive QuantiFERON-TB Gold test   Assessment & Plan    Chest x-ray shows left basilar changes but no overt TB findings  Sputum for AFB stains and culture ordered  Currently in isolation  Presently doubt active tuberculosis, possibly latent tuberculosis  Not sure the patient would tolerate prolonged treatment for latent TB infection (higher than normal risk for hepatotoxicity with INH) and not likely to be pursued given comfort care status; ruling out ACTIVE TB opens up options for placement though  3 sputum specimens are AFB-negative, discontinue isolation  Patient now free to pursue group home or skilled nursing facility placement     ST elevation myocardial infarction involving right coronary artery (HCC)- (present on  admission)   Assessment & Plan    Asa  plavix  lipitor  Currently withheld in light of comfort care status         Fall- (present on admission)   Assessment & Plan    PT OT evaluation  No longer in isolation, so now free to pursue group home or skilled nursing facility (patient unable to be safely discharged to independent living status)     Nosebleed   Assessment & Plan    resolved     Pain of left hip joint- (present on admission)   Assessment & Plan    Imaging including CT scan of the hip shows no evidence of fracture       Rhabdomyolysis- (present on admission)   Assessment & Plan    Due to fall  Last Cpk 196      Chronic kidney disease (CKD), stage III (moderate) (HCC)- (present on admission)   Assessment & Plan    Renally dose medications     Elevated brain natriuretic peptide (BNP) level- (present on admission)   Assessment & Plan    No signs of fluid overload  Echocardiogram showed LVEF 50%     Leukocytosis- (present on admission)   Assessment & Plan    resolved     Advanced directives, counseling/discussion- (present on admission)   Assessment & Plan    Comfort care  Discharge disposition pending            VTE prophylaxis: Discontinued as a result of comfort care status and pursuing hospice

## 2018-11-26 NOTE — DISCHARGE PLANNING
Anticipated Discharge Disposition:  on Hospice.    Action: LSW received a phone call from pt's daughter, Wild ([phone]). LSW explained the discharge plan and inquired if Wild is able to financially assist in a group home for pt. Wild stated she is a  and lives off of her SSI for a living. Wild stated she has been paying pt's rent which is $600 and she believes she can still pay this amount towards the  per month. Wild stated that her house is currently for sale and she can help with more once that sells.      Barriers to Discharge: No placement at this time.    Plan: Discuss plan with leadership and continue to work on placement for pt.

## 2018-11-27 ENCOUNTER — APPOINTMENT (OUTPATIENT)
Dept: RADIOLOGY | Facility: MEDICAL CENTER | Age: 83
DRG: 280 | End: 2018-11-27
Attending: INTERNAL MEDICINE
Payer: MEDICARE

## 2018-11-27 PROCEDURE — A9270 NON-COVERED ITEM OR SERVICE: HCPCS | Performed by: FAMILY MEDICINE

## 2018-11-27 PROCEDURE — 770001 HCHG ROOM/CARE - MED/SURG/GYN PRIV*

## 2018-11-27 PROCEDURE — 700102 HCHG RX REV CODE 250 W/ 637 OVERRIDE(OP): Performed by: FAMILY MEDICINE

## 2018-11-27 PROCEDURE — 99231 SBSQ HOSP IP/OBS SF/LOW 25: CPT | Performed by: INTERNAL MEDICINE

## 2018-11-27 PROCEDURE — 71045 X-RAY EXAM CHEST 1 VIEW: CPT

## 2018-11-27 RX ADMIN — FUROSEMIDE 20 MG: 20 TABLET ORAL at 06:22

## 2018-11-27 ASSESSMENT — ENCOUNTER SYMPTOMS
NAUSEA: 0
SHORTNESS OF BREATH: 0
FEVER: 0
HEADACHES: 0
VOMITING: 0

## 2018-11-27 ASSESSMENT — PAIN SCALES - GENERAL
PAINLEVEL_OUTOF10: 0
PAINLEVEL_OUTOF10: 0

## 2018-11-27 ASSESSMENT — PATIENT HEALTH QUESTIONNAIRE - PHQ9
SUM OF ALL RESPONSES TO PHQ9 QUESTIONS 1 AND 2: 0
2. FEELING DOWN, DEPRESSED, IRRITABLE, OR HOPELESS: NOT AT ALL
1. LITTLE INTEREST OR PLEASURE IN DOING THINGS: NOT AT ALL

## 2018-11-27 NOTE — HOSPICE
Hospice met with patient and she is open to hospice.  SW looking for placement- hospice will continue to follow.

## 2018-11-27 NOTE — DISCHARGE PLANNING
Anticipated Discharge Disposition: Hospice    Action: LSW completed SYDN[facility] request form and forwarded to Manager of Hospital Care Management and Supervisor of Hospital Care Management for review.     Barriers to Discharge: Placement, pt unable to afford GH.    Plan: Await determination from Hospital Care Management leadership regarding [facility].     Addendum: LSW contacted Hospice Liaison with [facility] to discuss ability of LSW with Hospice to assist in helping pt apply for a GH waiver if Hospital Care Management is able to assist via an SYDNIE for a limited time. Bree with Hospice will call LSW back with answer once she is able to confirm.

## 2018-11-27 NOTE — PROGRESS NOTES
Assumed care at 1900. Bedside report received from AQUILINO Butts. Patient's chart and MAR reviewed. Pt denies pain at this time. Pt is A & O x4. Patient was updated on plan of care for the day. Questions answered and concerns addressed.  Pt denies any additional needs at this time. White board updated. Call light, phone and personal belongings within reach.

## 2018-11-28 ENCOUNTER — APPOINTMENT (OUTPATIENT)
Dept: RADIOLOGY | Facility: MEDICAL CENTER | Age: 83
DRG: 280 | End: 2018-11-28
Attending: INTERNAL MEDICINE
Payer: MEDICARE

## 2018-11-28 ENCOUNTER — PATIENT OUTREACH (OUTPATIENT)
Dept: HEALTH INFORMATION MANAGEMENT | Facility: OTHER | Age: 83
End: 2018-11-28

## 2018-11-28 PROCEDURE — A9270 NON-COVERED ITEM OR SERVICE: HCPCS | Performed by: FAMILY MEDICINE

## 2018-11-28 PROCEDURE — 700102 HCHG RX REV CODE 250 W/ 637 OVERRIDE(OP): Performed by: FAMILY MEDICINE

## 2018-11-28 PROCEDURE — 99231 SBSQ HOSP IP/OBS SF/LOW 25: CPT | Performed by: INTERNAL MEDICINE

## 2018-11-28 PROCEDURE — 71045 X-RAY EXAM CHEST 1 VIEW: CPT

## 2018-11-28 PROCEDURE — 770001 HCHG ROOM/CARE - MED/SURG/GYN PRIV*

## 2018-11-28 RX ADMIN — FUROSEMIDE 20 MG: 20 TABLET ORAL at 06:21

## 2018-11-28 ASSESSMENT — ENCOUNTER SYMPTOMS
HEADACHES: 0
SHORTNESS OF BREATH: 0
NAUSEA: 0
VOMITING: 0
FEVER: 0

## 2018-11-28 ASSESSMENT — PAIN SCALES - GENERAL
PAINLEVEL_OUTOF10: 0
PAINLEVEL_OUTOF10: 0

## 2018-11-28 NOTE — PROGRESS NOTES
Placed a telephone call to T8 SW at extension [phone].  Left a vm requesting a return call for coordination of care.  Plan: will follow up.

## 2018-11-28 NOTE — PROGRESS NOTES
[facility] Medicine Daily Progress Note    Date of Service  11/27/2018    Chief Complaint  89 y.o. female admitted 11/13/2018 with fall and noted to have ST elevation myocardial infarction    Hospital Course    89-year-old female admitted to the hospital on November 30, 2018 with ground-level fall.  She stated that she has a bad left hip and fell because of that.  The patient denied palpitation, dizziness, syncope episode.  She presented to ER and was found to have acute ST elevation myocardial infarction with troponin over 50. She stated that she does not want any aggressive intervention including angiogram and wanted to be treated only medically. She was started on heparin drip in ER and she will be admitted to cardiac intensive care unit in critical condition. She was seen by cardiology she was also started on plavix and  Liptor.she was noted to have reactive leukocytosis which resolved. She was alos started on lasix.  Prior discussions with the family have led to a change in CODE STATUS to comfort care.  Inpatient hospice consultation is pursued.  TB QuantiFERON test has been requested for possible group home placement and this likely will delay the patient's transfer there.      Interval Problem Update    I evaluated at bedside this morning.  She denies any acute complaints.  I discussed plan of care with her.  Continue comfort measures.  Pending placement    Consultants/Specialty  Cardiology  Hospice    Code Status  Comfort care    Disposition  Pending placement    Review of Systems  Review of Systems   Constitutional: Negative for fever.   Respiratory: Negative for shortness of breath.    Cardiovascular: Negative for chest pain.   Gastrointestinal: Negative for nausea and vomiting.   Genitourinary: Negative for dysuria.   Neurological: Negative for headaches.        Physical Exam  Temp:  [36.6 °C (97.8 °F)-37.1 °C (98.7 °F)] 37.1 °C (98.7 °F)  Pulse:  [74-84] 84  Resp:  [16-18] 17  BP: ()/(48-89)  133/89    Physical Exam   Constitutional:   Elderly female without any acute distress.   Eyes: Pupils are equal, round, and reactive to light. No scleral icterus.   Neck: Normal range of motion. Neck supple.   Cardiovascular: Normal rate and regular rhythm.    Pulmonary/Chest: Effort normal and breath sounds normal. No respiratory distress.   Abdominal: Soft. Bowel sounds are normal. She exhibits no distension.   Musculoskeletal: She exhibits no edema.   Lymphadenopathy:     She has no cervical adenopathy.   Neurological: She is alert. No cranial nerve deficit.       Fluids    Intake/Output Summary (Last 24 hours) at 11/27/18 2022  Last data filed at 11/27/18 1817   Gross per 24 hour   Intake              260 ml   Output              470 ml   Net             -210 ml       Laboratory      Recent Labs      11/26/18   0305   SODIUM  138   POTASSIUM  4.5   CHLORIDE  107   CO2  24   GLUCOSE  100*   BUN  18   CREATININE  1.22   CALCIUM  8.3*                   Imaging  DX-CHEST-PORTABLE (1 VIEW)   Final Result         1.  Interstitial infiltrates or edema.   2.  Atherosclerosis      DX-CHEST-PORTABLE (1 VIEW)   Final Result      Moderate interstitial edema or interstitial lung disease and small left pleural effusion similar to previous.      DX-CHEST-PORTABLE (1 VIEW)   Final Result      Stable interstitial edema and/or interstitial lung disease with probable small pleural fluid      DX-CHEST-PORTABLE (1 VIEW)   Final Result      Ill-defined infrahilar interstitial opacities, atelectasis versus mild edema. No significant pleural effusions.      DX-CHEST-PORTABLE (1 VIEW)   Final Result      Mild left basilar atelectasis, improved since prior. No new consolidation or large pleural effusions.      DX-CHEST-PORTABLE (1 VIEW)   Final Result      1.  Left basilar opacification may be secondary to atelectasis. Developing pneumonia cannot be excluded.         DX-CHEST-PORTABLE (1 VIEW)   Final Result      1.  Unchanged mild  interstitial pulmonary edema   2.  Unchanged bibasilar atelectasis, alveolar edema or pneumonia      DX-CHEST-PORTABLE (1 VIEW)   Final Result      1.  Decreased pulmonary edema   2.  Unchanged bibasilar atelectasis, alveolar edema or pneumonia      DX-CHEST-PORTABLE (1 VIEW)   Final Result      1.  There is diffuse interstitial and alveolar density in the right mid and lower lung zone. This is increased since the previous study. This may represent mild pulmonary edema/consolidation.   2.  Mildly enlarged cardiomediastinal silhouette when compared with the previous chest x-ray.      DX-CHEST-PORTABLE (1 VIEW)   Final Result      Stable mild pulmonary edema.   New left basilar atelectasis.      DX-CHEST-PORTABLE (1 VIEW)   Final Result      Stable chest appearance compared with 11/16.      DX-CHEST-PORTABLE (1 VIEW)   Final Result      No acute cardiopulmonary abnormality identified.      DX-CHEST-PORTABLE (1 VIEW)   Final Result      No acute cardiopulmonary abnormality. No interval change.      DX-CHEST-PORTABLE (1 VIEW)   Final Result      No acute cardiopulmonary disease.      CT-HEAD W/O   Final Result      1.  No evidence of acute intracranial process.      2.  There is again seen interstitial pulmonary edema and bibasilar atelectasis.      CT-HIP W/O PLUS RECONS LEFT   Final Result      1.  No evidence of acute fracture or malalignment. No evidence of hardware failure or complication.   2.  Postsurgical changes of the left femur with broken screw fragment redemonstrated.   3.  Demineralization.   4.  Scattered colonic diverticula.   5.  Atherosclerosis.   6.  Small fat-containing umbilical hernia.      EC-ECHOCARDIOGRAM COMPLETE W/O CONT   Final Result      DX-HIP-COMPLETE - UNILATERAL 2+ LEFT   Final Result      1.  No definite acute osseous abnormality. In setting of demineralization, an occult fracture is difficult to exclude. If there is strong clinical suspicion, CT or MRI could be obtained for further  evaluation.   2.  Postsurgical changes of the left femur with broken screw fragment redemonstrated.      DX-CHEST-PORTABLE (1 VIEW)   Final Result      No acute cardiopulmonary process is seen.      Atherosclerotic plaque.      DX-CHEST-PORTABLE (1 VIEW)    (Results Pending)        Assessment/Plan  Positive QuantiFERON-TB Gold test   Assessment & Plan    Chest x-ray shows left basilar changes but no overt TB findings  3 sputum specimens are negative for AFB.  Discontinued isolation  Patient now free to pursue group home or skilled nursing facility placement.  Discussed with case management     ST elevation myocardial infarction involving right coronary artery (HCC)- (present on admission)   Assessment & Plan    Asa  plavix  lipitor  Currently withheld in light of comfort care status         Fall- (present on admission)   Assessment & Plan    Physical therapy and outpatient therapy..  No longer in isolation, so now free to pursue group home or skilled nursing facility (patient unable to be safely discharged to independent living status)  Pending placement.  Continue comfort care     Nosebleed   Assessment & Plan    resolved     Pain of left hip joint- (present on admission)   Assessment & Plan    Imaging including CT scan of the hip shows no evidence of fracture       Rhabdomyolysis- (present on admission)   Assessment & Plan    Due to fall  Last Cpk 196      Chronic kidney disease (CKD), stage III (moderate) (HCC)- (present on admission)   Assessment & Plan    Renally dose medications     Elevated brain natriuretic peptide (BNP) level- (present on admission)   Assessment & Plan    No signs of fluid overload  Echocardiogram showed LVEF 50%     Leukocytosis- (present on admission)   Assessment & Plan    resolved     Advanced directives, counseling/discussion- (present on admission)   Assessment & Plan    Comfort care  Discharge disposition pending            VTE prophylaxis: Discontinued as a result of comfort care  status and pursuing hospice

## 2018-11-28 NOTE — CARE PLAN
Problem: Communication  Goal: The ability to communicate needs accurately and effectively will improve  Outcome: PROGRESSING AS EXPECTED  Patient able to communicate effectively.     Problem: Infection  Goal: Will remain free from infection  Outcome: PROGRESSING AS EXPECTED

## 2018-11-28 NOTE — PROGRESS NOTES
Per chart review pt is currently admitted at [facility]. Pt was admitted to on 11/13/2018 brought in by ambulance after ground-level fall. Per chart review appears inpatient SW has been working with pt regarding discharge plans and discussion related to group home and/or hospice options. LSW has not heard back from inpatient  regarding previous VM left on 11/21/2018. Follow up call made today to  at ext [phone]. This LSW was able to connect with floor SW on inpatient side. Discussed/collaborate care related to pt's discharge plan.  Hospital  explained medical providers do not feel pt is safe to discharge home without care services, she reported they are discussing group home options with pt and potential of the group home waiver program.  This LSW notified hospital SW that pt's assets related to the cash surrender value of her life insurance policies are currently over limit for the group home waiver program. Explained that pt would need to be willing to cash out her life insurance policies and use the cash surrender value to pay for her care/complete a spend down to qualify for the medicaid-funded group home waiver program. Encouraged hospital SW to reach out to this LSW if additional collaboration and assistance is needed.     Plan:  · LSW will continue to monitor pt while admitted at [facility] and collaborate with care team members at hospital for continuity of care, as needed.

## 2018-11-28 NOTE — PROGRESS NOTES
Assumed care at 1900. Bedside report received from AQUILINO Ferrer. Patient's chart and MAR reviewed. Pt denies pain at this time. Pt is A & O x4. Patient was updated on plan of care for the day. Questions answered and concerns addressed.  Pt denies any additional needs at this time. White board updated. Call light, phone and personal belongings within reach.

## 2018-11-29 ENCOUNTER — HOME CARE VISIT (OUTPATIENT)
Dept: HOSPICE | Facility: HOSPICE | Age: 83
End: 2018-11-29
Payer: MEDICARE

## 2018-11-29 ENCOUNTER — APPOINTMENT (OUTPATIENT)
Dept: RADIOLOGY | Facility: MEDICAL CENTER | Age: 83
DRG: 280 | End: 2018-11-29
Attending: INTERNAL MEDICINE
Payer: MEDICARE

## 2018-11-29 PROCEDURE — A9270 NON-COVERED ITEM OR SERVICE: HCPCS | Performed by: FAMILY MEDICINE

## 2018-11-29 PROCEDURE — 71045 X-RAY EXAM CHEST 1 VIEW: CPT

## 2018-11-29 PROCEDURE — 99231 SBSQ HOSP IP/OBS SF/LOW 25: CPT | Performed by: INTERNAL MEDICINE

## 2018-11-29 PROCEDURE — 700102 HCHG RX REV CODE 250 W/ 637 OVERRIDE(OP): Performed by: FAMILY MEDICINE

## 2018-11-29 PROCEDURE — 770001 HCHG ROOM/CARE - MED/SURG/GYN PRIV*

## 2018-11-29 RX ADMIN — FUROSEMIDE 20 MG: 20 TABLET ORAL at 05:24

## 2018-11-29 ASSESSMENT — PAIN SCALES - GENERAL
PAINLEVEL_OUTOF10: 0
PAINLEVEL_OUTOF10: 0

## 2018-11-29 ASSESSMENT — ENCOUNTER SYMPTOMS
FEVER: 0
SHORTNESS OF BREATH: 0
NAUSEA: 0
VOMITING: 0
HEADACHES: 0

## 2018-11-29 NOTE — PROGRESS NOTES
[facility] Medicine Daily Progress Note    Date of Service  11/28/2018    Chief Complaint  89 y.o. female admitted 11/13/2018 with fall and noted to have ST elevation myocardial infarction    Hospital Course    89-year-old female admitted to the hospital on November 30, 2018 with ground-level fall.  She stated that she has a bad left hip and fell because of that.  The patient denied palpitation, dizziness, syncope episode.  She presented to ER and was found to have acute ST elevation myocardial infarction with troponin over 50. She stated that she does not want any aggressive intervention including angiogram and wanted to be treated only medically. She was started on heparin drip in ER and she will be admitted to cardiac intensive care unit in critical condition. She was seen by cardiology she was also started on plavix and  Liptor.she was noted to have reactive leukocytosis which resolved. She was alos started on lasix.  Prior discussions with the family have led to a change in CODE STATUS to comfort care.  Inpatient hospice consultation is pursued.  TB QuantiFERON test has been requested for possible group home placement and this likely will delay the patient's transfer there.      Interval Problem Update    I evaluated at bedside this morning.  She was sitting in chair and having her lunch.  She denies any acute complaints.    Discussed with case management regarding disposition.    Consultants/Specialty  Cardiology  Hospice    Code Status  Comfort care    Disposition  Pending placement    Review of Systems  Review of Systems   Constitutional: Negative for fever.   Respiratory: Negative for shortness of breath.    Cardiovascular: Negative for chest pain.   Gastrointestinal: Negative for nausea and vomiting.   Genitourinary: Negative for dysuria.   Neurological: Negative for headaches.        Physical Exam  Temp:  [36.9 °C (98.4 °F)] 36.9 °C (98.4 °F)  Pulse:  [70-77] 70  Resp:  [18] 18  BP: (94-96)/(40-51)  96/51    Physical Exam   Constitutional:   Elderly female without any acute distress.   Eyes: Pupils are equal, round, and reactive to light. No scleral icterus.   Neck: Normal range of motion. Neck supple.   Cardiovascular: Normal rate and regular rhythm.    Pulmonary/Chest: Effort normal and breath sounds normal. No respiratory distress.   Abdominal: Soft. Bowel sounds are normal. She exhibits no distension.   Musculoskeletal: She exhibits no edema.   Lymphadenopathy:     She has no cervical adenopathy.   Neurological: She is alert. No cranial nerve deficit.     No change in exam  Fluids    Intake/Output Summary (Last 24 hours) at 11/28/18 1643  Last data filed at 11/27/18 2000   Gross per 24 hour   Intake              240 ml   Output                0 ml   Net              240 ml       Laboratory      Recent Labs      11/26/18   0305   SODIUM  138   POTASSIUM  4.5   CHLORIDE  107   CO2  24   GLUCOSE  100*   BUN  18   CREATININE  1.22   CALCIUM  8.3*                   Imaging  DX-CHEST-PORTABLE (1 VIEW)   Final Result         1.  Interstitial infiltrates or edema, stable.   2.  Atherosclerosis      DX-CHEST-PORTABLE (1 VIEW)   Final Result         1.  Interstitial infiltrates or edema.   2.  Atherosclerosis      DX-CHEST-PORTABLE (1 VIEW)   Final Result      Moderate interstitial edema or interstitial lung disease and small left pleural effusion similar to previous.      DX-CHEST-PORTABLE (1 VIEW)   Final Result      Stable interstitial edema and/or interstitial lung disease with probable small pleural fluid      DX-CHEST-PORTABLE (1 VIEW)   Final Result      Ill-defined infrahilar interstitial opacities, atelectasis versus mild edema. No significant pleural effusions.      DX-CHEST-PORTABLE (1 VIEW)   Final Result      Mild left basilar atelectasis, improved since prior. No new consolidation or large pleural effusions.      DX-CHEST-PORTABLE (1 VIEW)   Final Result      1.  Left basilar opacification may be  secondary to atelectasis. Developing pneumonia cannot be excluded.         DX-CHEST-PORTABLE (1 VIEW)   Final Result      1.  Unchanged mild interstitial pulmonary edema   2.  Unchanged bibasilar atelectasis, alveolar edema or pneumonia      DX-CHEST-PORTABLE (1 VIEW)   Final Result      1.  Decreased pulmonary edema   2.  Unchanged bibasilar atelectasis, alveolar edema or pneumonia      DX-CHEST-PORTABLE (1 VIEW)   Final Result      1.  There is diffuse interstitial and alveolar density in the right mid and lower lung zone. This is increased since the previous study. This may represent mild pulmonary edema/consolidation.   2.  Mildly enlarged cardiomediastinal silhouette when compared with the previous chest x-ray.      DX-CHEST-PORTABLE (1 VIEW)   Final Result      Stable mild pulmonary edema.   New left basilar atelectasis.      DX-CHEST-PORTABLE (1 VIEW)   Final Result      Stable chest appearance compared with 11/16.      DX-CHEST-PORTABLE (1 VIEW)   Final Result      No acute cardiopulmonary abnormality identified.      DX-CHEST-PORTABLE (1 VIEW)   Final Result      No acute cardiopulmonary abnormality. No interval change.      DX-CHEST-PORTABLE (1 VIEW)   Final Result      No acute cardiopulmonary disease.      CT-HEAD W/O   Final Result      1.  No evidence of acute intracranial process.      2.  There is again seen interstitial pulmonary edema and bibasilar atelectasis.      CT-HIP W/O PLUS RECONS LEFT   Final Result      1.  No evidence of acute fracture or malalignment. No evidence of hardware failure or complication.   2.  Postsurgical changes of the left femur with broken screw fragment redemonstrated.   3.  Demineralization.   4.  Scattered colonic diverticula.   5.  Atherosclerosis.   6.  Small fat-containing umbilical hernia.      EC-ECHOCARDIOGRAM COMPLETE W/O CONT   Final Result      DX-HIP-COMPLETE - UNILATERAL 2+ LEFT   Final Result      1.  No definite acute osseous abnormality. In setting of  demineralization, an occult fracture is difficult to exclude. If there is strong clinical suspicion, CT or MRI could be obtained for further evaluation.   2.  Postsurgical changes of the left femur with broken screw fragment redemonstrated.      DX-CHEST-PORTABLE (1 VIEW)   Final Result      No acute cardiopulmonary process is seen.      Atherosclerotic plaque.           Assessment/Plan  Positive QuantiFERON-TB Gold test   Assessment & Plan    Chest x-ray shows left basilar changes but no overt TB findings  3 sputum specimens are negative for AFB.  Discontinued isolation  Patient now free to pursue group home or skilled nursing facility placement.  Discussed with case management     ST elevation myocardial infarction involving right coronary artery (HCC)- (present on admission)   Assessment & Plan    Asa  plavix  lipitor  Currently withheld in light of comfort care status         Fall- (present on admission)   Assessment & Plan    Physical therapy and outpatient therapy..  No longer in isolation, so now free to pursue group home or skilled nursing facility (patient unable to be safely discharged to independent living status)  Pending placement.  Continue comfort care     Nosebleed   Assessment & Plan    resolved     Pain of left hip joint- (present on admission)   Assessment & Plan    Imaging including CT scan of the hip shows no evidence of fracture       Rhabdomyolysis- (present on admission)   Assessment & Plan    Due to fall  Last Cpk 196      Chronic kidney disease (CKD), stage III (moderate) ([facility])- (present on admission)   Assessment & Plan    Renally dose medications     Elevated brain natriuretic peptide (BNP) level- (present on admission)   Assessment & Plan    No signs of fluid overload  Echocardiogram showed LVEF 50%     Leukocytosis- (present on admission)   Assessment & Plan    resolved     Advanced directives, counseling/discussion- (present on admission)   Assessment & Plan    Comfort care  Discharge  disposition pending            VTE prophylaxis: Discontinued as a result of comfort care status and pursuing hospice

## 2018-11-29 NOTE — PROGRESS NOTES
Assumed care of patient, bedside report received from Beckie. Updated on POC, call light within reach and fall precautions in place. Bed locked and in lowest position. Patient instructed to call for assistance before getting out of bed. All questions answered, no other needs at this time.

## 2018-11-29 NOTE — CARE PLAN
Problem: Knowledge Deficit  Goal: Knowledge of disease process/condition, treatment plan, diagnostic tests, and medications will improve  Outcome: PROGRESSING AS EXPECTED      Problem: Mobility  Goal: Risk for activity intolerance will decrease  Outcome: PROGRESSING AS EXPECTED

## 2018-11-29 NOTE — CARE PLAN
Problem: Communication  Goal: The ability to communicate needs accurately and effectively will improve  Outcome: PROGRESSING AS EXPECTED  Pt whiteboard updated on plan of care  Pt encouraged to call for assistance  Pt encouraged to voice any concerns or questions regarding care plan  Pt updated on plan of care as it develops and changes    Problem: Safety  Goal: Will remain free from injury  Outcome: PROGRESSING AS EXPECTED  Treaded socks in use  Call light within reach and patient calls appropriately  Medication education provided prior to administration  Medications administered as ordered  Bed alarm on and bed locked in lowest position

## 2018-11-29 NOTE — PROGRESS NOTES
Received report from day shift RN. Assumed care of pt. Pt reports no pain at this time. Updated pt on plan of care. Pt resting comfortably in bed, HOB elevated. Bed alarm in place. Educated on use of call light. Hourly rounding and continuous monitoring in place.

## 2018-11-30 ENCOUNTER — HOME CARE VISIT (OUTPATIENT)
Dept: HOSPICE | Facility: HOSPICE | Age: 83
End: 2018-11-30
Payer: MEDICARE

## 2018-11-30 PROCEDURE — 770001 HCHG ROOM/CARE - MED/SURG/GYN PRIV*

## 2018-11-30 PROCEDURE — 700102 HCHG RX REV CODE 250 W/ 637 OVERRIDE(OP): Performed by: FAMILY MEDICINE

## 2018-11-30 PROCEDURE — A9270 NON-COVERED ITEM OR SERVICE: HCPCS | Performed by: FAMILY MEDICINE

## 2018-11-30 PROCEDURE — 99231 SBSQ HOSP IP/OBS SF/LOW 25: CPT | Performed by: INTERNAL MEDICINE

## 2018-11-30 RX ADMIN — FUROSEMIDE 20 MG: 20 TABLET ORAL at 06:53

## 2018-11-30 ASSESSMENT — PAIN SCALES - GENERAL
PAINLEVEL_OUTOF10: 0

## 2018-11-30 ASSESSMENT — PATIENT HEALTH QUESTIONNAIRE - PHQ9
2. FEELING DOWN, DEPRESSED, IRRITABLE, OR HOPELESS: NOT AT ALL
SUM OF ALL RESPONSES TO PHQ9 QUESTIONS 1 AND 2: 0
1. LITTLE INTEREST OR PLEASURE IN DOING THINGS: NOT AT ALL

## 2018-11-30 ASSESSMENT — ENCOUNTER SYMPTOMS
SHORTNESS OF BREATH: 0
NAUSEA: 0
VOMITING: 0
HEADACHES: 0
FEVER: 0

## 2018-11-30 NOTE — PROGRESS NOTES
Bedside report received by vanessa Hanks. Patient sitting up in bed watching TV at this time. POC discussed, verbalized understanding. No immediate concerns for patient at this time. All safety measures in place.

## 2018-11-30 NOTE — HOSPICE
Pt resting comfortably, no voiced complaints. Group home placement pending due to financial barriers. Spoke with Gita MCKINNEY. Awaiting hospital care management decision on SYDNIE for group home room and board. Female private and semi-private bed available. Owner Rhonda Sweeney can be reached at [phone]. Updated pt on plan of care. She is agreeable to go to a group home. No questions about routine hospice care at this time. Will update daughter Wild once SYDNIE decision is made. Hospice will follow until discharged.   Please call [phone] for any questions.

## 2018-11-30 NOTE — DISCHARGE PLANNING
Anticipated Discharge Disposition: Group home with Hospice    Action: LSW spoke with Jeannie with [facility] and discussed pt's discharge plan.    LSW called Lisa Sweeney with [facility] ([phone]) who stated that they currently have one available private room for $4,000. LSW requested Lisa Sweeney fax group home packet to LSW. Lisa Sweeney requested a copy of the H & P.     Barriers to Discharge: None    Plan: Complete Group Home packet and fax back to Lisa Sweeney with a copy of H & P (Fax: [phone]; [phone]). Complete SYDNIE. Await official acceptance to group home. Set up transportation to group home.

## 2018-11-30 NOTE — PROGRESS NOTES
[facility] Medicine Daily Progress Note    Date of Service  11/29/2018    Chief Complaint  89 y.o. female admitted 11/13/2018 with fall and noted to have ST elevation myocardial infarction    Hospital Course    89-year-old female admitted to the hospital on November 30, 2018 with ground-level fall.  She stated that she has a bad left hip and fell because of that.  The patient denied palpitation, dizziness, syncope episode.  She presented to ER and was found to have acute ST elevation myocardial infarction with troponin over 50. She stated that she does not want any aggressive intervention including angiogram and wanted to be treated only medically. She was started on heparin drip in ER and she will be admitted to cardiac intensive care unit in critical condition. She was seen by cardiology she was also started on plavix and  Liptor.she was noted to have reactive leukocytosis which resolved. She was alos started on lasix.  Prior discussions with the family have led to a change in CODE STATUS to comfort care.  Inpatient hospice consultation is pursued.  TB QuantiFERON test has been requested for possible group home placement and this likely will delay the patient's transfer there.      Interval Problem Update    No acute events overnight.  I evaluated her at bedside this morning and she denies any acute complaints and she looks comfortable.  I discussed with case management regarding her disposition.    Consultants/Specialty  Cardiology  Hospice    Code Status  Comfort care    Disposition  Pending placement    Review of Systems  Review of Systems   Constitutional: Negative for fever.   Respiratory: Negative for shortness of breath.    Cardiovascular: Negative for chest pain.   Gastrointestinal: Negative for nausea and vomiting.   Genitourinary: Negative for dysuria.   Neurological: Negative for headaches.        Physical Exam  Temp:  [36.3 °C (97.4 °F)-37 °C (98.6 °F)] 36.3 °C (97.4 °F)  Pulse:  [71-72] 71  Resp:   [16-18] 16  BP: (108-112)/(57-67) 112/67    Physical Exam   Constitutional:   Elderly female without any acute distress.   Eyes: Pupils are equal, round, and reactive to light. No scleral icterus.   Neck: Normal range of motion. Neck supple.   Cardiovascular: Normal rate and regular rhythm.    Pulmonary/Chest: Effort normal and breath sounds normal. No respiratory distress.   Abdominal: Soft. Bowel sounds are normal. She exhibits no distension.   Musculoskeletal: She exhibits no edema.   Lymphadenopathy:     She has no cervical adenopathy.   Neurological: She is alert. No cranial nerve deficit.     No change in exam  Fluids  No intake or output data in the 24 hours ending 11/29/18 1726    Laboratory                        Imaging  DX-CHEST-PORTABLE (1 VIEW)   Final Result         1.  Hazy interstitial left pulmonary opacities suggests interstitial edema or infiltrate, similar to prior.   2.  Trace left pleural effusion   3.  Hyperexpansion of lungs favors changes of COPD.      DX-CHEST-PORTABLE (1 VIEW)   Final Result         1.  Interstitial infiltrates or edema, stable.   2.  Atherosclerosis      DX-CHEST-PORTABLE (1 VIEW)   Final Result         1.  Interstitial infiltrates or edema.   2.  Atherosclerosis      DX-CHEST-PORTABLE (1 VIEW)   Final Result      Moderate interstitial edema or interstitial lung disease and small left pleural effusion similar to previous.      DX-CHEST-PORTABLE (1 VIEW)   Final Result      Stable interstitial edema and/or interstitial lung disease with probable small pleural fluid      DX-CHEST-PORTABLE (1 VIEW)   Final Result      Ill-defined infrahilar interstitial opacities, atelectasis versus mild edema. No significant pleural effusions.      DX-CHEST-PORTABLE (1 VIEW)   Final Result      Mild left basilar atelectasis, improved since prior. No new consolidation or large pleural effusions.      DX-CHEST-PORTABLE (1 VIEW)   Final Result      1.  Left basilar opacification may be  secondary to atelectasis. Developing pneumonia cannot be excluded.         DX-CHEST-PORTABLE (1 VIEW)   Final Result      1.  Unchanged mild interstitial pulmonary edema   2.  Unchanged bibasilar atelectasis, alveolar edema or pneumonia      DX-CHEST-PORTABLE (1 VIEW)   Final Result      1.  Decreased pulmonary edema   2.  Unchanged bibasilar atelectasis, alveolar edema or pneumonia      DX-CHEST-PORTABLE (1 VIEW)   Final Result      1.  There is diffuse interstitial and alveolar density in the right mid and lower lung zone. This is increased since the previous study. This may represent mild pulmonary edema/consolidation.   2.  Mildly enlarged cardiomediastinal silhouette when compared with the previous chest x-ray.      DX-CHEST-PORTABLE (1 VIEW)   Final Result      Stable mild pulmonary edema.   New left basilar atelectasis.      DX-CHEST-PORTABLE (1 VIEW)   Final Result      Stable chest appearance compared with 11/16.      DX-CHEST-PORTABLE (1 VIEW)   Final Result      No acute cardiopulmonary abnormality identified.      DX-CHEST-PORTABLE (1 VIEW)   Final Result      No acute cardiopulmonary abnormality. No interval change.      DX-CHEST-PORTABLE (1 VIEW)   Final Result      No acute cardiopulmonary disease.      CT-HEAD W/O   Final Result      1.  No evidence of acute intracranial process.      2.  There is again seen interstitial pulmonary edema and bibasilar atelectasis.      CT-HIP W/O PLUS RECONS LEFT   Final Result      1.  No evidence of acute fracture or malalignment. No evidence of hardware failure or complication.   2.  Postsurgical changes of the left femur with broken screw fragment redemonstrated.   3.  Demineralization.   4.  Scattered colonic diverticula.   5.  Atherosclerosis.   6.  Small fat-containing umbilical hernia.      EC-ECHOCARDIOGRAM COMPLETE W/O CONT   Final Result      DX-HIP-COMPLETE - UNILATERAL 2+ LEFT   Final Result      1.  No definite acute osseous abnormality. In setting of  demineralization, an occult fracture is difficult to exclude. If there is strong clinical suspicion, CT or MRI could be obtained for further evaluation.   2.  Postsurgical changes of the left femur with broken screw fragment redemonstrated.      DX-CHEST-PORTABLE (1 VIEW)   Final Result      No acute cardiopulmonary process is seen.      Atherosclerotic plaque.           Assessment/Plan  Positive QuantiFERON-TB Gold test   Assessment & Plan    Denies any acute complaints.  Chest x-ray shows left basilar changes but no overt TB findings  3 sputum specimens are negative for AFB.  Discontinued isolation  Patient now free to pursue group home or skilled nursing facility placement.  Discussed with case management     ST elevation myocardial infarction involving right coronary artery (HCC)- (present on admission)   Assessment & Plan    Asa  plavix  lipitor  Currently withheld in light of comfort care status         Fall- (present on admission)   Assessment & Plan    No active complaints.  Continue comfort care measures.  No longer in isolation, so now free to pursue group home or skilled nursing facility (patient unable to be safely discharged to independent living status)  Pending placement.       Nosebleed   Assessment & Plan    resolved     Pain of left hip joint- (present on admission)   Assessment & Plan    Imaging including CT scan of the hip shows no evidence of fracture       Rhabdomyolysis- (present on admission)   Assessment & Plan    Due to fall  Last Cpk 196      Chronic kidney disease (CKD), stage III (moderate) (HCC)- (present on admission)   Assessment & Plan    Continue comfort care measures.     Elevated brain natriuretic peptide (BNP) level- (present on admission)   Assessment & Plan    No signs of fluid overload  Echocardiogram showed LVEF 50%     Leukocytosis- (present on admission)   Assessment & Plan    resolved     Advanced directives, counseling/discussion- (present on admission)   Assessment &  Plan    Comfort care  Discharge disposition pending            VTE prophylaxis: Discontinued as a result of comfort care status and pursuing hospice

## 2018-11-30 NOTE — PROGRESS NOTES
Assumed care of patient, bedside report received from Stacy. Updated on POC, call light within reach and fall precautions in place. Bed locked and in lowest position. Patient instructed to call for assistance before getting out of bed. All questions answered, no other needs at this time.

## 2018-11-30 NOTE — CARE PLAN
Problem: Safety  Goal: Will remain free from injury  Outcome: PROGRESSING AS EXPECTED      Problem: Knowledge Deficit  Goal: Knowledge of disease process/condition, treatment plan, diagnostic tests, and medications will improve  Outcome: PROGRESSING AS EXPECTED      Problem: Mobility  Goal: Risk for activity intolerance will decrease  Outcome: PROGRESSING AS EXPECTED

## 2018-12-01 PROCEDURE — 99231 SBSQ HOSP IP/OBS SF/LOW 25: CPT | Performed by: INTERNAL MEDICINE

## 2018-12-01 PROCEDURE — A9270 NON-COVERED ITEM OR SERVICE: HCPCS | Performed by: FAMILY MEDICINE

## 2018-12-01 PROCEDURE — 700102 HCHG RX REV CODE 250 W/ 637 OVERRIDE(OP): Performed by: FAMILY MEDICINE

## 2018-12-01 PROCEDURE — 770001 HCHG ROOM/CARE - MED/SURG/GYN PRIV*

## 2018-12-01 RX ADMIN — FUROSEMIDE 20 MG: 20 TABLET ORAL at 11:06

## 2018-12-01 ASSESSMENT — ENCOUNTER SYMPTOMS
VOMITING: 0
SHORTNESS OF BREATH: 0
FEVER: 0
NAUSEA: 0
HEADACHES: 0

## 2018-12-01 ASSESSMENT — PAIN SCALES - GENERAL
PAINLEVEL_OUTOF10: 0
PAINLEVEL_OUTOF10: 0

## 2018-12-01 NOTE — CARE PLAN
Problem: Infection  Goal: Will remain free from infection  Outcome: PROGRESSING AS EXPECTED      Problem: Skin Integrity  Goal: Risk for impaired skin integrity will decrease  Outcome: PROGRESSING AS EXPECTED      Problem: Respiratory:  Goal: Respiratory status will improve  Outcome: PROGRESSING AS EXPECTED

## 2018-12-01 NOTE — DISCHARGE PLANNING
Anticipated Discharge Disposition: Group Home with Hospice      [facility] asked LSW to find another group home for pt since group home is too expensive.     LSW attempted to contact [facility] ([id number]) to discuss other options for group homes. [facility]  stated that they have already left for the day.     Barriers to Discharge: Placement, pt unable to afford a group home.     Plan: Discuss other options for group homes with [facility].

## 2018-12-01 NOTE — DISCHARGE PLANNING
Anticipated Discharge Disposition: Hospice    Action: LSW spoke with pt's outpatient LSW and discussed pt's d/c planning needs. Per pt's outpatient LSW, pt will not qualify for a Medicaid GH waiver because she has approximately four life insurance policies that have a cash value of almost $10,000. OP LSW spoke with pt about cashing in her life insurance policies so that she can pay for her care and do a spend down, at which time she could qualify for a Medicaid GH waiver. Pt has not given permission to OP LSW to discuss this with pt's daughter.    LSW notified  Supervisor Rhonda Mcgee of above. Unit LSW will need to continue working with pt in planning and discuss above.     Barriers to Discharge: Placement, pt does not have income to support GH, pt does not qualify for Medicaid waiver.    Plan: Continue working with pt to find a GH.

## 2018-12-01 NOTE — PROGRESS NOTES
[facility] Medicine Daily Progress Note    Date of Service  11/30/2018    Chief Complaint  89 y.o. female admitted 11/13/2018 with fall and noted to have ST elevation myocardial infarction    Hospital Course    89-year-old female admitted to the hospital on November 30, 2018 with ground-level fall.  She stated that she has a bad left hip and fell because of that.  The patient denied palpitation, dizziness, syncope episode.  She presented to ER and was found to have acute ST elevation myocardial infarction with troponin over 50. She stated that she does not want any aggressive intervention including angiogram and wanted to be treated only medically. She was started on heparin drip in ER and she will be admitted to cardiac intensive care unit in critical condition. She was seen by cardiology she was also started on plavix and  Liptor.she was noted to have reactive leukocytosis which resolved. She was alos started on lasix.  Prior discussions with the family have led to a change in CODE STATUS to comfort care.  Inpatient hospice consultation is pursued.  TB QuantiFERON test has been requested for possible group home placement and this likely will delay the patient's transfer there.      Interval Problem Update    No change in her conditions.  Plan is to transfer to medical floor.  Pending disposition.    A full paperwork for [facility].    Consultants/Specialty  Cardiology  Hospice    Code Status  Comfort care    Disposition  Pending placement    Review of Systems  Review of Systems   Constitutional: Negative for fever.   Respiratory: Negative for shortness of breath.    Cardiovascular: Negative for chest pain.   Gastrointestinal: Negative for nausea and vomiting.   Genitourinary: Negative for dysuria.   Neurological: Negative for headaches.        Physical Exam  Temp:  [36.5 °C (97.7 °F)-37.3 °C (99.1 °F)] 37.3 °C (99.1 °F)  Pulse:  [74-90] 90  Resp:  [16-18] 18  BP: (103-123)/(61-76) 116/76    Physical Exam    Constitutional:   Elderly female without any acute distress.   Eyes: Pupils are equal, round, and reactive to light. No scleral icterus.   Neck: Normal range of motion. Neck supple.   Cardiovascular: Normal rate and regular rhythm.    Pulmonary/Chest: Effort normal and breath sounds normal. No respiratory distress.   Abdominal: Soft. Bowel sounds are normal. She exhibits no distension.   Musculoskeletal: She exhibits no edema.   Lymphadenopathy:     She has no cervical adenopathy.   Neurological: She is alert. No cranial nerve deficit.     No change in exam  Fluids    Intake/Output Summary (Last 24 hours) at 11/30/18 1800  Last data filed at 11/30/18 0400   Gross per 24 hour   Intake                0 ml   Output              300 ml   Net             -300 ml       Laboratory                        Imaging  DX-CHEST-PORTABLE (1 VIEW)   Final Result         1.  Hazy interstitial left pulmonary opacities suggests interstitial edema or infiltrate, similar to prior.   2.  Trace left pleural effusion   3.  Hyperexpansion of lungs favors changes of COPD.      DX-CHEST-PORTABLE (1 VIEW)   Final Result         1.  Interstitial infiltrates or edema, stable.   2.  Atherosclerosis      DX-CHEST-PORTABLE (1 VIEW)   Final Result         1.  Interstitial infiltrates or edema.   2.  Atherosclerosis      DX-CHEST-PORTABLE (1 VIEW)   Final Result      Moderate interstitial edema or interstitial lung disease and small left pleural effusion similar to previous.      DX-CHEST-PORTABLE (1 VIEW)   Final Result      Stable interstitial edema and/or interstitial lung disease with probable small pleural fluid      DX-CHEST-PORTABLE (1 VIEW)   Final Result      Ill-defined infrahilar interstitial opacities, atelectasis versus mild edema. No significant pleural effusions.      DX-CHEST-PORTABLE (1 VIEW)   Final Result      Mild left basilar atelectasis, improved since prior. No new consolidation or large pleural effusions.       DX-CHEST-PORTABLE (1 VIEW)   Final Result      1.  Left basilar opacification may be secondary to atelectasis. Developing pneumonia cannot be excluded.         DX-CHEST-PORTABLE (1 VIEW)   Final Result      1.  Unchanged mild interstitial pulmonary edema   2.  Unchanged bibasilar atelectasis, alveolar edema or pneumonia      DX-CHEST-PORTABLE (1 VIEW)   Final Result      1.  Decreased pulmonary edema   2.  Unchanged bibasilar atelectasis, alveolar edema or pneumonia      DX-CHEST-PORTABLE (1 VIEW)   Final Result      1.  There is diffuse interstitial and alveolar density in the right mid and lower lung zone. This is increased since the previous study. This may represent mild pulmonary edema/consolidation.   2.  Mildly enlarged cardiomediastinal silhouette when compared with the previous chest x-ray.      DX-CHEST-PORTABLE (1 VIEW)   Final Result      Stable mild pulmonary edema.   New left basilar atelectasis.      DX-CHEST-PORTABLE (1 VIEW)   Final Result      Stable chest appearance compared with 11/16.      DX-CHEST-PORTABLE (1 VIEW)   Final Result      No acute cardiopulmonary abnormality identified.      DX-CHEST-PORTABLE (1 VIEW)   Final Result      No acute cardiopulmonary abnormality. No interval change.      DX-CHEST-PORTABLE (1 VIEW)   Final Result      No acute cardiopulmonary disease.      CT-HEAD W/O   Final Result      1.  No evidence of acute intracranial process.      2.  There is again seen interstitial pulmonary edema and bibasilar atelectasis.      CT-HIP W/O PLUS RECONS LEFT   Final Result      1.  No evidence of acute fracture or malalignment. No evidence of hardware failure or complication.   2.  Postsurgical changes of the left femur with broken screw fragment redemonstrated.   3.  Demineralization.   4.  Scattered colonic diverticula.   5.  Atherosclerosis.   6.  Small fat-containing umbilical hernia.      EC-ECHOCARDIOGRAM COMPLETE W/O CONT   Final Result      DX-HIP-COMPLETE - UNILATERAL  2+ LEFT   Final Result      1.  No definite acute osseous abnormality. In setting of demineralization, an occult fracture is difficult to exclude. If there is strong clinical suspicion, CT or MRI could be obtained for further evaluation.   2.  Postsurgical changes of the left femur with broken screw fragment redemonstrated.      DX-CHEST-PORTABLE (1 VIEW)   Final Result      No acute cardiopulmonary process is seen.      Atherosclerotic plaque.           Assessment/Plan  Positive QuantiFERON-TB Gold test   Assessment & Plan    Denies any acute complaints.  Chest x-ray shows left basilar changes but no overt TB findings  3 sputum specimens are negative for AFB.  Discontinued isolation  Patient now free to pursue group home or skilled nursing facility placement.  Discussed with case management     ST elevation myocardial infarction involving right coronary artery (HCC)- (present on admission)   Assessment & Plan    Asa  plavix  lipitor  Currently withheld in light of comfort care status         Fall- (present on admission)   Assessment & Plan    No active complaints.  Continue comfort care measures.  No longer in isolation, so now free to pursue group home or skilled nursing facility (patient unable to be safely discharged to independent living status)  Pending placement.       Nosebleed   Assessment & Plan    resolved     Pain of left hip joint- (present on admission)   Assessment & Plan    Imaging including CT scan of the hip shows no evidence of fracture       Rhabdomyolysis- (present on admission)   Assessment & Plan    Due to fall  Last Cpk 196      Chronic kidney disease (CKD), stage III (moderate) (HCC)- (present on admission)   Assessment & Plan    Continue comfort care measures.     Elevated brain natriuretic peptide (BNP) level- (present on admission)   Assessment & Plan    No signs of fluid overload  Echocardiogram showed LVEF 50%     Leukocytosis- (present on admission)   Assessment & Plan    resolved      Advanced directives, counseling/discussion- (present on admission)   Assessment & Plan    Comfort care  Discharge disposition pending            VTE prophylaxis: Discontinued as a result of comfort care status and pursuing hospice

## 2018-12-01 NOTE — PROGRESS NOTES
Patient transferred to [facility], report called to receiving RN. Updated on POC. All questions answered. All belongings sent with patient.

## 2018-12-01 NOTE — CARE PLAN
Problem: Safety  Goal: Will remain free from injury  Outcome: PROGRESSING AS EXPECTED  Bed position low, call light within reach, treaded footwear, fall risk education, bed/chair alarm

## 2018-12-02 PROCEDURE — 770001 HCHG ROOM/CARE - MED/SURG/GYN PRIV*

## 2018-12-02 PROCEDURE — 700102 HCHG RX REV CODE 250 W/ 637 OVERRIDE(OP): Performed by: FAMILY MEDICINE

## 2018-12-02 PROCEDURE — 99231 SBSQ HOSP IP/OBS SF/LOW 25: CPT | Performed by: FAMILY MEDICINE

## 2018-12-02 PROCEDURE — A9270 NON-COVERED ITEM OR SERVICE: HCPCS | Performed by: FAMILY MEDICINE

## 2018-12-02 RX ORDER — BISACODYL 10 MG
10 SUPPOSITORY, RECTAL RECTAL
Status: DISCONTINUED | OUTPATIENT
Start: 2018-12-02 | End: 2018-12-19

## 2018-12-02 RX ORDER — AMOXICILLIN 250 MG
2 CAPSULE ORAL 2 TIMES DAILY
Status: DISCONTINUED | OUTPATIENT
Start: 2018-12-02 | End: 2018-12-19

## 2018-12-02 RX ORDER — POLYETHYLENE GLYCOL 3350 17 G/17G
1 POWDER, FOR SOLUTION ORAL
Status: DISCONTINUED | OUTPATIENT
Start: 2018-12-02 | End: 2018-12-19

## 2018-12-02 RX ADMIN — STANDARDIZED SENNA CONCENTRATE AND DOCUSATE SODIUM 2 TABLET: 8.6; 5 TABLET, FILM COATED ORAL at 17:03

## 2018-12-02 ASSESSMENT — ENCOUNTER SYMPTOMS
BLURRED VISION: 0
VOMITING: 0
SHORTNESS OF BREATH: 0
HEARTBURN: 0
DIZZINESS: 0
NERVOUS/ANXIOUS: 0
ABDOMINAL PAIN: 0
NAUSEA: 0
NECK PAIN: 0
COUGH: 0
CHILLS: 0
SORE THROAT: 0
DIARRHEA: 0
HEADACHES: 0
FEVER: 0
BACK PAIN: 0
WHEEZING: 0
WEAKNESS: 0

## 2018-12-02 ASSESSMENT — PAIN SCALES - GENERAL
PAINLEVEL_OUTOF10: 0

## 2018-12-02 NOTE — CARE PLAN
Problem: Skin Integrity  Goal: Risk for impaired skin integrity will decrease  Outcome: PROGRESSING AS EXPECTED  All skin intact, blanching redness to sacrum. Refuses mepilex, stating adhesive will cause her to break out. Waffle overlay, Q2h turns, encourage nutrition and hydration, encourage mobility.    Problem: Urinary Elimination:  Goal: Ability to reestablish a normal urinary elimination pattern will improve  Outcome: PROGRESSING SLOWER THAN EXPECTED  Reports of intermittent incontinence. Offer toileting regularly throughout the day.

## 2018-12-02 NOTE — PROGRESS NOTES
Pt A/O x4. Sacrum very red but blanchable. Educated pt on use of mepilex; pt declines d/t concerns about allergies to tape.     R elbow skin tear healed, wound LDA resolved in epic.    Waffle mattress overlay applied, Q2h turns in place.

## 2018-12-02 NOTE — CARE PLAN
Problem: Communication  Goal: The ability to communicate needs accurately and effectively will improve    Intervention: [place] patient and significant other/support system to call light to alert staff of needs   12/02/18 0159   OTHER   Oriented to: All of the Following : Location of Bathroom, Visiting Policy, Unit Routine, Call Light and Bedside Controls, Bedside Rail Policy, Smoking Policy, Rights and Responsibilities, Bedside Report, and Patient Education Notebook         Problem: Safety  Goal: Will remain free from falls  Outcome: PROGRESSING AS EXPECTED  Calls appropriately. Call light and personal belongings within easy reach. Educated on level of risk. Fall precautions in place. Instructed to call for assistance whenever needed.

## 2018-12-02 NOTE — PROGRESS NOTES
[facility] Medicine Daily Progress Note    Date of Service  12/1/2018    Chief Complaint  89 y.o. female admitted 11/13/2018 with fall and noted to have ST elevation myocardial infarction    Hospital Course    89-year-old female admitted to the hospital on November 30, 2018 with ground-level fall.  She stated that she has a bad left hip and fell because of that.  The patient denied palpitation, dizziness, syncope episode.  She presented to ER and was found to have acute ST elevation myocardial infarction with troponin over 50. She stated that she does not want any aggressive intervention including angiogram and wanted to be treated only medically. She was started on heparin drip in ER and she will be admitted to cardiac intensive care unit in critical condition. She was seen by cardiology she was also started on plavix and  Liptor.she was noted to have reactive leukocytosis which resolved. She was alos started on lasix.  Prior discussions with the family have led to a change in CODE STATUS to comfort care.  Inpatient hospice consultation is pursued.  TB QuantiFERON test has been requested for possible group home placement and this likely will delay the patient's transfer there.      Interval Problem Update    I related to bedside.  She expressed frustration that she is still in the hospital.  She denies any acute complaints.    I discussed plan of care with her and I answered all questions with    Consultants/Specialty  Cardiology  Hospice    Code Status  Comfort care    Disposition  Pending placement    Review of Systems  Review of Systems   Constitutional: Negative for fever.   Respiratory: Negative for shortness of breath.    Cardiovascular: Negative for chest pain.   Gastrointestinal: Negative for nausea and vomiting.   Genitourinary: Negative for dysuria.   Neurological: Negative for headaches.        Physical Exam  Temp:  [36.8 °C (98.3 °F)-37.2 °C (98.9 °F)] 36.8 °C (98.3 °F)  Pulse:  [82-89] 82  Resp:  [18]  18  BP: (105-113)/(63-70) 105/63    Physical Exam   Constitutional:   Elderly female without any acute distress.   Eyes: Pupils are equal, round, and reactive to light. No scleral icterus.   Neck: Normal range of motion. Neck supple.   Cardiovascular: Normal rate and regular rhythm.    Pulmonary/Chest: Effort normal and breath sounds normal. No respiratory distress.   Abdominal: Soft. Bowel sounds are normal. She exhibits no distension.   Musculoskeletal: She exhibits no edema.   Lymphadenopathy:     She has no cervical adenopathy.   Neurological: She is alert. No cranial nerve deficit.   No change from prior exam  Fluids    Intake/Output Summary (Last 24 hours) at 12/01/18 1854  Last data filed at 12/01/18 1700   Gross per 24 hour   Intake                0 ml   Output             1000 ml   Net            -1000 ml       Laboratory                        Imaging  DX-CHEST-PORTABLE (1 VIEW)   Final Result         1.  Hazy interstitial left pulmonary opacities suggests interstitial edema or infiltrate, similar to prior.   2.  Trace left pleural effusion   3.  Hyperexpansion of lungs favors changes of COPD.      DX-CHEST-PORTABLE (1 VIEW)   Final Result         1.  Interstitial infiltrates or edema, stable.   2.  Atherosclerosis      DX-CHEST-PORTABLE (1 VIEW)   Final Result         1.  Interstitial infiltrates or edema.   2.  Atherosclerosis      DX-CHEST-PORTABLE (1 VIEW)   Final Result      Moderate interstitial edema or interstitial lung disease and small left pleural effusion similar to previous.      DX-CHEST-PORTABLE (1 VIEW)   Final Result      Stable interstitial edema and/or interstitial lung disease with probable small pleural fluid      DX-CHEST-PORTABLE (1 VIEW)   Final Result      Ill-defined infrahilar interstitial opacities, atelectasis versus mild edema. No significant pleural effusions.      DX-CHEST-PORTABLE (1 VIEW)   Final Result      Mild left basilar atelectasis, improved since prior. No new  consolidation or large pleural effusions.      DX-CHEST-PORTABLE (1 VIEW)   Final Result      1.  Left basilar opacification may be secondary to atelectasis. Developing pneumonia cannot be excluded.         DX-CHEST-PORTABLE (1 VIEW)   Final Result      1.  Unchanged mild interstitial pulmonary edema   2.  Unchanged bibasilar atelectasis, alveolar edema or pneumonia      DX-CHEST-PORTABLE (1 VIEW)   Final Result      1.  Decreased pulmonary edema   2.  Unchanged bibasilar atelectasis, alveolar edema or pneumonia      DX-CHEST-PORTABLE (1 VIEW)   Final Result      1.  There is diffuse interstitial and alveolar density in the right mid and lower lung zone. This is increased since the previous study. This may represent mild pulmonary edema/consolidation.   2.  Mildly enlarged cardiomediastinal silhouette when compared with the previous chest x-ray.      DX-CHEST-PORTABLE (1 VIEW)   Final Result      Stable mild pulmonary edema.   New left basilar atelectasis.      DX-CHEST-PORTABLE (1 VIEW)   Final Result      Stable chest appearance compared with 11/16.      DX-CHEST-PORTABLE (1 VIEW)   Final Result      No acute cardiopulmonary abnormality identified.      DX-CHEST-PORTABLE (1 VIEW)   Final Result      No acute cardiopulmonary abnormality. No interval change.      DX-CHEST-PORTABLE (1 VIEW)   Final Result      No acute cardiopulmonary disease.      CT-HEAD W/O   Final Result      1.  No evidence of acute intracranial process.      2.  There is again seen interstitial pulmonary edema and bibasilar atelectasis.      CT-HIP W/O PLUS RECONS LEFT   Final Result      1.  No evidence of acute fracture or malalignment. No evidence of hardware failure or complication.   2.  Postsurgical changes of the left femur with broken screw fragment redemonstrated.   3.  Demineralization.   4.  Scattered colonic diverticula.   5.  Atherosclerosis.   6.  Small fat-containing umbilical hernia.      EC-ECHOCARDIOGRAM COMPLETE W/O CONT    Final Result      DX-HIP-COMPLETE - UNILATERAL 2+ LEFT   Final Result      1.  No definite acute osseous abnormality. In setting of demineralization, an occult fracture is difficult to exclude. If there is strong clinical suspicion, CT or MRI could be obtained for further evaluation.   2.  Postsurgical changes of the left femur with broken screw fragment redemonstrated.      DX-CHEST-PORTABLE (1 VIEW)   Final Result      No acute cardiopulmonary process is seen.      Atherosclerotic plaque.           Assessment/Plan  Positive QuantiFERON-TB Gold test   Assessment & Plan    Denies any acute complaints.  Chest x-ray shows left basilar changes but no overt TB findings  3 sputum specimens are negative for AFB.  Discontinued isolation  Patient now free to pursue group home or skilled nursing facility placement.  Discussed with case management     ST elevation myocardial infarction involving right coronary artery (HCC)- (present on admission)   Assessment & Plan    Held medication due to comfort care status.         Fall- (present on admission)   Assessment & Plan    No new complaints  No longer in isolation, so now free to pursue group home or skilled nursing facility (patient unable to be safely discharged to independent living status)  Pending placement.       Nosebleed   Assessment & Plan    resolved     Pain of left hip joint- (present on admission)   Assessment & Plan    Imaging including CT scan of the hip shows no evidence of fracture       Rhabdomyolysis- (present on admission)   Assessment & Plan    Due to fall  Last Cpk 196      Chronic kidney disease (CKD), stage III (moderate) (HCC)- (present on admission)   Assessment & Plan    Continue comfort care measures.     Elevated brain natriuretic peptide (BNP) level- (present on admission)   Assessment & Plan    No signs of fluid overload  Echocardiogram showed LVEF 50%     Leukocytosis- (present on admission)   Assessment & Plan    resolved     Advanced  directives, counseling/discussion- (present on admission)   Assessment & Plan    Comfort care  Discharge disposition pending            VTE prophylaxis: Discontinued as a result of comfort care status and pursuing hospice

## 2018-12-02 NOTE — PROGRESS NOTES
[facility] Medicine Daily Progress Note    Date of Service  12/2/2018    Chief Complaint  89 y.o. female admitted 11/13/2018 with fall.    Hospital Course  Admitted with fall, workup showed ST elevation myocardial infarction.  Patient was admitted to ICU, on heparin drip, Plavix, Lipitor.  Patient and family did not want any aggressive interventions or procedures done. Plan of care was later changed to comfort care.    Interval Problem Update  Appears comfortable, no complaints of pain.    Consultants/Specialty  Cardiology signed off  Critical care signed off    Code Status  Comfort care    Disposition  Hospice    Review of Systems  Review of Systems   Constitutional: Negative for chills, fever and malaise/fatigue.   HENT: Negative for hearing loss and sore throat.    Eyes: Negative for blurred vision.   Respiratory: Negative for cough, shortness of breath and wheezing.    Cardiovascular: Negative for chest pain and leg swelling.   Gastrointestinal: Negative for abdominal pain, diarrhea, heartburn, nausea and vomiting.   Genitourinary: Negative for dysuria.   Musculoskeletal: Negative for back pain, joint pain and neck pain.   Skin: Negative for rash.   Neurological: Negative for dizziness, weakness and headaches.   Psychiatric/Behavioral: The patient is not nervous/anxious.         Physical Exam  Temp:  [37.1 °C (98.8 °F)] 37.1 °C (98.8 °F)  Pulse:  [83-85] 83  Resp:  [18] 18  BP: ()/(54-65) 98/54    Physical Exam   Constitutional: She appears well-developed and well-nourished.   HENT:   Head: Normocephalic and atraumatic.   Eyes: Pupils are equal, round, and reactive to light. Conjunctivae are normal.   Neck: No tracheal deviation present. No thyromegaly present.   Cardiovascular: Normal rate and regular rhythm.    Murmur heard.  Pulmonary/Chest: Effort normal and breath sounds normal.   Abdominal: Soft. Bowel sounds are normal. She exhibits no distension. There is no tenderness.   Musculoskeletal: She  exhibits edema.   Lymphadenopathy:     She has no cervical adenopathy.   Neurological: She is alert.   Oriented to person and place   Skin: Skin is warm and dry.   Nursing note and vitals reviewed.      Fluids    Intake/Output Summary (Last 24 hours) at 12/02/18 1155  Last data filed at 12/01/18 1700   Gross per 24 hour   Intake                0 ml   Output              750 ml   Net             -750 ml       Laboratory                        Imaging  DX-CHEST-PORTABLE (1 VIEW)   Final Result         1.  Hazy interstitial left pulmonary opacities suggests interstitial edema or infiltrate, similar to prior.   2.  Trace left pleural effusion   3.  Hyperexpansion of lungs favors changes of COPD.      DX-CHEST-PORTABLE (1 VIEW)   Final Result         1.  Interstitial infiltrates or edema, stable.   2.  Atherosclerosis      DX-CHEST-PORTABLE (1 VIEW)   Final Result         1.  Interstitial infiltrates or edema.   2.  Atherosclerosis      DX-CHEST-PORTABLE (1 VIEW)   Final Result      Moderate interstitial edema or interstitial lung disease and small left pleural effusion similar to previous.      DX-CHEST-PORTABLE (1 VIEW)   Final Result      Stable interstitial edema and/or interstitial lung disease with probable small pleural fluid      DX-CHEST-PORTABLE (1 VIEW)   Final Result      Ill-defined infrahilar interstitial opacities, atelectasis versus mild edema. No significant pleural effusions.      DX-CHEST-PORTABLE (1 VIEW)   Final Result      Mild left basilar atelectasis, improved since prior. No new consolidation or large pleural effusions.      DX-CHEST-PORTABLE (1 VIEW)   Final Result      1.  Left basilar opacification may be secondary to atelectasis. Developing pneumonia cannot be excluded.         DX-CHEST-PORTABLE (1 VIEW)   Final Result      1.  Unchanged mild interstitial pulmonary edema   2.  Unchanged bibasilar atelectasis, alveolar edema or pneumonia      DX-CHEST-PORTABLE (1 VIEW)   Final Result      1.   Decreased pulmonary edema   2.  Unchanged bibasilar atelectasis, alveolar edema or pneumonia      DX-CHEST-PORTABLE (1 VIEW)   Final Result      1.  There is diffuse interstitial and alveolar density in the right mid and lower lung zone. This is increased since the previous study. This may represent mild pulmonary edema/consolidation.   2.  Mildly enlarged cardiomediastinal silhouette when compared with the previous chest x-ray.      DX-CHEST-PORTABLE (1 VIEW)   Final Result      Stable mild pulmonary edema.   New left basilar atelectasis.      DX-CHEST-PORTABLE (1 VIEW)   Final Result      Stable chest appearance compared with 11/16.      DX-CHEST-PORTABLE (1 VIEW)   Final Result      No acute cardiopulmonary abnormality identified.      DX-CHEST-PORTABLE (1 VIEW)   Final Result      No acute cardiopulmonary abnormality. No interval change.      DX-CHEST-PORTABLE (1 VIEW)   Final Result      No acute cardiopulmonary disease.      CT-HEAD W/O   Final Result      1.  No evidence of acute intracranial process.      2.  There is again seen interstitial pulmonary edema and bibasilar atelectasis.      CT-HIP W/O PLUS RECONS LEFT   Final Result      1.  No evidence of acute fracture or malalignment. No evidence of hardware failure or complication.   2.  Postsurgical changes of the left femur with broken screw fragment redemonstrated.   3.  Demineralization.   4.  Scattered colonic diverticula.   5.  Atherosclerosis.   6.  Small fat-containing umbilical hernia.      EC-ECHOCARDIOGRAM COMPLETE W/O CONT   Final Result      DX-HIP-COMPLETE - UNILATERAL 2+ LEFT   Final Result      1.  No definite acute osseous abnormality. In setting of demineralization, an occult fracture is difficult to exclude. If there is strong clinical suspicion, CT or MRI could be obtained for further evaluation.   2.  Postsurgical changes of the left femur with broken screw fragment redemonstrated.      DX-CHEST-PORTABLE (1 VIEW)   Final Result      No  acute cardiopulmonary process is seen.      Atherosclerotic plaque.           Assessment/Plan  * ST elevation myocardial infarction involving right coronary artery (HCC)- (present on admission)   Assessment & Plan    comfort care          Fall- (present on admission)   Assessment & Plan    Comfort care       Positive QuantiFERON-TB Gold test- (present on admission)   Assessment & Plan    AFB negative     Nosebleed- (present on admission)   Assessment & Plan    resolved     Pain of left hip joint- (present on admission)   Assessment & Plan    Pain control       Rhabdomyolysis- (present on admission)   Assessment & Plan    resolved      Chronic kidney disease (CKD), stage III (moderate) ([facility])- (present on admission)   Assessment & Plan    Comfort care     Elevated brain natriuretic peptide (BNP) level- (present on admission)   Assessment & Plan    Lasix     Leukocytosis- (present on admission)   Assessment & Plan    resolved     Essential hypertension- (present on admission)   Assessment & Plan    Comfort care     Advanced directives, counseling/discussion- (present on admission)   Assessment & Plan    Comfort care            VTE prophylaxis: Comfort care

## 2018-12-02 NOTE — PROGRESS NOTES
Patient to room 338 from post partum. Alert and oriented times 4. No c/o pain or nausea. Eating dinner. Oriented to room and call bell given.

## 2018-12-03 ENCOUNTER — HOME CARE VISIT (OUTPATIENT)
Dept: HOSPICE | Facility: HOSPICE | Age: 83
End: 2018-12-03
Payer: MEDICARE

## 2018-12-03 PROCEDURE — 770001 HCHG ROOM/CARE - MED/SURG/GYN PRIV*

## 2018-12-03 PROCEDURE — A9270 NON-COVERED ITEM OR SERVICE: HCPCS | Performed by: FAMILY MEDICINE

## 2018-12-03 PROCEDURE — 700102 HCHG RX REV CODE 250 W/ 637 OVERRIDE(OP): Performed by: FAMILY MEDICINE

## 2018-12-03 PROCEDURE — 99231 SBSQ HOSP IP/OBS SF/LOW 25: CPT | Performed by: FAMILY MEDICINE

## 2018-12-03 RX ORDER — ACETAMINOPHEN 500 MG
1000 TABLET ORAL 2 TIMES DAILY
Status: DISCONTINUED | OUTPATIENT
Start: 2018-12-03 | End: 2018-12-03

## 2018-12-03 RX ORDER — ACETAMINOPHEN 500 MG
500 TABLET ORAL EVERY 6 HOURS PRN
Status: DISCONTINUED | OUTPATIENT
Start: 2018-12-03 | End: 2019-06-06 | Stop reason: HOSPADM

## 2018-12-03 RX ADMIN — STANDARDIZED SENNA CONCENTRATE AND DOCUSATE SODIUM 2 TABLET: 8.6; 5 TABLET, FILM COATED ORAL at 06:00

## 2018-12-03 RX ADMIN — STANDARDIZED SENNA CONCENTRATE AND DOCUSATE SODIUM 2 TABLET: 8.6; 5 TABLET, FILM COATED ORAL at 17:28

## 2018-12-03 ASSESSMENT — PAIN SCALES - GENERAL
PAINLEVEL_OUTOF10: ASSUMED PAIN PRESENT
PAINLEVEL_OUTOF10: ASSUMED PAIN PRESENT
PAINLEVEL_OUTOF10: 0

## 2018-12-03 ASSESSMENT — ENCOUNTER SYMPTOMS
BLURRED VISION: 0
ABDOMINAL PAIN: 0
DIARRHEA: 0
SHORTNESS OF BREATH: 0
NERVOUS/ANXIOUS: 0
COUGH: 0
SORE THROAT: 0
DIZZINESS: 0
WEAKNESS: 0
NECK PAIN: 0
NAUSEA: 0
FEVER: 0
WHEEZING: 0
CHILLS: 0
HEARTBURN: 0
VOMITING: 0
HEADACHES: 0
BACK PAIN: 0

## 2018-12-03 NOTE — PROGRESS NOTES
[facility] Medicine Daily Progress Note    Date of Service  12/3/2018    Chief Complaint  89 y.o. female admitted 11/13/2018 with fall.    Hospital Course  Admitted with fall, workup showed ST elevation myocardial infarction.  Patient was admitted to ICU, on heparin drip, Plavix, Lipitor.  Patient and family did not want any aggressive interventions or procedures done. Plan of care was later changed to comfort care.    Interval Problem Update  States her left hip and knee hurt.    Consultants/Specialty  Cardiology signed off  Critical care signed off    Code Status  Comfort care    Disposition  Hospice?    Review of Systems  Review of Systems   Constitutional: Negative for chills, fever and malaise/fatigue.   HENT: Negative for hearing loss and sore throat.    Eyes: Negative for blurred vision.   Respiratory: Negative for cough, shortness of breath and wheezing.    Cardiovascular: Negative for chest pain and leg swelling.   Gastrointestinal: Negative for abdominal pain, diarrhea, heartburn, nausea and vomiting.   Genitourinary: Negative for dysuria.   Musculoskeletal: Negative for back pain, joint pain and neck pain.   Skin: Negative for rash.   Neurological: Negative for dizziness, weakness and headaches.   Psychiatric/Behavioral: The patient is not nervous/anxious.         Physical Exam  Temp:  [36.7 °C (98.1 °F)-37.6 °C (99.6 °F)] 36.7 °C (98.1 °F)  Pulse:  [62-84] 84  Resp:  [16-19] 19  BP: ()/(49-64) 112/49    Physical Exam   Constitutional: She appears well-developed and well-nourished.   HENT:   Head: Normocephalic and atraumatic.   Eyes: Pupils are equal, round, and reactive to light. Conjunctivae are normal.   Neck: No tracheal deviation present. No thyromegaly present.   Cardiovascular: Normal rate and regular rhythm.    Murmur heard.  Pulmonary/Chest: Effort normal and breath sounds normal.   Abdominal: Soft. Bowel sounds are normal. She exhibits no distension. There is no tenderness.    Musculoskeletal: She exhibits edema.   Lymphadenopathy:     She has no cervical adenopathy.   Neurological: She is alert.   Oriented to person and place   Skin: Skin is warm and dry.   Nursing note and vitals reviewed.      Fluids    Intake/Output Summary (Last 24 hours) at 12/03/18 1343  Last data filed at 12/03/18 1105   Gross per 24 hour   Intake                0 ml   Output                1 ml   Net               -1 ml       Laboratory                        Imaging  DX-CHEST-PORTABLE (1 VIEW)   Final Result         1.  Hazy interstitial left pulmonary opacities suggests interstitial edema or infiltrate, similar to prior.   2.  Trace left pleural effusion   3.  Hyperexpansion of lungs favors changes of COPD.      DX-CHEST-PORTABLE (1 VIEW)   Final Result         1.  Interstitial infiltrates or edema, stable.   2.  Atherosclerosis      DX-CHEST-PORTABLE (1 VIEW)   Final Result         1.  Interstitial infiltrates or edema.   2.  Atherosclerosis      DX-CHEST-PORTABLE (1 VIEW)   Final Result      Moderate interstitial edema or interstitial lung disease and small left pleural effusion similar to previous.      DX-CHEST-PORTABLE (1 VIEW)   Final Result      Stable interstitial edema and/or interstitial lung disease with probable small pleural fluid      DX-CHEST-PORTABLE (1 VIEW)   Final Result      Ill-defined infrahilar interstitial opacities, atelectasis versus mild edema. No significant pleural effusions.      DX-CHEST-PORTABLE (1 VIEW)   Final Result      Mild left basilar atelectasis, improved since prior. No new consolidation or large pleural effusions.      DX-CHEST-PORTABLE (1 VIEW)   Final Result      1.  Left basilar opacification may be secondary to atelectasis. Developing pneumonia cannot be excluded.         DX-CHEST-PORTABLE (1 VIEW)   Final Result      1.  Unchanged mild interstitial pulmonary edema   2.  Unchanged bibasilar atelectasis, alveolar edema or pneumonia      DX-CHEST-PORTABLE (1 VIEW)    Final Result      1.  Decreased pulmonary edema   2.  Unchanged bibasilar atelectasis, alveolar edema or pneumonia      DX-CHEST-PORTABLE (1 VIEW)   Final Result      1.  There is diffuse interstitial and alveolar density in the right mid and lower lung zone. This is increased since the previous study. This may represent mild pulmonary edema/consolidation.   2.  Mildly enlarged cardiomediastinal silhouette when compared with the previous chest x-ray.      DX-CHEST-PORTABLE (1 VIEW)   Final Result      Stable mild pulmonary edema.   New left basilar atelectasis.      DX-CHEST-PORTABLE (1 VIEW)   Final Result      Stable chest appearance compared with 11/16.      DX-CHEST-PORTABLE (1 VIEW)   Final Result      No acute cardiopulmonary abnormality identified.      DX-CHEST-PORTABLE (1 VIEW)   Final Result      No acute cardiopulmonary abnormality. No interval change.      DX-CHEST-PORTABLE (1 VIEW)   Final Result      No acute cardiopulmonary disease.      CT-HEAD W/O   Final Result      1.  No evidence of acute intracranial process.      2.  There is again seen interstitial pulmonary edema and bibasilar atelectasis.      CT-HIP W/O PLUS RECONS LEFT   Final Result      1.  No evidence of acute fracture or malalignment. No evidence of hardware failure or complication.   2.  Postsurgical changes of the left femur with broken screw fragment redemonstrated.   3.  Demineralization.   4.  Scattered colonic diverticula.   5.  Atherosclerosis.   6.  Small fat-containing umbilical hernia.      EC-ECHOCARDIOGRAM COMPLETE W/O CONT   Final Result      DX-HIP-COMPLETE - UNILATERAL 2+ LEFT   Final Result      1.  No definite acute osseous abnormality. In setting of demineralization, an occult fracture is difficult to exclude. If there is strong clinical suspicion, CT or MRI could be obtained for further evaluation.   2.  Postsurgical changes of the left femur with broken screw fragment redemonstrated.      DX-CHEST-PORTABLE (1  VIEW)   Final Result      No acute cardiopulmonary process is seen.      Atherosclerotic plaque.           Assessment/Plan  * ST elevation myocardial infarction involving right coronary artery (HCC)- (present on admission)   Assessment & Plan    comfort care          Fall- (present on admission)   Assessment & Plan    Comfort care       Positive QuantiFERON-TB Gold test- (present on admission)   Assessment & Plan    AFB negative     Nosebleed- (present on admission)   Assessment & Plan    resolved     Pain of left hip joint- (present on admission)   Assessment & Plan    Pain control  schedule tylenol     Rhabdomyolysis- (present on admission)   Assessment & Plan    resolved      Chronic kidney disease (CKD), stage III (moderate) ([facility])- (present on admission)   Assessment & Plan    Comfort care     Elevated brain natriuretic peptide (BNP) level- (present on admission)   Assessment & Plan    Lasix     Leukocytosis- (present on admission)   Assessment & Plan    resolved     Essential hypertension- (present on admission)   Assessment & Plan    Comfort care     Advanced directives, counseling/discussion- (present on admission)   Assessment & Plan    Comfort care            VTE prophylaxis: Comfort care

## 2018-12-03 NOTE — CARE PLAN
Problem: Skin Integrity  Goal: Risk for impaired skin integrity will decrease    Intervention: Assess and monitor skin integrity, appearance and/or temperature  Noted redness on sacral area. Q2 turns done. Waffle mattress overlay present. Applied barrier cream to area.       Problem: Urinary Elimination:  Goal: Ability to reestablish a normal urinary elimination pattern will improve    Intervention: Assist patient to sit on commode or toilet for voiding  Reports difficulty with sitting on low toilet seat. Prefers bed pan during the night. Provided frequent toileting. Ordered raised toilet seat from central supply.

## 2018-12-03 NOTE — CARE PLAN
Problem: Skin Integrity  Goal: Risk for impaired skin integrity will decrease  Outcome: PROGRESSING AS EXPECTED  Blanching redness sacrum and heels. Float heels, Q2h turns, waffle overlay, barrier cream, promote nutrition. Pt refuses mepilex.    Problem: Mobility  Goal: Risk for activity intolerance will decrease  Outcome: PROGRESSING AS EXPECTED  Up to chair for breakfast, no s/sx intolerance. Pt on 2L oxygen via nc. Continue to offer mobility.

## 2018-12-03 NOTE — HOSPICE
Met with pt and MSW and still no movement on D.C Plan per MSW. Spoke with Dr. Henriquez and she is still appropriate for Hospice stated that she would come under diagnosis of ASCVD  We would need a CMP and BNP rechecked even though she is on comfort care.

## 2018-12-03 NOTE — PROGRESS NOTES
"Pt complaining of mild pain in knee. Pt unable to rate using 0-10 scale, or describe pain, stating, \"Oh, I don't know.\"    This RN asked pt if MD should ne contacted for prn pain medication. Pt declines, stating that she does not like to take pain pills and that the pain is \"tolerable.\"  "

## 2018-12-04 PROCEDURE — 770001 HCHG ROOM/CARE - MED/SURG/GYN PRIV*

## 2018-12-04 PROCEDURE — 99231 SBSQ HOSP IP/OBS SF/LOW 25: CPT | Performed by: HOSPITALIST

## 2018-12-04 ASSESSMENT — LIFESTYLE VARIABLES: SUBSTANCE_ABUSE: 0

## 2018-12-04 ASSESSMENT — ENCOUNTER SYMPTOMS
PALPITATIONS: 0
DIAPHORESIS: 0
WEAKNESS: 1
BACK PAIN: 0
WHEEZING: 0
DIARRHEA: 0
NECK PAIN: 0
FEVER: 0
SENSORY CHANGE: 0
ABDOMINAL PAIN: 0
FOCAL WEAKNESS: 0
STRIDOR: 0
HALLUCINATIONS: 0
SPEECH CHANGE: 0
COUGH: 0
SHORTNESS OF BREATH: 0
CHILLS: 0
VOMITING: 0
FALLS: 1

## 2018-12-04 ASSESSMENT — PAIN SCALES - GENERAL
PAINLEVEL_OUTOF10: 0

## 2018-12-04 NOTE — CARE PLAN
Problem: Safety  Goal: Will remain free from injury  Outcome: PROGRESSING AS EXPECTED  Fall precautions in place to prevent injury.     Problem: Skin Integrity  Goal: Risk for impaired skin integrity will decrease  Outcome: PROGRESSING AS EXPECTED  Sacrum red, but blanchable. Pt turned Q 2 hours, waffle overlay in place, incontinence care provided, and barrier cream applied.

## 2018-12-04 NOTE — PROGRESS NOTES
A&O x4.  Denies pain at this time.  Sacrum is reddened: incontinence care done, barrier cream applied, and pt turned Q 2 hours.   Fall precautions in place, bed in lowest position, call light in reach, and hourly rounding implemented.   Plan of care discussed and will continue to monitor.

## 2018-12-04 NOTE — PROGRESS NOTES
[facility] Progress Note    Date of Service: 2018    Chief Complaint  89 y.o. female hx of Htn admitted 2018 post GLF.  Dx acute STEMI. Initially admitted to ICU - treated w heparin , anti plts and statin. Patient and family did not want any aggressive interventions or procedures done. Plan of care was later changed to comfort care.       Interval Problem Update    No chest pain. Function overall improved. Feeding independently. Discussed with multi disciplinary team and patient findings and dispo-  working on group home placement.     Consultants/Specialty  Cardiology and Critical care- signed off.     Disposition    Hospice , group home assessment.         Review of Systems   Constitutional: Negative for chills, diaphoresis, fever and malaise/fatigue.   HENT: Negative for congestion.    Respiratory: Negative for cough, shortness of breath, wheezing and stridor.    Cardiovascular: Negative for chest pain and palpitations.   Gastrointestinal: Negative for abdominal pain, diarrhea and vomiting.   Musculoskeletal: Positive for falls. Negative for back pain, joint pain and neck pain.   Neurological: Positive for weakness (mild generalized, improved .). Negative for sensory change, speech change and focal weakness.   Psychiatric/Behavioral: Negative for hallucinations and substance abuse.      Physical Exam  Laboratory/Imaging   Hemodynamics  Temp (24hrs), Av.2 °C (99 °F), Min:37.2 °C (99 °F), Max:37.2 °C (99 °F)   Temperature: 37.5 °C (99.5 °F)  Pulse  Av  Min: 59  Max: 96    Blood Pressure : 104/59      Respiratory      Respiration: 16, Pulse Oximetry: 92 %        RML Breath Sounds: Diminished, RLL Breath Sounds: Diminished, MAGEN Breath Sounds: Diminished, LLL Breath Sounds: Diminished;Rhonchi    Fluids    Intake/Output Summary (Last 24 hours) at 18 4467  Last data filed at 18 1105   Gross per 24 hour   Intake                0 ml   Output                1 ml   Net                -1 ml       Nutrition  Orders Placed This Encounter   Procedures   • Diet Order Regular     Standing Status:   Standing     Number of Occurrences:   1     Order Specific Question:   Diet:     Answer:   Regular [1]     Order Specific Question:   Texture/Fiber modifications:     Answer:   Dysphagia 3(Mechanical Soft)specify fluid consistency(question 6) [3]     Order Specific Question:   Consistency/Fluid modifications:     Answer:   Thin Liquids [3]     Physical Exam   Constitutional: She is oriented to person, place, and time. No distress.   HENT:   Head: Normocephalic and atraumatic.   Eyes: EOM are normal. No scleral icterus.   Neck: Neck supple. No JVD present.   Cardiovascular: Normal rate and regular rhythm.    No murmur heard.  Pulmonary/Chest: Effort normal. No stridor. She has no wheezes. She has no rales.   Abdominal: Soft. Bowel sounds are normal. She exhibits no distension. There is no tenderness.   Musculoskeletal: She exhibits no edema or tenderness.   Neurological: She is alert and oriented to person, place, and time. No cranial nerve deficit.   Skin: Skin is warm and dry. She is not diaphoretic. No pallor.   Vitals reviewed.                               Assessment/Plan     * ST elevation myocardial infarction involving right coronary artery (HCC)- (present on admission)   Assessment & Plan    Post anti plt, heparin, statin therapy - transitioned to comfort care- no chest pain or discomfort- monitor clinically .        Chronic kidney disease (CKD), stage III (moderate) (HCC)- (present on admission)   Assessment & Plan    Was stable  Monitor intake- caution w lasix if poor intake.      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 Comfort care     Advanced directives, counseling/discussion- (present on admission)   Assessment & Plan    Comfort care         Quality-Core Measures    Discussed with RN / staff plan of care.

## 2018-12-04 NOTE — CARE PLAN
Problem: Safety  Goal: Will remain free from falls  Outcome: PROGRESSING AS EXPECTED  Pt does not attempt to mobilize independently. A/O x 4, calls appropriately, rounding in place, bed alarm on, call light within reach, non skid socks.    Problem: Medication  Goal: Compliance with prescribed medication will improve  Outcome: PROGRESSING AS EXPECTED  Pt taking prescribed lasix and stool softener. States she is agreeable to prn Tylenol if she has pain, which she denies.

## 2018-12-05 ENCOUNTER — HOME CARE VISIT (OUTPATIENT)
Dept: HOSPICE | Facility: HOSPICE | Age: 83
End: 2018-12-05
Payer: MEDICARE

## 2018-12-05 ENCOUNTER — PATIENT OUTREACH (OUTPATIENT)
Dept: HEALTH INFORMATION MANAGEMENT | Facility: OTHER | Age: 83
End: 2018-12-05

## 2018-12-05 PROBLEM — I95.9 HYPOTENSION: Status: ACTIVE | Noted: 2018-12-05

## 2018-12-05 PROCEDURE — 770001 HCHG ROOM/CARE - MED/SURG/GYN PRIV*

## 2018-12-05 PROCEDURE — 99232 SBSQ HOSP IP/OBS MODERATE 35: CPT | Performed by: HOSPITALIST

## 2018-12-05 PROCEDURE — 700102 HCHG RX REV CODE 250 W/ 637 OVERRIDE(OP): Performed by: FAMILY MEDICINE

## 2018-12-05 PROCEDURE — A9270 NON-COVERED ITEM OR SERVICE: HCPCS | Performed by: FAMILY MEDICINE

## 2018-12-05 RX ADMIN — STANDARDIZED SENNA CONCENTRATE AND DOCUSATE SODIUM 2 TABLET: 8.6; 5 TABLET, FILM COATED ORAL at 05:08

## 2018-12-05 RX ADMIN — STANDARDIZED SENNA CONCENTRATE AND DOCUSATE SODIUM 2 TABLET: 8.6; 5 TABLET, FILM COATED ORAL at 17:41

## 2018-12-05 ASSESSMENT — PAIN SCALES - GENERAL
PAINLEVEL_OUTOF10: 0

## 2018-12-05 ASSESSMENT — ENCOUNTER SYMPTOMS
DIARRHEA: 0
SENSORY CHANGE: 0
PALPITATIONS: 0
WHEEZING: 0
FOCAL WEAKNESS: 0
FEVER: 0
NECK PAIN: 0
BACK PAIN: 0
SPEECH CHANGE: 0
ABDOMINAL PAIN: 0
CHILLS: 0
DIZZINESS: 0
WEAKNESS: 1
SHORTNESS OF BREATH: 0
FALLS: 1
STRIDOR: 0
COUGH: 0
VOMITING: 0
HALLUCINATIONS: 0
DIAPHORESIS: 0

## 2018-12-05 ASSESSMENT — LIFESTYLE VARIABLES: SUBSTANCE_ABUSE: 0

## 2018-12-05 NOTE — CARE PLAN
Problem: Safety  Goal: Will remain free from falls    Intervention: Assess risk factors for falls  Pt has history of falls. Fall risk on door. Bed alarm in place. Call light and belongings in reach       Problem: Skin Integrity  Goal: Risk for impaired skin integrity will decrease    Intervention: Assess risk factors for impaired skin integrity and/or pressure ulcers  Pt's skin intact. Sacral area has reddened skin but blanching. Pt on Q2 turns

## 2018-12-05 NOTE — PROGRESS NOTES
[facility] Progress Note    Date of Service: 2018    Chief Complaint  89 y.o. female hx of Htn admitted 2018 post GLF.  Dx acute STEMI. Initially admitted to ICU - treated w heparin , anti plts and statin. Patient and family did not want any aggressive interventions or procedures done. Plan of care was later changed to comfort care.       Interval Problem Update    Intermittent left knee pain.  Hypotensive - sbp 80s- no chest pain , dyspnea or dizziness.        Consultants/Specialty  Cardiology and Critical care- signed off.     Disposition    Hospice , group home assessment.         Review of Systems   Constitutional: Negative for chills, diaphoresis and fever.   HENT: Negative for congestion.    Respiratory: Negative for cough, shortness of breath, wheezing and stridor.    Cardiovascular: Negative for chest pain and palpitations.   Gastrointestinal: Negative for abdominal pain, diarrhea and vomiting.   Musculoskeletal: Positive for falls and joint pain. Negative for back pain and neck pain.   Neurological: Positive for weakness (mild generalized, improved .). Negative for dizziness, sensory change, speech change and focal weakness.   Psychiatric/Behavioral: Negative for hallucinations and substance abuse.      Physical Exam  Laboratory/Imaging   Hemodynamics  Temp (24hrs), Av.4 °C (99.3 °F), Min:37.2 °C (99 °F), Max:37.5 °C (99.5 °F)   Temperature: 37.2 °C (99 °F)  Pulse  Av  Min: 59  Max: 96    Blood Pressure : (!) 85/47 (denies dizziness)      Respiratory      Respiration: 18, Pulse Oximetry: 92 %     Work Of Breathing / Effort: Mild  RUL Breath Sounds: Clear, RML Breath Sounds: Clear, RLL Breath Sounds: Diminished, MAGEN Breath Sounds: Clear, LLL Breath Sounds: Diminished    Fluids    Intake/Output Summary (Last 24 hours) at 18 6821  Last data filed at 18 0500   Gross per 24 hour   Intake              480 ml   Output                0 ml   Net              480 ml        Nutrition  Orders Placed This Encounter   Procedures   • Diet Order Regular     Standing Status:   Standing     Number of Occurrences:   1     Order Specific Question:   Diet:     Answer:   Regular [1]     Order Specific Question:   Texture/Fiber modifications:     Answer:   Dysphagia 3(Mechanical Soft)specify fluid consistency(question 6) [3]     Order Specific Question:   Consistency/Fluid modifications:     Answer:   Thin Liquids [3]     Physical Exam   Constitutional: She is oriented to person, place, and time. No distress.   HENT:   Head: Normocephalic and atraumatic.   Eyes: EOM are normal. No scleral icterus.   Neck: Neck supple. No JVD present.   Cardiovascular: Normal rate and regular rhythm.    Murmur (holosystolic 3/6) heard.  Pulmonary/Chest: Effort normal. No stridor. She has no wheezes. She has no rales.   Abdominal: Soft. Bowel sounds are normal. She exhibits no distension. There is no tenderness.   Musculoskeletal: She exhibits no edema or tenderness.   Neurological: She is alert and oriented to person, place, and time. No cranial nerve deficit.   Generalized 4/5 strength   Left knee no localized redness, warmth, swelling / palpable fluid. No Crepitis. Mildly decreased ROM   Skin: Skin is warm and dry. She is not diaphoretic. No pallor.   Vitals reviewed.                               Assessment/Plan     * ST elevation myocardial infarction involving right coronary artery (HCC)- (present on admission)   Assessment & Plan    Post anti plt, heparin, statin therapy - transitioned to comfort care- no chest pain or discomfort- monitor clinically .        Hypotension   Assessment & Plan    Asymptomatic   Encourage intake  Hold lasix for sbp < 100     Pain of left hip joint- (present on admission)   Assessment & Plan    Also intermittent left Knee - probably OA   Pain control- prn tylenol. Try to avoid opiates due to risk for delirium and NSAIDS w Ckd.   Continue prn Tylenol- change to scheduled if  breakthru pain .     Chronic kidney disease (CKD), stage III (moderate) (HCC)- (present on admission)   Assessment & Plan    Was stable  Monitor intake- caution w lasix if poor intake.      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 Comfort care     Advanced directives, counseling/discussion- (present on admission)   Assessment & Plan    Comfort care         Quality-Core Measures    Discussed with Sw and RN plan of care.

## 2018-12-05 NOTE — CARE PLAN
Problem: Safety  Goal: Will remain free from injury  Outcome: PROGRESSING AS EXPECTED  Fall precautions in place to prevent injury. Bed alarm on and call light in reach.    Problem: Urinary Elimination:  Goal: Ability to reestablish a normal urinary elimination pattern will improve  Outcome: PROGRESSING AS EXPECTED  Pt has occasional incontinence. Pt up and using the bathroom during the day. Will continue to monitor and offer frequent toileting.

## 2018-12-05 NOTE — PROGRESS NOTES
Pt has requested to rest in bed most of the day.     Sacrum still red but blanchable. Waffle overlay and Q2h turns in place as pt still refuses mepilex. Heels floated with decreased redness from yesterday.

## 2018-12-06 ENCOUNTER — HOME CARE VISIT (OUTPATIENT)
Dept: HOSPICE | Facility: HOSPICE | Age: 83
End: 2018-12-06
Payer: MEDICARE

## 2018-12-06 PROCEDURE — 770001 HCHG ROOM/CARE - MED/SURG/GYN PRIV*

## 2018-12-06 PROCEDURE — 99232 SBSQ HOSP IP/OBS MODERATE 35: CPT | Performed by: HOSPITALIST

## 2018-12-06 ASSESSMENT — ENCOUNTER SYMPTOMS
FALLS: 0
FOCAL WEAKNESS: 0
BACK PAIN: 0
ABDOMINAL PAIN: 0
DIZZINESS: 0
PALPITATIONS: 0
FEVER: 0
NECK PAIN: 0
WHEEZING: 0
SHORTNESS OF BREATH: 0
COUGH: 0
WEAKNESS: 1
CHILLS: 0
SENSORY CHANGE: 0
VOMITING: 0
DIARRHEA: 0
SPEECH CHANGE: 0

## 2018-12-06 ASSESSMENT — PAIN SCALES - GENERAL: PAINLEVEL_OUTOF10: 0

## 2018-12-06 NOTE — CARE PLAN
Problem: Communication  Goal: The ability to communicate needs accurately and effectively will improve  Outcome: PROGRESSING AS EXPECTED  Pt using call light appropriately to communicate needs.     Problem: Skin Integrity  Goal: Risk for impaired skin integrity will decrease  Outcome: PROGRESSING AS EXPECTED  Buttocks is reddened, but is healing. Q 2 hour turns done, applied barrier cream

## 2018-12-06 NOTE — HOSPICE
Hospice RN visit. Patient in bed asleep. No c/o pain per MD notes earlier. Spoke with Mary KEATING. No changes regarding placement. Patient still refusing spend down to qualify for Medicaid. She will speak with the patient again later today and will notify [facility]. Hospice will continue to follow.

## 2018-12-06 NOTE — PROGRESS NOTES
[facility] Progress Note    Date of Service: 2018    Chief Complaint  89 y.o. female hx of Htn admitted 2018 post GLF.  Dx acute STEMI. Initially admitted to ICU - treated w heparin , anti plts and statin. Patient and family did not want any aggressive interventions or procedures done. Plan of care was later changed to comfort care.       Interval Problem Update    Feeling better- no co knee pain. Has been up w one person transfer assist and walker.   I have discussed case with SW and patient. - plan to look into life insurance assets as means to pay for group home.     Consultants/Specialty  Cardiology and Critical care- signed off.     Disposition    Hospice , group home assessment.         Review of Systems   Constitutional: Negative for chills and fever.   Respiratory: Negative for cough, shortness of breath and wheezing.    Cardiovascular: Negative for chest pain, palpitations and leg swelling.   Gastrointestinal: Negative for abdominal pain, diarrhea and vomiting.   Musculoskeletal: Positive for joint pain. Negative for back pain, falls and neck pain.        Improved.    Neurological: Positive for weakness (mild generalized, improved .). Negative for dizziness, sensory change, speech change and focal weakness.      Physical Exam  Laboratory/Imaging   Hemodynamics  Temp (24hrs), Av.8 °C (98.3 °F), Min:36.8 °C (98.3 °F), Max:36.8 °C (98.3 °F)   Temperature: 36.8 °C (98.3 °F)  Pulse  Av  Min: 59  Max: 96    Blood Pressure : (!) 95/56      Respiratory      Respiration: 17, Pulse Oximetry: 92 %     Work Of Breathing / Effort: Mild  RUL Breath Sounds: Clear, RML Breath Sounds: Diminished, RLL Breath Sounds: Diminished, MAGEN Breath Sounds: Clear, LLL Breath Sounds: Diminished    Fluids    Intake/Output Summary (Last 24 hours) at 18 0738  Last data filed at 18 1800   Gross per 24 hour   Intake              240 ml   Output                0 ml   Net              240 ml        Nutrition  Orders Placed This Encounter   Procedures   • Diet Order Regular     Standing Status:   Standing     Number of Occurrences:   1     Order Specific Question:   Diet:     Answer:   Regular [1]     Order Specific Question:   Texture/Fiber modifications:     Answer:   Dysphagia 3(Mechanical Soft)specify fluid consistency(question 6) [3]     Order Specific Question:   Consistency/Fluid modifications:     Answer:   Thin Liquids [3]     Physical Exam   Constitutional: She is oriented to person, place, and time. No distress.   HENT:   Head: Normocephalic and atraumatic.   Eyes: EOM are normal. No scleral icterus.   Neck: Neck supple.   Cardiovascular: Normal rate and regular rhythm.    Murmur (holosystolic 3/6) heard.  Pulmonary/Chest: No stridor. She has no wheezes. She has no rales.   Abdominal: Soft. Bowel sounds are normal. She exhibits no distension. There is no tenderness.   Musculoskeletal: She exhibits no edema or tenderness.   Neurological: She is alert and oriented to person, place, and time. No cranial nerve deficit.   No gross focal weakness     Skin: Skin is warm and dry. She is not diaphoretic. No pallor.   Vitals reviewed.                               Assessment/Plan     * ST elevation myocardial infarction involving right coronary artery (HCC)- (present on admission)   Assessment & Plan    Post anti plt, heparin, statin therapy - transitioned to comfort care- no chest pain or discomfort.   Continue lasix for pul edema.        Hypotension   Assessment & Plan    Asymptomatic   Encourage intake  Hold lasix for sbp < 100     Pain of left hip joint- (present on admission)   Assessment & Plan    Also intermittent left Knee - probably OA - exam no signs of acute infection.   Try to avoid opiates due to risk for delirium and NSAIDS w Ckd.   Continue prn Tylenol- change to scheduled if breakthru pain .     Chronic kidney disease (CKD), stage III (moderate) (HCC)- (present on admission)   Assessment  & Plan    Was stable  Monitor intake- caution w lasix if poor intake.      Fall- (present on admission)   Assessment & Plan    Improved mobility   Continue fall precautions, walker use and assist w transfers.         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      Leukocytosis- (present on admission)   Assessment & Plan    resolved     Essential hypertension- (present on admission)   Assessment & Plan    Continue diuretics for edema. Comfort care     Advanced directives, counseling/discussion- (present on admission)   Assessment & Plan    Comfort care         Quality-Core Measures   Reviewed items::  Medications reviewed, Labs reviewed and Radiology images reviewed  Valenzuela catheter::  No Valenzuela      Discussed with RN and SW  plan of care.

## 2018-12-07 PROBLEM — R10.9 ABDOMINAL PAIN: Status: ACTIVE | Noted: 2018-12-07

## 2018-12-07 PROCEDURE — 99232 SBSQ HOSP IP/OBS MODERATE 35: CPT | Performed by: HOSPITALIST

## 2018-12-07 PROCEDURE — 700102 HCHG RX REV CODE 250 W/ 637 OVERRIDE(OP): Performed by: HOSPITALIST

## 2018-12-07 PROCEDURE — A9270 NON-COVERED ITEM OR SERVICE: HCPCS | Performed by: HOSPITALIST

## 2018-12-07 PROCEDURE — 770001 HCHG ROOM/CARE - MED/SURG/GYN PRIV*

## 2018-12-07 RX ADMIN — FUROSEMIDE 20 MG: 20 TABLET ORAL at 05:12

## 2018-12-07 ASSESSMENT — ENCOUNTER SYMPTOMS
SENSORY CHANGE: 0
NECK PAIN: 0
FALLS: 0
DIARRHEA: 0
FEVER: 0
SPEECH CHANGE: 0
ABDOMINAL PAIN: 0
BACK PAIN: 0
COUGH: 0
VOMITING: 0
CHILLS: 0
SHORTNESS OF BREATH: 0
FOCAL WEAKNESS: 0
WEAKNESS: 1

## 2018-12-07 ASSESSMENT — PAIN SCALES - GENERAL
PAINLEVEL_OUTOF10: ASSUMED PAIN PRESENT
PAINLEVEL_OUTOF10: 0
PAINLEVEL_OUTOF10: 0

## 2018-12-07 NOTE — CARE PLAN
Problem: Safety  Goal: Will remain free from falls  Outcome: PROGRESSING AS EXPECTED  No falls this shift. Pt uses call bell appropriately, waits for assistance. Uses FWW with SBA to bathroom.    Problem: Bowel/Gastric:  Goal: Will not experience complications related to bowel motility  Outcome: PROGRESSING AS EXPECTED  Pt had bm today, declined bowel care meds this pm.    Problem: Skin Integrity  Goal: Risk for impaired skin integrity will decrease  Outcome: PROGRESSING AS EXPECTED  No new skin issues noted. Able to turn herself, but agreeable to repositioning every 2 hrs. Up to chair for breakfast and dinner.

## 2018-12-07 NOTE — PROGRESS NOTES
[facility]    Date of Service: 2018    Chief Complaint  89 y.o. female hx of Htn admitted 2018 post GLF.  Dx acute STEMI. Initially admitted to ICU - treated w heparin , anti plts and statin. Patient and family did not want any aggressive interventions or procedures done. Plan of care was later changed to comfort care.       Interval Problem Update    Complaint of abdominal pain overnight. No N/V.  Feeling better this morning.      Consultants/Specialty    Cardiology and Critical care- signed off.     Disposition    Hospice , group home assessment.          Review of Systems   Constitutional: Negative for chills and fever.   Respiratory: Negative for cough and shortness of breath.    Cardiovascular: Negative for chest pain and leg swelling.   Gastrointestinal: Negative for abdominal pain, diarrhea and vomiting.   Musculoskeletal: Positive for joint pain. Negative for back pain, falls and neck pain.        Improved.    Neurological: Positive for weakness (mild generalized, improved .). Negative for sensory change, speech change and focal weakness.      Physical Exam  Laboratory/Imaging   Hemodynamics  Temp (24hrs), Av.8 °C (98.3 °F), Min:36.4 °C (97.6 °F), Max:37.3 °C (99.2 °F)   Temperature:  (Q12 Per Rn.)  Pulse  Av.1  Min: 59  Max: 96    Blood Pressure : (!) 97/53 (Rn Notified.)      Respiratory      Respiration: 15, Pulse Oximetry: 93 %     Work Of Breathing / Effort: Mild  RUL Breath Sounds: Clear, RML Breath Sounds: Diminished, RLL Breath Sounds: Diminished, MAGEN Breath Sounds: Clear, LLL Breath Sounds: Diminished    Fluids    Intake/Output Summary (Last 24 hours) at 18 0719  Last data filed at 18 0900   Gross per 24 hour   Intake              120 ml   Output                0 ml   Net              120 ml       Nutrition  Orders Placed This Encounter   Procedures   • Diet Order Regular     Standing Status:   Standing     Number of Occurrences:   1     Order  Specific Question:   Diet:     Answer:   Regular [1]     Order Specific Question:   Texture/Fiber modifications:     Answer:   Dysphagia 3(Mechanical Soft)specify fluid consistency(question 6) [3]     Order Specific Question:   Consistency/Fluid modifications:     Answer:   Thin Liquids [3]     Physical Exam   Constitutional: She is oriented to person, place, and time. No distress.   HENT:   Head: Normocephalic and atraumatic.   Eyes: EOM are normal. No scleral icterus.   Neck: Neck supple.   Cardiovascular: Normal rate and regular rhythm.    Murmur (holosystolic 3/6) heard.  Pulmonary/Chest: No stridor. She has no wheezes. She has no rales.   Abdominal: Soft. Bowel sounds are normal. She exhibits no distension. There is no tenderness.   Musculoskeletal: She exhibits no edema or tenderness.   Neurological: She is alert and oriented to person, place, and time.   No gross focal weakness     Skin: Skin is warm and dry. She is not diaphoretic. No pallor.   Vitals reviewed.                               Assessment/Plan     * ST elevation myocardial infarction involving right coronary artery (HCC)- (present on admission)   Assessment & Plan    Post anti plt, heparin, statin therapy - transitioned to comfort care- no chest pain or dyspnea.   Lasix diuresis .        Abdominal pain   Assessment & Plan    Transient . Abdominal exam benign and improved this morning.   Provide analgesics.  Add low dose IV morphine if recurrent/ worsens.   Bowel protocols for constipation in place if needed.      Hypotension   Assessment & Plan    Asymptomatic   Encourage intake  Hold lasix for sbp < 100     Pain of left hip joint- (present on admission)   Assessment & Plan    Also intermittent left Knee - probably OA - exam no signs of acute infection.   Try to avoid opiates due to risk for delirium and NSAIDS w Ckd.   Continue prn Tylenol- change to scheduled if breakthru pain .     Chronic kidney disease (CKD), stage III (moderate) ([facility])-  (present on admission)   Assessment & Plan    Was stable  Monitor intake.  Encourage adequate intake with lasix use.      Fall- (present on admission)   Assessment & Plan    Improved function.    Encourage mobility with fall precautions, walker use w assist w tranfers.            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      Leukocytosis- (present on admission)   Assessment & Plan    resolved     Essential hypertension- (present on admission)   Assessment & Plan    Mildly hypotensive  Continue diuretics for edema. Hold lasix for sbp < 100 . Comfort care     Advanced directives, counseling/discussion- (present on admission)   Assessment & Plan    Comfort care       Discussed with  - to see if patient will relinquish Life insurance funds to help pay for group home .   Quality-Core Measures   Reviewed items::  Medications reviewed, Labs reviewed and Radiology images reviewed  Valenzuela catheter::  No Valenzuela

## 2018-12-07 NOTE — DISCHARGE PLANNING
"Anticipated Discharge Disposition: Group home with Hospice    Action: LSW spoke with pt at bedside to discuss discharge planning. Pt became guarded and refused to answer LSW's questions. Pt then stated \"my daughter is working on it\". LSW stated that this is the reason for the discussion. Pt slammed her drink on the table and stated \"I do not want to talk about this\".     Barriers to Discharge: Placement, pt does not have income to support GH, pt does not qualify for Medicaid waiver, Pt refusing to discuss discharge plan at this time.     Plan: Attempt to speak with pt about [facility] again. Call daughter to discuss plan of care since pt stated daughter is \"working on it\".     "

## 2018-12-07 NOTE — CARE PLAN
Problem: Communication  Goal: The ability to communicate needs accurately and effectively will improve  Outcome: MET Date Met: 12/07/18  Patient uses call light to contact medical staff when necesary    Problem: Safety  Goal: Will remain free from falls  Outcome: MET Date Met: 12/07/18  Patient uses call light to get help before ambulating, treaded socks on, assistive devices used during ambulation    Problem: Mobility  Goal: Risk for activity intolerance will decrease  Outcome: MET Date Met: 12/07/18  Patient ambulated to bathroom without fatigue    Problem: Pain Management  Goal: Pain level will decrease to patient's comfort goal  Outcome: MET Date Met: 12/07/18  Patient denies pain at this time

## 2018-12-07 NOTE — ASSESSMENT & PLAN NOTE
Transient / intermittent.  No new symptoms, abdominal exam benign  Monitor intake and for signs of constipation. Ordered for scheduled senakot and miralax. Has been noncompliant  Bowel protocols.     Patient is comfortable and remains on comfort care

## 2018-12-08 ENCOUNTER — HOME CARE VISIT (OUTPATIENT)
Dept: HOSPICE | Facility: HOSPICE | Age: 83
End: 2018-12-08
Payer: MEDICARE

## 2018-12-08 PROCEDURE — 700102 HCHG RX REV CODE 250 W/ 637 OVERRIDE(OP): Performed by: FAMILY MEDICINE

## 2018-12-08 PROCEDURE — 700102 HCHG RX REV CODE 250 W/ 637 OVERRIDE(OP): Performed by: HOSPITALIST

## 2018-12-08 PROCEDURE — A9270 NON-COVERED ITEM OR SERVICE: HCPCS | Performed by: HOSPITALIST

## 2018-12-08 PROCEDURE — 770001 HCHG ROOM/CARE - MED/SURG/GYN PRIV*

## 2018-12-08 PROCEDURE — A9270 NON-COVERED ITEM OR SERVICE: HCPCS | Performed by: FAMILY MEDICINE

## 2018-12-08 PROCEDURE — 99231 SBSQ HOSP IP/OBS SF/LOW 25: CPT | Performed by: HOSPITALIST

## 2018-12-08 RX ADMIN — STANDARDIZED SENNA CONCENTRATE AND DOCUSATE SODIUM 2 TABLET: 8.6; 5 TABLET, FILM COATED ORAL at 05:30

## 2018-12-08 RX ADMIN — FUROSEMIDE 20 MG: 20 TABLET ORAL at 05:30

## 2018-12-08 ASSESSMENT — ENCOUNTER SYMPTOMS
COUGH: 0
WEAKNESS: 1
FOCAL WEAKNESS: 0
VOMITING: 0
ABDOMINAL PAIN: 0
CHILLS: 0
SPEECH CHANGE: 0
NECK PAIN: 0
FALLS: 0
DIARRHEA: 0
FEVER: 0
SHORTNESS OF BREATH: 0
BACK PAIN: 0

## 2018-12-08 ASSESSMENT — PAIN SCALES - GENERAL
PAINLEVEL_OUTOF10: 0
PAINLEVEL_OUTOF10: 0

## 2018-12-08 NOTE — FACE TO FACE
Face to Face Note  -  Durable Medical Equipment    Hakeem Cardozo M.D. - NPI: [id number]  I certify that this patient is under my care and that they had a durable medical equipment(DME)face to face encounter by myself that meets the physician DME face-to-face encounter requirements with this patient on:    Date of encounter:   Patient:                    MRN:                       YOB: 2018  Nora Hope  [id number]  8/6/1925     The encounter with the patient was in whole, or in part, for the following medical condition, which is the primary reason for durable medical equipment:  COPD    I certify that, based on my findings, the following durable medical equipment is medically necessary:  Oxygen.    HOME O2 Saturation Measurements:(Values must be present for Home Oxygen orders)         ,     ,         My Clinical findings support the need for the above equipment due to:  Hypoxia    Supporting Symptoms: hypoxia with Sob, cough, congestion

## 2018-12-08 NOTE — CARE PLAN
Problem: Safety  Goal: Will remain free from injury    Intervention: Provide assistance with mobility  Patient does not get out of bed without staff providing assistance with mobility.       Problem: Infection  Goal: Will remain free from infection    Intervention: Assess signs and symptoms of infection  Hand hygiene preformed before and after patient contact.

## 2018-12-08 NOTE — PROGRESS NOTES
0710: Bedside report received.  Pt denies any pain or needs at this time.  Call light and personal belongings within reach.  Bed locked in lowest position.  Bed alarm on.  Pt educated to call for assistance.    Pt declined to get OOB today, resting in bed all day.

## 2018-12-08 NOTE — PROGRESS NOTES
[facility] Hospitalist Progress Note    Date of Service: 2018    Chief Complaint  89 y.o. female hx of Htn admitted 2018 post GLF.  Dx acute STEMI. Initially admitted to ICU - treated w heparin , anti plts and statin. Patient and family did not want any aggressive interventions or procedures done. Plan of care was later changed to comfort care.       Interval Problem Update    No new complaints of abdominal pain.  Fair oral intake.     Consultants/Specialty    Cardiology and Critical care- signed off.     Disposition    Hospice.  SS investigating for possible sources of group home funding.         Review of Systems   Constitutional: Negative for chills and fever.   Respiratory: Negative for cough and shortness of breath.    Cardiovascular: Negative for chest pain and leg swelling.   Gastrointestinal: Negative for abdominal pain, diarrhea and vomiting.   Musculoskeletal: Positive for joint pain. Negative for back pain, falls and neck pain.        Improved.    Neurological: Positive for weakness (mild generalized, improved .). Negative for speech change and focal weakness.      Physical Exam  Laboratory/Imaging   Hemodynamics  Temp (24hrs), Av °C (98.6 °F), Min:36.9 °C (98.4 °F), Max:37.1 °C (98.7 °F)   Temperature: 36.9 °C (98.4 °F)  Pulse  Av.1  Min: 59  Max: 96    Blood Pressure : 111/68      Respiratory      Respiration: 20, Pulse Oximetry: 95 %     Work Of Breathing / Effort: Mild  RUL Breath Sounds: Clear, RML Breath Sounds: Diminished, RLL Breath Sounds: Diminished, MAGEN Breath Sounds: Clear, LLL Breath Sounds: Diminished    Fluids    Intake/Output Summary (Last 24 hours) at 18 0724  Last data filed at 18 0400   Gross per 24 hour   Intake              420 ml   Output                0 ml   Net              420 ml       Nutrition  Orders Placed This Encounter   Procedures   • Diet Order Regular     Standing Status:   Standing     Number of Occurrences:   1     Order Specific Question:    Diet:     Answer:   Regular [1]     Order Specific Question:   Texture/Fiber modifications:     Answer:   Dysphagia 3(Mechanical Soft)specify fluid consistency(question 6) [3]     Order Specific Question:   Consistency/Fluid modifications:     Answer:   Thin Liquids [3]     Physical Exam   Constitutional: She is oriented to person, place, and time. No distress.   HENT:   Head: Normocephalic and atraumatic.   Eyes: EOM are normal. No scleral icterus.   Cardiovascular: Normal rate and regular rhythm.    Murmur (holosystolic 3/6) heard.  Pulmonary/Chest: She has no wheezes. She has no rales.   Abdominal: Soft. Bowel sounds are normal. She exhibits no distension. There is no tenderness.   Musculoskeletal: She exhibits no edema or tenderness.   Neurological: She is alert and oriented to person, place, and time.   No gross focal weakness  Mild confusion      Skin: Skin is warm and dry. She is not diaphoretic. No pallor.   Vitals reviewed.                               Assessment/Plan     * ST elevation myocardial infarction involving right coronary artery (HCC)- (present on admission)   Assessment & Plan    Post anti plt, heparin, statin therapy - transitioned to comfort care- no chest pain or dyspnea.   Supportive treatment if symptoms.        Abdominal pain   Assessment & Plan    Transient / intermittent.  Abdominal exam benign.   Monitor intake and for signs of constipation.   Provide analgesics.    Bowel protocols.      Hypotension   Assessment & Plan    Asymptomatic   Encourage intake  Hold lasix for sbp < 100     Pain of left hip joint- (present on admission)   Assessment & Plan    Also intermittent left Knee - probably OA - exam no signs of acute infection.   Try to avoid opiates due to risk for delirium and NSAIDS w Ckd.   Continue prn Tylenol- change to scheduled if breakthru pain .     Chronic kidney disease (CKD), stage III (moderate) (HCC)- (present on admission)   Assessment & Plan    Was stable  Monitor  intake.  Encourage adequate intake and caution with lasix use.      Fall- (present on admission)   Assessment & Plan    Improved function.   Ambulator w walker.  Will need to continue assist w transfers and fall precautions.            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      Leukocytosis- (present on admission)   Assessment & Plan    resolved     Essential hypertension- (present on admission)   Assessment & Plan    BP stable.  Continue diuretics for edema. Hold lasix for sbp < 100 . Comfort care     Advanced directives, counseling/discussion- (present on admission)   Assessment & Plan    Comfort care         Quality-Core Measures   Reviewed items::  Medications reviewed, Labs reviewed and Radiology images reviewed  Valenzuela catheter::  No Valenzuela

## 2018-12-09 PROCEDURE — 770001 HCHG ROOM/CARE - MED/SURG/GYN PRIV*

## 2018-12-09 PROCEDURE — 99231 SBSQ HOSP IP/OBS SF/LOW 25: CPT | Performed by: HOSPITALIST

## 2018-12-09 PROCEDURE — 700102 HCHG RX REV CODE 250 W/ 637 OVERRIDE(OP): Performed by: FAMILY MEDICINE

## 2018-12-09 PROCEDURE — A9270 NON-COVERED ITEM OR SERVICE: HCPCS | Performed by: FAMILY MEDICINE

## 2018-12-09 RX ADMIN — STANDARDIZED SENNA CONCENTRATE AND DOCUSATE SODIUM 2 TABLET: 8.6; 5 TABLET, FILM COATED ORAL at 06:20

## 2018-12-09 ASSESSMENT — ENCOUNTER SYMPTOMS
FEVER: 0
FOCAL WEAKNESS: 0
HEADACHES: 0
DIZZINESS: 0
SPEECH CHANGE: 0
PALPITATIONS: 0
ABDOMINAL PAIN: 0
SHORTNESS OF BREATH: 0
FALLS: 0
WEAKNESS: 1
CHILLS: 0
STRIDOR: 0
DIARRHEA: 0
BACK PAIN: 0
HALLUCINATIONS: 0
COUGH: 0
VOMITING: 0

## 2018-12-09 ASSESSMENT — PAIN SCALES - GENERAL
PAINLEVEL_OUTOF10: 0
PAINLEVEL_OUTOF10: 0

## 2018-12-09 NOTE — CARE PLAN
Problem: Safety  Goal: Will remain free from injury    Intervention: Provide assistance with mobility  Staff present with patient for ambulation, and front wheel walker provided.       Problem: Bowel/Gastric:  Goal: Normal bowel function is maintained or improved    Intervention: Educate patient and significant other/support system about signs and symptoms of constipation and interventions to implement  Educated patient on use of stool softeners and increased fluid intake to prevent constipation.

## 2018-12-09 NOTE — PROGRESS NOTES
[facility] Hospitalist Progress Note    Date of Service: 2018    Chief Complaint  89 y.o. female hx of Htn admitted 2018 post GLF.  Dx acute STEMI. Initially admitted to ICU - treated w heparin , anti plts and statin. Patient and family did not want any aggressive interventions or procedures done. Plan of care was later changed to comfort care.       Interval Problem Update    Has had intermittent knee pain and abdominal pain-  without new symptoms.  Tolerating diet.  No chest pain or shortness of breath.  Up to bathroom with assistance and use a walker.      Consultants/Specialty    Cardiology and Critical care- signed off.     Disposition    Hospice.  SS investigating for possible sources of group home funding.         Review of Systems   Constitutional: Negative for chills and fever.   HENT: Negative for congestion.    Respiratory: Negative for cough, shortness of breath and stridor.    Cardiovascular: Negative for chest pain and palpitations.   Gastrointestinal: Negative for abdominal pain, diarrhea and vomiting.   Musculoskeletal: Positive for joint pain. Negative for back pain and falls.        Improved.    Neurological: Positive for weakness (mild generalized, improved .). Negative for dizziness, speech change, focal weakness and headaches. Loss of consciousness:    Psychiatric/Behavioral: Negative for hallucinations.      Physical Exam  Laboratory/Imaging   Hemodynamics  Temp (24hrs), Av.9 °C (98.4 °F), Min:36.8 °C (98.3 °F), Max:36.9 °C (98.4 °F)   Temperature: 36.8 °C (98.3 °F)  Pulse  Av  Min: 59  Max: 96    Blood Pressure : (!) 93/47 (rn notified )      Respiratory      Respiration: 16, Pulse Oximetry: 94 %     Work Of Breathing / Effort: Mild  RUL Breath Sounds: Diminished, RML Breath Sounds: Diminished, RLL Breath Sounds: Diminished, MAGEN Breath Sounds: Diminished, LLL Breath Sounds: Diminished    Fluids    Intake/Output Summary (Last 24 hours) at 18 1503  Last data filed at  12/09/18 1300   Gross per 24 hour   Intake             1080 ml   Output                0 ml   Net             1080 ml       Nutrition  Orders Placed This Encounter   Procedures   • Diet Order Regular     Standing Status:   Standing     Number of Occurrences:   1     Order Specific Question:   Diet:     Answer:   Regular [1]     Order Specific Question:   Texture/Fiber modifications:     Answer:   Dysphagia 3(Mechanical Soft)specify fluid consistency(question 6) [3]     Order Specific Question:   Consistency/Fluid modifications:     Answer:   Thin Liquids [3]     Physical Exam   Constitutional: She is oriented to person, place, and time. No distress.   HENT:   Head: Normocephalic and atraumatic.   Eyes: EOM are normal. No scleral icterus.   Cardiovascular: Normal rate and regular rhythm.    Murmur (holosystolic 3/6) heard.  Pulmonary/Chest: No stridor. No respiratory distress. She has no wheezes. She has no rales.   Abdominal: Soft. Bowel sounds are normal. She exhibits no distension. There is no tenderness.   Musculoskeletal: She exhibits no edema or tenderness.   Neurological: She is alert and oriented to person, place, and time.   Mild memory impairment     Skin: Skin is warm and dry. She is not diaphoretic. No pallor.   Vitals reviewed.                               Assessment/Plan     * ST elevation myocardial infarction involving right coronary artery (HCC)- (present on admission)   Assessment & Plan    Post anti plt, heparin, statin therapy - transitioned to comfort care- no chest pain or dyspnea.   Provide IV morphine, supportive treatment if chest pain symptoms.        Abdominal pain   Assessment & Plan    Transient / intermittent.  Abdominal exam benign.   Monitor intake and for signs of constipation.   Provide analgesics.    Bowel protocols.      Hypotension   Assessment & Plan    Asymptomatic   Encourage intake  Hold lasix for sbp < 100     Pain of left hip joint- (present on admission)   Assessment &  Plan    Also intermittent left Knee - probably OA - exam no signs of acute infection.   Try to avoid opiates due to risk for delirium and NSAIDS w Ckd.   Continue prn Tylenol.      Chronic kidney disease (CKD), stage III (moderate) (HCC)- (present on admission)   Assessment & Plan    Was stable  Monitor intake.  Encourage adequate intake and caution with lasix use.      Fall- (present on admission)   Assessment & Plan    Improved function.   Try to encourage mobility, assist with transfers and use of walker.           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      Leukocytosis- (present on admission)   Assessment & Plan    resolved     Essential hypertension- (present on admission)   Assessment & Plan    BP stable.  Continue diuretics for edema. Hold lasix for sbp < 100 . Comfort care     Advanced directives, counseling/discussion- (present on admission)   Assessment & Plan    Comfort care         Quality-Core Measures   Reviewed items::  Medications reviewed, Labs reviewed and Radiology images reviewed  Valenzuela catheter::  No Valenzuela

## 2018-12-09 NOTE — PROGRESS NOTES
Pt AOx4 all day.  No c/o pain.  Bed bath and hair wash completed by this RN and CNA.  Pt resting in bed all day.  Did not want to get up to the chair. Pt took O2 off, on and off throughout the day.  O2 sat fluctuating between 85-93% on RA.  Q2 turns offered throughout the day--pt mostly refuses.  Barrier cream applied to coccyx. Fall precautions in place.  Pt calls appropriately for assistance.

## 2018-12-10 ENCOUNTER — PATIENT OUTREACH (OUTPATIENT)
Dept: HEALTH INFORMATION MANAGEMENT | Facility: OTHER | Age: 83
End: 2018-12-10

## 2018-12-10 PROCEDURE — 99231 SBSQ HOSP IP/OBS SF/LOW 25: CPT | Performed by: HOSPITALIST

## 2018-12-10 PROCEDURE — 770001 HCHG ROOM/CARE - MED/SURG/GYN PRIV*

## 2018-12-10 ASSESSMENT — ENCOUNTER SYMPTOMS
FOCAL WEAKNESS: 0
SHORTNESS OF BREATH: 0
CHILLS: 0
VOMITING: 0
BACK PAIN: 0
FALLS: 0
ABDOMINAL PAIN: 0
HALLUCINATIONS: 0
WEAKNESS: 1
COUGH: 0
SPEECH CHANGE: 0
FEVER: 0
DIARRHEA: 0

## 2018-12-10 ASSESSMENT — PAIN SCALES - GENERAL
PAINLEVEL_OUTOF10: 0
PAINLEVEL_OUTOF10: 0

## 2018-12-10 NOTE — PROGRESS NOTES
Per chart review the patient remains at [facility].  Plan: will monitor for discharge from the hospital.

## 2018-12-10 NOTE — CARE PLAN
Problem: Venous Thromboembolism (VTW)/Deep Vein Thrombosis (DVT) Prevention:  Goal: Patient will participate in Venous Thrombosis (VTE)/Deep Vein Thrombosis (DVT)Prevention Measures    Intervention: Encourage ambulation/mobilization at level directed by Physical Therapy in collaboration with Interdisciplinary Team  Assisted by staff with ambulation, transfer OOB to chair and repositioning in bed. Ambulated with walker x 1 assist, tolerates well.       Problem: Skin Integrity  Goal: Risk for impaired skin integrity will decrease    Intervention: Assess and monitor skin integrity, appearance and/or temperature  Blanchable redness noted to sacral area, repositioned.

## 2018-12-10 NOTE — CARE PLAN
Problem: Skin Integrity  Goal: Risk for impaired skin integrity will decrease    Intervention: Implement precautions to protect skin integrity in collaboration with the interdisciplinary team  Waffle overlay in use, barrier cream, Q2hr turns, pillows in use for support/positioning, frequent toileting offered. Patient has allergies to adhesives, and did not tolerate having a mepilex placed on sacrum for further skin protection.       Problem: Urinary Elimination:  Goal: Ability to reestablish a normal urinary elimination pattern will improve    Intervention: Assist patient to sit on commode or toilet for voiding  Patient walks to bathroom with front wheel walker with staff to void. Does not use bedpan.

## 2018-12-11 PROCEDURE — 700102 HCHG RX REV CODE 250 W/ 637 OVERRIDE(OP): Performed by: FAMILY MEDICINE

## 2018-12-11 PROCEDURE — 99232 SBSQ HOSP IP/OBS MODERATE 35: CPT | Performed by: HOSPITALIST

## 2018-12-11 PROCEDURE — A9270 NON-COVERED ITEM OR SERVICE: HCPCS | Performed by: HOSPITALIST

## 2018-12-11 PROCEDURE — A9270 NON-COVERED ITEM OR SERVICE: HCPCS | Performed by: FAMILY MEDICINE

## 2018-12-11 PROCEDURE — 700102 HCHG RX REV CODE 250 W/ 637 OVERRIDE(OP): Performed by: HOSPITALIST

## 2018-12-11 PROCEDURE — 770001 HCHG ROOM/CARE - MED/SURG/GYN PRIV*

## 2018-12-11 RX ADMIN — FUROSEMIDE 20 MG: 20 TABLET ORAL at 04:16

## 2018-12-11 RX ADMIN — ACETAMINOPHEN 500 MG: 500 TABLET ORAL at 14:20

## 2018-12-11 RX ADMIN — STANDARDIZED SENNA CONCENTRATE AND DOCUSATE SODIUM 2 TABLET: 8.6; 5 TABLET, FILM COATED ORAL at 17:19

## 2018-12-11 ASSESSMENT — ENCOUNTER SYMPTOMS
EYE PAIN: 0
CHILLS: 0
MYALGIAS: 0
DOUBLE VISION: 0
SORE THROAT: 0
FLANK PAIN: 0
HEMOPTYSIS: 0
BRUISES/BLEEDS EASILY: 0
FOCAL WEAKNESS: 0
POLYDIPSIA: 0
SPEECH CHANGE: 0
LOSS OF CONSCIOUSNESS: 0
FEVER: 0
SHORTNESS OF BREATH: 0
ABDOMINAL PAIN: 0
DIARRHEA: 0
STRIDOR: 0
SEIZURES: 0
HALLUCINATIONS: 0
BLOOD IN STOOL: 0
PALPITATIONS: 0
NERVOUS/ANXIOUS: 0
COUGH: 0
FALLS: 0
EYE DISCHARGE: 0
VOMITING: 0
PHOTOPHOBIA: 0
BLURRED VISION: 0
SPUTUM PRODUCTION: 0

## 2018-12-11 ASSESSMENT — PAIN SCALES - GENERAL
PAINLEVEL_OUTOF10: 0
PAINLEVEL_OUTOF10: 0
PAINLEVEL_OUTOF10: 4

## 2018-12-11 NOTE — DISCHARGE PLANNING
GELYW contacted her supervisor Rhonda to discuss Pt case. LSW stated that Dr. Fischer said that the Pt told him she cannot cash out her $10,000 life insurance as she needs it for her burial, as her burial vault will need to be opened so that she can be next to her spouse. SW Wally Ellis stated she will follow up with Joseph Vasquez and get back to this LSW.

## 2018-12-11 NOTE — PROGRESS NOTES
Resting in bed, calm, pleasant, appropriate. Call light within reach. Sipping on hot tea. Wearing 1 LPM O2 via NC;  in use. Lungs diminished to bases, but clear. Denies all discomfort, pain, and SOB; denies needing/wanting anything at this time. Last BM charted was today, 12/10/18. Waffle mattress overlay in use. Skin pale, cool, fragile, but intact. Blanchable erythema noted to tailbone; barrier cream applied. No mepilex applied to sacrum/coccyx d/t tape allergy. Staff turning patient and repositioning her every few hours; patient also able to reposition herself in bed.

## 2018-12-11 NOTE — PROGRESS NOTES
Hospital Medicine Daily Progress Note    Date of Service  12/11/2018    Chief Complaint  Ground-level fall and chest pain    Hospital Course      89 y.o. female hx of hypertension admitted 11/13/2018 post GLF.  Dx acute STEMI. Initially admitted to ICU - treated w heparin , anti plts and statin. Patient and family did not want any aggressive interventions or procedures done. Plan of care was later changed to comfort care        Interval Problem Update  Pain controlled, still feeling weak  Eating 25-50% of her meals  CM investigating for possible sources of group home funding    Consultants/Specialty  Cardiology  Intensive care    Code Status  Hospice, comfort care CM investigating for possible sources of group home funding    Disposition  Pending placement    Review of Systems  Review of Systems   Constitutional: Negative for chills and fever.   HENT: Negative for congestion, ear discharge, ear pain, sore throat and tinnitus.    Eyes: Negative for blurred vision, double vision, photophobia, pain and discharge.   Respiratory: Negative for cough, hemoptysis, sputum production, shortness of breath and stridor.    Cardiovascular: Negative for chest pain, palpitations and leg swelling.   Gastrointestinal: Negative for abdominal pain, blood in stool, diarrhea and vomiting.   Genitourinary: Negative for flank pain, hematuria and urgency.   Musculoskeletal: Negative for falls and myalgias.   Skin: Negative.    Neurological: Negative for speech change, focal weakness, seizures and loss of consciousness.   Endo/Heme/Allergies: Negative for polydipsia. Does not bruise/bleed easily.   Psychiatric/Behavioral: Negative for hallucinations and suicidal ideas. The patient is not nervous/anxious.         Physical Exam  Temp:  [36.7 °C (98 °F)-37 °C (98.6 °F)] 37 °C (98.6 °F)  Pulse:  [65-79] 78  Resp:  [17-18] 17  BP: (102-110)/(56-62) 102/58    Physical Exam   Constitutional: She is oriented to person, place, and time. No distress.    Cachectic    HENT:   Head: Normocephalic and atraumatic.   Right Ear: External ear normal.   Left Ear: External ear normal.   Eyes: Pupils are equal, round, and reactive to light. Conjunctivae are normal. Right eye exhibits no discharge. Left eye exhibits no discharge.   Neck: Normal range of motion. Neck supple. No JVD present. No tracheal deviation present. No thyromegaly present.   Cardiovascular: Exam reveals no gallop and no friction rub.    No murmur heard.  Pulmonary/Chest: Effort normal and breath sounds normal. No stridor. No respiratory distress. She has no wheezes. She has no rales. She exhibits no tenderness.   Abdominal: Soft. Bowel sounds are normal. She exhibits no distension. There is no tenderness. There is no rebound and no guarding.   Musculoskeletal: Normal range of motion. She exhibits no edema, tenderness or deformity.   Neurological: She is alert and oriented to person, place, and time. No cranial nerve deficit. Coordination normal.   Skin: Skin is warm and dry. No rash noted. She is not diaphoretic. No erythema. No pallor.   Psychiatric: She has a normal mood and affect. Judgment normal.   Nursing note reviewed.      Fluids  No intake or output data in the 24 hours ending 12/11/18 2031    Laboratory                        Imaging  DX-CHEST-PORTABLE (1 VIEW)   Final Result         1.  Hazy interstitial left pulmonary opacities suggests interstitial edema or infiltrate, similar to prior.   2.  Trace left pleural effusion   3.  Hyperexpansion of lungs favors changes of COPD.      DX-CHEST-PORTABLE (1 VIEW)   Final Result         1.  Interstitial infiltrates or edema, stable.   2.  Atherosclerosis      DX-CHEST-PORTABLE (1 VIEW)   Final Result         1.  Interstitial infiltrates or edema.   2.  Atherosclerosis      DX-CHEST-PORTABLE (1 VIEW)   Final Result      Moderate interstitial edema or interstitial lung disease and small left pleural effusion similar to previous.      DX-CHEST-PORTABLE  (1 VIEW)   Final Result      Stable interstitial edema and/or interstitial lung disease with probable small pleural fluid      DX-CHEST-PORTABLE (1 VIEW)   Final Result      Ill-defined infrahilar interstitial opacities, atelectasis versus mild edema. No significant pleural effusions.      DX-CHEST-PORTABLE (1 VIEW)   Final Result      Mild left basilar atelectasis, improved since prior. No new consolidation or large pleural effusions.      DX-CHEST-PORTABLE (1 VIEW)   Final Result      1.  Left basilar opacification may be secondary to atelectasis. Developing pneumonia cannot be excluded.         DX-CHEST-PORTABLE (1 VIEW)   Final Result      1.  Unchanged mild interstitial pulmonary edema   2.  Unchanged bibasilar atelectasis, alveolar edema or pneumonia      DX-CHEST-PORTABLE (1 VIEW)   Final Result      1.  Decreased pulmonary edema   2.  Unchanged bibasilar atelectasis, alveolar edema or pneumonia      DX-CHEST-PORTABLE (1 VIEW)   Final Result      1.  There is diffuse interstitial and alveolar density in the right mid and lower lung zone. This is increased since the previous study. This may represent mild pulmonary edema/consolidation.   2.  Mildly enlarged cardiomediastinal silhouette when compared with the previous chest x-ray.      DX-CHEST-PORTABLE (1 VIEW)   Final Result      Stable mild pulmonary edema.   New left basilar atelectasis.      DX-CHEST-PORTABLE (1 VIEW)   Final Result      Stable chest appearance compared with 11/16.      DX-CHEST-PORTABLE (1 VIEW)   Final Result      No acute cardiopulmonary abnormality identified.      DX-CHEST-PORTABLE (1 VIEW)   Final Result      No acute cardiopulmonary abnormality. No interval change.      DX-CHEST-PORTABLE (1 VIEW)   Final Result      No acute cardiopulmonary disease.      CT-HEAD W/O   Final Result      1.  No evidence of acute intracranial process.      2.  There is again seen interstitial pulmonary edema and bibasilar atelectasis.      CT-HIP W/O  PLUS RECONS LEFT   Final Result      1.  No evidence of acute fracture or malalignment. No evidence of hardware failure or complication.   2.  Postsurgical changes of the left femur with broken screw fragment redemonstrated.   3.  Demineralization.   4.  Scattered colonic diverticula.   5.  Atherosclerosis.   6.  Small fat-containing umbilical hernia.      EC-ECHOCARDIOGRAM COMPLETE W/O CONT   Final Result      DX-HIP-COMPLETE - UNILATERAL 2+ LEFT   Final Result      1.  No definite acute osseous abnormality. In setting of demineralization, an occult fracture is difficult to exclude. If there is strong clinical suspicion, CT or MRI could be obtained for further evaluation.   2.  Postsurgical changes of the left femur with broken screw fragment redemonstrated.      DX-CHEST-PORTABLE (1 VIEW)   Final Result      No acute cardiopulmonary process is seen.      Atherosclerotic plaque.           Assessment/Plan  * ST elevation myocardial infarction involving right coronary artery (HCC)- (present on admission)   Assessment & Plan    Post anti plt, heparin, statin therapy - transitioned to comfort care- no chest pain or dyspnea.   Provide IV morphine, supportive treatment if chest pain symptoms.       Abdominal pain   Assessment & Plan    Transient / intermittent.  No new symptoms, abdominal exam benign  Monitor intake and for signs of constipation.   Provide analgesics.    Bowel protocols.       Hypotension   Assessment & Plan    Asymptomatic   Encourage intake  Hold lasix for sbp < 100      Pain of left hip joint- (present on admission)   Assessment & Plan    Also intermittent left Knee - probably OA - exam no signs of acute infection.   Try to avoid opiates due to risk for delirium and NSAIDS w Ckd.   Continue prn Tylenol.       Chronic kidney disease (CKD), stage III (moderate) (HCC)- (present on admission)   Assessment & Plan    Was stable  Encourage intake.       Fall- (present on admission)   Assessment & Plan     With debility - improved function.   Try to encourage mobility, assist with transfers and use of walker.       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       Leukocytosis- (present on admission)   Assessment & Plan    Resolved      Essential hypertension- (present on admission)   Assessment & Plan    BP stable to mildly low.  Hold lasix for sbp < 100 . Comfort care       Advanced directives, counseling/discussion- (present on admission)   Assessment & Plan    Comfort care           VTE prophylaxis: N/A, on comfort care

## 2018-12-11 NOTE — PROGRESS NOTES
[facility] Hospitalist Progress Note    Date of Service: 12/10/2018    Chief Complaint  89 y.o. female hx of Htn admitted 2018 post GLF.  Dx acute STEMI. Initially admitted to ICU - treated w heparin , anti plts and statin. Patient and family did not want any aggressive interventions or procedures done. Plan of care was later changed to comfort care.       Interval Problem Update    No new complaints.  Tolerating diet.  States she does not want to use her life insurance pay out to cover group home cost and that it was intended for her  costs.     Consultants/Specialty    Cardiology and Critical care- signed off.     Disposition    Hospice.  SS investigating for possible sources of group home funding.         Review of Systems   Constitutional: Negative for chills and fever.   Respiratory: Negative for cough and shortness of breath.    Cardiovascular: Negative for chest pain.   Gastrointestinal: Negative for abdominal pain, diarrhea and vomiting.   Musculoskeletal: Negative for back pain, falls and joint pain.   Neurological: Positive for weakness (mild generalized, improved .). Negative for speech change and focal weakness. Loss of consciousness:    Psychiatric/Behavioral: Negative for hallucinations.      Physical Exam  Laboratory/Imaging   Hemodynamics  Temp (24hrs), Av.7 °C (98 °F), Min:36.5 °C (97.7 °F), Max:36.8 °C (98.2 °F)   Temperature: 36.8 °C (98.2 °F)  Pulse  Av.1  Min: 59  Max: 98    Blood Pressure : (!) 93/55      Respiratory      Respiration: 17, Pulse Oximetry: 93 %     Work Of Breathing / Effort: Mild  RUL Breath Sounds: Clear, RML Breath Sounds: Diminished, RLL Breath Sounds: Diminished, MAGEN Breath Sounds: Clear, LLL Breath Sounds: Diminished    Fluids    Intake/Output Summary (Last 24 hours) at 12/10/18 1711  Last data filed at 12/10/18 1200   Gross per 24 hour   Intake              590 ml   Output                0 ml   Net              590 ml       Nutrition  Orders Placed This  Encounter   Procedures   • Diet Order Regular     Standing Status:   Standing     Number of Occurrences:   1     Order Specific Question:   Diet:     Answer:   Regular [1]     Order Specific Question:   Texture/Fiber modifications:     Answer:   Dysphagia 3(Mechanical Soft)specify fluid consistency(question 6) [3]     Order Specific Question:   Consistency/Fluid modifications:     Answer:   Thin Liquids [3]     Physical Exam   Constitutional: She is oriented to person, place, and time. No distress.   Calm, cooperative   HENT:   Head: Normocephalic and atraumatic.   Eyes: EOM are normal. No scleral icterus.   Cardiovascular: Normal rate and regular rhythm.    Murmur (holosystolic 3/6) heard.  Pulmonary/Chest: No stridor. No respiratory distress. She has no wheezes. She has no rales.   Abdominal: Soft. Bowel sounds are normal. She exhibits no distension. There is no tenderness.   Musculoskeletal: She exhibits no edema or tenderness.   Neurological: She is alert and oriented to person, place, and time.   Mild memory impairment     Skin: Skin is warm and dry. She is not diaphoretic. No pallor.   Vitals reviewed.                               Assessment/Plan     * ST elevation myocardial infarction involving right coronary artery (HCC)- (present on admission)   Assessment & Plan    Post anti plt, heparin, statin therapy - transitioned to comfort care- no chest pain or dyspnea.   Provide IV morphine, supportive treatment if chest pain symptoms.        Abdominal pain   Assessment & Plan    Transient / intermittent.  No new symptoms, abdominal exam benign  Monitor intake and for signs of constipation.   Provide analgesics.    Bowel protocols.      Hypotension   Assessment & Plan    Asymptomatic   Encourage intake  Hold lasix for sbp < 100     Pain of left hip joint- (present on admission)   Assessment & Plan    Also intermittent left Knee - probably OA - exam no signs of acute infection.   Try to avoid opiates due to risk  for delirium and NSAIDS w Ckd.   Continue prn Tylenol.      Chronic kidney disease (CKD), stage III (moderate) ([facility])- (present on admission)   Assessment & Plan    Was stable  Encourage intake.      Fall- (present on admission)   Assessment & Plan    With debility - improved function.   Try to encourage mobility, assist with transfers and use of walker.           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      Leukocytosis- (present on admission)   Assessment & Plan    resolved     Essential hypertension- (present on admission)   Assessment & Plan    BP stable to mildly low.  Continue diuretics for edema. Hold lasix for sbp < 100 . Comfort care     Advanced directives, counseling/discussion- (present on admission)   Assessment & Plan    Comfort care         Quality-Core Measures   Reviewed items::  Medications reviewed, Labs reviewed and Radiology images reviewed  Valenzuela catheter::  No Valenzuela

## 2018-12-12 PROCEDURE — 99232 SBSQ HOSP IP/OBS MODERATE 35: CPT | Performed by: HOSPITALIST

## 2018-12-12 PROCEDURE — 700102 HCHG RX REV CODE 250 W/ 637 OVERRIDE(OP): Performed by: FAMILY MEDICINE

## 2018-12-12 PROCEDURE — 700102 HCHG RX REV CODE 250 W/ 637 OVERRIDE(OP): Performed by: HOSPITALIST

## 2018-12-12 PROCEDURE — A9270 NON-COVERED ITEM OR SERVICE: HCPCS | Performed by: FAMILY MEDICINE

## 2018-12-12 PROCEDURE — 770001 HCHG ROOM/CARE - MED/SURG/GYN PRIV*

## 2018-12-12 PROCEDURE — A9270 NON-COVERED ITEM OR SERVICE: HCPCS | Performed by: HOSPITALIST

## 2018-12-12 RX ADMIN — STANDARDIZED SENNA CONCENTRATE AND DOCUSATE SODIUM 2 TABLET: 8.6; 5 TABLET, FILM COATED ORAL at 05:36

## 2018-12-12 RX ADMIN — FUROSEMIDE 20 MG: 20 TABLET ORAL at 09:58

## 2018-12-12 ASSESSMENT — ENCOUNTER SYMPTOMS
BRUISES/BLEEDS EASILY: 0
LOSS OF CONSCIOUSNESS: 0
HALLUCINATIONS: 0
BLOOD IN STOOL: 0
PHOTOPHOBIA: 0
PALPITATIONS: 0
MYALGIAS: 0
SEIZURES: 0
POLYDIPSIA: 0
COUGH: 0
BLURRED VISION: 0
FLANK PAIN: 0
CHILLS: 0
SPEECH CHANGE: 0
EYE PAIN: 0
FOCAL WEAKNESS: 0
FEVER: 0
FALLS: 0
NERVOUS/ANXIOUS: 0
VOMITING: 0
SHORTNESS OF BREATH: 0
STRIDOR: 0
SORE THROAT: 0
SPUTUM PRODUCTION: 0
ABDOMINAL PAIN: 0
EYE DISCHARGE: 0
DIARRHEA: 0
HEMOPTYSIS: 0
DOUBLE VISION: 0

## 2018-12-12 ASSESSMENT — PAIN SCALES - GENERAL
PAINLEVEL_OUTOF10: 0
PAINLEVEL_OUTOF10: 0

## 2018-12-12 NOTE — PROGRESS NOTES
Pt assessed, VS stable. Medications passed. Patient updated on plan of care for the shift. Bed low and locked, call light within reach. Hourly rounding in place. Comfort care protocol in place. All needs met at this time. Will continue to monitor.

## 2018-12-12 NOTE — CARE PLAN
Problem: Safety  Goal: Will remain free from injury  Outcome: PROGRESSING AS EXPECTED      Problem: Medication  Goal: Compliance with prescribed medication will improve  Outcome: PROGRESSING AS EXPECTED

## 2018-12-12 NOTE — PROGRESS NOTES
Hospital Medicine Daily Progress Note    Date of Service  12/12/2018    Chief Complaint  Ground-level fall and chest pain    Hospital Course      89 y.o. female hx of hypertension admitted 11/13/2018 post GLF.  Dx acute STEMI. Initially admitted to ICU - treated w heparin , anti plts and statin. Patient and family did not want any aggressive interventions or procedures done. Plan of care was later changed to comfort care        Interval Problem Update  Pain controlled, still feeling weak  Eating 25-50% of her meals  CM investigating for possible sources of group home funding    Consultants/Specialty  Cardiology  Intensive care    Code Status  Hospice, comfort care CM investigating for possible sources of group home funding    Disposition  Pending placement    Review of Systems  Review of Systems   Constitutional: Negative for chills and fever.   HENT: Negative for congestion, ear discharge, ear pain, sore throat and tinnitus.    Eyes: Negative for blurred vision, double vision, photophobia, pain and discharge.   Respiratory: Negative for cough, hemoptysis, sputum production, shortness of breath and stridor.    Cardiovascular: Negative for chest pain, palpitations and leg swelling.   Gastrointestinal: Negative for abdominal pain, blood in stool, diarrhea and vomiting.   Genitourinary: Negative for flank pain, hematuria and urgency.   Musculoskeletal: Negative for falls and myalgias.   Skin: Negative.    Neurological: Negative for speech change, focal weakness, seizures and loss of consciousness.   Endo/Heme/Allergies: Negative for polydipsia. Does not bruise/bleed easily.   Psychiatric/Behavioral: Negative for hallucinations and suicidal ideas. The patient is not nervous/anxious.         Physical Exam  Temp:  [36.3 °C (97.3 °F)-36.5 °C (97.7 °F)] 36.3 °C (97.3 °F)  Pulse:  [73-78] 75  Resp:  [15-16] 15  BP: (100-115)/(51-62) 115/62    Physical Exam   Constitutional: She is oriented to person, place, and time. No  distress.   Cachectic    HENT:   Head: Normocephalic and atraumatic.   Right Ear: External ear normal.   Left Ear: External ear normal.   Eyes: Pupils are equal, round, and reactive to light. Conjunctivae are normal. Right eye exhibits no discharge. Left eye exhibits no discharge.   Neck: Normal range of motion. Neck supple. No JVD present. No tracheal deviation present. No thyromegaly present.   Cardiovascular: Exam reveals no gallop and no friction rub.    No murmur heard.  Pulmonary/Chest: Effort normal and breath sounds normal. No stridor. No respiratory distress. She has no wheezes. She has no rales. She exhibits no tenderness.   Abdominal: Soft. Bowel sounds are normal. She exhibits no distension. There is no tenderness. There is no rebound and no guarding.   Musculoskeletal: Normal range of motion. She exhibits no edema, tenderness or deformity.   Neurological: She is alert and oriented to person, place, and time. No cranial nerve deficit. Coordination normal.   Skin: Skin is warm and dry. No rash noted. She is not diaphoretic. No erythema. No pallor.   Psychiatric: She has a normal mood and affect. Judgment normal.   Nursing note reviewed.      Fluids    Intake/Output Summary (Last 24 hours) at 12/12/18 1320  Last data filed at 12/12/18 0200   Gross per 24 hour   Intake              180 ml   Output              700 ml   Net             -520 ml       Laboratory                        Imaging  DX-CHEST-PORTABLE (1 VIEW)   Final Result         1.  Hazy interstitial left pulmonary opacities suggests interstitial edema or infiltrate, similar to prior.   2.  Trace left pleural effusion   3.  Hyperexpansion of lungs favors changes of COPD.      DX-CHEST-PORTABLE (1 VIEW)   Final Result         1.  Interstitial infiltrates or edema, stable.   2.  Atherosclerosis      DX-CHEST-PORTABLE (1 VIEW)   Final Result         1.  Interstitial infiltrates or edema.   2.  Atherosclerosis      DX-CHEST-PORTABLE (1 VIEW)   Final  Result      Moderate interstitial edema or interstitial lung disease and small left pleural effusion similar to previous.      DX-CHEST-PORTABLE (1 VIEW)   Final Result      Stable interstitial edema and/or interstitial lung disease with probable small pleural fluid      DX-CHEST-PORTABLE (1 VIEW)   Final Result      Ill-defined infrahilar interstitial opacities, atelectasis versus mild edema. No significant pleural effusions.      DX-CHEST-PORTABLE (1 VIEW)   Final Result      Mild left basilar atelectasis, improved since prior. No new consolidation or large pleural effusions.      DX-CHEST-PORTABLE (1 VIEW)   Final Result      1.  Left basilar opacification may be secondary to atelectasis. Developing pneumonia cannot be excluded.         DX-CHEST-PORTABLE (1 VIEW)   Final Result      1.  Unchanged mild interstitial pulmonary edema   2.  Unchanged bibasilar atelectasis, alveolar edema or pneumonia      DX-CHEST-PORTABLE (1 VIEW)   Final Result      1.  Decreased pulmonary edema   2.  Unchanged bibasilar atelectasis, alveolar edema or pneumonia      DX-CHEST-PORTABLE (1 VIEW)   Final Result      1.  There is diffuse interstitial and alveolar density in the right mid and lower lung zone. This is increased since the previous study. This may represent mild pulmonary edema/consolidation.   2.  Mildly enlarged cardiomediastinal silhouette when compared with the previous chest x-ray.      DX-CHEST-PORTABLE (1 VIEW)   Final Result      Stable mild pulmonary edema.   New left basilar atelectasis.      DX-CHEST-PORTABLE (1 VIEW)   Final Result      Stable chest appearance compared with 11/16.      DX-CHEST-PORTABLE (1 VIEW)   Final Result      No acute cardiopulmonary abnormality identified.      DX-CHEST-PORTABLE (1 VIEW)   Final Result      No acute cardiopulmonary abnormality. No interval change.      DX-CHEST-PORTABLE (1 VIEW)   Final Result      No acute cardiopulmonary disease.      CT-HEAD W/O   Final Result      1.   No evidence of acute intracranial process.      2.  There is again seen interstitial pulmonary edema and bibasilar atelectasis.      CT-HIP W/O PLUS RECONS LEFT   Final Result      1.  No evidence of acute fracture or malalignment. No evidence of hardware failure or complication.   2.  Postsurgical changes of the left femur with broken screw fragment redemonstrated.   3.  Demineralization.   4.  Scattered colonic diverticula.   5.  Atherosclerosis.   6.  Small fat-containing umbilical hernia.      EC-ECHOCARDIOGRAM COMPLETE W/O CONT   Final Result      DX-HIP-COMPLETE - UNILATERAL 2+ LEFT   Final Result      1.  No definite acute osseous abnormality. In setting of demineralization, an occult fracture is difficult to exclude. If there is strong clinical suspicion, CT or MRI could be obtained for further evaluation.   2.  Postsurgical changes of the left femur with broken screw fragment redemonstrated.      DX-CHEST-PORTABLE (1 VIEW)   Final Result      No acute cardiopulmonary process is seen.      Atherosclerotic plaque.           Assessment/Plan  * ST elevation myocardial infarction involving right coronary artery (HCC)- (present on admission)   Assessment & Plan    Post anti plt, heparin, statin therapy - transitioned to comfort care- no chest pain or dyspnea.   Provide IV morphine, supportive treatment if chest pain symptoms.        Abdominal pain   Assessment & Plan    Transient / intermittent.  No new symptoms, abdominal exam benign  Monitor intake and for signs of constipation.   Provide analgesics.    Bowel protocols.        Hypotension   Assessment & Plan    Asymptomatic   Encourage intake   Hold lasix for sbp < 100       Pain of left hip joint- (present on admission)   Assessment & Plan    Also intermittent left Knee - probably OA - exam no signs of acute infection.   Try to avoid opiates due to risk for delirium and NSAIDS w Ckd.   Continue prn Tylenol.        Chronic kidney disease (CKD), stage III  (moderate) (HCC)- (present on admission)   Assessment & Plan    Was stable  Encourage intake.        Fall- (present on admission)   Assessment & Plan    With debility - improved function.   Try to encourage mobility, assist with transfers and use of walker.        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       Leukocytosis- (present on admission)   Assessment & Plan    Resolved       Essential hypertension- (present on admission)   Assessment & Plan    BP stable to mildly low.  Hold lasix for sbp < 100 . Comfort care         Advanced directives, counseling/discussion- (present on admission)   Assessment & Plan    Comfort care            VTE prophylaxis: N/A, on comfort care

## 2018-12-13 PROCEDURE — 99232 SBSQ HOSP IP/OBS MODERATE 35: CPT | Performed by: HOSPITALIST

## 2018-12-13 PROCEDURE — A9270 NON-COVERED ITEM OR SERVICE: HCPCS | Performed by: FAMILY MEDICINE

## 2018-12-13 PROCEDURE — A9270 NON-COVERED ITEM OR SERVICE: HCPCS | Performed by: HOSPITALIST

## 2018-12-13 PROCEDURE — 700102 HCHG RX REV CODE 250 W/ 637 OVERRIDE(OP): Performed by: HOSPITALIST

## 2018-12-13 PROCEDURE — 700102 HCHG RX REV CODE 250 W/ 637 OVERRIDE(OP): Performed by: FAMILY MEDICINE

## 2018-12-13 PROCEDURE — 770001 HCHG ROOM/CARE - MED/SURG/GYN PRIV*

## 2018-12-13 RX ADMIN — ACETAMINOPHEN 500 MG: 500 TABLET ORAL at 02:37

## 2018-12-13 ASSESSMENT — ENCOUNTER SYMPTOMS
BRUISES/BLEEDS EASILY: 0
FALLS: 0
DOUBLE VISION: 0
HALLUCINATIONS: 0
FEVER: 0
PALPITATIONS: 0
SPEECH CHANGE: 0
BLOOD IN STOOL: 0
POLYDIPSIA: 0
EYE PAIN: 0
ABDOMINAL PAIN: 0
MYALGIAS: 0
BLURRED VISION: 0
FOCAL WEAKNESS: 0
LOSS OF CONSCIOUSNESS: 0
FLANK PAIN: 0
HEMOPTYSIS: 0
DIARRHEA: 0
NERVOUS/ANXIOUS: 0
SHORTNESS OF BREATH: 0
VOMITING: 0
SORE THROAT: 0
EYE DISCHARGE: 0
CHILLS: 0
SEIZURES: 0
SPUTUM PRODUCTION: 0
PHOTOPHOBIA: 0
COUGH: 0
STRIDOR: 0

## 2018-12-13 ASSESSMENT — PAIN SCALES - GENERAL
PAINLEVEL_OUTOF10: 4
PAINLEVEL_OUTOF10: 0

## 2018-12-13 NOTE — CARE PLAN
Problem: Safety  Goal: Will remain free from injury  Outcome: PROGRESSING AS EXPECTED  Bed low and locked position,  socks on hourly rounding, bed/chair alarm on, call light and belongings within reach.

## 2018-12-13 NOTE — PROGRESS NOTES
Pt is AAOx4  Pt reports a 0/10 pain level  Medicated per MAR  VS WNL  POC discussed  All needs met at this time  Bed in low position  Call light within reach  Rounding in place

## 2018-12-13 NOTE — CARE PLAN
Problem: Safety  Goal: Will remain free from injury  Bed in lowest and locked position.  Bed alarm activated.  Call light within reach. Pt is able to make needs known    Problem: Skin Integrity  Goal: Risk for impaired skin integrity will decrease  Pt is able to move self /  Q 2 hour turns are encouraged.  Barrier cream applied to sacrum.  Pt unable to have a mepilex due to allergy

## 2018-12-13 NOTE — PROGRESS NOTES
Hospital Medicine Daily Progress Note    Date of Service  12/13/2018    Chief Complaint  Ground-level fall and chest pain    Hospital Course      89 y.o. female hx of hypertension admitted 11/13/2018 post GLF.  Dx acute STEMI. Initially admitted to ICU - treated w heparin , anti plts and statin. Patient and family did not want any aggressive interventions or procedures done. Plan of care was later changed to comfort care        Interval Problem Update  No changed, patient reports that her pains are controlled  Eating well   CM investigating for possible sources of group home funding    Consultants/Specialty  Cardiology  Intensive care    Code Status  Hospice, comfort care CM investigating for possible sources of group home funding    Disposition  Pending placement    Review of Systems  Review of Systems   Constitutional: Negative for chills and fever.   HENT: Negative for congestion, ear discharge, ear pain, sore throat and tinnitus.    Eyes: Negative for blurred vision, double vision, photophobia, pain and discharge.   Respiratory: Negative for cough, hemoptysis, sputum production, shortness of breath and stridor.    Cardiovascular: Negative for chest pain, palpitations and leg swelling.   Gastrointestinal: Negative for abdominal pain, blood in stool, diarrhea and vomiting.   Genitourinary: Negative for flank pain, hematuria and urgency.   Musculoskeletal: Negative for falls and myalgias.   Skin: Negative.    Neurological: Negative for speech change, focal weakness, seizures and loss of consciousness.   Endo/Heme/Allergies: Negative for polydipsia. Does not bruise/bleed easily.   Psychiatric/Behavioral: Negative for hallucinations and suicidal ideas. The patient is not nervous/anxious.         Physical Exam  Temp:  [36.5 °C (97.7 °F)-36.8 °C (98.3 °F)] 36.5 °C (97.7 °F)  Pulse:  [80-87] 80  Resp:  [13-14] 14  BP: ()/(50-67) 96/50    Physical Exam   Constitutional: She is oriented to person, place, and time.  No distress.   Cachectic    HENT:   Head: Normocephalic and atraumatic.   Right Ear: External ear normal.   Left Ear: External ear normal.   Eyes: Pupils are equal, round, and reactive to light. Conjunctivae are normal. Right eye exhibits no discharge. Left eye exhibits no discharge.   Neck: Normal range of motion. Neck supple. No JVD present. No tracheal deviation present. No thyromegaly present.   Cardiovascular: Exam reveals no gallop and no friction rub.    No murmur heard.  Pulmonary/Chest: Effort normal and breath sounds normal. No stridor. No respiratory distress. She has no wheezes. She has no rales. She exhibits no tenderness.   Abdominal: Soft. Bowel sounds are normal. She exhibits no distension. There is no tenderness. There is no rebound and no guarding.   Musculoskeletal: Normal range of motion. She exhibits no edema, tenderness or deformity.   Neurological: She is alert and oriented to person, place, and time. No cranial nerve deficit. Coordination normal.   Skin: Skin is warm and dry. No rash noted. She is not diaphoretic. No erythema. No pallor.   Psychiatric: She has a normal mood and affect. Judgment normal.   Nursing note reviewed.      Fluids  No intake or output data in the 24 hours ending 12/13/18 1752    Laboratory                        Imaging  DX-CHEST-PORTABLE (1 VIEW)   Final Result         1.  Hazy interstitial left pulmonary opacities suggests interstitial edema or infiltrate, similar to prior.   2.  Trace left pleural effusion   3.  Hyperexpansion of lungs favors changes of COPD.      DX-CHEST-PORTABLE (1 VIEW)   Final Result         1.  Interstitial infiltrates or edema, stable.   2.  Atherosclerosis      DX-CHEST-PORTABLE (1 VIEW)   Final Result         1.  Interstitial infiltrates or edema.   2.  Atherosclerosis      DX-CHEST-PORTABLE (1 VIEW)   Final Result      Moderate interstitial edema or interstitial lung disease and small left pleural effusion similar to previous.       DX-CHEST-PORTABLE (1 VIEW)   Final Result      Stable interstitial edema and/or interstitial lung disease with probable small pleural fluid      DX-CHEST-PORTABLE (1 VIEW)   Final Result      Ill-defined infrahilar interstitial opacities, atelectasis versus mild edema. No significant pleural effusions.      DX-CHEST-PORTABLE (1 VIEW)   Final Result      Mild left basilar atelectasis, improved since prior. No new consolidation or large pleural effusions.      DX-CHEST-PORTABLE (1 VIEW)   Final Result      1.  Left basilar opacification may be secondary to atelectasis. Developing pneumonia cannot be excluded.         DX-CHEST-PORTABLE (1 VIEW)   Final Result      1.  Unchanged mild interstitial pulmonary edema   2.  Unchanged bibasilar atelectasis, alveolar edema or pneumonia      DX-CHEST-PORTABLE (1 VIEW)   Final Result      1.  Decreased pulmonary edema   2.  Unchanged bibasilar atelectasis, alveolar edema or pneumonia      DX-CHEST-PORTABLE (1 VIEW)   Final Result      1.  There is diffuse interstitial and alveolar density in the right mid and lower lung zone. This is increased since the previous study. This may represent mild pulmonary edema/consolidation.   2.  Mildly enlarged cardiomediastinal silhouette when compared with the previous chest x-ray.      DX-CHEST-PORTABLE (1 VIEW)   Final Result      Stable mild pulmonary edema.   New left basilar atelectasis.      DX-CHEST-PORTABLE (1 VIEW)   Final Result      Stable chest appearance compared with 11/16.      DX-CHEST-PORTABLE (1 VIEW)   Final Result      No acute cardiopulmonary abnormality identified.      DX-CHEST-PORTABLE (1 VIEW)   Final Result      No acute cardiopulmonary abnormality. No interval change.      DX-CHEST-PORTABLE (1 VIEW)   Final Result      No acute cardiopulmonary disease.      CT-HEAD W/O   Final Result      1.  No evidence of acute intracranial process.      2.  There is again seen interstitial pulmonary edema and bibasilar  atelectasis.      CT-HIP W/O PLUS RECONS LEFT   Final Result      1.  No evidence of acute fracture or malalignment. No evidence of hardware failure or complication.   2.  Postsurgical changes of the left femur with broken screw fragment redemonstrated.   3.  Demineralization.   4.  Scattered colonic diverticula.   5.  Atherosclerosis.   6.  Small fat-containing umbilical hernia.      EC-ECHOCARDIOGRAM COMPLETE W/O CONT   Final Result      DX-HIP-COMPLETE - UNILATERAL 2+ LEFT   Final Result      1.  No definite acute osseous abnormality. In setting of demineralization, an occult fracture is difficult to exclude. If there is strong clinical suspicion, CT or MRI could be obtained for further evaluation.   2.  Postsurgical changes of the left femur with broken screw fragment redemonstrated.      DX-CHEST-PORTABLE (1 VIEW)   Final Result      No acute cardiopulmonary process is seen.      Atherosclerotic plaque.           Assessment/Plan  * ST elevation myocardial infarction involving right coronary artery (HCC)- (present on admission)   Assessment & Plan    Post anti plt, heparin, statin therapy - transitioned to comfort care- no chest pain or dyspnea.   Provide IV morphine, supportive treatment if chest pain symptoms.        Abdominal pain   Assessment & Plan    Transient / intermittent.  No new symptoms, abdominal exam benign  Monitor intake and for signs of constipation.   Provide analgesics.    Bowel protocols.         Hypotension   Assessment & Plan     Asymptomatic   Encourage intake   Hold lasix for sbp < 100        Pain of left hip joint- (present on admission)   Assessment & Plan    Also intermittent left Knee - probably OA - exam no signs of acute infection.   Try to avoid opiates due to risk for delirium and NSAIDS w Ckd.   Continue prn Tylenol.        Chronic kidney disease (CKD), stage III (moderate) (HCC)- (present on admission)   Assessment & Plan    Was stable  Encourage intake.         Fall- (present  on admission)   Assessment & Plan    With debility - improved function.   Try to encourage mobility, assist with transfers and use of walker.        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        Leukocytosis- (present on admission)   Assessment & Plan    Resolved        Essential hypertension- (present on admission)   Assessment & Plan    BP stable to mildly low.  Hold lasix for sbp < 100 . Comfort care          Advanced directives, counseling/discussion- (present on admission)   Assessment & Plan    Comfort care             VTE prophylaxis: N/A, on comfort care

## 2018-12-14 ENCOUNTER — PATIENT OUTREACH (OUTPATIENT)
Dept: HEALTH INFORMATION MANAGEMENT | Facility: OTHER | Age: 83
End: 2018-12-14

## 2018-12-14 PROCEDURE — A9270 NON-COVERED ITEM OR SERVICE: HCPCS | Performed by: FAMILY MEDICINE

## 2018-12-14 PROCEDURE — 700102 HCHG RX REV CODE 250 W/ 637 OVERRIDE(OP): Performed by: FAMILY MEDICINE

## 2018-12-14 PROCEDURE — 99232 SBSQ HOSP IP/OBS MODERATE 35: CPT | Performed by: HOSPITALIST

## 2018-12-14 PROCEDURE — 770001 HCHG ROOM/CARE - MED/SURG/GYN PRIV*

## 2018-12-14 ASSESSMENT — ENCOUNTER SYMPTOMS
MYALGIAS: 0
PHOTOPHOBIA: 0
EYE DISCHARGE: 0
DIARRHEA: 0
LOSS OF CONSCIOUSNESS: 0
ABDOMINAL PAIN: 0
SPUTUM PRODUCTION: 0
NERVOUS/ANXIOUS: 0
SPEECH CHANGE: 0
STRIDOR: 0
FOCAL WEAKNESS: 0
PALPITATIONS: 0
SHORTNESS OF BREATH: 0
SEIZURES: 0
DOUBLE VISION: 0
FEVER: 0
FLANK PAIN: 0
HALLUCINATIONS: 0
POLYDIPSIA: 0
FALLS: 0
COUGH: 0
VOMITING: 0
CHILLS: 0
HEMOPTYSIS: 0
BRUISES/BLEEDS EASILY: 0
SORE THROAT: 0
BLOOD IN STOOL: 0
BLURRED VISION: 0
EYE PAIN: 0

## 2018-12-14 ASSESSMENT — PAIN SCALES - GENERAL
PAINLEVEL_OUTOF10: 0
PAINLEVEL_OUTOF10: 0

## 2018-12-14 NOTE — PROGRESS NOTES
Per chart review the patient remains at [facility].  Plan: will monitor for discharge from the hospital.

## 2018-12-14 NOTE — PROGRESS NOTES
Pt A&0x4, pleasant demeanor. Moderate appetite. Appropriate use of call light. 1L 02.  Bed low and locked,  socks on, call light in reach, hourly rounding performed.  Pt comfort care.

## 2018-12-14 NOTE — PROGRESS NOTES
Hospital Medicine Daily Progress Note    Date of Service  12/14/2018    Chief Complaint  Ground-level fall and chest pain    Hospital Course      89 y.o. female hx of hypertension admitted 11/13/2018 post GLF.  Dx acute STEMI. Initially admitted to ICU - treated w heparin , anti plts and statin. Patient and family did not want any aggressive interventions or procedures done. Plan of care was later changed to comfort care        Interval Problem Update  Patient feeling well.  Fully oriented, and eating well.  Denies pain  CM investigating for possible sources of group home funding    Consultants/Specialty  Cardiology  Intensive care    Code Status  Hospice, comfort care CM investigating for possible sources of group home funding    Disposition  Pending placement    Review of Systems  Review of Systems   Constitutional: Negative for chills and fever.   HENT: Negative for congestion, ear discharge, ear pain, sore throat and tinnitus.    Eyes: Negative for blurred vision, double vision, photophobia, pain and discharge.   Respiratory: Negative for cough, hemoptysis, sputum production, shortness of breath and stridor.    Cardiovascular: Negative for chest pain, palpitations and leg swelling.   Gastrointestinal: Negative for abdominal pain, blood in stool, diarrhea and vomiting.   Genitourinary: Negative for flank pain, hematuria and urgency.   Musculoskeletal: Negative for falls and myalgias.   Skin: Negative.    Neurological: Negative for speech change, focal weakness, seizures and loss of consciousness.   Endo/Heme/Allergies: Negative for polydipsia. Does not bruise/bleed easily.   Psychiatric/Behavioral: Negative for hallucinations and suicidal ideas. The patient is not nervous/anxious.         Physical Exam  Temp:  [36.9 °C (98.4 °F)-36.9 °C (98.5 °F)] 36.9 °C (98.4 °F)  Pulse:  [85-87] 87  Resp:  [16-20] 16  BP: ()/(49-63) 115/61    Physical Exam   Constitutional: She is oriented to person, place, and time. No  distress.   Cachectic    HENT:   Head: Normocephalic and atraumatic.   Right Ear: External ear normal.   Left Ear: External ear normal.   Eyes: Pupils are equal, round, and reactive to light. Conjunctivae are normal. Right eye exhibits no discharge. Left eye exhibits no discharge.   Neck: Normal range of motion. Neck supple. No JVD present. No tracheal deviation present. No thyromegaly present.   Cardiovascular: Exam reveals no gallop and no friction rub.    No murmur heard.  Pulmonary/Chest: Effort normal and breath sounds normal. No stridor. No respiratory distress. She has no wheezes. She has no rales. She exhibits no tenderness.   Abdominal: Soft. Bowel sounds are normal. She exhibits no distension. There is no tenderness. There is no rebound and no guarding.   Musculoskeletal: Normal range of motion. She exhibits no edema, tenderness or deformity.   Neurological: She is alert and oriented to person, place, and time. No cranial nerve deficit. Coordination normal.   Skin: Skin is warm and dry. No rash noted. She is not diaphoretic. No erythema. No pallor.   Psychiatric: She has a normal mood and affect. Judgment normal.   Nursing note reviewed.      Fluids    Intake/Output Summary (Last 24 hours) at 12/14/18 1753  Last data filed at 12/14/18 0900   Gross per 24 hour   Intake              520 ml   Output                0 ml   Net              520 ml       Laboratory                        Imaging  DX-CHEST-PORTABLE (1 VIEW)   Final Result         1.  Hazy interstitial left pulmonary opacities suggests interstitial edema or infiltrate, similar to prior.   2.  Trace left pleural effusion   3.  Hyperexpansion of lungs favors changes of COPD.      DX-CHEST-PORTABLE (1 VIEW)   Final Result         1.  Interstitial infiltrates or edema, stable.   2.  Atherosclerosis      DX-CHEST-PORTABLE (1 VIEW)   Final Result         1.  Interstitial infiltrates or edema.   2.  Atherosclerosis      DX-CHEST-PORTABLE (1 VIEW)   Final  Result      Moderate interstitial edema or interstitial lung disease and small left pleural effusion similar to previous.      DX-CHEST-PORTABLE (1 VIEW)   Final Result      Stable interstitial edema and/or interstitial lung disease with probable small pleural fluid      DX-CHEST-PORTABLE (1 VIEW)   Final Result      Ill-defined infrahilar interstitial opacities, atelectasis versus mild edema. No significant pleural effusions.      DX-CHEST-PORTABLE (1 VIEW)   Final Result      Mild left basilar atelectasis, improved since prior. No new consolidation or large pleural effusions.      DX-CHEST-PORTABLE (1 VIEW)   Final Result      1.  Left basilar opacification may be secondary to atelectasis. Developing pneumonia cannot be excluded.         DX-CHEST-PORTABLE (1 VIEW)   Final Result      1.  Unchanged mild interstitial pulmonary edema   2.  Unchanged bibasilar atelectasis, alveolar edema or pneumonia      DX-CHEST-PORTABLE (1 VIEW)   Final Result      1.  Decreased pulmonary edema   2.  Unchanged bibasilar atelectasis, alveolar edema or pneumonia      DX-CHEST-PORTABLE (1 VIEW)   Final Result      1.  There is diffuse interstitial and alveolar density in the right mid and lower lung zone. This is increased since the previous study. This may represent mild pulmonary edema/consolidation.   2.  Mildly enlarged cardiomediastinal silhouette when compared with the previous chest x-ray.      DX-CHEST-PORTABLE (1 VIEW)   Final Result      Stable mild pulmonary edema.   New left basilar atelectasis.      DX-CHEST-PORTABLE (1 VIEW)   Final Result      Stable chest appearance compared with 11/16.      DX-CHEST-PORTABLE (1 VIEW)   Final Result      No acute cardiopulmonary abnormality identified.      DX-CHEST-PORTABLE (1 VIEW)   Final Result      No acute cardiopulmonary abnormality. No interval change.      DX-CHEST-PORTABLE (1 VIEW)   Final Result      No acute cardiopulmonary disease.      CT-HEAD W/O   Final Result      1.   No evidence of acute intracranial process.      2.  There is again seen interstitial pulmonary edema and bibasilar atelectasis.      CT-HIP W/O PLUS RECONS LEFT   Final Result      1.  No evidence of acute fracture or malalignment. No evidence of hardware failure or complication.   2.  Postsurgical changes of the left femur with broken screw fragment redemonstrated.   3.  Demineralization.   4.  Scattered colonic diverticula.   5.  Atherosclerosis.   6.  Small fat-containing umbilical hernia.      EC-ECHOCARDIOGRAM COMPLETE W/O CONT   Final Result      DX-HIP-COMPLETE - UNILATERAL 2+ LEFT   Final Result      1.  No definite acute osseous abnormality. In setting of demineralization, an occult fracture is difficult to exclude. If there is strong clinical suspicion, CT or MRI could be obtained for further evaluation.   2.  Postsurgical changes of the left femur with broken screw fragment redemonstrated.      DX-CHEST-PORTABLE (1 VIEW)   Final Result      No acute cardiopulmonary process is seen.      Atherosclerotic plaque.           Assessment/Plan  * ST elevation myocardial infarction involving right coronary artery (HCC)- (present on admission)   Assessment & Plan    Post anti plt, heparin, statin therapy - transitioned to comfort care- no chest pain or dyspnea.   Provide IV morphine, supportive treatment if chest pain symptoms.         Abdominal pain   Assessment & Plan    Transient / intermittent.  No new symptoms, abdominal exam benign  Monitor intake and for signs of constipation.   Provide analgesics.    Bowel protocols.         Hypotension   Assessment & Plan     Asymptomatic   Encourage intake   Hold lasix for sbp < 100         Pain of left hip joint- (present on admission)   Assessment & Plan    Also intermittent left Knee - probably OA - exam no signs of acute infection.   Try to avoid opiates due to risk for delirium and NSAIDS w Ckd.   Continue prn Tylenol.         Chronic kidney disease (CKD), stage III  (moderate) (HCC)- (present on admission)   Assessment & Plan    Was stable  Encourage intake.          Fall- (present on admission)   Assessment & Plan    With debility - improved function.   Try to encourage mobility, assist with transfers and use of walker.         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         Leukocytosis- (present on admission)   Assessment & Plan    Resolved         Essential hypertension- (present on admission)   Assessment & Plan    BP stable to mildly low.  Hold lasix for sbp < 100 . Comfort care           Advanced directives, counseling/discussion- (present on admission)   Assessment & Plan    Comfort care             VTE prophylaxis: N/A, on comfort care

## 2018-12-14 NOTE — PROGRESS NOTES
Bedside report with AQUILINO Joseph, pt is alert and oriented on 1L via NC, call bell in reach, eating dinner. Pt has no questions about POC or complaint of pain, will continue to monitor.

## 2018-12-15 LAB — EKG IMPRESSION: NORMAL

## 2018-12-15 PROCEDURE — 94760 N-INVAS EAR/PLS OXIMETRY 1: CPT

## 2018-12-15 PROCEDURE — 770001 HCHG ROOM/CARE - MED/SURG/GYN PRIV*

## 2018-12-15 PROCEDURE — 700101 HCHG RX REV CODE 250: Performed by: INTERNAL MEDICINE

## 2018-12-15 PROCEDURE — 94640 AIRWAY INHALATION TREATMENT: CPT

## 2018-12-15 PROCEDURE — A9270 NON-COVERED ITEM OR SERVICE: HCPCS | Performed by: INTERNAL MEDICINE

## 2018-12-15 PROCEDURE — 99232 SBSQ HOSP IP/OBS MODERATE 35: CPT | Performed by: HOSPITALIST

## 2018-12-15 PROCEDURE — 700102 HCHG RX REV CODE 250 W/ 637 OVERRIDE(OP): Performed by: INTERNAL MEDICINE

## 2018-12-15 RX ORDER — MORPHINE SULFATE 100 MG/5ML
5-10 SOLUTION ORAL
Status: DISCONTINUED | OUTPATIENT
Start: 2018-12-15 | End: 2019-06-06 | Stop reason: HOSPADM

## 2018-12-15 RX ORDER — IPRATROPIUM BROMIDE AND ALBUTEROL SULFATE 2.5; .5 MG/3ML; MG/3ML
3 SOLUTION RESPIRATORY (INHALATION)
Status: DISCONTINUED | OUTPATIENT
Start: 2018-12-15 | End: 2019-06-06 | Stop reason: HOSPADM

## 2018-12-15 RX ORDER — LORAZEPAM 2 MG/ML
0.5 INJECTION INTRAMUSCULAR EVERY 4 HOURS PRN
Status: DISCONTINUED | OUTPATIENT
Start: 2018-12-15 | End: 2018-12-15

## 2018-12-15 RX ORDER — MORPHINE SULFATE 10 MG/ML
2-4 INJECTION, SOLUTION INTRAMUSCULAR; INTRAVENOUS
Status: DISCONTINUED | OUTPATIENT
Start: 2018-12-15 | End: 2018-12-15

## 2018-12-15 RX ORDER — LORAZEPAM 0.5 MG/1
0.5 TABLET ORAL EVERY 4 HOURS PRN
Status: DISCONTINUED | OUTPATIENT
Start: 2018-12-15 | End: 2019-06-06 | Stop reason: HOSPADM

## 2018-12-15 RX ADMIN — LORAZEPAM 0.5 MG: 0.5 TABLET ORAL at 01:43

## 2018-12-15 RX ADMIN — IPRATROPIUM BROMIDE AND ALBUTEROL SULFATE 3 ML: .5; 3 SOLUTION RESPIRATORY (INHALATION) at 01:51

## 2018-12-15 ASSESSMENT — ENCOUNTER SYMPTOMS
HALLUCINATIONS: 0
SORE THROAT: 0
BRUISES/BLEEDS EASILY: 0
NERVOUS/ANXIOUS: 0
SPEECH CHANGE: 0
FALLS: 0
FEVER: 0
EYE DISCHARGE: 0
SHORTNESS OF BREATH: 0
STRIDOR: 0
FOCAL WEAKNESS: 0
BLURRED VISION: 0
FLANK PAIN: 0
PHOTOPHOBIA: 0
CHILLS: 0
ABDOMINAL PAIN: 0
EYE PAIN: 0
COUGH: 0
BLOOD IN STOOL: 0
POLYDIPSIA: 0
PALPITATIONS: 0
VOMITING: 0
MYALGIAS: 0
DOUBLE VISION: 0
HEMOPTYSIS: 0
SEIZURES: 0
LOSS OF CONSCIOUSNESS: 0
DIARRHEA: 0
SPUTUM PRODUCTION: 0

## 2018-12-15 ASSESSMENT — PAIN SCALES - GENERAL
PAINLEVEL_OUTOF10: 0

## 2018-12-15 NOTE — PROGRESS NOTES
Bedside rounding recently completed. Resting in bed with eyes closed; no signs of distress. Wearing 1 LPM O2 via NC. Call bell within reach.

## 2018-12-15 NOTE — PROGRESS NOTES
2 RN skin check d/t low Nirmal score:    Skin body-wide is pale, fragile, dry, and cool. Skin intact, including to heels, coccyx, and behind ears. Skin to tailbone and gluteal cleft red and pink, but blanchable with cap refill of 3 seconds. Skin behind ears pink, but blanchable.    Refuses SCDs. Wearing NC O2 tubing. Waffle mattress overlay in use. Patient able to reposition self, but staff turning/tilting with use of pillows every few hours.

## 2018-12-15 NOTE — PROGRESS NOTES
"Patient sleeping, awakened for q2h turning and repositioning. Patient rolled over on her side independently while RN placed pillow. Speech clear, mentation appropriate. Denies all pain, SOB, and dizziness at this time, states \"I was feeling dizzy and having trouble breathing--but that was earlier. I feel fine now.\" Breathing appears unlabored. Receiving 2 LPM O2 via NC. Scheduled AM lasix held for SBP of 101 and pulse of 58.  "

## 2018-12-15 NOTE — PROGRESS NOTES
[facility] Medicine Daily Progress Note    Date of Service  12/15/2018    Chief Complaint  Ground-level fall and chest pain    Hospital Course      89 y.o. female hx of hypertension admitted 11/13/2018 post GLF.  Dx acute STEMI. Initially admitted to ICU - treated w heparin , anti plts and statin. Patient and family did not want any aggressive interventions or procedures done. Plan of care was later changed to comfort care        Interval Problem Update  Had a bad night, had shortness of breath and chest discomfort. Improved with lorazepam.   Has codeine allergy, trying oral morphine as she does not want to have an IV. Patient OK trying morphine for pain.   Slept well after the lorazepam, doing well this morning denies pain and discomfort   SW working on placement    Consultants/Specialty  Cardiology  Intensive care    Code Status  Hospice, comfort care CM investigating for possible sources of group home funding    Disposition  Pending placement    Review of Systems  Review of Systems   Constitutional: Negative for chills and fever.   HENT: Negative for congestion, ear discharge, ear pain, sore throat and tinnitus.    Eyes: Negative for blurred vision, double vision, photophobia, pain and discharge.   Respiratory: Negative for cough, hemoptysis, sputum production, shortness of breath and stridor.    Cardiovascular: Negative for chest pain, palpitations and leg swelling.   Gastrointestinal: Negative for abdominal pain, blood in stool, diarrhea and vomiting.   Genitourinary: Negative for flank pain, hematuria and urgency.   Musculoskeletal: Negative for falls and myalgias.   Skin: Negative.    Neurological: Negative for speech change, focal weakness, seizures and loss of consciousness.   Endo/Heme/Allergies: Negative for polydipsia. Does not bruise/bleed easily.   Psychiatric/Behavioral: Negative for hallucinations and suicidal ideas. The patient is not nervous/anxious.         Physical Exam  Temp:  [36.7 °C (98  °F)-36.8 °C (98.3 °F)] 36.7 °C (98 °F)  Pulse:  [38-84] 84  Resp:  [18-22] 22  BP: (101-116)/(52-64) 116/56    Physical Exam   Constitutional: She is oriented to person, place, and time. No distress.   Cachectic    HENT:   Head: Normocephalic and atraumatic.   Right Ear: External ear normal.   Left Ear: External ear normal.   Eyes: Pupils are equal, round, and reactive to light. Conjunctivae are normal. Right eye exhibits no discharge. Left eye exhibits no discharge.   Neck: Normal range of motion. Neck supple. No JVD present. No tracheal deviation present. No thyromegaly present.   Cardiovascular: Exam reveals no gallop and no friction rub.    No murmur heard.  Pulmonary/Chest: Effort normal and breath sounds normal. No stridor. No respiratory distress. She has no wheezes. She has no rales. She exhibits no tenderness.   Abdominal: Soft. Bowel sounds are normal. She exhibits no distension. There is no tenderness. There is no rebound and no guarding.   Musculoskeletal: Normal range of motion. She exhibits no edema, tenderness or deformity.   Neurological: She is alert and oriented to person, place, and time. No cranial nerve deficit. Coordination normal.   Skin: Skin is warm and dry. No rash noted. She is not diaphoretic. No erythema. No pallor.   Psychiatric: She has a normal mood and affect. Judgment normal.   Nursing note reviewed.      Fluids    Intake/Output Summary (Last 24 hours) at 12/15/18 1543  Last data filed at 12/15/18 1300   Gross per 24 hour   Intake              720 ml   Output                0 ml   Net              720 ml       Laboratory                        Imaging  DX-CHEST-PORTABLE (1 VIEW)   Final Result         1.  Hazy interstitial left pulmonary opacities suggests interstitial edema or infiltrate, similar to prior.   2.  Trace left pleural effusion   3.  Hyperexpansion of lungs favors changes of COPD.      DX-CHEST-PORTABLE (1 VIEW)   Final Result         1.  Interstitial infiltrates or  edema, stable.   2.  Atherosclerosis      DX-CHEST-PORTABLE (1 VIEW)   Final Result         1.  Interstitial infiltrates or edema.   2.  Atherosclerosis      DX-CHEST-PORTABLE (1 VIEW)   Final Result      Moderate interstitial edema or interstitial lung disease and small left pleural effusion similar to previous.      DX-CHEST-PORTABLE (1 VIEW)   Final Result      Stable interstitial edema and/or interstitial lung disease with probable small pleural fluid      DX-CHEST-PORTABLE (1 VIEW)   Final Result      Ill-defined infrahilar interstitial opacities, atelectasis versus mild edema. No significant pleural effusions.      DX-CHEST-PORTABLE (1 VIEW)   Final Result      Mild left basilar atelectasis, improved since prior. No new consolidation or large pleural effusions.      DX-CHEST-PORTABLE (1 VIEW)   Final Result      1.  Left basilar opacification may be secondary to atelectasis. Developing pneumonia cannot be excluded.         DX-CHEST-PORTABLE (1 VIEW)   Final Result      1.  Unchanged mild interstitial pulmonary edema   2.  Unchanged bibasilar atelectasis, alveolar edema or pneumonia      DX-CHEST-PORTABLE (1 VIEW)   Final Result      1.  Decreased pulmonary edema   2.  Unchanged bibasilar atelectasis, alveolar edema or pneumonia      DX-CHEST-PORTABLE (1 VIEW)   Final Result      1.  There is diffuse interstitial and alveolar density in the right mid and lower lung zone. This is increased since the previous study. This may represent mild pulmonary edema/consolidation.   2.  Mildly enlarged cardiomediastinal silhouette when compared with the previous chest x-ray.      DX-CHEST-PORTABLE (1 VIEW)   Final Result      Stable mild pulmonary edema.   New left basilar atelectasis.      DX-CHEST-PORTABLE (1 VIEW)   Final Result      Stable chest appearance compared with 11/16.      DX-CHEST-PORTABLE (1 VIEW)   Final Result      No acute cardiopulmonary abnormality identified.      DX-CHEST-PORTABLE (1 VIEW)   Final  Result      No acute cardiopulmonary abnormality. No interval change.      DX-CHEST-PORTABLE (1 VIEW)   Final Result      No acute cardiopulmonary disease.      CT-HEAD W/O   Final Result      1.  No evidence of acute intracranial process.      2.  There is again seen interstitial pulmonary edema and bibasilar atelectasis.      CT-HIP W/O PLUS RECONS LEFT   Final Result      1.  No evidence of acute fracture or malalignment. No evidence of hardware failure or complication.   2.  Postsurgical changes of the left femur with broken screw fragment redemonstrated.   3.  Demineralization.   4.  Scattered colonic diverticula.   5.  Atherosclerosis.   6.  Small fat-containing umbilical hernia.      EC-ECHOCARDIOGRAM COMPLETE W/O CONT   Final Result      DX-HIP-COMPLETE - UNILATERAL 2+ LEFT   Final Result      1.  No definite acute osseous abnormality. In setting of demineralization, an occult fracture is difficult to exclude. If there is strong clinical suspicion, CT or MRI could be obtained for further evaluation.   2.  Postsurgical changes of the left femur with broken screw fragment redemonstrated.      DX-CHEST-PORTABLE (1 VIEW)   Final Result      No acute cardiopulmonary process is seen.      Atherosclerotic plaque.           Assessment/Plan  * ST elevation myocardial infarction involving right coronary artery (HCC)- (present on admission)   Assessment & Plan    Post anti plt, heparin, statin therapy - transitioned to comfort care- no chest pain or dyspnea.   Provide IV morphine, supportive treatment if chest pain symptoms.         Abdominal pain   Assessment & Plan    Transient / intermittent.  No new symptoms, abdominal exam benign  Monitor intake and for signs of constipation.   Provide analgesics.     Bowel protocols.         Hypotension   Assessment & Plan     Asymptomatic   Encourage intake   Hold lasix for sbp < 100          Pain of left hip joint- (present on admission)   Assessment & Plan    Also intermittent  left Knee - probably OA - exam no signs of acute infection.   Try to avoid opiates due to risk for delirium and NSAIDS w Ckd.   Continue prn Tylenol.         Chronic kidney disease (CKD), stage III (moderate) (HCC)- (present on admission)   Assessment & Plan    Was stable  Encourage intake.           Fall- (present on admission)   Assessment & Plan    With debility - improved function.   Try to encourage mobility, assist with transfers and use of walker.          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          Leukocytosis- (present on admission)   Assessment & Plan    Resolved          Essential hypertension- (present on admission)   Assessment & Plan    BP stable to mildly low.  Hold lasix for sbp < 100 . Comfort care            Advanced directives, counseling/discussion- (present on admission)   Assessment & Plan    Comfort care      Oral morphine for pain, air hunger. Lorazepam for anxiety           VTE prophylaxis: N/A, on comfort care

## 2018-12-15 NOTE — PROGRESS NOTES
"Sleeping, awakened for administration of PRN ativan pill. Denies all pain. States her SOB \"is better.\" Breathing appears unlabored. Patient very drowsy, falling back asleep quickly. Speech is a little slurred, but patient following simple commands and answering questions appropriately when awakened. RT at bedside, about to administer PRN neb tx.  "

## 2018-12-15 NOTE — PROGRESS NOTES
Dr. Gaston returned this RN's phone call; MDARBY. notified of pt.'s report of feeling SOB and dizzy at rest, and of her fluctuating heart rate. M.D. states he will order ativan and neb treatments.

## 2018-12-15 NOTE — PROGRESS NOTES
RN spoke with Dr. Gaston on the phone again since pt. has no IV access and Dr. Edgar gave an order about three weeks ago that no IV access was needed. RN asked M.D. if IV should be placed. M.D. states no IV needs placed; M.D. gave phone order to change IV ativan to 0.5 mg PO PRN ativan at the same interval as the IV is currently ordered, to order 5 mg PRN oxy, and to d/c the morphine order.

## 2018-12-15 NOTE — PROGRESS NOTES
Called RN into room to report that she feels like she can't catch her breath. Breathing appears unlabored on 1 LPM O2 via NC; pulse ox readin-94% on 1 liter. Pulse ranging from 38-85 bpm at rest. Patient states she feels dizzy. She has not recently gotten OOB. O2 turned up to 3 LPM. Denies all pain. RN paged Dr. RODRIGUEZ Gaston; awaiting return phone call.

## 2018-12-16 PROCEDURE — 99231 SBSQ HOSP IP/OBS SF/LOW 25: CPT | Performed by: HOSPITALIST

## 2018-12-16 PROCEDURE — 770001 HCHG ROOM/CARE - MED/SURG/GYN PRIV*

## 2018-12-16 ASSESSMENT — ENCOUNTER SYMPTOMS
SEIZURES: 0
CHILLS: 0
DIARRHEA: 0
FOCAL WEAKNESS: 0
FALLS: 0
BLURRED VISION: 0
PALPITATIONS: 0
SPUTUM PRODUCTION: 0
POLYDIPSIA: 0
HEMOPTYSIS: 0
COUGH: 0
HALLUCINATIONS: 0
MYALGIAS: 0
BRUISES/BLEEDS EASILY: 0
NERVOUS/ANXIOUS: 0
SHORTNESS OF BREATH: 0
SORE THROAT: 0
LOSS OF CONSCIOUSNESS: 0
VOMITING: 0
BLOOD IN STOOL: 0
EYE DISCHARGE: 0
SPEECH CHANGE: 0
FLANK PAIN: 0
ABDOMINAL PAIN: 0
STRIDOR: 0
EYE PAIN: 0
FEVER: 0
PHOTOPHOBIA: 0
DOUBLE VISION: 0

## 2018-12-16 ASSESSMENT — PAIN SCALES - GENERAL
PAINLEVEL_OUTOF10: 0

## 2018-12-16 NOTE — PROGRESS NOTES
Pt had no c/o SOB or chest pain this shift.  Resting comfortably. A&0x 3-4 (sometimes forgets date)  Standby assist to bathroom with walker.  Call light within reach, bed low and locked, hourly rounding performed.

## 2018-12-16 NOTE — PROGRESS NOTES
Bedside report received, pt care assumed. Pt is resting in bed, no distress noted. Pt denies any needs at this time. Pt AOx3, safety precautions in place, call bell in reach. Instructed pt to call for assistance if needed.

## 2018-12-16 NOTE — PROGRESS NOTES
Assumed care of patient from night shift RN  89 year old female  Comfort care  Admitted 11/13 s/p ground level fall  A&O x4 , disoriented to time occasionally  Pain: 0/10 patient resting at this time, no needs  2 L nc  Cardiac WNL  Last BM 12/15  Dysphagia 3 diet, thin liquids  Voiding appropriately  PIV: none, MD aware  Skin: fragile, intact, sacrum red but blanching  Skin breakdown prevention measures: waffle overlay, pillows for support and positioning, pressure redistribution mattress, moisturizer and turning in bed in place  Standby assist with walker  Low fall risk per tate sterling bed alarm in use  Plan: group home with hospice  All questions answered and all needs met at this time. Bed in low, locked position. Bed alarm on. Call light within reach and patient verbalized understanding of proper use. RN will implement hourly rounding and answer call lights appropriately.

## 2018-12-16 NOTE — PROGRESS NOTES
Hospital Medicine Daily Progress Note    Date of Service  12/16/2018    Chief Complaint  Ground-level fall and chest pain    Hospital Course      89 y.o. female hx of hypertension admitted 11/13/2018 post GLF.  Dx acute STEMI. Initially admitted to ICU - treated w heparin , anti plts and statin. Patient and family did not want any aggressive interventions or procedures done. Plan of care was later changed to comfort care        Interval Problem Update  Pain well controlled  Denies any needs  No changes continue with comfort care  SW working on placement    Consultants/Specialty  Cardiology  Intensive care    Code Status  Hospice, comfort care CM investigating for possible sources of group home funding    Disposition  Pending placement    Review of Systems  Review of Systems   Constitutional: Negative for chills and fever.   HENT: Negative for congestion, ear discharge, ear pain, sore throat and tinnitus.    Eyes: Negative for blurred vision, double vision, photophobia, pain and discharge.   Respiratory: Negative for cough, hemoptysis, sputum production, shortness of breath and stridor.    Cardiovascular: Negative for chest pain, palpitations and leg swelling.   Gastrointestinal: Negative for abdominal pain, blood in stool, diarrhea and vomiting.   Genitourinary: Negative for flank pain, hematuria and urgency.   Musculoskeletal: Negative for falls and myalgias.   Skin: Negative.    Neurological: Negative for speech change, focal weakness, seizures and loss of consciousness.   Endo/Heme/Allergies: Negative for polydipsia. Does not bruise/bleed easily.   Psychiatric/Behavioral: Negative for hallucinations and suicidal ideas. The patient is not nervous/anxious.         Physical Exam  Temp:  [36.8 °C (98.3 °F)] 36.8 °C (98.3 °F)  Pulse:  [89] 89  Resp:  [16] 16  BP: ()/(65) 98/65    Physical Exam   Constitutional: She is oriented to person, place, and time. No distress.   Cachectic    HENT:   Head: Normocephalic  and atraumatic.   Right Ear: External ear normal.   Left Ear: External ear normal.   Eyes: Pupils are equal, round, and reactive to light. Conjunctivae are normal. Right eye exhibits no discharge. Left eye exhibits no discharge.   Neck: Normal range of motion. Neck supple. No JVD present. No tracheal deviation present. No thyromegaly present.   Cardiovascular: Exam reveals no gallop and no friction rub.    No murmur heard.  Pulmonary/Chest: Effort normal and breath sounds normal. No stridor. No respiratory distress. She has no wheezes. She has no rales. She exhibits no tenderness.   Abdominal: Soft. Bowel sounds are normal. She exhibits no distension. There is no tenderness. There is no rebound and no guarding.   Musculoskeletal: Normal range of motion. She exhibits no edema, tenderness or deformity.   Neurological: She is alert and oriented to person, place, and time. No cranial nerve deficit. Coordination normal.   Skin: Skin is warm and dry. No rash noted. She is not diaphoretic. No erythema. No pallor.   Psychiatric: She has a normal mood and affect. Judgment normal.   Nursing note reviewed.      Fluids    Intake/Output Summary (Last 24 hours) at 12/16/18 1400  Last data filed at 12/15/18 1822   Gross per 24 hour   Intake              360 ml   Output                0 ml   Net              360 ml       Laboratory                        Imaging  DX-CHEST-PORTABLE (1 VIEW)   Final Result         1.  Hazy interstitial left pulmonary opacities suggests interstitial edema or infiltrate, similar to prior.   2.  Trace left pleural effusion   3.  Hyperexpansion of lungs favors changes of COPD.      DX-CHEST-PORTABLE (1 VIEW)   Final Result         1.  Interstitial infiltrates or edema, stable.   2.  Atherosclerosis      DX-CHEST-PORTABLE (1 VIEW)   Final Result         1.  Interstitial infiltrates or edema.   2.  Atherosclerosis      DX-CHEST-PORTABLE (1 VIEW)   Final Result      Moderate interstitial edema or  interstitial lung disease and small left pleural effusion similar to previous.      DX-CHEST-PORTABLE (1 VIEW)   Final Result      Stable interstitial edema and/or interstitial lung disease with probable small pleural fluid      DX-CHEST-PORTABLE (1 VIEW)   Final Result      Ill-defined infrahilar interstitial opacities, atelectasis versus mild edema. No significant pleural effusions.      DX-CHEST-PORTABLE (1 VIEW)   Final Result      Mild left basilar atelectasis, improved since prior. No new consolidation or large pleural effusions.      DX-CHEST-PORTABLE (1 VIEW)   Final Result      1.  Left basilar opacification may be secondary to atelectasis. Developing pneumonia cannot be excluded.         DX-CHEST-PORTABLE (1 VIEW)   Final Result      1.  Unchanged mild interstitial pulmonary edema   2.  Unchanged bibasilar atelectasis, alveolar edema or pneumonia      DX-CHEST-PORTABLE (1 VIEW)   Final Result      1.  Decreased pulmonary edema   2.  Unchanged bibasilar atelectasis, alveolar edema or pneumonia      DX-CHEST-PORTABLE (1 VIEW)   Final Result      1.  There is diffuse interstitial and alveolar density in the right mid and lower lung zone. This is increased since the previous study. This may represent mild pulmonary edema/consolidation.   2.  Mildly enlarged cardiomediastinal silhouette when compared with the previous chest x-ray.      DX-CHEST-PORTABLE (1 VIEW)   Final Result      Stable mild pulmonary edema.   New left basilar atelectasis.      DX-CHEST-PORTABLE (1 VIEW)   Final Result      Stable chest appearance compared with 11/16.      DX-CHEST-PORTABLE (1 VIEW)   Final Result      No acute cardiopulmonary abnormality identified.      DX-CHEST-PORTABLE (1 VIEW)   Final Result      No acute cardiopulmonary abnormality. No interval change.      DX-CHEST-PORTABLE (1 VIEW)   Final Result      No acute cardiopulmonary disease.      CT-HEAD W/O   Final Result      1.  No evidence of acute intracranial process.       2.  There is again seen interstitial pulmonary edema and bibasilar atelectasis.      CT-HIP W/O PLUS RECONS LEFT   Final Result      1.  No evidence of acute fracture or malalignment. No evidence of hardware failure or complication.   2.  Postsurgical changes of the left femur with broken screw fragment redemonstrated.   3.  Demineralization.   4.  Scattered colonic diverticula.   5.  Atherosclerosis.   6.  Small fat-containing umbilical hernia.      EC-ECHOCARDIOGRAM COMPLETE W/O CONT   Final Result      DX-HIP-COMPLETE - UNILATERAL 2+ LEFT   Final Result      1.  No definite acute osseous abnormality. In setting of demineralization, an occult fracture is difficult to exclude. If there is strong clinical suspicion, CT or MRI could be obtained for further evaluation.   2.  Postsurgical changes of the left femur with broken screw fragment redemonstrated.      DX-CHEST-PORTABLE (1 VIEW)   Final Result      No acute cardiopulmonary process is seen.      Atherosclerotic plaque.           Assessment/Plan  * ST elevation myocardial infarction involving right coronary artery (HCC)- (present on admission)   Assessment & Plan    Post anti plt, heparin, statin therapy - transitioned to comfort care- no chest pain or dyspnea.   Provide IV morphine, supportive treatment if chest pain symptoms.          Abdominal pain   Assessment & Plan    Transient / intermittent.  No new symptoms, abdominal exam benign  Monitor intake and for signs of constipation.   Provide analgesics.     Bowel protocols.          Hypotension   Assessment & Plan     Asymptomatic   Encourage intake   Hold lasix for sbp < 100           Pain of left hip joint- (present on admission)   Assessment & Plan    Also intermittent left Knee - probably OA - exam no signs of acute infection.   Pain control      Chronic kidney disease (CKD), stage III (moderate) (HCC)- (present on admission)   Assessment & Plan    Was stable  Encourage intake.            Fall-  (present on admission)   Assessment & Plan    With debility - improved function.   Try to encourage mobility, assist with transfers and use of walker.           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           Leukocytosis- (present on admission)   Assessment & Plan    Resolved           Essential hypertension- (present on admission)   Assessment & Plan    BP stable to mildly low.  Hold lasix for sbp < 100 . Comfort care             Advanced directives, counseling/discussion- (present on admission)   Assessment & Plan    Comfort care      Oral morphine for pain, air hunger. Lorazepam for anxiety            VTE prophylaxis: N/A, on comfort care

## 2018-12-16 NOTE — CARE PLAN
Problem: Safety  Goal: Will remain free from injury  Outcome: PROGRESSING AS EXPECTED  Provided assistance with mobility. Fall prevention measures in place. rounds ongoing.    Problem: Skin Integrity  Goal: Risk for impaired skin integrity will decrease  Outcome: PROGRESSING AS EXPECTED  Assessed for signs of skin breakdown. Encouraged frequent turns and repositioning to prevent development of pressure ulcers.

## 2018-12-17 PROCEDURE — 99231 SBSQ HOSP IP/OBS SF/LOW 25: CPT | Performed by: HOSPITALIST

## 2018-12-17 PROCEDURE — 770001 HCHG ROOM/CARE - MED/SURG/GYN PRIV*

## 2018-12-17 ASSESSMENT — PAIN SCALES - GENERAL
PAINLEVEL_OUTOF10: 0
PAINLEVEL_OUTOF10: 0

## 2018-12-17 ASSESSMENT — ENCOUNTER SYMPTOMS
NERVOUS/ANXIOUS: 0
SORE THROAT: 0
POLYDIPSIA: 0
MYALGIAS: 0
COUGH: 0
ABDOMINAL PAIN: 0
EYE PAIN: 0
VOMITING: 0
SEIZURES: 0
FEVER: 0
FOCAL WEAKNESS: 0
FLANK PAIN: 0
SHORTNESS OF BREATH: 0
FALLS: 0
DOUBLE VISION: 0
DIARRHEA: 0
SPEECH CHANGE: 0
HALLUCINATIONS: 0
CHILLS: 0
STRIDOR: 0
SPUTUM PRODUCTION: 0
BLOOD IN STOOL: 0
BRUISES/BLEEDS EASILY: 0
BLURRED VISION: 0
HEMOPTYSIS: 0
EYE DISCHARGE: 0
PHOTOPHOBIA: 0
PALPITATIONS: 0
LOSS OF CONSCIOUSNESS: 0

## 2018-12-17 NOTE — PROGRESS NOTES
[facility] Medicine Daily Progress Note    Date of Service  12/17/2018    Chief Complaint  Ground-level fall and chest pain    Hospital Course      89 y.o. female hx of hypertension admitted 11/13/2018 post GLF.  Dx acute STEMI. Initially admitted to ICU - treated w heparin , anti plts and statin. Patient and family did not want any aggressive interventions or procedures done. Plan of care was later changed to comfort care        Interval Problem Update  Reports that her chest discomfort resolved with oxygen.  Denies pain, eating and drinking well, no new changes.   Continue with comfort care measures.   SW working on placement, may qualify for [facility]    Consultants/Specialty  Cardiology  Intensive care    Code Status  Hospice, comfort care CM investigating for possible sources of group home funding    Disposition  Pending placement    Review of Systems  Review of Systems   Constitutional: Negative for chills and fever.   HENT: Negative for congestion, ear discharge, ear pain, sore throat and tinnitus.    Eyes: Negative for blurred vision, double vision, photophobia, pain and discharge.   Respiratory: Negative for cough, hemoptysis, sputum production, shortness of breath and stridor.    Cardiovascular: Negative for chest pain, palpitations and leg swelling.   Gastrointestinal: Negative for abdominal pain, blood in stool, diarrhea and vomiting.   Genitourinary: Negative for flank pain, hematuria and urgency.   Musculoskeletal: Negative for falls and myalgias.   Skin: Negative.    Neurological: Negative for speech change, focal weakness, seizures and loss of consciousness.   Endo/Heme/Allergies: Negative for polydipsia. Does not bruise/bleed easily.   Psychiatric/Behavioral: Negative for hallucinations and suicidal ideas. The patient is not nervous/anxious.         Physical Exam  Temp:  [36.5 °C (97.7 °F)-36.7 °C (98 °F)] 36.7 °C (98 °F)  Pulse:  [68-83] 68  Resp:  [18-20] 18  BP: ()/(54-70)  97/54    Physical Exam   Constitutional: She is oriented to person, place, and time. No distress.   Cachectic    HENT:   Head: Normocephalic and atraumatic.   Right Ear: External ear normal.   Left Ear: External ear normal.   Eyes: Pupils are equal, round, and reactive to light. Conjunctivae are normal. Right eye exhibits no discharge. Left eye exhibits no discharge.   Neck: Normal range of motion. Neck supple. No JVD present. No tracheal deviation present. No thyromegaly present.   Cardiovascular: Exam reveals no gallop and no friction rub.    No murmur heard.  Pulmonary/Chest: Effort normal and breath sounds normal. No stridor. No respiratory distress. She has no wheezes. She has no rales. She exhibits no tenderness.   Abdominal: Soft. Bowel sounds are normal. She exhibits no distension. There is no tenderness. There is no rebound and no guarding.   Musculoskeletal: Normal range of motion. She exhibits no edema, tenderness or deformity.   Neurological: She is alert and oriented to person, place, and time. No cranial nerve deficit. Coordination normal.   Skin: Skin is warm and dry. No rash noted. She is not diaphoretic. No erythema. No pallor.   Psychiatric: She has a normal mood and affect. Judgment normal.   Nursing note reviewed.      Fluids    Intake/Output Summary (Last 24 hours) at 12/17/18 1647  Last data filed at 12/17/18 1200   Gross per 24 hour   Intake              480 ml   Output                0 ml   Net              480 ml       Laboratory                        Imaging  DX-CHEST-PORTABLE (1 VIEW)   Final Result         1.  Hazy interstitial left pulmonary opacities suggests interstitial edema or infiltrate, similar to prior.   2.  Trace left pleural effusion   3.  Hyperexpansion of lungs favors changes of COPD.      DX-CHEST-PORTABLE (1 VIEW)   Final Result         1.  Interstitial infiltrates or edema, stable.   2.  Atherosclerosis      DX-CHEST-PORTABLE (1 VIEW)   Final Result         1.   Interstitial infiltrates or edema.   2.  Atherosclerosis      DX-CHEST-PORTABLE (1 VIEW)   Final Result      Moderate interstitial edema or interstitial lung disease and small left pleural effusion similar to previous.      DX-CHEST-PORTABLE (1 VIEW)   Final Result      Stable interstitial edema and/or interstitial lung disease with probable small pleural fluid      DX-CHEST-PORTABLE (1 VIEW)   Final Result      Ill-defined infrahilar interstitial opacities, atelectasis versus mild edema. No significant pleural effusions.      DX-CHEST-PORTABLE (1 VIEW)   Final Result      Mild left basilar atelectasis, improved since prior. No new consolidation or large pleural effusions.      DX-CHEST-PORTABLE (1 VIEW)   Final Result      1.  Left basilar opacification may be secondary to atelectasis. Developing pneumonia cannot be excluded.         DX-CHEST-PORTABLE (1 VIEW)   Final Result      1.  Unchanged mild interstitial pulmonary edema   2.  Unchanged bibasilar atelectasis, alveolar edema or pneumonia      DX-CHEST-PORTABLE (1 VIEW)   Final Result      1.  Decreased pulmonary edema   2.  Unchanged bibasilar atelectasis, alveolar edema or pneumonia      DX-CHEST-PORTABLE (1 VIEW)   Final Result      1.  There is diffuse interstitial and alveolar density in the right mid and lower lung zone. This is increased since the previous study. This may represent mild pulmonary edema/consolidation.   2.  Mildly enlarged cardiomediastinal silhouette when compared with the previous chest x-ray.      DX-CHEST-PORTABLE (1 VIEW)   Final Result      Stable mild pulmonary edema.   New left basilar atelectasis.      DX-CHEST-PORTABLE (1 VIEW)   Final Result      Stable chest appearance compared with 11/16.      DX-CHEST-PORTABLE (1 VIEW)   Final Result      No acute cardiopulmonary abnormality identified.      DX-CHEST-PORTABLE (1 VIEW)   Final Result      No acute cardiopulmonary abnormality. No interval change.      DX-CHEST-PORTABLE (1  VIEW)   Final Result      No acute cardiopulmonary disease.      CT-HEAD W/O   Final Result      1.  No evidence of acute intracranial process.      2.  There is again seen interstitial pulmonary edema and bibasilar atelectasis.      CT-HIP W/O PLUS RECONS LEFT   Final Result      1.  No evidence of acute fracture or malalignment. No evidence of hardware failure or complication.   2.  Postsurgical changes of the left femur with broken screw fragment redemonstrated.   3.  Demineralization.   4.  Scattered colonic diverticula.   5.  Atherosclerosis.   6.  Small fat-containing umbilical hernia.      EC-ECHOCARDIOGRAM COMPLETE W/O CONT   Final Result      DX-HIP-COMPLETE - UNILATERAL 2+ LEFT   Final Result      1.  No definite acute osseous abnormality. In setting of demineralization, an occult fracture is difficult to exclude. If there is strong clinical suspicion, CT or MRI could be obtained for further evaluation.   2.  Postsurgical changes of the left femur with broken screw fragment redemonstrated.      DX-CHEST-PORTABLE (1 VIEW)   Final Result      No acute cardiopulmonary process is seen.      Atherosclerotic plaque.           Assessment/Plan  * ST elevation myocardial infarction involving right coronary artery (HCC)- (present on admission)   Assessment & Plan    Post anti plt, heparin, statin therapy - transitioned to comfort care- no chest pain or dyspnea.   Provide IV morphine, supportive treatment if chest pain symptoms.          Abdominal pain   Assessment & Plan    Transient / intermittent.  No new symptoms, abdominal exam benign  Monitor intake and for signs of constipation.   Provide analgesics.     Bowel protocols.           Hypotension   Assessment & Plan     Asymptomatic   Encourage intake   Hold lasix for sbp < 100            Pain of left hip joint- (present on admission)   Assessment & Plan    Also intermittent left Knee - probably OA - exam no signs of acute infection.   Pain control      Chronic  kidney disease (CKD), stage III (moderate) (HCC)- (present on admission)   Assessment & Plan    Was stable  Encourage intake.             Fall- (present on admission)   Assessment & Plan    With debility - improved function.   Try to encourage mobility, assist with transfers and use of walker.            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            Leukocytosis- (present on admission)   Assessment & Plan    Resolved           Essential hypertension- (present on admission)   Assessment & Plan    BP stable to mildly low.  Hold lasix for sbp < 100 . Comfort care              Advanced directives, counseling/discussion- (present on admission)   Assessment & Plan    Comfort care      Oral morphine for pain, air hunger. Lorazepam for anxiety             VTE prophylaxis: N/A, on comfort care

## 2018-12-17 NOTE — CARE PLAN
Problem: Safety  Goal: Will remain free from injury  Outcome: PROGRESSING AS EXPECTED  Provided assistance with mobility. Fall prevention measures in place. rounds ongoing.    Problem: Respiratory:  Goal: Respiratory status will improve  Outcome: PROGRESSING AS EXPECTED  O2 titrated to maintain saturation above 90%. Encouraged coughing and deep breathing.

## 2018-12-17 NOTE — DISCHARGE PLANNING
GELYW met with Pt who is open to [facility] Assistance. RISHABH left VM for Pretty Taylor [phone] with [facility] services division asking for a call back to see if the Pt would qualify for [facility] assistance.

## 2018-12-18 ENCOUNTER — PATIENT OUTREACH (OUTPATIENT)
Dept: HEALTH INFORMATION MANAGEMENT | Facility: OTHER | Age: 83
End: 2018-12-18

## 2018-12-18 PROBLEM — R04.0 NOSEBLEED: Status: RESOLVED | Noted: 2018-11-16 | Resolved: 2018-12-18

## 2018-12-18 PROBLEM — M62.82 RHABDOMYOLYSIS: Status: RESOLVED | Noted: 2018-11-13 | Resolved: 2018-12-18

## 2018-12-18 PROBLEM — D72.829 LEUKOCYTOSIS: Status: RESOLVED | Noted: 2018-11-13 | Resolved: 2018-12-18

## 2018-12-18 PROCEDURE — 770001 HCHG ROOM/CARE - MED/SURG/GYN PRIV*

## 2018-12-18 PROCEDURE — 99231 SBSQ HOSP IP/OBS SF/LOW 25: CPT | Performed by: INTERNAL MEDICINE

## 2018-12-18 ASSESSMENT — PAIN SCALES - GENERAL
PAINLEVEL_OUTOF10: 0

## 2018-12-18 ASSESSMENT — ENCOUNTER SYMPTOMS
NAUSEA: 0
HEADACHES: 0
COUGH: 0
SHORTNESS OF BREATH: 0
BACK PAIN: 0
VOMITING: 0
ABDOMINAL PAIN: 0
FEVER: 0

## 2018-12-18 NOTE — PROGRESS NOTES
Per chart review the patient remains at [facility]. Currently still working on discharge plan. Based on last discharge notes appears hospital is pending determining if pt would qualify for [facility] assistance for higher level of care placement.     Plan:   · Will monitor for discharge from the hospital and collaborate with care team members as needed for continuity of care.

## 2018-12-18 NOTE — PROGRESS NOTES
[facility] Medicine Daily Progress Note    Date of Service  12/18/2018    Chief Complaint  89 y.o. female admitted 11/13/2018 with fall.    Hospital Course    PMH HTN who presented with fall. She was found with PRUDENCE in inferior leads on EKG. Stat TTE showed mild hypokinesis of inferior wall. Cardiology was consulted, given her goals of care, she was monitored in the [facility] on heparin infusion for NSTEMI. She did well and was treated with asa, plavix, lipitor. She had low BP and was not started on BB or ACEI/ARB. She was diuresed for fluid overload.  She had discussion of care with her family and changed to comfort care and hospice was consulted. In anticipation for discharge, her quantiferon was positive. CXR not show signs of TB and her AFBs were negative. She has delayed discharge due to placement.          Interval Problem Update  Patient has not complaints    Consultants/Specialty  Cardiology  Critical Care    Code Status  DNR/Comfort care    Disposition  [facility] with hospice - difficult placement due to finances    Review of Systems  Review of Systems   Constitutional: Negative for fever.   HENT: Negative for congestion.    Respiratory: Negative for cough and shortness of breath.    Cardiovascular: Negative for chest pain.   Gastrointestinal: Negative for abdominal pain, nausea and vomiting.   Musculoskeletal: Negative for back pain.   Skin: Negative for itching.   Neurological: Negative for headaches.        Physical Exam  Temp:  [36.4 °C (97.6 °F)-36.7 °C (98 °F)] 36.7 °C (98 °F)  Pulse:  [71-81] 71  Resp:  [16-17] 16  BP: (110-146)/(62-69) 110/62    Physical Exam   Constitutional:   Thin, frail   HENT:   Mouth/Throat: Oropharynx is clear and moist.   Eyes: Conjunctivae are normal. Right eye exhibits no discharge. Left eye exhibits no discharge.   Cardiovascular: Normal rate and regular rhythm.    Pulmonary/Chest: Effort normal. She has no wheezes. She has no rales.   Abdominal: Soft. There is no tenderness. There is no  rebound and no guarding.   Musculoskeletal:   Diffuse muscle atrophy   Neurological: She is alert.   Oriented to self and place   Skin: Skin is warm and dry.   Nursing note and vitals reviewed.      Fluids    Intake/Output Summary (Last 24 hours) at 12/18/18 1716  Last data filed at 12/18/18 1200   Gross per 24 hour   Intake              240 ml   Output                0 ml   Net              240 ml       Laboratory                        Imaging  DX-CHEST-PORTABLE (1 VIEW)   Final Result         1.  Hazy interstitial left pulmonary opacities suggests interstitial edema or infiltrate, similar to prior.   2.  Trace left pleural effusion   3.  Hyperexpansion of lungs favors changes of COPD.      DX-CHEST-PORTABLE (1 VIEW)   Final Result         1.  Interstitial infiltrates or edema, stable.   2.  Atherosclerosis      DX-CHEST-PORTABLE (1 VIEW)   Final Result         1.  Interstitial infiltrates or edema.   2.  Atherosclerosis      DX-CHEST-PORTABLE (1 VIEW)   Final Result      Moderate interstitial edema or interstitial lung disease and small left pleural effusion similar to previous.      DX-CHEST-PORTABLE (1 VIEW)   Final Result      Stable interstitial edema and/or interstitial lung disease with probable small pleural fluid      DX-CHEST-PORTABLE (1 VIEW)   Final Result      Ill-defined infrahilar interstitial opacities, atelectasis versus mild edema. No significant pleural effusions.      DX-CHEST-PORTABLE (1 VIEW)   Final Result      Mild left basilar atelectasis, improved since prior. No new consolidation or large pleural effusions.      DX-CHEST-PORTABLE (1 VIEW)   Final Result      1.  Left basilar opacification may be secondary to atelectasis. Developing pneumonia cannot be excluded.         DX-CHEST-PORTABLE (1 VIEW)   Final Result      1.  Unchanged mild interstitial pulmonary edema   2.  Unchanged bibasilar atelectasis, alveolar edema or pneumonia      DX-CHEST-PORTABLE (1 VIEW)   Final Result      1.   Decreased pulmonary edema   2.  Unchanged bibasilar atelectasis, alveolar edema or pneumonia      DX-CHEST-PORTABLE (1 VIEW)   Final Result      1.  There is diffuse interstitial and alveolar density in the right mid and lower lung zone. This is increased since the previous study. This may represent mild pulmonary edema/consolidation.   2.  Mildly enlarged cardiomediastinal silhouette when compared with the previous chest x-ray.      DX-CHEST-PORTABLE (1 VIEW)   Final Result      Stable mild pulmonary edema.   New left basilar atelectasis.      DX-CHEST-PORTABLE (1 VIEW)   Final Result      Stable chest appearance compared with 11/16.      DX-CHEST-PORTABLE (1 VIEW)   Final Result      No acute cardiopulmonary abnormality identified.      DX-CHEST-PORTABLE (1 VIEW)   Final Result      No acute cardiopulmonary abnormality. No interval change.      DX-CHEST-PORTABLE (1 VIEW)   Final Result      No acute cardiopulmonary disease.      CT-HEAD W/O   Final Result      1.  No evidence of acute intracranial process.      2.  There is again seen interstitial pulmonary edema and bibasilar atelectasis.      CT-HIP W/O PLUS RECONS LEFT   Final Result      1.  No evidence of acute fracture or malalignment. No evidence of hardware failure or complication.   2.  Postsurgical changes of the left femur with broken screw fragment redemonstrated.   3.  Demineralization.   4.  Scattered colonic diverticula.   5.  Atherosclerosis.   6.  Small fat-containing umbilical hernia.      EC-ECHOCARDIOGRAM COMPLETE W/O CONT   Final Result      DX-HIP-COMPLETE - UNILATERAL 2+ LEFT   Final Result      1.  No definite acute osseous abnormality. In setting of demineralization, an occult fracture is difficult to exclude. If there is strong clinical suspicion, CT or MRI could be obtained for further evaluation.   2.  Postsurgical changes of the left femur with broken screw fragment redemonstrated.      DX-CHEST-PORTABLE (1 VIEW)   Final Result      No  acute cardiopulmonary process is seen.      Atherosclerotic plaque.           Assessment/Plan  * ST elevation myocardial infarction involving right coronary artery (HCC)- (present on admission)   Assessment & Plan    Post anti plt, heparin, statin therapy - transitioned to comfort care- no chest pain or dyspnea.   Provide IV morphine, supportive treatment if chest pain symptoms.          Abdominal pain   Assessment & Plan    Transient / intermittent.  No new symptoms, abdominal exam benign  Monitor intake and for signs of constipation.   Provide analgesics.     Bowel protocols.           Hypotension   Assessment & Plan     Asymptomatic   Encourage intake   Hold lasix for sbp < 100            Pain of left hip joint- (present on admission)   Assessment & Plan    Also intermittent left Knee - probably OA - exam no signs of acute infection.   Pain control      Chronic kidney disease (CKD), stage III (moderate) (HCC)- (present on admission)   Assessment & Plan    Was stable  Encourage intake.             Fall- (present on admission)   Assessment & Plan    With debility - improved function.   Try to encourage mobility, assist with transfers and use of walker.            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            Essential hypertension- (present on admission)   Assessment & Plan    BP stable to mildly low.  Hold lasix for sbp < 100 . Comfort care              Advanced directives, counseling/discussion- (present on admission)   Assessment & Plan    Comfort care      Oral morphine for pain, air hunger. Lorazepam for anxiety             VTE prophylaxis: comfort care

## 2018-12-18 NOTE — PROGRESS NOTES
Assumed care of patient at 1915, received report from day RN at that time. Communication board updated and reviewed POC with pt. Assessment complete. No needs at this time. Bed alarm on and in good working order. Call light within reach.

## 2018-12-18 NOTE — DISCHARGE PLANNING
GELYW received phone call from [employer] [facility] to discuss Pt case. GELYW informed that the Pt will not meet criteria for their program. RISHABH sent e-mail to MARINA Ellis informing her of this.

## 2018-12-19 PROCEDURE — 770001 HCHG ROOM/CARE - MED/SURG/GYN PRIV*

## 2018-12-19 PROCEDURE — 99231 SBSQ HOSP IP/OBS SF/LOW 25: CPT | Performed by: INTERNAL MEDICINE

## 2018-12-19 PROCEDURE — A9270 NON-COVERED ITEM OR SERVICE: HCPCS | Performed by: HOSPITALIST

## 2018-12-19 PROCEDURE — 700102 HCHG RX REV CODE 250 W/ 637 OVERRIDE(OP): Performed by: HOSPITALIST

## 2018-12-19 RX ORDER — BISACODYL 10 MG
10 SUPPOSITORY, RECTAL RECTAL
Status: DISCONTINUED | OUTPATIENT
Start: 2018-12-19 | End: 2019-06-06 | Stop reason: HOSPADM

## 2018-12-19 RX ORDER — AMOXICILLIN 250 MG
2 CAPSULE ORAL 2 TIMES DAILY
Status: DISCONTINUED | OUTPATIENT
Start: 2018-12-19 | End: 2019-06-06 | Stop reason: HOSPADM

## 2018-12-19 RX ORDER — POLYETHYLENE GLYCOL 3350 17 G/17G
1 POWDER, FOR SOLUTION ORAL DAILY
Status: DISCONTINUED | OUTPATIENT
Start: 2018-12-19 | End: 2019-04-10

## 2018-12-19 RX ADMIN — FUROSEMIDE 20 MG: 20 TABLET ORAL at 05:35

## 2018-12-19 ASSESSMENT — ENCOUNTER SYMPTOMS
BACK PAIN: 0
HEADACHES: 0
CONSTIPATION: 1
NAUSEA: 0
COUGH: 0
ABDOMINAL PAIN: 0
FEVER: 0
VOMITING: 0
SHORTNESS OF BREATH: 0

## 2018-12-19 ASSESSMENT — PAIN SCALES - GENERAL
PAINLEVEL_OUTOF10: 0

## 2018-12-19 NOTE — PROGRESS NOTES
Report received. Assumed pt care. Pt sleeping no signs of acute distress noted. Fall precaution in place, call light within reach. Hourly rounding in place.

## 2018-12-19 NOTE — PROGRESS NOTES
Moved Pt to different bed, original bed had malfunctioning bed control functions. Pt requested to not have waffle cushion on new bed. RN notified.

## 2018-12-19 NOTE — PROGRESS NOTES
Hospital Medicine Daily Progress Note    Date of Service  12/19/2018    Chief Complaint  89 y.o. female admitted 11/13/2018 with fall.    Hospital Course    PMH HTN who presented with fall. She was found with PRUDENCE in inferior leads on EKG. Stat TTE showed mild hypokinesis of inferior wall. Cardiology was consulted, given her goals of care, she was monitored in the [facility] on heparin infusion for NSTEMI. She did well and was treated with asa, plavix, lipitor. She had low BP and was not started on BB or ACEI/ARB. She was diuresed for fluid overload.  She had discussion of care with her family and changed to comfort care and hospice was consulted. In anticipation for discharge, her quantiferon was positive. CXR not show signs of TB and her AFBs were negative. She has delayed discharge due to placement.          Interval Problem Update  Patient feels well. I discussed our difficulty with finding a group home with the patient, asking to speak with family. She says she doesn't want to bother her daughter. Discussed with case coordination.    Consultants/Specialty  Cardiology  Critical Care    Code Status  DNR/Comfort care    Disposition   with hospice - difficult placement due to finances    Review of Systems  Review of Systems   Constitutional: Negative for fever.   HENT: Negative for congestion.    Respiratory: Negative for cough and shortness of breath.    Cardiovascular: Negative for chest pain and leg swelling.   Gastrointestinal: Positive for constipation. Negative for abdominal pain, nausea and vomiting.   Musculoskeletal: Negative for back pain.   Skin: Negative for itching.   Neurological: Negative for headaches.        Physical Exam  Temp:  [36.4 °C (97.6 °F)-36.9 °C (98.5 °F)] 36.6 °C (97.9 °F)  Pulse:  [71-84] 84  Resp:  [16] 16  BP: (114-128)/(57-76) 128/76    Physical Exam   Constitutional:   Thin, frail   HENT:   Mouth/Throat: Oropharynx is clear and moist.   Eyes: Conjunctivae are normal. Right eye exhibits  no discharge. Left eye exhibits no discharge.   Cardiovascular: Normal rate and regular rhythm.    Pulmonary/Chest: Effort normal. She has no wheezes. She has no rales.   Abdominal: Soft. There is no tenderness.   Musculoskeletal:   Diffuse muscle atrophy   Neurological: She is alert.   Oriented to self and place   Skin: Skin is warm and dry.   Nursing note and vitals reviewed.      Fluids    Intake/Output Summary (Last 24 hours) at 12/19/18 1029  Last data filed at 12/18/18 1200   Gross per 24 hour   Intake              120 ml   Output                0 ml   Net              120 ml       Laboratory                        Imaging  DX-CHEST-PORTABLE (1 VIEW)   Final Result         1.  Hazy interstitial left pulmonary opacities suggests interstitial edema or infiltrate, similar to prior.   2.  Trace left pleural effusion   3.  Hyperexpansion of lungs favors changes of COPD.      DX-CHEST-PORTABLE (1 VIEW)   Final Result         1.  Interstitial infiltrates or edema, stable.   2.  Atherosclerosis      DX-CHEST-PORTABLE (1 VIEW)   Final Result         1.  Interstitial infiltrates or edema.   2.  Atherosclerosis      DX-CHEST-PORTABLE (1 VIEW)   Final Result      Moderate interstitial edema or interstitial lung disease and small left pleural effusion similar to previous.      DX-CHEST-PORTABLE (1 VIEW)   Final Result      Stable interstitial edema and/or interstitial lung disease with probable small pleural fluid      DX-CHEST-PORTABLE (1 VIEW)   Final Result      Ill-defined infrahilar interstitial opacities, atelectasis versus mild edema. No significant pleural effusions.      DX-CHEST-PORTABLE (1 VIEW)   Final Result      Mild left basilar atelectasis, improved since prior. No new consolidation or large pleural effusions.      DX-CHEST-PORTABLE (1 VIEW)   Final Result      1.  Left basilar opacification may be secondary to atelectasis. Developing pneumonia cannot be excluded.         DX-CHEST-PORTABLE (1 VIEW)   Final  Result      1.  Unchanged mild interstitial pulmonary edema   2.  Unchanged bibasilar atelectasis, alveolar edema or pneumonia      DX-CHEST-PORTABLE (1 VIEW)   Final Result      1.  Decreased pulmonary edema   2.  Unchanged bibasilar atelectasis, alveolar edema or pneumonia      DX-CHEST-PORTABLE (1 VIEW)   Final Result      1.  There is diffuse interstitial and alveolar density in the right mid and lower lung zone. This is increased since the previous study. This may represent mild pulmonary edema/consolidation.   2.  Mildly enlarged cardiomediastinal silhouette when compared with the previous chest x-ray.      DX-CHEST-PORTABLE (1 VIEW)   Final Result      Stable mild pulmonary edema.   New left basilar atelectasis.      DX-CHEST-PORTABLE (1 VIEW)   Final Result      Stable chest appearance compared with 11/16.      DX-CHEST-PORTABLE (1 VIEW)   Final Result      No acute cardiopulmonary abnormality identified.      DX-CHEST-PORTABLE (1 VIEW)   Final Result      No acute cardiopulmonary abnormality. No interval change.      DX-CHEST-PORTABLE (1 VIEW)   Final Result      No acute cardiopulmonary disease.      CT-HEAD W/O   Final Result      1.  No evidence of acute intracranial process.      2.  There is again seen interstitial pulmonary edema and bibasilar atelectasis.      CT-HIP W/O PLUS RECONS LEFT   Final Result      1.  No evidence of acute fracture or malalignment. No evidence of hardware failure or complication.   2.  Postsurgical changes of the left femur with broken screw fragment redemonstrated.   3.  Demineralization.   4.  Scattered colonic diverticula.   5.  Atherosclerosis.   6.  Small fat-containing umbilical hernia.      EC-ECHOCARDIOGRAM COMPLETE W/O CONT   Final Result      DX-HIP-COMPLETE - UNILATERAL 2+ LEFT   Final Result      1.  No definite acute osseous abnormality. In setting of demineralization, an occult fracture is difficult to exclude. If there is strong clinical suspicion, CT or MRI  could be obtained for further evaluation.   2.  Postsurgical changes of the left femur with broken screw fragment redemonstrated.      DX-CHEST-PORTABLE (1 VIEW)   Final Result      No acute cardiopulmonary process is seen.      Atherosclerotic plaque.           Assessment/Plan  * ST elevation myocardial infarction involving right coronary artery (HCC)- (present on admission)   Assessment & Plan    Post anti plt, heparin, statin therapy - transitioned to comfort care- no chest pain or dyspnea.   Provide IV morphine, supportive treatment if chest pain symptoms.          Abdominal pain   Assessment & Plan    Transient / intermittent.  No new symptoms, abdominal exam benign  Monitor intake and for signs of constipation. Has not had BM in several days, ordered for scheduled senakot and miralax  Bowel protocols.           Hypotension   Assessment & Plan     Asymptomatic   Encourage intake   Hold lasix for sbp < 100            Pain of left hip joint- (present on admission)   Assessment & Plan    Also intermittent left Knee - probably OA - exam no signs of acute infection.   Pain control      Chronic kidney disease (CKD), stage III (moderate) (HCC)- (present on admission)   Assessment & Plan    Was stable  Encourage intake.             Fall- (present on admission)   Assessment & Plan    With debility - improved function.   Try to encourage mobility, assist with transfers and use of walker.            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            Essential hypertension- (present on admission)   Assessment & Plan    BP stable to mildly low.  Hold lasix for sbp < 100 . Comfort care              Advanced directives, counseling/discussion- (present on admission)   Assessment & Plan    Comfort care      Oral morphine  for pain, air hunger. Lorazepam for anxiety             VTE prophylaxis: comfort care

## 2018-12-19 NOTE — CARE PLAN
Problem: Safety  Goal: Will remain free from injury  Outcome: PROGRESSING AS EXPECTED  Bed in the lowest locked position. Call light within reach. Bed alarm in use. Hourly rounding in place.     Problem: Bowel/Gastric:  Goal: Normal bowel function is maintained or improved  Outcome: PROGRESSING SLOWER THAN EXPECTED      Problem: Fluid Volume:  Goal: Will maintain balanced intake and output  Outcome: PROGRESSING AS EXPECTED  Pt encouraged to increase oral intake

## 2018-12-19 NOTE — PROGRESS NOTES
· 2 RN skin check complete.   · Devices in place NONE.  · Sacrum redness noted blanching, skin intact no breakdown noted  · The following interventions in place refused waffle overlay at this this despite, pt encouraged ambulation. Frequent repositioning in bed,

## 2018-12-19 NOTE — PROGRESS NOTES
Report received.  Assumed Care.  Pt in bed, 338. AOx4, responds appropriately.  Denies pain, SOB.  Plan of care discussed.  Explained importance of calling before getting OOB and pt verbalizes understanding.  Call light and belongings within reach, treaded slipper socks on, bed alarm in use, bed in lowest position.  Will monitor frequently.

## 2018-12-20 PROCEDURE — 99231 SBSQ HOSP IP/OBS SF/LOW 25: CPT | Performed by: INTERNAL MEDICINE

## 2018-12-20 PROCEDURE — 97530 THERAPEUTIC ACTIVITIES: CPT

## 2018-12-20 PROCEDURE — 97535 SELF CARE MNGMENT TRAINING: CPT

## 2018-12-20 PROCEDURE — 770001 HCHG ROOM/CARE - MED/SURG/GYN PRIV*

## 2018-12-20 ASSESSMENT — PAIN SCALES - GENERAL
PAINLEVEL_OUTOF10: 0

## 2018-12-20 ASSESSMENT — COGNITIVE AND FUNCTIONAL STATUS - GENERAL
DAILY ACTIVITIY SCORE: 23
HELP NEEDED FOR BATHING: A LITTLE
SUGGESTED CMS G CODE MODIFIER DAILY ACTIVITY: CI

## 2018-12-20 ASSESSMENT — ENCOUNTER SYMPTOMS
CONSTIPATION: 1
FEVER: 0
NAUSEA: 0
BACK PAIN: 0
HEADACHES: 0
COUGH: 0
SHORTNESS OF BREATH: 0
VOMITING: 0
ABDOMINAL PAIN: 0

## 2018-12-20 NOTE — DISCHARGE PLANNING
"Anticipated Discharge Disposition: Home vs SNF vs GH    Action: During Rounding LSW informed Pt wanted to speak with LSW about GH. LSW met with Pt at bedside. Pt stated she was told a GH could cost $3000 or more and LSW stated that depending on the GH that could be the price. Pt stated she cannot afford to pay a GH. LSW stated that therapy may be reeval the Pt and said if the Pt is cleared to go to a SNF hospice could follow however room and board will need to be paid privately. Pt stated she cannot afford to for room and board. LSW informed the Pt that she had contacted the [facility] however this LSW was told that he Pt did not meet criteria with the [facility] nor the state. Pt stated she cannot and will not cash out her life insurance stating she does not know how much her policy is for but stated she has to use it for her burial. Pt stated she will be buried with her  and they have to open his grave which costs thousands of dollars. Pt said her burial is with [place]. Pt does not want this LSW contacting her dtr and stated her dtr has already helped her so much she cannot ask her to do any more. LSW stated she will continue to look into options to assist the Pt and Pt told this LSW \"not to hold your breath, everything run by money.\"      Barriers to Discharge: Financial barrier to SNF and GH    Plan: Continue to look into resources to assist Pt with placement    "

## 2018-12-20 NOTE — PROGRESS NOTES
Hospital Medicine Daily Progress Note    Date of Service  12/20/2018    Chief Complaint  89 y.o. female admitted 11/13/2018 with fall.    Hospital Course    PMH HTN who presented with fall. She was found with PRUDENCE in inferior leads on EKG. Stat TTE showed mild hypokinesis of inferior wall. Cardiology was consulted, given her goals of care, she was monitored in the [facility] on heparin infusion for NSTEMI. She did well and was treated with asa, plavix, lipitor. She had low BP and was not started on BB or ACEI/ARB. She was diuresed for fluid overload.  She had discussion of care with her family and changed to comfort care and hospice was consulted. In anticipation for discharge, her quantiferon was positive. CXR not show signs of TB and her AFBs were negative. She has delayed discharge due to placement.          Interval Problem Update  She feels well, ambulating to restroom.  PTOT to eval if she can d/c home with hospice.  She still has not had BM, refusing bowel medications    Consultants/Specialty  Cardiology  Critical Care    Code Status  DNR/Comfort care    Disposition   with hospice - difficult placement due to finances    Review of Systems  Review of Systems   Constitutional: Negative for fever.   HENT: Negative for congestion.    Respiratory: Negative for cough and shortness of breath.    Cardiovascular: Negative for chest pain and leg swelling.   Gastrointestinal: Positive for constipation. Negative for abdominal pain, nausea and vomiting.   Musculoskeletal: Negative for back pain.   Skin: Negative for itching.   Neurological: Negative for headaches.        Physical Exam  Temp:  [36.3 °C (97.4 °F)-37 °C (98.6 °F)] 36.7 °C (98.1 °F)  Pulse:  [65-76] 65  Resp:  [16-18] 18  BP: (109-127)/(58-69) 110/65    Physical Exam   Constitutional:   Thin, frail   HENT:   Mouth/Throat: Oropharynx is clear and moist.   Eyes: Conjunctivae are normal. Right eye exhibits no discharge. Left eye exhibits no discharge.    Cardiovascular: Normal rate and regular rhythm.    Pulmonary/Chest: Effort normal. She has no wheezes. She has no rales.   Abdominal: Soft. There is no tenderness. There is no rebound and no guarding.   Musculoskeletal:   Diffuse muscle atrophy   Neurological: She is alert.   Oriented to self and place   Skin: Skin is warm and dry.   Nursing note and vitals reviewed.      Fluids  No intake or output data in the 24 hours ending 12/20/18 1112    Laboratory                        Imaging  DX-CHEST-PORTABLE (1 VIEW)   Final Result         1.  Hazy interstitial left pulmonary opacities suggests interstitial edema or infiltrate, similar to prior.   2.  Trace left pleural effusion   3.  Hyperexpansion of lungs favors changes of COPD.      DX-CHEST-PORTABLE (1 VIEW)   Final Result         1.  Interstitial infiltrates or edema, stable.   2.  Atherosclerosis      DX-CHEST-PORTABLE (1 VIEW)   Final Result         1.  Interstitial infiltrates or edema.   2.  Atherosclerosis      DX-CHEST-PORTABLE (1 VIEW)   Final Result      Moderate interstitial edema or interstitial lung disease and small left pleural effusion similar to previous.      DX-CHEST-PORTABLE (1 VIEW)   Final Result      Stable interstitial edema and/or interstitial lung disease with probable small pleural fluid      DX-CHEST-PORTABLE (1 VIEW)   Final Result      Ill-defined infrahilar interstitial opacities, atelectasis versus mild edema. No significant pleural effusions.      DX-CHEST-PORTABLE (1 VIEW)   Final Result      Mild left basilar atelectasis, improved since prior. No new consolidation or large pleural effusions.      DX-CHEST-PORTABLE (1 VIEW)   Final Result      1.  Left basilar opacification may be secondary to atelectasis. Developing pneumonia cannot be excluded.         DX-CHEST-PORTABLE (1 VIEW)   Final Result      1.  Unchanged mild interstitial pulmonary edema   2.  Unchanged bibasilar atelectasis, alveolar edema or pneumonia       DX-CHEST-PORTABLE (1 VIEW)   Final Result      1.  Decreased pulmonary edema   2.  Unchanged bibasilar atelectasis, alveolar edema or pneumonia      DX-CHEST-PORTABLE (1 VIEW)   Final Result      1.  There is diffuse interstitial and alveolar density in the right mid and lower lung zone. This is increased since the previous study. This may represent mild pulmonary edema/consolidation.   2.  Mildly enlarged cardiomediastinal silhouette when compared with the previous chest x-ray.      DX-CHEST-PORTABLE (1 VIEW)   Final Result      Stable mild pulmonary edema.   New left basilar atelectasis.      DX-CHEST-PORTABLE (1 VIEW)   Final Result      Stable chest appearance compared with 11/16.      DX-CHEST-PORTABLE (1 VIEW)   Final Result      No acute cardiopulmonary abnormality identified.      DX-CHEST-PORTABLE (1 VIEW)   Final Result      No acute cardiopulmonary abnormality. No interval change.      DX-CHEST-PORTABLE (1 VIEW)   Final Result      No acute cardiopulmonary disease.      CT-HEAD W/O   Final Result      1.  No evidence of acute intracranial process.      2.  There is again seen interstitial pulmonary edema and bibasilar atelectasis.      CT-HIP W/O PLUS RECONS LEFT   Final Result      1.  No evidence of acute fracture or malalignment. No evidence of hardware failure or complication.   2.  Postsurgical changes of the left femur with broken screw fragment redemonstrated.   3.  Demineralization.   4.  Scattered colonic diverticula.   5.  Atherosclerosis.   6.  Small fat-containing umbilical hernia.      EC-ECHOCARDIOGRAM COMPLETE W/O CONT   Final Result      DX-HIP-COMPLETE - UNILATERAL 2+ LEFT   Final Result      1.  No definite acute osseous abnormality. In setting of demineralization, an occult fracture is difficult to exclude. If there is strong clinical suspicion, CT or MRI could be obtained for further evaluation.   2.  Postsurgical changes of the left femur with broken screw fragment redemonstrated.       DX-CHEST-PORTABLE (1 VIEW)   Final Result      No acute cardiopulmonary process is seen.      Atherosclerotic plaque.           Assessment/Plan  * ST elevation myocardial infarction involving right coronary artery (HCC)- (present on admission)   Assessment & Plan    Post anti plt, heparin, statin therapy - transitioned to comfort care- no chest pain or dyspnea.   Provide IV morphine, supportive treatment if chest pain symptoms.          Abdominal pain   Assessment & Plan    Transient / intermittent.  No new symptoms, abdominal exam benign  Monitor intake and for signs of constipation. Has not had BM in several days, ordered for scheduled senakot and miralax. Has been noncompliant  Bowel protocols.           Hypotension   Assessment & Plan     Asymptomatic   Encourage intake   Hold lasix for sbp < 100            Pain of left hip joint- (present on admission)   Assessment & Plan    Also intermittent left Knee - probably OA - exam no signs of acute infection.   Pain control      Chronic kidney disease (CKD), stage III (moderate) (HCC)- (present on admission)   Assessment & Plan    Was stable  Encourage intake.             Fall- (present on admission)   Assessment & Plan    With debility - improved function.   Try to encourage mobility, assist with transfers and use of walker.            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            Essential hypertension- (present on admission)   Assessment & Plan    BP stable to mildly low.  Hold lasix for sbp < 100 . Comfort care              Advanced directives, counseling/discussion- (present on admission)   Assessment & Plan    Comfort care      Oral morphine for pain, air hunger. Lorazepam for anxiety             VTE prophylaxis: comfort care

## 2018-12-20 NOTE — PROGRESS NOTES
Patient states living alone and neighbors are elderly and would not have support from neighbors at home.

## 2018-12-21 ENCOUNTER — PATIENT OUTREACH (OUTPATIENT)
Dept: HEALTH INFORMATION MANAGEMENT | Facility: OTHER | Age: 83
End: 2018-12-21

## 2018-12-21 PROCEDURE — 99231 SBSQ HOSP IP/OBS SF/LOW 25: CPT | Performed by: INTERNAL MEDICINE

## 2018-12-21 PROCEDURE — 770001 HCHG ROOM/CARE - MED/SURG/GYN PRIV*

## 2018-12-21 ASSESSMENT — ENCOUNTER SYMPTOMS
SHORTNESS OF BREATH: 0
COUGH: 0
HEADACHES: 0
CONSTIPATION: 1
BACK PAIN: 0
ABDOMINAL PAIN: 0
VOMITING: 0
NAUSEA: 0

## 2018-12-21 ASSESSMENT — PAIN SCALES - GENERAL: PAINLEVEL_OUTOF10: 0

## 2018-12-21 NOTE — CARE PLAN
Problem: Infection  Goal: Will remain free from infection    Intervention: Implement standard precautions and perform hand washing before and after patient contact  Proper use of PPE at all times  hand hygiene implemented upon entry and exit of patients room.  Education provided on keeping hands clean and wound clean.       Problem: Skin Integrity  Goal: Risk for impaired skin integrity will decrease    Intervention: Assess risk factors for impaired skin integrity and/or pressure ulcers  Patient able to turn self in bed without assistance. Patient is self motivated and does not require reminders.

## 2018-12-21 NOTE — THERAPY
"Occupational Therapy Treatment completed with focus on ADLs and ADL transfers.  Functional Status: Mod I supine > < EOB, supv transfers with 4WW, supv LB dressing, supv toileting   Plan of Care: Patient with no further skilled OT needs in the acute care setting at this time  Discharge Recommendations:  Equipment No Equipment Needed*Recommend HH assess for tub transfer bench.* Post-acute therapy: Recommend home health occupational therapy services to assess safety in home environment.    See \"Rehab Therapy-Acute\" Patient Summary Report for complete documentation.     Pt seen for re-assessment of ADL for possible DC home. Pt completed: bed mobility, LB dressing without AE, mobilization to bathroom for toileting, standing grooming, all with no more than supv. Extensive discussion regarding home set-up, PLOF. Pt resides in 1 story apt. She mobilizes using 4WW at all times. Uses [employer] Access for grocery shopping, appointments. Uses tub/shower with grab bar to step in/out, shower chair and hand-held nozzle. Pt was oriented to month and year, but thought it was \"the 2nd or 3rd\". Pt appears to be at baseline function from OT standpoint in this setting. Recommended she obtain medic alert system and  for heavier housework (vacuuming, toilets). Ideally, pt would have 24/7 for first few days while she transitions (particulalry in light of having been away from home for 5 weeks and having history of falls). Would likely not continue to need 24/7 supv, but would benefit from occasional check-ins. Pt reports her daughter lives out of state and her granddaughter is local but she does not want to burden her in any way. Pt would also benefit from HH OT/PT, again for transition home and to assess home safety. Acute OT will not follow as pt appears to be at baseline function for ADL.   "

## 2018-12-21 NOTE — PROGRESS NOTES
Per chart review the patient remains at [facility].  Plan: will monitor for discharge from the hospital.

## 2018-12-21 NOTE — CARE PLAN
Problem: Safety  Goal: Will remain free from injury  Outcome: PROGRESSING AS EXPECTED  Treaded socks in place, bed in the lowest position, bed alarm on, call light and belongings within reach, pt call for assistance appropriately    Problem: Skin Integrity  Goal: Risk for impaired skin integrity will decrease  Outcome: PROGRESSING AS EXPECTED  Encouraged q2 turns when in bed, pt. walks occasionally, no skin breakdown noted.

## 2018-12-21 NOTE — CARE PLAN
Problem: Safety  Goal: Will remain free from injury  Outcome: PROGRESSING AS EXPECTED  Pt instructed to call before getting OOB. Pt verbalized understanding and calls appropriately.    Problem: Knowledge Deficit  Goal: Knowledge of the prescribed therapeutic regimen will improve  Outcome: PROGRESSING AS EXPECTED  POC discussed with pt. Pt verbalized understanding, all questions answered at this time.

## 2018-12-21 NOTE — PROGRESS NOTES
[facility] Medicine Daily Progress Note    Date of Service  12/21/2018    Chief Complaint  89 y.o. female admitted 11/13/2018 with fall.    Hospital Course    PMH HTN who presented with fall. She was found with PRUDENCE in inferior leads on EKG. Stat TTE showed mild hypokinesis of inferior wall. Cardiology was consulted, given her goals of care, she was monitored in the [facility] on heparin infusion for NSTEMI. She did well and was treated with asa, plavix, lipitor. She had low BP and was not started on BB or ACEI/ARB. She was diuresed for fluid overload.  She had discussion of care with her family and changed to comfort care and hospice was consulted. In anticipation for discharge, her quantiferon was positive. CXR not show signs of TB and her AFBs were negative. She has delayed discharge due to placement.          Interval Problem Update  She is sitting up, feeling well.   Refusing her medications  OT recommends possible home health, PT to [facility]  Discussed with care coordination    Consultants/Specialty  Cardiology  Critical Care    Code Status  DNR/Comfort care    Disposition   vs home with hospice - difficult placement    Review of Systems  Review of Systems   Constitutional: Negative for malaise/fatigue.   HENT: Negative for congestion.    Respiratory: Negative for cough and shortness of breath.    Cardiovascular: Negative for chest pain and leg swelling.   Gastrointestinal: Positive for constipation. Negative for abdominal pain, nausea and vomiting.   Musculoskeletal: Negative for back pain.   Skin: Negative for itching.   Neurological: Negative for headaches.        Physical Exam  Temp:  [36.5 °C (97.7 °F)-36.8 °C (98.3 °F)] 36.5 °C (97.7 °F)  Pulse:  [72-79] 72  Resp:  [15-18] 18  BP: (111-120)/(59-65) 111/65    Physical Exam   Constitutional: She is oriented to person, place, and time.   Thin, frail   HENT:   Mouth/Throat: Oropharynx is clear and moist.   Eyes: Conjunctivae are normal.   Cardiovascular: Normal rate and  regular rhythm.    Pulmonary/Chest: Effort normal. She has no wheezes. She has no rales.   Abdominal: Soft. She exhibits no distension. There is no tenderness. There is no rebound and no guarding.   Musculoskeletal:   Diffuse muscle atrophy   Neurological: She is alert and oriented to person, place, and time.   Skin: Skin is warm and dry.   Nursing note and vitals reviewed.      Fluids  No intake or output data in the 24 hours ending 12/21/18 1031    Laboratory                        Imaging  DX-CHEST-PORTABLE (1 VIEW)   Final Result         1.  Hazy interstitial left pulmonary opacities suggests interstitial edema or infiltrate, similar to prior.   2.  Trace left pleural effusion   3.  Hyperexpansion of lungs favors changes of COPD.      DX-CHEST-PORTABLE (1 VIEW)   Final Result         1.  Interstitial infiltrates or edema, stable.   2.  Atherosclerosis      DX-CHEST-PORTABLE (1 VIEW)   Final Result         1.  Interstitial infiltrates or edema.   2.  Atherosclerosis      DX-CHEST-PORTABLE (1 VIEW)   Final Result      Moderate interstitial edema or interstitial lung disease and small left pleural effusion similar to previous.      DX-CHEST-PORTABLE (1 VIEW)   Final Result      Stable interstitial edema and/or interstitial lung disease with probable small pleural fluid      DX-CHEST-PORTABLE (1 VIEW)   Final Result      Ill-defined infrahilar interstitial opacities, atelectasis versus mild edema. No significant pleural effusions.      DX-CHEST-PORTABLE (1 VIEW)   Final Result      Mild left basilar atelectasis, improved since prior. No new consolidation or large pleural effusions.      DX-CHEST-PORTABLE (1 VIEW)   Final Result      1.  Left basilar opacification may be secondary to atelectasis. Developing pneumonia cannot be excluded.         DX-CHEST-PORTABLE (1 VIEW)   Final Result      1.  Unchanged mild interstitial pulmonary edema   2.  Unchanged bibasilar atelectasis, alveolar edema or pneumonia       DX-CHEST-PORTABLE (1 VIEW)   Final Result      1.  Decreased pulmonary edema   2.  Unchanged bibasilar atelectasis, alveolar edema or pneumonia      DX-CHEST-PORTABLE (1 VIEW)   Final Result      1.  There is diffuse interstitial and alveolar density in the right mid and lower lung zone. This is increased since the previous study. This may represent mild pulmonary edema/consolidation.   2.  Mildly enlarged cardiomediastinal silhouette when compared with the previous chest x-ray.      DX-CHEST-PORTABLE (1 VIEW)   Final Result      Stable mild pulmonary edema.   New left basilar atelectasis.      DX-CHEST-PORTABLE (1 VIEW)   Final Result      Stable chest appearance compared with 11/16.      DX-CHEST-PORTABLE (1 VIEW)   Final Result      No acute cardiopulmonary abnormality identified.      DX-CHEST-PORTABLE (1 VIEW)   Final Result      No acute cardiopulmonary abnormality. No interval change.      DX-CHEST-PORTABLE (1 VIEW)   Final Result      No acute cardiopulmonary disease.      CT-HEAD W/O   Final Result      1.  No evidence of acute intracranial process.      2.  There is again seen interstitial pulmonary edema and bibasilar atelectasis.      CT-HIP W/O PLUS RECONS LEFT   Final Result      1.  No evidence of acute fracture or malalignment. No evidence of hardware failure or complication.   2.  Postsurgical changes of the left femur with broken screw fragment redemonstrated.   3.  Demineralization.   4.  Scattered colonic diverticula.   5.  Atherosclerosis.   6.  Small fat-containing umbilical hernia.      EC-ECHOCARDIOGRAM COMPLETE W/O CONT   Final Result      DX-HIP-COMPLETE - UNILATERAL 2+ LEFT   Final Result      1.  No definite acute osseous abnormality. In setting of demineralization, an occult fracture is difficult to exclude. If there is strong clinical suspicion, CT or MRI could be obtained for further evaluation.   2.  Postsurgical changes of the left femur with broken screw fragment redemonstrated.       DX-CHEST-PORTABLE (1 VIEW)   Final Result      No acute cardiopulmonary process is seen.      Atherosclerotic plaque.           Assessment/Plan  * ST elevation myocardial infarction involving right coronary artery (HCC)- (present on admission)   Assessment & Plan    Post anti plt, heparin, statin therapy - transitioned to comfort care- no chest pain or dyspnea.   Provide IV morphine, supportive treatment if chest pain symptoms.          Abdominal pain   Assessment & Plan    Transient / intermittent.  No new symptoms, abdominal exam benign  Monitor intake and for signs of constipation. Has not had BM in several days, ordered for scheduled senakot and miralax. Has been noncompliant  Bowel protocols.           Hypotension   Assessment & Plan     Asymptomatic   Encourage intake   Hold lasix for sbp < 100            Pain of left hip joint- (present on admission)   Assessment & Plan    Also intermittent left Knee - probably OA - exam no signs of acute infection.   Pain control      Chronic kidney disease (CKD), stage III (moderate) (HCC)- (present on admission)   Assessment & Plan    Was stable  Encourage intake.             Fall- (present on admission)   Assessment & Plan    With debility - improved function.   Try to encourage mobility, assist with transfers and use of walker.            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            Essential hypertension- (present on admission)   Assessment & Plan    BP stable to mildly low.  Hold lasix for sbp < 100 . Comfort care              Advanced directives, counseling/discussion- (present on admission)   Assessment & Plan    Comfort care      Oral morphine for pain, air hunger. Lorazepam for anxiety             VTE prophylaxis: comfort care

## 2018-12-22 PROCEDURE — 770001 HCHG ROOM/CARE - MED/SURG/GYN PRIV*

## 2018-12-22 PROCEDURE — 99231 SBSQ HOSP IP/OBS SF/LOW 25: CPT | Performed by: INTERNAL MEDICINE

## 2018-12-22 ASSESSMENT — ENCOUNTER SYMPTOMS
NAUSEA: 0
VOMITING: 0
COUGH: 0
BACK PAIN: 0
HEADACHES: 0
CONSTIPATION: 0
SHORTNESS OF BREATH: 0
ABDOMINAL PAIN: 0

## 2018-12-22 ASSESSMENT — PAIN SCALES - GENERAL: PAINLEVEL_OUTOF10: 0

## 2018-12-22 NOTE — PROGRESS NOTES
[facility] Medicine Daily Progress Note    Date of Service  12/22/2018    Chief Complaint  89 y.o. female admitted 11/13/2018 with fall.    Hospital Course    PMH HTN who presented with fall. She was found with PRUDENCE in inferior leads on EKG. Stat TTE showed mild hypokinesis of inferior wall. Cardiology was consulted, given her goals of care, she was monitored in the [facility] on heparin infusion for NSTEMI. She did well and was treated with asa, plavix, lipitor. She had low BP and was not started on BB or ACEI/ARB. She was diuresed for fluid overload.  She had discussion of care with her family and changed to comfort care and hospice was consulted. In anticipation for discharge, her quantiferon was positive. CXR not show signs of TB and her AFBs were negative. She has delayed discharge due to placement.          Interval Problem Update  She feels well, ambulating in her room. Had BMs 2 days ago.  She is refusing her medications    Consultants/Specialty  Cardiology  Critical Care    Code Status  DNR/Comfort care    Disposition   vs home with hospice - difficult placement    Review of Systems  Review of Systems   Constitutional: Negative for malaise/fatigue.   Respiratory: Negative for cough and shortness of breath.    Cardiovascular: Negative for chest pain and leg swelling.   Gastrointestinal: Negative for abdominal pain, constipation, nausea and vomiting.   Musculoskeletal: Negative for back pain.   Neurological: Negative for headaches.        Physical Exam  Temp:  [36.6 °C (97.9 °F)-36.7 °C (98 °F)] 36.6 °C (97.9 °F)  Pulse:  [63-80] 63  Resp:  [15-16] 16  BP: (110-128)/(63-74) 110/63    Physical Exam   Constitutional: She is oriented to person, place, and time.   Thin, frail   HENT:   Mouth/Throat: Oropharynx is clear and moist.   Eyes: Conjunctivae are normal.   Cardiovascular: Normal rate and regular rhythm.    Pulmonary/Chest: Effort normal. She has no wheezes. She has no rales.   Abdominal: Soft. She exhibits no  distension. There is no tenderness.   Musculoskeletal:   Diffuse muscle atrophy   Neurological: She is alert and oriented to person, place, and time.   Skin: Skin is warm and dry.   Nursing note and vitals reviewed.      Fluids  No intake or output data in the 24 hours ending 12/22/18 0945    Laboratory                        Imaging  DX-CHEST-PORTABLE (1 VIEW)   Final Result         1.  Hazy interstitial left pulmonary opacities suggests interstitial edema or infiltrate, similar to prior.   2.  Trace left pleural effusion   3.  Hyperexpansion of lungs favors changes of COPD.      DX-CHEST-PORTABLE (1 VIEW)   Final Result         1.  Interstitial infiltrates or edema, stable.   2.  Atherosclerosis      DX-CHEST-PORTABLE (1 VIEW)   Final Result         1.  Interstitial infiltrates or edema.   2.  Atherosclerosis      DX-CHEST-PORTABLE (1 VIEW)   Final Result      Moderate interstitial edema or interstitial lung disease and small left pleural effusion similar to previous.      DX-CHEST-PORTABLE (1 VIEW)   Final Result      Stable interstitial edema and/or interstitial lung disease with probable small pleural fluid      DX-CHEST-PORTABLE (1 VIEW)   Final Result      Ill-defined infrahilar interstitial opacities, atelectasis versus mild edema. No significant pleural effusions.      DX-CHEST-PORTABLE (1 VIEW)   Final Result      Mild left basilar atelectasis, improved since prior. No new consolidation or large pleural effusions.      DX-CHEST-PORTABLE (1 VIEW)   Final Result      1.  Left basilar opacification may be secondary to atelectasis. Developing pneumonia cannot be excluded.         DX-CHEST-PORTABLE (1 VIEW)   Final Result      1.  Unchanged mild interstitial pulmonary edema   2.  Unchanged bibasilar atelectasis, alveolar edema or pneumonia      DX-CHEST-PORTABLE (1 VIEW)   Final Result      1.  Decreased pulmonary edema   2.  Unchanged bibasilar atelectasis, alveolar edema or pneumonia      DX-CHEST-PORTABLE (1  VIEW)   Final Result      1.  There is diffuse interstitial and alveolar density in the right mid and lower lung zone. This is increased since the previous study. This may represent mild pulmonary edema/consolidation.   2.  Mildly enlarged cardiomediastinal silhouette when compared with the previous chest x-ray.      DX-CHEST-PORTABLE (1 VIEW)   Final Result      Stable mild pulmonary edema.   New left basilar atelectasis.      DX-CHEST-PORTABLE (1 VIEW)   Final Result      Stable chest appearance compared with 11/16.      DX-CHEST-PORTABLE (1 VIEW)   Final Result      No acute cardiopulmonary abnormality identified.      DX-CHEST-PORTABLE (1 VIEW)   Final Result      No acute cardiopulmonary abnormality. No interval change.      DX-CHEST-PORTABLE (1 VIEW)   Final Result      No acute cardiopulmonary disease.      CT-HEAD W/O   Final Result      1.  No evidence of acute intracranial process.      2.  There is again seen interstitial pulmonary edema and bibasilar atelectasis.      CT-HIP W/O PLUS RECONS LEFT   Final Result      1.  No evidence of acute fracture or malalignment. No evidence of hardware failure or complication.   2.  Postsurgical changes of the left femur with broken screw fragment redemonstrated.   3.  Demineralization.   4.  Scattered colonic diverticula.   5.  Atherosclerosis.   6.  Small fat-containing umbilical hernia.      EC-ECHOCARDIOGRAM COMPLETE W/O CONT   Final Result      DX-HIP-COMPLETE - UNILATERAL 2+ LEFT   Final Result      1.  No definite acute osseous abnormality. In setting of demineralization, an occult fracture is difficult to exclude. If there is strong clinical suspicion, CT or MRI could be obtained for further evaluation.   2.  Postsurgical changes of the left femur with broken screw fragment redemonstrated.      DX-CHEST-PORTABLE (1 VIEW)   Final Result      No acute cardiopulmonary process is seen.      Atherosclerotic plaque.           Assessment/Plan  * ST elevation  myocardial infarction involving right coronary artery (HCC)- (present on admission)   Assessment & Plan    Post anti plt, heparin, statin therapy - transitioned to comfort care- no chest pain or dyspnea.   Provide IV morphine, supportive treatment if chest pain symptoms.          Abdominal pain   Assessment & Plan    Transient / intermittent.  No new symptoms, abdominal exam benign  Monitor intake and for signs of constipation. Ordered for scheduled senakot and miralax. Has been noncompliant  Bowel protocols.           Hypotension   Assessment & Plan     Asymptomatic   Encourage intake   Hold lasix for sbp < 100            Pain of left hip joint- (present on admission)   Assessment & Plan    Also intermittent left Knee - probably OA - exam no signs of acute infection.   Pain control      Chronic kidney disease (CKD), stage III (moderate) ([facility])- (present on admission)   Assessment & Plan    Was stable  Encourage intake.             Fall- (present on admission)   Assessment & Plan    With debility - improved function.   Try to encourage mobility, assist with transfers and use of walker.            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            Essential hypertension- (present on admission)   Assessment & Plan    BP stable to mildly low.  Hold lasix for sbp < 100 . Comfort care              Advanced directives, counseling/discussion- (present on admission)   Assessment & Plan    Comfort care      Oral morphine for pain, air hunger. Lorazepam for anxiety             VTE prophylaxis: comfort care

## 2018-12-22 NOTE — PROGRESS NOTES
Patient resting comfortably in bed at this time. Call light with in reach, calls for assistance as needed

## 2018-12-22 NOTE — CARE PLAN
Problem: Safety  Goal: Will remain free from injury  Outcome: PROGRESSING AS EXPECTED  Nora will remain free from injury, call light with in reach, hourly rounding in place

## 2018-12-23 PROCEDURE — 99231 SBSQ HOSP IP/OBS SF/LOW 25: CPT | Performed by: INTERNAL MEDICINE

## 2018-12-23 PROCEDURE — 770001 HCHG ROOM/CARE - MED/SURG/GYN PRIV*

## 2018-12-23 PROCEDURE — 700102 HCHG RX REV CODE 250 W/ 637 OVERRIDE(OP): Performed by: INTERNAL MEDICINE

## 2018-12-23 PROCEDURE — A9270 NON-COVERED ITEM OR SERVICE: HCPCS | Performed by: INTERNAL MEDICINE

## 2018-12-23 RX ADMIN — BISACODYL 10 MG: 10 SUPPOSITORY RECTAL at 04:27

## 2018-12-23 ASSESSMENT — PAIN SCALES - GENERAL
PAINLEVEL_OUTOF10: 0
PAINLEVEL_OUTOF10: 0

## 2018-12-23 ASSESSMENT — ENCOUNTER SYMPTOMS
CONSTIPATION: 1
NAUSEA: 0
SHORTNESS OF BREATH: 0
VOMITING: 0
COUGH: 0
BACK PAIN: 0
ABDOMINAL PAIN: 0
HEADACHES: 0

## 2018-12-23 NOTE — PROGRESS NOTES
Patient feels like she needs something to help her move her bowel. Patient refusing to take Senna, miralax and MOM. Patient requesting to take supp only after dinner. Waiting for patient to finish dinner to administer dolculax supp.

## 2018-12-23 NOTE — PROGRESS NOTES
Hospital Medicine Daily Progress Note    Date of Service  12/23/2018    Chief Complaint  89 y.o. female admitted 11/13/2018 with fall.    Hospital Course    PMH HTN who presented with fall. She was found with PRUDENCE in inferior leads on EKG. Stat TTE showed mild hypokinesis of inferior wall. Cardiology was consulted, given her goals of care, she was monitored in the [facility] on heparin infusion for NSTEMI. She did well and was treated with asa, plavix, lipitor. She had low BP and was not started on BB or ACEI/ARB. She was diuresed for fluid overload.  She had discussion of care with her family and changed to comfort care and hospice was consulted. In anticipation for discharge, her quantiferon was positive. CXR not show signs of TB and her AFBs were negative. She has delayed discharge due to placement.          Interval Problem Update  Feels constipated, took bisacodyl  Tolerating meals    Consultants/Specialty  Cardiology  Critical Care    Code Status  DNR/Comfort care    Disposition   vs home with hospice - difficult placement    Review of Systems  Review of Systems   Respiratory: Negative for cough and shortness of breath.    Cardiovascular: Negative for chest pain and leg swelling.   Gastrointestinal: Positive for constipation. Negative for abdominal pain, nausea and vomiting.   Musculoskeletal: Negative for back pain.   Neurological: Negative for headaches.        Physical Exam  Temp:  [36.7 °C (98.1 °F)] 36.7 °C (98.1 °F)  Pulse:  [68] 68  Resp:  [15] 15  BP: (116)/(70) 116/70    Physical Exam   Constitutional: She is oriented to person, place, and time.   Thin, frail   HENT:   Mouth/Throat: Oropharynx is clear and moist.   Eyes: Conjunctivae are normal.   Cardiovascular: Normal rate and regular rhythm.    Pulmonary/Chest: Effort normal. She has no wheezes.   Abdominal: Soft. She exhibits no distension. There is no tenderness. There is no rebound and no guarding.   Musculoskeletal:   Diffuse muscle atrophy    Neurological: She is alert and oriented to person, place, and time.   Skin: Skin is warm and dry.   Nursing note and vitals reviewed.      Fluids  No intake or output data in the 24 hours ending 12/23/18 0954    Laboratory                        Imaging  DX-CHEST-PORTABLE (1 VIEW)   Final Result         1.  Hazy interstitial left pulmonary opacities suggests interstitial edema or infiltrate, similar to prior.   2.  Trace left pleural effusion   3.  Hyperexpansion of lungs favors changes of COPD.      DX-CHEST-PORTABLE (1 VIEW)   Final Result         1.  Interstitial infiltrates or edema, stable.   2.  Atherosclerosis      DX-CHEST-PORTABLE (1 VIEW)   Final Result         1.  Interstitial infiltrates or edema.   2.  Atherosclerosis      DX-CHEST-PORTABLE (1 VIEW)   Final Result      Moderate interstitial edema or interstitial lung disease and small left pleural effusion similar to previous.      DX-CHEST-PORTABLE (1 VIEW)   Final Result      Stable interstitial edema and/or interstitial lung disease with probable small pleural fluid      DX-CHEST-PORTABLE (1 VIEW)   Final Result      Ill-defined infrahilar interstitial opacities, atelectasis versus mild edema. No significant pleural effusions.      DX-CHEST-PORTABLE (1 VIEW)   Final Result      Mild left basilar atelectasis, improved since prior. No new consolidation or large pleural effusions.      DX-CHEST-PORTABLE (1 VIEW)   Final Result      1.  Left basilar opacification may be secondary to atelectasis. Developing pneumonia cannot be excluded.         DX-CHEST-PORTABLE (1 VIEW)   Final Result      1.  Unchanged mild interstitial pulmonary edema   2.  Unchanged bibasilar atelectasis, alveolar edema or pneumonia      DX-CHEST-PORTABLE (1 VIEW)   Final Result      1.  Decreased pulmonary edema   2.  Unchanged bibasilar atelectasis, alveolar edema or pneumonia      DX-CHEST-PORTABLE (1 VIEW)   Final Result      1.  There is diffuse interstitial and alveolar  density in the right mid and lower lung zone. This is increased since the previous study. This may represent mild pulmonary edema/consolidation.   2.  Mildly enlarged cardiomediastinal silhouette when compared with the previous chest x-ray.      DX-CHEST-PORTABLE (1 VIEW)   Final Result      Stable mild pulmonary edema.   New left basilar atelectasis.      DX-CHEST-PORTABLE (1 VIEW)   Final Result      Stable chest appearance compared with 11/16.      DX-CHEST-PORTABLE (1 VIEW)   Final Result      No acute cardiopulmonary abnormality identified.      DX-CHEST-PORTABLE (1 VIEW)   Final Result      No acute cardiopulmonary abnormality. No interval change.      DX-CHEST-PORTABLE (1 VIEW)   Final Result      No acute cardiopulmonary disease.      CT-HEAD W/O   Final Result      1.  No evidence of acute intracranial process.      2.  There is again seen interstitial pulmonary edema and bibasilar atelectasis.      CT-HIP W/O PLUS RECONS LEFT   Final Result      1.  No evidence of acute fracture or malalignment. No evidence of hardware failure or complication.   2.  Postsurgical changes of the left femur with broken screw fragment redemonstrated.   3.  Demineralization.   4.  Scattered colonic diverticula.   5.  Atherosclerosis.   6.  Small fat-containing umbilical hernia.      EC-ECHOCARDIOGRAM COMPLETE W/O CONT   Final Result      DX-HIP-COMPLETE - UNILATERAL 2+ LEFT   Final Result      1.  No definite acute osseous abnormality. In setting of demineralization, an occult fracture is difficult to exclude. If there is strong clinical suspicion, CT or MRI could be obtained for further evaluation.   2.  Postsurgical changes of the left femur with broken screw fragment redemonstrated.      DX-CHEST-PORTABLE (1 VIEW)   Final Result      No acute cardiopulmonary process is seen.      Atherosclerotic plaque.           Assessment/Plan  * ST elevation myocardial infarction involving right coronary artery (HCC)- (present on  admission)   Assessment & Plan    Post anti plt, heparin, statin therapy - transitioned to comfort care- no chest pain or dyspnea.   Provide IV morphine, supportive treatment if chest pain symptoms.          Abdominal pain   Assessment & Plan    Transient / intermittent.  No new symptoms, abdominal exam benign  Monitor intake and for signs of constipation. Ordered for scheduled senakot and miralax. Has been noncompliant  Bowel protocols.           Hypotension   Assessment & Plan     Asymptomatic   Encourage intake   Hold lasix for sbp < 100            Pain of left hip joint- (present on admission)   Assessment & Plan    Also intermittent left Knee - probably OA - exam no signs of acute infection.   Pain control      Chronic kidney disease (CKD), stage III (moderate) ([facility])- (present on admission)   Assessment & Plan    Was stable  Encourage intake.             Fall- (present on admission)   Assessment & Plan    With debility - improved function.   Try to encourage mobility, assist with transfers and use of walker.            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            Essential hypertension- (present on admission)   Assessment & Plan    BP stable to mildly low.  Hold lasix for sbp < 100 . Comfort care              Advanced directives, counseling/discussion- (present on admission)   Assessment & Plan    Comfort care      Oral morphine for pain, air hunger. Lorazepam for anxiety             VTE prophylaxis: comfort care

## 2018-12-23 NOTE — CARE PLAN
Problem: Safety  Goal: Will remain free from falls  Outcome: PROGRESSING AS EXPECTED    Intervention: Implement fall precautions  Bed in low position, wheels locked, call light within reach, hourly rounding in place.      Problem: Mobility  Goal: Risk for activity intolerance will decrease  Outcome: PROGRESSING AS EXPECTED  Patient ambulated to bathroom with standby assist and FWW.

## 2018-12-24 ENCOUNTER — PATIENT OUTREACH (OUTPATIENT)
Dept: HEALTH INFORMATION MANAGEMENT | Facility: OTHER | Age: 83
End: 2018-12-24

## 2018-12-24 PROCEDURE — 770001 HCHG ROOM/CARE - MED/SURG/GYN PRIV*

## 2018-12-24 PROCEDURE — 99231 SBSQ HOSP IP/OBS SF/LOW 25: CPT | Performed by: INTERNAL MEDICINE

## 2018-12-24 ASSESSMENT — ENCOUNTER SYMPTOMS
VOMITING: 0
NAUSEA: 0
HEADACHES: 0
SHORTNESS OF BREATH: 0
BACK PAIN: 0
COUGH: 0
ABDOMINAL PAIN: 0

## 2018-12-24 ASSESSMENT — PAIN SCALES - GENERAL
PAINLEVEL_OUTOF10: 0
PAINLEVEL_OUTOF10: 0

## 2018-12-24 NOTE — PROGRESS NOTES
Assumed care of Nora at 0700. She is pleasantly resting in bed waiting for breakfast to come. Refused to get OOB in to chair for breakfast. [facility]

## 2018-12-24 NOTE — PROGRESS NOTES
Per chart review the patient remains at [facility].  Plan: will monitor for discharge from the hospital.

## 2018-12-24 NOTE — PROGRESS NOTES
Pt resting throughout noc shift. 1 person assist up to br, voided without difficulty,no c/o's of pain, tolerating reg diet.  Able to reposition in bed,  horly rounding in practice

## 2018-12-24 NOTE — PROGRESS NOTES
[facility] Medicine Daily Progress Note    Date of Service  12/24/2018    Chief Complaint  89 y.o. female admitted 11/13/2018 with fall.    Hospital Course    PMH HTN who presented with fall. She was found with PRUDENCE in inferior leads on EKG. Stat TTE showed mild hypokinesis of inferior wall. Cardiology was consulted, given her goals of care, she was monitored in the [facility] on heparin infusion for NSTEMI. She did well and was treated with asa, plavix, lipitor. She had low BP and was not started on BB or ACEI/ARB. She was diuresed for fluid overload.  She had discussion of care with her family and changed to comfort care and hospice was consulted. In anticipation for discharge, her quantiferon was positive. CXR not show signs of TB and her AFBs were negative. She has delayed discharge due to placement.          Interval Problem Update  She has no complaints.   Had BM, no abd pain, N/V    Consultants/Specialty  Cardiology  Critical Care    Code Status  DNR/Comfort care    Disposition   vs home with hospice - difficult placement, unable to afford   OT reevaluated and patient has improved mobility. Social work to follow up with hospice if they will accept her with return to home    Review of Systems  Review of Systems   Respiratory: Negative for cough and shortness of breath.    Cardiovascular: Negative for chest pain and leg swelling.   Gastrointestinal: Negative for abdominal pain, nausea and vomiting.   Musculoskeletal: Negative for back pain.   Neurological: Negative for headaches.        Physical Exam  Temp:  [36.6 °C (97.8 °F)] 36.6 °C (97.8 °F)  Pulse:  [75] 75  Resp:  [16] 16  BP: (114)/(62) 114/62    Physical Exam   Constitutional: She is oriented to person, place, and time.   Thin, frail  Pleasant   HENT:   Mouth/Throat: Oropharynx is clear and moist.   Eyes: Conjunctivae are normal.   Cardiovascular: Normal rate and regular rhythm.    Pulmonary/Chest: Effort normal. She has no wheezes.   Abdominal: Soft. She  exhibits no distension. There is no tenderness. There is no rebound and no guarding.   Musculoskeletal:   Diffuse muscle atrophy   Neurological: She is alert and oriented to person, place, and time.   Skin: Skin is warm and dry.   Nursing note and vitals reviewed.      Fluids  No intake or output data in the 24 hours ending 12/24/18 1053    Laboratory                        Imaging  DX-CHEST-PORTABLE (1 VIEW)   Final Result         1.  Hazy interstitial left pulmonary opacities suggests interstitial edema or infiltrate, similar to prior.   2.  Trace left pleural effusion   3.  Hyperexpansion of lungs favors changes of COPD.      DX-CHEST-PORTABLE (1 VIEW)   Final Result         1.  Interstitial infiltrates or edema, stable.   2.  Atherosclerosis      DX-CHEST-PORTABLE (1 VIEW)   Final Result         1.  Interstitial infiltrates or edema.   2.  Atherosclerosis      DX-CHEST-PORTABLE (1 VIEW)   Final Result      Moderate interstitial edema or interstitial lung disease and small left pleural effusion similar to previous.      DX-CHEST-PORTABLE (1 VIEW)   Final Result      Stable interstitial edema and/or interstitial lung disease with probable small pleural fluid      DX-CHEST-PORTABLE (1 VIEW)   Final Result      Ill-defined infrahilar interstitial opacities, atelectasis versus mild edema. No significant pleural effusions.      DX-CHEST-PORTABLE (1 VIEW)   Final Result      Mild left basilar atelectasis, improved since prior. No new consolidation or large pleural effusions.      DX-CHEST-PORTABLE (1 VIEW)   Final Result      1.  Left basilar opacification may be secondary to atelectasis. Developing pneumonia cannot be excluded.         DX-CHEST-PORTABLE (1 VIEW)   Final Result      1.  Unchanged mild interstitial pulmonary edema   2.  Unchanged bibasilar atelectasis, alveolar edema or pneumonia      DX-CHEST-PORTABLE (1 VIEW)   Final Result      1.  Decreased pulmonary edema   2.  Unchanged bibasilar atelectasis,  alveolar edema or pneumonia      DX-CHEST-PORTABLE (1 VIEW)   Final Result      1.  There is diffuse interstitial and alveolar density in the right mid and lower lung zone. This is increased since the previous study. This may represent mild pulmonary edema/consolidation.   2.  Mildly enlarged cardiomediastinal silhouette when compared with the previous chest x-ray.      DX-CHEST-PORTABLE (1 VIEW)   Final Result      Stable mild pulmonary edema.   New left basilar atelectasis.      DX-CHEST-PORTABLE (1 VIEW)   Final Result      Stable chest appearance compared with 11/16.      DX-CHEST-PORTABLE (1 VIEW)   Final Result      No acute cardiopulmonary abnormality identified.      DX-CHEST-PORTABLE (1 VIEW)   Final Result      No acute cardiopulmonary abnormality. No interval change.      DX-CHEST-PORTABLE (1 VIEW)   Final Result      No acute cardiopulmonary disease.      CT-HEAD W/O   Final Result      1.  No evidence of acute intracranial process.      2.  There is again seen interstitial pulmonary edema and bibasilar atelectasis.      CT-HIP W/O PLUS RECONS LEFT   Final Result      1.  No evidence of acute fracture or malalignment. No evidence of hardware failure or complication.   2.  Postsurgical changes of the left femur with broken screw fragment redemonstrated.   3.  Demineralization.   4.  Scattered colonic diverticula.   5.  Atherosclerosis.   6.  Small fat-containing umbilical hernia.      EC-ECHOCARDIOGRAM COMPLETE W/O CONT   Final Result      DX-HIP-COMPLETE - UNILATERAL 2+ LEFT   Final Result      1.  No definite acute osseous abnormality. In setting of demineralization, an occult fracture is difficult to exclude. If there is strong clinical suspicion, CT or MRI could be obtained for further evaluation.   2.  Postsurgical changes of the left femur with broken screw fragment redemonstrated.      DX-CHEST-PORTABLE (1 VIEW)   Final Result      No acute cardiopulmonary process is seen.      Atherosclerotic  plaque.           Assessment/Plan  * ST elevation myocardial infarction involving right coronary artery (HCC)- (present on admission)   Assessment & Plan    Post anti plt, heparin, statin therapy - transitioned to comfort care- no chest pain or dyspnea.   Provide IV morphine, supportive treatment if chest pain symptoms.          Abdominal pain   Assessment & Plan    Transient / intermittent.  No new symptoms, abdominal exam benign  Monitor intake and for signs of constipation. Ordered for scheduled senakot and miralax. Has been noncompliant  Bowel protocols.           Hypotension   Assessment & Plan     Asymptomatic   Encourage intake   Hold lasix for sbp < 100            Pain of left hip joint- (present on admission)   Assessment & Plan    Also intermittent left Knee - probably OA - exam no signs of acute infection.   Pain control      Chronic kidney disease (CKD), stage III (moderate) ([facility])- (present on admission)   Assessment & Plan    Was stable  Encourage intake.             Fall- (present on admission)   Assessment & Plan    With debility - improved function.   Try to encourage mobility, assist with transfers and use of walker.            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            Essential hypertension- (present on admission)   Assessment & Plan    BP stable to mildly low.  Hold lasix for sbp < 100 . Comfort care              Advanced directives, counseling/discussion- (present on admission)   Assessment & Plan    Comfort care      Oral morphine for pain, air hunger. Lorazepam for anxiety             VTE prophylaxis: comfort care

## 2018-12-24 NOTE — CARE PLAN
Problem: Safety  Goal: Will remain free from injury  Outcome: PROGRESSING AS EXPECTED  Nora calls appropriately for assistance, ambulates with SBA for safety    Problem: Skin Integrity  Goal: Risk for impaired skin integrity will decrease  Outcome: PROGRESSING AS EXPECTED  Nora repositions herself frequently in bed, accepts and acknowledges education provided for safety

## 2018-12-25 PROCEDURE — 99232 SBSQ HOSP IP/OBS MODERATE 35: CPT | Performed by: INTERNAL MEDICINE

## 2018-12-25 PROCEDURE — 770001 HCHG ROOM/CARE - MED/SURG/GYN PRIV*

## 2018-12-25 ASSESSMENT — ENCOUNTER SYMPTOMS
SPEECH CHANGE: 0
EYE PAIN: 0
SHORTNESS OF BREATH: 0
SEIZURES: 0
BACK PAIN: 0
COUGH: 0
CONSTIPATION: 0
WEAKNESS: 0
PALPITATIONS: 0
TREMORS: 0
FEVER: 0
WEIGHT LOSS: 0
HEADACHES: 0
DEPRESSION: 0
BLURRED VISION: 0
FALLS: 0
CHILLS: 0
SPUTUM PRODUCTION: 0
NAUSEA: 0
WHEEZING: 0
HEARTBURN: 0
PND: 0
DIARRHEA: 0
ABDOMINAL PAIN: 0
NECK PAIN: 0
DIZZINESS: 0
VOMITING: 0

## 2018-12-25 ASSESSMENT — LIFESTYLE VARIABLES: SUBSTANCE_ABUSE: 0

## 2018-12-25 ASSESSMENT — PAIN SCALES - GENERAL: PAINLEVEL_OUTOF10: 0

## 2018-12-25 NOTE — PROGRESS NOTES
Skin assessed with Connie SHERMAN RN. Patient skin is intact. Heels are intact and normo color, sacral coccyx area is pin and blanching, elbows are pink and blanching.

## 2018-12-25 NOTE — PROGRESS NOTES
Pt resting comfortably in bed. Denies pain, SOB, or nausea. Up standby assist to the bathroom. No other concerns at the moment.

## 2018-12-25 NOTE — PROGRESS NOTES
Hospital Medicine Daily Progress Note    Date of Service  12/25/2018    Chief Complaint  89 y.o. female admitted 11/13/2018 with fall.    Hospital Course    PMH HTN who presented with fall. She was found with PRUDENCE in inferior leads on EKG. Stat TTE showed mild hypokinesis of inferior wall. Cardiology was consulted, given her goals of care, she was monitored in the [facility] on heparin infusion for NSTEMI. She did well and was treated with asa, plavix, lipitor. She had low BP and was not started on BB or ACEI/ARB. She was diuresed for fluid overload.  She had discussion of care with her family and changed to comfort care and hospice was consulted. In anticipation for discharge, her quantiferon was positive. CXR not show signs of TB and her AFBs were negative. She has delayed discharge due to placement.          Interval Problem Update  12/25.  Patient is comfortable and stable overnight no significant acute event.  Still pending placement.  Patient does not want to go to nursing care facility.  Pending  help with financial support.    Consultants/Specialty  Cardiology  Critical Care    Code Status  DNR/Comfort care    Disposition   vs home with hospice - difficult placement, unable to afford GH  OT reevaluated and patient has improved mobility. Social work to follow up with hospice if they will accept her with return to home    Review of Systems  Review of Systems   Constitutional: Negative for chills, fever and weight loss.   HENT: Negative for congestion, ear discharge, ear pain, hearing loss and nosebleeds.    Eyes: Negative for blurred vision and pain.   Respiratory: Negative for cough, sputum production, shortness of breath and wheezing.    Cardiovascular: Negative for chest pain, palpitations, leg swelling and PND.   Gastrointestinal: Negative for abdominal pain, constipation, diarrhea, heartburn, nausea and vomiting.   Genitourinary: Negative for dysuria, frequency and hematuria.   Musculoskeletal:  Negative for back pain, falls, joint pain and neck pain.   Skin: Negative for rash.   Neurological: Negative for dizziness, tremors, speech change, seizures, weakness and headaches.   Psychiatric/Behavioral: Negative for depression, substance abuse and suicidal ideas.        Physical Exam  Temp:  [37.2 °C (99 °F)-37.4 °C (99.4 °F)] 37.4 °C (99.4 °F)  Pulse:  [81-89] 89  Resp:  [16] 16  BP: (117-128)/(62-80) 128/80    Physical Exam   Constitutional: She is oriented to person, place, and time. She appears well-developed and well-nourished.   Thin, frail  Pleasant   HENT:   Head: Normocephalic.   Nose: Nose normal.   Mouth/Throat: Oropharynx is clear and moist. No oropharyngeal exudate.   Eyes: Pupils are equal, round, and reactive to light. Conjunctivae and EOM are normal.   Neck: Normal range of motion. Neck supple. No JVD present. No thyromegaly present.   Cardiovascular: Normal rate, regular rhythm and normal heart sounds.  Exam reveals no gallop and no friction rub.    No murmur heard.  Pulmonary/Chest: Effort normal and breath sounds normal. No respiratory distress. She has no wheezes. She has no rales.   Abdominal: Soft. Bowel sounds are normal. She exhibits no distension and no mass. There is no tenderness. There is no rebound and no guarding.   Musculoskeletal: Normal range of motion. She exhibits no edema or tenderness.   Diffuse muscle atrophy   Lymphadenopathy:     She has no cervical adenopathy.   Neurological: She is alert and oriented to person, place, and time. No cranial nerve deficit.   Skin: Skin is warm and dry. No rash noted.   Psychiatric: Her behavior is normal.   Nursing note and vitals reviewed.      Fluids  No intake or output data in the 24 hours ending 12/25/18 1323    Laboratory                        Imaging  DX-CHEST-PORTABLE (1 VIEW)   Final Result         1.  Hazy interstitial left pulmonary opacities suggests interstitial edema or infiltrate, similar to prior.   2.  Trace left pleural  effusion   3.  Hyperexpansion of lungs favors changes of COPD.      DX-CHEST-PORTABLE (1 VIEW)   Final Result         1.  Interstitial infiltrates or edema, stable.   2.  Atherosclerosis      DX-CHEST-PORTABLE (1 VIEW)   Final Result         1.  Interstitial infiltrates or edema.   2.  Atherosclerosis      DX-CHEST-PORTABLE (1 VIEW)   Final Result      Moderate interstitial edema or interstitial lung disease and small left pleural effusion similar to previous.      DX-CHEST-PORTABLE (1 VIEW)   Final Result      Stable interstitial edema and/or interstitial lung disease with probable small pleural fluid      DX-CHEST-PORTABLE (1 VIEW)   Final Result      Ill-defined infrahilar interstitial opacities, atelectasis versus mild edema. No significant pleural effusions.      DX-CHEST-PORTABLE (1 VIEW)   Final Result      Mild left basilar atelectasis, improved since prior. No new consolidation or large pleural effusions.      DX-CHEST-PORTABLE (1 VIEW)   Final Result      1.  Left basilar opacification may be secondary to atelectasis. Developing pneumonia cannot be excluded.         DX-CHEST-PORTABLE (1 VIEW)   Final Result      1.  Unchanged mild interstitial pulmonary edema   2.  Unchanged bibasilar atelectasis, alveolar edema or pneumonia      DX-CHEST-PORTABLE (1 VIEW)   Final Result      1.  Decreased pulmonary edema   2.  Unchanged bibasilar atelectasis, alveolar edema or pneumonia      DX-CHEST-PORTABLE (1 VIEW)   Final Result      1.  There is diffuse interstitial and alveolar density in the right mid and lower lung zone. This is increased since the previous study. This may represent mild pulmonary edema/consolidation.   2.  Mildly enlarged cardiomediastinal silhouette when compared with the previous chest x-ray.      DX-CHEST-PORTABLE (1 VIEW)   Final Result      Stable mild pulmonary edema.   New left basilar atelectasis.      DX-CHEST-PORTABLE (1 VIEW)   Final Result      Stable chest appearance compared with  11/16.      DX-CHEST-PORTABLE (1 VIEW)   Final Result      No acute cardiopulmonary abnormality identified.      DX-CHEST-PORTABLE (1 VIEW)   Final Result      No acute cardiopulmonary abnormality. No interval change.      DX-CHEST-PORTABLE (1 VIEW)   Final Result      No acute cardiopulmonary disease.      CT-HEAD W/O   Final Result      1.  No evidence of acute intracranial process.      2.  There is again seen interstitial pulmonary edema and bibasilar atelectasis.      CT-HIP W/O PLUS RECONS LEFT   Final Result      1.  No evidence of acute fracture or malalignment. No evidence of hardware failure or complication.   2.  Postsurgical changes of the left femur with broken screw fragment redemonstrated.   3.  Demineralization.   4.  Scattered colonic diverticula.   5.  Atherosclerosis.   6.  Small fat-containing umbilical hernia.      EC-ECHOCARDIOGRAM COMPLETE W/O CONT   Final Result      DX-HIP-COMPLETE - UNILATERAL 2+ LEFT   Final Result      1.  No definite acute osseous abnormality. In setting of demineralization, an occult fracture is difficult to exclude. If there is strong clinical suspicion, CT or MRI could be obtained for further evaluation.   2.  Postsurgical changes of the left femur with broken screw fragment redemonstrated.      DX-CHEST-PORTABLE (1 VIEW)   Final Result      No acute cardiopulmonary process is seen.      Atherosclerotic plaque.           Assessment/Plan  * ST elevation myocardial infarction involving right coronary artery (HCC)- (present on admission)   Assessment & Plan    Post anti plt, heparin, statin therapy - transitioned to comfort care- no chest pain or dyspnea.   Provide IV morphine, supportive treatment if chest pain symptoms.  Patient is comfortable, comfort care          Abdominal pain   Assessment & Plan    Transient / intermittent.  No new symptoms, abdominal exam benign  Monitor intake and for signs of constipation. Ordered for scheduled senakot and miralax. Has been  noncompliant  Bowel protocols.        Patient is comfortable and comfort care     Hypotension   Assessment & Plan     Asymptomatic   Encourage intake   Hold lasix for sbp < 100         Patient is comfortable, comfort care          Pain of left hip joint- (present on admission)   Assessment & Plan    Also intermittent left Knee - probably OA - exam no signs of acute infection.   Pain control   Patient is comfortable, comfort care          Chronic kidney disease (CKD), stage III (moderate) (HCC)- (present on admission)   Assessment & Plan    Was stable  Encourage intake.       Patient is comfortable, comfort care             Fall- (present on admission)   Assessment & Plan    With debility - improved function.   Try to encourage mobility, assist with transfers and use of walker.            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            Essential hypertension- (present on admission)   Assessment & Plan    BP stable to mildly low.  Hold lasix for sbp < 100 . Comfort care              Advanced directives, counseling/discussion- (present on admission)   Assessment & Plan    Comfort care      Oral morphine for pain, air hunger. Lorazepam for anxiety     Patient is comfortable, comfort care               VTE prophylaxis: comfort care      I have discussed with RN and CM and SW and other consultants about patient's plan.       Current Facility-Administered Medications:   •  senna-docusate (PERICOLACE or SENOKOT S) 8.6-50 MG per tablet 2 Tab, 2 Tab, Oral, BID **AND** polyethylene glycol/lytes (MIRALAX) PACKET 1 Packet, 1 Packet, Oral, DAILY **AND** magnesium hydroxide (MILK OF MAGNESIA) suspension 30 mL, 30 mL, Oral, QDAY PRN **AND** bisacodyl (DULCOLAX) suppository 10 mg, 10 mg, Rectal, QDAY PRN, Fam Tejeda M.D., 10  mg at 12/23/18 0427  •  ipratropium-albuterol (DUONEB) nebulizer solution, 3 mL, Nebulization, Q4H PRN (RT), Caitlyn Finn M.D., 3 mL at 12/15/18 0151  •  LORazepam (ATIVAN) tablet 0.5 mg, 0.5 mg, Oral, Q4HRS PRN, Caitlyn Finn M.D., 0.5 mg at 12/15/18 0143  •  morphine (ROXANOL) 20 MG/ML oral conc 5-10 mg, 5-10 mg, Oral, Q HOUR PRN, Caitlyn Finn M.D.  •  acetaminophen (TYLENOL) tablet 500 mg, 500 mg, Oral, Q6HRS PRN, Caitlyn Finn M.D., 500 mg at 12/13/18 0237  •  diphenhydrAMINE-ZnAcetate (BENADRYL ITCH) 1-0.1 % cream, , Topical, 4X/DAY PRN, Caitlyn Finn M.D.  •  MD ALERT...adult comfort care, , Other, PRN, Caitlyn Finn M.D.  •  atropine 1 % ophthalmic solution 2 Drop, 2 Drop, Sublingual, Q4HRS PRN, Caitlyn Finn M.D.  •  furosemide (LASIX) tablet 20 mg, 20 mg, Oral, Q DAY, Caitlyn Finn M.D., 20 mg at 12/19/18 5291

## 2018-12-26 PROCEDURE — 770001 HCHG ROOM/CARE - MED/SURG/GYN PRIV*

## 2018-12-26 PROCEDURE — 99232 SBSQ HOSP IP/OBS MODERATE 35: CPT | Performed by: INTERNAL MEDICINE

## 2018-12-26 ASSESSMENT — ENCOUNTER SYMPTOMS
ABDOMINAL PAIN: 0
WHEEZING: 0
HEADACHES: 0
CONSTIPATION: 0
PND: 0
WEIGHT LOSS: 0
NECK PAIN: 0
BLURRED VISION: 0
TREMORS: 0
EYE PAIN: 0
DIZZINESS: 0
PALPITATIONS: 0
COUGH: 0
BACK PAIN: 0
FEVER: 0
WEAKNESS: 0
SPUTUM PRODUCTION: 0
NAUSEA: 0
DEPRESSION: 0
FALLS: 0

## 2018-12-26 ASSESSMENT — PAIN SCALES - GENERAL
PAINLEVEL_OUTOF10: 0
PAINLEVEL_OUTOF10: 0

## 2018-12-26 ASSESSMENT — LIFESTYLE VARIABLES: SUBSTANCE_ABUSE: 0

## 2018-12-26 NOTE — CARE PLAN
Problem: Safety  Goal: Will remain free from injury    Intervention: Provide assistance with mobility  Patient assisted by staff with mobility; Uses FWW during ambulation , standby assist. Repositioned Q2h when in bed.       Problem: Skin Integrity  Goal: Risk for impaired skin integrity will decrease    Intervention: Assess and monitor skin integrity, appearance and/or temperature  Blanching redness noted to sacral area, mepilex not in use due to tape allergy. Patient repositioned Q2h.

## 2018-12-26 NOTE — CARE PLAN
Problem: Safety  Goal: Will remain free from injury  Outcome: PROGRESSING AS EXPECTED  Calls appropriately for assistance, SBA assist with a FWW for safety    Problem: Discharge Barriers/Planning  Goal: Patient's continuum of care needs will be met  Outcome: PROGRESSING SLOWER THAN EXPECTED

## 2018-12-26 NOTE — PROGRESS NOTES
Assumed care of patient at 0700. In bed waiting for breakfast. Refusing to sit up for breakfast. Prefers to have the room dark

## 2018-12-27 ENCOUNTER — PATIENT OUTREACH (OUTPATIENT)
Dept: HEALTH INFORMATION MANAGEMENT | Facility: OTHER | Age: 83
End: 2018-12-27

## 2018-12-27 PROCEDURE — 99232 SBSQ HOSP IP/OBS MODERATE 35: CPT | Performed by: INTERNAL MEDICINE

## 2018-12-27 PROCEDURE — 770001 HCHG ROOM/CARE - MED/SURG/GYN PRIV*

## 2018-12-27 ASSESSMENT — ENCOUNTER SYMPTOMS
VOMITING: 0
PALPITATIONS: 0
FEVER: 0
CONSTIPATION: 0
TINGLING: 0
BACK PAIN: 0
EYE PAIN: 0
DOUBLE VISION: 0
HALLUCINATIONS: 0
NAUSEA: 0
PND: 0
COUGH: 0
NECK PAIN: 0
DEPRESSION: 0
WEAKNESS: 0
DIZZINESS: 0
WEIGHT LOSS: 0

## 2018-12-27 ASSESSMENT — PAIN SCALES - GENERAL
PAINLEVEL_OUTOF10: 0
PAINLEVEL_OUTOF10: 0

## 2018-12-27 NOTE — PROGRESS NOTES
Hospital Medicine Daily Progress Note    Date of Service  12/26/2018    Chief Complaint  89 y.o. female admitted 11/13/2018 with fall.    Hospital Course    PMH HTN who presented with fall. She was found with PRUDENCE in inferior leads on EKG. Stat TTE showed mild hypokinesis of inferior wall. Cardiology was consulted, given her goals of care, she was monitored in the [facility] on heparin infusion for NSTEMI. She did well and was treated with asa, plavix, lipitor. She had low BP and was not started on BB or ACEI/ARB. She was diuresed for fluid overload.  She had discussion of care with her family and changed to comfort care and hospice was consulted. In anticipation for discharge, her quantiferon was positive. CXR not show signs of TB and her AFBs were negative. She has delayed discharge due to placement.          Interval Problem Update  12/25.  Patient is comfortable and stable overnight no significant acute event.  Still pending placement.  Patient does not want to go to nursing care facility.  Pending  help with financial support.  12/26.  Patient is stable overnight and pleasant.  Pending discharge plan.   continue to work with hospice team.  Pending financial assist.    Consultants/Specialty  Cardiology  Critical Care    Code Status  DNR/Comfort care    Disposition  [facility] vs home with hospice - difficult placement, unable to afford GH  OT reevaluated and patient has improved mobility. Social work to follow up with hospice if they will accept her with return to home    Review of Systems  Review of Systems   Constitutional: Negative for fever and weight loss.   HENT: Negative for ear pain and hearing loss.    Eyes: Negative for blurred vision and pain.   Respiratory: Negative for cough, sputum production and wheezing.    Cardiovascular: Negative for chest pain, palpitations, leg swelling and PND.   Gastrointestinal: Negative for abdominal pain, constipation and nausea.   Genitourinary: Negative for  dysuria, frequency and hematuria.   Musculoskeletal: Negative for back pain, falls and neck pain.   Skin: Negative for rash.   Neurological: Negative for dizziness, tremors, weakness and headaches.   Psychiatric/Behavioral: Negative for depression and substance abuse.        Physical Exam  Temp:  [36.2 °C (97.1 °F)-37.4 °C (99.4 °F)] 36.2 °C (97.1 °F)  Pulse:  [80-87] 80  Resp:  [14-15] 14  BP: (112-127)/(56-67) 112/56    Physical Exam   Constitutional: She is oriented to person, place, and time. She appears well-developed. No distress.   Thin, frail  Pleasant   HENT:   Head: Normocephalic.   Nose: Nose normal.   Mouth/Throat: Oropharynx is clear and moist. No oropharyngeal exudate.   Eyes: Pupils are equal, round, and reactive to light. Conjunctivae and EOM are normal.   Neck: Normal range of motion. Neck supple. No JVD present. No tracheal deviation present.   Cardiovascular: Normal rate, regular rhythm and normal heart sounds.  Exam reveals no gallop and no friction rub.    Pulmonary/Chest: Effort normal and breath sounds normal. No respiratory distress. She has no rales. She exhibits no tenderness.   Abdominal: Soft. Bowel sounds are normal. She exhibits no mass. There is no tenderness. There is no rebound and no guarding.   Musculoskeletal: Normal range of motion. She exhibits no edema or tenderness.   Diffuse muscle atrophy   Lymphadenopathy:     She has no cervical adenopathy.   Neurological: She is alert and oriented to person, place, and time. No cranial nerve deficit.   Skin: Skin is warm and dry. No erythema.   Psychiatric: Her behavior is normal.   Nursing note and vitals reviewed.      Fluids    Intake/Output Summary (Last 24 hours) at 12/26/18 1603  Last data filed at 12/26/18 1300   Gross per 24 hour   Intake              480 ml   Output                0 ml   Net              480 ml       Laboratory                        Imaging  DX-CHEST-PORTABLE (1 VIEW)   Final Result         1.  Hazy interstitial  left pulmonary opacities suggests interstitial edema or infiltrate, similar to prior.   2.  Trace left pleural effusion   3.  Hyperexpansion of lungs favors changes of COPD.      DX-CHEST-PORTABLE (1 VIEW)   Final Result         1.  Interstitial infiltrates or edema, stable.   2.  Atherosclerosis      DX-CHEST-PORTABLE (1 VIEW)   Final Result         1.  Interstitial infiltrates or edema.   2.  Atherosclerosis      DX-CHEST-PORTABLE (1 VIEW)   Final Result      Moderate interstitial edema or interstitial lung disease and small left pleural effusion similar to previous.      DX-CHEST-PORTABLE (1 VIEW)   Final Result      Stable interstitial edema and/or interstitial lung disease with probable small pleural fluid      DX-CHEST-PORTABLE (1 VIEW)   Final Result      Ill-defined infrahilar interstitial opacities, atelectasis versus mild edema. No significant pleural effusions.      DX-CHEST-PORTABLE (1 VIEW)   Final Result      Mild left basilar atelectasis, improved since prior. No new consolidation or large pleural effusions.      DX-CHEST-PORTABLE (1 VIEW)   Final Result      1.  Left basilar opacification may be secondary to atelectasis. Developing pneumonia cannot be excluded.         DX-CHEST-PORTABLE (1 VIEW)   Final Result      1.  Unchanged mild interstitial pulmonary edema   2.  Unchanged bibasilar atelectasis, alveolar edema or pneumonia      DX-CHEST-PORTABLE (1 VIEW)   Final Result      1.  Decreased pulmonary edema   2.  Unchanged bibasilar atelectasis, alveolar edema or pneumonia      DX-CHEST-PORTABLE (1 VIEW)   Final Result      1.  There is diffuse interstitial and alveolar density in the right mid and lower lung zone. This is increased since the previous study. This may represent mild pulmonary edema/consolidation.   2.  Mildly enlarged cardiomediastinal silhouette when compared with the previous chest x-ray.      DX-CHEST-PORTABLE (1 VIEW)   Final Result      Stable mild pulmonary edema.   New left  basilar atelectasis.      DX-CHEST-PORTABLE (1 VIEW)   Final Result      Stable chest appearance compared with 11/16.      DX-CHEST-PORTABLE (1 VIEW)   Final Result      No acute cardiopulmonary abnormality identified.      DX-CHEST-PORTABLE (1 VIEW)   Final Result      No acute cardiopulmonary abnormality. No interval change.      DX-CHEST-PORTABLE (1 VIEW)   Final Result      No acute cardiopulmonary disease.      CT-HEAD W/O   Final Result      1.  No evidence of acute intracranial process.      2.  There is again seen interstitial pulmonary edema and bibasilar atelectasis.      CT-HIP W/O PLUS RECONS LEFT   Final Result      1.  No evidence of acute fracture or malalignment. No evidence of hardware failure or complication.   2.  Postsurgical changes of the left femur with broken screw fragment redemonstrated.   3.  Demineralization.   4.  Scattered colonic diverticula.   5.  Atherosclerosis.   6.  Small fat-containing umbilical hernia.      EC-ECHOCARDIOGRAM COMPLETE W/O CONT   Final Result      DX-HIP-COMPLETE - UNILATERAL 2+ LEFT   Final Result      1.  No definite acute osseous abnormality. In setting of demineralization, an occult fracture is difficult to exclude. If there is strong clinical suspicion, CT or MRI could be obtained for further evaluation.   2.  Postsurgical changes of the left femur with broken screw fragment redemonstrated.      DX-CHEST-PORTABLE (1 VIEW)   Final Result      No acute cardiopulmonary process is seen.      Atherosclerotic plaque.           Assessment/Plan  * ST elevation myocardial infarction involving right coronary artery (HCC)- (present on admission)   Assessment & Plan    Post anti plt, heparin, statin therapy - transitioned to comfort care- no chest pain or dyspnea.   Provide IV morphine, supportive treatment if chest pain symptoms.  Patient is comfortable, comfort care       Pending placement     Abdominal pain   Assessment & Plan    Transient / intermittent.  No new  symptoms, abdominal exam benign  Monitor intake and for signs of constipation. Ordered for scheduled senakot and miralax. Has been noncompliant  Bowel protocols.        Patient is comfortable and comfort care  Pending placement     Hypotension   Assessment & Plan     Asymptomatic   Encourage intake   Hold lasix for sbp < 100              Pain of left hip joint- (present on admission)   Assessment & Plan    Also intermittent left Knee - probably OA - exam no signs of acute infection.   Pain control   Patient is comfortable, comfort care          Chronic kidney disease (CKD), stage III (moderate) ([facility])- (present on admission)   Assessment & Plan    Was stable  Encourage intake.       Patient is comfortable, comfort care             Fall- (present on admission)   Assessment & Plan    With debility - improved function.   Try to encourage mobility, assist with transfers and use of walker.            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            Essential hypertension- (present on admission)   Assessment & Plan    BP stable to mildly low.  Hold lasix for sbp < 100 . Comfort care              Advanced directives, counseling/discussion- (present on admission)   Assessment & Plan    Comfort care      Oral morphine for pain, air hunger. Lorazepam for anxiety     Patient is comfortable, comfort care               VTE prophylaxis: comfort care      I have discussed with RN and CM and SW and other consultants about patient's plan.       Current Facility-Administered Medications:   •  senna-docusate (PERICOLACE or SENOKOT S) 8.6-50 MG per tablet 2 Tab, 2 Tab, Oral, BID **AND** polyethylene glycol/lytes (MIRALAX) PACKET 1 Packet, 1 Packet, Oral, DAILY **AND** magnesium hydroxide (MILK OF MAGNESIA) suspension 30 mL, 30 mL,  Oral, QDAY PRN **AND** bisacodyl (DULCOLAX) suppository 10 mg, 10 mg, Rectal, QDAY PRN, Fam Tejeda M.D., 10 mg at 12/23/18 0427  •  ipratropium-albuterol (DUONEB) nebulizer solution, 3 mL, Nebulization, Q4H PRN (RT), Caitlyn Finn M.D., 3 mL at 12/15/18 0151  •  LORazepam (ATIVAN) tablet 0.5 mg, 0.5 mg, Oral, Q4HRS PRN, Caitlyn Finn M.D., 0.5 mg at 12/15/18 0143  •  morphine (ROXANOL) 20 MG/ML oral conc 5-10 mg, 5-10 mg, Oral, Q HOUR PRN, Caitlyn Finn M.D.  •  acetaminophen (TYLENOL) tablet 500 mg, 500 mg, Oral, Q6HRS PRN, Caitlyn Finn M.D., 500 mg at 12/13/18 0237  •  diphenhydrAMINE-ZnAcetate (BENADRYL ITCH) 1-0.1 % cream, , Topical, 4X/DAY PRN, Caitlyn Finn M.D.  •  MD ALERT...adult comfort care, , Other, PRN, Caitlyn Finn M.D.  •  atropine 1 % ophthalmic solution 2 Drop, 2 Drop, Sublingual, Q4HRS PRN, Caitlyn Finn M.D.  •  furosemide (LASIX) tablet 20 mg, 20 mg, Oral, Q DAY, Caitlyn Finn M.D., 20 mg at 12/19/18 6649

## 2018-12-27 NOTE — CARE PLAN
Problem: Safety  Goal: Will remain free from injury  Outcome: PROGRESSING AS EXPECTED  Hourly rounds done. Call light within reach. Needs attended on a timely manner. Treaded socks on when OOB. Educated on the importance of calling for assistance when attempting to be OOB.  BED ALARM ON.    Problem: Venous Thromboembolism (VTW)/Deep Vein Thrombosis (DVT) Prevention:  Goal: Patient will participate in Venous Thrombosis (VTE)/Deep Vein Thrombosis (DVT)Prevention Measures  Outcome: PROGRESSING AS EXPECTED  Up SBA. Refused SCDs.    Problem: Mobility  Goal: Risk for activity intolerance will decrease  Outcome: PROGRESSING AS EXPECTED  Up SBA with A FWW.     Problem: Psychosocial Needs:  Goal: Level of anxiety will decrease  Outcome: PROGRESSING AS EXPECTED  Provided emotional support for patient.

## 2018-12-28 ENCOUNTER — HOME CARE VISIT (OUTPATIENT)
Dept: HOSPICE | Facility: HOSPICE | Age: 83
End: 2018-12-28
Payer: MEDICARE

## 2018-12-28 PROCEDURE — 99232 SBSQ HOSP IP/OBS MODERATE 35: CPT | Performed by: INTERNAL MEDICINE

## 2018-12-28 PROCEDURE — 770001 HCHG ROOM/CARE - MED/SURG/GYN PRIV*

## 2018-12-28 ASSESSMENT — ENCOUNTER SYMPTOMS
COUGH: 0
DIAPHORESIS: 0
BLOOD IN STOOL: 0
DEPRESSION: 0
PND: 0
BLURRED VISION: 0
SPUTUM PRODUCTION: 0
DIZZINESS: 0
BACK PAIN: 0
HALLUCINATIONS: 0
HEARTBURN: 0
WEAKNESS: 0
CHILLS: 0
TINGLING: 0
NAUSEA: 0
WEIGHT LOSS: 0
FLANK PAIN: 0
NECK PAIN: 0
PALPITATIONS: 0
DOUBLE VISION: 0

## 2018-12-28 ASSESSMENT — PAIN SCALES - GENERAL
PAINLEVEL_OUTOF10: 0

## 2018-12-28 NOTE — PROGRESS NOTES
Rosa Cash,  from the [facility] [phone], Nora's home, came with bank statements to assist with medicaid reinstatement, copies made and given to LIZA Lee's contact info is [phone].  Senior  for the facility is Rachna York [phone].  [facility] [place] [phone]; fax number [phone]

## 2018-12-28 NOTE — PROGRESS NOTES
Hospital Medicine Daily Progress Note    Date of Service  12/27/2018    Chief Complaint  89 y.o. female admitted 11/13/2018 with fall.    Hospital Course    PMH HTN who presented with fall. She was found with PRUDENCE in inferior leads on EKG. Stat TTE showed mild hypokinesis of inferior wall. Cardiology was consulted, given her goals of care, she was monitored in the [facility] on heparin infusion for NSTEMI. She did well and was treated with asa, plavix, lipitor. She had low BP and was not started on BB or ACEI/ARB. She was diuresed for fluid overload.  She had discussion of care with her family and changed to comfort care and hospice was consulted. In anticipation for discharge, her quantiferon was positive. CXR not show signs of TB and her AFBs were negative. She has delayed discharge due to placement.          Interval Problem Update  12/25.  Patient is comfortable and stable overnight no significant acute event.  Still pending placement.  Patient does not want to go to nursing care facility.  Pending  help with financial support.  12/26.  Patient is stable overnight and pleasant.  Pending discharge plan.   continue to work with hospice team.  Pending financial assist.  12/27.  Patient is stable and continue monitor and pending financial assist discharged to another facility.  Patient otherwise remained stable and no significant overnight events.    Consultants/Specialty  Cardiology  Critical Care    Code Status  DNR/Comfort care    Disposition   vs home with hospice - difficult placement, unable to afford GH  OT reevaluated and patient has improved mobility. Social work to follow up with hospice if they will accept her with return to home    Review of Systems  Review of Systems   Constitutional: Negative for fever and weight loss.   HENT: Negative for ear pain and hearing loss.    Eyes: Negative for double vision and pain.   Respiratory: Negative for cough.    Cardiovascular: Negative for chest  pain, palpitations, leg swelling and PND.   Gastrointestinal: Negative for constipation, nausea and vomiting.   Genitourinary: Negative for dysuria, frequency and hematuria.   Musculoskeletal: Negative for back pain and neck pain.   Skin: Negative for rash.   Neurological: Negative for dizziness, tingling and weakness.   Psychiatric/Behavioral: Negative for depression and hallucinations.        Physical Exam  Temp:  [36.7 °C (98.1 °F)-37.6 °C (99.6 °F)] 36.7 °C (98.1 °F)  Pulse:  [65-83] 65  Resp:  [14] 14  BP: (101-108)/(50-63) 101/63    Physical Exam   Constitutional: She is oriented to person, place, and time. She appears well-developed. No distress.   Thin, frail  Pleasant   HENT:   Head: Normocephalic.   Nose: Nose normal.   Mouth/Throat: Oropharynx is clear and moist. No oropharyngeal exudate.   Eyes: Pupils are equal, round, and reactive to light. Conjunctivae and EOM are normal.   Neck: Normal range of motion. Neck supple. No JVD present. No tracheal deviation present.   Cardiovascular: Normal rate, regular rhythm and normal heart sounds.  Exam reveals no friction rub.    No murmur heard.  Pulmonary/Chest: Effort normal and breath sounds normal. No respiratory distress. She has no wheezes. She exhibits no tenderness.   Abdominal: Soft. Bowel sounds are normal. She exhibits no distension and no mass. There is no rebound and no guarding.   Musculoskeletal: Normal range of motion. She exhibits no edema or tenderness.   Diffuse muscle atrophy   Lymphadenopathy:     She has no cervical adenopathy.   Neurological: She is alert and oriented to person, place, and time. She displays normal reflexes. No cranial nerve deficit.   Skin: Skin is warm and dry. No erythema.   Psychiatric: Her behavior is normal.   Nursing note and vitals reviewed.      Fluids    Intake/Output Summary (Last 24 hours) at 12/27/18 1729  Last data filed at 12/27/18 1200   Gross per 24 hour   Intake              360 ml   Output                0  ml   Net              360 ml       Laboratory                        Imaging  DX-CHEST-PORTABLE (1 VIEW)   Final Result         1.  Hazy interstitial left pulmonary opacities suggests interstitial edema or infiltrate, similar to prior.   2.  Trace left pleural effusion   3.  Hyperexpansion of lungs favors changes of COPD.      DX-CHEST-PORTABLE (1 VIEW)   Final Result         1.  Interstitial infiltrates or edema, stable.   2.  Atherosclerosis      DX-CHEST-PORTABLE (1 VIEW)   Final Result         1.  Interstitial infiltrates or edema.   2.  Atherosclerosis      DX-CHEST-PORTABLE (1 VIEW)   Final Result      Moderate interstitial edema or interstitial lung disease and small left pleural effusion similar to previous.      DX-CHEST-PORTABLE (1 VIEW)   Final Result      Stable interstitial edema and/or interstitial lung disease with probable small pleural fluid      DX-CHEST-PORTABLE (1 VIEW)   Final Result      Ill-defined infrahilar interstitial opacities, atelectasis versus mild edema. No significant pleural effusions.      DX-CHEST-PORTABLE (1 VIEW)   Final Result      Mild left basilar atelectasis, improved since prior. No new consolidation or large pleural effusions.      DX-CHEST-PORTABLE (1 VIEW)   Final Result      1.  Left basilar opacification may be secondary to atelectasis. Developing pneumonia cannot be excluded.         DX-CHEST-PORTABLE (1 VIEW)   Final Result      1.  Unchanged mild interstitial pulmonary edema   2.  Unchanged bibasilar atelectasis, alveolar edema or pneumonia      DX-CHEST-PORTABLE (1 VIEW)   Final Result      1.  Decreased pulmonary edema   2.  Unchanged bibasilar atelectasis, alveolar edema or pneumonia      DX-CHEST-PORTABLE (1 VIEW)   Final Result      1.  There is diffuse interstitial and alveolar density in the right mid and lower lung zone. This is increased since the previous study. This may represent mild pulmonary edema/consolidation.   2.  Mildly enlarged cardiomediastinal  silhouette when compared with the previous chest x-ray.      DX-CHEST-PORTABLE (1 VIEW)   Final Result      Stable mild pulmonary edema.   New left basilar atelectasis.      DX-CHEST-PORTABLE (1 VIEW)   Final Result      Stable chest appearance compared with 11/16.      DX-CHEST-PORTABLE (1 VIEW)   Final Result      No acute cardiopulmonary abnormality identified.      DX-CHEST-PORTABLE (1 VIEW)   Final Result      No acute cardiopulmonary abnormality. No interval change.      DX-CHEST-PORTABLE (1 VIEW)   Final Result      No acute cardiopulmonary disease.      CT-HEAD W/O   Final Result      1.  No evidence of acute intracranial process.      2.  There is again seen interstitial pulmonary edema and bibasilar atelectasis.      CT-HIP W/O PLUS RECONS LEFT   Final Result      1.  No evidence of acute fracture or malalignment. No evidence of hardware failure or complication.   2.  Postsurgical changes of the left femur with broken screw fragment redemonstrated.   3.  Demineralization.   4.  Scattered colonic diverticula.   5.  Atherosclerosis.   6.  Small fat-containing umbilical hernia.      EC-ECHOCARDIOGRAM COMPLETE W/O CONT   Final Result      DX-HIP-COMPLETE - UNILATERAL 2+ LEFT   Final Result      1.  No definite acute osseous abnormality. In setting of demineralization, an occult fracture is difficult to exclude. If there is strong clinical suspicion, CT or MRI could be obtained for further evaluation.   2.  Postsurgical changes of the left femur with broken screw fragment redemonstrated.      DX-CHEST-PORTABLE (1 VIEW)   Final Result      No acute cardiopulmonary process is seen.      Atherosclerotic plaque.           Assessment/Plan  * ST elevation myocardial infarction involving right coronary artery (HCC)- (present on admission)   Assessment & Plan    Post anti plt, heparin, statin therapy - transitioned to comfort care- no chest pain or dyspnea.   Provide IV morphine, supportive treatment if chest pain  symptoms.  Patient is comfortable, comfort care       Pending financial help for placement     Abdominal pain   Assessment & Plan    Transient / intermittent.  No new symptoms, abdominal exam benign  Monitor intake and for signs of constipation. Ordered for scheduled senakot and miralax. Has been noncompliant  Bowel protocols.        Patient is comfortable and comfort care  Pending  to help with placement     Hypotension   Assessment & Plan     Asymptomatic   Encourage intake   Hold lasix for sbp < 100              Pain of left hip joint- (present on admission)   Assessment & Plan    Also intermittent left Knee - probably OA - exam no signs of acute infection.   Pain control   Patient is comfortable, comfort care    Needs financial help and  arrangement for placement        Chronic kidney disease (CKD), stage III (moderate) ([facility])- (present on admission)   Assessment & Plan    Was stable  Encourage intake.       Patient is comfortable, comfort care             Fall- (present on admission)   Assessment & Plan    With debility - improved function.   Try to encourage mobility, assist with transfers and use of walker.            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            Essential hypertension- (present on admission)   Assessment & Plan    BP stable to mildly low.  Hold lasix for sbp < 100 . Comfort care              Advanced directives, counseling/discussion- (present on admission)   Assessment & Plan    Comfort care      Oral morphine for pain, air hunger. Lorazepam for anxiety     Patient is comfortable, comfort care     Pending placement          VTE prophylaxis: comfort care      I have discussed with RN and CM and SW and other consultants about patient's plan.       Current  Facility-Administered Medications:   •  senna-docusate (PERICOLACE or SENOKOT S) 8.6-50 MG per tablet 2 Tab, 2 Tab, Oral, BID **AND** polyethylene glycol/lytes (MIRALAX) PACKET 1 Packet, 1 Packet, Oral, DAILY **AND** magnesium hydroxide (MILK OF MAGNESIA) suspension 30 mL, 30 mL, Oral, QDAY PRN **AND** bisacodyl (DULCOLAX) suppository 10 mg, 10 mg, Rectal, QDAY PRN, Fam Tejeda M.D., 10 mg at 12/23/18 0427  •  ipratropium-albuterol (DUONEB) nebulizer solution, 3 mL, Nebulization, Q4H PRN (RT), Caitlyn Finn M.D., 3 mL at 12/15/18 0151  •  LORazepam (ATIVAN) tablet 0.5 mg, 0.5 mg, Oral, Q4HRS PRN, Caitlyn Finn M.D., 0.5 mg at 12/15/18 0143  •  morphine (ROXANOL) 20 MG/ML oral conc 5-10 mg, 5-10 mg, Oral, Q HOUR PRN, Caitlyn Finn M.D.  •  acetaminophen (TYLENOL) tablet 500 mg, 500 mg, Oral, Q6HRS PRN, Caitlyn Finn M.D., 500 mg at 12/13/18 0237  •  diphenhydrAMINE-ZnAcetate (BENADRYL ITCH) 1-0.1 % cream, , Topical, 4X/DAY PRN, Caitlyn Finn M.D.  •  MD ALERT...adult comfort care, , Other, PRN, Caitlyn Finn M.D.  •  atropine 1 % ophthalmic solution 2 Drop, 2 Drop, Sublingual, Q4HRS PRN, Caitlyn Finn M.D.  •  furosemide (LASIX) tablet 20 mg, 20 mg, Oral, Q DAY, Caitlyn Finn M.D., 20 mg at 12/19/18 0595

## 2018-12-28 NOTE — CARE PLAN
Problem: Safety  Goal: Will remain free from falls  Outcome: PROGRESSING AS EXPECTED    Intervention: Implement fall precautions  Bed in low position, wheels locked, call light within reach, hourly rounding in place.      Problem: Mobility  Goal: Risk for activity intolerance will decrease  Outcome: PROGRESSING AS EXPECTED  Patient ambulating to bathroom with x1 assist and FWW.

## 2018-12-28 NOTE — CARE PLAN
Problem: Safety  Goal: Will remain free from falls    Intervention: Implement fall precautions  Pt calls appropriately, treaded socks in use. Personal belongings and call light with in reach and bed is locked in lowest position. Hourly rounding in place      Problem: Skin Integrity  Goal: Risk for impaired skin integrity will decrease    Intervention: Implement precautions to protect skin integrity in collaboration with the interdisciplinary team  Mepilex applied to sacrum, Q2 Hour turns

## 2018-12-28 NOTE — DISCHARGE PLANNING
GELYW spoke with Calvin Jiménez from Hospice informing him of the OT note that the Pt could discharge home without 24/7 sup and that Dr. Louise is onboard with discharge only if hospice will sign on and meet with the Pt in her home. Calvin stated he will need a new hospice order and he will come reassess the Pt. LSW gave Pt an update on the possible discharge plan.

## 2018-12-28 NOTE — PROGRESS NOTES
[facility] Medicine Daily Progress Note    Date of Service  12/28/2018    Chief Complaint  89 y.o. female admitted 11/13/2018 with fall.    Hospital Course    PMH HTN who presented with fall. She was found with PRUDENCE in inferior leads on EKG. Stat TTE showed mild hypokinesis of inferior wall. Cardiology was consulted, given her goals of care, she was monitored in the [facility] on heparin infusion for NSTEMI. She did well and was treated with asa, plavix, lipitor. She had low BP and was not started on BB or ACEI/ARB. She was diuresed for fluid overload.  She had discussion of care with her family and changed to comfort care and hospice was consulted. In anticipation for discharge, her quantiferon was positive. CXR not show signs of TB and her AFBs were negative. She has delayed discharge due to placement.          Interval Problem Update  12/25.  Patient is comfortable and stable overnight no significant acute event.  Still pending placement.  Patient does not want to go to nursing care facility.  Pending  help with financial support.  12/26.  Patient is stable overnight and pleasant.  Pending discharge plan.   continue to work with hospice team.  Pending financial assist.  12/27.  Patient is stable and continue monitor and pending financial assist discharged to another facility.  Patient otherwise remained stable and no significant overnight events.  12/28.  Patient has been comfortable overnight.  No significant overnight acute chief complaint.  Pending discharge planning by  and financial support.  Currently unsafe to discharge from the hospital without further plan.  Continue assisted by  for discharge plan.    Consultants/Specialty  Cardiology  Critical Care    Code Status  DNR/Comfort care    Disposition   vs home with hospice - difficult placement, unable to afford   OT reevaluated and patient has improved mobility. Social work to follow up with hospice if they  will accept her with return to home    Review of Systems  Review of Systems   Constitutional: Negative for chills, diaphoresis and weight loss.   HENT: Negative for nosebleeds and tinnitus.    Eyes: Negative for blurred vision and double vision.   Respiratory: Negative for cough and sputum production.    Cardiovascular: Negative for chest pain, palpitations, leg swelling and PND.   Gastrointestinal: Negative for blood in stool, heartburn and nausea.   Genitourinary: Negative for dysuria, flank pain and hematuria.   Musculoskeletal: Negative for back pain and neck pain.   Skin: Negative for rash.   Neurological: Negative for dizziness, tingling and weakness.   Psychiatric/Behavioral: Negative for depression and hallucinations.        Physical Exam  Temp:  [36.4 °C (97.5 °F)-36.9 °C (98.5 °F)] 36.9 °C (98.5 °F)  Pulse:  [71-72] 72  Resp:  [14-15] 14  BP: ()/(62-71) 99/62    Physical Exam   Constitutional: She is oriented to person, place, and time. She appears well-nourished. No distress.   Thin, frail  Pleasant   HENT:   Head: Normocephalic.   Nose: Nose normal.   Mouth/Throat: Oropharynx is clear and moist. No oropharyngeal exudate.   Eyes: Pupils are equal, round, and reactive to light. Conjunctivae and EOM are normal.   Neck: Normal range of motion. Neck supple. No JVD present. No tracheal deviation present.   Cardiovascular: Normal rate, regular rhythm and normal heart sounds.  Exam reveals no gallop.    No murmur heard.  Pulmonary/Chest: Effort normal and breath sounds normal. She has no wheezes. She exhibits no tenderness.   Abdominal: Soft. Bowel sounds are normal. She exhibits no distension. There is no rebound and no guarding.   Musculoskeletal: Normal range of motion. She exhibits no edema or tenderness.   Diffuse muscle atrophy   Lymphadenopathy:     She has no cervical adenopathy.   Neurological: She is alert and oriented to person, place, and time. She displays normal reflexes. No cranial nerve  deficit.   Skin: Skin is warm and dry. No erythema.   Psychiatric: Her behavior is normal.   Nursing note and vitals reviewed.      Fluids    Intake/Output Summary (Last 24 hours) at 12/28/18 1321  Last data filed at 12/28/18 0800   Gross per 24 hour   Intake              360 ml   Output                0 ml   Net              360 ml       Laboratory                        Imaging  DX-CHEST-PORTABLE (1 VIEW)   Final Result         1.  Hazy interstitial left pulmonary opacities suggests interstitial edema or infiltrate, similar to prior.   2.  Trace left pleural effusion   3.  Hyperexpansion of lungs favors changes of COPD.      DX-CHEST-PORTABLE (1 VIEW)   Final Result         1.  Interstitial infiltrates or edema, stable.   2.  Atherosclerosis      DX-CHEST-PORTABLE (1 VIEW)   Final Result         1.  Interstitial infiltrates or edema.   2.  Atherosclerosis      DX-CHEST-PORTABLE (1 VIEW)   Final Result      Moderate interstitial edema or interstitial lung disease and small left pleural effusion similar to previous.      DX-CHEST-PORTABLE (1 VIEW)   Final Result      Stable interstitial edema and/or interstitial lung disease with probable small pleural fluid      DX-CHEST-PORTABLE (1 VIEW)   Final Result      Ill-defined infrahilar interstitial opacities, atelectasis versus mild edema. No significant pleural effusions.      DX-CHEST-PORTABLE (1 VIEW)   Final Result      Mild left basilar atelectasis, improved since prior. No new consolidation or large pleural effusions.      DX-CHEST-PORTABLE (1 VIEW)   Final Result      1.  Left basilar opacification may be secondary to atelectasis. Developing pneumonia cannot be excluded.         DX-CHEST-PORTABLE (1 VIEW)   Final Result      1.  Unchanged mild interstitial pulmonary edema   2.  Unchanged bibasilar atelectasis, alveolar edema or pneumonia      DX-CHEST-PORTABLE (1 VIEW)   Final Result      1.  Decreased pulmonary edema   2.  Unchanged bibasilar atelectasis,  alveolar edema or pneumonia      DX-CHEST-PORTABLE (1 VIEW)   Final Result      1.  There is diffuse interstitial and alveolar density in the right mid and lower lung zone. This is increased since the previous study. This may represent mild pulmonary edema/consolidation.   2.  Mildly enlarged cardiomediastinal silhouette when compared with the previous chest x-ray.      DX-CHEST-PORTABLE (1 VIEW)   Final Result      Stable mild pulmonary edema.   New left basilar atelectasis.      DX-CHEST-PORTABLE (1 VIEW)   Final Result      Stable chest appearance compared with 11/16.      DX-CHEST-PORTABLE (1 VIEW)   Final Result      No acute cardiopulmonary abnormality identified.      DX-CHEST-PORTABLE (1 VIEW)   Final Result      No acute cardiopulmonary abnormality. No interval change.      DX-CHEST-PORTABLE (1 VIEW)   Final Result      No acute cardiopulmonary disease.      CT-HEAD W/O   Final Result      1.  No evidence of acute intracranial process.      2.  There is again seen interstitial pulmonary edema and bibasilar atelectasis.      CT-HIP W/O PLUS RECONS LEFT   Final Result      1.  No evidence of acute fracture or malalignment. No evidence of hardware failure or complication.   2.  Postsurgical changes of the left femur with broken screw fragment redemonstrated.   3.  Demineralization.   4.  Scattered colonic diverticula.   5.  Atherosclerosis.   6.  Small fat-containing umbilical hernia.      EC-ECHOCARDIOGRAM COMPLETE W/O CONT   Final Result      DX-HIP-COMPLETE - UNILATERAL 2+ LEFT   Final Result      1.  No definite acute osseous abnormality. In setting of demineralization, an occult fracture is difficult to exclude. If there is strong clinical suspicion, CT or MRI could be obtained for further evaluation.   2.  Postsurgical changes of the left femur with broken screw fragment redemonstrated.      DX-CHEST-PORTABLE (1 VIEW)   Final Result      No acute cardiopulmonary process is seen.      Atherosclerotic  plaque.           Assessment/Plan  * ST elevation myocardial infarction involving right coronary artery (HCC)- (present on admission)   Assessment & Plan    Post anti plt, heparin, statin therapy - transitioned to comfort care- no chest pain or dyspnea.   Provide IV morphine, supportive treatment if chest pain symptoms.  Patient is comfortable, comfort care       Hospice team need to reassess patient for possibly discharge home with hospice.     Abdominal pain   Assessment & Plan    Transient / intermittent.  No new symptoms, abdominal exam benign  Monitor intake and for signs of constipation. Ordered for scheduled senakot and miralax. Has been noncompliant  Bowel protocols.        Patient is comfortable and comfort care  Pending  to help with placement, pending hospice team to reassess patient's need     Hypotension   Assessment & Plan     Asymptomatic   Encourage intake   Hold lasix for sbp < 100         Currently stable     Pain of left hip joint- (present on admission)   Assessment & Plan    Also intermittent left Knee - probably OA - exam no signs of acute infection.   Pain control   Patient is comfortable, comfort care    Needs financial help and  arrangement for placement        Chronic kidney disease (CKD), stage III (moderate) ([facility])- (present on admission)   Assessment & Plan    Was stable  Encourage intake.       Patient is comfortable, comfort care             Fall- (present on admission)   Assessment & Plan    With debility - improved function.   Try to encourage mobility, assist with transfers and use of walker.            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            Essential hypertension- (present on admission)   Assessment & Plan    BP stable to mildly low.  Hold  lasix for sbp < 100 . Comfort care              Advanced directives, counseling/discussion- (present on admission)   Assessment & Plan    Comfort care      Oral morphine for pain, air hunger. Lorazepam for anxiety     Patient is comfortable, comfort care     Pending placement          VTE prophylaxis: comfort care      I have discussed with RN and CM and SW and other consultants about patient's plan.       Current Facility-Administered Medications:   •  senna-docusate (PERICOLACE or SENOKOT S) 8.6-50 MG per tablet 2 Tab, 2 Tab, Oral, BID **AND** polyethylene glycol/lytes (MIRALAX) PACKET 1 Packet, 1 Packet, Oral, DAILY **AND** magnesium hydroxide (MILK OF MAGNESIA) suspension 30 mL, 30 mL, Oral, QDAY PRN **AND** bisacodyl (DULCOLAX) suppository 10 mg, 10 mg, Rectal, QDAY PRN, Fam Tejeda M.D., 10 mg at 12/23/18 0427  •  ipratropium-albuterol (DUONEB) nebulizer solution, 3 mL, Nebulization, Q4H PRN (RT), Caitlyn Finn M.D., 3 mL at 12/15/18 0151  •  LORazepam (ATIVAN) tablet 0.5 mg, 0.5 mg, Oral, Q4HRS PRN, Caitlyn Finn M.D., 0.5 mg at 12/15/18 0143  •  morphine (ROXANOL) 20 MG/ML oral conc 5-10 mg, 5-10 mg, Oral, Q HOUR PRN, Caitlyn Finn M.D.  •  acetaminophen (TYLENOL) tablet 500 mg, 500 mg, Oral, Q6HRS PRN, Caitlyn Finn M.D., 500 mg at 12/13/18 0237  •  diphenhydrAMINE-ZnAcetate (BENADRYL ITCH) 1-0.1 % cream, , Topical, 4X/DAY PRN, Caitlyn Finn M.D.  •  MD ALERT...adult comfort care, , Other, PRN, Caitlyn Finn M.D.  •  atropine 1 % ophthalmic solution 2 Drop, 2 Drop, Sublingual, Q4HRS PRN, Caitlyn Finn M.D.  •  furosemide (LASIX) tablet 20 mg, 20 mg, Oral, Q DAY, Caitlyn Finn M.D., 20 mg at 12/19/18 0524

## 2018-12-28 NOTE — DISCHARGE PLANNING
RISHABH spoke with Dr Louise regarding OT note that the Pt could potentially discharge home with DME and would benefit from check in. BRITTANEY Louise said discharge to home will only be approved if hospice will follow her in the home. GELYW left message with [facility] asking for a call back to see if they will follow the Pt in her home.

## 2018-12-28 NOTE — DISCHARGE PLANNING
LSW informed by Rn Calvin Jiménez from hospice that the Pt will not have enough supports at home as her granddtr cannot help and her  does not assist as it it independent living. Calvin stated that her  think the Pt needs 24/7 assist as she has frequent falls and leaves the burners on. LSW informed that the Pt had Medicaid and that a xaitment had been working on trying to reestablish the Pt Medicaid.

## 2018-12-28 NOTE — HOSPICE
Hospice nurse visit - Spoke with patient's daughter, Jeannie, who lives in [place].  Patient's granddaughter, Evita, lives in [place], but is unable to help provide care.  Patient is estranged from granddaughter.  Rosa Cash, manager at patient's Independent Living residence, states she is unable to assist in care or purchase groceries for the patient.  investigating other placement options.

## 2018-12-29 PROCEDURE — A9270 NON-COVERED ITEM OR SERVICE: HCPCS | Performed by: INTERNAL MEDICINE

## 2018-12-29 PROCEDURE — 99232 SBSQ HOSP IP/OBS MODERATE 35: CPT | Performed by: INTERNAL MEDICINE

## 2018-12-29 PROCEDURE — 700102 HCHG RX REV CODE 250 W/ 637 OVERRIDE(OP): Performed by: INTERNAL MEDICINE

## 2018-12-29 PROCEDURE — 770001 HCHG ROOM/CARE - MED/SURG/GYN PRIV*

## 2018-12-29 RX ADMIN — SENNOSIDES AND DOCUSATE SODIUM 2 TABLET: 8.6; 5 TABLET ORAL at 16:49

## 2018-12-29 ASSESSMENT — ENCOUNTER SYMPTOMS
TINGLING: 0
BACK PAIN: 0
DIZZINESS: 0
ABDOMINAL PAIN: 0
BLOOD IN STOOL: 0
EYE PAIN: 0
COUGH: 0
SPUTUM PRODUCTION: 0
DEPRESSION: 0
CHILLS: 0
BLURRED VISION: 0
WHEEZING: 0
FEVER: 0
NECK PAIN: 0
EYE DISCHARGE: 0
PND: 0
HALLUCINATIONS: 0
PALPITATIONS: 0
HEARTBURN: 0
FLANK PAIN: 0
DOUBLE VISION: 0
NAUSEA: 0

## 2018-12-29 ASSESSMENT — PAIN SCALES - GENERAL: PAINLEVEL_OUTOF10: 0

## 2018-12-29 NOTE — PROGRESS NOTES
[facility] Medicine Daily Progress Note    Date of Service  12/29/2018    Chief Complaint  89 y.o. female admitted 11/13/2018 with fall.    Hospital Course    PMH HTN who presented with fall. She was found with PRUDENCE in inferior leads on EKG. Stat TTE showed mild hypokinesis of inferior wall. Cardiology was consulted, given her goals of care, she was monitored in the [facility] on heparin infusion for NSTEMI. She did well and was treated with asa, plavix, lipitor. She had low BP and was not started on BB or ACEI/ARB. She was diuresed for fluid overload.  She had discussion of care with her family and changed to comfort care and hospice was consulted. In anticipation for discharge, her quantiferon was positive. CXR not show signs of TB and her AFBs were negative. She has delayed discharge due to placement.          Interval Problem Update  12/25.  Patient is comfortable and stable overnight no significant acute event.  Still pending placement.  Patient does not want to go to nursing care facility.  Pending  help with financial support.  12/26.  Patient is stable overnight and pleasant.  Pending discharge plan.   continue to work with hospice team.  Pending financial assist.  12/27.  Patient is stable and continue monitor and pending financial assist discharged to another facility.  Patient otherwise remained stable and no significant overnight events.  12/28.  Patient has been comfortable overnight.  No significant overnight acute chief complaint.  Pending discharge planning by  and financial support.  Currently unsafe to discharge from the hospital without further plan.  Continue assisted by  for discharge plan.  12/29.  Patient is pleasant.  Hospice option discussed with  and apparently patient currently is not able to return home with hospice because need 24/7 care which patient's home condition cannot accommodate this need.  Pending further placement  options.    Consultants/Specialty  Cardiology  Critical Care    Code Status  DNR/Comfort care    Disposition  [facility] vs home with hospice - difficult placement, unable to afford [facility]  OT reevaluated and patient has improved mobility. Social work to follow up with hospice if they will accept her with return to home    Review of Systems  Review of Systems   Constitutional: Negative for chills and fever.   HENT: Negative for nosebleeds and tinnitus.    Eyes: Negative for blurred vision, double vision, pain and discharge.   Respiratory: Negative for cough, sputum production and wheezing.    Cardiovascular: Negative for chest pain, palpitations, leg swelling and PND.   Gastrointestinal: Negative for abdominal pain, blood in stool, heartburn and nausea.   Genitourinary: Negative for dysuria, flank pain and hematuria.   Musculoskeletal: Negative for back pain and neck pain.   Skin: Negative for rash.   Neurological: Negative for dizziness and tingling.   Psychiatric/Behavioral: Negative for depression and hallucinations.        Physical Exam  Temp:  [36.6 °C (97.9 °F)] 36.6 °C (97.9 °F)  Pulse:  [75] 75  Resp:  [13] 13  BP: (106)/(64) 106/64    Physical Exam   Constitutional: She is oriented to person, place, and time. She appears well-developed. No distress.   Thin, frail  Pleasant   HENT:   Head: Normocephalic.   Nose: Nose normal.   Mouth/Throat: Oropharynx is clear and moist. No oropharyngeal exudate.   Eyes: Pupils are equal, round, and reactive to light. Conjunctivae and EOM are normal.   Neck: Normal range of motion. Neck supple. No JVD present. No tracheal deviation present.   Cardiovascular: Regular rhythm and normal heart sounds.  Exam reveals no gallop and no friction rub.    No murmur heard.  Pulmonary/Chest: Effort normal and breath sounds normal. She has no rales. She exhibits no tenderness.   Abdominal: Soft. Bowel sounds are normal. There is no tenderness. There is no rebound and no guarding.   Musculoskeletal:  Normal range of motion. She exhibits no edema or tenderness.   Diffuse muscle atrophy   Lymphadenopathy:     She has no cervical adenopathy.   Neurological: She is alert and oriented to person, place, and time. She displays normal reflexes. No cranial nerve deficit.   Skin: Skin is warm and dry. No erythema.   Psychiatric: Her behavior is normal.   Nursing note and vitals reviewed.      Fluids    Intake/Output Summary (Last 24 hours) at 12/29/18 1329  Last data filed at 12/28/18 1700   Gross per 24 hour   Intake              120 ml   Output                0 ml   Net              120 ml       Laboratory                        Imaging  DX-CHEST-PORTABLE (1 VIEW)   Final Result         1.  Hazy interstitial left pulmonary opacities suggests interstitial edema or infiltrate, similar to prior.   2.  Trace left pleural effusion   3.  Hyperexpansion of lungs favors changes of COPD.      DX-CHEST-PORTABLE (1 VIEW)   Final Result         1.  Interstitial infiltrates or edema, stable.   2.  Atherosclerosis      DX-CHEST-PORTABLE (1 VIEW)   Final Result         1.  Interstitial infiltrates or edema.   2.  Atherosclerosis      DX-CHEST-PORTABLE (1 VIEW)   Final Result      Moderate interstitial edema or interstitial lung disease and small left pleural effusion similar to previous.      DX-CHEST-PORTABLE (1 VIEW)   Final Result      Stable interstitial edema and/or interstitial lung disease with probable small pleural fluid      DX-CHEST-PORTABLE (1 VIEW)   Final Result      Ill-defined infrahilar interstitial opacities, atelectasis versus mild edema. No significant pleural effusions.      DX-CHEST-PORTABLE (1 VIEW)   Final Result      Mild left basilar atelectasis, improved since prior. No new consolidation or large pleural effusions.      DX-CHEST-PORTABLE (1 VIEW)   Final Result      1.  Left basilar opacification may be secondary to atelectasis. Developing pneumonia cannot be excluded.         DX-CHEST-PORTABLE (1 VIEW)    Final Result      1.  Unchanged mild interstitial pulmonary edema   2.  Unchanged bibasilar atelectasis, alveolar edema or pneumonia      DX-CHEST-PORTABLE (1 VIEW)   Final Result      1.  Decreased pulmonary edema   2.  Unchanged bibasilar atelectasis, alveolar edema or pneumonia      DX-CHEST-PORTABLE (1 VIEW)   Final Result      1.  There is diffuse interstitial and alveolar density in the right mid and lower lung zone. This is increased since the previous study. This may represent mild pulmonary edema/consolidation.   2.  Mildly enlarged cardiomediastinal silhouette when compared with the previous chest x-ray.      DX-CHEST-PORTABLE (1 VIEW)   Final Result      Stable mild pulmonary edema.   New left basilar atelectasis.      DX-CHEST-PORTABLE (1 VIEW)   Final Result      Stable chest appearance compared with 11/16.      DX-CHEST-PORTABLE (1 VIEW)   Final Result      No acute cardiopulmonary abnormality identified.      DX-CHEST-PORTABLE (1 VIEW)   Final Result      No acute cardiopulmonary abnormality. No interval change.      DX-CHEST-PORTABLE (1 VIEW)   Final Result      No acute cardiopulmonary disease.      CT-HEAD W/O   Final Result      1.  No evidence of acute intracranial process.      2.  There is again seen interstitial pulmonary edema and bibasilar atelectasis.      CT-HIP W/O PLUS RECONS LEFT   Final Result      1.  No evidence of acute fracture or malalignment. No evidence of hardware failure or complication.   2.  Postsurgical changes of the left femur with broken screw fragment redemonstrated.   3.  Demineralization.   4.  Scattered colonic diverticula.   5.  Atherosclerosis.   6.  Small fat-containing umbilical hernia.      EC-ECHOCARDIOGRAM COMPLETE W/O CONT   Final Result      DX-HIP-COMPLETE - UNILATERAL 2+ LEFT   Final Result      1.  No definite acute osseous abnormality. In setting of demineralization, an occult fracture is difficult to exclude. If there is strong clinical suspicion, CT  or MRI could be obtained for further evaluation.   2.  Postsurgical changes of the left femur with broken screw fragment redemonstrated.      DX-CHEST-PORTABLE (1 VIEW)   Final Result      No acute cardiopulmonary process is seen.      Atherosclerotic plaque.           Assessment/Plan  * ST elevation myocardial infarction involving right coronary artery (HCC)- (present on admission)   Assessment & Plan    Post anti plt, heparin, statin therapy - transitioned to comfort care- no chest pain or dyspnea.   Provide IV morphine, supportive treatment if chest pain symptoms.  Patient is comfortable, comfort care       Hospice team decided patient cannot go home with this level because patient need higher level of care to accommodate patient's needs.  Still awaiting  to help with placement     Abdominal pain   Assessment & Plan    Transient / intermittent.  No new symptoms, abdominal exam benign  Monitor intake and for signs of constipation. Ordered for scheduled senakot and miralax. Has been noncompliant  Bowel protocols.        Patient is comfortable and comfort care  Hospice team decided patient cannot go home with this level because patient need higher level of care to accommodate patient's needs.  Still awaiting  to help with placement     Hypotension   Assessment & Plan     Asymptomatic   Encourage intake   Hold lasix for sbp < 100         Currently stable     Pain of left hip joint- (present on admission)   Assessment & Plan    Also intermittent left Knee - probably OA - exam no signs of acute infection.   Pain control   Patient is comfortable, comfort care    Needs financial help and  arrangement for placement , at this moment patient seems not able to return home with hospice because patient need higher level of care.     Chronic kidney disease (CKD), stage III (moderate) (HCC)- (present on admission)   Assessment & Plan    Was stable  Encourage intake.       Patient is  comfortable, comfort care             Fall- (present on admission)   Assessment & Plan    With debility - improved function.   Try to encourage mobility, assist with transfers and use of walker.            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            Essential hypertension- (present on admission)   Assessment & Plan    BP stable to mildly low.  Hold lasix for sbp < 100 . Comfort care              Advanced directives, counseling/discussion- (present on admission)   Assessment & Plan    Comfort care      Oral morphine for pain, air hunger. Lorazepam for anxiety     Patient is comfortable, comfort care     Pending placement          VTE prophylaxis: comfort care      I have discussed with RN and CM and SW and other consultants about patient's plan.       Current Facility-Administered Medications:   •  senna-docusate (PERICOLACE or SENOKOT S) 8.6-50 MG per tablet 2 Tab, 2 Tab, Oral, BID **AND** polyethylene glycol/lytes (MIRALAX) PACKET 1 Packet, 1 Packet, Oral, DAILY **AND** magnesium hydroxide (MILK OF MAGNESIA) suspension 30 mL, 30 mL, Oral, QDAY PRN **AND** bisacodyl (DULCOLAX) suppository 10 mg, 10 mg, Rectal, QDAY PRN, Fam Tejeda M.D., 10 mg at 12/23/18 0427  •  ipratropium-albuterol (DUONEB) nebulizer solution, 3 mL, Nebulization, Q4H PRN (RT), Caitlyn Finn M.D., 3 mL at 12/15/18 0151  •  LORazepam (ATIVAN) tablet 0.5 mg, 0.5 mg, Oral, Q4HRS PRN, Caitlyn Finn M.D., 0.5 mg at 12/15/18 0143  •  morphine (ROXANOL) 20 MG/ML oral conc 5-10 mg, 5-10 mg, Oral, Q HOUR PRN, Caitlyn Finn M.D.  •  acetaminophen (TYLENOL) tablet 500 mg, 500 mg, Oral, Q6HRS PRN, Caitlyn Finn M.D., 500 mg at 12/13/18 0237  •  diphenhydrAMINE-ZnAcetate (BENADRYL ITCH) 1-0.1 % cream, , Topical, 4X/DAY PRN, Asem A  VENITA Finn  •  MD ALERT...adult comfort care, , Other, PRN, Caitlyn Finn M.D.  •  atropine 1 % ophthalmic solution 2 Drop, 2 Drop, Sublingual, Q4HRS PRN, Caitlyn Finn M.D.  •  furosemide (LASIX) tablet 20 mg, 20 mg, Oral, Q DAY, Caitlyn Finn M.D., 20 mg at 12/19/18 0517

## 2018-12-29 NOTE — PROGRESS NOTES
Pt declining to be repositioned. Nirmal scale 18. Pt up OOB to bathroom and to ambulate with RN and CNA.

## 2018-12-29 NOTE — CARE PLAN
Problem: Safety  Goal: Will remain free from falls    Intervention: Implement fall precautions  Pt calls appropriately, treaded socks in use. Personal belongings and call light with in reach and bed is locked in lowest position.      Problem: Infection  Goal: Will remain free from infection    Intervention: Implement standard precautions and perform hand washing before and after patient contact  Pt calls appropriately, treaded socks in use. Personal belongings and call light with in reach and bed is locked in lowest position.

## 2018-12-29 NOTE — PROGRESS NOTES
Received shift report from conrado RN and assumed care of this pt at 0715. Pt AOx4 . Pt reports pain is 0/10  -Pt is up one assist, . Pt is appropriately with call light when needing assistance. Bed alarm is on.  NoPIV assessed. Pt is on RA.  Discussed POC for day shift,  comfort, and safety. Patient has call light and personal belongings within reach. Safety and fall precautions in place. Reviewed orders, notes, labs, and test results. Hourly rounding in place with RN rounding on odd hours and CNA on even hours.

## 2018-12-29 NOTE — CARE PLAN
Problem: Safety  Goal: Will remain free from falls  Outcome: PROGRESSING AS EXPECTED    Intervention: Implement fall precautions  Bed in low position, wheels locked, call light within reach, hourly rounding in place.

## 2018-12-29 NOTE — CARE PLAN
Problem: Safety  Goal: Will remain free from injury  Outcome: PROGRESSING AS EXPECTED  Pt remains free from injury, Floor clear from clutter and cords.  Pt A+OX4, Non-Skid yellow socks, Bed/chair alarm on. Proper signs outside pt door in place. Door remains open.  Pt uses call light appropriately. Call light with in reach of pt.  Hourly rounding in place.    Problem: Venous Thromboembolism (VTW)/Deep Vein Thrombosis (DVT) Prevention:  Goal: Patient will participate in Venous Thrombosis (VTE)/Deep Vein Thrombosis (DVT)Prevention Measures  Outcome: PROGRESSING AS EXPECTED   12/21/18 2000 12/27/18 0900 12/29/18 0820   OTHER   Risk Assessment Score 1 --  --    VTE RISK Moderate --  --    Pharmacologic Prophylaxis Used --  (comfport care) --    Mechanical/VTE Prophylaxis   Mechanical Prophylaxis  --  --  SCDs, Sequential Compression Device   SCDs, Sequential Compression Device --  --  Refused   Pt denies SCD's, frequently ambulating with CNA.      Problem: Mobility  Goal: Risk for activity intolerance will decrease  Outcome: PROGRESSING AS EXPECTED

## 2018-12-30 PROCEDURE — 770001 HCHG ROOM/CARE - MED/SURG/GYN PRIV*

## 2018-12-30 PROCEDURE — 99232 SBSQ HOSP IP/OBS MODERATE 35: CPT | Performed by: INTERNAL MEDICINE

## 2018-12-30 ASSESSMENT — ENCOUNTER SYMPTOMS
SPUTUM PRODUCTION: 0
PND: 0
PALPITATIONS: 0
WEAKNESS: 1
EYE PAIN: 0
PHOTOPHOBIA: 0
DEPRESSION: 0
FLANK PAIN: 0
COUGH: 0
NECK PAIN: 0
VOMITING: 0
TINGLING: 0
DIZZINESS: 0
DOUBLE VISION: 0
DIARRHEA: 0
BLOOD IN STOOL: 0
BACK PAIN: 0
WHEEZING: 0
CHILLS: 0

## 2018-12-30 ASSESSMENT — PAIN SCALES - GENERAL
PAINLEVEL_OUTOF10: 0
PAINLEVEL_OUTOF10: 0

## 2018-12-30 NOTE — PROGRESS NOTES
[facility] Medicine Daily Progress Note    Date of Service  12/30/2018    Chief Complaint  89 y.o. female admitted 11/13/2018 with fall.    Hospital Course    PMH HTN who presented with fall. She was found with PRUDENCE in inferior leads on EKG. Stat TTE showed mild hypokinesis of inferior wall. Cardiology was consulted, given her goals of care, she was monitored in the [facility] on heparin infusion for NSTEMI. She did well and was treated with asa, plavix, lipitor. She had low BP and was not started on BB or ACEI/ARB. She was diuresed for fluid overload.  She had discussion of care with her family and changed to comfort care and hospice was consulted. In anticipation for discharge, her quantiferon was positive. CXR not show signs of TB and her AFBs were negative. She has delayed discharge due to placement.          Interval Problem Update  12/25.  Patient is comfortable and stable overnight no significant acute event.  Still pending placement.  Patient does not want to go to nursing care facility.  Pending  help with financial support.  12/26.  Patient is stable overnight and pleasant.  Pending discharge plan.   continue to work with hospice team.  Pending financial assist.  12/27.  Patient is stable and continue monitor and pending financial assist discharged to another facility.  Patient otherwise remained stable and no significant overnight events.  12/28.  Patient has been comfortable overnight.  No significant overnight acute chief complaint.  Pending discharge planning by  and financial support.  Currently unsafe to discharge from the hospital without further plan.  Continue assisted by  for discharge plan.  12/29.  Patient is pleasant.  Hospice option discussed with  and apparently patient currently is not able to return home with hospice because need 24/7 care which patient's home condition cannot accommodate this need.  Pending further placement  options.  12/30.  Still waiting for insurance to be approved.  Patient currently stable and resting comfortably in bed.  Continue comfort care.    Consultants/Specialty  Cardiology  Critical Care    Code Status  DNR/Comfort care    Disposition  [facility] vs home with hospice - difficult placement, unable to afford [facility]  OT reevaluated and patient has improved mobility. Social work to follow up with hospice if they will accept her with return to home    Review of Systems  Review of Systems   Constitutional: Negative for chills and malaise/fatigue.   HENT: Negative for congestion, ear discharge and hearing loss.    Eyes: Negative for double vision, photophobia and pain.   Respiratory: Negative for cough, sputum production and wheezing.    Cardiovascular: Negative for chest pain, palpitations, leg swelling and PND.   Gastrointestinal: Negative for blood in stool, diarrhea and vomiting.   Genitourinary: Negative for dysuria, flank pain and hematuria.   Musculoskeletal: Negative for back pain and neck pain.   Skin: Negative for rash.   Neurological: Positive for weakness. Negative for dizziness and tingling.   Psychiatric/Behavioral: Negative for depression and suicidal ideas.        Physical Exam  Temp:  [36.4 °C (97.6 °F)] 36.4 °C (97.6 °F)  Pulse:  [72-73] 73  Resp:  [17-18] 17  BP: (104-121)/(63) 104/63    Physical Exam   Constitutional: She is oriented to person, place, and time. She appears well-nourished.   Thin, frail  Pleasant   HENT:   Head: Normocephalic.   Nose: Nose normal.   Mouth/Throat: Oropharynx is clear and moist. No oropharyngeal exudate.   Eyes: Pupils are equal, round, and reactive to light. Conjunctivae and EOM are normal.   Neck: Normal range of motion. Neck supple.   Cardiovascular: Normal rate, regular rhythm and normal heart sounds.  Exam reveals no gallop.    No murmur heard.  Pulmonary/Chest: Effort normal and breath sounds normal. No stridor. She has no wheezes. She has no rales. She exhibits no  tenderness.   Abdominal: Soft. Bowel sounds are normal. She exhibits no distension. There is no rebound and no guarding.   Musculoskeletal: Normal range of motion. She exhibits no edema or tenderness.   Diffuse muscle atrophy   Lymphadenopathy:     She has no cervical adenopathy.   Neurological: She is alert and oriented to person, place, and time. She displays normal reflexes. No cranial nerve deficit.   Skin: Skin is warm and dry. She is not diaphoretic. No erythema.   Psychiatric: Her behavior is normal.   Nursing note and vitals reviewed.      Fluids    Intake/Output Summary (Last 24 hours) at 12/30/18 1528  Last data filed at 12/30/18 1200   Gross per 24 hour   Intake              720 ml   Output                0 ml   Net              720 ml       Laboratory                        Imaging  DX-CHEST-PORTABLE (1 VIEW)   Final Result         1.  Hazy interstitial left pulmonary opacities suggests interstitial edema or infiltrate, similar to prior.   2.  Trace left pleural effusion   3.  Hyperexpansion of lungs favors changes of COPD.      DX-CHEST-PORTABLE (1 VIEW)   Final Result         1.  Interstitial infiltrates or edema, stable.   2.  Atherosclerosis      DX-CHEST-PORTABLE (1 VIEW)   Final Result         1.  Interstitial infiltrates or edema.   2.  Atherosclerosis      DX-CHEST-PORTABLE (1 VIEW)   Final Result      Moderate interstitial edema or interstitial lung disease and small left pleural effusion similar to previous.      DX-CHEST-PORTABLE (1 VIEW)   Final Result      Stable interstitial edema and/or interstitial lung disease with probable small pleural fluid      DX-CHEST-PORTABLE (1 VIEW)   Final Result      Ill-defined infrahilar interstitial opacities, atelectasis versus mild edema. No significant pleural effusions.      DX-CHEST-PORTABLE (1 VIEW)   Final Result      Mild left basilar atelectasis, improved since prior. No new consolidation or large pleural effusions.      DX-CHEST-PORTABLE (1 VIEW)    Final Result      1.  Left basilar opacification may be secondary to atelectasis. Developing pneumonia cannot be excluded.         DX-CHEST-PORTABLE (1 VIEW)   Final Result      1.  Unchanged mild interstitial pulmonary edema   2.  Unchanged bibasilar atelectasis, alveolar edema or pneumonia      DX-CHEST-PORTABLE (1 VIEW)   Final Result      1.  Decreased pulmonary edema   2.  Unchanged bibasilar atelectasis, alveolar edema or pneumonia      DX-CHEST-PORTABLE (1 VIEW)   Final Result      1.  There is diffuse interstitial and alveolar density in the right mid and lower lung zone. This is increased since the previous study. This may represent mild pulmonary edema/consolidation.   2.  Mildly enlarged cardiomediastinal silhouette when compared with the previous chest x-ray.      DX-CHEST-PORTABLE (1 VIEW)   Final Result      Stable mild pulmonary edema.   New left basilar atelectasis.      DX-CHEST-PORTABLE (1 VIEW)   Final Result      Stable chest appearance compared with 11/16.      DX-CHEST-PORTABLE (1 VIEW)   Final Result      No acute cardiopulmonary abnormality identified.      DX-CHEST-PORTABLE (1 VIEW)   Final Result      No acute cardiopulmonary abnormality. No interval change.      DX-CHEST-PORTABLE (1 VIEW)   Final Result      No acute cardiopulmonary disease.      CT-HEAD W/O   Final Result      1.  No evidence of acute intracranial process.      2.  There is again seen interstitial pulmonary edema and bibasilar atelectasis.      CT-HIP W/O PLUS RECONS LEFT   Final Result      1.  No evidence of acute fracture or malalignment. No evidence of hardware failure or complication.   2.  Postsurgical changes of the left femur with broken screw fragment redemonstrated.   3.  Demineralization.   4.  Scattered colonic diverticula.   5.  Atherosclerosis.   6.  Small fat-containing umbilical hernia.      EC-ECHOCARDIOGRAM COMPLETE W/O CONT   Final Result      DX-HIP-COMPLETE - UNILATERAL 2+ LEFT   Final Result       1.  No definite acute osseous abnormality. In setting of demineralization, an occult fracture is difficult to exclude. If there is strong clinical suspicion, CT or MRI could be obtained for further evaluation.   2.  Postsurgical changes of the left femur with broken screw fragment redemonstrated.      DX-CHEST-PORTABLE (1 VIEW)   Final Result      No acute cardiopulmonary process is seen.      Atherosclerotic plaque.           Assessment/Plan  * ST elevation myocardial infarction involving right coronary artery (HCC)- (present on admission)   Assessment & Plan    Post anti plt, heparin, statin therapy - transitioned to comfort care- no chest pain or dyspnea.   Provide IV morphine, supportive treatment if chest pain symptoms.  Patient is comfortable, comfort care       Hospice team decided patient cannot go home with this level because patient need higher level of care to accommodate patient's needs.  Still awaiting  to help with placement  Continue comfort care.     Abdominal pain   Assessment & Plan    Transient / intermittent.  No new symptoms, abdominal exam benign  Monitor intake and for signs of constipation. Ordered for scheduled senakot and miralax. Has been noncompliant  Bowel protocols.        Patient is comfortable and comfort care  Hospice team decided patient cannot go home with this level because patient need higher level of care to accommodate patient's needs.  Still awaiting  to help with placement  Continue comfort care     Hypotension   Assessment & Plan     Asymptomatic   Encourage intake   Hold lasix for sbp < 100         Currently stable  Continue comfort care     Pain of left hip joint- (present on admission)   Assessment & Plan    Also intermittent left Knee - probably OA - exam no signs of acute infection.   Pain control   Patient is comfortable, comfort care    Needs financial help and  arrangement for placement , at this moment patient seems not able  to return home with hospice because patient need higher level of care.  Patient is comfortable currently     Chronic kidney disease (CKD), stage III (moderate) (HCC)- (present on admission)   Assessment & Plan    Was stable  Encourage intake.       Patient is comfortable, comfort care             Fall- (present on admission)   Assessment & Plan    With debility - improved function.   Try to encourage mobility, assist with transfers and use of walker.            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            Essential hypertension- (present on admission)   Assessment & Plan    BP stable to mildly low.  Hold lasix for sbp < 100 .   Comfort care              Advanced directives, counseling/discussion- (present on admission)   Assessment & Plan    Comfort care      Oral morphine for pain, air hunger. Lorazepam for anxiety     Patient is comfortable, comfort care     Pending placement          VTE prophylaxis: comfort care      I have discussed with RN and CM and SW and other consultants about patient's plan.       Current Facility-Administered Medications:   •  senna-docusate (PERICOLACE or SENOKOT S) 8.6-50 MG per tablet 2 Tab, 2 Tab, Oral, BID, 2 Tab at 12/29/18 1649 **AND** polyethylene glycol/lytes (MIRALAX) PACKET 1 Packet, 1 Packet, Oral, DAILY **AND** magnesium hydroxide (MILK OF MAGNESIA) suspension 30 mL, 30 mL, Oral, QDAY PRN **AND** bisacodyl (DULCOLAX) suppository 10 mg, 10 mg, Rectal, QDAY PRN, Fam Tejeda M.D., 10 mg at 12/23/18 0427  •  ipratropium-albuterol (DUONEB) nebulizer solution, 3 mL, Nebulization, Q4H PRN (RT), Caitlyn Finn M.D., 3 mL at 12/15/18 0151  •  LORazepam (ATIVAN) tablet 0.5 mg, 0.5 mg, Oral, Q4HRS PRN, Caitlyn Finn M.D., 0.5 mg at 12/15/18 0143  •  morphine (ROXANOL) 20  MG/ML oral conc 5-10 mg, 5-10 mg, Oral, Q HOUR PRN, Caitlyn Finn M.D.  •  acetaminophen (TYLENOL) tablet 500 mg, 500 mg, Oral, Q6HRS PRN, Caitlyn Finn M.D., 500 mg at 12/13/18 0239  •  diphenhydrAMINE-ZnAcetate (BENADRYL ITCH) 1-0.1 % cream, , Topical, 4X/DAY PRN, Caitlyn Finn M.D.  •  MD ALERT...adult comfort care, , Other, PRN, Caitlyn Finn M.D.  •  atropine 1 % ophthalmic solution 2 Drop, 2 Drop, Sublingual, Q4HRS PRN, Caitlyn Finn M.D.  •  furosemide (LASIX) tablet 20 mg, 20 mg, Oral, Q DAY, Caitlyn Finn M.D., 20 mg at 12/19/18 0528

## 2018-12-30 NOTE — CARE PLAN
Problem: Safety  Goal: Will remain free from falls  Outcome: PROGRESSING AS EXPECTED    Intervention: Implement fall precautions  Bed in low position, wheels locked, call light within reach, hourly rounding in place.      Problem: Venous Thromboembolism (VTW)/Deep Vein Thrombosis (DVT) Prevention:  Goal: Patient will participate in Venous Thrombosis (VTE)/Deep Vein Thrombosis (DVT)Prevention Measures  Outcome: PROGRESSING AS EXPECTED    Intervention: Assess and monitor for anticoagulation complications  No complications noted.  Intervention: Ensure patient wears graduated elastic stockings (FRITZ hose) and/or SCDs, if ordered, when in bed or chair (Remove at least once per shift for skin check)  Patient refusing SCDs.

## 2018-12-30 NOTE — CARE PLAN
Problem: Safety  Goal: Will remain free from injury  Outcome: PROGRESSING AS EXPECTED  Treaded socks in place, bed in the lowest position, bed alarm on, call light and belongings within reach, pt call for assistance appropriately    Problem: Skin Integrity  Goal: Risk for impaired skin integrity will decrease  Outcome: PROGRESSING AS EXPECTED  Encouraged pt. to q2 turns, barrier cream applied.    Problem: Mobility  Goal: Risk for activity intolerance will decrease  Outcome: PROGRESSING AS EXPECTED  Pt. Up to bathroom, standby assist with FWW, well tolerated.

## 2018-12-31 ENCOUNTER — PATIENT OUTREACH (OUTPATIENT)
Dept: HEALTH INFORMATION MANAGEMENT | Facility: OTHER | Age: 83
End: 2018-12-31

## 2018-12-31 PROCEDURE — 770001 HCHG ROOM/CARE - MED/SURG/GYN PRIV*

## 2018-12-31 PROCEDURE — 99232 SBSQ HOSP IP/OBS MODERATE 35: CPT | Performed by: INTERNAL MEDICINE

## 2018-12-31 ASSESSMENT — ENCOUNTER SYMPTOMS
BLOOD IN STOOL: 0
WEAKNESS: 1
SHORTNESS OF BREATH: 0
EYE PAIN: 0
NAUSEA: 0
FLANK PAIN: 0
CHILLS: 0
DEPRESSION: 0
NECK PAIN: 0
HEMOPTYSIS: 0
DOUBLE VISION: 0
TINGLING: 0
PHOTOPHOBIA: 0
COUGH: 0
PALPITATIONS: 0
HEARTBURN: 0
PND: 0
ABDOMINAL PAIN: 0
DIZZINESS: 0

## 2018-12-31 ASSESSMENT — PAIN SCALES - GENERAL: PAINLEVEL_OUTOF10: 0

## 2018-12-31 NOTE — PROGRESS NOTES
[facility] Medicine Daily Progress Note    Date of Service  12/31/2018    Chief Complaint  89 y.o. female admitted 11/13/2018 with fall.    Hospital Course    PMH HTN who presented with fall. She was found with PRUDENCE in inferior leads on EKG. Stat TTE showed mild hypokinesis of inferior wall. Cardiology was consulted, given her goals of care, she was monitored in the [facility] on heparin infusion for NSTEMI. She did well and was treated with asa, plavix, lipitor. She had low BP and was not started on BB or ACEI/ARB. She was diuresed for fluid overload.  She had discussion of care with her family and changed to comfort care and hospice was consulted. In anticipation for discharge, her quantiferon was positive. CXR not show signs of TB and her AFBs were negative. She has delayed discharge due to placement.          Interval Problem Update  12/25.  Patient is comfortable and stable overnight no significant acute event.  Still pending placement.  Patient does not want to go to nursing care facility.  Pending  help with financial support.  12/26.  Patient is stable overnight and pleasant.  Pending discharge plan.   continue to work with hospice team.  Pending financial assist.  12/27.  Patient is stable and continue monitor and pending financial assist discharged to another facility.  Patient otherwise remained stable and no significant overnight events.  12/28.  Patient has been comfortable overnight.  No significant overnight acute chief complaint.  Pending discharge planning by  and financial support.  Currently unsafe to discharge from the hospital without further plan.  Continue assisted by  for discharge plan.  12/29.  Patient is pleasant.  Hospice option discussed with  and apparently patient currently is not able to return home with hospice because need 24/7 care which patient's home condition cannot accommodate this need.  Pending further placement  options.  12/30.  Still waiting for insurance to be approved.  Patient currently stable and resting comfortably in bed.  Continue comfort care.  12/31.  Patient is comfortable.  Pending placement for comfort care.  Awaiting for  to help.    Consultants/Specialty  Cardiology  Critical Care    Code Status  DNR/Comfort care    Disposition  [facility] vs home with hospice - difficult placement, unable to afford [facility]  OT reevaluated and patient has improved mobility. Social work to follow up with hospice if they will accept her with return to home    Review of Systems  Review of Systems   Constitutional: Negative for chills and malaise/fatigue.   HENT: Negative for congestion, ear discharge and hearing loss.    Eyes: Negative for double vision, photophobia and pain.   Respiratory: Negative for cough, hemoptysis and shortness of breath.    Cardiovascular: Negative for chest pain, palpitations, leg swelling and PND.   Gastrointestinal: Negative for abdominal pain, blood in stool, heartburn and nausea.   Genitourinary: Negative for dysuria, flank pain and frequency.   Musculoskeletal: Negative for neck pain.   Skin: Negative for rash.   Neurological: Positive for weakness. Negative for dizziness and tingling.   Psychiatric/Behavioral: Negative for depression and suicidal ideas.        Physical Exam  Temp:  [36.4 °C (97.6 °F)-36.8 °C (98.2 °F)] 36.4 °C (97.6 °F)  Pulse:  [67-74] 67  Resp:  [16-17] 16  BP: (108-124)/(57-72) 108/57    Physical Exam   Constitutional: She is oriented to person, place, and time. She appears well-developed and well-nourished.   Thin, frail  Pleasant   HENT:   Head: Normocephalic.   Nose: Nose normal.   Mouth/Throat: Oropharynx is clear and moist. No oropharyngeal exudate.   Eyes: Pupils are equal, round, and reactive to light. Conjunctivae and EOM are normal.   Neck: Normal range of motion. Neck supple.   Cardiovascular: Normal rate, regular rhythm and normal heart sounds.  Exam reveals no  gallop and no friction rub.    Pulmonary/Chest: Effort normal and breath sounds normal. No stridor. No respiratory distress. She has no rales.   Abdominal: Soft. Bowel sounds are normal. She exhibits no distension. There is no tenderness. There is no guarding.   Musculoskeletal: Normal range of motion. She exhibits no edema or tenderness.   Diffuse muscle atrophy   Lymphadenopathy:     She has no cervical adenopathy.   Neurological: She is alert and oriented to person, place, and time. She displays normal reflexes. No cranial nerve deficit.   Skin: Skin is warm and dry. No erythema.   Psychiatric: Her behavior is normal.   Nursing note and vitals reviewed.      Fluids    Intake/Output Summary (Last 24 hours) at 12/31/18 1516  Last data filed at 12/31/18 1300   Gross per 24 hour   Intake             1200 ml   Output                0 ml   Net             1200 ml       Laboratory                        Imaging  DX-CHEST-PORTABLE (1 VIEW)   Final Result         1.  Hazy interstitial left pulmonary opacities suggests interstitial edema or infiltrate, similar to prior.   2.  Trace left pleural effusion   3.  Hyperexpansion of lungs favors changes of COPD.      DX-CHEST-PORTABLE (1 VIEW)   Final Result         1.  Interstitial infiltrates or edema, stable.   2.  Atherosclerosis      DX-CHEST-PORTABLE (1 VIEW)   Final Result         1.  Interstitial infiltrates or edema.   2.  Atherosclerosis      DX-CHEST-PORTABLE (1 VIEW)   Final Result      Moderate interstitial edema or interstitial lung disease and small left pleural effusion similar to previous.      DX-CHEST-PORTABLE (1 VIEW)   Final Result      Stable interstitial edema and/or interstitial lung disease with probable small pleural fluid      DX-CHEST-PORTABLE (1 VIEW)   Final Result      Ill-defined infrahilar interstitial opacities, atelectasis versus mild edema. No significant pleural effusions.      DX-CHEST-PORTABLE (1 VIEW)   Final Result      Mild left basilar  atelectasis, improved since prior. No new consolidation or large pleural effusions.      DX-CHEST-PORTABLE (1 VIEW)   Final Result      1.  Left basilar opacification may be secondary to atelectasis. Developing pneumonia cannot be excluded.         DX-CHEST-PORTABLE (1 VIEW)   Final Result      1.  Unchanged mild interstitial pulmonary edema   2.  Unchanged bibasilar atelectasis, alveolar edema or pneumonia      DX-CHEST-PORTABLE (1 VIEW)   Final Result      1.  Decreased pulmonary edema   2.  Unchanged bibasilar atelectasis, alveolar edema or pneumonia      DX-CHEST-PORTABLE (1 VIEW)   Final Result      1.  There is diffuse interstitial and alveolar density in the right mid and lower lung zone. This is increased since the previous study. This may represent mild pulmonary edema/consolidation.   2.  Mildly enlarged cardiomediastinal silhouette when compared with the previous chest x-ray.      DX-CHEST-PORTABLE (1 VIEW)   Final Result      Stable mild pulmonary edema.   New left basilar atelectasis.      DX-CHEST-PORTABLE (1 VIEW)   Final Result      Stable chest appearance compared with 11/16.      DX-CHEST-PORTABLE (1 VIEW)   Final Result      No acute cardiopulmonary abnormality identified.      DX-CHEST-PORTABLE (1 VIEW)   Final Result      No acute cardiopulmonary abnormality. No interval change.      DX-CHEST-PORTABLE (1 VIEW)   Final Result      No acute cardiopulmonary disease.      CT-HEAD W/O   Final Result      1.  No evidence of acute intracranial process.      2.  There is again seen interstitial pulmonary edema and bibasilar atelectasis.      CT-HIP W/O PLUS RECONS LEFT   Final Result      1.  No evidence of acute fracture or malalignment. No evidence of hardware failure or complication.   2.  Postsurgical changes of the left femur with broken screw fragment redemonstrated.   3.  Demineralization.   4.  Scattered colonic diverticula.   5.  Atherosclerosis.   6.  Small fat-containing umbilical hernia.       EC-ECHOCARDIOGRAM COMPLETE W/O CONT   Final Result      DX-HIP-COMPLETE - UNILATERAL 2+ LEFT   Final Result      1.  No definite acute osseous abnormality. In setting of demineralization, an occult fracture is difficult to exclude. If there is strong clinical suspicion, CT or MRI could be obtained for further evaluation.   2.  Postsurgical changes of the left femur with broken screw fragment redemonstrated.      DX-CHEST-PORTABLE (1 VIEW)   Final Result      No acute cardiopulmonary process is seen.      Atherosclerotic plaque.           Assessment/Plan  * ST elevation myocardial infarction involving right coronary artery (HCC)- (present on admission)   Assessment & Plan     Post anti plt, heparin, statin therapy - transitioned to comfort care- no chest pain or dyspnea.   Provide IV morphine, supportive treatment if chest pain symptoms.  Comfort care          Abdominal pain   Assessment & Plan     Transient / intermittent.  No new symptoms, abdominal exam benign  Monitor intake and for signs of constipation. Ordered for scheduled senakot and miralax. Has been noncompliant  Bowel protocols.           Hypotension   Assessment & Plan     Asymptomatic   Encourage intake   Hold lasix for sbp < 100            Pain of left hip joint- (present on admission)   Assessment & Plan    Also intermittent left Knee - probably OA - exam no signs of acute infection.   Pain control   Comfort care patient is stable     Chronic kidney disease (CKD), stage III (moderate) (HCC)- (present on admission)   Assessment & Plan    Was stable  Encourage intake.          Comfort care     Fall- (present on admission)   Assessment & Plan     With debility - improved function.   Try to encourage mobility, assist with transfers and use of walker.            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            Essential hypertension- (present on admission)   Assessment & Plan     BP stable to mildly low.  Hold lasix for sbp < 100 . Comfort care              Advanced directives, counseling/discussion- (present on admission)   Assessment & Plan     Comfort care      Oral morphine for pain, air hunger. Lorazepam for anxiety               VTE prophylaxis: comfort care      I have discussed with RN and CM and SW and other consultants about patient's plan.       Current Facility-Administered Medications:   •  senna-docusate (PERICOLACE or SENOKOT S) 8.6-50 MG per tablet 2 Tab, 2 Tab, Oral, BID, 2 Tab at 12/29/18 1649 **AND** polyethylene glycol/lytes (MIRALAX) PACKET 1 Packet, 1 Packet, Oral, DAILY **AND** magnesium hydroxide (MILK OF MAGNESIA) suspension 30 mL, 30 mL, Oral, QDAY PRN **AND** bisacodyl (DULCOLAX) suppository 10 mg, 10 mg, Rectal, QDAY PRN, Fam Tejeda M.D., 10 mg at 12/23/18 0427  •  ipratropium-albuterol (DUONEB) nebulizer solution, 3 mL, Nebulization, Q4H PRN (RT), Caitlyn Finn M.D., 3 mL at 12/15/18 0151  •  LORazepam (ATIVAN) tablet 0.5 mg, 0.5 mg, Oral, Q4HRS PRN, Caitlyn Finn M.D., 0.5 mg at 12/15/18 0143  •  morphine (ROXANOL) 20 MG/ML oral conc 5-10 mg, 5-10 mg, Oral, Q HOUR PRN, Caitlyn Finn M.D.  •  acetaminophen (TYLENOL) tablet 500 mg, 500 mg, Oral, Q6HRS PRN, Caitlyn Finn M.D., 500 mg at 12/13/18 0237  •  diphenhydrAMINE-ZnAcetate (BENADRYL ITCH) 1-0.1 % cream, , Topical, 4X/DAY PRN, Caitlyn Finn M.D.  •  MD ALERT...adult comfort care, , Other, PRN, Caitlyn Finn M.D.  •  atropine 1 % ophthalmic solution 2 Drop, 2 Drop, Sublingual, Q4HRS PRN, Caitlyn Finn M.D.  •  furosemide (LASIX) tablet 20 mg, 20 mg, Oral, Q DAY, Caitlyn Finn M.D., 20 mg at 12/19/18 0536

## 2018-12-31 NOTE — CARE PLAN
Problem: Safety  Goal: Will remain free from injury  Outcome: PROGRESSING AS EXPECTED  Patient remains free from injury. Uses call light appropriately. Hourly rounding in place.     Problem: Infection  Goal: Will remain free from infection  Outcome: PROGRESSING AS EXPECTED  Remains free from signs/symtoms of infection.

## 2018-12-31 NOTE — PROGRESS NOTES
Per chart review the patient remains at [facility].  Plan: will monitor for discharge from the hospital.

## 2018-12-31 NOTE — CARE PLAN
Problem: Safety  Goal: Will remain free from falls  Outcome: PROGRESSING AS EXPECTED  Bed in low position, wheels locked, call light within reach, hourly rounding in place.

## 2019-01-01 PROCEDURE — 99232 SBSQ HOSP IP/OBS MODERATE 35: CPT | Performed by: INTERNAL MEDICINE

## 2019-01-01 PROCEDURE — 770001 HCHG ROOM/CARE - MED/SURG/GYN PRIV*

## 2019-01-01 ASSESSMENT — ENCOUNTER SYMPTOMS
HALLUCINATIONS: 0
DIZZINESS: 0
WEIGHT LOSS: 0
HEARTBURN: 0
CHILLS: 0
FOCAL WEAKNESS: 0
ABDOMINAL PAIN: 0
PHOTOPHOBIA: 0
NAUSEA: 0
WEAKNESS: 1
HEMOPTYSIS: 0
TINGLING: 0
PALPITATIONS: 0
DEPRESSION: 0
COUGH: 0
SHORTNESS OF BREATH: 0
FLANK PAIN: 0
TREMORS: 0
CONSTIPATION: 0
HEADACHES: 0
ORTHOPNEA: 0
VOMITING: 0
PND: 0
BACK PAIN: 0
EYE PAIN: 0
DOUBLE VISION: 0
BLOOD IN STOOL: 0
NECK PAIN: 0

## 2019-01-01 ASSESSMENT — PAIN SCALES - GENERAL: PAINLEVEL_OUTOF10: 0

## 2019-01-01 NOTE — CARE PLAN
"Problem: Bowel/Gastric:  Goal: Normal bowel function is maintained or improved  Outcome: PROGRESSING SLOWER THAN EXPECTED  Patient continues to refuse stool softeners despite education. Patient states that she had a bowel movement \"a few days\" ago. Patient denies any abdominal discomfort.     Problem: Psychosocial Needs:  Goal: Level of anxiety will decrease  Outcome: PROGRESSING AS EXPECTED  Patient calm and resting in bed. Patient comfort care, no signs / symptoms of distress or anxiety noted.       "

## 2019-01-01 NOTE — PROGRESS NOTES
[facility] Medicine Daily Progress Note    Date of Service  1/1/2019    Chief Complaint  89 y.o. female admitted 11/13/2018 with fall.    Hospital Course    PMH HTN who presented with fall. She was found with PRUDENCE in inferior leads on EKG. Stat TTE showed mild hypokinesis of inferior wall. Cardiology was consulted, given her goals of care, she was monitored in the [facility] on heparin infusion for NSTEMI. She did well and was treated with asa, plavix, lipitor. She had low BP and was not started on BB or ACEI/ARB. She was diuresed for fluid overload.  She had discussion of care with her family and changed to comfort care and hospice was consulted. In anticipation for discharge, her quantiferon was positive. CXR not show signs of TB and her AFBs were negative. She has delayed discharge due to placement.          Interval Problem Update  12/25.  Patient is comfortable and stable overnight no significant acute event.  Still pending placement.  Patient does not want to go to nursing care facility.  Pending  help with financial support.  12/26.  Patient is stable overnight and pleasant.  Pending discharge plan.   continue to work with hospice team.  Pending financial assist.  12/27.  Patient is stable and continue monitor and pending financial assist discharged to another facility.  Patient otherwise remained stable and no significant overnight events.  12/28.  Patient has been comfortable overnight.  No significant overnight acute chief complaint.  Pending discharge planning by  and financial support.  Currently unsafe to discharge from the hospital without further plan.  Continue assisted by  for discharge plan.  12/29.  Patient is pleasant.  Hospice option discussed with  and apparently patient currently is not able to return home with hospice because need 24/7 care which patient's home condition cannot accommodate this need.  Pending further placement  options.  12/30.  Still waiting for insurance to be approved.  Patient currently stable and resting comfortably in bed.  Continue comfort care.  12/31.  Patient is comfortable.  Pending placement for comfort care.  Awaiting for  to help.  1/1.  Patient has been stable overnight and no significant acute event.  Patient is on comfort care and currently comfortable.    Consultants/Specialty  Cardiology  Critical Care    Code Status  DNR/Comfort care    Disposition  [facility] vs home with hospice - difficult placement, unable to afford [facility]  OT reevaluated and patient has improved mobility. Social work to follow up with hospice if they will accept her with return to home    Review of Systems  Review of Systems   Constitutional: Negative for chills, malaise/fatigue and weight loss.   HENT: Negative for congestion, ear discharge and hearing loss.    Eyes: Negative for double vision, photophobia and pain.   Respiratory: Negative for cough, hemoptysis and shortness of breath.    Cardiovascular: Negative for chest pain, palpitations, orthopnea, leg swelling and PND.   Gastrointestinal: Negative for abdominal pain, blood in stool, constipation, heartburn, nausea and vomiting.   Genitourinary: Negative for dysuria, flank pain, frequency and urgency.   Musculoskeletal: Negative for back pain and neck pain.   Skin: Negative for rash.   Neurological: Positive for weakness. Negative for dizziness, tingling, tremors, focal weakness and headaches.   Psychiatric/Behavioral: Negative for depression, hallucinations and suicidal ideas.        Physical Exam  Temp:  [36.5 °C (97.7 °F)] 36.5 °C (97.7 °F)  Pulse:  [81] 81  Resp:  [20] 20  BP: (113)/(60) 113/60    Physical Exam   Constitutional: She is oriented to person, place, and time. She appears well-developed and well-nourished.   Thin, frail  Pleasant   HENT:   Head: Normocephalic and atraumatic.   Nose: Nose normal.   Mouth/Throat: Oropharynx is clear and moist. No oropharyngeal  exudate.   Eyes: Pupils are equal, round, and reactive to light. Conjunctivae and EOM are normal.   Neck: Normal range of motion. Neck supple. No JVD present. No thyromegaly present.   Cardiovascular: Normal rate, regular rhythm and normal heart sounds.  Exam reveals no gallop and no friction rub.    No murmur heard.  Pulmonary/Chest: Effort normal and breath sounds normal. No stridor. No respiratory distress. She has no rales. She exhibits no tenderness.   Abdominal: Soft. Bowel sounds are normal. She exhibits no distension and no mass. There is no tenderness. There is no guarding.   Musculoskeletal: Normal range of motion. She exhibits no edema or tenderness.   Diffuse muscle atrophy   Lymphadenopathy:     She has no cervical adenopathy.   Neurological: She is alert and oriented to person, place, and time. She displays normal reflexes. No cranial nerve deficit.   Skin: Skin is warm and dry. No rash noted. No erythema.   Psychiatric: Her behavior is normal.   Nursing note and vitals reviewed.      Fluids    Intake/Output Summary (Last 24 hours) at 01/01/19 1424  Last data filed at 01/01/19 1200   Gross per 24 hour   Intake              600 ml   Output                0 ml   Net              600 ml       Laboratory                        Imaging  DX-CHEST-PORTABLE (1 VIEW)   Final Result         1.  Hazy interstitial left pulmonary opacities suggests interstitial edema or infiltrate, similar to prior.   2.  Trace left pleural effusion   3.  Hyperexpansion of lungs favors changes of COPD.      DX-CHEST-PORTABLE (1 VIEW)   Final Result         1.  Interstitial infiltrates or edema, stable.   2.  Atherosclerosis      DX-CHEST-PORTABLE (1 VIEW)   Final Result         1.  Interstitial infiltrates or edema.   2.  Atherosclerosis      DX-CHEST-PORTABLE (1 VIEW)   Final Result      Moderate interstitial edema or interstitial lung disease and small left pleural effusion similar to previous.      DX-CHEST-PORTABLE (1 VIEW)    Final Result      Stable interstitial edema and/or interstitial lung disease with probable small pleural fluid      DX-CHEST-PORTABLE (1 VIEW)   Final Result      Ill-defined infrahilar interstitial opacities, atelectasis versus mild edema. No significant pleural effusions.      DX-CHEST-PORTABLE (1 VIEW)   Final Result      Mild left basilar atelectasis, improved since prior. No new consolidation or large pleural effusions.      DX-CHEST-PORTABLE (1 VIEW)   Final Result      1.  Left basilar opacification may be secondary to atelectasis. Developing pneumonia cannot be excluded.         DX-CHEST-PORTABLE (1 VIEW)   Final Result      1.  Unchanged mild interstitial pulmonary edema   2.  Unchanged bibasilar atelectasis, alveolar edema or pneumonia      DX-CHEST-PORTABLE (1 VIEW)   Final Result      1.  Decreased pulmonary edema   2.  Unchanged bibasilar atelectasis, alveolar edema or pneumonia      DX-CHEST-PORTABLE (1 VIEW)   Final Result      1.  There is diffuse interstitial and alveolar density in the right mid and lower lung zone. This is increased since the previous study. This may represent mild pulmonary edema/consolidation.   2.  Mildly enlarged cardiomediastinal silhouette when compared with the previous chest x-ray.      DX-CHEST-PORTABLE (1 VIEW)   Final Result      Stable mild pulmonary edema.   New left basilar atelectasis.      DX-CHEST-PORTABLE (1 VIEW)   Final Result      Stable chest appearance compared with 11/16.      DX-CHEST-PORTABLE (1 VIEW)   Final Result      No acute cardiopulmonary abnormality identified.      DX-CHEST-PORTABLE (1 VIEW)   Final Result      No acute cardiopulmonary abnormality. No interval change.      DX-CHEST-PORTABLE (1 VIEW)   Final Result      No acute cardiopulmonary disease.      CT-HEAD W/O   Final Result      1.  No evidence of acute intracranial process.      2.  There is again seen interstitial pulmonary edema and bibasilar atelectasis.      CT-HIP W/O PLUS  RECONS LEFT   Final Result      1.  No evidence of acute fracture or malalignment. No evidence of hardware failure or complication.   2.  Postsurgical changes of the left femur with broken screw fragment redemonstrated.   3.  Demineralization.   4.  Scattered colonic diverticula.   5.  Atherosclerosis.   6.  Small fat-containing umbilical hernia.      EC-ECHOCARDIOGRAM COMPLETE W/O CONT   Final Result      DX-HIP-COMPLETE - UNILATERAL 2+ LEFT   Final Result      1.  No definite acute osseous abnormality. In setting of demineralization, an occult fracture is difficult to exclude. If there is strong clinical suspicion, CT or MRI could be obtained for further evaluation.   2.  Postsurgical changes of the left femur with broken screw fragment redemonstrated.      DX-CHEST-PORTABLE (1 VIEW)   Final Result      No acute cardiopulmonary process is seen.      Atherosclerotic plaque.           Assessment/Plan  * ST elevation myocardial infarction involving right coronary artery (HCC)- (present on admission)   Assessment & Plan      Post anti plt, heparin, statin therapy - transitioned to comfort care- no chest pain or dyspnea.   Provide IV morphine, supportive treatment if chest pain symptoms.  Comfort care          Abdominal pain   Assessment & Plan     Transient / intermittent.  No new symptoms, abdominal exam benign  Monitor intake and for signs of constipation. Ordered for scheduled senakot and miralax. Has been noncompliant  Bowel protocols.     Patient is comfortable and currently on comfort care        Hypotension   Assessment & Plan     Asymptomatic currently resolved  Encourage intake   Hold lasix for sbp < 100            Pain of left hip joint- (present on admission)   Assessment & Plan     Also intermittent left Knee - probably OA - exam no signs of acute infection.   Pain control   Patient is comfortable     Chronic kidney disease (CKD), stage III (moderate) (HCC)- (present on admission)   Assessment & Plan      Was stable  Encourage intake.          Comfort care     Fall- (present on admission)   Assessment & Plan      With debility - improved function.   Try to encourage mobility, assist with transfers and use of walker.            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            Essential hypertension- (present on admission)   Assessment & Plan     BP stable to mildly low.  Hold lasix for sbp < 100 . Comfort care              Advanced directives, counseling/discussion- (present on admission)   Assessment & Plan     Comfort care      Oral morphine for pain, air hunger. Lorazepam for anxiety               VTE prophylaxis: comfort care      I have discussed with RN and CM and SW and other consultants about patient's plan.       Current Facility-Administered Medications:   •  senna-docusate (PERICOLACE or SENOKOT S) 8.6-50 MG per tablet 2 Tab, 2 Tab, Oral, BID, 2 Tab at 12/29/18 1649 **AND** polyethylene glycol/lytes (MIRALAX) PACKET 1 Packet, 1 Packet, Oral, DAILY **AND** magnesium hydroxide (MILK OF MAGNESIA) suspension 30 mL, 30 mL, Oral, QDAY PRN **AND** bisacodyl (DULCOLAX) suppository 10 mg, 10 mg, Rectal, QDAY PRN, Fam Tejeda M.D., 10 mg at 12/23/18 0427  •  ipratropium-albuterol (DUONEB) nebulizer solution, 3 mL, Nebulization, Q4H PRN (RT), Caitlyn Finn M.D., 3 mL at 12/15/18 0151  •  LORazepam (ATIVAN) tablet 0.5 mg, 0.5 mg, Oral, Q4HRS PRN, Caitlyn Finn M.D., 0.5 mg at 12/15/18 0143  •  morphine (ROXANOL) 20 MG/ML oral conc 5-10 mg, 5-10 mg, Oral, Q HOUR PRN, Caitlyn Finn M.D.  •  acetaminophen (TYLENOL) tablet 500 mg, 500 mg, Oral, Q6HRS PRN, Caitlyn Finn M.D., 500 mg at 12/13/18 0237  •  diphenhydrAMINE-ZnAcetate (BENADRYL ITCH) 1-0.1 % cream, , Topical, 4X/DAY PRN, Caitlyn Finn,  M.D.  •  MD ALERT...adult comfort care, , Other, PRN, Caitlyn Finn M.D.  •  atropine 1 % ophthalmic solution 2 Drop, 2 Drop, Sublingual, Q4HRS PRN, Caitlyn Finn M.D.  •  furosemide (LASIX) tablet 20 mg, 20 mg, Oral, Q DAY, Caitlyn Finn M.D., 20 mg at 12/19/18 0536

## 2019-01-01 NOTE — CARE PLAN
Problem: Safety  Goal: Will remain free from injury  Outcome: PROGRESSING AS EXPECTED  Pt instructed to call before getting OOB. Pt verbalized understanding and calls appropriately. Bed alarm in place and functional, hourly rounding in progress.      Problem: Mobility  Goal: Risk for activity intolerance will decrease  Outcome: PROGRESSING AS EXPECTED  Pt ambulates as needed to get to restroom and back. Pt is comfort care status. Demonstrates bed mobility.

## 2019-01-01 NOTE — CARE PLAN
"Problem: Safety  Goal: Will remain free from injury  Outcome: PROGRESSING AS EXPECTED  Pt instructed to call before getting OOB. Pt verbalized understanding and calls appropriately.    Problem: Bowel/Gastric:  Goal: Normal bowel function is maintained or improved  Outcome: PROGRESSING SLOWER THAN EXPECTED  Pt has had no BM for 7 days, reports that \"the doctor told me I'm not eating enough to have a bowel movement and not to worry about it. I feel fine.\"      "

## 2019-01-02 ENCOUNTER — PATIENT OUTREACH (OUTPATIENT)
Dept: HEALTH INFORMATION MANAGEMENT | Facility: OTHER | Age: 84
End: 2019-01-02

## 2019-01-02 ENCOUNTER — HOME CARE VISIT (OUTPATIENT)
Dept: HOSPICE | Facility: HOSPICE | Age: 84
End: 2019-01-02
Payer: MEDICARE

## 2019-01-02 PROCEDURE — 99231 SBSQ HOSP IP/OBS SF/LOW 25: CPT | Performed by: INTERNAL MEDICINE

## 2019-01-02 PROCEDURE — 770001 HCHG ROOM/CARE - MED/SURG/GYN PRIV*

## 2019-01-02 ASSESSMENT — PAIN SCALES - GENERAL
PAINLEVEL_OUTOF10: 0
PAINLEVEL_OUTOF10: 0

## 2019-01-02 ASSESSMENT — ENCOUNTER SYMPTOMS
WEIGHT LOSS: 0
COUGH: 0
PHOTOPHOBIA: 0
CHILLS: 0
DIZZINESS: 0
NECK PAIN: 0
DEPRESSION: 0
WEAKNESS: 1
TREMORS: 0
TINGLING: 0
FOCAL WEAKNESS: 0
DOUBLE VISION: 0
VOMITING: 0
SHORTNESS OF BREATH: 0
HALLUCINATIONS: 0
BACK PAIN: 0
HEADACHES: 0
FLANK PAIN: 0
HEMOPTYSIS: 0
NAUSEA: 0
EYE PAIN: 0
HEARTBURN: 0

## 2019-01-02 NOTE — PROGRESS NOTES
Per chart review the patient remains at [facility]. Currently still working on discharge plan. Per last discharge notes, there is still concern related to pt's ability to care for self in the home setting and looking into placement options where pt would have more support/supervision.      Plan:   · Will monitor for discharge from the hospital and collaborate with care team members as needed for continuity of care.

## 2019-01-02 NOTE — CARE PLAN
Problem: Safety  Goal: Will remain free from injury  Outcome: PROGRESSING AS EXPECTED  Violet calls and verbally communicates all needs to nursing staff, violet calls for assistanceadequately    Problem: Skin Integrity  Goal: Risk for impaired skin integrity will decrease  Outcome: PROGRESSING SLOWER THAN EXPECTED  Violet educated and encouraged to reposition frequently

## 2019-01-02 NOTE — PROGRESS NOTES
Assumed care of patient at 0700. Patient is pleasantly resting in bed a this time. Call light with in reach, makes needs known to staff

## 2019-01-02 NOTE — PROGRESS NOTES
[facility] Medicine Daily Progress Note    Date of Service  1/2/2019    Chief Complaint  89 y.o. female admitted 11/13/2018 with fall.    Hospital Course    PMH HTN who presented with fall. She was found with PRUDENCE in inferior leads on EKG. Stat TTE showed mild hypokinesis of inferior wall. Cardiology was consulted, given her goals of care, she was monitored in the [facility] on heparin infusion for NSTEMI. She did well and was treated with asa, plavix, lipitor. She had low BP and was not started on BB or ACEI/ARB. She was diuresed for fluid overload.  She had discussion of care with her family and changed to comfort care and hospice was consulted. In anticipation for discharge, her quantiferon was positive. CXR not show signs of TB and her AFBs were negative. She has delayed discharge due to placement.          Interval Problem Update  12/25.  Patient is comfortable and stable overnight no significant acute event.  Still pending placement.  Patient does not want to go to nursing care facility.  Pending  help with financial support.  12/26.  Patient is stable overnight and pleasant.  Pending discharge plan.   continue to work with hospice team.  Pending financial assist.  12/27.  Patient is stable and continue monitor and pending financial assist discharged to another facility.  Patient otherwise remained stable and no significant overnight events.  12/28.  Patient has been comfortable overnight.  No significant overnight acute chief complaint.  Pending discharge planning by  and financial support.  Currently unsafe to discharge from the hospital without further plan.  Continue assisted by  for discharge plan.  12/29.  Patient is pleasant.  Hospice option discussed with  and apparently patient currently is not able to return home with hospice because need 24/7 care which patient's home condition cannot accommodate this need.  Pending further placement  options.  12/30.  Still waiting for insurance to be approved.  Patient currently stable and resting comfortably in bed.  Continue comfort care.  12/31.  Patient is comfortable.  Pending placement for comfort care.  Awaiting for  to help.  1/1.  Patient has been stable overnight and no significant acute event.  Patient is on comfort care and currently comfortable.  1/2-Pt seen and examined, afebrile, no overnight events, on comfort care measures.    Consultants/Specialty  Cardiology  Critical Care    Code Status  DNR/Comfort care    Disposition  [facility] vs home with hospice - difficult placement, unable to afford [facility]  OT reevaluated and patient has improved mobility. Social work to follow up with hospice if they will accept her with return to home    Review of Systems  Review of Systems   Constitutional: Negative for chills, malaise/fatigue and weight loss.   HENT: Negative for congestion, ear discharge and hearing loss.    Eyes: Negative for double vision, photophobia and pain.   Respiratory: Negative for cough, hemoptysis and shortness of breath.    Cardiovascular: Negative for chest pain and leg swelling.   Gastrointestinal: Negative for heartburn, nausea and vomiting.   Genitourinary: Negative for dysuria, flank pain, frequency and urgency.   Musculoskeletal: Negative for back pain and neck pain.   Skin: Negative for rash.   Neurological: Positive for weakness. Negative for dizziness, tingling, tremors, focal weakness and headaches.   Psychiatric/Behavioral: Negative for depression, hallucinations and suicidal ideas.        Physical Exam  Temp:  [36.2 °C (97.1 °F)-36.2 °C (97.2 °F)] 36.2 °C (97.2 °F)  Pulse:  [74-83] 74  Resp:  [17-18] 18  BP: (116-120)/(66-74) 116/66    Physical Exam   Constitutional: She is oriented to person, place, and time. She appears well-developed and well-nourished.   Thin, frail  Pleasant   HENT:   Head: Normocephalic and atraumatic.   Nose: Nose normal.   Mouth/Throat:  Oropharynx is clear and moist. No oropharyngeal exudate.   Eyes: Pupils are equal, round, and reactive to light. Conjunctivae and EOM are normal.   Neck: Normal range of motion. Neck supple. No JVD present. No thyromegaly present.   Cardiovascular: Normal rate, regular rhythm and normal heart sounds.  Exam reveals no gallop and no friction rub.    No murmur heard.  Pulmonary/Chest: Effort normal and breath sounds normal. No stridor. No respiratory distress. She has no rales. She exhibits no tenderness.   Abdominal: Soft. Bowel sounds are normal. She exhibits no distension. There is no tenderness.   Musculoskeletal: Normal range of motion. She exhibits no edema or tenderness.   Diffuse muscle atrophy   Lymphadenopathy:     She has no cervical adenopathy.   Neurological: She is alert and oriented to person, place, and time. She displays normal reflexes. No cranial nerve deficit.   Skin: Skin is warm and dry. No rash noted. No erythema.   Psychiatric: Her behavior is normal.   Nursing note and vitals reviewed.      Fluids    Intake/Output Summary (Last 24 hours) at 01/02/19 1422  Last data filed at 01/02/19 1400   Gross per 24 hour   Intake              600 ml   Output                0 ml   Net              600 ml       Laboratory                        Imaging  DX-CHEST-PORTABLE (1 VIEW)   Final Result         1.  Hazy interstitial left pulmonary opacities suggests interstitial edema or infiltrate, similar to prior.   2.  Trace left pleural effusion   3.  Hyperexpansion of lungs favors changes of COPD.      DX-CHEST-PORTABLE (1 VIEW)   Final Result         1.  Interstitial infiltrates or edema, stable.   2.  Atherosclerosis      DX-CHEST-PORTABLE (1 VIEW)   Final Result         1.  Interstitial infiltrates or edema.   2.  Atherosclerosis      DX-CHEST-PORTABLE (1 VIEW)   Final Result      Moderate interstitial edema or interstitial lung disease and small left pleural effusion similar to previous.       DX-CHEST-PORTABLE (1 VIEW)   Final Result      Stable interstitial edema and/or interstitial lung disease with probable small pleural fluid      DX-CHEST-PORTABLE (1 VIEW)   Final Result      Ill-defined infrahilar interstitial opacities, atelectasis versus mild edema. No significant pleural effusions.      DX-CHEST-PORTABLE (1 VIEW)   Final Result      Mild left basilar atelectasis, improved since prior. No new consolidation or large pleural effusions.      DX-CHEST-PORTABLE (1 VIEW)   Final Result      1.  Left basilar opacification may be secondary to atelectasis. Developing pneumonia cannot be excluded.         DX-CHEST-PORTABLE (1 VIEW)   Final Result      1.  Unchanged mild interstitial pulmonary edema   2.  Unchanged bibasilar atelectasis, alveolar edema or pneumonia      DX-CHEST-PORTABLE (1 VIEW)   Final Result      1.  Decreased pulmonary edema   2.  Unchanged bibasilar atelectasis, alveolar edema or pneumonia      DX-CHEST-PORTABLE (1 VIEW)   Final Result      1.  There is diffuse interstitial and alveolar density in the right mid and lower lung zone. This is increased since the previous study. This may represent mild pulmonary edema/consolidation.   2.  Mildly enlarged cardiomediastinal silhouette when compared with the previous chest x-ray.      DX-CHEST-PORTABLE (1 VIEW)   Final Result      Stable mild pulmonary edema.   New left basilar atelectasis.      DX-CHEST-PORTABLE (1 VIEW)   Final Result      Stable chest appearance compared with 11/16.      DX-CHEST-PORTABLE (1 VIEW)   Final Result      No acute cardiopulmonary abnormality identified.      DX-CHEST-PORTABLE (1 VIEW)   Final Result      No acute cardiopulmonary abnormality. No interval change.      DX-CHEST-PORTABLE (1 VIEW)   Final Result      No acute cardiopulmonary disease.      CT-HEAD W/O   Final Result      1.  No evidence of acute intracranial process.      2.  There is again seen interstitial pulmonary edema and bibasilar  atelectasis.      CT-HIP W/O PLUS RECONS LEFT   Final Result      1.  No evidence of acute fracture or malalignment. No evidence of hardware failure or complication.   2.  Postsurgical changes of the left femur with broken screw fragment redemonstrated.   3.  Demineralization.   4.  Scattered colonic diverticula.   5.  Atherosclerosis.   6.  Small fat-containing umbilical hernia.      EC-ECHOCARDIOGRAM COMPLETE W/O CONT   Final Result      DX-HIP-COMPLETE - UNILATERAL 2+ LEFT   Final Result      1.  No definite acute osseous abnormality. In setting of demineralization, an occult fracture is difficult to exclude. If there is strong clinical suspicion, CT or MRI could be obtained for further evaluation.   2.  Postsurgical changes of the left femur with broken screw fragment redemonstrated.      DX-CHEST-PORTABLE (1 VIEW)   Final Result      No acute cardiopulmonary process is seen.      Atherosclerotic plaque.           Assessment/Plan  * ST elevation myocardial infarction involving right coronary artery (HCC)- (present on admission)   Assessment & Plan      Post anti plt, heparin, statin therapy - transitioned to comfort care- no chest pain or dyspnea.   Provide IV morphine, supportive treatment if chest pain symptoms.  Comfort care          Abdominal pain   Assessment & Plan     Transient / intermittent.  No new symptoms, abdominal exam benign  Monitor intake and for signs of constipation. Ordered for scheduled senakot and miralax. Has been noncompliant  Bowel protocols.     Patient is comfortable and currently on comfort care        Hypotension   Assessment & Plan     Asymptomatic currently resolved  Encourage intake   Hold lasix for sbp < 100            Pain of left hip joint- (present on admission)   Assessment & Plan     Also intermittent left Knee - probably OA - exam no signs of acute infection.   Pain control   Patient is comfortable     Chronic kidney disease (CKD), stage III (moderate) (HCC)- (present on  admission)   Assessment & Plan     Was stable  Encourage intake.          Comfort care     Fall- (present on admission)   Assessment & Plan      With debility - improved function.   Try to encourage mobility, assist with transfers and use of walker.            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            Essential hypertension- (present on admission)   Assessment & Plan     BP stable to mildly low.  Hold lasix for sbp < 100 . Comfort care              Advanced directives, counseling/discussion- (present on admission)   Assessment & Plan     Comfort care      Oral morphine for pain, air hunger. Lorazepam for anxiety               VTE prophylaxis: comfort care

## 2019-01-02 NOTE — CARE PLAN
Problem: Safety  Goal: Will remain free from falls  Outcome: PROGRESSING AS EXPECTED  Pt instructed to call before getting OOB. Pt verbalized understanding and calls appropriately.    Problem: Skin Integrity  Goal: Risk for impaired skin integrity will decrease  Outcome: PROGRESSING AS EXPECTED  Pt ambulates as desired, demonstrates bed mobility, complies with assessments.

## 2019-01-02 NOTE — PROGRESS NOTES
Per chart review the patient remains at [facility].  Plan: will monitor for discharge from the hospital.

## 2019-01-03 PROCEDURE — 99231 SBSQ HOSP IP/OBS SF/LOW 25: CPT | Performed by: INTERNAL MEDICINE

## 2019-01-03 PROCEDURE — 770001 HCHG ROOM/CARE - MED/SURG/GYN PRIV*

## 2019-01-03 ASSESSMENT — ENCOUNTER SYMPTOMS
HALLUCINATIONS: 0
DEPRESSION: 0
WEAKNESS: 1
NECK PAIN: 0
TREMORS: 0
DOUBLE VISION: 0
COUGH: 0
FOCAL WEAKNESS: 0
SHORTNESS OF BREATH: 0
HEARTBURN: 0
BACK PAIN: 0
PHOTOPHOBIA: 0
HEMOPTYSIS: 0
DIZZINESS: 0
TINGLING: 0
NAUSEA: 0
HEADACHES: 0
EYE PAIN: 0
FLANK PAIN: 0
VOMITING: 0
CHILLS: 0
WEIGHT LOSS: 0

## 2019-01-03 ASSESSMENT — PAIN SCALES - GENERAL
PAINLEVEL_OUTOF10: 0
PAINLEVEL_OUTOF10: 0

## 2019-01-03 NOTE — PROGRESS NOTES
[facility] Medicine Daily Progress Note    Date of Service  1/3/2019    Chief Complaint  89 y.o. female admitted 11/13/2018 with fall.    Hospital Course    PMH HTN who presented with fall. She was found with PRUDENCE in inferior leads on EKG. Stat TTE showed mild hypokinesis of inferior wall. Cardiology was consulted, given her goals of care, she was monitored in the [facility] on heparin infusion for NSTEMI. She did well and was treated with asa, plavix, lipitor. She had low BP and was not started on BB or ACEI/ARB. She was diuresed for fluid overload.  She had discussion of care with her family and changed to comfort care and hospice was consulted. In anticipation for discharge, her quantiferon was positive. CXR not show signs of TB and her AFBs were negative. She has delayed discharge due to placement.          Interval Problem Update  12/25.  Patient is comfortable and stable overnight no significant acute event.  Still pending placement.  Patient does not want to go to nursing care facility.  Pending  help with financial support.  12/26.  Patient is stable overnight and pleasant.  Pending discharge plan.   continue to work with hospice team.  Pending financial assist.  12/27.  Patient is stable and continue monitor and pending financial assist discharged to another facility.  Patient otherwise remained stable and no significant overnight events.  12/28.  Patient has been comfortable overnight.  No significant overnight acute chief complaint.  Pending discharge planning by  and financial support.  Currently unsafe to discharge from the hospital without further plan.  Continue assisted by  for discharge plan.  12/29.  Patient is pleasant.  Hospice option discussed with  and apparently patient currently is not able to return home with hospice because need 24/7 care which patient's home condition cannot accommodate this need.  Pending further placement  options.  12/30.  Still waiting for insurance to be approved.  Patient currently stable and resting comfortably in bed.  Continue comfort care.  12/31.  Patient is comfortable.  Pending placement for comfort care.  Awaiting for  to help.  1/1.  Patient has been stable overnight and no significant acute event.  Patient is on comfort care and currently comfortable.  1/2-Pt seen and examined, afebrile, no overnight events, on comfort care measures.  1/3- No acute events overnight, pt resting in bed, on comfort care measures.   Consultants/Specialty  Cardiology  Critical Care    Code Status  DNR/Comfort care    Disposition  [facility] vs home with hospice - difficult placement, unable to afford [facility]  OT reevaluated and patient has improved mobility. Social work to follow up with hospice if they will accept her with return to home    Review of Systems  Review of Systems   Constitutional: Negative for chills, malaise/fatigue and weight loss.   HENT: Negative for congestion, ear discharge and hearing loss.    Eyes: Negative for double vision, photophobia and pain.   Respiratory: Negative for cough, hemoptysis and shortness of breath.    Cardiovascular: Negative for chest pain and leg swelling.   Gastrointestinal: Negative for heartburn, nausea and vomiting.   Genitourinary: Negative for dysuria, flank pain, frequency and urgency.   Musculoskeletal: Negative for back pain and neck pain.   Skin: Negative for rash.   Neurological: Positive for weakness. Negative for dizziness, tingling, tremors, focal weakness and headaches.   Psychiatric/Behavioral: Negative for depression, hallucinations and suicidal ideas.        Physical Exam  Temp:  [36.4 °C (97.5 °F)-36.6 °C (97.8 °F)] 36.6 °C (97.8 °F)  Pulse:  [74-88] 74  Resp:  [18] 18  BP: (115-137)/(62-73) 119/63    Physical Exam   Constitutional: She is oriented to person, place, and time. She appears well-developed and well-nourished.   Thin, frail  Pleasant   HENT:   Head:  Normocephalic and atraumatic.   Nose: Nose normal.   Mouth/Throat: Oropharynx is clear and moist. No oropharyngeal exudate.   Eyes: Pupils are equal, round, and reactive to light. Conjunctivae and EOM are normal.   Neck: Normal range of motion. Neck supple. No JVD present. No thyromegaly present.   Cardiovascular: Normal rate, regular rhythm and normal heart sounds.  Exam reveals no gallop and no friction rub.    No murmur heard.  Pulmonary/Chest: Effort normal and breath sounds normal. No stridor. No respiratory distress. She has no rales. She exhibits no tenderness.   Abdominal: Soft. Bowel sounds are normal. She exhibits no distension. There is no tenderness.   Musculoskeletal: Normal range of motion. She exhibits no edema or tenderness.   Diffuse muscle atrophy   Lymphadenopathy:     She has no cervical adenopathy.   Neurological: She is alert and oriented to person, place, and time. She displays normal reflexes. No cranial nerve deficit.   Skin: Skin is warm and dry. No rash noted. No erythema.   Psychiatric: Her behavior is normal.   Nursing note and vitals reviewed.      Fluids    Intake/Output Summary (Last 24 hours) at 01/03/19 1331  Last data filed at 01/03/19 0900   Gross per 24 hour   Intake              360 ml   Output                0 ml   Net              360 ml       Laboratory                        Imaging  DX-CHEST-PORTABLE (1 VIEW)   Final Result         1.  Hazy interstitial left pulmonary opacities suggests interstitial edema or infiltrate, similar to prior.   2.  Trace left pleural effusion   3.  Hyperexpansion of lungs favors changes of COPD.      DX-CHEST-PORTABLE (1 VIEW)   Final Result         1.  Interstitial infiltrates or edema, stable.   2.  Atherosclerosis      DX-CHEST-PORTABLE (1 VIEW)   Final Result         1.  Interstitial infiltrates or edema.   2.  Atherosclerosis      DX-CHEST-PORTABLE (1 VIEW)   Final Result      Moderate interstitial edema or interstitial lung disease and  small left pleural effusion similar to previous.      DX-CHEST-PORTABLE (1 VIEW)   Final Result      Stable interstitial edema and/or interstitial lung disease with probable small pleural fluid      DX-CHEST-PORTABLE (1 VIEW)   Final Result      Ill-defined infrahilar interstitial opacities, atelectasis versus mild edema. No significant pleural effusions.      DX-CHEST-PORTABLE (1 VIEW)   Final Result      Mild left basilar atelectasis, improved since prior. No new consolidation or large pleural effusions.      DX-CHEST-PORTABLE (1 VIEW)   Final Result      1.  Left basilar opacification may be secondary to atelectasis. Developing pneumonia cannot be excluded.         DX-CHEST-PORTABLE (1 VIEW)   Final Result      1.  Unchanged mild interstitial pulmonary edema   2.  Unchanged bibasilar atelectasis, alveolar edema or pneumonia      DX-CHEST-PORTABLE (1 VIEW)   Final Result      1.  Decreased pulmonary edema   2.  Unchanged bibasilar atelectasis, alveolar edema or pneumonia      DX-CHEST-PORTABLE (1 VIEW)   Final Result      1.  There is diffuse interstitial and alveolar density in the right mid and lower lung zone. This is increased since the previous study. This may represent mild pulmonary edema/consolidation.   2.  Mildly enlarged cardiomediastinal silhouette when compared with the previous chest x-ray.      DX-CHEST-PORTABLE (1 VIEW)   Final Result      Stable mild pulmonary edema.   New left basilar atelectasis.      DX-CHEST-PORTABLE (1 VIEW)   Final Result      Stable chest appearance compared with 11/16.      DX-CHEST-PORTABLE (1 VIEW)   Final Result      No acute cardiopulmonary abnormality identified.      DX-CHEST-PORTABLE (1 VIEW)   Final Result      No acute cardiopulmonary abnormality. No interval change.      DX-CHEST-PORTABLE (1 VIEW)   Final Result      No acute cardiopulmonary disease.      CT-HEAD W/O   Final Result      1.  No evidence of acute intracranial process.      2.  There is again seen  interstitial pulmonary edema and bibasilar atelectasis.      CT-HIP W/O PLUS RECONS LEFT   Final Result      1.  No evidence of acute fracture or malalignment. No evidence of hardware failure or complication.   2.  Postsurgical changes of the left femur with broken screw fragment redemonstrated.   3.  Demineralization.   4.  Scattered colonic diverticula.   5.  Atherosclerosis.   6.  Small fat-containing umbilical hernia.      EC-ECHOCARDIOGRAM COMPLETE W/O CONT   Final Result      DX-HIP-COMPLETE - UNILATERAL 2+ LEFT   Final Result      1.  No definite acute osseous abnormality. In setting of demineralization, an occult fracture is difficult to exclude. If there is strong clinical suspicion, CT or MRI could be obtained for further evaluation.   2.  Postsurgical changes of the left femur with broken screw fragment redemonstrated.      DX-CHEST-PORTABLE (1 VIEW)   Final Result      No acute cardiopulmonary process is seen.      Atherosclerotic plaque.           Assessment/Plan  * ST elevation myocardial infarction involving right coronary artery (HCC)- (present on admission)   Assessment & Plan      Post anti plt, heparin, statin therapy - transitioned to comfort care- no chest pain or dyspnea.   Provide IV morphine, supportive treatment if chest pain symptoms.  Comfort care          Abdominal pain   Assessment & Plan     Transient / intermittent.  No new symptoms, abdominal exam benign  Monitor intake and for signs of constipation. Ordered for scheduled senakot and miralax. Has been noncompliant  Bowel protocols.     Patient is comfortable and currently on comfort care        Hypotension   Assessment & Plan     Asymptomatic currently resolved  Encourage intake   Hold lasix for sbp < 100            Pain of left hip joint- (present on admission)   Assessment & Plan     Also intermittent left Knee - probably OA - exam no signs of acute infection.   Pain control   Patient is comfortable     Chronic kidney disease (CKD),  stage III (moderate) (HCC)- (present on admission)   Assessment & Plan     Was stable  Encourage intake.          Comfort care     Fall- (present on admission)   Assessment & Plan      With debility - improved function.   Try to encourage mobility, assist with transfers and use of walker.            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            Essential hypertension- (present on admission)   Assessment & Plan     BP stable to mildly low.  Hold lasix for sbp < 100 . Comfort care              Advanced directives, counseling/discussion- (present on admission)   Assessment & Plan     Comfort care      Oral morphine for pain, air hunger. Lorazepam for anxiety               VTE prophylaxis: comfort care

## 2019-01-04 ENCOUNTER — HOME CARE VISIT (OUTPATIENT)
Dept: HOSPICE | Facility: HOSPICE | Age: 84
End: 2019-01-04
Payer: MEDICARE

## 2019-01-04 ENCOUNTER — PATIENT OUTREACH (OUTPATIENT)
Dept: HEALTH INFORMATION MANAGEMENT | Facility: OTHER | Age: 84
End: 2019-01-04

## 2019-01-04 PROCEDURE — 770001 HCHG ROOM/CARE - MED/SURG/GYN PRIV*

## 2019-01-04 PROCEDURE — 700102 HCHG RX REV CODE 250 W/ 637 OVERRIDE(OP): Performed by: HOSPITALIST

## 2019-01-04 PROCEDURE — 700102 HCHG RX REV CODE 250 W/ 637 OVERRIDE(OP): Performed by: INTERNAL MEDICINE

## 2019-01-04 PROCEDURE — A9270 NON-COVERED ITEM OR SERVICE: HCPCS | Performed by: INTERNAL MEDICINE

## 2019-01-04 PROCEDURE — 99231 SBSQ HOSP IP/OBS SF/LOW 25: CPT | Performed by: INTERNAL MEDICINE

## 2019-01-04 PROCEDURE — A9270 NON-COVERED ITEM OR SERVICE: HCPCS | Performed by: HOSPITALIST

## 2019-01-04 RX ADMIN — SENNOSIDES AND DOCUSATE SODIUM 2 TABLET: 8.6; 5 TABLET ORAL at 07:54

## 2019-01-04 RX ADMIN — POLYETHYLENE GLYCOL 3350 1 PACKET: 17 POWDER, FOR SOLUTION ORAL at 17:20

## 2019-01-04 RX ADMIN — FUROSEMIDE 20 MG: 20 TABLET ORAL at 07:54

## 2019-01-04 ASSESSMENT — ENCOUNTER SYMPTOMS
DEPRESSION: 0
NECK PAIN: 0
WEIGHT LOSS: 0
COUGH: 0
PHOTOPHOBIA: 0
HEARTBURN: 0
HEMOPTYSIS: 0
DIZZINESS: 0
FOCAL WEAKNESS: 0
HALLUCINATIONS: 0
HEADACHES: 0
EYE PAIN: 0
SHORTNESS OF BREATH: 0
DOUBLE VISION: 0
FLANK PAIN: 0
BACK PAIN: 0
NAUSEA: 0
CHILLS: 0
TINGLING: 0
VOMITING: 0
WEAKNESS: 1
TREMORS: 0

## 2019-01-04 ASSESSMENT — PAIN SCALES - GENERAL
PAINLEVEL_OUTOF10: 0
PAINLEVEL_OUTOF10: 0

## 2019-01-04 NOTE — CARE PLAN
Problem: Safety  Goal: Will remain free from falls    Intervention: Implement fall precautions  Pt calls appropriately, treaded socks in use. Personal belongings and call light with in reach and bed is locked in lowest position. Hourly rounding      Problem: Skin Integrity  Goal: Risk for impaired skin integrity will decrease    Intervention: Implement precautions to protect skin integrity in collaboration with the interdisciplinary team  mepilex to sacrum, refused waffle overlay. Patient does reposition self in bed and is encouraged to do so often.

## 2019-01-04 NOTE — PROGRESS NOTES
[facility] Medicine Daily Progress Note    Date of Service  1/4/2019    Chief Complaint  89 y.o. female admitted 11/13/2018 with fall.    Hospital Course    PMH HTN who presented with fall. She was found with PRUDENCE in inferior leads on EKG. Stat TTE showed mild hypokinesis of inferior wall. Cardiology was consulted, given her goals of care, she was monitored in the [facility] on heparin infusion for NSTEMI. She did well and was treated with asa, plavix, lipitor. She had low BP and was not started on BB or ACEI/ARB. She was diuresed for fluid overload.  She had discussion of care with her family and changed to comfort care and hospice was consulted. In anticipation for discharge, her quantiferon was positive. CXR not show signs of TB and her AFBs were negative. She has delayed discharge due to placement.          Interval Problem Update  12/25.  Patient is comfortable and stable overnight no significant acute event.  Still pending placement.  Patient does not want to go to nursing care facility.  Pending  help with financial support.  12/26.  Patient is stable overnight and pleasant.  Pending discharge plan.   continue to work with hospice team.  Pending financial assist.  12/27.  Patient is stable and continue monitor and pending financial assist discharged to another facility.  Patient otherwise remained stable and no significant overnight events.  12/28.  Patient has been comfortable overnight.  No significant overnight acute chief complaint.  Pending discharge planning by  and financial support.  Currently unsafe to discharge from the hospital without further plan.  Continue assisted by  for discharge plan.  12/29.  Patient is pleasant.  Hospice option discussed with  and apparently patient currently is not able to return home with hospice because need 24/7 care which patient's home condition cannot accommodate this need.  Pending further placement  options.  12/30.  Still waiting for insurance to be approved.  Patient currently stable and resting comfortably in bed.  Continue comfort care.  12/31.  Patient is comfortable.  Pending placement for comfort care.  Awaiting for  to help.  1/1.  Patient has been stable overnight and no significant acute event.  Patient is on comfort care and currently comfortable.  1/2-Pt seen and examined, afebrile, no overnight events, on comfort care measures.  1/3- No acute events overnight, pt resting in bed, on comfort care measures.   1/4- Afebrile, no acute events overnight,     Consultants/Specialty  Cardiology  Critical Care    Code Status  DNR/Comfort care    Disposition  [facility] vs home with hospice - difficult placement, unable to afford [facility]  OT reevaluated and patient has improved mobility. Social work to follow up with hospice if they will accept her with return to home    Review of Systems  Review of Systems   Constitutional: Negative for chills, malaise/fatigue and weight loss.   HENT: Negative for congestion, ear discharge and hearing loss.    Eyes: Negative for double vision, photophobia and pain.   Respiratory: Negative for cough, hemoptysis and shortness of breath.    Cardiovascular: Negative for chest pain and leg swelling.   Gastrointestinal: Negative for heartburn, nausea and vomiting.   Genitourinary: Negative for dysuria, flank pain, frequency and urgency.   Musculoskeletal: Negative for back pain and neck pain.   Skin: Negative for rash.   Neurological: Positive for weakness. Negative for dizziness, tingling, tremors, focal weakness and headaches.   Psychiatric/Behavioral: Negative for depression, hallucinations and suicidal ideas.        Physical Exam  Temp:  [36.2 °C (97.1 °F)-36.9 °C (98.4 °F)] 36.2 °C (97.1 °F)  Pulse:  [71-78] 71  Resp:  [16-20] 20  BP: (114-116)/(61-65) 114/65    Physical Exam   Constitutional: She is oriented to person, place, and time. She appears well-developed and  well-nourished.   Thin, frail  Pleasant   HENT:   Head: Normocephalic and atraumatic.   Nose: Nose normal.   Mouth/Throat: Oropharynx is clear and moist. No oropharyngeal exudate.   Eyes: Pupils are equal, round, and reactive to light. Conjunctivae and EOM are normal.   Neck: Normal range of motion. Neck supple. No JVD present. No thyromegaly present.   Cardiovascular: Normal rate, regular rhythm and normal heart sounds.  Exam reveals no gallop and no friction rub.    No murmur heard.  Pulmonary/Chest: Effort normal and breath sounds normal. No stridor. No respiratory distress. She has no rales. She exhibits no tenderness.   Abdominal: Soft. Bowel sounds are normal. She exhibits no distension. There is no tenderness.   Musculoskeletal: Normal range of motion. She exhibits no edema or tenderness.   Diffuse muscle atrophy   Lymphadenopathy:     She has no cervical adenopathy.   Neurological: She is alert and oriented to person, place, and time. She displays normal reflexes. No cranial nerve deficit.   Skin: Skin is warm and dry. No rash noted. No erythema.   Psychiatric: Her behavior is normal.   Nursing note and vitals reviewed.      Fluids    Intake/Output Summary (Last 24 hours) at 01/04/19 1526  Last data filed at 01/04/19 0850   Gross per 24 hour   Intake              240 ml   Output                0 ml   Net              240 ml       Laboratory                        Imaging  DX-CHEST-PORTABLE (1 VIEW)   Final Result         1.  Hazy interstitial left pulmonary opacities suggests interstitial edema or infiltrate, similar to prior.   2.  Trace left pleural effusion   3.  Hyperexpansion of lungs favors changes of COPD.      DX-CHEST-PORTABLE (1 VIEW)   Final Result         1.  Interstitial infiltrates or edema, stable.   2.  Atherosclerosis      DX-CHEST-PORTABLE (1 VIEW)   Final Result         1.  Interstitial infiltrates or edema.   2.  Atherosclerosis      DX-CHEST-PORTABLE (1 VIEW)   Final Result      Moderate  interstitial edema or interstitial lung disease and small left pleural effusion similar to previous.      DX-CHEST-PORTABLE (1 VIEW)   Final Result      Stable interstitial edema and/or interstitial lung disease with probable small pleural fluid      DX-CHEST-PORTABLE (1 VIEW)   Final Result      Ill-defined infrahilar interstitial opacities, atelectasis versus mild edema. No significant pleural effusions.      DX-CHEST-PORTABLE (1 VIEW)   Final Result      Mild left basilar atelectasis, improved since prior. No new consolidation or large pleural effusions.      DX-CHEST-PORTABLE (1 VIEW)   Final Result      1.  Left basilar opacification may be secondary to atelectasis. Developing pneumonia cannot be excluded.         DX-CHEST-PORTABLE (1 VIEW)   Final Result      1.  Unchanged mild interstitial pulmonary edema   2.  Unchanged bibasilar atelectasis, alveolar edema or pneumonia      DX-CHEST-PORTABLE (1 VIEW)   Final Result      1.  Decreased pulmonary edema   2.  Unchanged bibasilar atelectasis, alveolar edema or pneumonia      DX-CHEST-PORTABLE (1 VIEW)   Final Result      1.  There is diffuse interstitial and alveolar density in the right mid and lower lung zone. This is increased since the previous study. This may represent mild pulmonary edema/consolidation.   2.  Mildly enlarged cardiomediastinal silhouette when compared with the previous chest x-ray.      DX-CHEST-PORTABLE (1 VIEW)   Final Result      Stable mild pulmonary edema.   New left basilar atelectasis.      DX-CHEST-PORTABLE (1 VIEW)   Final Result      Stable chest appearance compared with 11/16.      DX-CHEST-PORTABLE (1 VIEW)   Final Result      No acute cardiopulmonary abnormality identified.      DX-CHEST-PORTABLE (1 VIEW)   Final Result      No acute cardiopulmonary abnormality. No interval change.      DX-CHEST-PORTABLE (1 VIEW)   Final Result      No acute cardiopulmonary disease.      CT-HEAD W/O   Final Result      1.  No evidence of acute  intracranial process.      2.  There is again seen interstitial pulmonary edema and bibasilar atelectasis.      CT-HIP W/O PLUS RECONS LEFT   Final Result      1.  No evidence of acute fracture or malalignment. No evidence of hardware failure or complication.   2.  Postsurgical changes of the left femur with broken screw fragment redemonstrated.   3.  Demineralization.   4.  Scattered colonic diverticula.   5.  Atherosclerosis.   6.  Small fat-containing umbilical hernia.      EC-ECHOCARDIOGRAM COMPLETE W/O CONT   Final Result      DX-HIP-COMPLETE - UNILATERAL 2+ LEFT   Final Result      1.  No definite acute osseous abnormality. In setting of demineralization, an occult fracture is difficult to exclude. If there is strong clinical suspicion, CT or MRI could be obtained for further evaluation.   2.  Postsurgical changes of the left femur with broken screw fragment redemonstrated.      DX-CHEST-PORTABLE (1 VIEW)   Final Result      No acute cardiopulmonary process is seen.      Atherosclerotic plaque.           Assessment/Plan  * ST elevation myocardial infarction involving right coronary artery (HCC)- (present on admission)   Assessment & Plan      Post anti plt, heparin, statin therapy - transitioned to comfort care- no chest pain or dyspnea.   Provide IV morphine, supportive treatment if chest pain symptoms.  Comfort care          Abdominal pain   Assessment & Plan     Transient / intermittent.  No new symptoms, abdominal exam benign  Monitor intake and for signs of constipation. Ordered for scheduled senakot and miralax. Has been noncompliant  Bowel protocols.     Patient is comfortable and currently on comfort care        Hypotension   Assessment & Plan     Asymptomatic currently resolved  Encourage intake   Hold lasix for sbp < 100            Pain of left hip joint- (present on admission)   Assessment & Plan     Also intermittent left Knee - probably OA - exam no signs of acute infection.   Pain control    Patient is comfortable     Chronic kidney disease (CKD), stage III (moderate) (HCC)- (present on admission)   Assessment & Plan     Was stable  Encourage intake.          Comfort care     Fall- (present on admission)   Assessment & Plan      With debility - improved function.   Try to encourage mobility, assist with transfers and use of walker.            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            Essential hypertension- (present on admission)   Assessment & Plan     BP stable to mildly low.  Hold lasix for sbp < 100 . Comfort care              Advanced directives, counseling/discussion- (present on admission)   Assessment & Plan     Comfort care      Oral morphine for pain, air hunger. Lorazepam for anxiety               VTE prophylaxis: comfort care

## 2019-01-04 NOTE — CARE PLAN
Problem: Safety  Goal: Will remain free from falls  Outcome: PROGRESSING AS EXPECTED    Intervention: Implement fall precautions  Bed in low position, wheels locked, call light within reach, hourly rounding in place.      Problem: Mobility  Goal: Risk for activity intolerance will decrease  Outcome: PROGRESSING AS EXPECTED  Patient ambulating with standby assist and FWW.

## 2019-01-04 NOTE — PROGRESS NOTES
· 2 RN skin check complete with AQUILINO Cooper.  · Devices in place NONE.  · Skin assessed under devices N/A.  · Confirmed pressure ulcers found on NONE.  · New potential pressure ulcers noted on NONE.  · Sacrum redness noted blanching.  · The following interventions in place Meplix in place to sacrum

## 2019-01-04 NOTE — PROGRESS NOTES
Report received. Assumed pt care. Denies pain or SOB. Pt up to chair for breakfast. Assessment done. Fall precaution in place, chair alarm and bed alarm in use. Hourly rounding in place.

## 2019-01-05 PROCEDURE — 700102 HCHG RX REV CODE 250 W/ 637 OVERRIDE(OP): Performed by: HOSPITALIST

## 2019-01-05 PROCEDURE — 99231 SBSQ HOSP IP/OBS SF/LOW 25: CPT | Performed by: INTERNAL MEDICINE

## 2019-01-05 PROCEDURE — A9270 NON-COVERED ITEM OR SERVICE: HCPCS | Performed by: INTERNAL MEDICINE

## 2019-01-05 PROCEDURE — 700102 HCHG RX REV CODE 250 W/ 637 OVERRIDE(OP): Performed by: INTERNAL MEDICINE

## 2019-01-05 PROCEDURE — 770001 HCHG ROOM/CARE - MED/SURG/GYN PRIV*

## 2019-01-05 PROCEDURE — A9270 NON-COVERED ITEM OR SERVICE: HCPCS | Performed by: HOSPITALIST

## 2019-01-05 RX ADMIN — SENNOSIDES AND DOCUSATE SODIUM 2 TABLET: 8.6; 5 TABLET ORAL at 07:05

## 2019-01-05 RX ADMIN — FUROSEMIDE 20 MG: 20 TABLET ORAL at 07:05

## 2019-01-05 ASSESSMENT — ENCOUNTER SYMPTOMS
HEMOPTYSIS: 0
FOCAL WEAKNESS: 0
NECK PAIN: 0
TREMORS: 0
NAUSEA: 0
BACK PAIN: 0
PHOTOPHOBIA: 0
HEARTBURN: 0
WEAKNESS: 1
CHILLS: 0
DOUBLE VISION: 0
WEIGHT LOSS: 0
HEADACHES: 0
COUGH: 0
EYE PAIN: 0
SHORTNESS OF BREATH: 0
DEPRESSION: 0
VOMITING: 0
DIZZINESS: 0
FLANK PAIN: 0
HALLUCINATIONS: 0
TINGLING: 0

## 2019-01-05 ASSESSMENT — PAIN SCALES - GENERAL
PAINLEVEL_OUTOF10: 0

## 2019-01-05 NOTE — CARE PLAN
Problem: Safety  Goal: Will remain free from injury  Outcome: PROGRESSING AS EXPECTED  Bed in the lowest locked position. Call light within reach. Bed alarm in use. Hourly rounding in place.     Problem: Bowel/Gastric:  Goal: Normal bowel function is maintained or improved  Outcome: PROGRESSING SLOWER THAN EXPECTED  Pt has active bowel sounds throughout. Passing gas. Took Miralax this evening.

## 2019-01-05 NOTE — CARE PLAN
Problem: Safety  Goal: Will remain free from injury  Outcome: PROGRESSING AS EXPECTED  Bed in the lowest locked position. Call light within reach. Bed alarm in use. Hourly rounding in place.     Problem: Mobility  Goal: Risk for activity intolerance will decrease  Outcome: PROGRESSING AS EXPECTED  Pt up standby assist with four wheel walker

## 2019-01-05 NOTE — PROGRESS NOTES
[facility] Medicine Daily Progress Note    Date of Service  1/5/2019    Chief Complaint  89 y.o. female admitted 11/13/2018 with fall.    Hospital Course    PMH HTN who presented with fall. She was found with PRUDENCE in inferior leads on EKG. Stat TTE showed mild hypokinesis of inferior wall. Cardiology was consulted, given her goals of care, she was monitored in the [facility] on heparin infusion for NSTEMI. She did well and was treated with asa, plavix, lipitor. She had low BP and was not started on BB or ACEI/ARB. She was diuresed for fluid overload.  She had discussion of care with her family and changed to comfort care and hospice was consulted. In anticipation for discharge, her quantiferon was positive. CXR not show signs of TB and her AFBs were negative. She has delayed discharge due to placement.          Interval Problem Update  12/25.  Patient is comfortable and stable overnight no significant acute event.  Still pending placement.  Patient does not want to go to nursing care facility.  Pending  help with financial support.  12/26.  Patient is stable overnight and pleasant.  Pending discharge plan.   continue to work with hospice team.  Pending financial assist.  12/27.  Patient is stable and continue monitor and pending financial assist discharged to another facility.  Patient otherwise remained stable and no significant overnight events.  12/28.  Patient has been comfortable overnight.  No significant overnight acute chief complaint.  Pending discharge planning by  and financial support.  Currently unsafe to discharge from the hospital without further plan.  Continue assisted by  for discharge plan.  12/29.  Patient is pleasant.  Hospice option discussed with  and apparently patient currently is not able to return home with hospice because need 24/7 care which patient's home condition cannot accommodate this need.  Pending further placement  options.  12/30.  Still waiting for insurance to be approved.  Patient currently stable and resting comfortably in bed.  Continue comfort care.  12/31.  Patient is comfortable.  Pending placement for comfort care.  Awaiting for  to help.  1/1.  Patient has been stable overnight and no significant acute event.  Patient is on comfort care and currently comfortable.  1/2-Pt seen and examined, afebrile, no overnight events, on comfort care measures.  1/3- No acute events overnight, pt resting in bed, on comfort care measures.   1/4- Afebrile, no acute events overnight,   1/5- Pt seen and examined, no changes, pending placement   Consultants/Specialty  Cardiology  Critical Care    Code Status  DNR/Comfort care    Disposition   vs home with hospice - difficult placement, unable to afford       Review of Systems  Review of Systems   Constitutional: Negative for chills, malaise/fatigue and weight loss.   HENT: Negative for congestion, ear discharge and hearing loss.    Eyes: Negative for double vision, photophobia and pain.   Respiratory: Negative for cough, hemoptysis and shortness of breath.    Cardiovascular: Negative for chest pain and leg swelling.   Gastrointestinal: Negative for heartburn, nausea and vomiting.   Genitourinary: Negative for dysuria, flank pain, frequency and urgency.   Musculoskeletal: Negative for back pain and neck pain.   Skin: Negative for rash.   Neurological: Positive for weakness. Negative for dizziness, tingling, tremors, focal weakness and headaches.   Psychiatric/Behavioral: Negative for depression, hallucinations and suicidal ideas.        Physical Exam  Temp:  [36.6 °C (97.9 °F)-37 °C (98.6 °F)] 36.6 °C (97.9 °F)  Pulse:  [76-81] 79  Resp:  [18] 18  BP: (117-129)/(65-78) 125/74    Physical Exam   Constitutional: She is oriented to person, place, and time. She appears well-developed and well-nourished.   Thin, frail  Pleasant   HENT:   Head: Normocephalic and atraumatic.    Nose: Nose normal.   Mouth/Throat: Oropharynx is clear and moist. No oropharyngeal exudate.   Eyes: Pupils are equal, round, and reactive to light. Conjunctivae and EOM are normal.   Neck: Normal range of motion. Neck supple. No JVD present. No thyromegaly present.   Cardiovascular: Normal rate, regular rhythm and normal heart sounds.  Exam reveals no gallop and no friction rub.    No murmur heard.  Pulmonary/Chest: Effort normal and breath sounds normal. No stridor. No respiratory distress. She has no rales. She exhibits no tenderness.   Abdominal: Soft. Bowel sounds are normal. She exhibits no distension. There is no tenderness.   Musculoskeletal: Normal range of motion. She exhibits no edema or tenderness.   Diffuse muscle atrophy   Lymphadenopathy:     She has no cervical adenopathy.   Neurological: She is alert and oriented to person, place, and time. She displays normal reflexes. No cranial nerve deficit.   Skin: Skin is warm and dry. No rash noted. No erythema.   Psychiatric: Her behavior is normal.   Nursing note and vitals reviewed.      Fluids    Intake/Output Summary (Last 24 hours) at 01/05/19 1238  Last data filed at 01/04/19 1329   Gross per 24 hour   Intake              240 ml   Output                0 ml   Net              240 ml       Laboratory                        Imaging  DX-CHEST-PORTABLE (1 VIEW)   Final Result         1.  Hazy interstitial left pulmonary opacities suggests interstitial edema or infiltrate, similar to prior.   2.  Trace left pleural effusion   3.  Hyperexpansion of lungs favors changes of COPD.      DX-CHEST-PORTABLE (1 VIEW)   Final Result         1.  Interstitial infiltrates or edema, stable.   2.  Atherosclerosis      DX-CHEST-PORTABLE (1 VIEW)   Final Result         1.  Interstitial infiltrates or edema.   2.  Atherosclerosis      DX-CHEST-PORTABLE (1 VIEW)   Final Result      Moderate interstitial edema or interstitial lung disease and small left pleural effusion  similar to previous.      DX-CHEST-PORTABLE (1 VIEW)   Final Result      Stable interstitial edema and/or interstitial lung disease with probable small pleural fluid      DX-CHEST-PORTABLE (1 VIEW)   Final Result      Ill-defined infrahilar interstitial opacities, atelectasis versus mild edema. No significant pleural effusions.      DX-CHEST-PORTABLE (1 VIEW)   Final Result      Mild left basilar atelectasis, improved since prior. No new consolidation or large pleural effusions.      DX-CHEST-PORTABLE (1 VIEW)   Final Result      1.  Left basilar opacification may be secondary to atelectasis. Developing pneumonia cannot be excluded.         DX-CHEST-PORTABLE (1 VIEW)   Final Result      1.  Unchanged mild interstitial pulmonary edema   2.  Unchanged bibasilar atelectasis, alveolar edema or pneumonia      DX-CHEST-PORTABLE (1 VIEW)   Final Result      1.  Decreased pulmonary edema   2.  Unchanged bibasilar atelectasis, alveolar edema or pneumonia      DX-CHEST-PORTABLE (1 VIEW)   Final Result      1.  There is diffuse interstitial and alveolar density in the right mid and lower lung zone. This is increased since the previous study. This may represent mild pulmonary edema/consolidation.   2.  Mildly enlarged cardiomediastinal silhouette when compared with the previous chest x-ray.      DX-CHEST-PORTABLE (1 VIEW)   Final Result      Stable mild pulmonary edema.   New left basilar atelectasis.      DX-CHEST-PORTABLE (1 VIEW)   Final Result      Stable chest appearance compared with 11/16.      DX-CHEST-PORTABLE (1 VIEW)   Final Result      No acute cardiopulmonary abnormality identified.      DX-CHEST-PORTABLE (1 VIEW)   Final Result      No acute cardiopulmonary abnormality. No interval change.      DX-CHEST-PORTABLE (1 VIEW)   Final Result      No acute cardiopulmonary disease.      CT-HEAD W/O   Final Result      1.  No evidence of acute intracranial process.      2.  There is again seen interstitial pulmonary edema  and bibasilar atelectasis.      CT-HIP W/O PLUS RECONS LEFT   Final Result      1.  No evidence of acute fracture or malalignment. No evidence of hardware failure or complication.   2.  Postsurgical changes of the left femur with broken screw fragment redemonstrated.   3.  Demineralization.   4.  Scattered colonic diverticula.   5.  Atherosclerosis.   6.  Small fat-containing umbilical hernia.      EC-ECHOCARDIOGRAM COMPLETE W/O CONT   Final Result      DX-HIP-COMPLETE - UNILATERAL 2+ LEFT   Final Result      1.  No definite acute osseous abnormality. In setting of demineralization, an occult fracture is difficult to exclude. If there is strong clinical suspicion, CT or MRI could be obtained for further evaluation.   2.  Postsurgical changes of the left femur with broken screw fragment redemonstrated.      DX-CHEST-PORTABLE (1 VIEW)   Final Result      No acute cardiopulmonary process is seen.      Atherosclerotic plaque.           Assessment/Plan  * ST elevation myocardial infarction involving right coronary artery (HCC)- (present on admission)   Assessment & Plan      Post anti plt, heparin, statin therapy - transitioned to comfort care- no chest pain or dyspnea.   Provide IV morphine, supportive treatment if chest pain symptoms.  Comfort care          Abdominal pain   Assessment & Plan     Transient / intermittent.  No new symptoms, abdominal exam benign  Monitor intake and for signs of constipation. Ordered for scheduled senakot and miralax. Has been noncompliant  Bowel protocols.     Patient is comfortable and currently on comfort care        Hypotension   Assessment & Plan     Asymptomatic currently resolved  Encourage intake   Hold lasix for sbp < 100            Pain of left hip joint- (present on admission)   Assessment & Plan     Also intermittent left Knee - probably OA - exam no signs of acute infection.   Pain control   Patient is comfortable     Chronic kidney disease (CKD), stage III (moderate) ([facility])-  (present on admission)   Assessment & Plan     Was stable  Encourage intake.          Comfort care     Fall- (present on admission)   Assessment & Plan      With debility - improved function.   Try to encourage mobility, assist with transfers and use of walker.            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            Essential hypertension- (present on admission)   Assessment & Plan     BP stable to mildly low.  Hold lasix for sbp < 100 . Comfort care              Advanced directives, counseling/discussion- (present on admission)   Assessment & Plan     Comfort care      Oral morphine for pain, air hunger. Lorazepam for anxiety               VTE prophylaxis: comfort care

## 2019-01-06 PROCEDURE — 99231 SBSQ HOSP IP/OBS SF/LOW 25: CPT | Performed by: INTERNAL MEDICINE

## 2019-01-06 PROCEDURE — 770001 HCHG ROOM/CARE - MED/SURG/GYN PRIV*

## 2019-01-06 ASSESSMENT — PAIN SCALES - GENERAL
PAINLEVEL_OUTOF10: 0
PAINLEVEL_OUTOF10: 0

## 2019-01-06 ASSESSMENT — ENCOUNTER SYMPTOMS
WEIGHT LOSS: 0
DOUBLE VISION: 0
FOCAL WEAKNESS: 0
DEPRESSION: 0
NAUSEA: 0
BACK PAIN: 0
COUGH: 0
FLANK PAIN: 0
TINGLING: 0
HEMOPTYSIS: 0
DIZZINESS: 0
TREMORS: 0
SHORTNESS OF BREATH: 0
NECK PAIN: 0
VOMITING: 0
HEARTBURN: 0
PHOTOPHOBIA: 0
HEADACHES: 0
CHILLS: 0
WEAKNESS: 1
EYE PAIN: 0
HALLUCINATIONS: 0

## 2019-01-06 NOTE — PROGRESS NOTES
[facility] Medicine Daily Progress Note    Date of Service  1/6/2019    Chief Complaint  89 y.o. female admitted 11/13/2018 with fall.    Hospital Course    PMH HTN who presented with fall. She was found with PRUDENCE in inferior leads on EKG. Stat TTE showed mild hypokinesis of inferior wall. Cardiology was consulted, given her goals of care, she was monitored in the [facility] on heparin infusion for NSTEMI. She did well and was treated with asa, plavix, lipitor. She had low BP and was not started on BB or ACEI/ARB. She was diuresed for fluid overload.  She had discussion of care with her family and changed to comfort care and hospice was consulted. In anticipation for discharge, her quantiferon was positive. CXR not show signs of TB and her AFBs were negative. She has delayed discharge due to placement.          Interval Problem Update  12/25.  Patient is comfortable and stable overnight no significant acute event.  Still pending placement.  Patient does not want to go to nursing care facility.  Pending  help with financial support.  12/26.  Patient is stable overnight and pleasant.  Pending discharge plan.   continue to work with hospice team.  Pending financial assist.  12/27.  Patient is stable and continue monitor and pending financial assist discharged to another facility.  Patient otherwise remained stable and no significant overnight events.  12/28.  Patient has been comfortable overnight.  No significant overnight acute chief complaint.  Pending discharge planning by  and financial support.  Currently unsafe to discharge from the hospital without further plan.  Continue assisted by  for discharge plan.  12/29.  Patient is pleasant.  Hospice option discussed with  and apparently patient currently is not able to return home with hospice because need 24/7 care which patient's home condition cannot accommodate this need.  Pending further placement  options.  12/30.  Still waiting for insurance to be approved.  Patient currently stable and resting comfortably in bed.  Continue comfort care.  12/31.  Patient is comfortable.  Pending placement for comfort care.  Awaiting for  to help.  1/1.  Patient has been stable overnight and no significant acute event.  Patient is on comfort care and currently comfortable.  1/2-Pt seen and examined, afebrile, no overnight events, on comfort care measures.  1/3- No acute events overnight, pt resting in bed, on comfort care measures.   1/4- Afebrile, no acute events overnight,   1/5- Pt seen and examined, no changes, pending placement   1/6- no changes, resting in bed, pending placement   Consultants/Specialty  Cardiology  Critical Care    Code Status  DNR/Comfort care    Disposition   vs home with hospice - difficult placement, unable to afford       Review of Systems  Review of Systems   Constitutional: Negative for chills, malaise/fatigue and weight loss.   HENT: Negative for congestion, ear discharge and hearing loss.    Eyes: Negative for double vision, photophobia and pain.   Respiratory: Negative for cough, hemoptysis and shortness of breath.    Cardiovascular: Negative for chest pain and leg swelling.   Gastrointestinal: Negative for heartburn, nausea and vomiting.   Genitourinary: Negative for dysuria, flank pain, frequency and urgency.   Musculoskeletal: Negative for back pain and neck pain.   Skin: Negative for rash.   Neurological: Positive for weakness. Negative for dizziness, tingling, tremors, focal weakness and headaches.   Psychiatric/Behavioral: Negative for depression, hallucinations and suicidal ideas.        Physical Exam  Temp:  [36.3 °C (97.4 °F)-36.9 °C (98.5 °F)] 36.3 °C (97.4 °F)  Pulse:  [76-84] 79  Resp:  [16-18] 16  BP: (101-118)/(65-73) 118/73    Physical Exam   Constitutional: She is oriented to person, place, and time. She appears well-developed and well-nourished.   Thin,  frail  Pleasant   HENT:   Head: Normocephalic and atraumatic.   Nose: Nose normal.   Mouth/Throat: Oropharynx is clear and moist. No oropharyngeal exudate.   Eyes: Pupils are equal, round, and reactive to light. Conjunctivae and EOM are normal.   Neck: Normal range of motion. Neck supple. No JVD present. No thyromegaly present.   Cardiovascular: Normal rate, regular rhythm and normal heart sounds.  Exam reveals no gallop and no friction rub.    No murmur heard.  Pulmonary/Chest: Effort normal and breath sounds normal. No stridor. No respiratory distress. She has no rales. She exhibits no tenderness.   Abdominal: Soft. Bowel sounds are normal. She exhibits no distension. There is no tenderness.   Musculoskeletal: Normal range of motion. She exhibits no edema or tenderness.   Diffuse muscle atrophy   Lymphadenopathy:     She has no cervical adenopathy.   Neurological: She is alert and oriented to person, place, and time. She displays normal reflexes. No cranial nerve deficit.   Skin: Skin is warm and dry. No rash noted. No erythema.   Psychiatric: Her behavior is normal.   Nursing note and vitals reviewed.      Fluids    Intake/Output Summary (Last 24 hours) at 01/06/19 1322  Last data filed at 01/06/19 0800   Gross per 24 hour   Intake             1210 ml   Output                0 ml   Net             1210 ml       Laboratory                        Imaging  DX-CHEST-PORTABLE (1 VIEW)   Final Result         1.  Hazy interstitial left pulmonary opacities suggests interstitial edema or infiltrate, similar to prior.   2.  Trace left pleural effusion   3.  Hyperexpansion of lungs favors changes of COPD.      DX-CHEST-PORTABLE (1 VIEW)   Final Result         1.  Interstitial infiltrates or edema, stable.   2.  Atherosclerosis      DX-CHEST-PORTABLE (1 VIEW)   Final Result         1.  Interstitial infiltrates or edema.   2.  Atherosclerosis      DX-CHEST-PORTABLE (1 VIEW)   Final Result      Moderate interstitial edema or  interstitial lung disease and small left pleural effusion similar to previous.      DX-CHEST-PORTABLE (1 VIEW)   Final Result      Stable interstitial edema and/or interstitial lung disease with probable small pleural fluid      DX-CHEST-PORTABLE (1 VIEW)   Final Result      Ill-defined infrahilar interstitial opacities, atelectasis versus mild edema. No significant pleural effusions.      DX-CHEST-PORTABLE (1 VIEW)   Final Result      Mild left basilar atelectasis, improved since prior. No new consolidation or large pleural effusions.      DX-CHEST-PORTABLE (1 VIEW)   Final Result      1.  Left basilar opacification may be secondary to atelectasis. Developing pneumonia cannot be excluded.         DX-CHEST-PORTABLE (1 VIEW)   Final Result      1.  Unchanged mild interstitial pulmonary edema   2.  Unchanged bibasilar atelectasis, alveolar edema or pneumonia      DX-CHEST-PORTABLE (1 VIEW)   Final Result      1.  Decreased pulmonary edema   2.  Unchanged bibasilar atelectasis, alveolar edema or pneumonia      DX-CHEST-PORTABLE (1 VIEW)   Final Result      1.  There is diffuse interstitial and alveolar density in the right mid and lower lung zone. This is increased since the previous study. This may represent mild pulmonary edema/consolidation.   2.  Mildly enlarged cardiomediastinal silhouette when compared with the previous chest x-ray.      DX-CHEST-PORTABLE (1 VIEW)   Final Result      Stable mild pulmonary edema.   New left basilar atelectasis.      DX-CHEST-PORTABLE (1 VIEW)   Final Result      Stable chest appearance compared with 11/16.      DX-CHEST-PORTABLE (1 VIEW)   Final Result      No acute cardiopulmonary abnormality identified.      DX-CHEST-PORTABLE (1 VIEW)   Final Result      No acute cardiopulmonary abnormality. No interval change.      DX-CHEST-PORTABLE (1 VIEW)   Final Result      No acute cardiopulmonary disease.      CT-HEAD W/O   Final Result      1.  No evidence of acute intracranial process.       2.  There is again seen interstitial pulmonary edema and bibasilar atelectasis.      CT-HIP W/O PLUS RECONS LEFT   Final Result      1.  No evidence of acute fracture or malalignment. No evidence of hardware failure or complication.   2.  Postsurgical changes of the left femur with broken screw fragment redemonstrated.   3.  Demineralization.   4.  Scattered colonic diverticula.   5.  Atherosclerosis.   6.  Small fat-containing umbilical hernia.      EC-ECHOCARDIOGRAM COMPLETE W/O CONT   Final Result      DX-HIP-COMPLETE - UNILATERAL 2+ LEFT   Final Result      1.  No definite acute osseous abnormality. In setting of demineralization, an occult fracture is difficult to exclude. If there is strong clinical suspicion, CT or MRI could be obtained for further evaluation.   2.  Postsurgical changes of the left femur with broken screw fragment redemonstrated.      DX-CHEST-PORTABLE (1 VIEW)   Final Result      No acute cardiopulmonary process is seen.      Atherosclerotic plaque.           Assessment/Plan  * ST elevation myocardial infarction involving right coronary artery (HCC)- (present on admission)   Assessment & Plan      Post anti plt, heparin, statin therapy - transitioned to comfort care- no chest pain or dyspnea.   Provide IV morphine, supportive treatment if chest pain symptoms.  Comfort care          Abdominal pain   Assessment & Plan     Transient / intermittent.  No new symptoms, abdominal exam benign  Monitor intake and for signs of constipation. Ordered for scheduled senakot and miralax. Has been noncompliant  Bowel protocols.     Patient is comfortable and currently on comfort care        Hypotension   Assessment & Plan     Asymptomatic currently resolved  Encourage intake   Hold lasix for sbp < 100            Pain of left hip joint- (present on admission)   Assessment & Plan     Also intermittent left Knee - probably OA - exam no signs of acute infection.   Pain control   Patient is comfortable      Chronic kidney disease (CKD), stage III (moderate) (HCC)- (present on admission)   Assessment & Plan     Was stable  Encourage intake.          Comfort care     Fall- (present on admission)   Assessment & Plan      With debility - improved function.   Try to encourage mobility, assist with transfers and use of walker.            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            Essential hypertension- (present on admission)   Assessment & Plan     BP stable to mildly low.  Hold lasix for sbp < 100 . Comfort care              Advanced directives, counseling/discussion- (present on admission)   Assessment & Plan     Comfort care      Oral morphine for pain, air hunger. Lorazepam for anxiety               VTE prophylaxis: comfort care

## 2019-01-06 NOTE — CARE PLAN
Problem: Safety  Goal: Will remain free from falls  Outcome: PROGRESSING AS EXPECTED  Proper signs communicated in pts doorway; floor clear of clutter, debris, or cords; pts personal items and call light within reach; pt educated to use call light for assistance and prior to getting out of bed    Problem: Bowel/Gastric:  Goal: Normal bowel function is maintained or improved  Outcome: PROGRESSING AS EXPECTED  Pt had BM 01/05; Pt refusing prophylactic stool softeners at this time    Problem: Knowledge Deficit  Goal: Knowledge of disease process/condition, treatment plan, diagnostic tests, and medications will improve  Outcome: PROGRESSING AS EXPECTED  Pt on comfort care; Assessments to be complete once daily; Pt educated on hourly rounds and informed to use call bell for any additional needs

## 2019-01-06 NOTE — PROGRESS NOTES
· 2 RN skin check complete ely Brito RN   · Devices in place: NA  · Skin assessed under devices: NA  · Skin appears dry and fragile. Sacral Area with redness yet blanching.   · The following interventions in place:   -mepilex to sacral area, moisturizer applied.

## 2019-01-06 NOTE — PROGRESS NOTES
· 2 RN skin check complete with AQUILINO Gauthier.  · Devices in place NONE.  · Skin assessed under devices NONE.  · Confirmed pressure ulcers found on NONE.  · The following interventions in place mepliex in use  Sacrum noted with redness blanching

## 2019-01-06 NOTE — CARE PLAN
Problem: Safety  Goal: Will remain free from injury  Outcome: PROGRESSING AS EXPECTED  Hourly rounds done. Call light within reach. Needs attended on a timely manner. Treaded socks on when OOB. Educated on the importance of calling for assistance when attempting to be OOB.  BED ALARM ON.    Problem: Infection  Goal: Will remain free from infection  Outcome: PROGRESSING AS EXPECTED  Hand hygiene advocated. Educated on measures on how to prevent infection.      Problem: Bowel/Gastric:  Goal: Normal bowel function is maintained or improved  Outcome: PROGRESSING AS EXPECTED  Last BM 1/5/19    Problem: Mobility  Goal: Risk for activity intolerance will decrease  Outcome: PROGRESSING AS EXPECTED  Up with SBA with a FWW

## 2019-01-07 PROCEDURE — 99231 SBSQ HOSP IP/OBS SF/LOW 25: CPT | Performed by: INTERNAL MEDICINE

## 2019-01-07 PROCEDURE — 770001 HCHG ROOM/CARE - MED/SURG/GYN PRIV*

## 2019-01-07 ASSESSMENT — ENCOUNTER SYMPTOMS
FOCAL WEAKNESS: 0
COUGH: 0
PHOTOPHOBIA: 0
EYE PAIN: 0
DEPRESSION: 0
SHORTNESS OF BREATH: 0
DOUBLE VISION: 0
HEADACHES: 0
BACK PAIN: 0
HEMOPTYSIS: 0
TREMORS: 0
WEAKNESS: 1
HEARTBURN: 0
CHILLS: 0
NECK PAIN: 0
FLANK PAIN: 0
NAUSEA: 0
DIZZINESS: 0
HALLUCINATIONS: 0
TINGLING: 0
VOMITING: 0
WEIGHT LOSS: 0

## 2019-01-07 ASSESSMENT — PAIN SCALES - GENERAL
PAINLEVEL_OUTOF10: 0
PAINLEVEL_OUTOF10: 0

## 2019-01-07 NOTE — PROGRESS NOTES
[facility] Medicine Daily Progress Note    Date of Service  1/7/2019    Chief Complaint  89 y.o. female admitted 11/13/2018 with fall.    Hospital Course    PMH HTN who presented with fall. She was found with PRUDENCE in inferior leads on EKG. Stat TTE showed mild hypokinesis of inferior wall. Cardiology was consulted, given her goals of care, she was monitored in the [facility] on heparin infusion for NSTEMI. She did well and was treated with asa, plavix, lipitor. She had low BP and was not started on BB or ACEI/ARB. She was diuresed for fluid overload.  She had discussion of care with her family and changed to comfort care and hospice was consulted. In anticipation for discharge, her quantiferon was positive. CXR not show signs of TB and her AFBs were negative. She has delayed discharge due to placement.          Interval Problem Update  12/25.  Patient is comfortable and stable overnight no significant acute event.  Still pending placement.  Patient does not want to go to nursing care facility.  Pending  help with financial support.  12/26.  Patient is stable overnight and pleasant.  Pending discharge plan.   continue to work with hospice team.  Pending financial assist.  12/27.  Patient is stable and continue monitor and pending financial assist discharged to another facility.  Patient otherwise remained stable and no significant overnight events.  12/28.  Patient has been comfortable overnight.  No significant overnight acute chief complaint.  Pending discharge planning by  and financial support.  Currently unsafe to discharge from the hospital without further plan.  Continue assisted by  for discharge plan.  12/29.  Patient is pleasant.  Hospice option discussed with  and apparently patient currently is not able to return home with hospice because need 24/7 care which patient's home condition cannot accommodate this need.  Pending further placement  options.  12/30.  Still waiting for insurance to be approved.  Patient currently stable and resting comfortably in bed.  Continue comfort care.  12/31.  Patient is comfortable.  Pending placement for comfort care.  Awaiting for  to help.  1/1.  Patient has been stable overnight and no significant acute event.  Patient is on comfort care and currently comfortable.  1/2-Pt seen and examined, afebrile, no overnight events, on comfort care measures.  1/3- No acute events overnight, pt resting in bed, on comfort care measures.   1/4- Afebrile, no acute events overnight,   1/5- Pt seen and examined, no changes, pending placement   1/6- no changes, resting in bed, pending placement   1/7- Afebrile, no events overnight, on comfort care measures   Consultants/Specialty  Cardiology  Critical Care    Code Status  DNR/Comfort care    Disposition   vs home with hospice - difficult placement, unable to afford       Review of Systems  Review of Systems   Constitutional: Negative for chills, malaise/fatigue and weight loss.   HENT: Negative for congestion, ear discharge and hearing loss.    Eyes: Negative for double vision, photophobia and pain.   Respiratory: Negative for cough, hemoptysis and shortness of breath.    Cardiovascular: Negative for chest pain and leg swelling.   Gastrointestinal: Negative for heartburn, nausea and vomiting.   Genitourinary: Negative for dysuria, flank pain, frequency and urgency.   Musculoskeletal: Negative for back pain and neck pain.   Skin: Negative for rash.   Neurological: Positive for weakness. Negative for dizziness, tingling, tremors, focal weakness and headaches.   Psychiatric/Behavioral: Negative for depression, hallucinations and suicidal ideas.        Physical Exam  Temp:  [36.2 °C (97.2 °F)-36.7 °C (98 °F)] 36.2 °C (97.2 °F)  Pulse:  [71-72] 71  Resp:  [15-16] 15  BP: (113-117)/(54-64) 113/54    Physical Exam   Constitutional: She is oriented to person, place, and time.  She appears well-developed and well-nourished.   Thin, frail  Pleasant   HENT:   Head: Normocephalic and atraumatic.   Nose: Nose normal.   Mouth/Throat: Oropharynx is clear and moist. No oropharyngeal exudate.   Eyes: Pupils are equal, round, and reactive to light. Conjunctivae and EOM are normal.   Neck: Normal range of motion. Neck supple. No JVD present. No thyromegaly present.   Cardiovascular: Normal rate, regular rhythm and normal heart sounds.  Exam reveals no gallop and no friction rub.    No murmur heard.  Pulmonary/Chest: Effort normal and breath sounds normal. No stridor. No respiratory distress. She has no rales. She exhibits no tenderness.   Abdominal: Soft. Bowel sounds are normal. She exhibits no distension. There is no tenderness.   Musculoskeletal: Normal range of motion. She exhibits no edema or tenderness.   Diffuse muscle atrophy   Lymphadenopathy:     She has no cervical adenopathy.   Neurological: She is alert and oriented to person, place, and time. She displays normal reflexes. No cranial nerve deficit.   Skin: Skin is warm and dry. No rash noted. No erythema.   Psychiatric: Her behavior is normal.   Nursing note and vitals reviewed.      Fluids    Intake/Output Summary (Last 24 hours) at 01/07/19 1409  Last data filed at 01/07/19 0800   Gross per 24 hour   Intake              370 ml   Output                0 ml   Net              370 ml       Laboratory                        Imaging  DX-CHEST-PORTABLE (1 VIEW)   Final Result         1.  Hazy interstitial left pulmonary opacities suggests interstitial edema or infiltrate, similar to prior.   2.  Trace left pleural effusion   3.  Hyperexpansion of lungs favors changes of COPD.      DX-CHEST-PORTABLE (1 VIEW)   Final Result         1.  Interstitial infiltrates or edema, stable.   2.  Atherosclerosis      DX-CHEST-PORTABLE (1 VIEW)   Final Result         1.  Interstitial infiltrates or edema.   2.  Atherosclerosis      DX-CHEST-PORTABLE (1  VIEW)   Final Result      Moderate interstitial edema or interstitial lung disease and small left pleural effusion similar to previous.      DX-CHEST-PORTABLE (1 VIEW)   Final Result      Stable interstitial edema and/or interstitial lung disease with probable small pleural fluid      DX-CHEST-PORTABLE (1 VIEW)   Final Result      Ill-defined infrahilar interstitial opacities, atelectasis versus mild edema. No significant pleural effusions.      DX-CHEST-PORTABLE (1 VIEW)   Final Result      Mild left basilar atelectasis, improved since prior. No new consolidation or large pleural effusions.      DX-CHEST-PORTABLE (1 VIEW)   Final Result      1.  Left basilar opacification may be secondary to atelectasis. Developing pneumonia cannot be excluded.         DX-CHEST-PORTABLE (1 VIEW)   Final Result      1.  Unchanged mild interstitial pulmonary edema   2.  Unchanged bibasilar atelectasis, alveolar edema or pneumonia      DX-CHEST-PORTABLE (1 VIEW)   Final Result      1.  Decreased pulmonary edema   2.  Unchanged bibasilar atelectasis, alveolar edema or pneumonia      DX-CHEST-PORTABLE (1 VIEW)   Final Result      1.  There is diffuse interstitial and alveolar density in the right mid and lower lung zone. This is increased since the previous study. This may represent mild pulmonary edema/consolidation.   2.  Mildly enlarged cardiomediastinal silhouette when compared with the previous chest x-ray.      DX-CHEST-PORTABLE (1 VIEW)   Final Result      Stable mild pulmonary edema.   New left basilar atelectasis.      DX-CHEST-PORTABLE (1 VIEW)   Final Result      Stable chest appearance compared with 11/16.      DX-CHEST-PORTABLE (1 VIEW)   Final Result      No acute cardiopulmonary abnormality identified.      DX-CHEST-PORTABLE (1 VIEW)   Final Result      No acute cardiopulmonary abnormality. No interval change.      DX-CHEST-PORTABLE (1 VIEW)   Final Result      No acute cardiopulmonary disease.      CT-HEAD W/O   Final  Result      1.  No evidence of acute intracranial process.      2.  There is again seen interstitial pulmonary edema and bibasilar atelectasis.      CT-HIP W/O PLUS RECONS LEFT   Final Result      1.  No evidence of acute fracture or malalignment. No evidence of hardware failure or complication.   2.  Postsurgical changes of the left femur with broken screw fragment redemonstrated.   3.  Demineralization.   4.  Scattered colonic diverticula.   5.  Atherosclerosis.   6.  Small fat-containing umbilical hernia.      EC-ECHOCARDIOGRAM COMPLETE W/O CONT   Final Result      DX-HIP-COMPLETE - UNILATERAL 2+ LEFT   Final Result      1.  No definite acute osseous abnormality. In setting of demineralization, an occult fracture is difficult to exclude. If there is strong clinical suspicion, CT or MRI could be obtained for further evaluation.   2.  Postsurgical changes of the left femur with broken screw fragment redemonstrated.      DX-CHEST-PORTABLE (1 VIEW)   Final Result      No acute cardiopulmonary process is seen.      Atherosclerotic plaque.           Assessment/Plan  * ST elevation myocardial infarction involving right coronary artery (HCC)- (present on admission)   Assessment & Plan      Post anti plt, heparin, statin therapy - transitioned to comfort care- no chest pain or dyspnea.   Provide IV morphine, supportive treatment if chest pain symptoms.  Comfort care          Abdominal pain   Assessment & Plan     Transient / intermittent.  No new symptoms, abdominal exam benign  Monitor intake and for signs of constipation. Ordered for scheduled senakot and miralax. Has been noncompliant  Bowel protocols.     Patient is comfortable and currently on comfort care        Hypotension   Assessment & Plan     Asymptomatic currently resolved  Encourage intake   Hold lasix for sbp < 100            Pain of left hip joint- (present on admission)   Assessment & Plan     Also intermittent left Knee - probably OA - exam no signs of  acute infection.   Pain control   Patient is comfortable     Chronic kidney disease (CKD), stage III (moderate) ([facility])- (present on admission)   Assessment & Plan     Was stable  Encourage intake.          Comfort care     Fall- (present on admission)   Assessment & Plan      With debility - improved function.   Try to encourage mobility, assist with transfers and use of walker.            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            Essential hypertension- (present on admission)   Assessment & Plan     BP stable to mildly low.  Hold lasix for sbp < 100 . Comfort care              Advanced directives, counseling/discussion- (present on admission)   Assessment & Plan     Comfort care      Oral morphine for pain, air hunger. Lorazepam for anxiety               VTE prophylaxis: comfort care

## 2019-01-07 NOTE — CARE PLAN
Problem: Safety  Goal: Will remain free from injury  Outcome: PROGRESSING AS EXPECTED  Hourly rounds done. Call light within reach. Needs attended on a timely manner. Treaded socks on when OOB. Educated on the importance of calling for assistance when attempting to be OOB.  BED ALARM ON.    Problem: Infection  Goal: Will remain free from infection  Outcome: PROGRESSING AS EXPECTED  Hand hygiene advocated. Educated on measures on how to prevent infection.      Problem: Venous Thromboembolism (VTW)/Deep Vein Thrombosis (DVT) Prevention:  Goal: Patient will participate in Venous Thrombosis (VTE)/Deep Vein Thrombosis (DVT)Prevention Measures  Outcome: PROGRESSING AS EXPECTED  Encourage early mobilization.     Problem: Bowel/Gastric:  Goal: Normal bowel function is maintained or improved  Outcome: PROGRESSING AS EXPECTED  Last BM 1/6/19    Problem: Mobility  Goal: Risk for activity intolerance will decrease  Outcome: PROGRESSING AS EXPECTED  Up SBA using a 4WW

## 2019-01-08 ENCOUNTER — PATIENT OUTREACH (OUTPATIENT)
Dept: HEALTH INFORMATION MANAGEMENT | Facility: OTHER | Age: 84
End: 2019-01-08

## 2019-01-08 PROCEDURE — 99231 SBSQ HOSP IP/OBS SF/LOW 25: CPT | Performed by: HOSPITALIST

## 2019-01-08 PROCEDURE — 700102 HCHG RX REV CODE 250 W/ 637 OVERRIDE(OP): Performed by: HOSPITALIST

## 2019-01-08 PROCEDURE — 770001 HCHG ROOM/CARE - MED/SURG/GYN PRIV*

## 2019-01-08 PROCEDURE — A9270 NON-COVERED ITEM OR SERVICE: HCPCS | Performed by: HOSPITALIST

## 2019-01-08 RX ADMIN — FUROSEMIDE 20 MG: 20 TABLET ORAL at 06:15

## 2019-01-08 ASSESSMENT — ENCOUNTER SYMPTOMS
WEAKNESS: 1
FOCAL WEAKNESS: 0
NAUSEA: 0
COUGH: 0
BACK PAIN: 0
EYE PAIN: 0
CHILLS: 0
SHORTNESS OF BREATH: 0

## 2019-01-08 ASSESSMENT — PAIN SCALES - GENERAL
PAINLEVEL_OUTOF10: 0
PAINLEVEL_OUTOF10: 0

## 2019-01-08 NOTE — PROGRESS NOTES
Per chart review the patient remains at [facility].  Plan: will monitor for discharge from the hospital.

## 2019-01-08 NOTE — CARE PLAN
Problem: Safety  Goal: Will remain free from injury  Outcome: PROGRESSING AS EXPECTED  Bed in the lowest locked position. Call light within reach. Bed alarm in use. Hourly rounding in place.     Problem: Psychosocial Needs:  Goal: Level of anxiety will decrease    Intervention: Identify and develop with patient strategies to cope with anxiety triggers  Pt encouraged to voice feelings. Patient showered with staff assist.

## 2019-01-08 NOTE — PROGRESS NOTES
Assumed car e of patient at 0700. Patient resting comfortably in bed. Call light with in reach. Patient calls appropriately for assistance.

## 2019-01-08 NOTE — PROGRESS NOTES
· 2 RN skin check complete with AQUILINO Elizabeth.  · Devices in place NONE.  · Skin assessed under devices NONE.  · The following interventions in place mepliex in use to sacrum. Sacrum noted with redness but blanching, intact

## 2019-01-08 NOTE — PROGRESS NOTES
[facility] Medicine Daily Progress Note    Date of Service  1/8/2019    Chief Complaint  89 y.o. female admitted 11/13/2018 with fall.    Hospital Course    Patient is a pleasant 89 year old woman with history of HTN who presented following a fall and was found to have an acute ST elevation MI.  After discussion with her family, she was placed on comfort care.          Interval Problem Update  She remains alert and appropriate, without complaints, states she is comfortable  Denies pain or sob  Ros otherwise negative     Consultants/Specialty  Cardiology  Critical Care    Code Status  DNR/Comfort care    Disposition   vs home with hospice       Review of Systems  Review of Systems   Constitutional: Negative for chills.   HENT: Negative for hearing loss.    Eyes: Negative for pain.   Respiratory: Negative for cough and shortness of breath.    Cardiovascular: Negative for chest pain and leg swelling.   Gastrointestinal: Negative for nausea.   Genitourinary: Negative for dysuria.   Musculoskeletal: Negative for back pain.   Neurological: Positive for weakness. Negative for focal weakness.        Physical Exam  Temp:  [36.6 °C (97.9 °F)] 36.6 °C (97.9 °F)  Pulse:  [68] 68  Resp:  [15] 15  BP: (113)/(60) 113/60    Physical Exam   Constitutional: She is oriented to person, place, and time.   HENT:   Head: Normocephalic and atraumatic.   Nose: Nose normal.   Mouth/Throat: Oropharynx is clear and moist. No oropharyngeal exudate.   Eyes: Pupils are equal, round, and reactive to light. Conjunctivae and EOM are normal.   Neck: Normal range of motion. Neck supple. No JVD present. No thyromegaly present.   Cardiovascular: Normal rate, regular rhythm and normal heart sounds.  Exam reveals no gallop and no friction rub.    No murmur heard.  Pulmonary/Chest: Effort normal and breath sounds normal. No stridor. No respiratory distress. She has no rales. She exhibits no tenderness.   Abdominal: Soft. Bowel sounds are normal. She  exhibits no distension. There is no tenderness.   Musculoskeletal: Normal range of motion. She exhibits no edema or tenderness.   Lymphadenopathy:     She has no cervical adenopathy.   Neurological: She is alert and oriented to person, place, and time. She displays normal reflexes. No cranial nerve deficit.   Skin: Skin is warm and dry. No rash noted. No erythema.   Psychiatric: Her behavior is normal.   Nursing note and vitals reviewed.      Fluids    Intake/Output Summary (Last 24 hours) at 01/08/19 1421  Last data filed at 01/07/19 2000   Gross per 24 hour   Intake              100 ml   Output                0 ml   Net              100 ml       Laboratory                        Imaging  DX-CHEST-PORTABLE (1 VIEW)   Final Result         1.  Hazy interstitial left pulmonary opacities suggests interstitial edema or infiltrate, similar to prior.   2.  Trace left pleural effusion   3.  Hyperexpansion of lungs favors changes of COPD.      DX-CHEST-PORTABLE (1 VIEW)   Final Result         1.  Interstitial infiltrates or edema, stable.   2.  Atherosclerosis      DX-CHEST-PORTABLE (1 VIEW)   Final Result         1.  Interstitial infiltrates or edema.   2.  Atherosclerosis      DX-CHEST-PORTABLE (1 VIEW)   Final Result      Moderate interstitial edema or interstitial lung disease and small left pleural effusion similar to previous.      DX-CHEST-PORTABLE (1 VIEW)   Final Result      Stable interstitial edema and/or interstitial lung disease with probable small pleural fluid      DX-CHEST-PORTABLE (1 VIEW)   Final Result      Ill-defined infrahilar interstitial opacities, atelectasis versus mild edema. No significant pleural effusions.      DX-CHEST-PORTABLE (1 VIEW)   Final Result      Mild left basilar atelectasis, improved since prior. No new consolidation or large pleural effusions.      DX-CHEST-PORTABLE (1 VIEW)   Final Result      1.  Left basilar opacification may be secondary to atelectasis. Developing pneumonia  cannot be excluded.         DX-CHEST-PORTABLE (1 VIEW)   Final Result      1.  Unchanged mild interstitial pulmonary edema   2.  Unchanged bibasilar atelectasis, alveolar edema or pneumonia      DX-CHEST-PORTABLE (1 VIEW)   Final Result      1.  Decreased pulmonary edema   2.  Unchanged bibasilar atelectasis, alveolar edema or pneumonia      DX-CHEST-PORTABLE (1 VIEW)   Final Result      1.  There is diffuse interstitial and alveolar density in the right mid and lower lung zone. This is increased since the previous study. This may represent mild pulmonary edema/consolidation.   2.  Mildly enlarged cardiomediastinal silhouette when compared with the previous chest x-ray.      DX-CHEST-PORTABLE (1 VIEW)   Final Result      Stable mild pulmonary edema.   New left basilar atelectasis.      DX-CHEST-PORTABLE (1 VIEW)   Final Result      Stable chest appearance compared with 11/16.      DX-CHEST-PORTABLE (1 VIEW)   Final Result      No acute cardiopulmonary abnormality identified.      DX-CHEST-PORTABLE (1 VIEW)   Final Result      No acute cardiopulmonary abnormality. No interval change.      DX-CHEST-PORTABLE (1 VIEW)   Final Result      No acute cardiopulmonary disease.      CT-HEAD W/O   Final Result      1.  No evidence of acute intracranial process.      2.  There is again seen interstitial pulmonary edema and bibasilar atelectasis.      CT-HIP W/O PLUS RECONS LEFT   Final Result      1.  No evidence of acute fracture or malalignment. No evidence of hardware failure or complication.   2.  Postsurgical changes of the left femur with broken screw fragment redemonstrated.   3.  Demineralization.   4.  Scattered colonic diverticula.   5.  Atherosclerosis.   6.  Small fat-containing umbilical hernia.      EC-ECHOCARDIOGRAM COMPLETE W/O CONT   Final Result      DX-HIP-COMPLETE - UNILATERAL 2+ LEFT   Final Result      1.  No definite acute osseous abnormality. In setting of demineralization, an occult fracture is difficult  to exclude. If there is strong clinical suspicion, CT or MRI could be obtained for further evaluation.   2.  Postsurgical changes of the left femur with broken screw fragment redemonstrated.      DX-CHEST-PORTABLE (1 VIEW)   Final Result      No acute cardiopulmonary process is seen.      Atherosclerotic plaque.           Assessment/Plan  * ST elevation myocardial infarction involving right coronary artery (HCC)- (present on admission)   Assessment & Plan    Post anti plt, heparin, statin therapy - transitioned to comfort care- no chest pain or dyspnea.   Continue palliative care  Comfort care          Abdominal pain   Assessment & Plan     Transient / intermittent.  No new symptoms, abdominal exam benign  Monitor intake and for signs of constipation. Ordered for scheduled senakot and miralax. Has been noncompliant  Bowel protocols.     Patient is comfortable and currently on comfort care        Hypotension   Assessment & Plan     Asymptomatic   resolved  Hold lasix for sbp < 100            Pain of left hip joint- (present on admission)   Assessment & Plan     Also intermittent left Knee - probably OA - exam no signs of acute infection.   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            Essential hypertension- (present on admission)   Assessment & Plan     BP stable to mildly low.  Hold lasix for sbp < 100 . Comfort care              Advanced directives, counseling/discussion- (present on admission)   Assessment & Plan     Comfort care      Oral  morphine for pain, air hunger. Lorazepam for anxiety               VTE prophylaxis: comfort care

## 2019-01-08 NOTE — CARE PLAN
Problem: Safety  Goal: Will remain free from injury  Outcome: PROGRESSING AS EXPECTED  Calls appropriately for assistance, call light with in reach, SBA to ambulate to bathroom using personal walker    Problem: Skin Integrity  Goal: Risk for impaired skin integrity will decrease  Outcome: PROGRESSING SLOWER THAN EXPECTED  Has mepelix to sacral area for protection, re-education of risk of skin breakdown and encouragement of turning every 2 hours while in bed

## 2019-01-09 ENCOUNTER — PATIENT OUTREACH (OUTPATIENT)
Dept: HEALTH INFORMATION MANAGEMENT | Facility: OTHER | Age: 84
End: 2019-01-09

## 2019-01-09 PROCEDURE — 700102 HCHG RX REV CODE 250 W/ 637 OVERRIDE(OP): Performed by: HOSPITALIST

## 2019-01-09 PROCEDURE — 99231 SBSQ HOSP IP/OBS SF/LOW 25: CPT | Performed by: HOSPITALIST

## 2019-01-09 PROCEDURE — 700102 HCHG RX REV CODE 250 W/ 637 OVERRIDE(OP): Performed by: INTERNAL MEDICINE

## 2019-01-09 PROCEDURE — A9270 NON-COVERED ITEM OR SERVICE: HCPCS | Performed by: HOSPITALIST

## 2019-01-09 PROCEDURE — 770001 HCHG ROOM/CARE - MED/SURG/GYN PRIV*

## 2019-01-09 PROCEDURE — A9270 NON-COVERED ITEM OR SERVICE: HCPCS | Performed by: INTERNAL MEDICINE

## 2019-01-09 RX ADMIN — SENNOSIDES AND DOCUSATE SODIUM 2 TABLET: 8.6; 5 TABLET ORAL at 06:00

## 2019-01-09 RX ADMIN — FUROSEMIDE 20 MG: 20 TABLET ORAL at 06:42

## 2019-01-09 ASSESSMENT — ENCOUNTER SYMPTOMS
FOCAL WEAKNESS: 0
SHORTNESS OF BREATH: 0
BACK PAIN: 0
EYE PAIN: 0
NAUSEA: 0
COUGH: 0
CHILLS: 0
WEAKNESS: 1

## 2019-01-09 ASSESSMENT — PAIN SCALES - GENERAL
PAINLEVEL_OUTOF10: 0
PAINLEVEL_OUTOF10: 0

## 2019-01-09 NOTE — PROGRESS NOTES
Per chart review the patient remains at [facility].  Plan: will monitor for discharge from the hospital.

## 2019-01-09 NOTE — PROGRESS NOTES
Per chart review the patient remains at [facility]. No current discharge planing notes from previous chart review. Last discharge planing note inputted on 12/28/18 which indicated hospital was working on discharge plan. There were still concerns related to pt's ability to care for self in the home setting and looking into placement options where pt would have more support/supervision.      Plan:   · Will monitor for discharge from the hospital and collaborate with care team members as needed for continuity of care.

## 2019-01-09 NOTE — CARE PLAN
Problem: Safety  Goal: Will remain free from injury  Outcome: PROGRESSING AS EXPECTED  call light with in reach, calls appropriately for assistance

## 2019-01-09 NOTE — PROGRESS NOTES
[facility] Medicine Daily Progress Note    Date of Service  1/9/2019    Chief Complaint  89 y.o. female admitted 11/13/2018 with fall.    Hospital Course    Patient is a pleasant 89 year old woman with history of HTN who presented following a fall and was found to have an acute ST elevation MI.  After discussion with her family, she was placed on comfort care.          Interval Problem Update  Denies pain, states she is very happy with the care and attention she is receiving  Axox3, ros otherwise negative     Consultants/Specialty  Cardiology  Critical Care    Code Status  DNR/Comfort care    Disposition   vs home with hospice       Review of Systems  Review of Systems   Constitutional: Negative for chills.   HENT: Negative for hearing loss.    Eyes: Negative for pain.   Respiratory: Negative for cough and shortness of breath.    Cardiovascular: Negative for chest pain and leg swelling.   Gastrointestinal: Negative for nausea.   Genitourinary: Negative for dysuria.   Musculoskeletal: Negative for back pain.   Neurological: Positive for weakness. Negative for focal weakness.        Physical Exam  Temp:  [36.2 °C (97.1 °F)-36.6 °C (97.9 °F)] 36.6 °C (97.9 °F)  Pulse:  [68-74] 68  Resp:  [16] 16  BP: (100-103)/(55-61) 103/61    Physical Exam   Constitutional: She is oriented to person, place, and time.   HENT:   Head: Normocephalic and atraumatic.   Nose: Nose normal.   Mouth/Throat: Oropharynx is clear and moist. No oropharyngeal exudate.   Eyes: Pupils are equal, round, and reactive to light. Conjunctivae and EOM are normal.   Neck: Normal range of motion. Neck supple. No JVD present. No thyromegaly present.   Cardiovascular: Normal rate, regular rhythm and normal heart sounds.  Exam reveals no gallop and no friction rub.    No murmur heard.  Pulmonary/Chest: Effort normal and breath sounds normal. No stridor. No respiratory distress. She has no rales. She exhibits no tenderness.   Abdominal: Soft. Bowel sounds are  normal. She exhibits no distension. There is no tenderness.   Musculoskeletal: Normal range of motion. She exhibits no edema or tenderness.   Lymphadenopathy:     She has no cervical adenopathy.   Neurological: She is alert and oriented to person, place, and time. She displays normal reflexes. No cranial nerve deficit.   Skin: Skin is warm and dry. No rash noted. No erythema.   Psychiatric: Her behavior is normal.   Nursing note and vitals reviewed.      Fluids    Intake/Output Summary (Last 24 hours) at 01/09/19 0850  Last data filed at 01/08/19 2100   Gross per 24 hour   Intake               50 ml   Output                0 ml   Net               50 ml       Laboratory                        Imaging  DX-CHEST-PORTABLE (1 VIEW)   Final Result         1.  Hazy interstitial left pulmonary opacities suggests interstitial edema or infiltrate, similar to prior.   2.  Trace left pleural effusion   3.  Hyperexpansion of lungs favors changes of COPD.      DX-CHEST-PORTABLE (1 VIEW)   Final Result         1.  Interstitial infiltrates or edema, stable.   2.  Atherosclerosis      DX-CHEST-PORTABLE (1 VIEW)   Final Result         1.  Interstitial infiltrates or edema.   2.  Atherosclerosis      DX-CHEST-PORTABLE (1 VIEW)   Final Result      Moderate interstitial edema or interstitial lung disease and small left pleural effusion similar to previous.      DX-CHEST-PORTABLE (1 VIEW)   Final Result      Stable interstitial edema and/or interstitial lung disease with probable small pleural fluid      DX-CHEST-PORTABLE (1 VIEW)   Final Result      Ill-defined infrahilar interstitial opacities, atelectasis versus mild edema. No significant pleural effusions.      DX-CHEST-PORTABLE (1 VIEW)   Final Result      Mild left basilar atelectasis, improved since prior. No new consolidation or large pleural effusions.      DX-CHEST-PORTABLE (1 VIEW)   Final Result      1.  Left basilar opacification may be secondary to atelectasis. Developing  pneumonia cannot be excluded.         DX-CHEST-PORTABLE (1 VIEW)   Final Result      1.  Unchanged mild interstitial pulmonary edema   2.  Unchanged bibasilar atelectasis, alveolar edema or pneumonia      DX-CHEST-PORTABLE (1 VIEW)   Final Result      1.  Decreased pulmonary edema   2.  Unchanged bibasilar atelectasis, alveolar edema or pneumonia      DX-CHEST-PORTABLE (1 VIEW)   Final Result      1.  There is diffuse interstitial and alveolar density in the right mid and lower lung zone. This is increased since the previous study. This may represent mild pulmonary edema/consolidation.   2.  Mildly enlarged cardiomediastinal silhouette when compared with the previous chest x-ray.      DX-CHEST-PORTABLE (1 VIEW)   Final Result      Stable mild pulmonary edema.   New left basilar atelectasis.      DX-CHEST-PORTABLE (1 VIEW)   Final Result      Stable chest appearance compared with 11/16.      DX-CHEST-PORTABLE (1 VIEW)   Final Result      No acute cardiopulmonary abnormality identified.      DX-CHEST-PORTABLE (1 VIEW)   Final Result      No acute cardiopulmonary abnormality. No interval change.      DX-CHEST-PORTABLE (1 VIEW)   Final Result      No acute cardiopulmonary disease.      CT-HEAD W/O   Final Result      1.  No evidence of acute intracranial process.      2.  There is again seen interstitial pulmonary edema and bibasilar atelectasis.      CT-HIP W/O PLUS RECONS LEFT   Final Result      1.  No evidence of acute fracture or malalignment. No evidence of hardware failure or complication.   2.  Postsurgical changes of the left femur with broken screw fragment redemonstrated.   3.  Demineralization.   4.  Scattered colonic diverticula.   5.  Atherosclerosis.   6.  Small fat-containing umbilical hernia.      EC-ECHOCARDIOGRAM COMPLETE W/O CONT   Final Result      DX-HIP-COMPLETE - UNILATERAL 2+ LEFT   Final Result      1.  No definite acute osseous abnormality. In setting of demineralization, an occult fracture is  difficult to exclude. If there is strong clinical suspicion, CT or MRI could be obtained for further evaluation.   2.  Postsurgical changes of the left femur with broken screw fragment redemonstrated.      DX-CHEST-PORTABLE (1 VIEW)   Final Result      No acute cardiopulmonary process is seen.      Atherosclerotic plaque.           Assessment/Plan  * ST elevation myocardial infarction involving right coronary artery (HCC)- (present on admission)   Assessment & Plan    Post anti plt, heparin, statin therapy - transitioned to comfort care- no chest pain or dyspnea.   Continue palliative care  She remains on comfort care         Abdominal pain   Assessment & Plan     Transient / intermittent.  No new symptoms, abdominal exam benign  Monitor intake and for signs of constipation. Ordered for scheduled senakot and miralax. Has been noncompliant  Bowel protocols.     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            Essential hypertension- (present on admission)   Assessment & Plan     BP stable to mildly low.  Hold lasix for sbp < 100 . Comfort care              Advanced directives, counseling/discussion- (present on admission)   Assessment & Plan     Comfort care      Oral  morphine for pain, air hunger. Lorazepam for anxiety               VTE prophylaxis: comfort care

## 2019-01-09 NOTE — PROGRESS NOTES
Assumed care of patient at 0700. Patient is resting comfortably in bed at this time. Patient refusing to get out of bed. Call light with in reach, calls appropriately for assistance

## 2019-01-09 NOTE — CARE PLAN
Problem: Safety  Goal: Will remain free from injury  Outcome: PROGRESSING AS EXPECTED  Bed in the lowest locked position. Call light within reach. Bed alarm in use. Hourly rounding in place.     Problem: Mobility  Goal: Risk for activity intolerance will decrease  Outcome: PROGRESSING AS EXPECTED  Pt encouraged to ambulate, up with four wheel walker 1 assist.

## 2019-01-10 ENCOUNTER — PATIENT OUTREACH (OUTPATIENT)
Dept: HEALTH INFORMATION MANAGEMENT | Facility: OTHER | Age: 84
End: 2019-01-10

## 2019-01-10 PROCEDURE — A6213 FOAM DRG >16<=48 SQ IN W/BDR: HCPCS | Performed by: HOSPITALIST

## 2019-01-10 PROCEDURE — 770001 HCHG ROOM/CARE - MED/SURG/GYN PRIV*

## 2019-01-10 PROCEDURE — A9270 NON-COVERED ITEM OR SERVICE: HCPCS | Performed by: HOSPITALIST

## 2019-01-10 PROCEDURE — 99231 SBSQ HOSP IP/OBS SF/LOW 25: CPT | Performed by: HOSPITALIST

## 2019-01-10 PROCEDURE — 700102 HCHG RX REV CODE 250 W/ 637 OVERRIDE(OP): Performed by: HOSPITALIST

## 2019-01-10 RX ADMIN — FUROSEMIDE 20 MG: 20 TABLET ORAL at 06:16

## 2019-01-10 ASSESSMENT — ENCOUNTER SYMPTOMS
NAUSEA: 0
WEAKNESS: 1
COUGH: 0
EYE PAIN: 0
SHORTNESS OF BREATH: 0
FOCAL WEAKNESS: 0
CHILLS: 0
BACK PAIN: 0

## 2019-01-10 ASSESSMENT — PAIN SCALES - GENERAL: PAINLEVEL_OUTOF10: 0

## 2019-01-10 NOTE — PROGRESS NOTES
[facility] Medicine Daily Progress Note    Date of Service  1/10/2019    Chief Complaint  89 y.o. female admitted 11/13/2018 with fall.    Hospital Course    Patient is a pleasant 89 year old woman with history of HTN who presented following a fall and was found to have an acute ST elevation MI.  After discussion with her family, she was placed on comfort care.          Interval Problem Update  She remains alert and oriented  Denies pain, no sob  Ros otherwise negative     Consultants/Specialty  Cardiology  Critical Care    Code Status  DNR/Comfort care    Disposition   vs home with hospice       Review of Systems  Review of Systems   Constitutional: Negative for chills.   HENT: Negative for hearing loss.    Eyes: Negative for pain.   Respiratory: Negative for cough and shortness of breath.    Cardiovascular: Negative for chest pain and leg swelling.   Gastrointestinal: Negative for nausea.   Genitourinary: Negative for dysuria.   Musculoskeletal: Negative for back pain.   Neurological: Positive for weakness. Negative for focal weakness.        Physical Exam  Temp:  [36.4 °C (97.5 °F)] 36.4 °C (97.5 °F)  Pulse:  [68] 68  Resp:  [16] 16  BP: (114)/(69) 114/69    Physical Exam   Constitutional: She is oriented to person, place, and time.   HENT:   Head: Normocephalic and atraumatic.   Nose: Nose normal.   Mouth/Throat: Oropharynx is clear and moist. No oropharyngeal exudate.   Eyes: Pupils are equal, round, and reactive to light. Conjunctivae and EOM are normal.   Neck: Normal range of motion. Neck supple. No JVD present. No thyromegaly present.   Cardiovascular: Normal rate, regular rhythm and normal heart sounds.  Exam reveals no gallop and no friction rub.    No murmur heard.  Pulmonary/Chest: Effort normal and breath sounds normal. No stridor. No respiratory distress. She has no rales. She exhibits no tenderness.   Abdominal: Soft. Bowel sounds are normal. She exhibits no distension. There is no tenderness.    Musculoskeletal: Normal range of motion. She exhibits no edema or tenderness.   Lymphadenopathy:     She has no cervical adenopathy.   Neurological: She is alert and oriented to person, place, and time. She displays normal reflexes. No cranial nerve deficit.   Skin: Skin is warm and dry. No rash noted. No erythema.   Psychiatric: Her behavior is normal.   Nursing note and vitals reviewed.      Fluids    Intake/Output Summary (Last 24 hours) at 01/10/19 1005  Last data filed at 01/09/19 1200   Gross per 24 hour   Intake              100 ml   Output                0 ml   Net              100 ml       Laboratory                        Imaging  DX-CHEST-PORTABLE (1 VIEW)   Final Result         1.  Hazy interstitial left pulmonary opacities suggests interstitial edema or infiltrate, similar to prior.   2.  Trace left pleural effusion   3.  Hyperexpansion of lungs favors changes of COPD.      DX-CHEST-PORTABLE (1 VIEW)   Final Result         1.  Interstitial infiltrates or edema, stable.   2.  Atherosclerosis      DX-CHEST-PORTABLE (1 VIEW)   Final Result         1.  Interstitial infiltrates or edema.   2.  Atherosclerosis      DX-CHEST-PORTABLE (1 VIEW)   Final Result      Moderate interstitial edema or interstitial lung disease and small left pleural effusion similar to previous.      DX-CHEST-PORTABLE (1 VIEW)   Final Result      Stable interstitial edema and/or interstitial lung disease with probable small pleural fluid      DX-CHEST-PORTABLE (1 VIEW)   Final Result      Ill-defined infrahilar interstitial opacities, atelectasis versus mild edema. No significant pleural effusions.      DX-CHEST-PORTABLE (1 VIEW)   Final Result      Mild left basilar atelectasis, improved since prior. No new consolidation or large pleural effusions.      DX-CHEST-PORTABLE (1 VIEW)   Final Result      1.  Left basilar opacification may be secondary to atelectasis. Developing pneumonia cannot be excluded.         DX-CHEST-PORTABLE (1  VIEW)   Final Result      1.  Unchanged mild interstitial pulmonary edema   2.  Unchanged bibasilar atelectasis, alveolar edema or pneumonia      DX-CHEST-PORTABLE (1 VIEW)   Final Result      1.  Decreased pulmonary edema   2.  Unchanged bibasilar atelectasis, alveolar edema or pneumonia      DX-CHEST-PORTABLE (1 VIEW)   Final Result      1.  There is diffuse interstitial and alveolar density in the right mid and lower lung zone. This is increased since the previous study. This may represent mild pulmonary edema/consolidation.   2.  Mildly enlarged cardiomediastinal silhouette when compared with the previous chest x-ray.      DX-CHEST-PORTABLE (1 VIEW)   Final Result      Stable mild pulmonary edema.   New left basilar atelectasis.      DX-CHEST-PORTABLE (1 VIEW)   Final Result      Stable chest appearance compared with 11/16.      DX-CHEST-PORTABLE (1 VIEW)   Final Result      No acute cardiopulmonary abnormality identified.      DX-CHEST-PORTABLE (1 VIEW)   Final Result      No acute cardiopulmonary abnormality. No interval change.      DX-CHEST-PORTABLE (1 VIEW)   Final Result      No acute cardiopulmonary disease.      CT-HEAD W/O   Final Result      1.  No evidence of acute intracranial process.      2.  There is again seen interstitial pulmonary edema and bibasilar atelectasis.      CT-HIP W/O PLUS RECONS LEFT   Final Result      1.  No evidence of acute fracture or malalignment. No evidence of hardware failure or complication.   2.  Postsurgical changes of the left femur with broken screw fragment redemonstrated.   3.  Demineralization.   4.  Scattered colonic diverticula.   5.  Atherosclerosis.   6.  Small fat-containing umbilical hernia.      EC-ECHOCARDIOGRAM COMPLETE W/O CONT   Final Result      DX-HIP-COMPLETE - UNILATERAL 2+ LEFT   Final Result      1.  No definite acute osseous abnormality. In setting of demineralization, an occult fracture is difficult to exclude. If there is strong clinical  suspicion, CT or MRI could be obtained for further evaluation.   2.  Postsurgical changes of the left femur with broken screw fragment redemonstrated.      DX-CHEST-PORTABLE (1 VIEW)   Final Result      No acute cardiopulmonary process is seen.      Atherosclerotic plaque.           Assessment/Plan  * ST elevation myocardial infarction involving right coronary artery (HCC)- (present on admission)   Assessment & Plan    Post anti plt, heparin, statin therapy - transitioned to comfort care- no chest pain or dyspnea.   Continue palliative care  She remains on comfort care         Abdominal pain   Assessment & Plan     Transient / intermittent.  No new symptoms, abdominal exam benign  Monitor intake and for signs of constipation. Ordered for scheduled senakot and miralax. Has been noncompliant  Bowel protocols.     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            Essential hypertension- (present on admission)   Assessment & Plan     BP stable to mildly low.  Hold lasix for sbp < 100 . Comfort care              Advanced directives, counseling/discussion- (present on admission)   Assessment & Plan     Comfort care      Oral morphine for pain, air hunger. Lorazepam for anxiety                VTE prophylaxis: comfort care

## 2019-01-10 NOTE — CARE PLAN
Problem: Safety  Goal: Will remain free from falls    Intervention: Implement fall precautions  Pt calls appropriately, treaded socks in use. Personal belongings and call light with in reach and bed is locked in lowest position. Hourly rounding in place.      Problem: Infection  Goal: Will remain free from infection    Intervention: Implement standard precautions and perform hand washing before and after patient contact  Proper use of PPE at all times  hand hygiene implemented upon entry and exit of patients room.  Education provided on keeping hands clean.

## 2019-01-10 NOTE — PROGRESS NOTES
Per chart review the patient remains at [facility].  Plan: will monitor for discharge from the hospital.

## 2019-01-10 NOTE — CARE PLAN
Problem: Bowel/Gastric:  Goal: Normal bowel function is maintained or improved  Outcome: PROGRESSING SLOWER THAN EXPECTED  LBM 3 days ago. Pt continues to refuse stool softeners or other bowel management interventions despite education.     Problem: Skin Integrity  Goal: Risk for impaired skin integrity will decrease  Outcome: PROGRESSING SLOWER THAN EXPECTED  Blanching redness to sacrum. Pt declines to make frequent large changes in position. Education provided pt continues to refuse pt on comfort care.

## 2019-01-11 ENCOUNTER — PATIENT OUTREACH (OUTPATIENT)
Dept: HEALTH INFORMATION MANAGEMENT | Facility: OTHER | Age: 84
End: 2019-01-11

## 2019-01-11 PROCEDURE — 700102 HCHG RX REV CODE 250 W/ 637 OVERRIDE(OP): Performed by: HOSPITALIST

## 2019-01-11 PROCEDURE — A9270 NON-COVERED ITEM OR SERVICE: HCPCS | Performed by: INTERNAL MEDICINE

## 2019-01-11 PROCEDURE — 99231 SBSQ HOSP IP/OBS SF/LOW 25: CPT | Performed by: HOSPITALIST

## 2019-01-11 PROCEDURE — 700102 HCHG RX REV CODE 250 W/ 637 OVERRIDE(OP): Performed by: INTERNAL MEDICINE

## 2019-01-11 PROCEDURE — 770001 HCHG ROOM/CARE - MED/SURG/GYN PRIV*

## 2019-01-11 PROCEDURE — A9270 NON-COVERED ITEM OR SERVICE: HCPCS | Performed by: HOSPITALIST

## 2019-01-11 RX ADMIN — SENNOSIDES AND DOCUSATE SODIUM 2 TABLET: 8.6; 5 TABLET ORAL at 06:36

## 2019-01-11 RX ADMIN — FUROSEMIDE 20 MG: 20 TABLET ORAL at 06:36

## 2019-01-11 ASSESSMENT — ENCOUNTER SYMPTOMS
FOCAL WEAKNESS: 0
CHILLS: 0
EYE PAIN: 0
SHORTNESS OF BREATH: 0
WEAKNESS: 1
BACK PAIN: 0
COUGH: 0
NAUSEA: 0

## 2019-01-11 ASSESSMENT — PAIN SCALES - GENERAL
PAINLEVEL_OUTOF10: 0
PAINLEVEL_OUTOF10: 0

## 2019-01-11 NOTE — CARE PLAN
Problem: Venous Thromboembolism (VTW)/Deep Vein Thrombosis (DVT) Prevention:  Goal: Patient will participate in Venous Thrombosis (VTE)/Deep Vein Thrombosis (DVT)Prevention Measures  Outcome: PROGRESSING SLOWER THAN EXPECTED      Problem: Bowel/Gastric:  Goal: Normal bowel function is maintained or improved  Outcome: PROGRESSING AS EXPECTED  Refusing stool softeners at this time

## 2019-01-11 NOTE — PROGRESS NOTES
[facility] Medicine Daily Progress Note    Date of Service  1/11/2019    Chief Complaint  89 y.o. female admitted 11/13/2018 with fall.    Hospital Course    Patient is a pleasant 89 year old woman with history of HTN who presented following a fall and was found to have an acute ST elevation MI.  After discussion with her family, she was placed on comfort care.          Interval Problem Update  No significant change  She remains comfortable and without complaints, axo     Consultants/Specialty  Cardiology  Critical Care    Code Status  DNR/Comfort care    Disposition   vs home with hospice       Review of Systems  Review of Systems   Constitutional: Negative for chills.   HENT: Negative for hearing loss.    Eyes: Negative for pain.   Respiratory: Negative for cough and shortness of breath.    Cardiovascular: Negative for chest pain and leg swelling.   Gastrointestinal: Negative for nausea.   Genitourinary: Negative for dysuria.   Musculoskeletal: Negative for back pain.   Neurological: Positive for weakness. Negative for focal weakness.        Physical Exam  Temp:  [36.1 °C (96.9 °F)-36.6 °C (97.9 °F)] 36.6 °C (97.9 °F)  Pulse:  [68-71] 71  Resp:  [16-18] 18  BP: (110-120)/(64-72) 120/64    Physical Exam   Constitutional: She is oriented to person, place, and time.   HENT:   Head: Normocephalic and atraumatic.   Nose: Nose normal.   Mouth/Throat: Oropharynx is clear and moist. No oropharyngeal exudate.   Eyes: Pupils are equal, round, and reactive to light. Conjunctivae and EOM are normal.   Neck: Normal range of motion. Neck supple. No JVD present. No thyromegaly present.   Cardiovascular: Normal rate, regular rhythm and normal heart sounds.  Exam reveals no gallop and no friction rub.    No murmur heard.  Pulmonary/Chest: Effort normal and breath sounds normal. No stridor. No respiratory distress. She has no rales. She exhibits no tenderness.   Abdominal: Soft. Bowel sounds are normal. She exhibits no distension.  There is no tenderness.   Musculoskeletal: Normal range of motion. She exhibits no edema or tenderness.   Lymphadenopathy:     She has no cervical adenopathy.   Neurological: She is alert and oriented to person, place, and time. She displays normal reflexes. No cranial nerve deficit.   Skin: Skin is warm and dry. No rash noted. No erythema.   Psychiatric: Her behavior is normal.   Nursing note and vitals reviewed.      Fluids    Intake/Output Summary (Last 24 hours) at 01/11/19 1408  Last data filed at 01/10/19 1933   Gross per 24 hour   Intake               50 ml   Output                0 ml   Net               50 ml       Laboratory                        Imaging  DX-CHEST-PORTABLE (1 VIEW)   Final Result         1.  Hazy interstitial left pulmonary opacities suggests interstitial edema or infiltrate, similar to prior.   2.  Trace left pleural effusion   3.  Hyperexpansion of lungs favors changes of COPD.      DX-CHEST-PORTABLE (1 VIEW)   Final Result         1.  Interstitial infiltrates or edema, stable.   2.  Atherosclerosis      DX-CHEST-PORTABLE (1 VIEW)   Final Result         1.  Interstitial infiltrates or edema.   2.  Atherosclerosis      DX-CHEST-PORTABLE (1 VIEW)   Final Result      Moderate interstitial edema or interstitial lung disease and small left pleural effusion similar to previous.      DX-CHEST-PORTABLE (1 VIEW)   Final Result      Stable interstitial edema and/or interstitial lung disease with probable small pleural fluid      DX-CHEST-PORTABLE (1 VIEW)   Final Result      Ill-defined infrahilar interstitial opacities, atelectasis versus mild edema. No significant pleural effusions.      DX-CHEST-PORTABLE (1 VIEW)   Final Result      Mild left basilar atelectasis, improved since prior. No new consolidation or large pleural effusions.      DX-CHEST-PORTABLE (1 VIEW)   Final Result      1.  Left basilar opacification may be secondary to atelectasis. Developing pneumonia cannot be excluded.          DX-CHEST-PORTABLE (1 VIEW)   Final Result      1.  Unchanged mild interstitial pulmonary edema   2.  Unchanged bibasilar atelectasis, alveolar edema or pneumonia      DX-CHEST-PORTABLE (1 VIEW)   Final Result      1.  Decreased pulmonary edema   2.  Unchanged bibasilar atelectasis, alveolar edema or pneumonia      DX-CHEST-PORTABLE (1 VIEW)   Final Result      1.  There is diffuse interstitial and alveolar density in the right mid and lower lung zone. This is increased since the previous study. This may represent mild pulmonary edema/consolidation.   2.  Mildly enlarged cardiomediastinal silhouette when compared with the previous chest x-ray.      DX-CHEST-PORTABLE (1 VIEW)   Final Result      Stable mild pulmonary edema.   New left basilar atelectasis.      DX-CHEST-PORTABLE (1 VIEW)   Final Result      Stable chest appearance compared with 11/16.      DX-CHEST-PORTABLE (1 VIEW)   Final Result      No acute cardiopulmonary abnormality identified.      DX-CHEST-PORTABLE (1 VIEW)   Final Result      No acute cardiopulmonary abnormality. No interval change.      DX-CHEST-PORTABLE (1 VIEW)   Final Result      No acute cardiopulmonary disease.      CT-HEAD W/O   Final Result      1.  No evidence of acute intracranial process.      2.  There is again seen interstitial pulmonary edema and bibasilar atelectasis.      CT-HIP W/O PLUS RECONS LEFT   Final Result      1.  No evidence of acute fracture or malalignment. No evidence of hardware failure or complication.   2.  Postsurgical changes of the left femur with broken screw fragment redemonstrated.   3.  Demineralization.   4.  Scattered colonic diverticula.   5.  Atherosclerosis.   6.  Small fat-containing umbilical hernia.      EC-ECHOCARDIOGRAM COMPLETE W/O CONT   Final Result      DX-HIP-COMPLETE - UNILATERAL 2+ LEFT   Final Result      1.  No definite acute osseous abnormality. In setting of demineralization, an occult fracture is difficult to exclude. If there is  strong clinical suspicion, CT or MRI could be obtained for further evaluation.   2.  Postsurgical changes of the left femur with broken screw fragment redemonstrated.      DX-CHEST-PORTABLE (1 VIEW)   Final Result      No acute cardiopulmonary process is seen.      Atherosclerotic plaque.           Assessment/Plan  * ST elevation myocardial infarction involving right coronary artery (HCC)- (present on admission)   Assessment & Plan    Post anti plt, heparin, statin therapy - transitioned to comfort care- no chest pain or dyspnea.   Continue palliative care  She remains on comfort care         Abdominal pain   Assessment & Plan     Transient / intermittent.  No new symptoms, abdominal exam benign  Monitor intake and for signs of constipation. Ordered for scheduled senakot and miralax. Has been noncompliant  Bowel protocols.     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            Essential hypertension- (present on admission)   Assessment & Plan     BP stable to mildly low.  Hold lasix for sbp < 100 . Comfort care              Advanced directives, counseling/discussion- (present on admission)   Assessment & Plan     Comfort care      Oral morphine for pain, air hunger. Lorazepam for  anxiety               VTE prophylaxis: comfort care

## 2019-01-12 PROCEDURE — 770001 HCHG ROOM/CARE - MED/SURG/GYN PRIV*

## 2019-01-12 PROCEDURE — 99232 SBSQ HOSP IP/OBS MODERATE 35: CPT | Performed by: HOSPITALIST

## 2019-01-12 ASSESSMENT — PAIN SCALES - GENERAL
PAINLEVEL_OUTOF10: 0
PAINLEVEL_OUTOF10: 0

## 2019-01-12 ASSESSMENT — ENCOUNTER SYMPTOMS
COUGH: 0
EYE PAIN: 0
FOCAL WEAKNESS: 0
WEAKNESS: 1
BACK PAIN: 0
SHORTNESS OF BREATH: 0
CHILLS: 0
NAUSEA: 0

## 2019-01-12 NOTE — PROGRESS NOTES
· 2 RN skin check complete ely Valenzuela RN   · Devices in place: NA  · Skin assessed under devices: NA  · Skin appears dry and fragile. Sacral Area with redness yet blanching.   · The following interventions in place:           moisturizer applied.

## 2019-01-12 NOTE — PROGRESS NOTES
[facility] Medicine Daily Progress Note    Date of Service  1/12/2019    Chief Complaint  89 y.o. female admitted 11/13/2018 with fall.    Hospital Course    Patient is a pleasant 89 year old woman with history of HTN who presented following a fall and was found to have an acute ST elevation MI.  After discussion with her family, she was placed on comfort care.          Interval Problem Update  Remains on comfort care  Voiding bowel and bladder  Denies pain, ros negative     Consultants/Specialty  Cardiology  Critical Care    Code Status  DNR/Comfort care    Disposition   vs home with hospice       Review of Systems  Review of Systems   Constitutional: Negative for chills.   HENT: Negative for hearing loss.    Eyes: Negative for pain.   Respiratory: Negative for cough and shortness of breath.    Cardiovascular: Negative for chest pain and leg swelling.   Gastrointestinal: Negative for nausea.   Genitourinary: Negative for dysuria.   Musculoskeletal: Negative for back pain.   Neurological: Positive for weakness. Negative for focal weakness.        Physical Exam  Temp:  [36.4 °C (97.6 °F)-36.8 °C (98.3 °F)] 36.4 °C (97.6 °F)  Pulse:  [71-79] 73  Resp:  [16-18] 16  BP: (107-131)/(63-64) 131/63    Physical Exam   Constitutional: She is oriented to person, place, and time.   HENT:   Head: Normocephalic and atraumatic.   Nose: Nose normal.   Mouth/Throat: Oropharynx is clear and moist. No oropharyngeal exudate.   Eyes: Pupils are equal, round, and reactive to light. Conjunctivae and EOM are normal.   Neck: Normal range of motion. Neck supple. No JVD present. No thyromegaly present.   Cardiovascular: Normal rate, regular rhythm and normal heart sounds.  Exam reveals no gallop and no friction rub.    No murmur heard.  Pulmonary/Chest: Effort normal and breath sounds normal. No stridor. No respiratory distress. She has no rales. She exhibits no tenderness.   Abdominal: Soft. Bowel sounds are normal. She exhibits no  distension. There is no tenderness.   Musculoskeletal: Normal range of motion. She exhibits no edema or tenderness.   Lymphadenopathy:     She has no cervical adenopathy.   Neurological: She is alert and oriented to person, place, and time. She displays normal reflexes. No cranial nerve deficit.   Skin: Skin is warm and dry. No rash noted. No erythema.   Psychiatric: Her behavior is normal.   Nursing note and vitals reviewed.      Fluids    Intake/Output Summary (Last 24 hours) at 01/12/19 0821  Last data filed at 01/11/19 2300   Gross per 24 hour   Intake              600 ml   Output                0 ml   Net              600 ml       Laboratory                        Imaging  DX-CHEST-PORTABLE (1 VIEW)   Final Result         1.  Hazy interstitial left pulmonary opacities suggests interstitial edema or infiltrate, similar to prior.   2.  Trace left pleural effusion   3.  Hyperexpansion of lungs favors changes of COPD.      DX-CHEST-PORTABLE (1 VIEW)   Final Result         1.  Interstitial infiltrates or edema, stable.   2.  Atherosclerosis      DX-CHEST-PORTABLE (1 VIEW)   Final Result         1.  Interstitial infiltrates or edema.   2.  Atherosclerosis      DX-CHEST-PORTABLE (1 VIEW)   Final Result      Moderate interstitial edema or interstitial lung disease and small left pleural effusion similar to previous.      DX-CHEST-PORTABLE (1 VIEW)   Final Result      Stable interstitial edema and/or interstitial lung disease with probable small pleural fluid      DX-CHEST-PORTABLE (1 VIEW)   Final Result      Ill-defined infrahilar interstitial opacities, atelectasis versus mild edema. No significant pleural effusions.      DX-CHEST-PORTABLE (1 VIEW)   Final Result      Mild left basilar atelectasis, improved since prior. No new consolidation or large pleural effusions.      DX-CHEST-PORTABLE (1 VIEW)   Final Result      1.  Left basilar opacification may be secondary to atelectasis. Developing pneumonia cannot be  excluded.         DX-CHEST-PORTABLE (1 VIEW)   Final Result      1.  Unchanged mild interstitial pulmonary edema   2.  Unchanged bibasilar atelectasis, alveolar edema or pneumonia      DX-CHEST-PORTABLE (1 VIEW)   Final Result      1.  Decreased pulmonary edema   2.  Unchanged bibasilar atelectasis, alveolar edema or pneumonia      DX-CHEST-PORTABLE (1 VIEW)   Final Result      1.  There is diffuse interstitial and alveolar density in the right mid and lower lung zone. This is increased since the previous study. This may represent mild pulmonary edema/consolidation.   2.  Mildly enlarged cardiomediastinal silhouette when compared with the previous chest x-ray.      DX-CHEST-PORTABLE (1 VIEW)   Final Result      Stable mild pulmonary edema.   New left basilar atelectasis.      DX-CHEST-PORTABLE (1 VIEW)   Final Result      Stable chest appearance compared with 11/16.      DX-CHEST-PORTABLE (1 VIEW)   Final Result      No acute cardiopulmonary abnormality identified.      DX-CHEST-PORTABLE (1 VIEW)   Final Result      No acute cardiopulmonary abnormality. No interval change.      DX-CHEST-PORTABLE (1 VIEW)   Final Result      No acute cardiopulmonary disease.      CT-HEAD W/O   Final Result      1.  No evidence of acute intracranial process.      2.  There is again seen interstitial pulmonary edema and bibasilar atelectasis.      CT-HIP W/O PLUS RECONS LEFT   Final Result      1.  No evidence of acute fracture or malalignment. No evidence of hardware failure or complication.   2.  Postsurgical changes of the left femur with broken screw fragment redemonstrated.   3.  Demineralization.   4.  Scattered colonic diverticula.   5.  Atherosclerosis.   6.  Small fat-containing umbilical hernia.      EC-ECHOCARDIOGRAM COMPLETE W/O CONT   Final Result      DX-HIP-COMPLETE - UNILATERAL 2+ LEFT   Final Result      1.  No definite acute osseous abnormality. In setting of demineralization, an occult fracture is difficult to  exclude. If there is strong clinical suspicion, CT or MRI could be obtained for further evaluation.   2.  Postsurgical changes of the left femur with broken screw fragment redemonstrated.      DX-CHEST-PORTABLE (1 VIEW)   Final Result      No acute cardiopulmonary process is seen.      Atherosclerotic plaque.           Assessment/Plan  * ST elevation myocardial infarction involving right coronary artery (HCC)- (present on admission)   Assessment & Plan    Post anti plt, heparin, statin therapy - transitioned to comfort care- no chest pain or dyspnea.   Continue palliative measures  Comfort care      Abdominal pain   Assessment & Plan     Transient / intermittent.  No new symptoms, abdominal exam benign  Monitor intake and for signs of constipation. Ordered for scheduled senakot and miralax. Has been noncompliant  Bowel protocols.     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            Essential hypertension- (present on admission)   Assessment & Plan     BP stable to mildly low.  Hold lasix for sbp < 100 . Comfort care              Advanced directives, counseling/discussion- (present on admission)   Assessment & Plan     Comfort care      Oral morphine for pain, air hunger.  Lorazepam for anxiety               VTE prophylaxis: comfort care

## 2019-01-12 NOTE — CARE PLAN
Problem: Safety  Goal: Will remain free from falls  Outcome: PROGRESSING AS EXPECTED  Patient remains free from falls. Bed alarm in place. Patient uses call light appropriately. Hourly rounding in place.     Problem: Infection  Goal: Will remain free from infection  Outcome: PROGRESSING AS EXPECTED  Patient remains free from signs / symptoms of infection.     Problem: Venous Thromboembolism (VTW)/Deep Vein Thrombosis (DVT) Prevention:  Goal: Patient will participate in Venous Thrombosis (VTE)/Deep Vein Thrombosis (DVT)Prevention Measures  Outcome: PROGRESSING SLOWER THAN EXPECTED  Patient refusing SCDs to be in place despite education.

## 2019-01-12 NOTE — CARE PLAN
Problem: Safety  Goal: Will remain free from falls    Intervention: Implement fall precautions  Pt calls appropriately, treaded socks in use. Personal belongings and call light with in reach and bed is locked in lowest position. Hourly rounding in place      Problem: Respiratory:  Goal: Respiratory status will improve    Intervention: Educate and encourage incentive spirometry usage  Education provided on how to use the IS and why. Return demonstration provided.

## 2019-01-12 NOTE — CARE PLAN
Problem: Safety  Goal: Will remain free from injury  Outcome: PROGRESSING AS EXPECTED  Hourly rounds done. Call light within reach. Needs attended on a timely manner. Treaded socks on when OOB. Educated on the importance of calling for assistance when attempting to be OOB.  BED ALARM ON.    Problem: Infection  Goal: Will remain free from infection  Outcome: PROGRESSING AS EXPECTED  Hand hygiene advocated. Educated on measures on how to prevent infection.     Problem: Venous Thromboembolism (VTW)/Deep Vein Thrombosis (DVT) Prevention:  Goal: Patient will participate in Venous Thrombosis (VTE)/Deep Vein Thrombosis (DVT)Prevention Measures  Outcome: PROGRESSING AS EXPECTED  Encourage early mobilization.     Problem: Mobility  Goal: Risk for activity intolerance will decrease  Outcome: PROGRESSING AS EXPECTED  Up SBA

## 2019-01-13 PROCEDURE — 700102 HCHG RX REV CODE 250 W/ 637 OVERRIDE(OP): Performed by: INTERNAL MEDICINE

## 2019-01-13 PROCEDURE — 770001 HCHG ROOM/CARE - MED/SURG/GYN PRIV*

## 2019-01-13 PROCEDURE — A9270 NON-COVERED ITEM OR SERVICE: HCPCS | Performed by: INTERNAL MEDICINE

## 2019-01-13 PROCEDURE — 99231 SBSQ HOSP IP/OBS SF/LOW 25: CPT | Performed by: HOSPITALIST

## 2019-01-13 RX ADMIN — SENNOSIDES AND DOCUSATE SODIUM 2 TABLET: 8.6; 5 TABLET ORAL at 17:29

## 2019-01-13 ASSESSMENT — ENCOUNTER SYMPTOMS
EYE PAIN: 0
WEAKNESS: 1
FOCAL WEAKNESS: 0
BACK PAIN: 0
SHORTNESS OF BREATH: 0
NAUSEA: 0
CHILLS: 0
COUGH: 0

## 2019-01-13 ASSESSMENT — PAIN SCALES - GENERAL: PAINLEVEL_OUTOF10: 0

## 2019-01-13 NOTE — CARE PLAN
Problem: Safety  Goal: Will remain free from injury  Outcome: PROGRESSING AS EXPECTED  Pt remains free from injury, Floor clear from clutter and cords.  Pt A+OX4, Non-Skid yellow socks, Bed/chair alarm off. Proper signs outside pt door in place. Door remains open.  Pt uses call light appropriately. Call light with in reach of pt.  Hourly rounding in place.    Problem: Venous Thromboembolism (VTW)/Deep Vein Thrombosis (DVT) Prevention:  Goal: Patient will participate in Venous Thrombosis (VTE)/Deep Vein Thrombosis (DVT)Prevention Measures  Outcome: PROGRESSING AS EXPECTED   01/13/19 0800   OTHER   Risk Assessment Score 1   VTE RISK Moderate   Pharmacologic Prophylaxis Used (Comfort care)   Mechanical/VTE Prophylaxis   Mechanical Prophylaxis  SCDs, Sequential Compression Device   SCDs, Sequential Compression Device Refused   Pt on comfort care.    Problem: Bowel/Gastric:  Goal: Normal bowel function is maintained or improved  Outcome: PROGRESSING AS EXPECTED   01/09/19 2200 01/13/19 0800   OTHER   Last BM --  01/10/19  (Per pt)   Number of Times Stooled 1 --    + flatus, hypoactive BS. Pt declines PRN and scheduled stool softeners.   Encouraged increase in fluids, a diet high in fiber and ambulation as tolerated,

## 2019-01-13 NOTE — PROGRESS NOTES
Received shift report from conrado RN and assumed care of this pt at 0715. Pt AOx4 . Pt reports pain is 0/10. Pt is up one assist, . Pt appropriately uses call light when needing assistance. Bed alarm is off. No PIV. Pt is on RA.  Discussed POC for day shift,  comfort, and safety. Patient has call light and personal belongings within reach. Safety and fall precautions in place. Reviewed orders, notes, labs, and test results. Hourly rounding in place with RN rounding on odd hours and CNA on even hours.

## 2019-01-13 NOTE — PROGRESS NOTES
[facility] Medicine Daily Progress Note    Date of Service  1/13/2019    Chief Complaint  89 y.o. female admitted 11/13/2018 with fall.    Hospital Course    Patient is a pleasant 89 year old woman with history of HTN who presented following a fall and was found to have an acute ST elevation MI.  After discussion with her family, she was placed on comfort care.          Interval Problem Update  axox  Denies pain  Ros otherwise negative     Consultants/Specialty  Cardiology  Critical Care    Code Status  DNR/Comfort care    Disposition   vs home with hospice       Review of Systems  Review of Systems   Constitutional: Negative for chills.   HENT: Negative for hearing loss.    Eyes: Negative for pain.   Respiratory: Negative for cough and shortness of breath.    Cardiovascular: Negative for chest pain and leg swelling.   Gastrointestinal: Negative for nausea.   Genitourinary: Negative for dysuria.   Musculoskeletal: Negative for back pain.   Neurological: Positive for weakness. Negative for focal weakness.        Physical Exam  Temp:  [36.1 °C (97 °F)-36.4 °C (97.6 °F)] 36.4 °C (97.6 °F)  Pulse:  [68-73] 73  Resp:  [16] 16  BP: (105-119)/(55-70) 119/70    Physical Exam   Constitutional: She is oriented to person, place, and time.   HENT:   Head: Normocephalic and atraumatic.   Nose: Nose normal.   Mouth/Throat: Oropharynx is clear and moist. No oropharyngeal exudate.   Eyes: Pupils are equal, round, and reactive to light. Conjunctivae and EOM are normal.   Neck: Normal range of motion. Neck supple. No JVD present. No thyromegaly present.   Cardiovascular: Normal rate, regular rhythm and normal heart sounds.  Exam reveals no gallop and no friction rub.    No murmur heard.  Pulmonary/Chest: Effort normal and breath sounds normal. No stridor. No respiratory distress. She has no rales. She exhibits no tenderness.   Abdominal: Soft. Bowel sounds are normal. She exhibits no distension. There is no tenderness.    Musculoskeletal: Normal range of motion. She exhibits no edema or tenderness.   Lymphadenopathy:     She has no cervical adenopathy.   Neurological: She is alert and oriented to person, place, and time. She displays normal reflexes. No cranial nerve deficit.   Skin: Skin is warm and dry. No rash noted. No erythema.   Psychiatric: Her behavior is normal.   Nursing note and vitals reviewed.      Fluids    Intake/Output Summary (Last 24 hours) at 01/13/19 1212  Last data filed at 01/13/19 0840   Gross per 24 hour   Intake              370 ml   Output                0 ml   Net              370 ml       Laboratory                        Imaging  DX-CHEST-PORTABLE (1 VIEW)   Final Result         1.  Hazy interstitial left pulmonary opacities suggests interstitial edema or infiltrate, similar to prior.   2.  Trace left pleural effusion   3.  Hyperexpansion of lungs favors changes of COPD.      DX-CHEST-PORTABLE (1 VIEW)   Final Result         1.  Interstitial infiltrates or edema, stable.   2.  Atherosclerosis      DX-CHEST-PORTABLE (1 VIEW)   Final Result         1.  Interstitial infiltrates or edema.   2.  Atherosclerosis      DX-CHEST-PORTABLE (1 VIEW)   Final Result      Moderate interstitial edema or interstitial lung disease and small left pleural effusion similar to previous.      DX-CHEST-PORTABLE (1 VIEW)   Final Result      Stable interstitial edema and/or interstitial lung disease with probable small pleural fluid      DX-CHEST-PORTABLE (1 VIEW)   Final Result      Ill-defined infrahilar interstitial opacities, atelectasis versus mild edema. No significant pleural effusions.      DX-CHEST-PORTABLE (1 VIEW)   Final Result      Mild left basilar atelectasis, improved since prior. No new consolidation or large pleural effusions.      DX-CHEST-PORTABLE (1 VIEW)   Final Result      1.  Left basilar opacification may be secondary to atelectasis. Developing pneumonia cannot be excluded.         DX-CHEST-PORTABLE (1  VIEW)   Final Result      1.  Unchanged mild interstitial pulmonary edema   2.  Unchanged bibasilar atelectasis, alveolar edema or pneumonia      DX-CHEST-PORTABLE (1 VIEW)   Final Result      1.  Decreased pulmonary edema   2.  Unchanged bibasilar atelectasis, alveolar edema or pneumonia      DX-CHEST-PORTABLE (1 VIEW)   Final Result      1.  There is diffuse interstitial and alveolar density in the right mid and lower lung zone. This is increased since the previous study. This may represent mild pulmonary edema/consolidation.   2.  Mildly enlarged cardiomediastinal silhouette when compared with the previous chest x-ray.      DX-CHEST-PORTABLE (1 VIEW)   Final Result      Stable mild pulmonary edema.   New left basilar atelectasis.      DX-CHEST-PORTABLE (1 VIEW)   Final Result      Stable chest appearance compared with 11/16.      DX-CHEST-PORTABLE (1 VIEW)   Final Result      No acute cardiopulmonary abnormality identified.      DX-CHEST-PORTABLE (1 VIEW)   Final Result      No acute cardiopulmonary abnormality. No interval change.      DX-CHEST-PORTABLE (1 VIEW)   Final Result      No acute cardiopulmonary disease.      CT-HEAD W/O   Final Result      1.  No evidence of acute intracranial process.      2.  There is again seen interstitial pulmonary edema and bibasilar atelectasis.      CT-HIP W/O PLUS RECONS LEFT   Final Result      1.  No evidence of acute fracture or malalignment. No evidence of hardware failure or complication.   2.  Postsurgical changes of the left femur with broken screw fragment redemonstrated.   3.  Demineralization.   4.  Scattered colonic diverticula.   5.  Atherosclerosis.   6.  Small fat-containing umbilical hernia.      EC-ECHOCARDIOGRAM COMPLETE W/O CONT   Final Result      DX-HIP-COMPLETE - UNILATERAL 2+ LEFT   Final Result      1.  No definite acute osseous abnormality. In setting of demineralization, an occult fracture is difficult to exclude. If there is strong clinical  suspicion, CT or MRI could be obtained for further evaluation.   2.  Postsurgical changes of the left femur with broken screw fragment redemonstrated.      DX-CHEST-PORTABLE (1 VIEW)   Final Result      No acute cardiopulmonary process is seen.      Atherosclerotic plaque.           Assessment/Plan  * ST elevation myocardial infarction involving right coronary artery (HCC)- (present on admission)   Assessment & Plan    Post anti plt, heparin, statin therapy - transitioned to comfort care- no chest pain or dyspnea.   Continue palliative measures  Remains on comfort care      Abdominal pain   Assessment & Plan     Transient / intermittent.  No new symptoms, abdominal exam benign  Monitor intake and for signs of constipation. Ordered for scheduled senakot and miralax. Has been noncompliant  Bowel protocols.     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            Essential hypertension- (present on admission)   Assessment & Plan     BP stable to mildly low.  Hold lasix for sbp < 100 . Comfort care              Advanced directives, counseling/discussion- (present on admission)   Assessment & Plan     Comfort care      Oral morphine for pain, air hunger. Lorazepam for anxiety                VTE prophylaxis: comfort care

## 2019-01-14 ENCOUNTER — PATIENT OUTREACH (OUTPATIENT)
Dept: HEALTH INFORMATION MANAGEMENT | Facility: OTHER | Age: 84
End: 2019-01-14

## 2019-01-14 PROCEDURE — 770001 HCHG ROOM/CARE - MED/SURG/GYN PRIV*

## 2019-01-14 PROCEDURE — 99231 SBSQ HOSP IP/OBS SF/LOW 25: CPT | Performed by: HOSPITALIST

## 2019-01-14 ASSESSMENT — ENCOUNTER SYMPTOMS
BACK PAIN: 0
CHILLS: 0
COUGH: 0
NAUSEA: 0
WEAKNESS: 1
EYE PAIN: 0
SHORTNESS OF BREATH: 0
FOCAL WEAKNESS: 0

## 2019-01-14 ASSESSMENT — PAIN SCALES - GENERAL
PAINLEVEL_OUTOF10: 0
PAINLEVEL_OUTOF10: 0

## 2019-01-14 NOTE — PROGRESS NOTES
[facility] Medicine Daily Progress Note    Date of Service  1/14/2019    Chief Complaint  89 y.o. female admitted 11/13/2018 with fall.    Hospital Course    Patient is a pleasant 89 year old woman with history of HTN who presented following a fall and was found to have an acute ST elevation MI.  After discussion with her family, she was placed on comfort care.          Interval Problem Update  No significant change  Remains axox  Denies pain  Ros otherwise negative     Consultants/Specialty  Cardiology  Critical Care    Code Status  DNR/Comfort care    Disposition   vs home with hospice       Review of Systems  Review of Systems   Constitutional: Negative for chills.   HENT: Negative for hearing loss.    Eyes: Negative for pain.   Respiratory: Negative for cough and shortness of breath.    Cardiovascular: Negative for chest pain and leg swelling.   Gastrointestinal: Negative for nausea.   Genitourinary: Negative for dysuria.   Musculoskeletal: Negative for back pain.   Neurological: Positive for weakness. Negative for focal weakness.        Physical Exam  Temp:  [36.8 °C (98.2 °F)-36.8 °C (98.3 °F)] 36.8 °C (98.2 °F)  Pulse:  [78-79] 78  Resp:  [16-17] 17  BP: (111-112)/(62-68) 111/62    Physical Exam   Constitutional: She is oriented to person, place, and time.   HENT:   Head: Normocephalic and atraumatic.   Nose: Nose normal.   Mouth/Throat: Oropharynx is clear and moist. No oropharyngeal exudate.   Eyes: Pupils are equal, round, and reactive to light. Conjunctivae and EOM are normal.   Neck: Normal range of motion. Neck supple. No JVD present. No thyromegaly present.   Cardiovascular: Normal rate, regular rhythm and normal heart sounds.  Exam reveals no gallop and no friction rub.    No murmur heard.  Pulmonary/Chest: Effort normal and breath sounds normal. No stridor. No respiratory distress. She has no rales. She exhibits no tenderness.   Abdominal: Soft. Bowel sounds are normal. She exhibits no distension.  There is no tenderness.   Musculoskeletal: Normal range of motion. She exhibits no edema or tenderness.   Lymphadenopathy:     She has no cervical adenopathy.   Neurological: She is alert and oriented to person, place, and time. She displays normal reflexes. No cranial nerve deficit.   Skin: Skin is warm and dry. No rash noted. No erythema.   Psychiatric: Her behavior is normal.   Nursing note and vitals reviewed.      Fluids    Intake/Output Summary (Last 24 hours) at 01/14/19 1117  Last data filed at 01/14/19 0902   Gross per 24 hour   Intake              860 ml   Output                0 ml   Net              860 ml       Laboratory                        Imaging  DX-CHEST-PORTABLE (1 VIEW)   Final Result         1.  Hazy interstitial left pulmonary opacities suggests interstitial edema or infiltrate, similar to prior.   2.  Trace left pleural effusion   3.  Hyperexpansion of lungs favors changes of COPD.      DX-CHEST-PORTABLE (1 VIEW)   Final Result         1.  Interstitial infiltrates or edema, stable.   2.  Atherosclerosis      DX-CHEST-PORTABLE (1 VIEW)   Final Result         1.  Interstitial infiltrates or edema.   2.  Atherosclerosis      DX-CHEST-PORTABLE (1 VIEW)   Final Result      Moderate interstitial edema or interstitial lung disease and small left pleural effusion similar to previous.      DX-CHEST-PORTABLE (1 VIEW)   Final Result      Stable interstitial edema and/or interstitial lung disease with probable small pleural fluid      DX-CHEST-PORTABLE (1 VIEW)   Final Result      Ill-defined infrahilar interstitial opacities, atelectasis versus mild edema. No significant pleural effusions.      DX-CHEST-PORTABLE (1 VIEW)   Final Result      Mild left basilar atelectasis, improved since prior. No new consolidation or large pleural effusions.      DX-CHEST-PORTABLE (1 VIEW)   Final Result      1.  Left basilar opacification may be secondary to atelectasis. Developing pneumonia cannot be excluded.          DX-CHEST-PORTABLE (1 VIEW)   Final Result      1.  Unchanged mild interstitial pulmonary edema   2.  Unchanged bibasilar atelectasis, alveolar edema or pneumonia      DX-CHEST-PORTABLE (1 VIEW)   Final Result      1.  Decreased pulmonary edema   2.  Unchanged bibasilar atelectasis, alveolar edema or pneumonia      DX-CHEST-PORTABLE (1 VIEW)   Final Result      1.  There is diffuse interstitial and alveolar density in the right mid and lower lung zone. This is increased since the previous study. This may represent mild pulmonary edema/consolidation.   2.  Mildly enlarged cardiomediastinal silhouette when compared with the previous chest x-ray.      DX-CHEST-PORTABLE (1 VIEW)   Final Result      Stable mild pulmonary edema.   New left basilar atelectasis.      DX-CHEST-PORTABLE (1 VIEW)   Final Result      Stable chest appearance compared with 11/16.      DX-CHEST-PORTABLE (1 VIEW)   Final Result      No acute cardiopulmonary abnormality identified.      DX-CHEST-PORTABLE (1 VIEW)   Final Result      No acute cardiopulmonary abnormality. No interval change.      DX-CHEST-PORTABLE (1 VIEW)   Final Result      No acute cardiopulmonary disease.      CT-HEAD W/O   Final Result      1.  No evidence of acute intracranial process.      2.  There is again seen interstitial pulmonary edema and bibasilar atelectasis.      CT-HIP W/O PLUS RECONS LEFT   Final Result      1.  No evidence of acute fracture or malalignment. No evidence of hardware failure or complication.   2.  Postsurgical changes of the left femur with broken screw fragment redemonstrated.   3.  Demineralization.   4.  Scattered colonic diverticula.   5.  Atherosclerosis.   6.  Small fat-containing umbilical hernia.      EC-ECHOCARDIOGRAM COMPLETE W/O CONT   Final Result      DX-HIP-COMPLETE - UNILATERAL 2+ LEFT   Final Result      1.  No definite acute osseous abnormality. In setting of demineralization, an occult fracture is difficult to exclude. If there is  strong clinical suspicion, CT or MRI could be obtained for further evaluation.   2.  Postsurgical changes of the left femur with broken screw fragment redemonstrated.      DX-CHEST-PORTABLE (1 VIEW)   Final Result      No acute cardiopulmonary process is seen.      Atherosclerotic plaque.           Assessment/Plan  * ST elevation myocardial infarction involving right coronary artery (HCC)- (present on admission)   Assessment & Plan    Post anti plt, heparin, statin therapy - transitioned to comfort care- no chest pain or dyspnea.   Continue palliative measures  Continue comfort care      Abdominal pain   Assessment & Plan     Transient / intermittent.  No new symptoms, abdominal exam benign  Monitor intake and for signs of constipation. Ordered for scheduled senakot and miralax. Has been noncompliant  Bowel protocols.     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            Essential hypertension- (present on admission)   Assessment & Plan     BP stable to mildly low.  Hold lasix for sbp < 100 . Comfort care              Advanced directives, counseling/discussion- (present on admission)   Assessment & Plan     Comfort care      Oral morphine for pain, air hunger. Lorazepam  for anxiety               VTE prophylaxis: comfort care

## 2019-01-15 PROBLEM — I95.9 HYPOTENSION: Status: RESOLVED | Noted: 2018-12-05 | Resolved: 2019-01-15

## 2019-01-15 PROBLEM — R10.9 ABDOMINAL PAIN: Status: RESOLVED | Noted: 2018-12-07 | Resolved: 2019-01-15

## 2019-01-15 PROCEDURE — A9270 NON-COVERED ITEM OR SERVICE: HCPCS | Performed by: HOSPITALIST

## 2019-01-15 PROCEDURE — 700102 HCHG RX REV CODE 250 W/ 637 OVERRIDE(OP): Performed by: HOSPITALIST

## 2019-01-15 PROCEDURE — 770001 HCHG ROOM/CARE - MED/SURG/GYN PRIV*

## 2019-01-15 PROCEDURE — 99232 SBSQ HOSP IP/OBS MODERATE 35: CPT | Performed by: HOSPITALIST

## 2019-01-15 RX ADMIN — FUROSEMIDE 20 MG: 20 TABLET ORAL at 06:44

## 2019-01-15 ASSESSMENT — ENCOUNTER SYMPTOMS
DEPRESSION: 0
NAUSEA: 0
BLOOD IN STOOL: 0
FOCAL WEAKNESS: 0
ABDOMINAL PAIN: 0
DIAPHORESIS: 0
INSOMNIA: 0
BRUISES/BLEEDS EASILY: 0
PND: 0
SHORTNESS OF BREATH: 0
ROS GI COMMENTS: + GAS
PALPITATIONS: 0
CONSTIPATION: 0
SPEECH CHANGE: 0
DIARRHEA: 0
NERVOUS/ANXIOUS: 0
WEAKNESS: 0
WHEEZING: 0
ORTHOPNEA: 0
HEADACHES: 0
FEVER: 0
DIZZINESS: 0
COUGH: 0
SENSORY CHANGE: 0
CHILLS: 0
VOMITING: 0

## 2019-01-15 ASSESSMENT — PAIN SCALES - GENERAL: PAINLEVEL_OUTOF10: 0

## 2019-01-15 NOTE — PROGRESS NOTES
Assumed care of violet at 0700. Patient is resting in bed, refusing to get OOB for meal. Violet is refusing mepilex to sacral area and waffle overlay for skin breakdown prevention. Highly encouraged to turn and reposition, states she is comfortable and does not want to move at this time. Will continue to educate in skin breakdown risks and encourage repositioning and turning

## 2019-01-15 NOTE — PROGRESS NOTES
[facility] Medicine Daily Progress Note    Date of Service  1/15/2019    Chief Complaint  Ground-level fall    Hospital Course   Ms. Hope is a 89-year-old female who presented on 11/13/2018 after having a ground-level fall.  Upon evaluation in the emergency department she was found to have an acute STEMI with a troponin level greater than 50.  Cardiology was consulted but the patient insisted that she not have any aggressive interventions including an angiogram and wanted to be treated medically only.  After a long discussion with the patient and her family, she was made comfort care.      Interval Problem Update  Denies pain. Continues on comfort care. Not imminent.  VSS. No chest pain.  Eating without difficulty. Refusing to turn or mobilize.    Consultants/Specialty  Cardiology  Pulmonary/Critical care (while in ICU)    Code Status  Comfort Care     Disposition  Trying for home with hospice, pending acceptance.    Review of Systems  Review of Systems   Constitutional: Negative for chills, diaphoresis, fever and malaise/fatigue.   HENT: Negative for congestion and nosebleeds.    Respiratory: Negative for cough, shortness of breath and wheezing.    Cardiovascular: Negative for chest pain, palpitations, orthopnea, leg swelling and PND.   Gastrointestinal: Negative for abdominal pain, blood in stool, constipation, diarrhea, melena, nausea and vomiting.        + gas   Genitourinary: Negative for dysuria, frequency, hematuria and urgency.   Musculoskeletal:        Denies pain   Neurological: Negative for dizziness, sensory change, speech change, focal weakness, weakness and headaches.   Endo/Heme/Allergies: Does not bruise/bleed easily.   Psychiatric/Behavioral: Negative for depression. The patient is not nervous/anxious and does not have insomnia.    All other systems reviewed and are negative.     Physical Exam  Temp:  [36.3 °C (97.3 °F)-36.4 °C (97.6 °F)] 36.3 °C (97.3 °F)  Pulse:  [75-82] 75  Resp:  [16] 16  BP:  (121-127)/(55-69) 121/55  SpO2:  [92 %-93 %] 92 %    Physical Exam   Constitutional: She is oriented to person, place, and time. She appears well-developed and well-nourished. She is active and cooperative. She appears ill (chronically ill-appearing). No distress.   Thin, frail, elderly   HENT:   Head: Normocephalic and atraumatic.   Right Ear: External ear normal.   Left Ear: External ear normal.   Mouth/Throat: Oropharynx is clear and moist.   Eyes: Pupils are equal, round, and reactive to light. Right eye exhibits no discharge. Left eye exhibits no discharge. No scleral icterus.   Neck: Normal range of motion. Neck supple. No JVD present.   Cardiovascular: Normal rate, regular rhythm, normal heart sounds and intact distal pulses.  Exam reveals no gallop and no friction rub.    No murmur heard.  Pulmonary/Chest: Effort normal and breath sounds normal. No stridor. No respiratory distress. She has no wheezes. She has no rales.   Abdominal: Soft. She exhibits no distension. Bowel sounds are decreased. There is no tenderness. There is no rebound and no guarding.   Musculoskeletal: Normal range of motion. She exhibits no edema.   Neurological: She is alert and oriented to person, place, and time. GCS eye subscore is 4. GCS verbal subscore is 5. GCS motor subscore is 6.   Skin: Skin is warm and dry. No rash noted. She is not diaphoretic. No erythema. No pallor.   Psychiatric: She has a normal mood and affect. Her speech is normal and behavior is normal. Judgment and thought content normal. Cognition and memory are normal.   Nursing note and vitals reviewed.    Fluids    Intake/Output Summary (Last 24 hours) at 01/15/19 1708  Last data filed at 01/14/19 1800   Gross per 24 hour   Intake              240 ml   Output                0 ml   Net              240 ml     Laboratory    Imaging  DX-CHEST-PORTABLE (1 VIEW)   Final Result         1.  Hazy interstitial left pulmonary opacities suggests interstitial edema or  infiltrate, similar to prior.   2.  Trace left pleural effusion   3.  Hyperexpansion of lungs favors changes of COPD.      DX-CHEST-PORTABLE (1 VIEW)   Final Result         1.  Interstitial infiltrates or edema, stable.   2.  Atherosclerosis      DX-CHEST-PORTABLE (1 VIEW)   Final Result         1.  Interstitial infiltrates or edema.   2.  Atherosclerosis      DX-CHEST-PORTABLE (1 VIEW)   Final Result      Moderate interstitial edema or interstitial lung disease and small left pleural effusion similar to previous.      DX-CHEST-PORTABLE (1 VIEW)   Final Result      Stable interstitial edema and/or interstitial lung disease with probable small pleural fluid      DX-CHEST-PORTABLE (1 VIEW)   Final Result      Ill-defined infrahilar interstitial opacities, atelectasis versus mild edema. No significant pleural effusions.      DX-CHEST-PORTABLE (1 VIEW)   Final Result      Mild left basilar atelectasis, improved since prior. No new consolidation or large pleural effusions.      DX-CHEST-PORTABLE (1 VIEW)   Final Result      1.  Left basilar opacification may be secondary to atelectasis. Developing pneumonia cannot be excluded.         DX-CHEST-PORTABLE (1 VIEW)   Final Result      1.  Unchanged mild interstitial pulmonary edema   2.  Unchanged bibasilar atelectasis, alveolar edema or pneumonia      DX-CHEST-PORTABLE (1 VIEW)   Final Result      1.  Decreased pulmonary edema   2.  Unchanged bibasilar atelectasis, alveolar edema or pneumonia      DX-CHEST-PORTABLE (1 VIEW)   Final Result      1.  There is diffuse interstitial and alveolar density in the right mid and lower lung zone. This is increased since the previous study. This may represent mild pulmonary edema/consolidation.   2.  Mildly enlarged cardiomediastinal silhouette when compared with the previous chest x-ray.      DX-CHEST-PORTABLE (1 VIEW)   Final Result      Stable mild pulmonary edema.   New left basilar atelectasis.      DX-CHEST-PORTABLE (1 VIEW)    Final Result      Stable chest appearance compared with 11/16.      DX-CHEST-PORTABLE (1 VIEW)   Final Result      No acute cardiopulmonary abnormality identified.      DX-CHEST-PORTABLE (1 VIEW)   Final Result      No acute cardiopulmonary abnormality. No interval change.      DX-CHEST-PORTABLE (1 VIEW)   Final Result      No acute cardiopulmonary disease.      CT-HEAD W/O   Final Result      1.  No evidence of acute intracranial process.      2.  There is again seen interstitial pulmonary edema and bibasilar atelectasis.      CT-HIP W/O PLUS RECONS LEFT   Final Result      1.  No evidence of acute fracture or malalignment. No evidence of hardware failure or complication.   2.  Postsurgical changes of the left femur with broken screw fragment redemonstrated.   3.  Demineralization.   4.  Scattered colonic diverticula.   5.  Atherosclerosis.   6.  Small fat-containing umbilical hernia.      EC-ECHOCARDIOGRAM COMPLETE W/O CONT   Final Result      DX-HIP-COMPLETE - UNILATERAL 2+ LEFT   Final Result      1.  No definite acute osseous abnormality. In setting of demineralization, an occult fracture is difficult to exclude. If there is strong clinical suspicion, CT or MRI could be obtained for further evaluation.   2.  Postsurgical changes of the left femur with broken screw fragment redemonstrated.      DX-CHEST-PORTABLE (1 VIEW)   Final Result      No acute cardiopulmonary process is seen.      Atherosclerotic plaque.         Assessment/Plan  * Fall- (present on admission)   Assessment & Plan    Chronic debility. Has been refusing to turn or mobilize while inpatient.  She remains on comfort care.       ST elevation myocardial infarction involving right coronary artery (HCC)- (present on admission)   Assessment & Plan    Continue comfort care.     Positive QuantiFERON-TB Gold test   Assessment & Plan      AFB negative        Chronic kidney disease (CKD), stage III (moderate) (HCC)- (present on admission)   Assessment &  Plan    Will not trend or treat. On comfort care.     Elevated brain natriuretic peptide (BNP) level- (present on admission)   Assessment & Plan    Continue lasix for comfort.            Essential hypertension- (present on admission)   Assessment & Plan    Controlled, within normal limits.  Hold lasix for sbp < 100 .  Continue comfort care status.            Pain of left hip joint- (present on admission)   Assessment & Plan    Likely secondary to OA. Currently denies pain.  As needed pain meds available.     Advanced directives, counseling/discussion- (present on admission)   Assessment & Plan    Continue comfort care.        VTE prophylaxis: None. On comfort care.

## 2019-01-15 NOTE — CARE PLAN
Problem: Safety  Goal: Will remain free from injury  Outcome: PROGRESSING AS EXPECTED  Bed in the lowest locked position. Call light within reach. Bed alarm in use. Hourly rounding in place.     Problem: Bowel/Gastric:  Goal: Normal bowel function is maintained or improved  Outcome: PROGRESSING AS EXPECTED      Problem: Mobility  Goal: Risk for activity intolerance will decrease  Outcome: PROGRESSING AS EXPECTED  Pt up standby assist with Four wheel walker

## 2019-01-15 NOTE — CARE PLAN
Problem: Safety  Goal: Will remain free from injury  Outcome: PROGRESSING AS EXPECTED  Bed alarm, and I ,one person sba for safety     Problem: Skin Integrity  Goal: Risk for impaired skin integrity will decrease  Outcome: PROGRESSING SLOWER THAN EXPECTED  Encouraged to turn and reposition,

## 2019-01-16 ENCOUNTER — PATIENT OUTREACH (OUTPATIENT)
Dept: HEALTH INFORMATION MANAGEMENT | Facility: OTHER | Age: 84
End: 2019-01-16

## 2019-01-16 PROCEDURE — 99232 SBSQ HOSP IP/OBS MODERATE 35: CPT | Performed by: HOSPITALIST

## 2019-01-16 PROCEDURE — 770001 HCHG ROOM/CARE - MED/SURG/GYN PRIV*

## 2019-01-16 ASSESSMENT — ENCOUNTER SYMPTOMS
INSOMNIA: 0
CONSTIPATION: 0
PND: 0
FEVER: 0
SENSORY CHANGE: 0
COUGH: 0
WHEEZING: 0
DIAPHORESIS: 0
NAUSEA: 0
MEMORY LOSS: 1
ORTHOPNEA: 0
PALPITATIONS: 0
SPEECH CHANGE: 0
DIARRHEA: 0
FOCAL WEAKNESS: 0
ABDOMINAL PAIN: 0
VOMITING: 0
WEAKNESS: 0
CHILLS: 0
SHORTNESS OF BREATH: 0

## 2019-01-16 ASSESSMENT — PAIN SCALES - GENERAL
PAINLEVEL_OUTOF10: 0
PAINLEVEL_OUTOF10: 0

## 2019-01-16 NOTE — DISCHARGE PLANNING
"LSW met with the Pt to discuss hospice as the doctor has asked [facility] liason to meet with the Pt yesterday. Pt stated she did meet with Ericka from [facility] but stated she did not like when she has Ama[facility]  as they didn't stay with her as long as she wanted. LSW explained that hospice provides long term help. Pt said she did not want to go with [facility] or [facility]. LSW asked the Pt if she wanted to go with another hospice agency and read off a list of five other hospice agencies in [facility]. Pt stated the plan with referring to hospice sounded \"wishy washy\" and wants to hear any other options as she wants to go home but knows she needs help. LSW brought up a private care giver and [facility] and Pt stated she will not and cannot pay for it. LSW asked the Pt if any family can assist. Pt said her family is of no help and does not want their help. LSW stated at this point in time in order for the Pt to discharge home safely she will need 24/7 care. Pt once again asked about hospice and how much each referral would cost. LSW stated it costs nothing and LSW asked if the Pt would be interested in this LSW sending a referral to a hospice agency to see if they would be able to accept the Pt on their services and for the Pt to meet with a hospice agency and gather more information. PT said she didn't know if she wants hospice and said she does not know what to do at this time.    "

## 2019-01-16 NOTE — THERAPY
Pt has transitioned to comfort care. SLP eval/tx not indicated at this time. RN referred to psych referral re capacity issues.

## 2019-01-16 NOTE — PROGRESS NOTES
Pharmacy Pharmacotherapy Consult for LOS >30 days    Admit Date: 11/13/2018      Medications were reviewed for appropriateness and ongoing need.     Current Facility-Administered Medications   Medication Dose Route Frequency Provider Last Rate Last Dose   • polyethylene glycol/lytes (MIRALAX) PACKET 1 Packet  1 Packet Oral DAILY Fam Tejeda M.D.   1 Packet at 01/04/19 1720    And   • senna-docusate (PERICOLACE or SENOKOT S) 8.6-50 MG per tablet 2 Tab  2 Tab Oral BID Fam Tejeda M.D.   2 Tab at 01/13/19 1729    And   • magnesium hydroxide (MILK OF MAGNESIA) suspension 30 mL  30 mL Oral QDAY PRN Fam Tejeda M.D.        And   • bisacodyl (DULCOLAX) suppository 10 mg  10 mg Rectal QDAY PRN Fam Tejeda M.D.   10 mg at 12/23/18 0427   • ipratropium-albuterol (DUONEB) nebulizer solution  3 mL Nebulization Q4H PRN (RT) Asem RON Finn M.D.   3 mL at 12/15/18 0151   • LORazepam (ATIVAN) tablet 0.5 mg  0.5 mg Oral Q4HRS PRN Asem RON Finn M.D.   0.5 mg at 12/15/18 0143   • morphine (ROXANOL) 20 MG/ML oral conc 5-10 mg  5-10 mg Oral Q HOUR PRN Asem RON Finn M.D.       • acetaminophen (TYLENOL) tablet 500 mg  500 mg Oral Q6HRS PRN Asem RON Finn M.D.   500 mg at 12/13/18 0237   • diphenhydrAMINE-ZnAcetate (BENADRYL ITCH) 1-0.1 % cream   Topical 4X/DAY PRN Asem RON Finn M.D.       • MD ALERT...adult comfort care   Other PRN Asem RON Finn M.D.       • atropine 1 % ophthalmic solution 2 Drop  2 Drop Sublingual Q4HRS PRN Asem RON Finn M.D.       • furosemide (LASIX) tablet 20 mg  20 mg Oral Q DAY Asem RON Finn M.D.   20 mg at 01/15/19 0649       Recommendations:  -Pt is comfort care. Low medication utilization; however, all medications serve a comfort care need.    Francis Carmen  2019 Doctor of Pharmacy Candidate

## 2019-01-16 NOTE — PROGRESS NOTES
Patient's sacrum pink and blanching.  Patient refusing mepilex and waffle overlay as well as Q2 turns despite education.  Turning encouraged, frequent rounding in place, toileting offered at each round.

## 2019-01-16 NOTE — PROGRESS NOTES
"Hospital Medicine Daily Progress Note    Date of Service  1/16/2019    Chief Complaint  Ground-level fall    Hospital Course   Ms. Hope is a 89-year-old female who presented on 11/13/2018 after having a ground-level fall.  Upon evaluation in the emergency department she was found to have an acute STEMI with a troponin level greater than 50.  Cardiology was consulted but the patient insisted that she not have any aggressive interventions including an angiogram and wanted to be treated medically only.  After a long discussion with the patient and her family, she was made comfort care.      Interval Problem Update  Feeling angry today, but can't tell me why. She thinks it's related to hospice and \"everything dependent on the [employer] dollar\".  Confused.  Currently eating breakfast in bed without difficulty.  VSS.  Continues to refuse mobility & turns.  Not imminent.    Consultants/Specialty  Cardiology  Pulmonary/Critical care (while in ICU)    Code Status  Comfort Care     Disposition  Trying for home with hospice, pending acceptance. Group home placement doesn't seem to be an option at this point due to lack of finances.  Cog eval pending.    Review of Systems  Review of Systems   Constitutional: Positive for malaise/fatigue. Negative for chills, diaphoresis and fever.   Respiratory: Negative for cough, shortness of breath and wheezing.    Cardiovascular: Negative for chest pain, palpitations, orthopnea, leg swelling and PND.   Gastrointestinal: Negative for abdominal pain, constipation, diarrhea, nausea and vomiting.   Genitourinary: Negative for dysuria, frequency and urgency.   Musculoskeletal:        Denies pain   Neurological: Negative for sensory change, speech change, focal weakness and weakness.   Psychiatric/Behavioral: Positive for memory loss. The patient does not have insomnia.    All other systems reviewed and are negative.     Physical Exam  Temp:  [36.3 °C (97.4 °F)-36.8 °C (98.2 °F)] 36.8 °C " (98.2 °F)  Pulse:  [70-76] 70  Resp:  [16] 16  BP: (117-135)/(62-74) 117/62  SpO2:  [90 %-95 %] 90 %    Physical Exam   Constitutional: She is oriented to person, place, and time. She appears well-developed and well-nourished. She is active and cooperative. She appears ill (chronically ill-appearing). No distress.   Thin, frail, elderly   HENT:   Head: Normocephalic and atraumatic.   Right Ear: External ear normal.   Left Ear: External ear normal.   Mouth/Throat: Oropharynx is clear and moist.   Eyes: Pupils are equal, round, and reactive to light. Right eye exhibits no discharge. Left eye exhibits no discharge. No scleral icterus.   Neck: Normal range of motion. Neck supple. No JVD present.   Cardiovascular: Normal rate, regular rhythm, normal heart sounds and intact distal pulses.  Exam reveals no gallop and no friction rub.    No murmur heard.  Pulmonary/Chest: Effort normal and breath sounds normal. No stridor. No respiratory distress. She has no wheezes. She has no rales.   Abdominal: Soft. She exhibits no distension. Bowel sounds are decreased. There is no tenderness. There is no rebound and no guarding.   Musculoskeletal: Normal range of motion. She exhibits no edema.   Neurological: She is alert and oriented to person, place, and time. GCS eye subscore is 4. GCS verbal subscore is 5. GCS motor subscore is 6.   Skin: Skin is warm and dry. No rash noted. She is not diaphoretic. No erythema. No pallor.   Psychiatric: Her speech is normal and behavior is normal. Judgment and thought content normal. Her affect is angry. Cognition and memory are normal.   Nursing note and vitals reviewed.    Fluids  No intake or output data in the 24 hours ending 01/16/19 1254     Laboratory    Imaging  DX-CHEST-PORTABLE (1 VIEW)   Final Result         1.  Hazy interstitial left pulmonary opacities suggests interstitial edema or infiltrate, similar to prior.   2.  Trace left pleural effusion   3.  Hyperexpansion of lungs favors  changes of COPD.      DX-CHEST-PORTABLE (1 VIEW)   Final Result         1.  Interstitial infiltrates or edema, stable.   2.  Atherosclerosis      DX-CHEST-PORTABLE (1 VIEW)   Final Result         1.  Interstitial infiltrates or edema.   2.  Atherosclerosis      DX-CHEST-PORTABLE (1 VIEW)   Final Result      Moderate interstitial edema or interstitial lung disease and small left pleural effusion similar to previous.      DX-CHEST-PORTABLE (1 VIEW)   Final Result      Stable interstitial edema and/or interstitial lung disease with probable small pleural fluid      DX-CHEST-PORTABLE (1 VIEW)   Final Result      Ill-defined infrahilar interstitial opacities, atelectasis versus mild edema. No significant pleural effusions.      DX-CHEST-PORTABLE (1 VIEW)   Final Result      Mild left basilar atelectasis, improved since prior. No new consolidation or large pleural effusions.      DX-CHEST-PORTABLE (1 VIEW)   Final Result      1.  Left basilar opacification may be secondary to atelectasis. Developing pneumonia cannot be excluded.         DX-CHEST-PORTABLE (1 VIEW)   Final Result      1.  Unchanged mild interstitial pulmonary edema   2.  Unchanged bibasilar atelectasis, alveolar edema or pneumonia      DX-CHEST-PORTABLE (1 VIEW)   Final Result      1.  Decreased pulmonary edema   2.  Unchanged bibasilar atelectasis, alveolar edema or pneumonia      DX-CHEST-PORTABLE (1 VIEW)   Final Result      1.  There is diffuse interstitial and alveolar density in the right mid and lower lung zone. This is increased since the previous study. This may represent mild pulmonary edema/consolidation.   2.  Mildly enlarged cardiomediastinal silhouette when compared with the previous chest x-ray.      DX-CHEST-PORTABLE (1 VIEW)   Final Result      Stable mild pulmonary edema.   New left basilar atelectasis.      DX-CHEST-PORTABLE (1 VIEW)   Final Result      Stable chest appearance compared with 11/16.      DX-CHEST-PORTABLE (1 VIEW)   Final  Result      No acute cardiopulmonary abnormality identified.      DX-CHEST-PORTABLE (1 VIEW)   Final Result      No acute cardiopulmonary abnormality. No interval change.      DX-CHEST-PORTABLE (1 VIEW)   Final Result      No acute cardiopulmonary disease.      CT-HEAD W/O   Final Result      1.  No evidence of acute intracranial process.      2.  There is again seen interstitial pulmonary edema and bibasilar atelectasis.      CT-HIP W/O PLUS RECONS LEFT   Final Result      1.  No evidence of acute fracture or malalignment. No evidence of hardware failure or complication.   2.  Postsurgical changes of the left femur with broken screw fragment redemonstrated.   3.  Demineralization.   4.  Scattered colonic diverticula.   5.  Atherosclerosis.   6.  Small fat-containing umbilical hernia.      EC-ECHOCARDIOGRAM COMPLETE W/O CONT   Final Result      DX-HIP-COMPLETE - UNILATERAL 2+ LEFT   Final Result      1.  No definite acute osseous abnormality. In setting of demineralization, an occult fracture is difficult to exclude. If there is strong clinical suspicion, CT or MRI could be obtained for further evaluation.   2.  Postsurgical changes of the left femur with broken screw fragment redemonstrated.      DX-CHEST-PORTABLE (1 VIEW)   Final Result      No acute cardiopulmonary process is seen.      Atherosclerotic plaque.         Assessment/Plan  * Fall- (present on admission)   Assessment & Plan    Chronic debility. Continues to refuse to turn or mobilize.  She remains on comfort care.       ST elevation myocardial infarction involving right coronary artery (HCC)- (present on admission)   Assessment & Plan    Continue comfort care.     Positive QuantiFERON-TB Gold test   Assessment & Plan      AFB negative        Chronic kidney disease (CKD), stage III (moderate) (HCC)- (present on admission)   Assessment & Plan    Will not trend or treat. On comfort care.     Elevated brain natriuretic peptide (BNP) level- (present on  admission)   Assessment & Plan    Continue lasix as needed for comfort.            Essential hypertension- (present on admission)   Assessment & Plan    Controlled, within normal limits.  Hold lasix for sbp < 100 .  Continue comfort care status.            Pain of left hip joint- (present on admission)   Assessment & Plan    Likely secondary to OA. Currently denies pain.  As needed pain meds available.     Advanced directives, counseling/discussion- (present on admission)   Assessment & Plan    Continue comfort care.        VTE prophylaxis: None. On comfort care.

## 2019-01-17 ENCOUNTER — PATIENT OUTREACH (OUTPATIENT)
Dept: HEALTH INFORMATION MANAGEMENT | Facility: OTHER | Age: 84
End: 2019-01-17

## 2019-01-17 PROCEDURE — 770001 HCHG ROOM/CARE - MED/SURG/GYN PRIV*

## 2019-01-17 PROCEDURE — 99231 SBSQ HOSP IP/OBS SF/LOW 25: CPT | Performed by: HOSPITALIST

## 2019-01-17 PROCEDURE — 99222 1ST HOSP IP/OBS MODERATE 55: CPT | Performed by: PSYCHIATRY & NEUROLOGY

## 2019-01-17 ASSESSMENT — ENCOUNTER SYMPTOMS
WHEEZING: 0
CHILLS: 0
ORTHOPNEA: 0
ABDOMINAL PAIN: 0
MEMORY LOSS: 1
NAUSEA: 0
SENSORY CHANGE: 0
FEVER: 0
COUGH: 0
WEAKNESS: 0
SHORTNESS OF BREATH: 0
CONSTIPATION: 0
DIARRHEA: 0
SPEECH CHANGE: 0
VOMITING: 0
INSOMNIA: 0
FOCAL WEAKNESS: 0
PALPITATIONS: 0
DIAPHORESIS: 0
PND: 0

## 2019-01-17 ASSESSMENT — PAIN SCALES - GENERAL
PAINLEVEL_OUTOF10: 0
PAINLEVEL_OUTOF10: 0

## 2019-01-17 NOTE — CARE PLAN
Problem: Skin Integrity  Goal: Risk for impaired skin integrity will decrease    Intervention: Implement precautions to protect skin integrity in collaboration with the interdisciplinary team  Patient refusing mepilex, waffle overlay and

## 2019-01-17 NOTE — DISCHARGE PLANNING
During rounding LSW notified by Dr Yi that the Pt was seen by Psychiatry stating that Pt is not capacitated to make decisions in regards to discharge planning and that the Pt dtr Wild will be making choice. LSW contacted Pt dtr Wild and dtr stated she wanted [facility]. LSW filled out choice form with Pt dtr choice of [facility].

## 2019-01-17 NOTE — DISCHARGE PLANNING
Agency/Facility Name: [facility]  Spoke To: Ericka  Outcome: Received referral, has been approved for hospice. Ericka has left a message with daughter needing to figure out a placement for patient unless the daughter wants to do private caregivers.

## 2019-01-17 NOTE — PROGRESS NOTES
[facility] Medicine Daily Progress Note    Date of Service  1/17/2019    Chief Complaint  Ground-level fall    Hospital Course   Ms. Hope is a 89-year-old female who presented on 11/13/2018 after having a ground-level fall.  Upon evaluation in the emergency department she was found to have an acute STEMI with a troponin level greater than 50.  Cardiology was consulted but the patient insisted that she not have any aggressive interventions including an angiogram and wanted to be treated medically only.  After a long discussion with the patient and her family, she was made comfort care.      Interval Problem Update  Deemed incapacitated by psych today.  Not imminent.  No vital signs done yet today.  Dr. Yi spoke with the patient's daughter (POA) regarding decision making. She is good with home with hospice when available.    Consultants/Specialty  Cardiology  Pulmonary/Critical care (while in ICU)  Psychiatry    Code Status  Comfort Care     Disposition  Hopeful for home with hospice tomorrow. Has been accepted by [facility].    Review of Systems  Review of Systems   Constitutional: Positive for malaise/fatigue. Negative for chills, diaphoresis and fever.   Respiratory: Negative for cough, shortness of breath and wheezing.    Cardiovascular: Negative for chest pain, palpitations, orthopnea, leg swelling and PND.   Gastrointestinal: Negative for abdominal pain, constipation, diarrhea, nausea and vomiting.   Genitourinary: Negative for dysuria, frequency and urgency.   Musculoskeletal:        Denies pain   Neurological: Negative for sensory change, speech change, focal weakness and weakness.   Psychiatric/Behavioral: Positive for memory loss. The patient does not have insomnia.    All other systems reviewed and are negative.     Physical Exam  Physical Exam   Constitutional: She is oriented to person, place, and time. She appears well-developed and well-nourished. She is active and cooperative. She appears ill  (chronically ill-appearing). No distress.   Thin, frail, elderly   HENT:   Head: Normocephalic and atraumatic.   Right Ear: External ear normal.   Left Ear: External ear normal.   Mouth/Throat: Oropharynx is clear and moist.   Eyes: Pupils are equal, round, and reactive to light. Conjunctivae are normal. Right eye exhibits no discharge. Left eye exhibits no discharge. No scleral icterus.   Neck: Normal range of motion. Neck supple. No JVD present.   Cardiovascular: Normal rate, regular rhythm, normal heart sounds and intact distal pulses.  Exam reveals no gallop and no friction rub.    No murmur heard.  Pulmonary/Chest: Effort normal. No stridor. No respiratory distress. She has decreased breath sounds in the right lower field and the left lower field. She has no wheezes. She has no rhonchi. She has no rales.   Abdominal: Soft. She exhibits no distension. Bowel sounds are decreased. There is no tenderness. There is no rebound and no guarding.   Musculoskeletal: Normal range of motion. She exhibits no edema.   Neurological: She is alert and oriented to person, place, and time. GCS eye subscore is 4. GCS verbal subscore is 5. GCS motor subscore is 6.   Skin: Skin is warm and dry. No rash noted. She is not diaphoretic. No erythema. No pallor.   Psychiatric: She has a normal mood and affect. Her speech is normal and behavior is normal. Judgment and thought content normal. Cognition and memory are normal.   Nursing note and vitals reviewed.    Fluids  No intake or output data in the 24 hours ending 01/17/19 7217     Laboratory    Imaging  DX-CHEST-PORTABLE (1 VIEW)   Final Result         1.  Hazy interstitial left pulmonary opacities suggests interstitial edema or infiltrate, similar to prior.   2.  Trace left pleural effusion   3.  Hyperexpansion of lungs favors changes of COPD.      DX-CHEST-PORTABLE (1 VIEW)   Final Result         1.  Interstitial infiltrates or edema, stable.   2.  Atherosclerosis       DX-CHEST-PORTABLE (1 VIEW)   Final Result         1.  Interstitial infiltrates or edema.   2.  Atherosclerosis      DX-CHEST-PORTABLE (1 VIEW)   Final Result      Moderate interstitial edema or interstitial lung disease and small left pleural effusion similar to previous.      DX-CHEST-PORTABLE (1 VIEW)   Final Result      Stable interstitial edema and/or interstitial lung disease with probable small pleural fluid      DX-CHEST-PORTABLE (1 VIEW)   Final Result      Ill-defined infrahilar interstitial opacities, atelectasis versus mild edema. No significant pleural effusions.      DX-CHEST-PORTABLE (1 VIEW)   Final Result      Mild left basilar atelectasis, improved since prior. No new consolidation or large pleural effusions.      DX-CHEST-PORTABLE (1 VIEW)   Final Result      1.  Left basilar opacification may be secondary to atelectasis. Developing pneumonia cannot be excluded.         DX-CHEST-PORTABLE (1 VIEW)   Final Result      1.  Unchanged mild interstitial pulmonary edema   2.  Unchanged bibasilar atelectasis, alveolar edema or pneumonia      DX-CHEST-PORTABLE (1 VIEW)   Final Result      1.  Decreased pulmonary edema   2.  Unchanged bibasilar atelectasis, alveolar edema or pneumonia      DX-CHEST-PORTABLE (1 VIEW)   Final Result      1.  There is diffuse interstitial and alveolar density in the right mid and lower lung zone. This is increased since the previous study. This may represent mild pulmonary edema/consolidation.   2.  Mildly enlarged cardiomediastinal silhouette when compared with the previous chest x-ray.      DX-CHEST-PORTABLE (1 VIEW)   Final Result      Stable mild pulmonary edema.   New left basilar atelectasis.      DX-CHEST-PORTABLE (1 VIEW)   Final Result      Stable chest appearance compared with 11/16.      DX-CHEST-PORTABLE (1 VIEW)   Final Result      No acute cardiopulmonary abnormality identified.      DX-CHEST-PORTABLE (1 VIEW)   Final Result      No acute cardiopulmonary  abnormality. No interval change.      DX-CHEST-PORTABLE (1 VIEW)   Final Result      No acute cardiopulmonary disease.      CT-HEAD W/O   Final Result      1.  No evidence of acute intracranial process.      2.  There is again seen interstitial pulmonary edema and bibasilar atelectasis.      CT-HIP W/O PLUS RECONS LEFT   Final Result      1.  No evidence of acute fracture or malalignment. No evidence of hardware failure or complication.   2.  Postsurgical changes of the left femur with broken screw fragment redemonstrated.   3.  Demineralization.   4.  Scattered colonic diverticula.   5.  Atherosclerosis.   6.  Small fat-containing umbilical hernia.      EC-ECHOCARDIOGRAM COMPLETE W/O CONT   Final Result      DX-HIP-COMPLETE - UNILATERAL 2+ LEFT   Final Result      1.  No definite acute osseous abnormality. In setting of demineralization, an occult fracture is difficult to exclude. If there is strong clinical suspicion, CT or MRI could be obtained for further evaluation.   2.  Postsurgical changes of the left femur with broken screw fragment redemonstrated.      DX-CHEST-PORTABLE (1 VIEW)   Final Result      No acute cardiopulmonary process is seen.      Atherosclerotic plaque.         Assessment/Plan  * Fall- (present on admission)   Assessment & Plan    Chronic debility. Turns herself in bed, but not every 2 hours as recommended.  She remains on comfort care.       ST elevation myocardial infarction involving right coronary artery (HCC)- (present on admission)   Assessment & Plan    Continue comfort care.     Positive QuantiFERON-TB Gold test   Assessment & Plan      AFB negative        Chronic kidney disease (CKD), stage III (moderate) (HCC)- (present on admission)   Assessment & Plan    Will not trend or treat. On comfort care.     Elevated brain natriuretic peptide (BNP) level- (present on admission)   Assessment & Plan    Continue lasix as needed for comfort.            Essential hypertension- (present on  admission)   Assessment & Plan    Controlled, within normal limits.  Hold lasix for sbp < 100 .  Continue comfort care status.            Pain of left hip joint- (present on admission)   Assessment & Plan    Likely secondary to OA. Currently denies pain.  As needed pain meds available.     Advanced directives, counseling/discussion- (present on admission)   Assessment & Plan    Continue comfort care.        VTE prophylaxis: None. On comfort care.

## 2019-01-17 NOTE — DISCHARGE PLANNING
Received Choice form at [phone]  Agency/Facility Name: [facility]  Referral sent per Choice form @ [phone]

## 2019-01-17 NOTE — PSYCHIATRY
BRIEF PSYCHIATRIC CONSULT NOTE: patient seen, full note to follow.  -Legal Hold:not indicated  -Sitter:no     -Assessment:  Neurocognitive disorder unspecified    Pt IS NOT capacitated to make medical decisions relating to discharge planning. She doesn't understand the options that are being presented to her. She is unaware of her cognitive deficits. In addition, she seems to be fixated on the idea that [facility] or other similar organizations will only come once per week, even though 24/7 care is recommended. Recommend deferring to next of kin for discharge planning issues.     Thank you for the consult.     -Plan:signing off

## 2019-01-17 NOTE — PROGRESS NOTES
"Per chart review pt is still currently admitted at hospital and there remain concerns with pt's discharge plan. Outreach call to floor SW Geraldine ext [phone] to collaborate care. Per report pt refuses to consider liquidating her life insurance policies to get her assets under $2000 for Medicaid guidelines to qualify for in-home or group home services. Floor SW also reported pt is now refusing to allow staff to call/\"burden\" her daughter. SW reported [facility] visited with pt yesterday to discuss possibility of returning home on hospice but pt declined agency. Pt has been offered referrals to alternative hospice agencies but is declining at this time.     Plan:  · MSW will continue to monitor while pt is admitted at hospital and collaborate/assist care team as needed for continuity of care.   "

## 2019-01-17 NOTE — CARE PLAN
Problem: Safety  Goal: Will remain free from injury  Outcome: PROGRESSING AS EXPECTED  Call light with in reach, bed alarm in use, calls appropriately      Problem: Skin Integrity  Goal: Risk for impaired skin integrity will decrease  Outcome: PROGRESSING SLOWER THAN EXPECTED  Patient refuses to reposition every 2 hours. States that when she is comfortable she wants to stay in same position. Educated on skin breakdown risk and verbally acknowledged understanding.

## 2019-01-17 NOTE — PROGRESS NOTES
Assumed care of patient at 0700. Patient resting in bed refusing to get oob or reposition. Call light within reach.

## 2019-01-17 NOTE — THERAPY
Speech Therapy Contact Note  Patient has now transitioned to comfort care. RN to cancel order for clinical swallow evaluation. Please re-consult with change in status. Thank you.

## 2019-01-17 NOTE — THERAPY
Speech Therapy Contact Note  Patient has now transitioned to comfort care. Psych has deemed patient incapacitated to make medical decision. RN clarified order with MD - RN to cancel order for cognitive-linguistic evaluation. Thank you.

## 2019-01-18 LAB
MYCOBACTERIUM SPEC CULT: NORMAL
RHODAMINE-AURAMINE STN SPEC: NORMAL
SIGNIFICANT IND 70042: NORMAL
SITE SITE: NORMAL
SOURCE SOURCE: NORMAL

## 2019-01-18 PROCEDURE — A9270 NON-COVERED ITEM OR SERVICE: HCPCS | Performed by: HOSPITALIST

## 2019-01-18 PROCEDURE — 700102 HCHG RX REV CODE 250 W/ 637 OVERRIDE(OP): Performed by: HOSPITALIST

## 2019-01-18 PROCEDURE — 770001 HCHG ROOM/CARE - MED/SURG/GYN PRIV*

## 2019-01-18 PROCEDURE — 99231 SBSQ HOSP IP/OBS SF/LOW 25: CPT | Performed by: HOSPITALIST

## 2019-01-18 RX ADMIN — SENNOSIDES AND DOCUSATE SODIUM 2 TABLET: 8.6; 5 TABLET ORAL at 09:04

## 2019-01-18 ASSESSMENT — ENCOUNTER SYMPTOMS
COUGH: 0
NAUSEA: 0
CHILLS: 0
PALPITATIONS: 0
DIAPHORESIS: 0
FEVER: 0
WHEEZING: 0
DIARRHEA: 0
PND: 0
SENSORY CHANGE: 0
VOMITING: 0
MEMORY LOSS: 1
FOCAL WEAKNESS: 0
ORTHOPNEA: 0
WEAKNESS: 0
SHORTNESS OF BREATH: 0
ABDOMINAL PAIN: 0
INSOMNIA: 0
CONSTIPATION: 0
SPEECH CHANGE: 0

## 2019-01-18 ASSESSMENT — PAIN SCALES - GENERAL
PAINLEVEL_OUTOF10: 0
PAINLEVEL_OUTOF10: 0

## 2019-01-18 NOTE — PROGRESS NOTES
Report received. Assumed pt care at 0730. Pt resting no signs of acute distress noted. Bed alarm in place for safety. Fall precaution in place, call light within place. Hourly rounding place.

## 2019-01-18 NOTE — PROGRESS NOTES
[facility] Medicine Daily Progress Note    Date of Service  1/18/2019    Chief Complaint  Ground-level fall    Hospital Course   Ms. Hope is a 89-year-old female who presented on 11/13/2018 after having a ground-level fall.  Upon evaluation in the emergency department she was found to have an acute STEMI with a troponin level greater than 50.  Cardiology was consulted but the patient insisted that she not have any aggressive interventions including an angiogram and wanted to be treated medically only.  After a long discussion with the patient and her family, she was made comfort care.      Interval Problem Update  Appears happy today. Is getting ready to eat her grilled cheese sandwich.  Denies pain.  States she had no issues overnight.  Waiting on updated dispo plan (24/7 care vs hospice vs group home).  VSS. Not imminent.    Deemed incapacitated on 1/17/19 by psych.    Consultants/Specialty  Cardiology  Pulmonary/Critical care (while in ICU)  Psychiatry    Code Status  Comfort Care     Disposition  TBD as above.    Review of Systems  Review of Systems   Constitutional: Negative for chills, diaphoresis, fever and malaise/fatigue.   Respiratory: Negative for cough, shortness of breath and wheezing.    Cardiovascular: Negative for chest pain, palpitations, orthopnea, leg swelling and PND.   Gastrointestinal: Negative for abdominal pain, constipation, diarrhea, nausea and vomiting.   Genitourinary: Negative for dysuria, frequency and urgency.   Musculoskeletal:        Denies pain   Neurological: Negative for sensory change, speech change, focal weakness and weakness.   Psychiatric/Behavioral: Positive for memory loss. The patient does not have insomnia.    All other systems reviewed and are negative.     Physical Exam  Physical Exam   Constitutional: She is oriented to person, place, and time. She is active and cooperative. She appears ill (chronically ill-appearing). No distress.   Thin   HENT:   Head: Normocephalic  and atraumatic.   Right Ear: External ear normal.   Left Ear: External ear normal.   Mouth/Throat: Oropharynx is clear and moist.   Eyes: Pupils are equal, round, and reactive to light. Conjunctivae are normal. Right eye exhibits no discharge. Left eye exhibits no discharge. No scleral icterus.   Neck: Normal range of motion. Neck supple. No JVD present.   Cardiovascular: Normal rate, regular rhythm, normal heart sounds and intact distal pulses.  Exam reveals no gallop and no friction rub.    No murmur heard.  Pulmonary/Chest: Effort normal. No stridor. No respiratory distress. She has no decreased breath sounds. She has no wheezes. She has no rhonchi. She has no rales.   Abdominal: Soft. Bowel sounds are normal. She exhibits no distension. There is no tenderness. There is no rebound and no guarding.   Musculoskeletal: Normal range of motion. She exhibits no edema.   Neurological: She is alert and oriented to person, place, and time. GCS eye subscore is 4. GCS verbal subscore is 4. GCS motor subscore is 6.   Skin: Skin is warm and dry. No rash noted. She is not diaphoretic. No erythema. No pallor.   Psychiatric: She has a normal mood and affect. Her speech is normal and behavior is normal. Judgment and thought content normal. Cognition and memory are normal.   Nursing note and vitals reviewed.    Fluids    Intake/Output Summary (Last 24 hours) at 01/18/19 1429  Last data filed at 01/18/19 0800   Gross per 24 hour   Intake              100 ml   Output                0 ml   Net              100 ml      Laboratory    Imaging  DX-CHEST-PORTABLE (1 VIEW)   Final Result         1.  Hazy interstitial left pulmonary opacities suggests interstitial edema or infiltrate, similar to prior.   2.  Trace left pleural effusion   3.  Hyperexpansion of lungs favors changes of COPD.      DX-CHEST-PORTABLE (1 VIEW)   Final Result         1.  Interstitial infiltrates or edema, stable.   2.  Atherosclerosis      DX-CHEST-PORTABLE (1  VIEW)   Final Result         1.  Interstitial infiltrates or edema.   2.  Atherosclerosis      DX-CHEST-PORTABLE (1 VIEW)   Final Result      Moderate interstitial edema or interstitial lung disease and small left pleural effusion similar to previous.      DX-CHEST-PORTABLE (1 VIEW)   Final Result      Stable interstitial edema and/or interstitial lung disease with probable small pleural fluid      DX-CHEST-PORTABLE (1 VIEW)   Final Result      Ill-defined infrahilar interstitial opacities, atelectasis versus mild edema. No significant pleural effusions.      DX-CHEST-PORTABLE (1 VIEW)   Final Result      Mild left basilar atelectasis, improved since prior. No new consolidation or large pleural effusions.      DX-CHEST-PORTABLE (1 VIEW)   Final Result      1.  Left basilar opacification may be secondary to atelectasis. Developing pneumonia cannot be excluded.         DX-CHEST-PORTABLE (1 VIEW)   Final Result      1.  Unchanged mild interstitial pulmonary edema   2.  Unchanged bibasilar atelectasis, alveolar edema or pneumonia      DX-CHEST-PORTABLE (1 VIEW)   Final Result      1.  Decreased pulmonary edema   2.  Unchanged bibasilar atelectasis, alveolar edema or pneumonia      DX-CHEST-PORTABLE (1 VIEW)   Final Result      1.  There is diffuse interstitial and alveolar density in the right mid and lower lung zone. This is increased since the previous study. This may represent mild pulmonary edema/consolidation.   2.  Mildly enlarged cardiomediastinal silhouette when compared with the previous chest x-ray.      DX-CHEST-PORTABLE (1 VIEW)   Final Result      Stable mild pulmonary edema.   New left basilar atelectasis.      DX-CHEST-PORTABLE (1 VIEW)   Final Result      Stable chest appearance compared with 11/16.      DX-CHEST-PORTABLE (1 VIEW)   Final Result      No acute cardiopulmonary abnormality identified.      DX-CHEST-PORTABLE (1 VIEW)   Final Result      No acute cardiopulmonary abnormality. No interval  change.      DX-CHEST-PORTABLE (1 VIEW)   Final Result      No acute cardiopulmonary disease.      CT-HEAD W/O   Final Result      1.  No evidence of acute intracranial process.      2.  There is again seen interstitial pulmonary edema and bibasilar atelectasis.      CT-HIP W/O PLUS RECONS LEFT   Final Result      1.  No evidence of acute fracture or malalignment. No evidence of hardware failure or complication.   2.  Postsurgical changes of the left femur with broken screw fragment redemonstrated.   3.  Demineralization.   4.  Scattered colonic diverticula.   5.  Atherosclerosis.   6.  Small fat-containing umbilical hernia.      EC-ECHOCARDIOGRAM COMPLETE W/O CONT   Final Result      DX-HIP-COMPLETE - UNILATERAL 2+ LEFT   Final Result      1.  No definite acute osseous abnormality. In setting of demineralization, an occult fracture is difficult to exclude. If there is strong clinical suspicion, CT or MRI could be obtained for further evaluation.   2.  Postsurgical changes of the left femur with broken screw fragment redemonstrated.      DX-CHEST-PORTABLE (1 VIEW)   Final Result      No acute cardiopulmonary process is seen.      Atherosclerotic plaque.         Assessment/Plan  * Fall- (present on admission)   Assessment & Plan    Chronic debility. Refusing turns & mobilization.  She remains on comfort care.       ST elevation myocardial infarction involving right coronary artery (HCC)- (present on admission)   Assessment & Plan    Continue comfort care.     Positive QuantiFERON-TB Gold test   Assessment & Plan      AFB negative        Chronic kidney disease (CKD), stage III (moderate) (HCC)- (present on admission)   Assessment & Plan    Will not trend or treat. On comfort care.     Elevated brain natriuretic peptide (BNP) level- (present on admission)   Assessment & Plan    Continue lasix as needed for comfort.            Essential hypertension- (present on admission)   Assessment & Plan    Controlled, within  normal limits.  Hold lasix for sbp < 100 .  Continue comfort care status.            Pain of left hip joint- (present on admission)   Assessment & Plan    Likely secondary to OA. Currently denies pain.  As needed pain meds available.     Advanced directives, counseling/discussion- (present on admission)   Assessment & Plan    Continue comfort care.        VTE prophylaxis: None. On comfort care.

## 2019-01-18 NOTE — CARE PLAN
Problem: Safety  Goal: Will remain free from injury  Outcome: PROGRESSING AS EXPECTED  Bed in the lowest locked position. Call light within reach. Bed alarm in use. Hourly rounding in place.     Problem: Skin Integrity  Goal: Risk for impaired skin integrity will decrease  Outcome: PROGRESSING SLOWER THAN EXPECTED  Complete assessment done. No breakdown noted. Skin fragile refusing mepliex sacrum redness noted. Blanching pt encourage turns refusing waffle overlay despite education     Problem: Mobility  Goal: Risk for activity intolerance will decrease  Outcome: PROGRESSING AS EXPECTED  Pt up to bathroom one assist.     Problem: Urinary Elimination:  Goal: Ability to reestablish a normal urinary elimination pattern will improve  Outcome: PROGRESSING AS EXPECTED

## 2019-01-18 NOTE — CARE PLAN
Problem: Safety  Goal: Will remain free from falls  Outcome: PROGRESSING AS EXPECTED    Intervention: Implement fall precautions  Bed in low position, wheels locked, call light within reach, hourly rounding in place.      Problem: Medication  Goal: Compliance with prescribed medication will improve  Outcome: PROGRESSING AS EXPECTED  Patient denies having any pain and declines pain medication.

## 2019-01-18 NOTE — PSYCHIATRY
"PSYCHIATRIC CONSULTATION:  Reason for admission:   Fall, MI  Reason for consult: capacity   Requesting Physician: Ava     Legal status:  No hold     Chief Complaint:\"I have been worried\"     HPI:   Nora Hope is a 89 y.o. year old female with a PMH of HTN, presented with ground level fall, found to have a  who presented to [facility] complaining of a ground level fall, hip pain, found to have a myocardial infarction. She has a history of cognitive decline. She has opted to be DNR, she has opted to have no interventions and is on comfort care. However, she is confused about her options for leaving the hospital.. She declines a group home or a SNF due to cost. She likes being in the hospital because she feels safe here, and taken care of. She is afraid to go home by herself, which makes sense as she is currently unable to take care of herself. She has been recommended to have 24/7 care. She would like to have 24/7 care per her report. She is unable to understand how hospice works. She thinks they will come to her house only once per week. She does not believe staff when they tell her she will have 24/7 care at home through [facility]. She is unable to problem solve, unable to participate in reasoning. She is unable to tell me what her options are.     Review of Systems:  Psychiatric:  Depression:      Denies depression, energy ok, no anhedonia  Sharmin:No signs or symptoms   Anxiety/Panic Attacks she is quite anxious about leaving.   PTSD symptom: No signs or symptoms   Psychosis: No signs or symptoms   Other:  Neurologic:   Cognitively impaired, +memory loss    Eyes:  No blurry vision   Skin:  No rash. Some bruising   Musculoskeletal:  Some pain   CV:  No cp   Lungs:  No sob   GI:  No diarrhea no constipation   :  No dysuria   Endocrine:    All other systems reviewed and are negative.        Psychiatric Examination: observed phenomenon:  Vitals: BP (!) 99/68 Comment: rn notified  Pulse 92   Temp 36.4 °C (97.6 °F) " "(Temporal)   Resp 16   Ht 1.727 m (5' 8\")   Wt 67.9 kg (149 lb 11.1 oz)   LMP 01/01/1988   SpO2 95%   Breastfeeding? No   BMI 22.76 kg/m²  Body mass index is 22.76 kg/m².  Musculoskeletal: mild psychomotor retardation   Appearance:Grooming wnl   Thought Process: mildly confused   Thought Content: No a/vh, no evidence of delusions, no ideas of reference, no internal stimulation noted   Speech:Nl tone, rate, and volume. Not pressured. Understandable.   Mood:          \"okay\"   Affect:         Anxious   SI/HI:   Denies   Attention/Alertness:   Awake,alert. Attention impaired   Memory:    Impaired   Cognition:  Impaired   Insight/Judgement into symptoms:   Neurological Testing:          Past Psychiatric Hx:   Denies   Family Psychiatric Hx:  Denies     Social Hx:  Social History     Social History   • Marital status: Single     Spouse name: N/A   • Number of children: N/A   • Years of education: N/A     Occupational History   • Not on file.     Social History Main Topics   • Smoking status: Never Smoker   • Smokeless tobacco: Never Used   • Alcohol use No   • Drug use: No   • Sexual activity: Not Currently     Other Topics Concern   • Not on file     Social History Narrative   • No narrative on file       Drug/Alcohol/Tobacco Hx:   Drugs:denies    Alcohol:denies   Medical Hx: labs, MARS, medications, etc were reviewed. Only those findings of potential interest to psychiatry are noted below:  ]  Past Medical History:   Diagnosis Date   • Arthritis    • Cancer (HCC) 2009    eye   • Essential hypertension 10/26/2017   • Glaucoma      Past Surgical History:   Procedure Laterality Date   • EYE SURGERY      eye cancer 2009   • OTHER ORTHOPEDIC SURGERY     • SINUS TREPHINE FRONTAL      windows       Allergies:   Allergies   Allergen Reactions   • Allopurinol Rash     Hot, red face   • Aspirin      Cannot take due to medications   • Codeine Itching and Nausea   • Latex Rash     To tape/bandages containing latex   • Tape " Rash     adhessive in the tape, causes, swollen rash           Medications:  Current Facility-Administered Medications   Medication Dose Route Frequency Provider Last Rate Last Dose   • polyethylene glycol/lytes (MIRALAX) PACKET 1 Packet  1 Packet Oral DAILY Martinez Yi M.D.   1 Packet at 01/04/19 1720    And   • senna-docusate (PERICOLACE or SENOKOT S) 8.6-50 MG per tablet 2 Tab  2 Tab Oral BID Martinez Yi M.D.   2 Tab at 01/18/19 0904    And   • magnesium hydroxide (MILK OF MAGNESIA) suspension 30 mL  30 mL Oral QDAY PRN Martinez Yi M.D.        And   • bisacodyl (DULCOLAX) suppository 10 mg  10 mg Rectal QDAY PRN Martinez Yi M.D.   10 mg at 12/23/18 0427   • ipratropium-albuterol (DUONEB) nebulizer solution  3 mL Nebulization Q4H PRN (RT) Asem RON Finn M.D.   3 mL at 12/15/18 0151   • LORazepam (ATIVAN) tablet 0.5 mg  0.5 mg Oral Q4HRS PRN Asem RON Finn M.D.   0.5 mg at 12/15/18 0143   • morphine (ROXANOL) 20 MG/ML oral conc 5-10 mg  5-10 mg Oral Q HOUR PRN Asem RON Finn M.D.       • acetaminophen (TYLENOL) tablet 500 mg  500 mg Oral Q6HRS PRN Asem RON Finn M.D.   500 mg at 12/13/18 0237   • diphenhydrAMINE-ZnAcetate (BENADRYL ITCH) 1-0.1 % cream   Topical 4X/DAY PRN Asem RON Finn M.D.       • MD ALERT...adult comfort care   Other PRN Asem RON Finn M.D.       • atropine 1 % ophthalmic solution 2 Drop  2 Drop Sublingual Q4HRS PRN Zoranm RON Finn M.D.       • furosemide (LASIX) tablet 20 mg  20 mg Oral Q DAY Asem RON Finn M.D.   Stopped at 01/18/19 0857       Labs/ Testing:  No results found for this or any previous visit (from the past 48 hour(s)).    DX-CHEST-PORTABLE (1 VIEW)   Final Result         1.  Hazy interstitial left pulmonary opacities suggests interstitial edema or infiltrate, similar to prior.   2.  Trace left pleural effusion   3.  Hyperexpansion of lungs favors changes of COPD.      DX-CHEST-PORTABLE (1 VIEW)   Final Result         1.  Interstitial infiltrates or  edema, stable.   2.  Atherosclerosis      DX-CHEST-PORTABLE (1 VIEW)   Final Result         1.  Interstitial infiltrates or edema.   2.  Atherosclerosis      DX-CHEST-PORTABLE (1 VIEW)   Final Result      Moderate interstitial edema or interstitial lung disease and small left pleural effusion similar to previous.      DX-CHEST-PORTABLE (1 VIEW)   Final Result      Stable interstitial edema and/or interstitial lung disease with probable small pleural fluid      DX-CHEST-PORTABLE (1 VIEW)   Final Result      Ill-defined infrahilar interstitial opacities, atelectasis versus mild edema. No significant pleural effusions.      DX-CHEST-PORTABLE (1 VIEW)   Final Result      Mild left basilar atelectasis, improved since prior. No new consolidation or large pleural effusions.      DX-CHEST-PORTABLE (1 VIEW)   Final Result      1.  Left basilar opacification may be secondary to atelectasis. Developing pneumonia cannot be excluded.         DX-CHEST-PORTABLE (1 VIEW)   Final Result      1.  Unchanged mild interstitial pulmonary edema   2.  Unchanged bibasilar atelectasis, alveolar edema or pneumonia      DX-CHEST-PORTABLE (1 VIEW)   Final Result      1.  Decreased pulmonary edema   2.  Unchanged bibasilar atelectasis, alveolar edema or pneumonia      DX-CHEST-PORTABLE (1 VIEW)   Final Result      1.  There is diffuse interstitial and alveolar density in the right mid and lower lung zone. This is increased since the previous study. This may represent mild pulmonary edema/consolidation.   2.  Mildly enlarged cardiomediastinal silhouette when compared with the previous chest x-ray.      DX-CHEST-PORTABLE (1 VIEW)   Final Result      Stable mild pulmonary edema.   New left basilar atelectasis.      DX-CHEST-PORTABLE (1 VIEW)   Final Result      Stable chest appearance compared with 11/16.      DX-CHEST-PORTABLE (1 VIEW)   Final Result      No acute cardiopulmonary abnormality identified.      DX-CHEST-PORTABLE (1 VIEW)   Final  Result      No acute cardiopulmonary abnormality. No interval change.      DX-CHEST-PORTABLE (1 VIEW)   Final Result      No acute cardiopulmonary disease.      CT-HEAD W/O   Final Result      1.  No evidence of acute intracranial process.      2.  There is again seen interstitial pulmonary edema and bibasilar atelectasis.      CT-HIP W/O PLUS RECONS LEFT   Final Result      1.  No evidence of acute fracture or malalignment. No evidence of hardware failure or complication.   2.  Postsurgical changes of the left femur with broken screw fragment redemonstrated.   3.  Demineralization.   4.  Scattered colonic diverticula.   5.  Atherosclerosis.   6.  Small fat-containing umbilical hernia.      EC-ECHOCARDIOGRAM COMPLETE W/O CONT   Final Result      DX-HIP-COMPLETE - UNILATERAL 2+ LEFT   Final Result      1.  No definite acute osseous abnormality. In setting of demineralization, an occult fracture is difficult to exclude. If there is strong clinical suspicion, CT or MRI could be obtained for further evaluation.   2.  Postsurgical changes of the left femur with broken screw fragment redemonstrated.      DX-CHEST-PORTABLE (1 VIEW)   Final Result      No acute cardiopulmonary process is seen.      Atherosclerotic plaque.              ASSESSMENT:   Neurocognitive disorder unspecified     Pt IS NOT capacitated to make medical decisions relating to discharge planning. She doesn't understand the options that are being presented to her. She is unaware of her cognitive deficits. In addition, she seems to be fixated on the idea that [facility] or other similar organizations will only come once per week, even though 24/7 care is recommended. Recommend deferring to next of kin for discharge planning issues.     PLAN:  Legal status: no hold required.     No medication required.   Signing off   Thank you for the consult.

## 2019-01-19 LAB
MYCOBACTERIUM SPEC CULT: NORMAL
MYCOBACTERIUM SPEC CULT: NORMAL
RHODAMINE-AURAMINE STN SPEC: NORMAL
RHODAMINE-AURAMINE STN SPEC: NORMAL
SIGNIFICANT IND 70042: NORMAL
SIGNIFICANT IND 70042: NORMAL
SITE SITE: NORMAL
SITE SITE: NORMAL
SOURCE SOURCE: NORMAL
SOURCE SOURCE: NORMAL

## 2019-01-19 PROCEDURE — 770001 HCHG ROOM/CARE - MED/SURG/GYN PRIV*

## 2019-01-19 PROCEDURE — 99231 SBSQ HOSP IP/OBS SF/LOW 25: CPT | Performed by: HOSPITALIST

## 2019-01-19 ASSESSMENT — ENCOUNTER SYMPTOMS
COUGH: 0
ABDOMINAL PAIN: 0
FEVER: 0
DIZZINESS: 0
DEPRESSION: 0
PALPITATIONS: 0
WEAKNESS: 0
NAUSEA: 0
VOMITING: 0
FOCAL WEAKNESS: 0
WHEEZING: 0
DIAPHORESIS: 0
MEMORY LOSS: 1
INSOMNIA: 0
NERVOUS/ANXIOUS: 0
SPEECH CHANGE: 0
SHORTNESS OF BREATH: 0
CHILLS: 0

## 2019-01-19 ASSESSMENT — PAIN SCALES - GENERAL
PAINLEVEL_OUTOF10: 0
PAINLEVEL_OUTOF10: 0

## 2019-01-19 NOTE — PROGRESS NOTES
"Hospital Medicine Daily Progress Note    Date of Service  1/19/2019    Chief Complaint  Ground-level fall    Hospital Course   Ms. Hope is a 89-year-old female who presented on 11/13/2018 after having a ground-level fall.  Upon evaluation in the emergency department she was found to have an acute STEMI with a troponin level greater than 50.  Cardiology was consulted but the patient insisted that she not have any aggressive interventions including an angiogram and wanted to be treated medically only.  After a long discussion with the patient and her family, she was made comfort care.      Interval Problem Update  Labile mood today. Denies pain. When asked if she had any problems overnight she said \"I don't remember\".  Currently eating breakfast. No distress.  VSS.  Not imminent.    Deemed incapacitated on 1/17/19 by psych.    Consultants/Specialty  Cardiology  Pulmonary/Critical care (while in ICU)  Psychiatry    Code Status  Comfort Care     Disposition  Hospice & daughter working on group home placement, hoping to finalize something early next week.    Review of Systems  Review of Systems   Constitutional: Negative for chills, diaphoresis, fever and malaise/fatigue.   Respiratory: Negative for cough, shortness of breath and wheezing.    Cardiovascular: Negative for chest pain and palpitations.   Gastrointestinal: Negative for abdominal pain, nausea and vomiting.   Genitourinary: Negative for dysuria, frequency and urgency.   Musculoskeletal:        Denies pain   Neurological: Negative for dizziness, speech change, focal weakness and weakness.   Psychiatric/Behavioral: Positive for memory loss. Negative for depression. The patient is not nervous/anxious and does not have insomnia.    All other systems reviewed and are negative.     Physical Exam  Physical Exam   Constitutional: She is active and cooperative. She does not appear ill. No distress.   Thin   HENT:   Head: Normocephalic and atraumatic.   Right Ear: " External ear normal.   Left Ear: External ear normal.   Mouth/Throat: Oropharynx is clear and moist.   Eyes: Pupils are equal, round, and reactive to light. Right eye exhibits no discharge. Left eye exhibits no discharge. No scleral icterus.   Neck: Normal range of motion. Neck supple. No JVD present.   Cardiovascular: Normal rate, regular rhythm, normal heart sounds and intact distal pulses.  Exam reveals no gallop and no friction rub.    No murmur heard.  Pulmonary/Chest: Effort normal. No accessory muscle usage or stridor. No tachypnea. No respiratory distress. She has decreased breath sounds in the right lower field and the left lower field. She has no wheezes. She has no rhonchi. She has no rales.   Abdominal: Soft. She exhibits no distension. Bowel sounds are decreased. There is no tenderness. There is no rebound and no guarding.   Musculoskeletal: Normal range of motion. She exhibits no edema.   Neurological: She is alert. GCS eye subscore is 4. GCS verbal subscore is 4. GCS motor subscore is 6.   Skin: Skin is warm and dry. No rash noted. She is not diaphoretic. No erythema. No pallor.   Psychiatric: Her speech is normal and behavior is normal. Judgment and thought content normal. Her affect is labile.   Nursing note and vitals reviewed.    Fluids    Intake/Output Summary (Last 24 hours) at 01/19/19 1114  Last data filed at 01/19/19 0850   Gross per 24 hour   Intake              360 ml   Output                0 ml   Net              360 ml      Laboratory    Imaging  DX-CHEST-PORTABLE (1 VIEW)   Final Result         1.  Hazy interstitial left pulmonary opacities suggests interstitial edema or infiltrate, similar to prior.   2.  Trace left pleural effusion   3.  Hyperexpansion of lungs favors changes of COPD.      DX-CHEST-PORTABLE (1 VIEW)   Final Result         1.  Interstitial infiltrates or edema, stable.   2.  Atherosclerosis      DX-CHEST-PORTABLE (1 VIEW)   Final Result         1.  Interstitial  infiltrates or edema.   2.  Atherosclerosis      DX-CHEST-PORTABLE (1 VIEW)   Final Result      Moderate interstitial edema or interstitial lung disease and small left pleural effusion similar to previous.      DX-CHEST-PORTABLE (1 VIEW)   Final Result      Stable interstitial edema and/or interstitial lung disease with probable small pleural fluid      DX-CHEST-PORTABLE (1 VIEW)   Final Result      Ill-defined infrahilar interstitial opacities, atelectasis versus mild edema. No significant pleural effusions.      DX-CHEST-PORTABLE (1 VIEW)   Final Result      Mild left basilar atelectasis, improved since prior. No new consolidation or large pleural effusions.      DX-CHEST-PORTABLE (1 VIEW)   Final Result      1.  Left basilar opacification may be secondary to atelectasis. Developing pneumonia cannot be excluded.         DX-CHEST-PORTABLE (1 VIEW)   Final Result      1.  Unchanged mild interstitial pulmonary edema   2.  Unchanged bibasilar atelectasis, alveolar edema or pneumonia      DX-CHEST-PORTABLE (1 VIEW)   Final Result      1.  Decreased pulmonary edema   2.  Unchanged bibasilar atelectasis, alveolar edema or pneumonia      DX-CHEST-PORTABLE (1 VIEW)   Final Result      1.  There is diffuse interstitial and alveolar density in the right mid and lower lung zone. This is increased since the previous study. This may represent mild pulmonary edema/consolidation.   2.  Mildly enlarged cardiomediastinal silhouette when compared with the previous chest x-ray.      DX-CHEST-PORTABLE (1 VIEW)   Final Result      Stable mild pulmonary edema.   New left basilar atelectasis.      DX-CHEST-PORTABLE (1 VIEW)   Final Result      Stable chest appearance compared with 11/16.      DX-CHEST-PORTABLE (1 VIEW)   Final Result      No acute cardiopulmonary abnormality identified.      DX-CHEST-PORTABLE (1 VIEW)   Final Result      No acute cardiopulmonary abnormality. No interval change.      DX-CHEST-PORTABLE (1 VIEW)   Final  Result      No acute cardiopulmonary disease.      CT-HEAD W/O   Final Result      1.  No evidence of acute intracranial process.      2.  There is again seen interstitial pulmonary edema and bibasilar atelectasis.      CT-HIP W/O PLUS RECONS LEFT   Final Result      1.  No evidence of acute fracture or malalignment. No evidence of hardware failure or complication.   2.  Postsurgical changes of the left femur with broken screw fragment redemonstrated.   3.  Demineralization.   4.  Scattered colonic diverticula.   5.  Atherosclerosis.   6.  Small fat-containing umbilical hernia.      EC-ECHOCARDIOGRAM COMPLETE W/O CONT   Final Result      DX-HIP-COMPLETE - UNILATERAL 2+ LEFT   Final Result      1.  No definite acute osseous abnormality. In setting of demineralization, an occult fracture is difficult to exclude. If there is strong clinical suspicion, CT or MRI could be obtained for further evaluation.   2.  Postsurgical changes of the left femur with broken screw fragment redemonstrated.      DX-CHEST-PORTABLE (1 VIEW)   Final Result      No acute cardiopulmonary process is seen.      Atherosclerotic plaque.         Assessment/Plan  * Fall- (present on admission)   Assessment & Plan    Chronic debility. Refusing turns & mobilization.  She remains on comfort care.       ST elevation myocardial infarction involving right coronary artery (HCC)- (present on admission)   Assessment & Plan    Continue comfort care.     Positive QuantiFERON-TB Gold test   Assessment & Plan      AFB negative        Chronic kidney disease (CKD), stage III (moderate) (HCC)- (present on admission)   Assessment & Plan    Will not trend or treat. On comfort care.     Elevated brain natriuretic peptide (BNP) level- (present on admission)   Assessment & Plan    Continue lasix as needed for comfort.            Essential hypertension- (present on admission)   Assessment & Plan    Controlled, within normal limits.  Hold lasix for sbp < 100 .  Continue  comfort care status.            Pain of left hip joint- (present on admission)   Assessment & Plan    Likely secondary to OA. Currently denies pain.  As needed pain meds available.     Advanced directives, counseling/discussion- (present on admission)   Assessment & Plan    Continue comfort care.        VTE prophylaxis: None. On comfort care.

## 2019-01-19 NOTE — CARE PLAN
Problem: Safety  Goal: Will remain free from falls  Outcome: PROGRESSING AS EXPECTED  Patient remains free from injury. Bed alarm in place. Patient uses call light appropriately.     Problem: Venous Thromboembolism (VTW)/Deep Vein Thrombosis (DVT) Prevention:  Goal: Patient will participate in Venous Thrombosis (VTE)/Deep Vein Thrombosis (DVT)Prevention Measures  Outcome: NOT MET  Patient refuses SCDs despite education. Patient is on comfort care.

## 2019-01-20 PROCEDURE — 770001 HCHG ROOM/CARE - MED/SURG/GYN PRIV*

## 2019-01-20 PROCEDURE — 99231 SBSQ HOSP IP/OBS SF/LOW 25: CPT | Performed by: HOSPITALIST

## 2019-01-20 ASSESSMENT — ENCOUNTER SYMPTOMS
CHILLS: 0
WEAKNESS: 0
NERVOUS/ANXIOUS: 0
FEVER: 0
SPEECH CHANGE: 0
DIAPHORESIS: 0
COUGH: 0
DIZZINESS: 0
FOCAL WEAKNESS: 0
ABDOMINAL PAIN: 0
SHORTNESS OF BREATH: 0
DEPRESSION: 0
INSOMNIA: 0
PALPITATIONS: 0
VOMITING: 0
WHEEZING: 0
MEMORY LOSS: 1
NAUSEA: 0

## 2019-01-20 ASSESSMENT — PAIN SCALES - GENERAL
PAINLEVEL_OUTOF10: 0
PAINLEVEL_OUTOF10: 0

## 2019-01-20 NOTE — PROGRESS NOTES
[facility] Medicine Daily Progress Note    Date of Service  1/20/2019    Chief Complaint  Ground-level fall    Hospital Course   Ms. Hope is a 89-year-old female who presented on 11/13/2018 after having a ground-level fall.  Upon evaluation in the emergency department she was found to have an acute STEMI with a troponin level greater than 50.  Cardiology was consulted but the patient insisted that she not have any aggressive interventions including an angiogram and wanted to be treated medically only.  After a long discussion with the patient and her family, she was made comfort care.      Interval Problem Update  Has no pain or complaints outside of not sleeping well overnight. Refusing sleeping medication.  States she doesn't need anything today.  VSS.  Not imminent.    Deemed incapacitated on 1/17/19 by psych.    Consultants/Specialty  Cardiology  Pulmonary/Critical care (while in ICU)  Psychiatry    Code Status  Comfort Care     Disposition  Hospice & daughter working on group home placement, hoping to finalize something early next week.    Review of Systems  Review of Systems   Constitutional: Negative for chills, diaphoresis, fever and malaise/fatigue.   Respiratory: Negative for cough, shortness of breath and wheezing.    Cardiovascular: Negative for chest pain and palpitations.   Gastrointestinal: Negative for abdominal pain, nausea and vomiting.   Genitourinary: Negative for dysuria, frequency and urgency.   Musculoskeletal:        Denies pain   Neurological: Negative for dizziness, speech change, focal weakness and weakness.   Psychiatric/Behavioral: Positive for memory loss. Negative for depression. The patient is not nervous/anxious and does not have insomnia.    All other systems reviewed and are negative.     Physical Exam  Physical Exam   Constitutional: She is active and cooperative. She does not appear ill. No distress.   Thin   HENT:   Head: Normocephalic and atraumatic.   Right Ear: External ear  normal.   Left Ear: External ear normal.   Mouth/Throat: Oropharynx is clear and moist.   Eyes: Pupils are equal, round, and reactive to light. EOM are normal.   Neck: Normal range of motion. Neck supple. No JVD present.   Cardiovascular: Normal rate, regular rhythm, normal heart sounds and intact distal pulses.  Exam reveals no gallop and no friction rub.    No murmur heard.  Pulmonary/Chest: Effort normal. No accessory muscle usage or stridor. No tachypnea. No respiratory distress. She has decreased breath sounds in the right lower field and the left lower field. She has no wheezes. She has no rhonchi. She has no rales.   Abdominal: Soft. Bowel sounds are normal. She exhibits no distension. There is no tenderness. There is no rebound and no guarding.   Musculoskeletal: Normal range of motion. She exhibits no edema.   Neurological: She is alert. She is disoriented. GCS eye subscore is 4. GCS verbal subscore is 4. GCS motor subscore is 6.   Skin: Skin is warm and dry. No rash noted. She is not diaphoretic. No erythema. No pallor.   Psychiatric: Her speech is normal and behavior is normal. Judgment and thought content normal. Her affect is labile. Cognition and memory are impaired. She exhibits abnormal recent memory and abnormal remote memory.   Nursing note and vitals reviewed.    Fluids    Intake/Output Summary (Last 24 hours) at 01/20/19 1123  Last data filed at 01/20/19 0850   Gross per 24 hour   Intake              600 ml   Output                0 ml   Net              600 ml      Laboratory    Imaging  DX-CHEST-PORTABLE (1 VIEW)   Final Result         1.  Hazy interstitial left pulmonary opacities suggests interstitial edema or infiltrate, similar to prior.   2.  Trace left pleural effusion   3.  Hyperexpansion of lungs favors changes of COPD.      DX-CHEST-PORTABLE (1 VIEW)   Final Result         1.  Interstitial infiltrates or edema, stable.   2.  Atherosclerosis      DX-CHEST-PORTABLE (1 VIEW)   Final  Result         1.  Interstitial infiltrates or edema.   2.  Atherosclerosis      DX-CHEST-PORTABLE (1 VIEW)   Final Result      Moderate interstitial edema or interstitial lung disease and small left pleural effusion similar to previous.      DX-CHEST-PORTABLE (1 VIEW)   Final Result      Stable interstitial edema and/or interstitial lung disease with probable small pleural fluid      DX-CHEST-PORTABLE (1 VIEW)   Final Result      Ill-defined infrahilar interstitial opacities, atelectasis versus mild edema. No significant pleural effusions.      DX-CHEST-PORTABLE (1 VIEW)   Final Result      Mild left basilar atelectasis, improved since prior. No new consolidation or large pleural effusions.      DX-CHEST-PORTABLE (1 VIEW)   Final Result      1.  Left basilar opacification may be secondary to atelectasis. Developing pneumonia cannot be excluded.         DX-CHEST-PORTABLE (1 VIEW)   Final Result      1.  Unchanged mild interstitial pulmonary edema   2.  Unchanged bibasilar atelectasis, alveolar edema or pneumonia      DX-CHEST-PORTABLE (1 VIEW)   Final Result      1.  Decreased pulmonary edema   2.  Unchanged bibasilar atelectasis, alveolar edema or pneumonia      DX-CHEST-PORTABLE (1 VIEW)   Final Result      1.  There is diffuse interstitial and alveolar density in the right mid and lower lung zone. This is increased since the previous study. This may represent mild pulmonary edema/consolidation.   2.  Mildly enlarged cardiomediastinal silhouette when compared with the previous chest x-ray.      DX-CHEST-PORTABLE (1 VIEW)   Final Result      Stable mild pulmonary edema.   New left basilar atelectasis.      DX-CHEST-PORTABLE (1 VIEW)   Final Result      Stable chest appearance compared with 11/16.      DX-CHEST-PORTABLE (1 VIEW)   Final Result      No acute cardiopulmonary abnormality identified.      DX-CHEST-PORTABLE (1 VIEW)   Final Result      No acute cardiopulmonary abnormality. No interval change.       DX-CHEST-PORTABLE (1 VIEW)   Final Result      No acute cardiopulmonary disease.      CT-HEAD W/O   Final Result      1.  No evidence of acute intracranial process.      2.  There is again seen interstitial pulmonary edema and bibasilar atelectasis.      CT-HIP W/O PLUS RECONS LEFT   Final Result      1.  No evidence of acute fracture or malalignment. No evidence of hardware failure or complication.   2.  Postsurgical changes of the left femur with broken screw fragment redemonstrated.   3.  Demineralization.   4.  Scattered colonic diverticula.   5.  Atherosclerosis.   6.  Small fat-containing umbilical hernia.      EC-ECHOCARDIOGRAM COMPLETE W/O CONT   Final Result      DX-HIP-COMPLETE - UNILATERAL 2+ LEFT   Final Result      1.  No definite acute osseous abnormality. In setting of demineralization, an occult fracture is difficult to exclude. If there is strong clinical suspicion, CT or MRI could be obtained for further evaluation.   2.  Postsurgical changes of the left femur with broken screw fragment redemonstrated.      DX-CHEST-PORTABLE (1 VIEW)   Final Result      No acute cardiopulmonary process is seen.      Atherosclerotic plaque.         Assessment/Plan  * Fall- (present on admission)   Assessment & Plan    Chronic debility. Refusing turns & mobilization.  She remains on comfort care.       ST elevation myocardial infarction involving right coronary artery (HCC)- (present on admission)   Assessment & Plan    Continue comfort care.     Positive QuantiFERON-TB Gold test   Assessment & Plan      AFB negative        Chronic kidney disease (CKD), stage III (moderate) (HCC)- (present on admission)   Assessment & Plan    Will not trend or treat. On comfort care.     Elevated brain natriuretic peptide (BNP) level- (present on admission)   Assessment & Plan    Continue lasix as needed for comfort.            Essential hypertension- (present on admission)   Assessment & Plan    Controlled, within normal  limits.  Hold lasix for sbp < 100 .  Continue comfort care status.            Pain of left hip joint- (present on admission)   Assessment & Plan    Likely secondary to OA. Currently denies pain.  As needed pain meds available.     Advanced directives, counseling/discussion- (present on admission)   Assessment & Plan    Continue comfort care.        VTE prophylaxis: None. On comfort care.

## 2019-01-20 NOTE — CARE PLAN
Problem: Skin Integrity  Goal: Risk for impaired skin integrity will decrease  Outcome: PROGRESSING AS EXPECTED  Q 2 turns, waffle cushion, pillows for positioning bony areas, Pt to be dry at all times.    Problem: Mobility  Goal: Risk for activity intolerance will decrease  Outcome: PROGRESSING AS EXPECTED  Up to the bathroom standby assist with a walker.

## 2019-01-20 NOTE — CARE PLAN
Problem: Safety  Goal: Will remain free from injury  Outcome: PROGRESSING AS EXPECTED  Treaded socks in place, bed in the lowest position, bed alarm on, call light and belongings within reach, pt call for assistance appropriately    Problem: Mobility  Goal: Risk for activity intolerance will decrease  Outcome: PROGRESSING AS EXPECTED  Pt. up to bathroom, standby assist with FWW.

## 2019-01-21 ENCOUNTER — PATIENT OUTREACH (OUTPATIENT)
Dept: HEALTH INFORMATION MANAGEMENT | Facility: OTHER | Age: 84
End: 2019-01-21

## 2019-01-21 PROCEDURE — 99231 SBSQ HOSP IP/OBS SF/LOW 25: CPT | Performed by: HOSPITALIST

## 2019-01-21 PROCEDURE — A9270 NON-COVERED ITEM OR SERVICE: HCPCS | Performed by: HOSPITALIST

## 2019-01-21 PROCEDURE — 700102 HCHG RX REV CODE 250 W/ 637 OVERRIDE(OP): Performed by: HOSPITALIST

## 2019-01-21 PROCEDURE — 770001 HCHG ROOM/CARE - MED/SURG/GYN PRIV*

## 2019-01-21 RX ADMIN — ACETAMINOPHEN 500 MG: 500 TABLET ORAL at 11:52

## 2019-01-21 ASSESSMENT — ENCOUNTER SYMPTOMS
SHORTNESS OF BREATH: 0
VOMITING: 0
MEMORY LOSS: 1
PALPITATIONS: 0
SPEECH CHANGE: 0
FEVER: 0
DIAPHORESIS: 0
NAUSEA: 0
DEPRESSION: 0
INSOMNIA: 0
WEAKNESS: 1
FOCAL WEAKNESS: 0
NERVOUS/ANXIOUS: 1
COUGH: 0
WHEEZING: 0
DIZZINESS: 0
CHILLS: 0
ABDOMINAL PAIN: 0

## 2019-01-21 ASSESSMENT — PAIN SCALES - GENERAL
PAINLEVEL_OUTOF10: 0
PAINLEVEL_OUTOF10: 0

## 2019-01-21 NOTE — PROGRESS NOTES
[facility] Medicine Daily Progress Note    Date of Service  1/21/2019    Chief Complaint  Ground-level fall    Hospital Course   Ms. Hope is a 89-year-old female who presented on 11/13/2018 after having a ground-level fall.  Upon evaluation in the emergency department she was found to have an acute STEMI with a troponin level greater than 50.  Cardiology was consulted but the patient insisted that she not have any aggressive interventions including an angiogram and wanted to be treated medically only.  After a long discussion with the patient and her family, she was made comfort care.      Interval Problem Update  Denies pain. Says she had a rough night because she heard someone knocking on the door & she tried to get out of bed to answer it but quickly realized that she wasn't stable to get out of bed & sat back down.  VSS.  Not imminent.    Deemed incapacitated on 1/17/19 by psych.    Consultants/Specialty  Cardiology  Pulmonary/Critical care (while in ICU)  Psychiatry    Code Status  Comfort Care     Disposition  Hospice & daughter working on group home placement.    Review of Systems  Review of Systems   Constitutional: Negative for chills, diaphoresis, fever and malaise/fatigue.   Respiratory: Negative for cough, shortness of breath and wheezing.    Cardiovascular: Negative for chest pain and palpitations.   Gastrointestinal: Negative for abdominal pain, nausea and vomiting.   Genitourinary: Negative for dysuria, frequency and urgency.   Musculoskeletal:        Denies pain   Neurological: Positive for weakness (generalized). Negative for dizziness, speech change and focal weakness.   Psychiatric/Behavioral: Positive for memory loss. Negative for depression. The patient is nervous/anxious (mildly anxious about group home placement, doesn't want her daughter to pay for it). The patient does not have insomnia.    All other systems reviewed and are negative.     Physical Exam  Physical Exam   Constitutional: She  "is active and cooperative. She does not appear ill. No distress.   Thin   HENT:   Head: Normocephalic and atraumatic.   Right Ear: External ear normal.   Left Ear: External ear normal.   Mouth/Throat: Oropharynx is clear and moist.   Eyes: Pupils are equal, round, and reactive to light. EOM are normal.   Neck: Normal range of motion. Neck supple. No JVD present.   Cardiovascular: Normal rate, regular rhythm, normal heart sounds and intact distal pulses.  Exam reveals no gallop and no friction rub.    No murmur heard.  Pulmonary/Chest: Effort normal. No accessory muscle usage or stridor. No tachypnea. No respiratory distress. She has no decreased breath sounds. She has no wheezes. She has no rhonchi. She has no rales.   Abdominal: Soft. She exhibits no distension. Bowel sounds are decreased. There is no tenderness. There is no rebound and no guarding.   Musculoskeletal: Normal range of motion. She exhibits no edema.   Neurological: She is alert. She is disoriented. GCS eye subscore is 4. GCS verbal subscore is 4. GCS motor subscore is 6.   Skin: Skin is warm and dry. No rash noted. She is not diaphoretic. No erythema. No pallor.   Psychiatric: She has a normal mood and affect. Her speech is normal and behavior is normal. Judgment and thought content normal. Cognition and memory are impaired. She exhibits abnormal recent memory and abnormal remote memory.   Gets upset when discussing her daughter, saying \"I'm not her responsibility\".   Nursing note and vitals reviewed.    Fluids    Intake/Output Summary (Last 24 hours) at 01/21/19 0914  Last data filed at 01/21/19 0835   Gross per 24 hour   Intake             1040 ml   Output                0 ml   Net             1040 ml      Laboratory    Imaging  DX-CHEST-PORTABLE (1 VIEW)   Final Result         1.  Hazy interstitial left pulmonary opacities suggests interstitial edema or infiltrate, similar to prior.   2.  Trace left pleural effusion   3.  Hyperexpansion of lungs " favors changes of COPD.      DX-CHEST-PORTABLE (1 VIEW)   Final Result         1.  Interstitial infiltrates or edema, stable.   2.  Atherosclerosis      DX-CHEST-PORTABLE (1 VIEW)   Final Result         1.  Interstitial infiltrates or edema.   2.  Atherosclerosis      DX-CHEST-PORTABLE (1 VIEW)   Final Result      Moderate interstitial edema or interstitial lung disease and small left pleural effusion similar to previous.      DX-CHEST-PORTABLE (1 VIEW)   Final Result      Stable interstitial edema and/or interstitial lung disease with probable small pleural fluid      DX-CHEST-PORTABLE (1 VIEW)   Final Result      Ill-defined infrahilar interstitial opacities, atelectasis versus mild edema. No significant pleural effusions.      DX-CHEST-PORTABLE (1 VIEW)   Final Result      Mild left basilar atelectasis, improved since prior. No new consolidation or large pleural effusions.      DX-CHEST-PORTABLE (1 VIEW)   Final Result      1.  Left basilar opacification may be secondary to atelectasis. Developing pneumonia cannot be excluded.         DX-CHEST-PORTABLE (1 VIEW)   Final Result      1.  Unchanged mild interstitial pulmonary edema   2.  Unchanged bibasilar atelectasis, alveolar edema or pneumonia      DX-CHEST-PORTABLE (1 VIEW)   Final Result      1.  Decreased pulmonary edema   2.  Unchanged bibasilar atelectasis, alveolar edema or pneumonia      DX-CHEST-PORTABLE (1 VIEW)   Final Result      1.  There is diffuse interstitial and alveolar density in the right mid and lower lung zone. This is increased since the previous study. This may represent mild pulmonary edema/consolidation.   2.  Mildly enlarged cardiomediastinal silhouette when compared with the previous chest x-ray.      DX-CHEST-PORTABLE (1 VIEW)   Final Result      Stable mild pulmonary edema.   New left basilar atelectasis.      DX-CHEST-PORTABLE (1 VIEW)   Final Result      Stable chest appearance compared with 11/16.      DX-CHEST-PORTABLE (1 VIEW)    Final Result      No acute cardiopulmonary abnormality identified.      DX-CHEST-PORTABLE (1 VIEW)   Final Result      No acute cardiopulmonary abnormality. No interval change.      DX-CHEST-PORTABLE (1 VIEW)   Final Result      No acute cardiopulmonary disease.      CT-HEAD W/O   Final Result      1.  No evidence of acute intracranial process.      2.  There is again seen interstitial pulmonary edema and bibasilar atelectasis.      CT-HIP W/O PLUS RECONS LEFT   Final Result      1.  No evidence of acute fracture or malalignment. No evidence of hardware failure or complication.   2.  Postsurgical changes of the left femur with broken screw fragment redemonstrated.   3.  Demineralization.   4.  Scattered colonic diverticula.   5.  Atherosclerosis.   6.  Small fat-containing umbilical hernia.      EC-ECHOCARDIOGRAM COMPLETE W/O CONT   Final Result      DX-HIP-COMPLETE - UNILATERAL 2+ LEFT   Final Result      1.  No definite acute osseous abnormality. In setting of demineralization, an occult fracture is difficult to exclude. If there is strong clinical suspicion, CT or MRI could be obtained for further evaluation.   2.  Postsurgical changes of the left femur with broken screw fragment redemonstrated.      DX-CHEST-PORTABLE (1 VIEW)   Final Result      No acute cardiopulmonary process is seen.      Atherosclerotic plaque.         Assessment/Plan  * Fall- (present on admission)   Assessment & Plan    Chronic debility. Refusing turns & mobilization.  She remains on comfort care.    Continue fall precautions.     ST elevation myocardial infarction involving right coronary artery (HCC)- (present on admission)   Assessment & Plan    Continue comfort care.     Positive QuantiFERON-TB Gold test   Assessment & Plan      AFB negative        Chronic kidney disease (CKD), stage III (moderate) (HCC)- (present on admission)   Assessment & Plan    Will not trend or treat. On comfort care.     Elevated brain natriuretic peptide  (BNP) level- (present on admission)   Assessment & Plan    Continue lasix as needed for comfort.            Essential hypertension- (present on admission)   Assessment & Plan    Controlled, within normal limits.  Hold lasix for sbp < 100 .  Continue comfort care status.            Pain of left hip joint- (present on admission)   Assessment & Plan    Likely secondary to OA. Currently denies pain.  As needed pain meds available.     Advanced directives, counseling/discussion- (present on admission)   Assessment & Plan    Continue comfort care.        VTE prophylaxis: None. On comfort care.

## 2019-01-21 NOTE — CARE PLAN
Problem: Safety  Goal: Will remain free from injury  Outcome: PROGRESSING AS EXPECTED  Hourly rounds done. Call light within reach. Needs attended on a timely manner. Treaded socks on when OOB. Educated on the importance of calling for assistance when attempting to be OOB.  BED ALARM ON.    Problem: Venous Thromboembolism (VTW)/Deep Vein Thrombosis (DVT) Prevention:  Goal: Patient will participate in Venous Thrombosis (VTE)/Deep Vein Thrombosis (DVT)Prevention Measures  Outcome: PROGRESSING AS EXPECTED  Encourage early mobilization. Refused SCDs    Problem: Mobility  Goal: Risk for activity intolerance will decrease  Outcome: PROGRESSING AS EXPECTED  Up SBA with a 4ww

## 2019-01-21 NOTE — PROGRESS NOTES
Per chart review the patient remains at [facility].  Plan: will monitor for discharge from the hospital.

## 2019-01-22 ENCOUNTER — PATIENT OUTREACH (OUTPATIENT)
Dept: HEALTH INFORMATION MANAGEMENT | Facility: OTHER | Age: 84
End: 2019-01-22

## 2019-01-22 PROCEDURE — A9270 NON-COVERED ITEM OR SERVICE: HCPCS | Performed by: HOSPITALIST

## 2019-01-22 PROCEDURE — 99231 SBSQ HOSP IP/OBS SF/LOW 25: CPT | Performed by: HOSPITALIST

## 2019-01-22 PROCEDURE — 700102 HCHG RX REV CODE 250 W/ 637 OVERRIDE(OP): Performed by: HOSPITALIST

## 2019-01-22 PROCEDURE — 770001 HCHG ROOM/CARE - MED/SURG/GYN PRIV*

## 2019-01-22 RX ADMIN — SENNOSIDES AND DOCUSATE SODIUM 2 TABLET: 8.6; 5 TABLET ORAL at 06:05

## 2019-01-22 RX ADMIN — FUROSEMIDE 20 MG: 20 TABLET ORAL at 06:05

## 2019-01-22 ASSESSMENT — ENCOUNTER SYMPTOMS
FOCAL WEAKNESS: 0
COUGH: 0
EYE PAIN: 0
WEAKNESS: 1
SHORTNESS OF BREATH: 0
NAUSEA: 0
BACK PAIN: 0
CHILLS: 0

## 2019-01-22 ASSESSMENT — PAIN SCALES - GENERAL
PAINLEVEL_OUTOF10: 0

## 2019-01-22 NOTE — CARE PLAN
Problem: Safety  Goal: Will remain free from injury  Outcome: PROGRESSING AS EXPECTED  Bed in the lowest locked position. Call light within reach. Bed alarm in use. Hourly rounding in place.     Problem: Bowel/Gastric:  Goal: Normal bowel function is maintained or improved  Outcome: PROGRESSING AS EXPECTED      Problem: Mobility  Goal: Risk for activity intolerance will decrease  Pt up standby assist with four wheel walker

## 2019-01-22 NOTE — PROGRESS NOTES
[facility] Medicine Daily Progress Note    Date of Service  1/22/2019    Chief Complaint  89 y.o. female admitted 11/13/2018 with fall.    Hospital Course    Patient is a pleasant 89 year old woman with history of HTN who presented following a fall and was found to have an acute ST elevation MI.  After discussion with her family, she was placed on comfort care.          Interval Problem Update  No significant change  Denies pain  No sob  Ros otherwise negative  Remains on comfort care    Consultants/Specialty  Cardiology  Critical Care    Code Status  DNR/Comfort care    Disposition   vs home with hospice       Review of Systems  Review of Systems   Constitutional: Negative for chills.   HENT: Negative for hearing loss.    Eyes: Negative for pain.   Respiratory: Negative for cough and shortness of breath.    Cardiovascular: Negative for chest pain and leg swelling.   Gastrointestinal: Negative for nausea.   Genitourinary: Negative for dysuria.   Musculoskeletal: Negative for back pain.   Neurological: Positive for weakness. Negative for focal weakness.        Physical Exam  Temp:  [36.1 °C (97 °F)-36.6 °C (97.9 °F)] 36.6 °C (97.9 °F)  Pulse:  [65-71] 71  Resp:  [16-17] 16  BP: (102-114)/(54-61) 102/54  SpO2:  [94 %-96 %] 94 %    Physical Exam   Constitutional: She is oriented to person, place, and time.   HENT:   Head: Normocephalic and atraumatic.   Nose: Nose normal.   Mouth/Throat: Oropharynx is clear and moist. No oropharyngeal exudate.   Eyes: Pupils are equal, round, and reactive to light. Conjunctivae and EOM are normal.   Neck: Normal range of motion. Neck supple. No JVD present. No thyromegaly present.   Cardiovascular: Normal rate, regular rhythm and normal heart sounds.  Exam reveals no gallop and no friction rub.    No murmur heard.  Pulmonary/Chest: Effort normal and breath sounds normal. No stridor. No respiratory distress. She has no rales. She exhibits no tenderness.   Abdominal: Soft. Bowel sounds  are normal. She exhibits no distension. There is no tenderness.   Musculoskeletal: Normal range of motion. She exhibits no edema or tenderness.   Lymphadenopathy:     She has no cervical adenopathy.   Neurological: She is alert and oriented to person, place, and time. She displays normal reflexes. No cranial nerve deficit.   Skin: Skin is warm and dry. No rash noted. No erythema.   Psychiatric: Her behavior is normal.   Nursing note and vitals reviewed.      Fluids  No intake or output data in the 24 hours ending 01/22/19 1244    Laboratory                        Imaging  DX-CHEST-PORTABLE (1 VIEW)   Final Result         1.  Hazy interstitial left pulmonary opacities suggests interstitial edema or infiltrate, similar to prior.   2.  Trace left pleural effusion   3.  Hyperexpansion of lungs favors changes of COPD.      DX-CHEST-PORTABLE (1 VIEW)   Final Result         1.  Interstitial infiltrates or edema, stable.   2.  Atherosclerosis      DX-CHEST-PORTABLE (1 VIEW)   Final Result         1.  Interstitial infiltrates or edema.   2.  Atherosclerosis      DX-CHEST-PORTABLE (1 VIEW)   Final Result      Moderate interstitial edema or interstitial lung disease and small left pleural effusion similar to previous.      DX-CHEST-PORTABLE (1 VIEW)   Final Result      Stable interstitial edema and/or interstitial lung disease with probable small pleural fluid      DX-CHEST-PORTABLE (1 VIEW)   Final Result      Ill-defined infrahilar interstitial opacities, atelectasis versus mild edema. No significant pleural effusions.      DX-CHEST-PORTABLE (1 VIEW)   Final Result      Mild left basilar atelectasis, improved since prior. No new consolidation or large pleural effusions.      DX-CHEST-PORTABLE (1 VIEW)   Final Result      1.  Left basilar opacification may be secondary to atelectasis. Developing pneumonia cannot be excluded.         DX-CHEST-PORTABLE (1 VIEW)   Final Result      1.  Unchanged mild interstitial pulmonary edema    2.  Unchanged bibasilar atelectasis, alveolar edema or pneumonia      DX-CHEST-PORTABLE (1 VIEW)   Final Result      1.  Decreased pulmonary edema   2.  Unchanged bibasilar atelectasis, alveolar edema or pneumonia      DX-CHEST-PORTABLE (1 VIEW)   Final Result      1.  There is diffuse interstitial and alveolar density in the right mid and lower lung zone. This is increased since the previous study. This may represent mild pulmonary edema/consolidation.   2.  Mildly enlarged cardiomediastinal silhouette when compared with the previous chest x-ray.      DX-CHEST-PORTABLE (1 VIEW)   Final Result      Stable mild pulmonary edema.   New left basilar atelectasis.      DX-CHEST-PORTABLE (1 VIEW)   Final Result      Stable chest appearance compared with 11/16.      DX-CHEST-PORTABLE (1 VIEW)   Final Result      No acute cardiopulmonary abnormality identified.      DX-CHEST-PORTABLE (1 VIEW)   Final Result      No acute cardiopulmonary abnormality. No interval change.      DX-CHEST-PORTABLE (1 VIEW)   Final Result      No acute cardiopulmonary disease.      CT-HEAD W/O   Final Result      1.  No evidence of acute intracranial process.      2.  There is again seen interstitial pulmonary edema and bibasilar atelectasis.      CT-HIP W/O PLUS RECONS LEFT   Final Result      1.  No evidence of acute fracture or malalignment. No evidence of hardware failure or complication.   2.  Postsurgical changes of the left femur with broken screw fragment redemonstrated.   3.  Demineralization.   4.  Scattered colonic diverticula.   5.  Atherosclerosis.   6.  Small fat-containing umbilical hernia.      EC-ECHOCARDIOGRAM COMPLETE W/O CONT   Final Result      DX-HIP-COMPLETE - UNILATERAL 2+ LEFT   Final Result      1.  No definite acute osseous abnormality. In setting of demineralization, an occult fracture is difficult to exclude. If there is strong clinical suspicion, CT or MRI could be obtained for further evaluation.   2.  Postsurgical  changes of the left femur with broken screw fragment redemonstrated.      DX-CHEST-PORTABLE (1 VIEW)   Final Result      No acute cardiopulmonary process is seen.      Atherosclerotic plaque.           Assessment/Plan  * Fall- (present on admission)   Assessment & Plan    Chronic debility  She remains on comfort care.    Continue fall precautions.     ST elevation myocardial infarction involving right coronary artery (HCC)- (present on admission)   Assessment & Plan    Continue comfort care.     Positive QuantiFERON-TB Gold test   Assessment & Plan      AFB negative        Chronic kidney disease (CKD), stage III (moderate) (HCC)- (present on admission)   Assessment & Plan     On comfort care.     Elevated brain natriuretic peptide (BNP) level- (present on admission)   Assessment & Plan    Continue lasix as needed for comfort.            Essential hypertension- (present on admission)   Assessment & Plan    Controlled, within normal limits.  Hold lasix for sbp < 100 .  Continue comfort care status.            Pain of left hip joint- (present on admission)   Assessment & Plan    Likely secondary to OA. Currently denies pain.  As needed pain meds available.     Advanced directives, counseling/discussion- (present on admission)   Assessment & Plan    Continue comfort care.            VTE prophylaxis: comfort care

## 2019-01-22 NOTE — DISCHARGE PLANNING
LSW spoke with Ericka from [facility] who stated that Albert Reich from [facility] was supposed to come out and assess the Pt for possible GH placement. LSW stated she has not received any call from Albert reich. Ericka stated she will follow up and said that she iwll be contacting the Pt dtr on whether or not she can assist in the beginning financially with GH placement. Ericka asked this LSW to make a Gh wavier referral. LSW left a VM for the GH waiver asking for a call back.

## 2019-01-22 NOTE — PROGRESS NOTES
Report received. Assumed pt care. Pt resting denies pain or SOB. Fall precaution in place, call light within reach. Bed alarm on. Q2 hour rounding in place.

## 2019-01-22 NOTE — PROGRESS NOTES
Per chart review the patient remains at [facility].  Plan: will monitor for discharge from the hospital.

## 2019-01-23 PROCEDURE — 99231 SBSQ HOSP IP/OBS SF/LOW 25: CPT | Performed by: HOSPITALIST

## 2019-01-23 PROCEDURE — 770001 HCHG ROOM/CARE - MED/SURG/GYN PRIV*

## 2019-01-23 ASSESSMENT — ENCOUNTER SYMPTOMS
WEAKNESS: 1
BACK PAIN: 0
SHORTNESS OF BREATH: 0
EYE PAIN: 0
CHILLS: 0
NAUSEA: 0
COUGH: 0
FOCAL WEAKNESS: 0

## 2019-01-23 NOTE — PROGRESS NOTES
[facility] Medicine Daily Progress Note    Date of Service  1/23/2019    Chief Complaint  89 y.o. female admitted 11/13/2018 with fall.    Hospital Course    Patient is a pleasant 89 year old woman with history of HTN who presented following a fall and was found to have an acute ST elevation MI.  After discussion with her family, she was placed on comfort care.          Interval Problem Update  Remains on comfort care  Denies pain  Ros otherwise negative  axox    Consultants/Specialty  Cardiology  Critical Care    Code Status  DNR/Comfort care    Disposition   vs home with hospice       Review of Systems  Review of Systems   Constitutional: Negative for chills.   HENT: Negative for hearing loss.    Eyes: Negative for pain.   Respiratory: Negative for cough and shortness of breath.    Cardiovascular: Negative for chest pain and leg swelling.   Gastrointestinal: Negative for nausea.   Genitourinary: Negative for dysuria.   Musculoskeletal: Negative for back pain.   Neurological: Positive for weakness. Negative for focal weakness.        Physical Exam  Temp:  [36.1 °C (97 °F)-36.3 °C (97.4 °F)] 36.3 °C (97.4 °F)  Pulse:  [67-73] 67  Resp:  [17] 17  BP: (103-121)/(61-66) 103/66  SpO2:  [94 %-97 %] 97 %    Physical Exam   Constitutional: She is oriented to person, place, and time.   HENT:   Head: Normocephalic and atraumatic.   Nose: Nose normal.   Mouth/Throat: Oropharynx is clear and moist. No oropharyngeal exudate.   Eyes: Pupils are equal, round, and reactive to light. Conjunctivae and EOM are normal.   Neck: Normal range of motion. Neck supple. No JVD present. No thyromegaly present.   Cardiovascular: Normal rate, regular rhythm and normal heart sounds.  Exam reveals no gallop and no friction rub.    No murmur heard.  Pulmonary/Chest: Effort normal and breath sounds normal. No stridor. No respiratory distress. She has no rales. She exhibits no tenderness.   Abdominal: Soft. Bowel sounds are normal. She exhibits no  distension. There is no tenderness.   Musculoskeletal: Normal range of motion. She exhibits no edema or tenderness.   Lymphadenopathy:     She has no cervical adenopathy.   Neurological: She is alert and oriented to person, place, and time. She displays normal reflexes. No cranial nerve deficit.   Skin: Skin is warm and dry. No rash noted. No erythema.   Psychiatric: Her behavior is normal.   Nursing note and vitals reviewed.      Fluids  No intake or output data in the 24 hours ending 01/23/19 1243    Laboratory                        Imaging  DX-CHEST-PORTABLE (1 VIEW)   Final Result         1.  Hazy interstitial left pulmonary opacities suggests interstitial edema or infiltrate, similar to prior.   2.  Trace left pleural effusion   3.  Hyperexpansion of lungs favors changes of COPD.      DX-CHEST-PORTABLE (1 VIEW)   Final Result         1.  Interstitial infiltrates or edema, stable.   2.  Atherosclerosis      DX-CHEST-PORTABLE (1 VIEW)   Final Result         1.  Interstitial infiltrates or edema.   2.  Atherosclerosis      DX-CHEST-PORTABLE (1 VIEW)   Final Result      Moderate interstitial edema or interstitial lung disease and small left pleural effusion similar to previous.      DX-CHEST-PORTABLE (1 VIEW)   Final Result      Stable interstitial edema and/or interstitial lung disease with probable small pleural fluid      DX-CHEST-PORTABLE (1 VIEW)   Final Result      Ill-defined infrahilar interstitial opacities, atelectasis versus mild edema. No significant pleural effusions.      DX-CHEST-PORTABLE (1 VIEW)   Final Result      Mild left basilar atelectasis, improved since prior. No new consolidation or large pleural effusions.      DX-CHEST-PORTABLE (1 VIEW)   Final Result      1.  Left basilar opacification may be secondary to atelectasis. Developing pneumonia cannot be excluded.         DX-CHEST-PORTABLE (1 VIEW)   Final Result      1.  Unchanged mild interstitial pulmonary edema   2.  Unchanged bibasilar  atelectasis, alveolar edema or pneumonia      DX-CHEST-PORTABLE (1 VIEW)   Final Result      1.  Decreased pulmonary edema   2.  Unchanged bibasilar atelectasis, alveolar edema or pneumonia      DX-CHEST-PORTABLE (1 VIEW)   Final Result      1.  There is diffuse interstitial and alveolar density in the right mid and lower lung zone. This is increased since the previous study. This may represent mild pulmonary edema/consolidation.   2.  Mildly enlarged cardiomediastinal silhouette when compared with the previous chest x-ray.      DX-CHEST-PORTABLE (1 VIEW)   Final Result      Stable mild pulmonary edema.   New left basilar atelectasis.      DX-CHEST-PORTABLE (1 VIEW)   Final Result      Stable chest appearance compared with 11/16.      DX-CHEST-PORTABLE (1 VIEW)   Final Result      No acute cardiopulmonary abnormality identified.      DX-CHEST-PORTABLE (1 VIEW)   Final Result      No acute cardiopulmonary abnormality. No interval change.      DX-CHEST-PORTABLE (1 VIEW)   Final Result      No acute cardiopulmonary disease.      CT-HEAD W/O   Final Result      1.  No evidence of acute intracranial process.      2.  There is again seen interstitial pulmonary edema and bibasilar atelectasis.      CT-HIP W/O PLUS RECONS LEFT   Final Result      1.  No evidence of acute fracture or malalignment. No evidence of hardware failure or complication.   2.  Postsurgical changes of the left femur with broken screw fragment redemonstrated.   3.  Demineralization.   4.  Scattered colonic diverticula.   5.  Atherosclerosis.   6.  Small fat-containing umbilical hernia.      EC-ECHOCARDIOGRAM COMPLETE W/O CONT   Final Result      DX-HIP-COMPLETE - UNILATERAL 2+ LEFT   Final Result      1.  No definite acute osseous abnormality. In setting of demineralization, an occult fracture is difficult to exclude. If there is strong clinical suspicion, CT or MRI could be obtained for further evaluation.   2.  Postsurgical changes of the left femur  with broken screw fragment redemonstrated.      DX-CHEST-PORTABLE (1 VIEW)   Final Result      No acute cardiopulmonary process is seen.      Atherosclerotic plaque.           Assessment/Plan  * Fall- (present on admission)   Assessment & Plan    Chronic debility  She remains on comfort care.    Continue fall precautions.     ST elevation myocardial infarction involving right coronary artery (HCC)- (present on admission)   Assessment & Plan    Comfort care.     Positive QuantiFERON-TB Gold test   Assessment & Plan      AFB negative        Chronic kidney disease (CKD), stage III (moderate) (HCC)- (present on admission)   Assessment & Plan     On comfort care.     Elevated brain natriuretic peptide (BNP) level- (present on admission)   Assessment & Plan    Continue lasix as needed for comfort.            Essential hypertension- (present on admission)   Assessment & Plan    Controlled, within normal limits.  Hold lasix for sbp < 100 .  Continue comfort care status.            Pain of left hip joint- (present on admission)   Assessment & Plan    Likely secondary to OA. Currently denies pain.  As needed pain meds available.     Advanced directives, counseling/discussion- (present on admission)   Assessment & Plan    Continue comfort care.            VTE prophylaxis: comfort care

## 2019-01-23 NOTE — CARE PLAN
Problem: Safety  Goal: Will remain free from falls    Intervention: Assess risk factors for falls  Low fall risk, calls as appropriate.      Problem: Skin Integrity  Goal: Risk for impaired skin integrity will decrease    Intervention: Assess and monitor skin integrity, appearance and/or temperature  Skin intact, slight redness to sacrum, blanchable.

## 2019-01-23 NOTE — PROGRESS NOTES
Awake, A&O, VSS, ROJAS, =.  Denies pain, denies N/T to all extremities.  Refuses to turn every two hours, refuses a waffle cushion on her bed.  Skin is intact.  No IV access.  BM today per patient report.  POC discussed, pt agrees and verbalizes understanding.  Hourly rounding in place, call light is within reach.  Assessment completed as charted.

## 2019-01-23 NOTE — CARE PLAN
Problem: Safety  Goal: Will remain free from falls    Intervention: Assess risk factors for falls  Fall risk assessment completed.       Problem: Mobility  Goal: Risk for activity intolerance will decrease    Intervention: Assess and monitor signs of activity intolerance  Ambulates to BR with 2 wheeled walker and SBA, gait is steady.

## 2019-01-24 PROCEDURE — 99231 SBSQ HOSP IP/OBS SF/LOW 25: CPT | Performed by: HOSPITALIST

## 2019-01-24 PROCEDURE — 770001 HCHG ROOM/CARE - MED/SURG/GYN PRIV*

## 2019-01-24 PROCEDURE — 700102 HCHG RX REV CODE 250 W/ 637 OVERRIDE(OP): Performed by: HOSPITALIST

## 2019-01-24 PROCEDURE — A9270 NON-COVERED ITEM OR SERVICE: HCPCS | Performed by: HOSPITALIST

## 2019-01-24 RX ADMIN — FUROSEMIDE 20 MG: 20 TABLET ORAL at 07:00

## 2019-01-24 ASSESSMENT — ENCOUNTER SYMPTOMS
EYE PAIN: 0
NAUSEA: 0
SHORTNESS OF BREATH: 0
BACK PAIN: 0
WEAKNESS: 1
COUGH: 0
CHILLS: 0
FOCAL WEAKNESS: 0

## 2019-01-24 NOTE — CARE PLAN
Problem: Safety  Goal: Will remain free from injury  Outcome: PROGRESSING AS EXPECTED  Patient calls appropriately, bed in low position, bed alarm on and functioning, all needs met, no injury

## 2019-01-24 NOTE — PROGRESS NOTES
[facility] Medicine Daily Progress Note    Date of Service  1/24/2019    Chief Complaint  89 y.o. female admitted 11/13/2018 with fall.    Hospital Course    Patient is a pleasant 89 year old woman with history of HTN who presented following a fall and was found to have an acute ST elevation MI.  After discussion with her family, she was placed on comfort care.          Interval Problem Update  No significant change  Denies pain  Remains axox  Ros otherwise negtative    Consultants/Specialty  Cardiology  Critical Care    Code Status  DNR/Comfort care    Disposition   vs home with hospice       Review of Systems  Review of Systems   Constitutional: Negative for chills.   HENT: Negative for hearing loss.    Eyes: Negative for pain.   Respiratory: Negative for cough and shortness of breath.    Cardiovascular: Negative for chest pain and leg swelling.   Gastrointestinal: Negative for nausea.   Genitourinary: Negative for dysuria.   Musculoskeletal: Negative for back pain.   Neurological: Positive for weakness. Negative for focal weakness.        Physical Exam  Temp:  [36.7 °C (98.1 °F)-36.9 °C (98.4 °F)] 36.7 °C (98.1 °F)  Pulse:  [77-78] 77  Resp:  [15-18] 18  BP: (124-129)/(69-72) 124/69  SpO2:  [93 %-94 %] 94 %    Physical Exam   Constitutional: She is oriented to person, place, and time.   HENT:   Head: Normocephalic and atraumatic.   Nose: Nose normal.   Mouth/Throat: Oropharynx is clear and moist. No oropharyngeal exudate.   Eyes: Pupils are equal, round, and reactive to light. Conjunctivae and EOM are normal.   Neck: Normal range of motion. Neck supple. No JVD present. No thyromegaly present.   Cardiovascular: Normal rate, regular rhythm and normal heart sounds.  Exam reveals no gallop and no friction rub.    No murmur heard.  Pulmonary/Chest: Effort normal and breath sounds normal. No stridor. No respiratory distress. She has no rales. She exhibits no tenderness.   Abdominal: Soft. Bowel sounds are normal. She  exhibits no distension. There is no tenderness.   Musculoskeletal: Normal range of motion. She exhibits no edema or tenderness.   Lymphadenopathy:     She has no cervical adenopathy.   Neurological: She is alert and oriented to person, place, and time. She displays normal reflexes. No cranial nerve deficit.   Skin: Skin is warm and dry. No rash noted. No erythema.   Psychiatric: Her behavior is normal.   Nursing note and vitals reviewed.      Fluids    Intake/Output Summary (Last 24 hours) at 01/24/19 1259  Last data filed at 01/23/19 2114   Gross per 24 hour   Intake                0 ml   Output                1 ml   Net               -1 ml       Laboratory                        Imaging  DX-CHEST-PORTABLE (1 VIEW)   Final Result         1.  Hazy interstitial left pulmonary opacities suggests interstitial edema or infiltrate, similar to prior.   2.  Trace left pleural effusion   3.  Hyperexpansion of lungs favors changes of COPD.      DX-CHEST-PORTABLE (1 VIEW)   Final Result         1.  Interstitial infiltrates or edema, stable.   2.  Atherosclerosis      DX-CHEST-PORTABLE (1 VIEW)   Final Result         1.  Interstitial infiltrates or edema.   2.  Atherosclerosis      DX-CHEST-PORTABLE (1 VIEW)   Final Result      Moderate interstitial edema or interstitial lung disease and small left pleural effusion similar to previous.      DX-CHEST-PORTABLE (1 VIEW)   Final Result      Stable interstitial edema and/or interstitial lung disease with probable small pleural fluid      DX-CHEST-PORTABLE (1 VIEW)   Final Result      Ill-defined infrahilar interstitial opacities, atelectasis versus mild edema. No significant pleural effusions.      DX-CHEST-PORTABLE (1 VIEW)   Final Result      Mild left basilar atelectasis, improved since prior. No new consolidation or large pleural effusions.      DX-CHEST-PORTABLE (1 VIEW)   Final Result      1.  Left basilar opacification may be secondary to atelectasis. Developing pneumonia  cannot be excluded.         DX-CHEST-PORTABLE (1 VIEW)   Final Result      1.  Unchanged mild interstitial pulmonary edema   2.  Unchanged bibasilar atelectasis, alveolar edema or pneumonia      DX-CHEST-PORTABLE (1 VIEW)   Final Result      1.  Decreased pulmonary edema   2.  Unchanged bibasilar atelectasis, alveolar edema or pneumonia      DX-CHEST-PORTABLE (1 VIEW)   Final Result      1.  There is diffuse interstitial and alveolar density in the right mid and lower lung zone. This is increased since the previous study. This may represent mild pulmonary edema/consolidation.   2.  Mildly enlarged cardiomediastinal silhouette when compared with the previous chest x-ray.      DX-CHEST-PORTABLE (1 VIEW)   Final Result      Stable mild pulmonary edema.   New left basilar atelectasis.      DX-CHEST-PORTABLE (1 VIEW)   Final Result      Stable chest appearance compared with 11/16.      DX-CHEST-PORTABLE (1 VIEW)   Final Result      No acute cardiopulmonary abnormality identified.      DX-CHEST-PORTABLE (1 VIEW)   Final Result      No acute cardiopulmonary abnormality. No interval change.      DX-CHEST-PORTABLE (1 VIEW)   Final Result      No acute cardiopulmonary disease.      CT-HEAD W/O   Final Result      1.  No evidence of acute intracranial process.      2.  There is again seen interstitial pulmonary edema and bibasilar atelectasis.      CT-HIP W/O PLUS RECONS LEFT   Final Result      1.  No evidence of acute fracture or malalignment. No evidence of hardware failure or complication.   2.  Postsurgical changes of the left femur with broken screw fragment redemonstrated.   3.  Demineralization.   4.  Scattered colonic diverticula.   5.  Atherosclerosis.   6.  Small fat-containing umbilical hernia.      EC-ECHOCARDIOGRAM COMPLETE W/O CONT   Final Result      DX-HIP-COMPLETE - UNILATERAL 2+ LEFT   Final Result      1.  No definite acute osseous abnormality. In setting of demineralization, an occult fracture is difficult  to exclude. If there is strong clinical suspicion, CT or MRI could be obtained for further evaluation.   2.  Postsurgical changes of the left femur with broken screw fragment redemonstrated.      DX-CHEST-PORTABLE (1 VIEW)   Final Result      No acute cardiopulmonary process is seen.      Atherosclerotic plaque.           Assessment/Plan  * Fall- (present on admission)   Assessment & Plan    Chronic debility  She remains on comfort care.    Continue fall precautions.     ST elevation myocardial infarction involving right coronary artery (HCC)- (present on admission)   Assessment & Plan    Comfort care.     Positive QuantiFERON-TB Gold test   Assessment & Plan      AFB negative        Chronic kidney disease (CKD), stage III (moderate) (HCC)- (present on admission)   Assessment & Plan     On comfort care.     Elevated brain natriuretic peptide (BNP) level- (present on admission)   Assessment & Plan    Continue lasix as needed for comfort.            Essential hypertension- (present on admission)   Assessment & Plan    Controlled, within normal limits.  Hold lasix for sbp < 100 .  Continue comfort care status.            Pain of left hip joint- (present on admission)   Assessment & Plan    Likely secondary to OA. Currently denies pain.  As needed pain meds available.     Advanced directives, counseling/discussion- (present on admission)   Assessment & Plan    Continue comfort care.            VTE prophylaxis: comfort care

## 2019-01-25 PROCEDURE — 99231 SBSQ HOSP IP/OBS SF/LOW 25: CPT | Performed by: HOSPITALIST

## 2019-01-25 PROCEDURE — 770001 HCHG ROOM/CARE - MED/SURG/GYN PRIV*

## 2019-01-25 ASSESSMENT — ENCOUNTER SYMPTOMS
EYE PAIN: 0
NAUSEA: 0
CHILLS: 0
WEAKNESS: 1
FOCAL WEAKNESS: 0
SHORTNESS OF BREATH: 0
BACK PAIN: 0
COUGH: 0

## 2019-01-25 NOTE — CARE PLAN
Problem: Mobility  Goal: Risk for activity intolerance will decrease  Outcome: PROGRESSING AS EXPECTED  Mobility at patients baseline, has some lower leg weakness but steady with FFW

## 2019-01-25 NOTE — PROGRESS NOTES
[facility] Medicine Daily Progress Note    Date of Service  1/25/2019    Chief Complaint  89 y.o. female admitted 11/13/2018 with fall.    Hospital Course    Patient is a pleasant 89 year old woman with history of HTN who presented following a fall and was found to have an acute ST elevation MI.  After discussion with her family, she was placed on comfort care.          Interval Problem Update  Remains axox3  Tolerating diet  No current complaints    Consultants/Specialty  Cardiology  Critical Care    Code Status  DNR/Comfort care    Disposition   vs home with hospice       Review of Systems  Review of Systems   Constitutional: Negative for chills.   HENT: Negative for hearing loss.    Eyes: Negative for pain.   Respiratory: Negative for cough and shortness of breath.    Cardiovascular: Negative for chest pain and leg swelling.   Gastrointestinal: Negative for nausea.   Genitourinary: Negative for dysuria.   Musculoskeletal: Negative for back pain.   Neurological: Positive for weakness. Negative for focal weakness.        Physical Exam  Temp:  [36.6 °C (97.9 °F)-37.1 °C (98.7 °F)] 36.6 °C (97.9 °F)  Pulse:  [74-76] 74  Resp:  [16-18] 18  BP: (110-117)/(63-68) 117/68  SpO2:  [91 %] 91 %    Physical Exam   Constitutional: She is oriented to person, place, and time.   HENT:   Head: Normocephalic and atraumatic.   Nose: Nose normal.   Mouth/Throat: Oropharynx is clear and moist. No oropharyngeal exudate.   Eyes: Pupils are equal, round, and reactive to light. Conjunctivae and EOM are normal.   Neck: Normal range of motion. Neck supple. No JVD present. No thyromegaly present.   Cardiovascular: Normal rate, regular rhythm and normal heart sounds.  Exam reveals no gallop and no friction rub.    No murmur heard.  Pulmonary/Chest: Effort normal and breath sounds normal. No stridor. No respiratory distress. She has no rales. She exhibits no tenderness.   Abdominal: Soft. Bowel sounds are normal. She exhibits no distension.  There is no tenderness.   Musculoskeletal: Normal range of motion. She exhibits no edema or tenderness.   Lymphadenopathy:     She has no cervical adenopathy.   Neurological: She is alert and oriented to person, place, and time. She displays normal reflexes. No cranial nerve deficit.   Skin: Skin is warm and dry. No rash noted. No erythema.   Psychiatric: Her behavior is normal.   Nursing note and vitals reviewed.      Fluids  No intake or output data in the 24 hours ending 01/25/19 1220    Laboratory                        Imaging  DX-CHEST-PORTABLE (1 VIEW)   Final Result         1.  Hazy interstitial left pulmonary opacities suggests interstitial edema or infiltrate, similar to prior.   2.  Trace left pleural effusion   3.  Hyperexpansion of lungs favors changes of COPD.      DX-CHEST-PORTABLE (1 VIEW)   Final Result         1.  Interstitial infiltrates or edema, stable.   2.  Atherosclerosis      DX-CHEST-PORTABLE (1 VIEW)   Final Result         1.  Interstitial infiltrates or edema.   2.  Atherosclerosis      DX-CHEST-PORTABLE (1 VIEW)   Final Result      Moderate interstitial edema or interstitial lung disease and small left pleural effusion similar to previous.      DX-CHEST-PORTABLE (1 VIEW)   Final Result      Stable interstitial edema and/or interstitial lung disease with probable small pleural fluid      DX-CHEST-PORTABLE (1 VIEW)   Final Result      Ill-defined infrahilar interstitial opacities, atelectasis versus mild edema. No significant pleural effusions.      DX-CHEST-PORTABLE (1 VIEW)   Final Result      Mild left basilar atelectasis, improved since prior. No new consolidation or large pleural effusions.      DX-CHEST-PORTABLE (1 VIEW)   Final Result      1.  Left basilar opacification may be secondary to atelectasis. Developing pneumonia cannot be excluded.         DX-CHEST-PORTABLE (1 VIEW)   Final Result      1.  Unchanged mild interstitial pulmonary edema   2.  Unchanged bibasilar atelectasis,  alveolar edema or pneumonia      DX-CHEST-PORTABLE (1 VIEW)   Final Result      1.  Decreased pulmonary edema   2.  Unchanged bibasilar atelectasis, alveolar edema or pneumonia      DX-CHEST-PORTABLE (1 VIEW)   Final Result      1.  There is diffuse interstitial and alveolar density in the right mid and lower lung zone. This is increased since the previous study. This may represent mild pulmonary edema/consolidation.   2.  Mildly enlarged cardiomediastinal silhouette when compared with the previous chest x-ray.      DX-CHEST-PORTABLE (1 VIEW)   Final Result      Stable mild pulmonary edema.   New left basilar atelectasis.      DX-CHEST-PORTABLE (1 VIEW)   Final Result      Stable chest appearance compared with 11/16.      DX-CHEST-PORTABLE (1 VIEW)   Final Result      No acute cardiopulmonary abnormality identified.      DX-CHEST-PORTABLE (1 VIEW)   Final Result      No acute cardiopulmonary abnormality. No interval change.      DX-CHEST-PORTABLE (1 VIEW)   Final Result      No acute cardiopulmonary disease.      CT-HEAD W/O   Final Result      1.  No evidence of acute intracranial process.      2.  There is again seen interstitial pulmonary edema and bibasilar atelectasis.      CT-HIP W/O PLUS RECONS LEFT   Final Result      1.  No evidence of acute fracture or malalignment. No evidence of hardware failure or complication.   2.  Postsurgical changes of the left femur with broken screw fragment redemonstrated.   3.  Demineralization.   4.  Scattered colonic diverticula.   5.  Atherosclerosis.   6.  Small fat-containing umbilical hernia.      EC-ECHOCARDIOGRAM COMPLETE W/O CONT   Final Result      DX-HIP-COMPLETE - UNILATERAL 2+ LEFT   Final Result      1.  No definite acute osseous abnormality. In setting of demineralization, an occult fracture is difficult to exclude. If there is strong clinical suspicion, CT or MRI could be obtained for further evaluation.   2.  Postsurgical changes of the left femur with broken  screw fragment redemonstrated.      DX-CHEST-PORTABLE (1 VIEW)   Final Result      No acute cardiopulmonary process is seen.      Atherosclerotic plaque.           Assessment/Plan  * Fall- (present on admission)   Assessment & Plan    Chronic debility  Continue fall precautions.  Continue comfort care     ST elevation myocardial infarction involving right coronary artery (HCC)- (present on admission)   Assessment & Plan    Comfort care.     Positive QuantiFERON-TB Gold test   Assessment & Plan      AFB negative        Chronic kidney disease (CKD), stage III (moderate) (HCC)- (present on admission)   Assessment & Plan    Continue comfort care.     Elevated brain natriuretic peptide (BNP) level- (present on admission)   Assessment & Plan    Continue lasix as needed for comfort.            Essential hypertension- (present on admission)   Assessment & Plan               Pain of left hip joint- (present on admission)   Assessment & Plan    Likely secondary to OA. Currently denies pain.  As needed pain meds available.     Advanced directives, counseling/discussion- (present on admission)   Assessment & Plan    Continue comfort care.            VTE prophylaxis: comfort care

## 2019-01-25 NOTE — PROGRESS NOTES
Patient sat up for meals today. Ambulated to the bathroom with FWW. All needs met. Safety measured in place

## 2019-01-26 PROCEDURE — 770001 HCHG ROOM/CARE - MED/SURG/GYN PRIV*

## 2019-01-26 PROCEDURE — 99231 SBSQ HOSP IP/OBS SF/LOW 25: CPT | Performed by: HOSPITALIST

## 2019-01-26 PROCEDURE — A9270 NON-COVERED ITEM OR SERVICE: HCPCS | Performed by: HOSPITALIST

## 2019-01-26 PROCEDURE — 700102 HCHG RX REV CODE 250 W/ 637 OVERRIDE(OP): Performed by: HOSPITALIST

## 2019-01-26 RX ADMIN — FUROSEMIDE 20 MG: 20 TABLET ORAL at 06:42

## 2019-01-26 ASSESSMENT — ENCOUNTER SYMPTOMS
NAUSEA: 0
FOCAL WEAKNESS: 0
WEAKNESS: 1
SHORTNESS OF BREATH: 0
COUGH: 0
CHILLS: 0
EYE PAIN: 0
BACK PAIN: 0

## 2019-01-26 NOTE — PROGRESS NOTES
Politely refused scheduled AM senna-colace pill and miralax; states she wants a stool softener ONLY and does not want any laxatives.     Scheduled PO lasix given; BP this /65.

## 2019-01-26 NOTE — PROGRESS NOTES
"Vitals this evening WNL other than a low grade fever of 99.3. Patient denies all pain, including chest pain. Reports intermittent dizziness \"if I get up too fast sometimes.\" Thought date was December 18th, 2018, but patient is otherwise oriented and appropriate. Does not get OOB without supervision; uses call bell appropriately to request staff supervision with ambulation. Ambulates with 4 wheel walker to bathroom for toileting. Resting in bed; observed repositioning self frequently and without assist. Denies SOB on RA; breathing appears unlabored. Lungs clear but diminished to upper lobes, with crackles noted to bases. No edema noted. Heart rhythm sounds regular at this time. Pulses palpable; radial pulses bounding and pedal pulses weak but equal. Reports last BM was yesterday: 1/24/18; abdominal assessment WNL. Voids in toilet and is continent. Denies dysuria. Skin fragile, pale, cool, but intact. Skin intact including to heels and coccyx, though coccyx is red. Coccyx red, blanchable, with slow cap refill of 3-4 seconds. Patient allergic to tape, so no mepilex dsg. applied to sacrum. Bed sensor pad alarm in use. Call bell within reach.  "

## 2019-01-26 NOTE — PROGRESS NOTES
[facility] Medicine Daily Progress Note    Date of Service  1/26/2019    Chief Complaint  89 y.o. female admitted 11/13/2018 with fall.    Hospital Course    Patient is a pleasant 89 year old woman with history of HTN who presented following a fall and was found to have an acute ST elevation MI.  After discussion with her family, she was placed on comfort care.          Interval Problem Update  No significant changes  She is without complaints  Denies pain  Eating breakfast  Remains axox    Consultants/Specialty  Cardiology  Critical Care    Code Status  DNR/Comfort care    Disposition   vs home with hospice       Review of Systems  Review of Systems   Constitutional: Negative for chills.   HENT: Negative for hearing loss.    Eyes: Negative for pain.   Respiratory: Negative for cough and shortness of breath.    Cardiovascular: Negative for chest pain and leg swelling.   Gastrointestinal: Negative for nausea.   Genitourinary: Negative for dysuria.   Musculoskeletal: Negative for back pain.   Neurological: Positive for weakness. Negative for focal weakness.        Physical Exam  Temp:  [36.6 °C (97.8 °F)-37.4 °C (99.3 °F)] 36.6 °C (97.8 °F)  Pulse:  [77] 77  Resp:  [16] 16  BP: (107-118)/(63-65) 107/65  SpO2:  [90 %-91 %] 91 %    Physical Exam   Constitutional: She is oriented to person, place, and time.   HENT:   Head: Normocephalic and atraumatic.   Nose: Nose normal.   Mouth/Throat: Oropharynx is clear and moist. No oropharyngeal exudate.   Eyes: Pupils are equal, round, and reactive to light. Conjunctivae and EOM are normal.   Neck: Normal range of motion. Neck supple. No JVD present. No thyromegaly present.   Cardiovascular: Normal rate, regular rhythm and normal heart sounds.  Exam reveals no gallop and no friction rub.    No murmur heard.  Pulmonary/Chest: Effort normal and breath sounds normal. No stridor. No respiratory distress. She has no rales. She exhibits no tenderness.   Abdominal: Soft. Bowel sounds  are normal. She exhibits no distension. There is no tenderness.   Musculoskeletal: Normal range of motion. She exhibits no edema or tenderness.   Lymphadenopathy:     She has no cervical adenopathy.   Neurological: She is alert and oriented to person, place, and time. She displays normal reflexes. No cranial nerve deficit.   Skin: Skin is warm and dry. No rash noted. No erythema.   Psychiatric: Her behavior is normal.   Nursing note and vitals reviewed.      Fluids    Intake/Output Summary (Last 24 hours) at 01/26/19 1208  Last data filed at 01/26/19 0830   Gross per 24 hour   Intake              120 ml   Output                0 ml   Net              120 ml       Laboratory                        Imaging  DX-CHEST-PORTABLE (1 VIEW)   Final Result         1.  Hazy interstitial left pulmonary opacities suggests interstitial edema or infiltrate, similar to prior.   2.  Trace left pleural effusion   3.  Hyperexpansion of lungs favors changes of COPD.      DX-CHEST-PORTABLE (1 VIEW)   Final Result         1.  Interstitial infiltrates or edema, stable.   2.  Atherosclerosis      DX-CHEST-PORTABLE (1 VIEW)   Final Result         1.  Interstitial infiltrates or edema.   2.  Atherosclerosis      DX-CHEST-PORTABLE (1 VIEW)   Final Result      Moderate interstitial edema or interstitial lung disease and small left pleural effusion similar to previous.      DX-CHEST-PORTABLE (1 VIEW)   Final Result      Stable interstitial edema and/or interstitial lung disease with probable small pleural fluid      DX-CHEST-PORTABLE (1 VIEW)   Final Result      Ill-defined infrahilar interstitial opacities, atelectasis versus mild edema. No significant pleural effusions.      DX-CHEST-PORTABLE (1 VIEW)   Final Result      Mild left basilar atelectasis, improved since prior. No new consolidation or large pleural effusions.      DX-CHEST-PORTABLE (1 VIEW)   Final Result      1.  Left basilar opacification may be secondary to atelectasis.  Developing pneumonia cannot be excluded.         DX-CHEST-PORTABLE (1 VIEW)   Final Result      1.  Unchanged mild interstitial pulmonary edema   2.  Unchanged bibasilar atelectasis, alveolar edema or pneumonia      DX-CHEST-PORTABLE (1 VIEW)   Final Result      1.  Decreased pulmonary edema   2.  Unchanged bibasilar atelectasis, alveolar edema or pneumonia      DX-CHEST-PORTABLE (1 VIEW)   Final Result      1.  There is diffuse interstitial and alveolar density in the right mid and lower lung zone. This is increased since the previous study. This may represent mild pulmonary edema/consolidation.   2.  Mildly enlarged cardiomediastinal silhouette when compared with the previous chest x-ray.      DX-CHEST-PORTABLE (1 VIEW)   Final Result      Stable mild pulmonary edema.   New left basilar atelectasis.      DX-CHEST-PORTABLE (1 VIEW)   Final Result      Stable chest appearance compared with 11/16.      DX-CHEST-PORTABLE (1 VIEW)   Final Result      No acute cardiopulmonary abnormality identified.      DX-CHEST-PORTABLE (1 VIEW)   Final Result      No acute cardiopulmonary abnormality. No interval change.      DX-CHEST-PORTABLE (1 VIEW)   Final Result      No acute cardiopulmonary disease.      CT-HEAD W/O   Final Result      1.  No evidence of acute intracranial process.      2.  There is again seen interstitial pulmonary edema and bibasilar atelectasis.      CT-HIP W/O PLUS RECONS LEFT   Final Result      1.  No evidence of acute fracture or malalignment. No evidence of hardware failure or complication.   2.  Postsurgical changes of the left femur with broken screw fragment redemonstrated.   3.  Demineralization.   4.  Scattered colonic diverticula.   5.  Atherosclerosis.   6.  Small fat-containing umbilical hernia.      EC-ECHOCARDIOGRAM COMPLETE W/O CONT   Final Result      DX-HIP-COMPLETE - UNILATERAL 2+ LEFT   Final Result      1.  No definite acute osseous abnormality. In setting of demineralization, an occult  fracture is difficult to exclude. If there is strong clinical suspicion, CT or MRI could be obtained for further evaluation.   2.  Postsurgical changes of the left femur with broken screw fragment redemonstrated.      DX-CHEST-PORTABLE (1 VIEW)   Final Result      No acute cardiopulmonary process is seen.      Atherosclerotic plaque.           Assessment/Plan  * Fall- (present on admission)   Assessment & Plan    Chronic debility  Continue fall precautions.  On comfort care     ST elevation myocardial infarction involving right coronary artery (HCC)- (present on admission)   Assessment & Plan    Comfort care.     Positive QuantiFERON-TB Gold test   Assessment & Plan      AFB negative        Chronic kidney disease (CKD), stage III (moderate) (HCC)- (present on admission)   Assessment & Plan    Continue comfort care.     Elevated brain natriuretic peptide (BNP) level- (present on admission)   Assessment & Plan    Continue lasix as needed for comfort.            Essential hypertension- (present on admission)   Assessment & Plan               Pain of left hip joint- (present on admission)   Assessment & Plan    Likely secondary to OA. Currently denies pain.  As needed pain meds available.     Advanced directives, counseling/discussion- (present on admission)   Assessment & Plan    Continue comfort care.            VTE prophylaxis: comfort care

## 2019-01-26 NOTE — CARE PLAN
Problem: Safety  Goal: Will remain free from falls  Outcome: PROGRESSING AS EXPECTED  Patient remains free from falls. Uses call light appropriately when needing assistance. Bed alarm in place.

## 2019-01-26 NOTE — CARE PLAN
Problem: Urinary Elimination:  Goal: Ability to reestablish a normal urinary elimination pattern will improve  Outcome: PROGRESSING AS EXPECTED  Patient ambulatory to bathroom and voiding without difficulty.

## 2019-01-27 PROCEDURE — 99231 SBSQ HOSP IP/OBS SF/LOW 25: CPT | Performed by: HOSPITALIST

## 2019-01-27 PROCEDURE — 770001 HCHG ROOM/CARE - MED/SURG/GYN PRIV*

## 2019-01-27 ASSESSMENT — ENCOUNTER SYMPTOMS
FOCAL WEAKNESS: 0
EYE PAIN: 0
CHILLS: 0
NAUSEA: 0
WEAKNESS: 1
BACK PAIN: 0
COUGH: 0
SHORTNESS OF BREATH: 0

## 2019-01-27 NOTE — PROGRESS NOTES
[facility] Medicine Daily Progress Note    Date of Service  1/27/2019    Chief Complaint  89 y.o. female admitted 11/13/2018 with fall.    Hospital Course    Patient is a pleasant 89 year old woman with history of HTN who presented following a fall and was found to have an acute ST elevation MI.  After discussion with her family, she was placed on comfort care.          Interval Problem Update  Voiding b/b  Denies pain  Ros otherwise negative  Remains axox    Consultants/Specialty  Cardiology  Critical Care    Code Status  DNR/Comfort care    Disposition   vs home with hospice       Review of Systems  Review of Systems   Constitutional: Negative for chills.   HENT: Negative for hearing loss.    Eyes: Negative for pain.   Respiratory: Negative for cough and shortness of breath.    Cardiovascular: Negative for chest pain and leg swelling.   Gastrointestinal: Negative for nausea.   Genitourinary: Negative for dysuria.   Musculoskeletal: Negative for back pain.   Neurological: Positive for weakness. Negative for focal weakness.        Physical Exam  Temp:  [36.5 °C (97.7 °F)-37.3 °C (99.2 °F)] 36.7 °C (98.1 °F)  Pulse:  [85-96] 96  Resp:  [14-20] 14  BP: (114-129)/(69-74) 114/69  SpO2:  [91 %-96 %] 96 %    Physical Exam   Constitutional: She is oriented to person, place, and time.   HENT:   Head: Normocephalic and atraumatic.   Nose: Nose normal.   Mouth/Throat: Oropharynx is clear and moist. No oropharyngeal exudate.   Eyes: Pupils are equal, round, and reactive to light. Conjunctivae and EOM are normal.   Neck: Normal range of motion. Neck supple. No JVD present. No thyromegaly present.   Cardiovascular: Normal rate, regular rhythm and normal heart sounds.  Exam reveals no gallop and no friction rub.    No murmur heard.  Pulmonary/Chest: Effort normal and breath sounds normal. No stridor. No respiratory distress. She has no rales. She exhibits no tenderness.   Abdominal: Soft. Bowel sounds are normal. She exhibits no  distension. There is no tenderness.   Musculoskeletal: Normal range of motion. She exhibits no edema or tenderness.   Lymphadenopathy:     She has no cervical adenopathy.   Neurological: She is alert and oriented to person, place, and time. She displays normal reflexes. No cranial nerve deficit.   Skin: Skin is warm and dry. No rash noted. No erythema.   Psychiatric: Her behavior is normal.   Nursing note and vitals reviewed.      Fluids    Intake/Output Summary (Last 24 hours) at 01/27/19 1300  Last data filed at 01/26/19 1800   Gross per 24 hour   Intake              120 ml   Output                0 ml   Net              120 ml       Laboratory                        Imaging  DX-CHEST-PORTABLE (1 VIEW)   Final Result         1.  Hazy interstitial left pulmonary opacities suggests interstitial edema or infiltrate, similar to prior.   2.  Trace left pleural effusion   3.  Hyperexpansion of lungs favors changes of COPD.      DX-CHEST-PORTABLE (1 VIEW)   Final Result         1.  Interstitial infiltrates or edema, stable.   2.  Atherosclerosis      DX-CHEST-PORTABLE (1 VIEW)   Final Result         1.  Interstitial infiltrates or edema.   2.  Atherosclerosis      DX-CHEST-PORTABLE (1 VIEW)   Final Result      Moderate interstitial edema or interstitial lung disease and small left pleural effusion similar to previous.      DX-CHEST-PORTABLE (1 VIEW)   Final Result      Stable interstitial edema and/or interstitial lung disease with probable small pleural fluid      DX-CHEST-PORTABLE (1 VIEW)   Final Result      Ill-defined infrahilar interstitial opacities, atelectasis versus mild edema. No significant pleural effusions.      DX-CHEST-PORTABLE (1 VIEW)   Final Result      Mild left basilar atelectasis, improved since prior. No new consolidation or large pleural effusions.      DX-CHEST-PORTABLE (1 VIEW)   Final Result      1.  Left basilar opacification may be secondary to atelectasis. Developing pneumonia cannot be  excluded.         DX-CHEST-PORTABLE (1 VIEW)   Final Result      1.  Unchanged mild interstitial pulmonary edema   2.  Unchanged bibasilar atelectasis, alveolar edema or pneumonia      DX-CHEST-PORTABLE (1 VIEW)   Final Result      1.  Decreased pulmonary edema   2.  Unchanged bibasilar atelectasis, alveolar edema or pneumonia      DX-CHEST-PORTABLE (1 VIEW)   Final Result      1.  There is diffuse interstitial and alveolar density in the right mid and lower lung zone. This is increased since the previous study. This may represent mild pulmonary edema/consolidation.   2.  Mildly enlarged cardiomediastinal silhouette when compared with the previous chest x-ray.      DX-CHEST-PORTABLE (1 VIEW)   Final Result      Stable mild pulmonary edema.   New left basilar atelectasis.      DX-CHEST-PORTABLE (1 VIEW)   Final Result      Stable chest appearance compared with 11/16.      DX-CHEST-PORTABLE (1 VIEW)   Final Result      No acute cardiopulmonary abnormality identified.      DX-CHEST-PORTABLE (1 VIEW)   Final Result      No acute cardiopulmonary abnormality. No interval change.      DX-CHEST-PORTABLE (1 VIEW)   Final Result      No acute cardiopulmonary disease.      CT-HEAD W/O   Final Result      1.  No evidence of acute intracranial process.      2.  There is again seen interstitial pulmonary edema and bibasilar atelectasis.      CT-HIP W/O PLUS RECONS LEFT   Final Result      1.  No evidence of acute fracture or malalignment. No evidence of hardware failure or complication.   2.  Postsurgical changes of the left femur with broken screw fragment redemonstrated.   3.  Demineralization.   4.  Scattered colonic diverticula.   5.  Atherosclerosis.   6.  Small fat-containing umbilical hernia.      EC-ECHOCARDIOGRAM COMPLETE W/O CONT   Final Result      DX-HIP-COMPLETE - UNILATERAL 2+ LEFT   Final Result      1.  No definite acute osseous abnormality. In setting of demineralization, an occult fracture is difficult to  exclude. If there is strong clinical suspicion, CT or MRI could be obtained for further evaluation.   2.  Postsurgical changes of the left femur with broken screw fragment redemonstrated.      DX-CHEST-PORTABLE (1 VIEW)   Final Result      No acute cardiopulmonary process is seen.      Atherosclerotic plaque.           Assessment/Plan  * Fall- (present on admission)   Assessment & Plan    Chronic debility  Continue fall precautions.  On comfort care     ST elevation myocardial infarction involving right coronary artery (HCC)- (present on admission)   Assessment & Plan    Comfort care.     Positive QuantiFERON-TB Gold test   Assessment & Plan      AFB negative        Chronic kidney disease (CKD), stage III (moderate) (HCC)- (present on admission)   Assessment & Plan    Continue comfort care.     Elevated brain natriuretic peptide (BNP) level- (present on admission)   Assessment & Plan    Continue lasix as needed for comfort.            Essential hypertension- (present on admission)   Assessment & Plan               Pain of left hip joint- (present on admission)   Assessment & Plan    Likely secondary to OA. Currently denies pain.  As needed pain meds available.     Advanced directives, counseling/discussion- (present on admission)   Assessment & Plan    Continue comfort care.            VTE prophylaxis: comfort care

## 2019-01-28 PROCEDURE — A9270 NON-COVERED ITEM OR SERVICE: HCPCS | Performed by: HOSPITALIST

## 2019-01-28 PROCEDURE — 99231 SBSQ HOSP IP/OBS SF/LOW 25: CPT | Performed by: HOSPITALIST

## 2019-01-28 PROCEDURE — 770001 HCHG ROOM/CARE - MED/SURG/GYN PRIV*

## 2019-01-28 PROCEDURE — 700102 HCHG RX REV CODE 250 W/ 637 OVERRIDE(OP): Performed by: HOSPITALIST

## 2019-01-28 RX ADMIN — SENNOSIDES AND DOCUSATE SODIUM 2 TABLET: 8.6; 5 TABLET ORAL at 19:23

## 2019-01-28 ASSESSMENT — ENCOUNTER SYMPTOMS
COUGH: 0
WEAKNESS: 1
FOCAL WEAKNESS: 0
CHILLS: 0
SHORTNESS OF BREATH: 0
NAUSEA: 0
BACK PAIN: 0
EYE PAIN: 0

## 2019-01-28 ASSESSMENT — PATIENT HEALTH QUESTIONNAIRE - PHQ9
2. FEELING DOWN, DEPRESSED, IRRITABLE, OR HOPELESS: NOT AT ALL
1. LITTLE INTEREST OR PLEASURE IN DOING THINGS: NOT AT ALL
SUM OF ALL RESPONSES TO PHQ9 QUESTIONS 1 AND 2: 0

## 2019-01-28 NOTE — CARE PLAN
Problem: Safety  Goal: Will remain free from injury  Outcome: PROGRESSING AS EXPECTED  Treaded socks in place, bed in the lowest position, call light and belongings within reach, pt call for assistance appropriately    Problem: Mobility  Goal: Risk for activity intolerance will decrease  Outcome: PROGRESSING AS EXPECTED  Pt. Up to bathroom standby assist with FWW

## 2019-01-28 NOTE — CARE PLAN
Problem: Safety  Goal: Will remain free from injury  Outcome: PROGRESSING AS EXPECTED  Hourly rounds done. Call light within reach. Needs attended on a timely manner. Treaded socks on when OOB. Educated on the importance of calling for assistance when attempting to be OOB.  BED ALARM ON.    Problem: Infection  Goal: Will remain free from infection  Outcome: PROGRESSING AS EXPECTED  Hand hygiene advocated. Educated on measures on how to prevent infection.     Problem: Venous Thromboembolism (VTW)/Deep Vein Thrombosis (DVT) Prevention:  Goal: Patient will participate in Venous Thrombosis (VTE)/Deep Vein Thrombosis (DVT)Prevention Measures  Encourage early mobilization. Refused SCDs    Problem: Bowel/Gastric:  Goal: Normal bowel function is maintained or improved  Outcome: PROGRESSING AS EXPECTED  Last BM 1/27/19 per patient    Problem: Discharge Barriers/Planning  Goal: Patient's continuum of care needs will be met  Outcome: PROGRESSING AS EXPECTED  Accepted by [facility], a/w group home acceptance per LSW latest notes.    Problem: Mobility  Goal: Risk for activity intolerance will decrease  Outcome: PROGRESSING AS EXPECTED  Up SBA using a 4WW

## 2019-01-28 NOTE — PROGRESS NOTES
[facility] Medicine Daily Progress Note    Date of Service  1/28/2019    Chief Complaint  89 y.o. female admitted 11/13/2018 with fall.    Hospital Course    Patient is a pleasant 89 year old woman with history of HTN who presented following a fall and was found to have an acute ST elevation MI.  After discussion with her family, she was placed on comfort care.          Interval Problem Update  No significant change  She remains axox3  No complaints  Ros negative    Consultants/Specialty  Cardiology  Critical Care    Code Status  DNR/Comfort care    Disposition   vs home with hospice       Review of Systems  Review of Systems   Constitutional: Negative for chills.   HENT: Negative for hearing loss.    Eyes: Negative for pain.   Respiratory: Negative for cough and shortness of breath.    Cardiovascular: Negative for chest pain and leg swelling.   Gastrointestinal: Negative for nausea.   Genitourinary: Negative for dysuria.   Musculoskeletal: Negative for back pain.   Neurological: Positive for weakness. Negative for focal weakness.        Physical Exam  Temp:  [36.3 °C (97.3 °F)-36.8 °C (98.2 °F)] 36.3 °C (97.3 °F)  Pulse:  [63-81] 74  Resp:  [16-17] 16  BP: (102-114)/(59-64) 111/59  SpO2:  [92 %-95 %] 92 %    Physical Exam   Constitutional: She is oriented to person, place, and time.   HENT:   Head: Normocephalic and atraumatic.   Nose: Nose normal.   Mouth/Throat: Oropharynx is clear and moist. No oropharyngeal exudate.   Eyes: Pupils are equal, round, and reactive to light. Conjunctivae and EOM are normal.   Neck: Normal range of motion. Neck supple. No JVD present. No thyromegaly present.   Cardiovascular: Normal rate, regular rhythm and normal heart sounds.  Exam reveals no gallop and no friction rub.    No murmur heard.  Pulmonary/Chest: Effort normal and breath sounds normal. No stridor. No respiratory distress. She has no rales. She exhibits no tenderness.   Abdominal: Soft. Bowel sounds are normal. She  exhibits no distension. There is no tenderness.   Musculoskeletal: Normal range of motion. She exhibits no edema or tenderness.   Lymphadenopathy:     She has no cervical adenopathy.   Neurological: She is alert and oriented to person, place, and time. She displays normal reflexes. No cranial nerve deficit.   Skin: Skin is warm and dry. No rash noted. No erythema.   Psychiatric: Her behavior is normal.   Nursing note and vitals reviewed.      Fluids  No intake or output data in the 24 hours ending 01/28/19 1206    Laboratory                        Imaging  DX-CHEST-PORTABLE (1 VIEW)   Final Result         1.  Hazy interstitial left pulmonary opacities suggests interstitial edema or infiltrate, similar to prior.   2.  Trace left pleural effusion   3.  Hyperexpansion of lungs favors changes of COPD.      DX-CHEST-PORTABLE (1 VIEW)   Final Result         1.  Interstitial infiltrates or edema, stable.   2.  Atherosclerosis      DX-CHEST-PORTABLE (1 VIEW)   Final Result         1.  Interstitial infiltrates or edema.   2.  Atherosclerosis      DX-CHEST-PORTABLE (1 VIEW)   Final Result      Moderate interstitial edema or interstitial lung disease and small left pleural effusion similar to previous.      DX-CHEST-PORTABLE (1 VIEW)   Final Result      Stable interstitial edema and/or interstitial lung disease with probable small pleural fluid      DX-CHEST-PORTABLE (1 VIEW)   Final Result      Ill-defined infrahilar interstitial opacities, atelectasis versus mild edema. No significant pleural effusions.      DX-CHEST-PORTABLE (1 VIEW)   Final Result      Mild left basilar atelectasis, improved since prior. No new consolidation or large pleural effusions.      DX-CHEST-PORTABLE (1 VIEW)   Final Result      1.  Left basilar opacification may be secondary to atelectasis. Developing pneumonia cannot be excluded.         DX-CHEST-PORTABLE (1 VIEW)   Final Result      1.  Unchanged mild interstitial pulmonary edema   2.  Unchanged  bibasilar atelectasis, alveolar edema or pneumonia      DX-CHEST-PORTABLE (1 VIEW)   Final Result      1.  Decreased pulmonary edema   2.  Unchanged bibasilar atelectasis, alveolar edema or pneumonia      DX-CHEST-PORTABLE (1 VIEW)   Final Result      1.  There is diffuse interstitial and alveolar density in the right mid and lower lung zone. This is increased since the previous study. This may represent mild pulmonary edema/consolidation.   2.  Mildly enlarged cardiomediastinal silhouette when compared with the previous chest x-ray.      DX-CHEST-PORTABLE (1 VIEW)   Final Result      Stable mild pulmonary edema.   New left basilar atelectasis.      DX-CHEST-PORTABLE (1 VIEW)   Final Result      Stable chest appearance compared with 11/16.      DX-CHEST-PORTABLE (1 VIEW)   Final Result      No acute cardiopulmonary abnormality identified.      DX-CHEST-PORTABLE (1 VIEW)   Final Result      No acute cardiopulmonary abnormality. No interval change.      DX-CHEST-PORTABLE (1 VIEW)   Final Result      No acute cardiopulmonary disease.      CT-HEAD W/O   Final Result      1.  No evidence of acute intracranial process.      2.  There is again seen interstitial pulmonary edema and bibasilar atelectasis.      CT-HIP W/O PLUS RECONS LEFT   Final Result      1.  No evidence of acute fracture or malalignment. No evidence of hardware failure or complication.   2.  Postsurgical changes of the left femur with broken screw fragment redemonstrated.   3.  Demineralization.   4.  Scattered colonic diverticula.   5.  Atherosclerosis.   6.  Small fat-containing umbilical hernia.      EC-ECHOCARDIOGRAM COMPLETE W/O CONT   Final Result      DX-HIP-COMPLETE - UNILATERAL 2+ LEFT   Final Result      1.  No definite acute osseous abnormality. In setting of demineralization, an occult fracture is difficult to exclude. If there is strong clinical suspicion, CT or MRI could be obtained for further evaluation.   2.  Postsurgical changes of the  left femur with broken screw fragment redemonstrated.      DX-CHEST-PORTABLE (1 VIEW)   Final Result      No acute cardiopulmonary process is seen.      Atherosclerotic plaque.           Assessment/Plan  * Fall- (present on admission)   Assessment & Plan    Chronic debility  Continue fall precautions.  Continue comfort care     ST elevation myocardial infarction involving right coronary artery (HCC)- (present on admission)   Assessment & Plan    Continue current meds  Continue comfort care     Positive QuantiFERON-TB Gold test   Assessment & Plan      AFB negative        Chronic kidney disease (CKD), stage III (moderate) (HCC)- (present on admission)   Assessment & Plan    Continue comfort care.     Elevated brain natriuretic peptide (BNP) level- (present on admission)   Assessment & Plan    Continue lasix as needed for comfort.            Essential hypertension- (present on admission)   Assessment & Plan               Pain of left hip joint- (present on admission)   Assessment & Plan    Likely secondary to OA. Currently denies pain.  As needed pain meds available.     Advanced directives, counseling/discussion- (present on admission)   Assessment & Plan    Continue comfort care.            VTE prophylaxis: comfort care

## 2019-01-29 ENCOUNTER — PATIENT OUTREACH (OUTPATIENT)
Dept: HEALTH INFORMATION MANAGEMENT | Facility: OTHER | Age: 84
End: 2019-01-29

## 2019-01-29 PROCEDURE — 770001 HCHG ROOM/CARE - MED/SURG/GYN PRIV*

## 2019-01-29 PROCEDURE — A9270 NON-COVERED ITEM OR SERVICE: HCPCS | Performed by: HOSPITALIST

## 2019-01-29 PROCEDURE — 99231 SBSQ HOSP IP/OBS SF/LOW 25: CPT | Performed by: HOSPITALIST

## 2019-01-29 PROCEDURE — 700102 HCHG RX REV CODE 250 W/ 637 OVERRIDE(OP): Performed by: HOSPITALIST

## 2019-01-29 RX ADMIN — ACETAMINOPHEN 500 MG: 500 TABLET ORAL at 12:36

## 2019-01-29 ASSESSMENT — ENCOUNTER SYMPTOMS
COUGH: 0
WHEEZING: 0
CHILLS: 0
TREMORS: 0
EYE DISCHARGE: 0
STRIDOR: 0
NECK PAIN: 0
ABDOMINAL PAIN: 1
WEAKNESS: 1
FEVER: 0
PALPITATIONS: 0
BACK PAIN: 0
FOCAL WEAKNESS: 0
SHORTNESS OF BREATH: 0
DIARRHEA: 0
EYE REDNESS: 0
DIAPHORESIS: 0
SPEECH CHANGE: 0
FLANK PAIN: 0
SENSORY CHANGE: 0
VOMITING: 0

## 2019-01-29 ASSESSMENT — PATIENT HEALTH QUESTIONNAIRE - PHQ9
SUM OF ALL RESPONSES TO PHQ9 QUESTIONS 1 AND 2: 0
1. LITTLE INTEREST OR PLEASURE IN DOING THINGS: NOT AT ALL
2. FEELING DOWN, DEPRESSED, IRRITABLE, OR HOPELESS: NOT AT ALL

## 2019-01-29 NOTE — PROGRESS NOTES
[facility] Medicine Daily Progress Note    Date of Service  1/29/2019    Chief Complaint  89 y.o. female admitted 11/13/2018 with fall.    Hospital Course      Patient is a pleasant 89 year old woman with history of HTN who presented following a fall and was found to have an acute ST elevation MI.  After discussion with her family, she was placed on comfort care    Interval Problem Update    Mild abdominal pain after breakfast.  No nausea vomiting.     Consultants/Specialty  Palliative care  Critical care, cardiology    Code Status  Comfort care    Disposition     assist with discharge planning anticipate to group home    Review of Systems  Review of Systems   Constitutional: Negative for chills, diaphoresis, fever and malaise/fatigue.   HENT: Negative for congestion.    Eyes: Negative for discharge and redness.   Respiratory: Negative for cough, shortness of breath, wheezing and stridor.    Cardiovascular: Negative for chest pain, palpitations and leg swelling.   Gastrointestinal: Positive for abdominal pain (Mild). Negative for diarrhea and vomiting.   Genitourinary: Negative for flank pain and hematuria.   Musculoskeletal: Negative for back pain, joint pain and neck pain.   Neurological: Positive for weakness. Negative for tremors, sensory change, speech change and focal weakness.        Physical Exam  Temp:  [36.1 °C (97 °F)-36.9 °C (98.4 °F)] 36.1 °C (97 °F)  Pulse:  [69-74] 69  Resp:  [16-17] 16  BP: (104-114)/(53-69) 104/53  SpO2:  [92 %-94 %] 94 %    Physical Exam   Constitutional: No distress.   Frail, calm, cooperative   HENT:   Head: Normocephalic and atraumatic.   Eyes: EOM are normal. Right eye exhibits no discharge. Left eye exhibits no discharge. No scleral icterus.   Neck: Neck supple. No JVD present.   Cardiovascular: Normal rate and regular rhythm.    No murmur heard.  Pulmonary/Chest: Effort normal. No stridor. She has no wheezes. She has no rales.   Abdominal: Soft. Bowel sounds are  [facility] Nurse Msg Pool                   4/10/2017 colonoscopy / tubular adenoma / family history colon cancer / [facility] pharmacy / last colon 2008 Dr. Yuan          normal. She exhibits no distension. There is no tenderness.   Musculoskeletal: She exhibits no edema or tenderness.   Neurological: She is alert. No cranial nerve deficit.   Oriented x2   Skin: Skin is warm and dry. She is not diaphoretic. No pallor.   Vitals reviewed.      Fluids  No intake or output data in the 24 hours ending 01/29/19 1549    Laboratory                        Imaging  DX-CHEST-PORTABLE (1 VIEW)   Final Result         1.  Hazy interstitial left pulmonary opacities suggests interstitial edema or infiltrate, similar to prior.   2.  Trace left pleural effusion   3.  Hyperexpansion of lungs favors changes of COPD.      DX-CHEST-PORTABLE (1 VIEW)   Final Result         1.  Interstitial infiltrates or edema, stable.   2.  Atherosclerosis      DX-CHEST-PORTABLE (1 VIEW)   Final Result         1.  Interstitial infiltrates or edema.   2.  Atherosclerosis      DX-CHEST-PORTABLE (1 VIEW)   Final Result      Moderate interstitial edema or interstitial lung disease and small left pleural effusion similar to previous.      DX-CHEST-PORTABLE (1 VIEW)   Final Result      Stable interstitial edema and/or interstitial lung disease with probable small pleural fluid      DX-CHEST-PORTABLE (1 VIEW)   Final Result      Ill-defined infrahilar interstitial opacities, atelectasis versus mild edema. No significant pleural effusions.      DX-CHEST-PORTABLE (1 VIEW)   Final Result      Mild left basilar atelectasis, improved since prior. No new consolidation or large pleural effusions.      DX-CHEST-PORTABLE (1 VIEW)   Final Result      1.  Left basilar opacification may be secondary to atelectasis. Developing pneumonia cannot be excluded.         DX-CHEST-PORTABLE (1 VIEW)   Final Result      1.  Unchanged mild interstitial pulmonary edema   2.  Unchanged bibasilar atelectasis, alveolar edema or pneumonia      DX-CHEST-PORTABLE (1 VIEW)   Final Result      1.  Decreased pulmonary edema   2.  Unchanged bibasilar atelectasis,  alveolar edema or pneumonia      DX-CHEST-PORTABLE (1 VIEW)   Final Result      1.  There is diffuse interstitial and alveolar density in the right mid and lower lung zone. This is increased since the previous study. This may represent mild pulmonary edema/consolidation.   2.  Mildly enlarged cardiomediastinal silhouette when compared with the previous chest x-ray.      DX-CHEST-PORTABLE (1 VIEW)   Final Result      Stable mild pulmonary edema.   New left basilar atelectasis.      DX-CHEST-PORTABLE (1 VIEW)   Final Result      Stable chest appearance compared with 11/16.      DX-CHEST-PORTABLE (1 VIEW)   Final Result      No acute cardiopulmonary abnormality identified.      DX-CHEST-PORTABLE (1 VIEW)   Final Result      No acute cardiopulmonary abnormality. No interval change.      DX-CHEST-PORTABLE (1 VIEW)   Final Result      No acute cardiopulmonary disease.      CT-HEAD W/O   Final Result      1.  No evidence of acute intracranial process.      2.  There is again seen interstitial pulmonary edema and bibasilar atelectasis.      CT-HIP W/O PLUS RECONS LEFT   Final Result      1.  No evidence of acute fracture or malalignment. No evidence of hardware failure or complication.   2.  Postsurgical changes of the left femur with broken screw fragment redemonstrated.   3.  Demineralization.   4.  Scattered colonic diverticula.   5.  Atherosclerosis.   6.  Small fat-containing umbilical hernia.      EC-ECHOCARDIOGRAM COMPLETE W/O CONT   Final Result      DX-HIP-COMPLETE - UNILATERAL 2+ LEFT   Final Result      1.  No definite acute osseous abnormality. In setting of demineralization, an occult fracture is difficult to exclude. If there is strong clinical suspicion, CT or MRI could be obtained for further evaluation.   2.  Postsurgical changes of the left femur with broken screw fragment redemonstrated.      DX-CHEST-PORTABLE (1 VIEW)   Final Result      No acute cardiopulmonary process is seen.      Atherosclerotic  plaque.           Assessment/Plan  * Fall- (present on admission)   Assessment & Plan    Chronic debility  Continue fall precautions.  Continue comfort care     ST elevation myocardial infarction involving right coronary artery (HCC)- (present on admission)   Assessment & Plan    No new chest pain  Comfort care measures     Positive QuantiFERON-TB Gold test   Assessment & Plan      AFB negative        Chronic kidney disease (CKD), stage III (moderate) (HCC)- (present on admission)   Assessment & Plan    Continue comfort care.     Elevated brain natriuretic peptide (BNP) level- (present on admission)   Assessment & Plan    Continue Lasix as needed for comfort.  Monitor for dyspnea     Essential hypertension- (present on admission)   Assessment & Plan          Currently normotensive  Continue diuretics     Pain of left hip joint- (present on admission)   Assessment & Plan    Likely secondary to OA. Currently denies pain.  As needed pain meds available.     Advanced directives, counseling/discussion- (present on admission)   Assessment & Plan    Continue comfort care.          VTE prophylaxis: Comfort care  Discussed with multidisciplinary team plan of care.  Group home to assess.

## 2019-01-29 NOTE — PROGRESS NOTES
Per chart review the patient remains at [facility].  Plan: will monitor for discharge from the hospital.

## 2019-01-29 NOTE — CARE PLAN
Problem: Safety  Goal: Will remain free from falls    Intervention: Implement fall precautions  Pt calls appropriately, treaded socks in use. Personal belongings and call light with in reach and bed is locked in lowest position. Hourly rounding in practice.      Problem: Infection  Goal: Will remain free from infection    Intervention: Implement standard precautions and perform hand washing before and after patient contact  Proper use of PPE at all times  hand hygiene implemented upon entry and exit of patients room.  Education provided on keeping hands clean and wound clean.

## 2019-01-29 NOTE — PROGRESS NOTES
Assumed care of patient at 0700. Patient resting in bed, refusing to get oob at this time. Patient refusing turns and repositioning states she does not want to be woken to be turned if she is sleeping.

## 2019-01-30 PROCEDURE — A9270 NON-COVERED ITEM OR SERVICE: HCPCS | Performed by: HOSPITALIST

## 2019-01-30 PROCEDURE — 700102 HCHG RX REV CODE 250 W/ 637 OVERRIDE(OP): Performed by: HOSPITALIST

## 2019-01-30 PROCEDURE — 99231 SBSQ HOSP IP/OBS SF/LOW 25: CPT | Performed by: HOSPITALIST

## 2019-01-30 PROCEDURE — 770001 HCHG ROOM/CARE - MED/SURG/GYN PRIV*

## 2019-01-30 RX ADMIN — SENNOSIDES AND DOCUSATE SODIUM 2 TABLET: 8.6; 5 TABLET ORAL at 06:57

## 2019-01-30 RX ADMIN — FUROSEMIDE 20 MG: 20 TABLET ORAL at 06:57

## 2019-01-30 RX ADMIN — SENNOSIDES AND DOCUSATE SODIUM 2 TABLET: 8.6; 5 TABLET ORAL at 17:26

## 2019-01-30 ASSESSMENT — ENCOUNTER SYMPTOMS
FOCAL WEAKNESS: 0
FEVER: 0
SPEECH CHANGE: 0
SHORTNESS OF BREATH: 0
COUGH: 0
CHILLS: 0
BACK PAIN: 0
DIARRHEA: 0
ABDOMINAL PAIN: 0
NECK PAIN: 0
WEAKNESS: 1
VOMITING: 0

## 2019-01-30 NOTE — PROGRESS NOTES
Received change of shift report on patient . Patient is alert and oriented. Ambulatory with SBA and walker. No complaints of pain at this time.

## 2019-01-31 ENCOUNTER — PATIENT OUTREACH (OUTPATIENT)
Dept: HEALTH INFORMATION MANAGEMENT | Facility: OTHER | Age: 84
End: 2019-01-31

## 2019-01-31 PROBLEM — K59.00 CONSTIPATION: Status: ACTIVE | Noted: 2019-01-31

## 2019-01-31 PROCEDURE — 700102 HCHG RX REV CODE 250 W/ 637 OVERRIDE(OP): Performed by: HOSPITALIST

## 2019-01-31 PROCEDURE — 99231 SBSQ HOSP IP/OBS SF/LOW 25: CPT | Performed by: HOSPITALIST

## 2019-01-31 PROCEDURE — A9270 NON-COVERED ITEM OR SERVICE: HCPCS | Performed by: HOSPITALIST

## 2019-01-31 PROCEDURE — 770001 HCHG ROOM/CARE - MED/SURG/GYN PRIV*

## 2019-01-31 RX ADMIN — FUROSEMIDE 20 MG: 20 TABLET ORAL at 06:27

## 2019-01-31 RX ADMIN — SENNOSIDES AND DOCUSATE SODIUM 2 TABLET: 8.6; 5 TABLET ORAL at 06:27

## 2019-01-31 ASSESSMENT — ENCOUNTER SYMPTOMS
DIARRHEA: 0
NECK PAIN: 0
CONSTIPATION: 1
COUGH: 0
SHORTNESS OF BREATH: 0
BACK PAIN: 0
VOMITING: 0
ABDOMINAL PAIN: 0
WEAKNESS: 1
FOCAL WEAKNESS: 0
SPEECH CHANGE: 0

## 2019-01-31 NOTE — PROGRESS NOTES
Situation    Pt on [facility] services with goal to be able to identify and utilize community resources to remain independent in her home. While following pt outpatient she was and remains admitted at [facility] ([facility]) since 11/13/2018 due to ground level fall. Current concerns related to safe discharge planning.    Background       Per chart review there are concerns related to pt's safety in returning home independently. Pt does not currently qualify for Medicaid funded programs such as the Home and Community Based Waiver for in-home care services or group home care services due to her assets, specifically her cash surrender value (CSV) of her life insurances being over the asset amount allowed of $2000.    Assessment    BURTON has collaborated with inpatient staff regarding pt's inability to qualify for programs under Medicaid such as the group home waiver until she is able to spend down her assets.    Recommendation BURTON CC and RN CC collaborated with Community Care Management  regarding case. Manager will review to provide recommendations.

## 2019-01-31 NOTE — PROGRESS NOTES
[facility] Medicine Daily Progress Note    Date of Service  1/31/2019    Chief Complaint  89 y.o. female admitted 11/13/2018 with fall.    Hospital Course      Patient is a pleasant 89 year old woman with history of HTN who presented following a fall and was found to have an acute ST elevation MI.  After discussion with her family, she was placed on comfort care    Interval Problem Update    Had bowel movement.  Reports abdominal discomfort much improved.    Consultants/Specialty  Palliative care  Critical care, cardiology    Code Status  Comfort care    Disposition     assist with discharge planning anticipate to group home    Review of Systems  Review of Systems   Respiratory: Negative for cough and shortness of breath.    Cardiovascular: Negative for chest pain and leg swelling.   Gastrointestinal: Positive for constipation (Resolved). Negative for abdominal pain, diarrhea and vomiting.   Musculoskeletal: Negative for back pain, joint pain and neck pain.   Neurological: Positive for weakness. Negative for speech change and focal weakness.        Physical Exam  Temp:  [36.6 °C (97.8 °F)-36.6 °C (97.9 °F)] 36.6 °C (97.8 °F)  Pulse:  [69-79] 69  Resp:  [16-17] 17  BP: (118-119)/(61-66) 119/61  SpO2:  [92 %-94 %] 92 %    Physical Exam   Constitutional: No distress.   Frail   HENT:   Head: Normocephalic and atraumatic.   Eyes: EOM are normal. Right eye exhibits no discharge. Left eye exhibits no discharge. No scleral icterus.   Neck: Neck supple.   Cardiovascular: Normal rate and regular rhythm.    No murmur heard.  Pulmonary/Chest: No stridor. She has no wheezes. She has no rales.   Abdominal: Soft. She exhibits no distension. There is no tenderness.   Musculoskeletal: She exhibits no edema or tenderness.   Neurological: She is alert. No cranial nerve deficit.   Oriented x2  No gross focal weakness   Skin: Skin is warm and dry. She is not diaphoretic. No pallor.   Psychiatric: Cognition and memory are  impaired.   Vitals reviewed.      Fluids  No intake or output data in the 24 hours ending 01/31/19 1515    Laboratory                        Imaging  DX-CHEST-PORTABLE (1 VIEW)   Final Result         1.  Hazy interstitial left pulmonary opacities suggests interstitial edema or infiltrate, similar to prior.   2.  Trace left pleural effusion   3.  Hyperexpansion of lungs favors changes of COPD.      DX-CHEST-PORTABLE (1 VIEW)   Final Result         1.  Interstitial infiltrates or edema, stable.   2.  Atherosclerosis      DX-CHEST-PORTABLE (1 VIEW)   Final Result         1.  Interstitial infiltrates or edema.   2.  Atherosclerosis      DX-CHEST-PORTABLE (1 VIEW)   Final Result      Moderate interstitial edema or interstitial lung disease and small left pleural effusion similar to previous.      DX-CHEST-PORTABLE (1 VIEW)   Final Result      Stable interstitial edema and/or interstitial lung disease with probable small pleural fluid      DX-CHEST-PORTABLE (1 VIEW)   Final Result      Ill-defined infrahilar interstitial opacities, atelectasis versus mild edema. No significant pleural effusions.      DX-CHEST-PORTABLE (1 VIEW)   Final Result      Mild left basilar atelectasis, improved since prior. No new consolidation or large pleural effusions.      DX-CHEST-PORTABLE (1 VIEW)   Final Result      1.  Left basilar opacification may be secondary to atelectasis. Developing pneumonia cannot be excluded.         DX-CHEST-PORTABLE (1 VIEW)   Final Result      1.  Unchanged mild interstitial pulmonary edema   2.  Unchanged bibasilar atelectasis, alveolar edema or pneumonia      DX-CHEST-PORTABLE (1 VIEW)   Final Result      1.  Decreased pulmonary edema   2.  Unchanged bibasilar atelectasis, alveolar edema or pneumonia      DX-CHEST-PORTABLE (1 VIEW)   Final Result      1.  There is diffuse interstitial and alveolar density in the right mid and lower lung zone. This is increased since the previous study. This may represent mild  pulmonary edema/consolidation.   2.  Mildly enlarged cardiomediastinal silhouette when compared with the previous chest x-ray.      DX-CHEST-PORTABLE (1 VIEW)   Final Result      Stable mild pulmonary edema.   New left basilar atelectasis.      DX-CHEST-PORTABLE (1 VIEW)   Final Result      Stable chest appearance compared with 11/16.      DX-CHEST-PORTABLE (1 VIEW)   Final Result      No acute cardiopulmonary abnormality identified.      DX-CHEST-PORTABLE (1 VIEW)   Final Result      No acute cardiopulmonary abnormality. No interval change.      DX-CHEST-PORTABLE (1 VIEW)   Final Result      No acute cardiopulmonary disease.      CT-HEAD W/O   Final Result      1.  No evidence of acute intracranial process.      2.  There is again seen interstitial pulmonary edema and bibasilar atelectasis.      CT-HIP W/O PLUS RECONS LEFT   Final Result      1.  No evidence of acute fracture or malalignment. No evidence of hardware failure or complication.   2.  Postsurgical changes of the left femur with broken screw fragment redemonstrated.   3.  Demineralization.   4.  Scattered colonic diverticula.   5.  Atherosclerosis.   6.  Small fat-containing umbilical hernia.      EC-ECHOCARDIOGRAM COMPLETE W/O CONT   Final Result      DX-HIP-COMPLETE - UNILATERAL 2+ LEFT   Final Result      1.  No definite acute osseous abnormality. In setting of demineralization, an occult fracture is difficult to exclude. If there is strong clinical suspicion, CT or MRI could be obtained for further evaluation.   2.  Postsurgical changes of the left femur with broken screw fragment redemonstrated.      DX-CHEST-PORTABLE (1 VIEW)   Final Result      No acute cardiopulmonary process is seen.      Atherosclerotic plaque.           Assessment/Plan  * Fall- (present on admission)   Assessment & Plan    Chronic debility   fall precautions.  Continue comfort care  Discussed with  who is looking into and assisting with group home placement       elevation myocardial infarction involving right coronary artery (HCC)- (present on admission)   Assessment & Plan    No new chest pain.  Vital stable  Comfort care measures     Positive QuantiFERON-TB Gold test   Assessment & Plan      AFB negative        Chronic kidney disease (CKD), stage III (moderate) (HCC)- (present on admission)   Assessment & Plan    Continue comfort care.     Elevated brain natriuretic peptide (BNP) level- (present on admission)   Assessment & Plan    Continue Lasix as needed for comfort.  Monitor for dyspnea.      Essential hypertension- (present on admission)   Assessment & Plan          Currently normotensive  Continue diuretics     Constipation   Assessment & Plan    Improved . continue bowel protocols     Pain of left hip joint- (present on admission)   Assessment & Plan    Likely secondary to OA. Currently denies pain.  As needed pain meds available.     Advanced directives, counseling/discussion- (present on admission)   Assessment & Plan    Continue comfort care.          VTE prophylaxis: Comfort care      Discussed with multidisciplinary team plan of care.

## 2019-01-31 NOTE — PROGRESS NOTES
RN CC and MARINA CC collaborated with Community Care Management manager about the case. Manager will review the case to provide recommendations.

## 2019-01-31 NOTE — PROGRESS NOTES
[facility] Medicine Daily Progress Note    Date of Service  1/30/2019    Chief Complaint  89 y.o. female admitted 11/13/2018 with fall.    Hospital Course      Patient is a pleasant 89 year old woman with history of HTN who presented following a fall and was found to have an acute ST elevation MI.  After discussion with her family, she was placed on comfort care    Interval Problem Update    Tolerated diet.  No complaints of pain.    Consultants/Specialty  Palliative care  Critical care, cardiology    Code Status  Comfort care    Disposition     assist with discharge planning anticipate to group home    Review of Systems  Review of Systems   Constitutional: Negative for chills and fever.   Respiratory: Negative for cough and shortness of breath.    Cardiovascular: Negative for chest pain and leg swelling.   Gastrointestinal: Negative for abdominal pain, diarrhea and vomiting.   Musculoskeletal: Negative for back pain, joint pain and neck pain.   Neurological: Positive for weakness. Negative for speech change and focal weakness.        Physical Exam  Temp:  [36.2 °C (97.1 °F)-36.7 °C (98 °F)] 36.2 °C (97.1 °F)  Pulse:  [68-80] 71  Resp:  [16-17] 16  BP: (102-121)/(65-83) 121/65  SpO2:  [90 %-96 %] 90 %    Physical Exam   Constitutional: No distress.   HENT:   Head: Normocephalic and atraumatic.   Eyes: EOM are normal. Right eye exhibits no discharge. Left eye exhibits no discharge. No scleral icterus.   Neck: Neck supple. No JVD present.   Cardiovascular: Normal rate and regular rhythm.    No murmur heard.  Pulmonary/Chest: Effort normal. No stridor. She has no wheezes. She has no rales.   Abdominal: Soft. She exhibits no distension. There is no tenderness.   Musculoskeletal: She exhibits no edema or tenderness.   Neurological: She is alert. No cranial nerve deficit.   Oriented x2  No gross focal weakness   Skin: Skin is warm and dry. She is not diaphoretic. No pallor.   Psychiatric: Cognition and memory  are impaired.   Vitals reviewed.      Fluids  No intake or output data in the 24 hours ending 01/30/19 1641    Laboratory                        Imaging  DX-CHEST-PORTABLE (1 VIEW)   Final Result         1.  Hazy interstitial left pulmonary opacities suggests interstitial edema or infiltrate, similar to prior.   2.  Trace left pleural effusion   3.  Hyperexpansion of lungs favors changes of COPD.      DX-CHEST-PORTABLE (1 VIEW)   Final Result         1.  Interstitial infiltrates or edema, stable.   2.  Atherosclerosis      DX-CHEST-PORTABLE (1 VIEW)   Final Result         1.  Interstitial infiltrates or edema.   2.  Atherosclerosis      DX-CHEST-PORTABLE (1 VIEW)   Final Result      Moderate interstitial edema or interstitial lung disease and small left pleural effusion similar to previous.      DX-CHEST-PORTABLE (1 VIEW)   Final Result      Stable interstitial edema and/or interstitial lung disease with probable small pleural fluid      DX-CHEST-PORTABLE (1 VIEW)   Final Result      Ill-defined infrahilar interstitial opacities, atelectasis versus mild edema. No significant pleural effusions.      DX-CHEST-PORTABLE (1 VIEW)   Final Result      Mild left basilar atelectasis, improved since prior. No new consolidation or large pleural effusions.      DX-CHEST-PORTABLE (1 VIEW)   Final Result      1.  Left basilar opacification may be secondary to atelectasis. Developing pneumonia cannot be excluded.         DX-CHEST-PORTABLE (1 VIEW)   Final Result      1.  Unchanged mild interstitial pulmonary edema   2.  Unchanged bibasilar atelectasis, alveolar edema or pneumonia      DX-CHEST-PORTABLE (1 VIEW)   Final Result      1.  Decreased pulmonary edema   2.  Unchanged bibasilar atelectasis, alveolar edema or pneumonia      DX-CHEST-PORTABLE (1 VIEW)   Final Result      1.  There is diffuse interstitial and alveolar density in the right mid and lower lung zone. This is increased since the previous study. This may represent  mild pulmonary edema/consolidation.   2.  Mildly enlarged cardiomediastinal silhouette when compared with the previous chest x-ray.      DX-CHEST-PORTABLE (1 VIEW)   Final Result      Stable mild pulmonary edema.   New left basilar atelectasis.      DX-CHEST-PORTABLE (1 VIEW)   Final Result      Stable chest appearance compared with 11/16.      DX-CHEST-PORTABLE (1 VIEW)   Final Result      No acute cardiopulmonary abnormality identified.      DX-CHEST-PORTABLE (1 VIEW)   Final Result      No acute cardiopulmonary abnormality. No interval change.      DX-CHEST-PORTABLE (1 VIEW)   Final Result      No acute cardiopulmonary disease.      CT-HEAD W/O   Final Result      1.  No evidence of acute intracranial process.      2.  There is again seen interstitial pulmonary edema and bibasilar atelectasis.      CT-HIP W/O PLUS RECONS LEFT   Final Result      1.  No evidence of acute fracture or malalignment. No evidence of hardware failure or complication.   2.  Postsurgical changes of the left femur with broken screw fragment redemonstrated.   3.  Demineralization.   4.  Scattered colonic diverticula.   5.  Atherosclerosis.   6.  Small fat-containing umbilical hernia.      EC-ECHOCARDIOGRAM COMPLETE W/O CONT   Final Result      DX-HIP-COMPLETE - UNILATERAL 2+ LEFT   Final Result      1.  No definite acute osseous abnormality. In setting of demineralization, an occult fracture is difficult to exclude. If there is strong clinical suspicion, CT or MRI could be obtained for further evaluation.   2.  Postsurgical changes of the left femur with broken screw fragment redemonstrated.      DX-CHEST-PORTABLE (1 VIEW)   Final Result      No acute cardiopulmonary process is seen.      Atherosclerotic plaque.           Assessment/Plan  * Fall- (present on admission)   Assessment & Plan    Chronic debility  Continue fall precautions.  Continue comfort care  Discharge planning discussed with -ongoing evaluation from group  home     ST elevation myocardial infarction involving right coronary artery (HCC)- (present on admission)   Assessment & Plan    No new chest pain.  Vital stable  Comfort care measures     Positive QuantiFERON-TB Gold test   Assessment & Plan      AFB negative        Chronic kidney disease (CKD), stage III (moderate) (HCC)- (present on admission)   Assessment & Plan    Continue comfort care.     Elevated brain natriuretic peptide (BNP) level- (present on admission)   Assessment & Plan    Continue Lasix as needed for comfort.  Monitor for dyspnea.      Essential hypertension- (present on admission)   Assessment & Plan          Currently normotensive  Continue diuretics     Pain of left hip joint- (present on admission)   Assessment & Plan    Likely secondary to OA. Currently denies pain.  As needed pain meds available.     Advanced directives, counseling/discussion- (present on admission)   Assessment & Plan    Continue comfort care.          VTE prophylaxis: Comfort care      Discussed with multidisciplinary team plan of care.

## 2019-01-31 NOTE — CARE PLAN
Problem: Safety  Goal: Will remain free from injury  Outcome: PROGRESSING AS EXPECTED  Bed in the lowest locked position. Call light within reach. Bed alarm in use. Hourly rounding in place.     Problem: Bowel/Gastric:  Goal: Normal bowel function is maintained or improved  Outcome: PROGRESSING AS EXPECTED      Problem: Mobility  Goal: Risk for activity intolerance will decrease  Outcome: PROGRESSING AS EXPECTED  Pt encourage to ambulate to bathroom and up to chair for every meal.

## 2019-02-01 ENCOUNTER — PATIENT OUTREACH (OUTPATIENT)
Dept: HEALTH INFORMATION MANAGEMENT | Facility: OTHER | Age: 84
End: 2019-02-01

## 2019-02-01 PROCEDURE — 770001 HCHG ROOM/CARE - MED/SURG/GYN PRIV*

## 2019-02-01 PROCEDURE — 99231 SBSQ HOSP IP/OBS SF/LOW 25: CPT | Performed by: HOSPITALIST

## 2019-02-01 ASSESSMENT — ENCOUNTER SYMPTOMS
COUGH: 0
WEAKNESS: 1
DIARRHEA: 0
NECK PAIN: 0
ABDOMINAL PAIN: 0
PALPITATIONS: 0
SHORTNESS OF BREATH: 0
SPEECH CHANGE: 0
FOCAL WEAKNESS: 0
CONSTIPATION: 0
BACK PAIN: 0
VOMITING: 0

## 2019-02-01 NOTE — PROGRESS NOTES
The patient remains at the hospital. Medical needs to be met by inpatient staff. Discussed CCRN signing off at this time with [facility] SW and Manager.  Plan: will not continue to follow.

## 2019-02-01 NOTE — CARE PLAN
Problem: Safety  Goal: Will remain free from injury  Outcome: PROGRESSING AS EXPECTED  Hourly rounds done. Call light within reach. Needs attended on a timely manner. Treaded socks on when OOB. Educated on the importance of calling for assistance when attempting to be OOB.  BED ALARM ON.    Problem: Venous Thromboembolism (VTW)/Deep Vein Thrombosis (DVT) Prevention:  Goal: Patient will participate in Venous Thrombosis (VTE)/Deep Vein Thrombosis (DVT)Prevention Measures  Outcome: PROGRESSING AS EXPECTED  Encourage early mobilization. Refused SCDs    Problem: Bowel/Gastric:  Goal: Normal bowel function is maintained or improved  Outcome: PROGRESSING AS EXPECTED  Refusing stool softeners. Last BM 1/31/19

## 2019-02-01 NOTE — CARE PLAN
Problem: Safety  Goal: Will remain free from injury  Outcome: PROGRESSING AS EXPECTED  Bed in the lowest locked position. Call light within reach. Bed alarm in use. Hourly rounding in place.     Problem: Bowel/Gastric:  Goal: Normal bowel function is maintained or improved  Outcome: PROGRESSING AS EXPECTED      Problem: Discharge Barriers/Planning  Goal: Patient's continuum of care needs will be met  Pending group home placement

## 2019-02-02 PROCEDURE — 99231 SBSQ HOSP IP/OBS SF/LOW 25: CPT | Performed by: HOSPITALIST

## 2019-02-02 PROCEDURE — 770001 HCHG ROOM/CARE - MED/SURG/GYN PRIV*

## 2019-02-02 ASSESSMENT — ENCOUNTER SYMPTOMS
CONSTIPATION: 0
SHORTNESS OF BREATH: 0
ABDOMINAL PAIN: 0
SPEECH CHANGE: 0
DIARRHEA: 0
COUGH: 0
WEAKNESS: 1
FOCAL WEAKNESS: 0
BACK PAIN: 0
VOMITING: 0

## 2019-02-02 NOTE — CARE PLAN
Problem: Safety  Goal: Will remain free from injury  Outcome: PROGRESSING AS EXPECTED  Hourly rounds done. Call light within reach. Needs attended on a timely manner. Treaded socks on when OOB. Educated on the importance of calling for assistance when attempting to be OOB.  BED ALARM ON.    Problem: Bowel/Gastric:  Goal: Normal bowel function is maintained or improved  Outcome: PROGRESSING AS EXPECTED  Last BM 1/31/19    Problem: Discharge Barriers/Planning  Goal: Patient's continuum of care needs will be met  Outcome: PROGRESSING AS EXPECTED  [facility]     Problem: Skin Integrity  Goal: Risk for impaired skin integrity will decrease  Outcome: PROGRESSING SLOWER THAN EXPECTED  Pt refuses waffle overlay and foam dressing.     Problem: Mobility  Goal: Risk for activity intolerance will decrease  Outcome: PROGRESSING AS EXPECTED  Up SBA with a 4WW

## 2019-02-02 NOTE — PROGRESS NOTES
[facility] Medicine Daily Progress Note    Date of Service  2/2/2019    Chief Complaint  89 y.o. female admitted 11/13/2018 with fall.    Hospital Course      Patient is a pleasant 89 year old woman with history of HTN who presented following a fall and was found to have an acute ST elevation MI.  After discussion with her family, she was placed on comfort care    Interval Problem Update    No chest pain or abdominal discomfort reported.  Inconsistent oral intake.     Consultants/Specialty  Palliative care  Critical care, cardiology    Code Status  Comfort care    Disposition     assist with discharge planning anticipate to group home    Review of Systems  Review of Systems   Respiratory: Negative for cough and shortness of breath.    Cardiovascular: Negative for chest pain.   Gastrointestinal: Negative for abdominal pain, constipation, diarrhea and vomiting.   Musculoskeletal: Negative for back pain and joint pain.   Neurological: Positive for weakness. Negative for speech change and focal weakness.        Physical Exam  Temp:  [36.7 °C (98.1 °F)-37 °C (98.6 °F)] 36.7 °C (98.1 °F)  Pulse:  [70-73] 70  Resp:  [16] 16  BP: (100-101)/(54-60) 101/60  SpO2:  [91 %-92 %] 92 %    Physical Exam   Constitutional: No distress.   Drowsy, arousable, slow with responses, moderate confusion, no apparent distress   HENT:   Head: Normocephalic and atraumatic.   Eyes: EOM are normal. Right eye exhibits no discharge. Left eye exhibits no discharge. No scleral icterus.   Cardiovascular: Normal rate and regular rhythm.    Pulmonary/Chest: She has no wheezes. She has no rales.   Abdominal: Soft. She exhibits no distension. There is no tenderness.   Musculoskeletal: She exhibits no edema or tenderness.   Neurological: She is alert. No cranial nerve deficit.   Moves all extremities spontaneously   Skin: Skin is warm and dry. She is not diaphoretic. No pallor.   Psychiatric: Cognition and memory are impaired.   Vitals  reviewed.      Fluids    Intake/Output Summary (Last 24 hours) at 02/02/19 1540  Last data filed at 02/01/19 2000   Gross per 24 hour   Intake              250 ml   Output                0 ml   Net              250 ml       Laboratory                        Imaging  DX-CHEST-PORTABLE (1 VIEW)   Final Result         1.  Hazy interstitial left pulmonary opacities suggests interstitial edema or infiltrate, similar to prior.   2.  Trace left pleural effusion   3.  Hyperexpansion of lungs favors changes of COPD.      DX-CHEST-PORTABLE (1 VIEW)   Final Result         1.  Interstitial infiltrates or edema, stable.   2.  Atherosclerosis      DX-CHEST-PORTABLE (1 VIEW)   Final Result         1.  Interstitial infiltrates or edema.   2.  Atherosclerosis      DX-CHEST-PORTABLE (1 VIEW)   Final Result      Moderate interstitial edema or interstitial lung disease and small left pleural effusion similar to previous.      DX-CHEST-PORTABLE (1 VIEW)   Final Result      Stable interstitial edema and/or interstitial lung disease with probable small pleural fluid      DX-CHEST-PORTABLE (1 VIEW)   Final Result      Ill-defined infrahilar interstitial opacities, atelectasis versus mild edema. No significant pleural effusions.      DX-CHEST-PORTABLE (1 VIEW)   Final Result      Mild left basilar atelectasis, improved since prior. No new consolidation or large pleural effusions.      DX-CHEST-PORTABLE (1 VIEW)   Final Result      1.  Left basilar opacification may be secondary to atelectasis. Developing pneumonia cannot be excluded.         DX-CHEST-PORTABLE (1 VIEW)   Final Result      1.  Unchanged mild interstitial pulmonary edema   2.  Unchanged bibasilar atelectasis, alveolar edema or pneumonia      DX-CHEST-PORTABLE (1 VIEW)   Final Result      1.  Decreased pulmonary edema   2.  Unchanged bibasilar atelectasis, alveolar edema or pneumonia      DX-CHEST-PORTABLE (1 VIEW)   Final Result      1.  There is diffuse interstitial and  alveolar density in the right mid and lower lung zone. This is increased since the previous study. This may represent mild pulmonary edema/consolidation.   2.  Mildly enlarged cardiomediastinal silhouette when compared with the previous chest x-ray.      DX-CHEST-PORTABLE (1 VIEW)   Final Result      Stable mild pulmonary edema.   New left basilar atelectasis.      DX-CHEST-PORTABLE (1 VIEW)   Final Result      Stable chest appearance compared with 11/16.      DX-CHEST-PORTABLE (1 VIEW)   Final Result      No acute cardiopulmonary abnormality identified.      DX-CHEST-PORTABLE (1 VIEW)   Final Result      No acute cardiopulmonary abnormality. No interval change.      DX-CHEST-PORTABLE (1 VIEW)   Final Result      No acute cardiopulmonary disease.      CT-HEAD W/O   Final Result      1.  No evidence of acute intracranial process.      2.  There is again seen interstitial pulmonary edema and bibasilar atelectasis.      CT-HIP W/O PLUS RECONS LEFT   Final Result      1.  No evidence of acute fracture or malalignment. No evidence of hardware failure or complication.   2.  Postsurgical changes of the left femur with broken screw fragment redemonstrated.   3.  Demineralization.   4.  Scattered colonic diverticula.   5.  Atherosclerosis.   6.  Small fat-containing umbilical hernia.      EC-ECHOCARDIOGRAM COMPLETE W/O CONT   Final Result      DX-HIP-COMPLETE - UNILATERAL 2+ LEFT   Final Result      1.  No definite acute osseous abnormality. In setting of demineralization, an occult fracture is difficult to exclude. If there is strong clinical suspicion, CT or MRI could be obtained for further evaluation.   2.  Postsurgical changes of the left femur with broken screw fragment redemonstrated.      DX-CHEST-PORTABLE (1 VIEW)   Final Result      No acute cardiopulmonary process is seen.      Atherosclerotic plaque.           Assessment/Plan  * Fall- (present on admission)   Assessment & Plan    Chronic debility  Continue  comfort care  Working on group home placement, discussed with      Elevated brain natriuretic peptide (BNP) level- (present on admission)   Assessment & Plan    No dyspnea or symptoms of heart failure  Continue with Lasix.  Monitor for acute respiratory symptoms.  Will stop Lasix if significant decline in intake     ST elevation myocardial infarction involving right coronary artery (HCC)- (present on admission)   Assessment & Plan    No new chest pain.  Vital stable  Comfort care measures     Positive QuantiFERON-TB Gold test   Assessment & Plan      AFB negative        Chronic kidney disease (CKD), stage III (moderate) (HCC)- (present on admission)   Assessment & Plan    Continue comfort care.     Essential hypertension- (present on admission)   Assessment & Plan          Currently normotensive  Continue diuretics     Constipation   Assessment & Plan    Improved . continue bowel protocols     Pain of left hip joint- (present on admission)   Assessment & Plan    Likely secondary to OA. Currently denies pain.  As needed pain meds available.     Advanced directives, counseling/discussion- (present on admission)   Assessment & Plan    Continue comfort care.          VTE prophylaxis: Comfort care

## 2019-02-02 NOTE — PROGRESS NOTES
[facility] Medicine Daily Progress Note    Date of Service  2/1/2019    Chief Complaint  89 y.o. female admitted 11/13/2018 with fall.    Hospital Course      Patient is a pleasant 89 year old woman with history of HTN who presented following a fall and was found to have an acute ST elevation MI.  After discussion with her family, she was placed on comfort care    Interval Problem Update    Patient feeling comfortable this morning.  No new complaints.    Consultants/Specialty  Palliative care  Critical care, cardiology    Code Status  Comfort care    Disposition     assist with discharge planning anticipate to group home    Review of Systems  Review of Systems   Respiratory: Negative for cough and shortness of breath.    Cardiovascular: Negative for chest pain and palpitations.   Gastrointestinal: Negative for abdominal pain, constipation, diarrhea and vomiting.   Musculoskeletal: Negative for back pain, joint pain and neck pain.   Neurological: Positive for weakness. Negative for speech change and focal weakness.        Physical Exam  Temp:  [36.8 °C (98.3 °F)] 36.8 °C (98.3 °F)  Pulse:  [82] 82  Resp:  [16] 16  BP: (118)/(69) 118/69  SpO2:  [93 %] 93 %    Physical Exam   Constitutional: No distress.   Frail   HENT:   Head: Normocephalic and atraumatic.   Eyes: EOM are normal. Right eye exhibits no discharge. Left eye exhibits no discharge. No scleral icterus.   Cardiovascular: Normal rate and regular rhythm.    Pulmonary/Chest: She has no wheezes. She has no rales.   Abdominal: Soft. She exhibits no distension. There is no tenderness.   Musculoskeletal: She exhibits no edema or tenderness.   Neurological: She is alert. No cranial nerve deficit.   Moderately confused.   No gross focal weakness   Skin: Skin is warm and dry. She is not diaphoretic. No pallor.   Psychiatric: Cognition and memory are impaired.   Vitals reviewed.      Fluids    Intake/Output Summary (Last 24 hours) at 02/01/19 1637  Last data  filed at 02/01/19 0900   Gross per 24 hour   Intake             1030 ml   Output                0 ml   Net             1030 ml       Laboratory                        Imaging  DX-CHEST-PORTABLE (1 VIEW)   Final Result         1.  Hazy interstitial left pulmonary opacities suggests interstitial edema or infiltrate, similar to prior.   2.  Trace left pleural effusion   3.  Hyperexpansion of lungs favors changes of COPD.      DX-CHEST-PORTABLE (1 VIEW)   Final Result         1.  Interstitial infiltrates or edema, stable.   2.  Atherosclerosis      DX-CHEST-PORTABLE (1 VIEW)   Final Result         1.  Interstitial infiltrates or edema.   2.  Atherosclerosis      DX-CHEST-PORTABLE (1 VIEW)   Final Result      Moderate interstitial edema or interstitial lung disease and small left pleural effusion similar to previous.      DX-CHEST-PORTABLE (1 VIEW)   Final Result      Stable interstitial edema and/or interstitial lung disease with probable small pleural fluid      DX-CHEST-PORTABLE (1 VIEW)   Final Result      Ill-defined infrahilar interstitial opacities, atelectasis versus mild edema. No significant pleural effusions.      DX-CHEST-PORTABLE (1 VIEW)   Final Result      Mild left basilar atelectasis, improved since prior. No new consolidation or large pleural effusions.      DX-CHEST-PORTABLE (1 VIEW)   Final Result      1.  Left basilar opacification may be secondary to atelectasis. Developing pneumonia cannot be excluded.         DX-CHEST-PORTABLE (1 VIEW)   Final Result      1.  Unchanged mild interstitial pulmonary edema   2.  Unchanged bibasilar atelectasis, alveolar edema or pneumonia      DX-CHEST-PORTABLE (1 VIEW)   Final Result      1.  Decreased pulmonary edema   2.  Unchanged bibasilar atelectasis, alveolar edema or pneumonia      DX-CHEST-PORTABLE (1 VIEW)   Final Result      1.  There is diffuse interstitial and alveolar density in the right mid and lower lung zone. This is increased since the previous  study. This may represent mild pulmonary edema/consolidation.   2.  Mildly enlarged cardiomediastinal silhouette when compared with the previous chest x-ray.      DX-CHEST-PORTABLE (1 VIEW)   Final Result      Stable mild pulmonary edema.   New left basilar atelectasis.      DX-CHEST-PORTABLE (1 VIEW)   Final Result      Stable chest appearance compared with 11/16.      DX-CHEST-PORTABLE (1 VIEW)   Final Result      No acute cardiopulmonary abnormality identified.      DX-CHEST-PORTABLE (1 VIEW)   Final Result      No acute cardiopulmonary abnormality. No interval change.      DX-CHEST-PORTABLE (1 VIEW)   Final Result      No acute cardiopulmonary disease.      CT-HEAD W/O   Final Result      1.  No evidence of acute intracranial process.      2.  There is again seen interstitial pulmonary edema and bibasilar atelectasis.      CT-HIP W/O PLUS RECONS LEFT   Final Result      1.  No evidence of acute fracture or malalignment. No evidence of hardware failure or complication.   2.  Postsurgical changes of the left femur with broken screw fragment redemonstrated.   3.  Demineralization.   4.  Scattered colonic diverticula.   5.  Atherosclerosis.   6.  Small fat-containing umbilical hernia.      EC-ECHOCARDIOGRAM COMPLETE W/O CONT   Final Result      DX-HIP-COMPLETE - UNILATERAL 2+ LEFT   Final Result      1.  No definite acute osseous abnormality. In setting of demineralization, an occult fracture is difficult to exclude. If there is strong clinical suspicion, CT or MRI could be obtained for further evaluation.   2.  Postsurgical changes of the left femur with broken screw fragment redemonstrated.      DX-CHEST-PORTABLE (1 VIEW)   Final Result      No acute cardiopulmonary process is seen.      Atherosclerotic plaque.           Assessment/Plan  * Fall- (present on admission)   Assessment & Plan    Chronic debility  Continue comfort care  Awaiting arrangements for group home placement.  Discussed with       Elevated brain natriuretic peptide (BNP) level- (present on admission)   Assessment & Plan    No dyspnea - continue with Lasix.  Titrate up dose if signs of volume overload, CHF     ST elevation myocardial infarction involving right coronary artery (HCC)- (present on admission)   Assessment & Plan    No new chest pain.  Vital stable  Comfort care measures     Positive QuantiFERON-TB Gold test   Assessment & Plan      AFB negative        Chronic kidney disease (CKD), stage III (moderate) (HCC)- (present on admission)   Assessment & Plan    Continue comfort care.     Essential hypertension- (present on admission)   Assessment & Plan          Currently normotensive  Continue diuretics     Constipation   Assessment & Plan    Improved . continue bowel protocols     Pain of left hip joint- (present on admission)   Assessment & Plan    Likely secondary to OA. Currently denies pain.  As needed pain meds available.     Advanced directives, counseling/discussion- (present on admission)   Assessment & Plan    Continue comfort care.          VTE prophylaxis: Comfort care

## 2019-02-02 NOTE — CARE PLAN
Problem: Urinary Elimination:  Goal: Ability to reestablish a normal urinary elimination pattern will improve  Outcome: PROGRESSING AS EXPECTED  Pt able to communicate need for elimination assistance; Addressing prn and upon rounds    Problem: Psychosocial Needs:  Goal: Level of anxiety will decrease  Outcome: PROGRESSING AS EXPECTED  Pt is comfortable at this time; Assisted pt in straightening bed linens and provided w cup of tea

## 2019-02-03 PROCEDURE — 770001 HCHG ROOM/CARE - MED/SURG/GYN PRIV*

## 2019-02-03 PROCEDURE — 99231 SBSQ HOSP IP/OBS SF/LOW 25: CPT | Performed by: HOSPITALIST

## 2019-02-03 ASSESSMENT — ENCOUNTER SYMPTOMS
COUGH: 0
VOMITING: 0
SPEECH CHANGE: 0
SHORTNESS OF BREATH: 0
FOCAL WEAKNESS: 0
ABDOMINAL PAIN: 0
DIARRHEA: 0
WEAKNESS: 1

## 2019-02-03 NOTE — PROGRESS NOTES
[facility] Medicine Daily Progress Note    Date of Service  2/3/2019    Chief Complaint  89 y.o. female admitted 11/13/2018 with fall.    Hospital Course      Patient is a pleasant 89 year old woman with history of HTN who presented following a fall and was found to have an acute ST elevation MI.  After discussion with her family, she was placed on comfort care.     Interval Problem Update    Reports no pain , no new complaints.  Fair intake with assistance.    Consultants/Specialty  Palliative care  Critical care, cardiology    Code Status  Comfort care    Disposition     assist with discharge planning anticipate to group home    Review of Systems  Review of Systems   Respiratory: Negative for cough and shortness of breath.    Cardiovascular: Negative for chest pain.   Gastrointestinal: Negative for abdominal pain, diarrhea and vomiting.   Neurological: Positive for weakness. Negative for speech change and focal weakness.        Physical Exam  Temp:  [36.6 °C (97.8 °F)-36.7 °C (98 °F)] 36.6 °C (97.8 °F)  Pulse:  [61-71] 61  Resp:  [14-18] 14  BP: (100-112)/(59-65) 112/62  SpO2:  [91 %-93 %] 93 %    Physical Exam   Constitutional: No distress.   Alert, oriented times person, no apparent distress following commands   HENT:   Head: Normocephalic and atraumatic.   Eyes: EOM are normal. Right eye exhibits no discharge. Left eye exhibits no discharge. No scleral icterus.   Cardiovascular: Normal rate and regular rhythm.    Pulmonary/Chest: She has no wheezes. She has no rales.   Abdominal: Soft. She exhibits no distension. There is no tenderness.   Musculoskeletal: She exhibits no edema or tenderness.   Neurological: She is alert.   No gross focal weakness   Skin: Skin is warm and dry. She is not diaphoretic. No pallor.   Psychiatric: Cognition and memory are impaired.   Vitals reviewed.      Fluids  No intake or output data in the 24 hours ending 02/03/19 1358    Laboratory                         Imaging  DX-CHEST-PORTABLE (1 VIEW)   Final Result         1.  Hazy interstitial left pulmonary opacities suggests interstitial edema or infiltrate, similar to prior.   2.  Trace left pleural effusion   3.  Hyperexpansion of lungs favors changes of COPD.      DX-CHEST-PORTABLE (1 VIEW)   Final Result         1.  Interstitial infiltrates or edema, stable.   2.  Atherosclerosis      DX-CHEST-PORTABLE (1 VIEW)   Final Result         1.  Interstitial infiltrates or edema.   2.  Atherosclerosis      DX-CHEST-PORTABLE (1 VIEW)   Final Result      Moderate interstitial edema or interstitial lung disease and small left pleural effusion similar to previous.      DX-CHEST-PORTABLE (1 VIEW)   Final Result      Stable interstitial edema and/or interstitial lung disease with probable small pleural fluid      DX-CHEST-PORTABLE (1 VIEW)   Final Result      Ill-defined infrahilar interstitial opacities, atelectasis versus mild edema. No significant pleural effusions.      DX-CHEST-PORTABLE (1 VIEW)   Final Result      Mild left basilar atelectasis, improved since prior. No new consolidation or large pleural effusions.      DX-CHEST-PORTABLE (1 VIEW)   Final Result      1.  Left basilar opacification may be secondary to atelectasis. Developing pneumonia cannot be excluded.         DX-CHEST-PORTABLE (1 VIEW)   Final Result      1.  Unchanged mild interstitial pulmonary edema   2.  Unchanged bibasilar atelectasis, alveolar edema or pneumonia      DX-CHEST-PORTABLE (1 VIEW)   Final Result      1.  Decreased pulmonary edema   2.  Unchanged bibasilar atelectasis, alveolar edema or pneumonia      DX-CHEST-PORTABLE (1 VIEW)   Final Result      1.  There is diffuse interstitial and alveolar density in the right mid and lower lung zone. This is increased since the previous study. This may represent mild pulmonary edema/consolidation.   2.  Mildly enlarged cardiomediastinal silhouette when compared with the previous chest x-ray.       DX-CHEST-PORTABLE (1 VIEW)   Final Result      Stable mild pulmonary edema.   New left basilar atelectasis.      DX-CHEST-PORTABLE (1 VIEW)   Final Result      Stable chest appearance compared with 11/16.      DX-CHEST-PORTABLE (1 VIEW)   Final Result      No acute cardiopulmonary abnormality identified.      DX-CHEST-PORTABLE (1 VIEW)   Final Result      No acute cardiopulmonary abnormality. No interval change.      DX-CHEST-PORTABLE (1 VIEW)   Final Result      No acute cardiopulmonary disease.      CT-HEAD W/O   Final Result      1.  No evidence of acute intracranial process.      2.  There is again seen interstitial pulmonary edema and bibasilar atelectasis.      CT-HIP W/O PLUS RECONS LEFT   Final Result      1.  No evidence of acute fracture or malalignment. No evidence of hardware failure or complication.   2.  Postsurgical changes of the left femur with broken screw fragment redemonstrated.   3.  Demineralization.   4.  Scattered colonic diverticula.   5.  Atherosclerosis.   6.  Small fat-containing umbilical hernia.      EC-ECHOCARDIOGRAM COMPLETE W/O CONT   Final Result      DX-HIP-COMPLETE - UNILATERAL 2+ LEFT   Final Result      1.  No definite acute osseous abnormality. In setting of demineralization, an occult fracture is difficult to exclude. If there is strong clinical suspicion, CT or MRI could be obtained for further evaluation.   2.  Postsurgical changes of the left femur with broken screw fragment redemonstrated.      DX-CHEST-PORTABLE (1 VIEW)   Final Result      No acute cardiopulmonary process is seen.      Atherosclerotic plaque.           Assessment/Plan  * Fall- (present on admission)   Assessment & Plan    Chronic debility  Continue comfort care  Social service looking into group home options with limited finances in consideration.      Elevated brain natriuretic peptide (BNP) level- (present on admission)   Assessment & Plan    No dyspnea or symptoms of heart failure  Continue with  Lasix.       ST elevation myocardial infarction involving right coronary artery (HCC)- (present on admission)   Assessment & Plan    No new chest pain.  Vital stable  Comfort care measures     Positive QuantiFERON-TB Gold test   Assessment & Plan      AFB negative        Chronic kidney disease (CKD), stage III (moderate) ([facility])- (present on admission)   Assessment & Plan    Continue comfort care.     Essential hypertension- (present on admission)   Assessment & Plan          Currently normotensive  Continue diuretics     Constipation   Assessment & Plan    Improved . continue bowel protocols     Pain of left hip joint- (present on admission)   Assessment & Plan    Likely secondary to OA. Currently denies pain.  As needed pain meds available.     Advanced directives, counseling/discussion- (present on admission)   Assessment & Plan    Continue comfort care.          VTE prophylaxis: Comfort care      Discussed with RN plan of care.

## 2019-02-03 NOTE — CARE PLAN
Problem: Safety  Goal: Will remain free from injury  Outcome: PROGRESSING AS EXPECTED  Bed alarm on. Bed in low position. Upper bedrails up. Call light within reach. Slippers when ambulating. Hourly rounding. Assisted ambulation.    Problem: Mobility  Goal: Risk for activity intolerance will decrease  Outcome: PROGRESSING AS EXPECTED  Ambulates standby assist with a walker to the bathroom.

## 2019-02-03 NOTE — CARE PLAN
Problem: Safety  Goal: Will remain free from falls    Intervention: Assess risk factors for falls  Low fall risk, calls for assistance as appropriate.       Problem: Mobility  Goal: Risk for activity intolerance will decrease    Intervention: Assess and monitor signs of activity intolerance  Patient out of bed as tolerated, generalized weakness

## 2019-02-04 PROCEDURE — 99231 SBSQ HOSP IP/OBS SF/LOW 25: CPT | Performed by: HOSPITALIST

## 2019-02-04 PROCEDURE — 770001 HCHG ROOM/CARE - MED/SURG/GYN PRIV*

## 2019-02-04 ASSESSMENT — ENCOUNTER SYMPTOMS
FEVER: 0
DIARRHEA: 0
MYALGIAS: 0
FOCAL WEAKNESS: 0
WEAKNESS: 1
SPEECH CHANGE: 0
VOMITING: 0
SHORTNESS OF BREATH: 0
ABDOMINAL PAIN: 0
BACK PAIN: 0
COUGH: 0
NECK PAIN: 0

## 2019-02-04 NOTE — CARE PLAN
Problem: Safety  Goal: Will remain free from injury  Outcome: PROGRESSING AS EXPECTED  Patient remains free from injury. Patient cooperative with using call light when needing assistance. Hourly rounding in place.     Problem: Venous Thromboembolism (VTW)/Deep Vein Thrombosis (DVT) Prevention:  Goal: Patient will participate in Venous Thrombosis (VTE)/Deep Vein Thrombosis (DVT)Prevention Measures  Outcome: PROGRESSING AS EXPECTED  Patient refusing SCDs. Patient is on comfort care.

## 2019-02-04 NOTE — CARE PLAN
Problem: Safety  Goal: Will remain free from injury  Educated on safety measures, using call light ect    Problem: Knowledge Deficit  Goal: Knowledge of disease process/condition, treatment plan, diagnostic tests, and medications will improve  Discussed POC, pt communicates questions and poc well. Pt and family understand poc.

## 2019-02-05 PROCEDURE — 99231 SBSQ HOSP IP/OBS SF/LOW 25: CPT | Performed by: HOSPITALIST

## 2019-02-05 PROCEDURE — A9270 NON-COVERED ITEM OR SERVICE: HCPCS | Performed by: HOSPITALIST

## 2019-02-05 PROCEDURE — 770001 HCHG ROOM/CARE - MED/SURG/GYN PRIV*

## 2019-02-05 PROCEDURE — 700102 HCHG RX REV CODE 250 W/ 637 OVERRIDE(OP): Performed by: HOSPITALIST

## 2019-02-05 RX ADMIN — SENNOSIDES AND DOCUSATE SODIUM 2 TABLET: 8.6; 5 TABLET ORAL at 06:00

## 2019-02-05 ASSESSMENT — ENCOUNTER SYMPTOMS
WEAKNESS: 1
BACK PAIN: 0
ABDOMINAL PAIN: 0
SHORTNESS OF BREATH: 0
DIARRHEA: 0
EYE PAIN: 0
EYE DISCHARGE: 0
NERVOUS/ANXIOUS: 0
SPEECH CHANGE: 0
COUGH: 0
VOMITING: 0
SORE THROAT: 0
MYALGIAS: 0
FEVER: 0
NECK PAIN: 0
FOCAL WEAKNESS: 0
STRIDOR: 0
EYE REDNESS: 0

## 2019-02-05 NOTE — PROGRESS NOTES
Hospital Medicine Daily Progress Note    Date of Service  2/5/2019    Chief Complaint  89 y.o. female admitted 11/13/2018 with fall.    Hospital Course      Patient is a pleasant 89 year old woman with history of HTN who presented following a fall and was found to have an acute ST elevation MI.  After discussion with her family, she was placed on comfort care.     Interval Problem Update  Patient denies being compliant  No complaints  Pending placement,  working with [facility]    Consultants/Specialty  Palliative care  Critical care, cardiology    Code Status  Comfort care    Disposition  Anticipate group home placement when financial arrangements completed   working with [facility]    Review of Systems  Review of Systems   Constitutional: Negative for fever.   HENT: Negative for sore throat.    Eyes: Negative for pain, discharge and redness.   Respiratory: Negative for cough, shortness of breath and stridor.    Cardiovascular: Negative for chest pain.   Gastrointestinal: Negative for abdominal pain, diarrhea and vomiting.   Musculoskeletal: Negative for back pain, myalgias and neck pain.   Neurological: Positive for weakness. Negative for speech change and focal weakness.   Psychiatric/Behavioral: The patient is not nervous/anxious.         Physical Exam  Temp:  [36.6 °C (97.8 °F)-36.8 °C (98.2 °F)] 36.8 °C (98.2 °F)  Pulse:  [72-73] 73  Resp:  [15-18] 15  BP: ()/(51-72) 123/72    Physical Exam   Constitutional: No distress.   Drowsy, arouses to voice, no apparent distress  Frail   HENT:   Head: Normocephalic and atraumatic.   Eyes: EOM are normal. Right eye exhibits no discharge. Left eye exhibits no discharge. No scleral icterus.   Cardiovascular: Normal rate and regular rhythm.    Pulmonary/Chest: She has no wheezes. She has no rales.   Abdominal: Soft. She exhibits no distension. There is no tenderness.   Musculoskeletal: She exhibits no edema or tenderness.    Neurological: She is alert.   O x 2.   No gross focal weakness   Skin: Skin is warm and dry. She is not diaphoretic. No pallor.   Psychiatric: Cognition and memory are impaired.   Nursing note and vitals reviewed.      Fluids    Intake/Output Summary (Last 24 hours) at 02/05/19 2156  Last data filed at 02/05/19 1800   Gross per 24 hour   Intake             1060 ml   Output                0 ml   Net             1060 ml       Laboratory                        Imaging  DX-CHEST-PORTABLE (1 VIEW)   Final Result         1.  Hazy interstitial left pulmonary opacities suggests interstitial edema or infiltrate, similar to prior.   2.  Trace left pleural effusion   3.  Hyperexpansion of lungs favors changes of COPD.      DX-CHEST-PORTABLE (1 VIEW)   Final Result         1.  Interstitial infiltrates or edema, stable.   2.  Atherosclerosis      DX-CHEST-PORTABLE (1 VIEW)   Final Result         1.  Interstitial infiltrates or edema.   2.  Atherosclerosis      DX-CHEST-PORTABLE (1 VIEW)   Final Result      Moderate interstitial edema or interstitial lung disease and small left pleural effusion similar to previous.      DX-CHEST-PORTABLE (1 VIEW)   Final Result      Stable interstitial edema and/or interstitial lung disease with probable small pleural fluid      DX-CHEST-PORTABLE (1 VIEW)   Final Result      Ill-defined infrahilar interstitial opacities, atelectasis versus mild edema. No significant pleural effusions.      DX-CHEST-PORTABLE (1 VIEW)   Final Result      Mild left basilar atelectasis, improved since prior. No new consolidation or large pleural effusions.      DX-CHEST-PORTABLE (1 VIEW)   Final Result      1.  Left basilar opacification may be secondary to atelectasis. Developing pneumonia cannot be excluded.         DX-CHEST-PORTABLE (1 VIEW)   Final Result      1.  Unchanged mild interstitial pulmonary edema   2.  Unchanged bibasilar atelectasis, alveolar edema or pneumonia      DX-CHEST-PORTABLE (1 VIEW)    Final Result      1.  Decreased pulmonary edema   2.  Unchanged bibasilar atelectasis, alveolar edema or pneumonia      DX-CHEST-PORTABLE (1 VIEW)   Final Result      1.  There is diffuse interstitial and alveolar density in the right mid and lower lung zone. This is increased since the previous study. This may represent mild pulmonary edema/consolidation.   2.  Mildly enlarged cardiomediastinal silhouette when compared with the previous chest x-ray.      DX-CHEST-PORTABLE (1 VIEW)   Final Result      Stable mild pulmonary edema.   New left basilar atelectasis.      DX-CHEST-PORTABLE (1 VIEW)   Final Result      Stable chest appearance compared with 11/16.      DX-CHEST-PORTABLE (1 VIEW)   Final Result      No acute cardiopulmonary abnormality identified.      DX-CHEST-PORTABLE (1 VIEW)   Final Result      No acute cardiopulmonary abnormality. No interval change.      DX-CHEST-PORTABLE (1 VIEW)   Final Result      No acute cardiopulmonary disease.      CT-HEAD W/O   Final Result      1.  No evidence of acute intracranial process.      2.  There is again seen interstitial pulmonary edema and bibasilar atelectasis.      CT-HIP W/O PLUS RECONS LEFT   Final Result      1.  No evidence of acute fracture or malalignment. No evidence of hardware failure or complication.   2.  Postsurgical changes of the left femur with broken screw fragment redemonstrated.   3.  Demineralization.   4.  Scattered colonic diverticula.   5.  Atherosclerosis.   6.  Small fat-containing umbilical hernia.      EC-ECHOCARDIOGRAM COMPLETE W/O CONT   Final Result      DX-HIP-COMPLETE - UNILATERAL 2+ LEFT   Final Result      1.  No definite acute osseous abnormality. In setting of demineralization, an occult fracture is difficult to exclude. If there is strong clinical suspicion, CT or MRI could be obtained for further evaluation.   2.  Postsurgical changes of the left femur with broken screw fragment redemonstrated.      DX-CHEST-PORTABLE (1  VIEW)   Final Result      No acute cardiopulmonary process is seen.      Atherosclerotic plaque.           Assessment/Plan  * Fall- (present on admission)   Assessment & Plan    Chronic debility  Continue comfort care  Social service looking into group home options with limited finances in consideration--anticipate will need State waiver, family contribution, Letter of agreement from [facility] to fund      Elevated brain natriuretic peptide (BNP) level- (present on admission)   Assessment & Plan    No dyspnea or symptoms of heart failure  Continue with Lasix.  Monitor for signs of dehydration and poor intake.     ST elevation myocardial infarction involving right coronary artery (HCC)- (present on admission)   Assessment & Plan    No new chest pain.  Vital stable  Currently on comfort care measures      Positive QuantiFERON-TB Gold test   Assessment & Plan      AFB negative        Chronic kidney disease (CKD), stage III (moderate) (HCC)- (present on admission)   Assessment & Plan    Continue comfort care.     Essential hypertension- (present on admission)   Assessment & Plan          Currently normotensive  Continue diuretics     Constipation   Assessment & Plan    Improved . continue bowel protocols     Pain of left hip joint- (present on admission)   Assessment & Plan    Likely secondary to OA. Currently denies pain.  As needed pain meds available.     Advanced directives, counseling/discussion- (present on admission)   Assessment & Plan    Continue comfort care.           VTE prophylaxis: Comfort care

## 2019-02-05 NOTE — CARE PLAN
Problem: Safety  Goal: Will remain free from injury  Outcome: PROGRESSING AS EXPECTED  Treaded socks in place, bed in the lowest position, bed alarm on, call light and belongings within reach, pt call for assistance appropriately    Problem: Discharge Barriers/Planning  Goal: Patient's continuum of care needs will be met  Outcome: PROGRESSING AS EXPECTED  Awaiting placement at a group home.

## 2019-02-05 NOTE — CARE PLAN
Problem: Safety  Goal: Will remain free from injury  Outcome: PROGRESSING AS EXPECTED  Bed in the lowest locked position. Call light within reach. Bed alarm in use. Hourly rounding in place.     Problem: Discharge Barriers/Planning  Goal: Patient's continuum of care needs will be met  Outcome: PROGRESSING AS EXPECTED  Pending group home arrangements

## 2019-02-05 NOTE — PROGRESS NOTES
[facility] Medicine Daily Progress Note    Date of Service  2/4/2019    Chief Complaint  89 y.o. female admitted 11/13/2018 with fall.    Hospital Course      Patient is a pleasant 89 year old woman with history of HTN who presented following a fall and was found to have an acute ST elevation MI.  After discussion with her family, she was placed on comfort care.     Interval Problem Update    Patient sleeps much of day.  Easily arousable.  Monitoring for signs of anxiety, discomfort.  No Ativan required    Consultants/Specialty  Palliative care  Critical care, cardiology    Code Status  Comfort care    Disposition    Anticipate group home placement when financial arrangements completed    Review of Systems  Review of Systems   Constitutional: Negative for fever.   Respiratory: Negative for cough and shortness of breath.    Cardiovascular: Negative for chest pain.   Gastrointestinal: Negative for abdominal pain, diarrhea and vomiting.   Musculoskeletal: Negative for back pain, myalgias and neck pain.   Neurological: Positive for weakness. Negative for speech change and focal weakness.        Physical Exam  Temp:  [36.3 °C (97.3 °F)-36.4 °C (97.6 °F)] 36.4 °C (97.6 °F)  Pulse:  [71-73] 71  Resp:  [16-17] 17  BP: (115-117)/(61-70) 115/61  SpO2:  [92 %] 92 %    Physical Exam   Constitutional: No distress.   Drowsy, arouses to voice, no apparent distress  Frail   HENT:   Head: Normocephalic and atraumatic.   Eyes: EOM are normal. Right eye exhibits no discharge. Left eye exhibits no discharge. No scleral icterus.   Cardiovascular: Normal rate and regular rhythm.    Pulmonary/Chest: She has no wheezes. She has no rales.   Abdominal: Soft. She exhibits no distension. There is no tenderness.   Musculoskeletal: She exhibits no edema or tenderness.   Neurological: She is alert.   O x 2.   No gross focal weakness   Skin: Skin is warm and dry. She is not diaphoretic. No pallor.   Psychiatric: Cognition and memory are impaired.    Vitals reviewed.      Fluids    Intake/Output Summary (Last 24 hours) at 02/04/19 1809  Last data filed at 02/04/19 1545   Gross per 24 hour   Intake              360 ml   Output                0 ml   Net              360 ml       Laboratory                        Imaging  DX-CHEST-PORTABLE (1 VIEW)   Final Result         1.  Hazy interstitial left pulmonary opacities suggests interstitial edema or infiltrate, similar to prior.   2.  Trace left pleural effusion   3.  Hyperexpansion of lungs favors changes of COPD.      DX-CHEST-PORTABLE (1 VIEW)   Final Result         1.  Interstitial infiltrates or edema, stable.   2.  Atherosclerosis      DX-CHEST-PORTABLE (1 VIEW)   Final Result         1.  Interstitial infiltrates or edema.   2.  Atherosclerosis      DX-CHEST-PORTABLE (1 VIEW)   Final Result      Moderate interstitial edema or interstitial lung disease and small left pleural effusion similar to previous.      DX-CHEST-PORTABLE (1 VIEW)   Final Result      Stable interstitial edema and/or interstitial lung disease with probable small pleural fluid      DX-CHEST-PORTABLE (1 VIEW)   Final Result      Ill-defined infrahilar interstitial opacities, atelectasis versus mild edema. No significant pleural effusions.      DX-CHEST-PORTABLE (1 VIEW)   Final Result      Mild left basilar atelectasis, improved since prior. No new consolidation or large pleural effusions.      DX-CHEST-PORTABLE (1 VIEW)   Final Result      1.  Left basilar opacification may be secondary to atelectasis. Developing pneumonia cannot be excluded.         DX-CHEST-PORTABLE (1 VIEW)   Final Result      1.  Unchanged mild interstitial pulmonary edema   2.  Unchanged bibasilar atelectasis, alveolar edema or pneumonia      DX-CHEST-PORTABLE (1 VIEW)   Final Result      1.  Decreased pulmonary edema   2.  Unchanged bibasilar atelectasis, alveolar edema or pneumonia      DX-CHEST-PORTABLE (1 VIEW)   Final Result      1.  There is diffuse interstitial  and alveolar density in the right mid and lower lung zone. This is increased since the previous study. This may represent mild pulmonary edema/consolidation.   2.  Mildly enlarged cardiomediastinal silhouette when compared with the previous chest x-ray.      DX-CHEST-PORTABLE (1 VIEW)   Final Result      Stable mild pulmonary edema.   New left basilar atelectasis.      DX-CHEST-PORTABLE (1 VIEW)   Final Result      Stable chest appearance compared with 11/16.      DX-CHEST-PORTABLE (1 VIEW)   Final Result      No acute cardiopulmonary abnormality identified.      DX-CHEST-PORTABLE (1 VIEW)   Final Result      No acute cardiopulmonary abnormality. No interval change.      DX-CHEST-PORTABLE (1 VIEW)   Final Result      No acute cardiopulmonary disease.      CT-HEAD W/O   Final Result      1.  No evidence of acute intracranial process.      2.  There is again seen interstitial pulmonary edema and bibasilar atelectasis.      CT-HIP W/O PLUS RECONS LEFT   Final Result      1.  No evidence of acute fracture or malalignment. No evidence of hardware failure or complication.   2.  Postsurgical changes of the left femur with broken screw fragment redemonstrated.   3.  Demineralization.   4.  Scattered colonic diverticula.   5.  Atherosclerosis.   6.  Small fat-containing umbilical hernia.      EC-ECHOCARDIOGRAM COMPLETE W/O CONT   Final Result      DX-HIP-COMPLETE - UNILATERAL 2+ LEFT   Final Result      1.  No definite acute osseous abnormality. In setting of demineralization, an occult fracture is difficult to exclude. If there is strong clinical suspicion, CT or MRI could be obtained for further evaluation.   2.  Postsurgical changes of the left femur with broken screw fragment redemonstrated.      DX-CHEST-PORTABLE (1 VIEW)   Final Result      No acute cardiopulmonary process is seen.      Atherosclerotic plaque.           Assessment/Plan  * Fall- (present on admission)   Assessment & Plan    Chronic debility  Continue  comfort care  Social service looking into group home options with limited finances in consideration--anticipate will need State waiver, family contribution, Letter of agreement from [facility] to fund     Elevated brain natriuretic peptide (BNP) level- (present on admission)   Assessment & Plan    No dyspnea or symptoms of heart failure  Continue with Lasix.  Monitor for signs of dehydration and poor intake.     ST elevation myocardial infarction involving right coronary artery (HCC)- (present on admission)   Assessment & Plan    No new chest pain.  Vital stable  Comfort care measures     Positive QuantiFERON-TB Gold test   Assessment & Plan      AFB negative        Chronic kidney disease (CKD), stage III (moderate) ([facility])- (present on admission)   Assessment & Plan    Continue comfort care.     Essential hypertension- (present on admission)   Assessment & Plan          Currently normotensive  Continue diuretics     Constipation   Assessment & Plan    Improved . continue bowel protocols     Pain of left hip joint- (present on admission)   Assessment & Plan    Likely secondary to OA. Currently denies pain.  As needed pain meds available.     Advanced directives, counseling/discussion- (present on admission)   Assessment & Plan    Continue comfort care.          VTE prophylaxis: Comfort care        Discussed  plan of care.

## 2019-02-06 PROCEDURE — 770001 HCHG ROOM/CARE - MED/SURG/GYN PRIV*

## 2019-02-06 PROCEDURE — A9270 NON-COVERED ITEM OR SERVICE: HCPCS | Performed by: HOSPITALIST

## 2019-02-06 PROCEDURE — 99231 SBSQ HOSP IP/OBS SF/LOW 25: CPT | Performed by: HOSPITALIST

## 2019-02-06 PROCEDURE — 700102 HCHG RX REV CODE 250 W/ 637 OVERRIDE(OP): Performed by: HOSPITALIST

## 2019-02-06 RX ADMIN — SENNOSIDES AND DOCUSATE SODIUM 2 TABLET: 8.6; 5 TABLET ORAL at 05:52

## 2019-02-06 ASSESSMENT — ENCOUNTER SYMPTOMS
EYE DISCHARGE: 0
VOMITING: 0
NECK PAIN: 0
WEAKNESS: 1
ABDOMINAL PAIN: 0
NERVOUS/ANXIOUS: 0
COUGH: 0
STRIDOR: 0
EYE PAIN: 0
FEVER: 0
SHORTNESS OF BREATH: 0
FOCAL WEAKNESS: 0
EYE REDNESS: 0
SPEECH CHANGE: 0
BACK PAIN: 0
SORE THROAT: 0
DIARRHEA: 0
MYALGIAS: 0

## 2019-02-06 NOTE — PROGRESS NOTES
Assumed care of patient at 0700. Patient is resting in bed. She ambulates to the bathroom but does not wish to participate in any activity. She sits up in bed to eat her meals. Uses her call light to call for assistance.

## 2019-02-06 NOTE — CARE PLAN
Problem: Safety  Goal: Will remain free from injury  Outcome: PROGRESSING AS EXPECTED  Call light with in reach, bed alarm in use. Calls appropriately for assistance    Problem: Mobility  Goal: Risk for activity intolerance will decrease  Outcome: PROGRESSING SLOWER THAN EXPECTED  Patient refuses to ambulate other than walking to the bathroom. Refusing to reposition and turn in bed

## 2019-02-06 NOTE — CARE PLAN
Problem: Safety  Goal: Will remain free from injury  Outcome: PROGRESSING AS EXPECTED  Bed in the lowest locked position. Call light within reach. Bed alarm in use. Hourly rounding in place.     Problem: Skin Integrity  Goal: Risk for impaired skin integrity will decrease  Outcome: PROGRESSING AS EXPECTED  Patient refusing waffle overlay and mepliex to sacrum despite education. Pt moving her self in bed. Skin intact. Redness to sacrum noted but blanching

## 2019-02-06 NOTE — PROGRESS NOTES
[facility] Medicine Daily Progress Note    Date of Service  2/6/2019    Chief Complaint  89 y.o. female admitted 11/13/2018 with fall.    Hospital Course      Patient is a pleasant 89 year old woman with history of HTN who presented following a fall and was found to have an acute ST elevation MI.  After discussion with her family, she was placed on comfort care.     Interval Problem Update  Patient appears to be comfortable  No behavioral disturbances or agitation reported by staff  Pending placement,  working with [facility]    Consultants/Specialty  Palliative care  Critical care, cardiology    Code Status  Comfort care    Disposition  Anticipate group home placement when financial arrangements completed   working with [facility]    Review of Systems  Review of Systems   Constitutional: Negative for fever.   HENT: Negative for sore throat.    Eyes: Negative for pain, discharge and redness.   Respiratory: Negative for cough, shortness of breath and stridor.    Cardiovascular: Negative for chest pain.   Gastrointestinal: Negative for abdominal pain, diarrhea and vomiting.   Musculoskeletal: Negative for back pain, myalgias and neck pain.   Neurological: Positive for weakness. Negative for speech change and focal weakness.   Psychiatric/Behavioral: The patient is not nervous/anxious.         Physical Exam  Temp:  [36.4 °C (97.6 °F)-36.9 °C (98.5 °F)] 36.9 °C (98.5 °F)  Pulse:  [64-83] 83  Resp:  [16-17] 17  BP: ()/(52-62) 123/62  SpO2:  [92 %-93 %] 92 %    Physical Exam   Constitutional: No distress.   Drowsy, arouses to voice, no apparent distress  Frail   HENT:   Head: Normocephalic and atraumatic.   Eyes: EOM are normal. Right eye exhibits no discharge. Left eye exhibits no discharge. No scleral icterus.   Cardiovascular: Normal rate and regular rhythm.    Pulmonary/Chest: No stridor. She has no wheezes. She has no rales.   Abdominal: Soft. She exhibits no distension. There is  no tenderness.   Musculoskeletal: She exhibits no edema or tenderness.   Neurological: She is alert.   O x 2.   No gross focal weakness   Skin: Skin is warm and dry. She is not diaphoretic. No pallor.   Psychiatric: Cognition and memory are impaired.   Nursing note and vitals reviewed.      Fluids    Intake/Output Summary (Last 24 hours) at 02/06/19 2250  Last data filed at 02/06/19 2030   Gross per 24 hour   Intake                0 ml   Output                0 ml   Net                0 ml       Laboratory                        Imaging  DX-CHEST-PORTABLE (1 VIEW)   Final Result         1.  Hazy interstitial left pulmonary opacities suggests interstitial edema or infiltrate, similar to prior.   2.  Trace left pleural effusion   3.  Hyperexpansion of lungs favors changes of COPD.      DX-CHEST-PORTABLE (1 VIEW)   Final Result         1.  Interstitial infiltrates or edema, stable.   2.  Atherosclerosis      DX-CHEST-PORTABLE (1 VIEW)   Final Result         1.  Interstitial infiltrates or edema.   2.  Atherosclerosis      DX-CHEST-PORTABLE (1 VIEW)   Final Result      Moderate interstitial edema or interstitial lung disease and small left pleural effusion similar to previous.      DX-CHEST-PORTABLE (1 VIEW)   Final Result      Stable interstitial edema and/or interstitial lung disease with probable small pleural fluid      DX-CHEST-PORTABLE (1 VIEW)   Final Result      Ill-defined infrahilar interstitial opacities, atelectasis versus mild edema. No significant pleural effusions.      DX-CHEST-PORTABLE (1 VIEW)   Final Result      Mild left basilar atelectasis, improved since prior. No new consolidation or large pleural effusions.      DX-CHEST-PORTABLE (1 VIEW)   Final Result      1.  Left basilar opacification may be secondary to atelectasis. Developing pneumonia cannot be excluded.         DX-CHEST-PORTABLE (1 VIEW)   Final Result      1.  Unchanged mild interstitial pulmonary edema   2.  Unchanged bibasilar  atelectasis, alveolar edema or pneumonia      DX-CHEST-PORTABLE (1 VIEW)   Final Result      1.  Decreased pulmonary edema   2.  Unchanged bibasilar atelectasis, alveolar edema or pneumonia      DX-CHEST-PORTABLE (1 VIEW)   Final Result      1.  There is diffuse interstitial and alveolar density in the right mid and lower lung zone. This is increased since the previous study. This may represent mild pulmonary edema/consolidation.   2.  Mildly enlarged cardiomediastinal silhouette when compared with the previous chest x-ray.      DX-CHEST-PORTABLE (1 VIEW)   Final Result      Stable mild pulmonary edema.   New left basilar atelectasis.      DX-CHEST-PORTABLE (1 VIEW)   Final Result      Stable chest appearance compared with 11/16.      DX-CHEST-PORTABLE (1 VIEW)   Final Result      No acute cardiopulmonary abnormality identified.      DX-CHEST-PORTABLE (1 VIEW)   Final Result      No acute cardiopulmonary abnormality. No interval change.      DX-CHEST-PORTABLE (1 VIEW)   Final Result      No acute cardiopulmonary disease.      CT-HEAD W/O   Final Result      1.  No evidence of acute intracranial process.      2.  There is again seen interstitial pulmonary edema and bibasilar atelectasis.      CT-HIP W/O PLUS RECONS LEFT   Final Result      1.  No evidence of acute fracture or malalignment. No evidence of hardware failure or complication.   2.  Postsurgical changes of the left femur with broken screw fragment redemonstrated.   3.  Demineralization.   4.  Scattered colonic diverticula.   5.  Atherosclerosis.   6.  Small fat-containing umbilical hernia.      EC-ECHOCARDIOGRAM COMPLETE W/O CONT   Final Result      DX-HIP-COMPLETE - UNILATERAL 2+ LEFT   Final Result      1.  No definite acute osseous abnormality. In setting of demineralization, an occult fracture is difficult to exclude. If there is strong clinical suspicion, CT or MRI could be obtained for further evaluation.   2.  Postsurgical changes of the left femur  with broken screw fragment redemonstrated.      DX-CHEST-PORTABLE (1 VIEW)   Final Result      No acute cardiopulmonary process is seen.      Atherosclerotic plaque.           Assessment/Plan  * Fall- (present on admission)   Assessment & Plan    Chronic debility  Continue comfort care  Social service looking into group home options with limited finances in consideration--anticipate will need State waiver, family contribution, Letter of agreement from [facility] to fund      Elevated brain natriuretic peptide (BNP) level- (present on admission)   Assessment & Plan    No dyspnea or symptoms of heart failure  Continue with Lasix.  Monitor for signs of dehydration and poor intake.     ST elevation myocardial infarction involving right coronary artery (HCC)- (present on admission)   Assessment & Plan    No new chest pain.  Vital stable  Currently on comfort care measures        Positive QuantiFERON-TB Gold test   Assessment & Plan      AFB negative        Chronic kidney disease (CKD), stage III (moderate) (HCC)- (present on admission)   Assessment & Plan    Continue comfort care.     Essential hypertension- (present on admission)   Assessment & Plan          Currently normotensive  Continue diuretics     Constipation   Assessment & Plan    Improved . continue bowel protocols     Pain of left hip joint- (present on admission)   Assessment & Plan    Likely secondary to OA. Currently denies pain.  As needed pain meds available.      Advanced directives, counseling/discussion- (present on admission)   Assessment & Plan    Continue comfort care.           VTE prophylaxis: Comfort care

## 2019-02-07 PROCEDURE — 770001 HCHG ROOM/CARE - MED/SURG/GYN PRIV*

## 2019-02-07 PROCEDURE — 99231 SBSQ HOSP IP/OBS SF/LOW 25: CPT | Performed by: HOSPITALIST

## 2019-02-07 PROCEDURE — 700102 HCHG RX REV CODE 250 W/ 637 OVERRIDE(OP): Performed by: HOSPITALIST

## 2019-02-07 PROCEDURE — A9270 NON-COVERED ITEM OR SERVICE: HCPCS | Performed by: HOSPITALIST

## 2019-02-07 RX ADMIN — SENNOSIDES AND DOCUSATE SODIUM 2 TABLET: 8.6; 5 TABLET ORAL at 06:10

## 2019-02-07 RX ADMIN — MAGNESIUM HYDROXIDE 30 ML: 400 SUSPENSION ORAL at 16:08

## 2019-02-07 RX ADMIN — FUROSEMIDE 20 MG: 20 TABLET ORAL at 06:10

## 2019-02-07 RX ADMIN — SENNOSIDES AND DOCUSATE SODIUM 2 TABLET: 8.6; 5 TABLET ORAL at 16:08

## 2019-02-07 ASSESSMENT — ENCOUNTER SYMPTOMS
VOMITING: 0
SPEECH CHANGE: 0
EYE REDNESS: 0
EYE PAIN: 0
EYE DISCHARGE: 0
ABDOMINAL PAIN: 0
NERVOUS/ANXIOUS: 0
DIARRHEA: 0
FEVER: 0
SORE THROAT: 0
FOCAL WEAKNESS: 0
NECK PAIN: 0
SHORTNESS OF BREATH: 0
BACK PAIN: 0
MYALGIAS: 0
STRIDOR: 0
COUGH: 0

## 2019-02-07 NOTE — PROGRESS NOTES
[facility] Medicine Daily Progress Note    Date of Service  2/7/2019    Chief Complaint  89 y.o. female admitted 11/13/2018 with fall.    Hospital Course      Patient is a pleasant 89 year old woman with history of HTN who presented following a fall and was found to have an acute ST elevation MI.  After discussion with her family, she was placed on comfort care.     Interval Problem Update  The patient is resting comfortably  No agitation or behavioral disturbances  No changes  Pending placement,  working with [facility]  Discussed with patient's nurse and with multidisciplinary team during rounds including ,  and charge nurse.     Consultants/Specialty  Palliative care  Critical care, cardiology    Code Status  Comfort care    Disposition  Anticipate group home placement when financial arrangements completed   working with [facility]    Review of Systems  Review of Systems   Constitutional: Negative for fever.   HENT: Negative for sore throat.    Eyes: Negative for pain, discharge and redness.   Respiratory: Negative for cough, shortness of breath and stridor.    Cardiovascular: Negative for chest pain.   Gastrointestinal: Negative for abdominal pain, diarrhea and vomiting.   Musculoskeletal: Negative for back pain, myalgias and neck pain.   Neurological: Negative for speech change and focal weakness.   Psychiatric/Behavioral: The patient is not nervous/anxious.         Physical Exam  Temp:  [36.1 °C (97 °F)-36.6 °C (97.9 °F)] 36.6 °C (97.9 °F)  Pulse:  [70-79] 79  Resp:  [16-18] 16  BP: (116-125)/(59-61) 125/59  SpO2:  [92 %] 92 %    Physical Exam   Constitutional: No distress.   Drowsy, arouses to voice, no apparent distress  Frail   HENT:   Head: Normocephalic and atraumatic.   Eyes: EOM are normal. Right eye exhibits no discharge. Left eye exhibits no discharge. No scleral icterus.   Cardiovascular: Normal rate and regular rhythm.    Pulmonary/Chest: No stridor. She  has no wheezes. She has no rales.   Abdominal: Soft. She exhibits no distension. There is no tenderness.   Musculoskeletal: She exhibits no edema or tenderness.   Neurological: She is alert.   O x 2.   No gross focal weakness   Skin: Skin is warm and dry. She is not diaphoretic. No pallor.   Psychiatric: Cognition and memory are impaired.   Nursing note and vitals reviewed.      Fluids    Intake/Output Summary (Last 24 hours) at 02/07/19 9157  Last data filed at 02/07/19 2000   Gross per 24 hour   Intake              480 ml   Output                0 ml   Net              480 ml       Laboratory                        Imaging  DX-CHEST-PORTABLE (1 VIEW)   Final Result         1.  Hazy interstitial left pulmonary opacities suggests interstitial edema or infiltrate, similar to prior.   2.  Trace left pleural effusion   3.  Hyperexpansion of lungs favors changes of COPD.      DX-CHEST-PORTABLE (1 VIEW)   Final Result         1.  Interstitial infiltrates or edema, stable.   2.  Atherosclerosis      DX-CHEST-PORTABLE (1 VIEW)   Final Result         1.  Interstitial infiltrates or edema.   2.  Atherosclerosis      DX-CHEST-PORTABLE (1 VIEW)   Final Result      Moderate interstitial edema or interstitial lung disease and small left pleural effusion similar to previous.      DX-CHEST-PORTABLE (1 VIEW)   Final Result      Stable interstitial edema and/or interstitial lung disease with probable small pleural fluid      DX-CHEST-PORTABLE (1 VIEW)   Final Result      Ill-defined infrahilar interstitial opacities, atelectasis versus mild edema. No significant pleural effusions.      DX-CHEST-PORTABLE (1 VIEW)   Final Result      Mild left basilar atelectasis, improved since prior. No new consolidation or large pleural effusions.      DX-CHEST-PORTABLE (1 VIEW)   Final Result      1.  Left basilar opacification may be secondary to atelectasis. Developing pneumonia cannot be excluded.         DX-CHEST-PORTABLE (1 VIEW)   Final  Result      1.  Unchanged mild interstitial pulmonary edema   2.  Unchanged bibasilar atelectasis, alveolar edema or pneumonia      DX-CHEST-PORTABLE (1 VIEW)   Final Result      1.  Decreased pulmonary edema   2.  Unchanged bibasilar atelectasis, alveolar edema or pneumonia      DX-CHEST-PORTABLE (1 VIEW)   Final Result      1.  There is diffuse interstitial and alveolar density in the right mid and lower lung zone. This is increased since the previous study. This may represent mild pulmonary edema/consolidation.   2.  Mildly enlarged cardiomediastinal silhouette when compared with the previous chest x-ray.      DX-CHEST-PORTABLE (1 VIEW)   Final Result      Stable mild pulmonary edema.   New left basilar atelectasis.      DX-CHEST-PORTABLE (1 VIEW)   Final Result      Stable chest appearance compared with 11/16.      DX-CHEST-PORTABLE (1 VIEW)   Final Result      No acute cardiopulmonary abnormality identified.      DX-CHEST-PORTABLE (1 VIEW)   Final Result      No acute cardiopulmonary abnormality. No interval change.      DX-CHEST-PORTABLE (1 VIEW)   Final Result      No acute cardiopulmonary disease.      CT-HEAD W/O   Final Result      1.  No evidence of acute intracranial process.      2.  There is again seen interstitial pulmonary edema and bibasilar atelectasis.      CT-HIP W/O PLUS RECONS LEFT   Final Result      1.  No evidence of acute fracture or malalignment. No evidence of hardware failure or complication.   2.  Postsurgical changes of the left femur with broken screw fragment redemonstrated.   3.  Demineralization.   4.  Scattered colonic diverticula.   5.  Atherosclerosis.   6.  Small fat-containing umbilical hernia.      EC-ECHOCARDIOGRAM COMPLETE W/O CONT   Final Result      DX-HIP-COMPLETE - UNILATERAL 2+ LEFT   Final Result      1.  No definite acute osseous abnormality. In setting of demineralization, an occult fracture is difficult to exclude. If there is strong clinical suspicion, CT or MRI  could be obtained for further evaluation.   2.  Postsurgical changes of the left femur with broken screw fragment redemonstrated.      DX-CHEST-PORTABLE (1 VIEW)   Final Result      No acute cardiopulmonary process is seen.      Atherosclerotic plaque.           Assessment/Plan  * Fall- (present on admission)   Assessment & Plan    Chronic debility  Continue comfort care  Social service looking into group home options with limited finances in consideration--anticipate will need State waiver, family contribution, Letter of agreement from [facility] to fund   Patient is resting comfortably     Elevated brain natriuretic peptide (BNP) level- (present on admission)   Assessment & Plan    No dyspnea or symptoms of heart failure  Continue with Lasix.  Monitor for signs of dehydration and poor intake.     ST elevation myocardial infarction involving right coronary artery (HCC)- (present on admission)   Assessment & Plan    No new chest pain.  Vital stable  Currently on comfort care measures        Positive QuantiFERON-TB Gold test   Assessment & Plan      AFB negative        Chronic kidney disease (CKD), stage III (moderate) (HCC)- (present on admission)   Assessment & Plan    Continue comfort care.     Essential hypertension- (present on admission)   Assessment & Plan          Currently normotensive  Continue diuretics     Constipation   Assessment & Plan    Improved . continue bowel protocols     Pain of left hip joint- (present on admission)   Assessment & Plan    Likely secondary to OA. Currently denies pain.  As needed pain meds available.      Advanced directives, counseling/discussion- (present on admission)   Assessment & Plan    Continue comfort care.           VTE prophylaxis: Comfort care

## 2019-02-08 ENCOUNTER — PATIENT OUTREACH (OUTPATIENT)
Dept: HEALTH INFORMATION MANAGEMENT | Facility: OTHER | Age: 84
End: 2019-02-08

## 2019-02-08 PROCEDURE — 99231 SBSQ HOSP IP/OBS SF/LOW 25: CPT | Performed by: HOSPITALIST

## 2019-02-08 PROCEDURE — 770001 HCHG ROOM/CARE - MED/SURG/GYN PRIV*

## 2019-02-08 ASSESSMENT — ENCOUNTER SYMPTOMS
EYE DISCHARGE: 0
COUGH: 0
MYALGIAS: 0
NECK PAIN: 0
DIARRHEA: 0
STRIDOR: 0
BACK PAIN: 0
FOCAL WEAKNESS: 0
SHORTNESS OF BREATH: 0
EYE PAIN: 0
EYE REDNESS: 0
ABDOMINAL PAIN: 0
VOMITING: 0
SORE THROAT: 0
SPEECH CHANGE: 0
FEVER: 0
NERVOUS/ANXIOUS: 0

## 2019-02-08 NOTE — CARE PLAN
Problem: Safety  Goal: Will remain free from falls    Intervention: Implement fall precautions  Pt calls appropriately, treaded socks in use. Personal belongings and call light with in reach and bed is locked in lowest position. Hourly rounding      Problem: Bowel/Gastric:  Goal: Will not experience complications related to bowel motility    Intervention: Implement Bowel Protocol, if applicable  MOM administered to patient at the patient request

## 2019-02-08 NOTE — PROGRESS NOTES
[facility] Medicine Daily Progress Note    Date of Service  2/8/2019    Chief Complaint  89 y.o. female admitted 11/13/2018 with fall.    Hospital Course      Patient is a pleasant 89 year old woman with history of HTN who presented following a fall and was found to have an acute ST elevation MI.  After discussion with her family, she was placed on comfort care.     Interval Problem Update  Patient denies pain alert oriented times 3  No behavioral disturbances   Pending placement discussed with patient's nurse and with multidisciplinary team during rounds including  and charge nurse.     Consultants/Specialty  Palliative care  Critical care, cardiology    Code Status  Comfort care    Disposition  Anticipate group home placement when financial arrangements completed   working with [facility]    Review of Systems  Review of Systems   Constitutional: Negative for fever.   HENT: Negative for sore throat.    Eyes: Negative for pain, discharge and redness.   Respiratory: Negative for cough, shortness of breath and stridor.    Cardiovascular: Negative for chest pain.   Gastrointestinal: Negative for abdominal pain, diarrhea and vomiting.   Musculoskeletal: Negative for back pain, myalgias and neck pain.   Neurological: Negative for speech change and focal weakness.   Psychiatric/Behavioral: The patient is not nervous/anxious.         Physical Exam  Temp:  [36.3 °C (97.4 °F)] 36.3 °C (97.4 °F)  Pulse:  [79] 79  Resp:  [17] 17  BP: (122)/(78) 122/78  SpO2:  [93 %] 93 %    Physical Exam   Constitutional: No distress.   Drowsy, arouses to voice, no apparent distress  Frail   HENT:   Head: Normocephalic and atraumatic.   Eyes: EOM are normal. Right eye exhibits no discharge. Left eye exhibits no discharge. No scleral icterus.   Cardiovascular: Normal rate and regular rhythm.    Pulmonary/Chest: No stridor. She has no wheezes. She has no rales.   Abdominal: Soft. She exhibits no distension. There is  no tenderness.   Musculoskeletal: She exhibits no edema or tenderness.   Neurological: She is alert.   O x 2.   No gross focal weakness   Skin: Skin is warm and dry. She is not diaphoretic. No pallor.   Psychiatric: Cognition and memory are impaired.   Nursing note and vitals reviewed.      Fluids    Intake/Output Summary (Last 24 hours) at 02/08/19 2113  Last data filed at 02/08/19 0900   Gross per 24 hour   Intake              300 ml   Output                0 ml   Net              300 ml       Laboratory                        Imaging  DX-CHEST-PORTABLE (1 VIEW)   Final Result         1.  Hazy interstitial left pulmonary opacities suggests interstitial edema or infiltrate, similar to prior.   2.  Trace left pleural effusion   3.  Hyperexpansion of lungs favors changes of COPD.      DX-CHEST-PORTABLE (1 VIEW)   Final Result         1.  Interstitial infiltrates or edema, stable.   2.  Atherosclerosis      DX-CHEST-PORTABLE (1 VIEW)   Final Result         1.  Interstitial infiltrates or edema.   2.  Atherosclerosis      DX-CHEST-PORTABLE (1 VIEW)   Final Result      Moderate interstitial edema or interstitial lung disease and small left pleural effusion similar to previous.      DX-CHEST-PORTABLE (1 VIEW)   Final Result      Stable interstitial edema and/or interstitial lung disease with probable small pleural fluid      DX-CHEST-PORTABLE (1 VIEW)   Final Result      Ill-defined infrahilar interstitial opacities, atelectasis versus mild edema. No significant pleural effusions.      DX-CHEST-PORTABLE (1 VIEW)   Final Result      Mild left basilar atelectasis, improved since prior. No new consolidation or large pleural effusions.      DX-CHEST-PORTABLE (1 VIEW)   Final Result      1.  Left basilar opacification may be secondary to atelectasis. Developing pneumonia cannot be excluded.         DX-CHEST-PORTABLE (1 VIEW)   Final Result      1.  Unchanged mild interstitial pulmonary edema   2.  Unchanged bibasilar  atelectasis, alveolar edema or pneumonia      DX-CHEST-PORTABLE (1 VIEW)   Final Result      1.  Decreased pulmonary edema   2.  Unchanged bibasilar atelectasis, alveolar edema or pneumonia      DX-CHEST-PORTABLE (1 VIEW)   Final Result      1.  There is diffuse interstitial and alveolar density in the right mid and lower lung zone. This is increased since the previous study. This may represent mild pulmonary edema/consolidation.   2.  Mildly enlarged cardiomediastinal silhouette when compared with the previous chest x-ray.      DX-CHEST-PORTABLE (1 VIEW)   Final Result      Stable mild pulmonary edema.   New left basilar atelectasis.      DX-CHEST-PORTABLE (1 VIEW)   Final Result      Stable chest appearance compared with 11/16.      DX-CHEST-PORTABLE (1 VIEW)   Final Result      No acute cardiopulmonary abnormality identified.      DX-CHEST-PORTABLE (1 VIEW)   Final Result      No acute cardiopulmonary abnormality. No interval change.      DX-CHEST-PORTABLE (1 VIEW)   Final Result      No acute cardiopulmonary disease.      CT-HEAD W/O   Final Result      1.  No evidence of acute intracranial process.      2.  There is again seen interstitial pulmonary edema and bibasilar atelectasis.      CT-HIP W/O PLUS RECONS LEFT   Final Result      1.  No evidence of acute fracture or malalignment. No evidence of hardware failure or complication.   2.  Postsurgical changes of the left femur with broken screw fragment redemonstrated.   3.  Demineralization.   4.  Scattered colonic diverticula.   5.  Atherosclerosis.   6.  Small fat-containing umbilical hernia.      EC-ECHOCARDIOGRAM COMPLETE W/O CONT   Final Result      DX-HIP-COMPLETE - UNILATERAL 2+ LEFT   Final Result      1.  No definite acute osseous abnormality. In setting of demineralization, an occult fracture is difficult to exclude. If there is strong clinical suspicion, CT or MRI could be obtained for further evaluation.   2.  Postsurgical changes of the left femur  with broken screw fragment redemonstrated.      DX-CHEST-PORTABLE (1 VIEW)   Final Result      No acute cardiopulmonary process is seen.      Atherosclerotic plaque.           Assessment/Plan  * Fall- (present on admission)   Assessment & Plan    Chronic debility  Continue comfort care  Social service looking into group home options with limited finances in consideration--anticipate will need State waiver, family contribution, Letter of agreement from [facility] to fund   Patient is resting comfortably     Elevated brain natriuretic peptide (BNP) level- (present on admission)   Assessment & Plan    No dyspnea or symptoms of heart failure  Continue with Lasix.  Monitor for signs of dehydration and poor intake.     ST elevation myocardial infarction involving right coronary artery (HCC)- (present on admission)   Assessment & Plan    No new chest pain.  Vital stable  Currently on comfort care measures        Positive QuantiFERON-TB Gold test   Assessment & Plan      AFB negative        Chronic kidney disease (CKD), stage III (moderate) (HCC)- (present on admission)   Assessment & Plan    Continue comfort care.     Essential hypertension- (present on admission)   Assessment & Plan          Currently normotensive  Continue diuretics     Constipation   Assessment & Plan    Improved . continue bowel protocols     Pain of left hip joint- (present on admission)   Assessment & Plan    Likely secondary to OA. Currently denies pain.  As needed pain meds available.      Advanced directives, counseling/discussion- (present on admission)   Assessment & Plan    Continue comfort care.   Pending placement difficult discharge          VTE prophylaxis: Comfort care

## 2019-02-08 NOTE — CARE PLAN
Problem: Safety  Goal: Will remain free from falls  Outcome: PROGRESSING AS EXPECTED  Pt instructed to call before getting OOB. Pt verbalized understanding and calls appropriately. Bed alarm in place and functional.    Problem: Urinary Elimination:  Goal: Ability to reestablish a normal urinary elimination pattern will improve  Outcome: PROGRESSING AS EXPECTED  Pr ambulates to the restroom with 1 assist, voids spontaneously.

## 2019-02-09 PROCEDURE — 99231 SBSQ HOSP IP/OBS SF/LOW 25: CPT | Performed by: HOSPITALIST

## 2019-02-09 PROCEDURE — 770001 HCHG ROOM/CARE - MED/SURG/GYN PRIV*

## 2019-02-09 ASSESSMENT — ENCOUNTER SYMPTOMS
DIARRHEA: 0
STRIDOR: 0
NECK PAIN: 0
EYE PAIN: 0
ABDOMINAL PAIN: 0
EYE REDNESS: 0
SORE THROAT: 0
NERVOUS/ANXIOUS: 0
BACK PAIN: 0
EYE DISCHARGE: 0
COUGH: 0
FEVER: 0
VOMITING: 0
MYALGIAS: 0
SPEECH CHANGE: 0
SHORTNESS OF BREATH: 0
FOCAL WEAKNESS: 0

## 2019-02-09 NOTE — CARE PLAN
Problem: Knowledge Deficit  Goal: Knowledge of disease process/condition, treatment plan, diagnostic tests, and medications will improve  Outcome: PROGRESSING AS EXPECTED  Pt is educated by nurse on any changes to her plan of care, updates from social work or the doctors. And answers her questions/ concerns.     Problem: Medication  Goal: Compliance with prescribed medication will improve  Outcome: PROGRESSING AS EXPECTED  Pt refuses amny of her medications but is still informed of what they are and why she is getting them.

## 2019-02-09 NOTE — CARE PLAN
Problem: Safety  Goal: Will remain free from injury  Outcome: PROGRESSING AS EXPECTED  Patient remains free from injury. Bed alarm in place. Uses call light appropriately.     Problem: Respiratory:  Goal: Respiratory status will improve  Outcome: PROGRESSING AS EXPECTED  No signs / symptoms of respiratory distress noted.

## 2019-02-09 NOTE — PROGRESS NOTES
[facility] Medicine Daily Progress Note    Date of Service  2/9/2019    Chief Complaint  89 y.o. female admitted 11/13/2018 with fall.    Hospital Course      Patient is a pleasant 89 year old woman with history of HTN who presented following a fall and was found to have an acute ST elevation MI.  After discussion with her family, she was placed on comfort care.     Interval Problem Update  Patient does not appear to be in pain and denies having pain   Alert and oriented x3 without behavioral disturbances  Discussed with patient, patient's nurse and charge nurse.     Consultants/Specialty  Palliative care  Critical care, cardiology    Code Status  Comfort care     Disposition  Anticipate group home placement when financial arrangements completed   working with [facility]    Review of Systems  Review of Systems   Constitutional: Negative for fever.   HENT: Negative for sore throat.    Eyes: Negative for pain, discharge and redness.   Respiratory: Negative for cough, shortness of breath and stridor.    Cardiovascular: Negative for chest pain.   Gastrointestinal: Negative for abdominal pain, diarrhea and vomiting.   Musculoskeletal: Negative for back pain, myalgias and neck pain.   Neurological: Negative for speech change and focal weakness.   Psychiatric/Behavioral: The patient is not nervous/anxious.         Physical Exam  Temp:  [36 °C (96.8 °F)-36.8 °C (98.2 °F)] 36 °C (96.8 °F)  Pulse:  [65-77] 65  Resp:  [16-18] 16  BP: (109-129)/(50-72) 109/50  SpO2:  [93 %-94 %] 94 %    Physical Exam   Constitutional: No distress.   Drowsy, arouses to voice, no apparent distress  Frail   HENT:   Head: Normocephalic and atraumatic.   Eyes: EOM are normal. Right eye exhibits no discharge. Left eye exhibits no discharge. No scleral icterus.   Cardiovascular: Normal rate and regular rhythm.    Pulmonary/Chest: No stridor. She has no wheezes. She has no rales.   Abdominal: Soft. She exhibits no distension.    Musculoskeletal: She exhibits no edema or tenderness.   Neurological: She is alert.   O x 2.   No gross focal weakness   Skin: Skin is warm and dry. She is not diaphoretic. No pallor.   Psychiatric: Cognition and memory are impaired.   Nursing note and vitals reviewed.      Fluids  No intake or output data in the 24 hours ending 02/09/19 9282    Laboratory                        Imaging  DX-CHEST-PORTABLE (1 VIEW)   Final Result         1.  Hazy interstitial left pulmonary opacities suggests interstitial edema or infiltrate, similar to prior.   2.  Trace left pleural effusion   3.  Hyperexpansion of lungs favors changes of COPD.      DX-CHEST-PORTABLE (1 VIEW)   Final Result         1.  Interstitial infiltrates or edema, stable.   2.  Atherosclerosis      DX-CHEST-PORTABLE (1 VIEW)   Final Result         1.  Interstitial infiltrates or edema.   2.  Atherosclerosis      DX-CHEST-PORTABLE (1 VIEW)   Final Result      Moderate interstitial edema or interstitial lung disease and small left pleural effusion similar to previous.      DX-CHEST-PORTABLE (1 VIEW)   Final Result      Stable interstitial edema and/or interstitial lung disease with probable small pleural fluid      DX-CHEST-PORTABLE (1 VIEW)   Final Result      Ill-defined infrahilar interstitial opacities, atelectasis versus mild edema. No significant pleural effusions.      DX-CHEST-PORTABLE (1 VIEW)   Final Result      Mild left basilar atelectasis, improved since prior. No new consolidation or large pleural effusions.      DX-CHEST-PORTABLE (1 VIEW)   Final Result      1.  Left basilar opacification may be secondary to atelectasis. Developing pneumonia cannot be excluded.         DX-CHEST-PORTABLE (1 VIEW)   Final Result      1.  Unchanged mild interstitial pulmonary edema   2.  Unchanged bibasilar atelectasis, alveolar edema or pneumonia      DX-CHEST-PORTABLE (1 VIEW)   Final Result      1.  Decreased pulmonary edema   2.  Unchanged bibasilar  atelectasis, alveolar edema or pneumonia      DX-CHEST-PORTABLE (1 VIEW)   Final Result      1.  There is diffuse interstitial and alveolar density in the right mid and lower lung zone. This is increased since the previous study. This may represent mild pulmonary edema/consolidation.   2.  Mildly enlarged cardiomediastinal silhouette when compared with the previous chest x-ray.      DX-CHEST-PORTABLE (1 VIEW)   Final Result      Stable mild pulmonary edema.   New left basilar atelectasis.      DX-CHEST-PORTABLE (1 VIEW)   Final Result      Stable chest appearance compared with 11/16.      DX-CHEST-PORTABLE (1 VIEW)   Final Result      No acute cardiopulmonary abnormality identified.      DX-CHEST-PORTABLE (1 VIEW)   Final Result      No acute cardiopulmonary abnormality. No interval change.      DX-CHEST-PORTABLE (1 VIEW)   Final Result      No acute cardiopulmonary disease.      CT-HEAD W/O   Final Result      1.  No evidence of acute intracranial process.      2.  There is again seen interstitial pulmonary edema and bibasilar atelectasis.      CT-HIP W/O PLUS RECONS LEFT   Final Result      1.  No evidence of acute fracture or malalignment. No evidence of hardware failure or complication.   2.  Postsurgical changes of the left femur with broken screw fragment redemonstrated.   3.  Demineralization.   4.  Scattered colonic diverticula.   5.  Atherosclerosis.   6.  Small fat-containing umbilical hernia.      EC-ECHOCARDIOGRAM COMPLETE W/O CONT   Final Result      DX-HIP-COMPLETE - UNILATERAL 2+ LEFT   Final Result      1.  No definite acute osseous abnormality. In setting of demineralization, an occult fracture is difficult to exclude. If there is strong clinical suspicion, CT or MRI could be obtained for further evaluation.   2.  Postsurgical changes of the left femur with broken screw fragment redemonstrated.      DX-CHEST-PORTABLE (1 VIEW)   Final Result      No acute cardiopulmonary process is seen.       Atherosclerotic plaque.           Assessment/Plan  * Fall- (present on admission)   Assessment & Plan    Chronic debility  Continue comfort care  Social service looking into group home options with limited finances in consideration--anticipate will need State waiver, family contribution, Letter of agreement from [facility] to fund   Patient is resting comfortably     Elevated brain natriuretic peptide (BNP) level- (present on admission)   Assessment & Plan    No dyspnea or symptoms of heart failure  Continue with Lasix.  Monitor for signs of dehydration and poor intake.     ST elevation myocardial infarction involving right coronary artery (HCC)- (present on admission)   Assessment & Plan    No new chest pain.  Vital stable  Currently on comfort care measures        Positive QuantiFERON-TB Gold test   Assessment & Plan      AFB negative        Chronic kidney disease (CKD), stage III (moderate) (HCC)- (present on admission)   Assessment & Plan    Continue comfort care.      Essential hypertension- (present on admission)   Assessment & Plan          Currently normotensive  Continue diuretics     Constipation   Assessment & Plan    Improved . continue bowel protocols     Pain of left hip joint- (present on admission)   Assessment & Plan    Likely secondary to OA. Currently denies pain.  As needed pain meds available.      Advanced directives, counseling/discussion- (present on admission)   Assessment & Plan    On comfort care.   Pending placement difficult discharge          VTE prophylaxis: Comfort care

## 2019-02-09 NOTE — PROGRESS NOTES
Situation    Pt on [facility] services with goal to be able to identify and utilize community resources to remain independent in her home. While following pt outpatient she was and remains admitted at [facility] since 11/13/2018 due to ground level fall. Current concerns related to safe discharge planning.    Background       Per chart review there are concerns related to pt's safety in returning home independently. Pt does not currently qualify for Medicaid funded programs such as the Home and Community Based Waiver for in-home care services or group home care services due to her assets, specifically her cash surrender value (CSV) of her life insurances being over the asset amount allowed of $2000.    Assessment    BURTON has previously collaborated with inpatient staff regarding concerns of pt's inability to qualify for programs under Medicaid such as the group home waiver until she is able to spend down her assets. At this time per chart review and follow up e-mail received from inpatient supervisor (RADHA Arrieta) current plan for discharge is to pursue group home waiver with hospice. MSW readdressed concerns related to pt's eligibility for group home waiver with her current asset amount.     Recommendation BURTON CC provided update to Community Care Management Manager to collaborate care.

## 2019-02-09 NOTE — CARE PLAN
Problem: Safety  Goal: Will remain free from falls    Intervention: Assess risk factors for falls  Low fall risk, calls for assistance, bed alarm in use.      Problem: Urinary Elimination:  Goal: Ability to reestablish a normal urinary elimination pattern will improve    Intervention: Assess and monitor for signs and symptoms of urinary retention  Voiding adequate amounts without difficulty

## 2019-02-10 PROCEDURE — 99231 SBSQ HOSP IP/OBS SF/LOW 25: CPT | Performed by: HOSPITALIST

## 2019-02-10 PROCEDURE — 700102 HCHG RX REV CODE 250 W/ 637 OVERRIDE(OP): Performed by: HOSPITALIST

## 2019-02-10 PROCEDURE — 770001 HCHG ROOM/CARE - MED/SURG/GYN PRIV*

## 2019-02-10 PROCEDURE — A9270 NON-COVERED ITEM OR SERVICE: HCPCS | Performed by: HOSPITALIST

## 2019-02-10 RX ADMIN — FUROSEMIDE 20 MG: 20 TABLET ORAL at 05:24

## 2019-02-10 ASSESSMENT — ENCOUNTER SYMPTOMS
STRIDOR: 0
EYE REDNESS: 0
FLANK PAIN: 0
ABDOMINAL PAIN: 0
MYALGIAS: 0
NERVOUS/ANXIOUS: 0
EYE PAIN: 0
SHORTNESS OF BREATH: 0
SPEECH CHANGE: 0
FOCAL WEAKNESS: 0
DIARRHEA: 0
COUGH: 0
BACK PAIN: 0
NECK PAIN: 0
SINUS PAIN: 0
FEVER: 0
EYE DISCHARGE: 0
PALPITATIONS: 0
VOMITING: 0

## 2019-02-10 NOTE — PROGRESS NOTES
"1100 pt on call light stating \"I passed out\". Pt states she had \"heart pains\" and thought that she was having a stroke, so pushed call light and passed out. Pt is currently awake again, oriented. No changes to am assessment. Vitals stable without significant change. Will continue to monitor pt.   "

## 2019-02-10 NOTE — CARE PLAN
Problem: Venous Thromboembolism (VTW)/Deep Vein Thrombosis (DVT) Prevention:  Goal: Patient will participate in Venous Thrombosis (VTE)/Deep Vein Thrombosis (DVT)Prevention Measures  Outcome: PROGRESSING AS EXPECTED  Pt is on comfort care, SCDs offered, pt refused.     Problem: Bowel/Gastric:  Goal: Normal bowel function is maintained or improved  Outcome: PROGRESSING AS EXPECTED  LBM 2/8

## 2019-02-10 NOTE — CARE PLAN
Problem: Skin Integrity  Goal: Risk for impaired skin integrity will decrease  Outcome: PROGRESSING AS EXPECTED  Pt is at a low risk for skin breakdown. Pt ambulates, is able to turn/ move herself in bed, she refuses the waffle mattress and th mepilex at this time.     Problem: Mobility  Goal: Risk for activity intolerance will decrease  Outcome: PROGRESSING AS EXPECTED  Pt is ambulating with nursing staff to go to the bathroom multiple times during the day.

## 2019-02-11 PROCEDURE — 99231 SBSQ HOSP IP/OBS SF/LOW 25: CPT | Performed by: HOSPITALIST

## 2019-02-11 PROCEDURE — A9270 NON-COVERED ITEM OR SERVICE: HCPCS | Performed by: HOSPITALIST

## 2019-02-11 PROCEDURE — 700102 HCHG RX REV CODE 250 W/ 637 OVERRIDE(OP): Performed by: HOSPITALIST

## 2019-02-11 PROCEDURE — 770001 HCHG ROOM/CARE - MED/SURG/GYN PRIV*

## 2019-02-11 RX ADMIN — SENNOSIDES AND DOCUSATE SODIUM 2 TABLET: 8.6; 5 TABLET ORAL at 06:02

## 2019-02-11 RX ADMIN — SENNOSIDES AND DOCUSATE SODIUM 1 TABLET: 8.6; 5 TABLET ORAL at 16:57

## 2019-02-11 ASSESSMENT — ENCOUNTER SYMPTOMS
COUGH: 0
SINUS PAIN: 0
EYE PAIN: 0
ABDOMINAL PAIN: 0
MYALGIAS: 0
FOCAL WEAKNESS: 0
FLANK PAIN: 0
NECK PAIN: 0
STRIDOR: 0
EYE DISCHARGE: 0
PALPITATIONS: 0
SPEECH CHANGE: 0
CHILLS: 0
BACK PAIN: 0
SHORTNESS OF BREATH: 0
VOMITING: 0
FEVER: 0
NERVOUS/ANXIOUS: 0
EYE REDNESS: 0
DIARRHEA: 0

## 2019-02-11 NOTE — CARE PLAN
Problem: Mobility  Goal: Risk for activity intolerance will decrease  Outcome: PROGRESSING AS EXPECTED  Pt is ambulating often with the staff and is not having difficulties in ambulating.     Problem: Urinary Elimination:  Goal: Ability to reestablish a normal urinary elimination pattern will improve  Outcome: PROGRESSING AS EXPECTED  Pt is consuming liquids throughout the shift and is ambulating to the bathroom to urinate during the shift.

## 2019-02-11 NOTE — PROGRESS NOTES
Assumed care of patient from night shift RN  89 year old female  Allergies to allopurinol, codeine, and aspirin  DNR/DNI, comfort care  Admitted 11/13 s/p stemi  Pain 0/10, resting quietly at this time  A&O x 4  RA  Cardiac: WNL  LBM 2/8  Regular diet  Voiding in bathroom  No IV, MD aware  Skin: generalized fragile, flaky  Waffle mattress in use  Up 1 assist with walker  Low fall risk per tate sterling bed alarm in use   Plan: placement  All questions answered and all needs met. Bed in low, locked position. Call light within reach. Patient educated on importance of calling before getting OOB. RN will implement hourly rounding and answer call lights appropriately.

## 2019-02-11 NOTE — PROGRESS NOTES
[facility] Medicine Daily Progress Note    Date of Service  2/10/2019    Chief Complaint  89 y.o. female admitted 11/13/2018 with fall.    Hospital Course      Patient is a pleasant 89 year old woman with history of HTN who presented following a fall and was found to have an acute ST elevation MI.  After discussion with her family, she was placed on comfort care.     Interval Problem Update  The patient is resting comfortably in bed.  Denies having pain or other needs.  Very pleasant no behavioral disturbances, pending placement    Consultants/Specialty  Palliative care  Critical care, cardiology    Code Status  Comfort care     Disposition  Anticipate group home placement when financial arrangements completed   working with [facility]    Review of Systems  Review of Systems   Constitutional: Negative for fever.   HENT: Negative for sinus pain.    Eyes: Negative for pain, discharge and redness.   Respiratory: Negative for cough, shortness of breath and stridor.    Cardiovascular: Negative for chest pain and palpitations.   Gastrointestinal: Negative for abdominal pain, diarrhea and vomiting.   Genitourinary: Negative for flank pain and hematuria.   Musculoskeletal: Negative for back pain, myalgias and neck pain.   Neurological: Negative for speech change and focal weakness.   Psychiatric/Behavioral: The patient is not nervous/anxious.         Physical Exam  Temp:  [36 °C (96.8 °F)-36.7 °C (98 °F)] 36.7 °C (98 °F)  Pulse:  [65-70] 70  Resp:  [16-18] 18  BP: (107-110)/(50-64) 110/60  SpO2:  [93 %-94 %] 94 %    Physical Exam   Constitutional: No distress.   Drowsy, arouses to voice, no apparent distress  Frail   HENT:   Head: Normocephalic and atraumatic.   Eyes: EOM are normal. Right eye exhibits no discharge. Left eye exhibits no discharge. No scleral icterus.   Cardiovascular: Normal rate and regular rhythm.    Pulmonary/Chest: No stridor. She has no wheezes. She has no rales.   Abdominal: Soft. She  exhibits no distension.   Musculoskeletal: She exhibits no edema or tenderness.   Neurological: She is alert.   O x 2.   No gross focal weakness   Skin: Skin is warm and dry. She is not diaphoretic. No pallor.   Psychiatric: Cognition and memory are impaired.   Nursing note and vitals reviewed.      Fluids    Intake/Output Summary (Last 24 hours) at 02/10/19 1636  Last data filed at 02/10/19 0900   Gross per 24 hour   Intake              250 ml   Output                0 ml   Net              250 ml       Laboratory                        Imaging  DX-CHEST-PORTABLE (1 VIEW)   Final Result         1.  Hazy interstitial left pulmonary opacities suggests interstitial edema or infiltrate, similar to prior.   2.  Trace left pleural effusion   3.  Hyperexpansion of lungs favors changes of COPD.      DX-CHEST-PORTABLE (1 VIEW)   Final Result         1.  Interstitial infiltrates or edema, stable.   2.  Atherosclerosis      DX-CHEST-PORTABLE (1 VIEW)   Final Result         1.  Interstitial infiltrates or edema.   2.  Atherosclerosis      DX-CHEST-PORTABLE (1 VIEW)   Final Result      Moderate interstitial edema or interstitial lung disease and small left pleural effusion similar to previous.      DX-CHEST-PORTABLE (1 VIEW)   Final Result      Stable interstitial edema and/or interstitial lung disease with probable small pleural fluid      DX-CHEST-PORTABLE (1 VIEW)   Final Result      Ill-defined infrahilar interstitial opacities, atelectasis versus mild edema. No significant pleural effusions.      DX-CHEST-PORTABLE (1 VIEW)   Final Result      Mild left basilar atelectasis, improved since prior. No new consolidation or large pleural effusions.      DX-CHEST-PORTABLE (1 VIEW)   Final Result      1.  Left basilar opacification may be secondary to atelectasis. Developing pneumonia cannot be excluded.         DX-CHEST-PORTABLE (1 VIEW)   Final Result      1.  Unchanged mild interstitial pulmonary edema   2.  Unchanged bibasilar  atelectasis, alveolar edema or pneumonia      DX-CHEST-PORTABLE (1 VIEW)   Final Result      1.  Decreased pulmonary edema   2.  Unchanged bibasilar atelectasis, alveolar edema or pneumonia      DX-CHEST-PORTABLE (1 VIEW)   Final Result      1.  There is diffuse interstitial and alveolar density in the right mid and lower lung zone. This is increased since the previous study. This may represent mild pulmonary edema/consolidation.   2.  Mildly enlarged cardiomediastinal silhouette when compared with the previous chest x-ray.      DX-CHEST-PORTABLE (1 VIEW)   Final Result      Stable mild pulmonary edema.   New left basilar atelectasis.      DX-CHEST-PORTABLE (1 VIEW)   Final Result      Stable chest appearance compared with 11/16.      DX-CHEST-PORTABLE (1 VIEW)   Final Result      No acute cardiopulmonary abnormality identified.      DX-CHEST-PORTABLE (1 VIEW)   Final Result      No acute cardiopulmonary abnormality. No interval change.      DX-CHEST-PORTABLE (1 VIEW)   Final Result      No acute cardiopulmonary disease.      CT-HEAD W/O   Final Result      1.  No evidence of acute intracranial process.      2.  There is again seen interstitial pulmonary edema and bibasilar atelectasis.      CT-HIP W/O PLUS RECONS LEFT   Final Result      1.  No evidence of acute fracture or malalignment. No evidence of hardware failure or complication.   2.  Postsurgical changes of the left femur with broken screw fragment redemonstrated.   3.  Demineralization.   4.  Scattered colonic diverticula.   5.  Atherosclerosis.   6.  Small fat-containing umbilical hernia.      EC-ECHOCARDIOGRAM COMPLETE W/O CONT   Final Result      DX-HIP-COMPLETE - UNILATERAL 2+ LEFT   Final Result      1.  No definite acute osseous abnormality. In setting of demineralization, an occult fracture is difficult to exclude. If there is strong clinical suspicion, CT or MRI could be obtained for further evaluation.   2.  Postsurgical changes of the left femur  with broken screw fragment redemonstrated.      DX-CHEST-PORTABLE (1 VIEW)   Final Result      No acute cardiopulmonary process is seen.      Atherosclerotic plaque.           Assessment/Plan  * Fall- (present on admission)   Assessment & Plan    Chronic debility  Continue comfort care  Social service looking into group home options with limited finances in consideration--anticipate will need State waiver, family contribution, Letter of agreement from [facility] to fund   Patient is resting comfortably      Elevated brain natriuretic peptide (BNP) level- (present on admission)   Assessment & Plan    No dyspnea or symptoms of heart failure  Continue with Lasix.  Monitor for signs of dehydration and poor intake.      ST elevation myocardial infarction involving right coronary artery (HCC)- (present on admission)   Assessment & Plan    No new chest pain.  Vital stable  Currently on comfort care measures        Positive QuantiFERON-TB Gold test   Assessment & Plan      AFB negative        Chronic kidney disease (CKD), stage III (moderate) (HCC)- (present on admission)   Assessment & Plan    Continue comfort care.      Essential hypertension- (present on admission)   Assessment & Plan          Currently normotensive  Continue diuretics     Constipation   Assessment & Plan    Improved . continue bowel protocols     Pain of left hip joint- (present on admission)   Assessment & Plan    Likely secondary to OA. Currently denies pain.  As needed pain meds available.      Advanced directives, counseling/discussion- (present on admission)   Assessment & Plan    On comfort care.   Pending placement difficult discharge          VTE prophylaxis: Comfort care

## 2019-02-11 NOTE — PROGRESS NOTES
[facility] Medicine Daily Progress Note    Date of Service  2/11/2019    Chief Complaint  89 y.o. female admitted 11/13/2018 with fall.    Hospital Course      Patient is a pleasant 89 year old woman with history of HTN who presented following a fall and was found to have an acute ST elevation MI.  After discussion with her family, she was placed on comfort care.     Interval Problem Update  Hemodynamically stable, patient resting comfortably  Not in pain, no complaints   following on group home acceptance  Discussed with patient, patient's nurse and with multidisciplinary team during rounds including , and charge nurse.     Consultants/Specialty  Palliative care  Critical care, cardiology    Code Status  Comfort care     Disposition  Anticipate group home placement when financial arrangements completed   working with [facility]    Review of Systems  Review of Systems   Constitutional: Negative for chills and fever.   HENT: Negative for sinus pain.    Eyes: Negative for pain, discharge and redness.   Respiratory: Negative for cough, shortness of breath and stridor.    Cardiovascular: Negative for chest pain and palpitations.   Gastrointestinal: Negative for abdominal pain, diarrhea and vomiting.   Genitourinary: Negative for flank pain and hematuria.   Musculoskeletal: Negative for back pain, myalgias and neck pain.   Skin: Negative for itching and rash.   Neurological: Negative for speech change and focal weakness.   Psychiatric/Behavioral: The patient is not nervous/anxious.         Physical Exam  Temp:  [36.6 °C (97.8 °F)-36.8 °C (98.2 °F)] 36.6 °C (97.8 °F)  Pulse:  [62-72] 62  Resp:  [18] 18  BP: (104-114)/(67-71) 114/67  SpO2:  [93 %] 93 %    Physical Exam   Constitutional: No distress.   Drowsy, arouses to voice, no apparent distress  Frail   HENT:   Head: Normocephalic and atraumatic.   Eyes: EOM are normal. Right eye exhibits no discharge. Left eye exhibits no  discharge. No scleral icterus.   Cardiovascular: Normal rate and regular rhythm.    Pulmonary/Chest: No stridor. She has no wheezes. She has no rales.   Abdominal: Soft. She exhibits no distension.   Musculoskeletal: She exhibits no edema or tenderness.   Neurological: She is alert.   O x 2.   No gross focal weakness   Skin: Skin is warm and dry. She is not diaphoretic. No pallor.   Psychiatric: Cognition and memory are impaired.   Nursing note and vitals reviewed.      Fluids    Intake/Output Summary (Last 24 hours) at 02/11/19 1714  Last data filed at 02/11/19 1439   Gross per 24 hour   Intake              960 ml   Output                0 ml   Net              960 ml       Laboratory                        Imaging  DX-CHEST-PORTABLE (1 VIEW)   Final Result         1.  Hazy interstitial left pulmonary opacities suggests interstitial edema or infiltrate, similar to prior.   2.  Trace left pleural effusion   3.  Hyperexpansion of lungs favors changes of COPD.      DX-CHEST-PORTABLE (1 VIEW)   Final Result         1.  Interstitial infiltrates or edema, stable.   2.  Atherosclerosis      DX-CHEST-PORTABLE (1 VIEW)   Final Result         1.  Interstitial infiltrates or edema.   2.  Atherosclerosis      DX-CHEST-PORTABLE (1 VIEW)   Final Result      Moderate interstitial edema or interstitial lung disease and small left pleural effusion similar to previous.      DX-CHEST-PORTABLE (1 VIEW)   Final Result      Stable interstitial edema and/or interstitial lung disease with probable small pleural fluid      DX-CHEST-PORTABLE (1 VIEW)   Final Result      Ill-defined infrahilar interstitial opacities, atelectasis versus mild edema. No significant pleural effusions.      DX-CHEST-PORTABLE (1 VIEW)   Final Result      Mild left basilar atelectasis, improved since prior. No new consolidation or large pleural effusions.      DX-CHEST-PORTABLE (1 VIEW)   Final Result      1.  Left basilar opacification may be secondary to  atelectasis. Developing pneumonia cannot be excluded.         DX-CHEST-PORTABLE (1 VIEW)   Final Result      1.  Unchanged mild interstitial pulmonary edema   2.  Unchanged bibasilar atelectasis, alveolar edema or pneumonia      DX-CHEST-PORTABLE (1 VIEW)   Final Result      1.  Decreased pulmonary edema   2.  Unchanged bibasilar atelectasis, alveolar edema or pneumonia      DX-CHEST-PORTABLE (1 VIEW)   Final Result      1.  There is diffuse interstitial and alveolar density in the right mid and lower lung zone. This is increased since the previous study. This may represent mild pulmonary edema/consolidation.   2.  Mildly enlarged cardiomediastinal silhouette when compared with the previous chest x-ray.      DX-CHEST-PORTABLE (1 VIEW)   Final Result      Stable mild pulmonary edema.   New left basilar atelectasis.      DX-CHEST-PORTABLE (1 VIEW)   Final Result      Stable chest appearance compared with 11/16.      DX-CHEST-PORTABLE (1 VIEW)   Final Result      No acute cardiopulmonary abnormality identified.      DX-CHEST-PORTABLE (1 VIEW)   Final Result      No acute cardiopulmonary abnormality. No interval change.      DX-CHEST-PORTABLE (1 VIEW)   Final Result      No acute cardiopulmonary disease.      CT-HEAD W/O   Final Result      1.  No evidence of acute intracranial process.      2.  There is again seen interstitial pulmonary edema and bibasilar atelectasis.      CT-HIP W/O PLUS RECONS LEFT   Final Result      1.  No evidence of acute fracture or malalignment. No evidence of hardware failure or complication.   2.  Postsurgical changes of the left femur with broken screw fragment redemonstrated.   3.  Demineralization.   4.  Scattered colonic diverticula.   5.  Atherosclerosis.   6.  Small fat-containing umbilical hernia.      EC-ECHOCARDIOGRAM COMPLETE W/O CONT   Final Result      DX-HIP-COMPLETE - UNILATERAL 2+ LEFT   Final Result      1.  No definite acute osseous abnormality. In setting of  demineralization, an occult fracture is difficult to exclude. If there is strong clinical suspicion, CT or MRI could be obtained for further evaluation.   2.  Postsurgical changes of the left femur with broken screw fragment redemonstrated.      DX-CHEST-PORTABLE (1 VIEW)   Final Result      No acute cardiopulmonary process is seen.      Atherosclerotic plaque.            Assessment/Plan  * Fall- (present on admission)   Assessment & Plan    Chronic debility  Continue comfort care  Social service looking into group home options with limited finances in consideration--anticipate will need State waiver, family contribution, Letter of agreement from [facility] to fund   Patient is resting comfortably       Elevated brain natriuretic peptide (BNP) level- (present on admission)   Assessment & Plan    No dyspnea or symptoms of heart failure  Continue with Lasix.  Monitor for signs of dehydration and poor intake.      ST elevation myocardial infarction involving right coronary artery (HCC)- (present on admission)   Assessment & Plan    No new chest pain.  Vital stable  Currently on comfort care measures        Positive QuantiFERON-TB Gold test   Assessment & Plan      AFB negative        Chronic kidney disease (CKD), stage III (moderate) (HCC)- (present on admission)   Assessment & Plan    Continue comfort care.       Essential hypertension- (present on admission)   Assessment & Plan          Currently normotensive  Continue diuretics     Constipation   Assessment & Plan    Improved . Continue bowel protocols      Pain of left hip joint- (present on admission)   Assessment & Plan    Likely secondary to OA. Currently denies pain.  As needed pain meds available.      Advanced directives, counseling/discussion- (present on admission)   Assessment & Plan    On comfort care.   Pending placement difficult discharge          VTE prophylaxis: Comfort care

## 2019-02-12 PROCEDURE — 770001 HCHG ROOM/CARE - MED/SURG/GYN PRIV*

## 2019-02-12 PROCEDURE — 99232 SBSQ HOSP IP/OBS MODERATE 35: CPT | Performed by: HOSPITALIST

## 2019-02-12 ASSESSMENT — ENCOUNTER SYMPTOMS
MYALGIAS: 0
ABDOMINAL PAIN: 0
EYE PAIN: 0
DIARRHEA: 0
SHORTNESS OF BREATH: 0
VOMITING: 0
BACK PAIN: 0
FEVER: 0
NECK PAIN: 0
COUGH: 0
NERVOUS/ANXIOUS: 1
FLANK PAIN: 0

## 2019-02-12 NOTE — CARE PLAN
Problem: Safety  Goal: Will remain free from injury  Outcome: PROGRESSING AS EXPECTED  Treaded socks in place, bed in the lowest position, bed alarm on, call light and belongings within reach, pt call for assistance appropriately    Problem: Discharge Barriers/Planning  Goal: Patient's continuum of care needs will be met  Outcome: PROGRESSING AS EXPECTED  Awaiting placement

## 2019-02-13 ENCOUNTER — PATIENT OUTREACH (OUTPATIENT)
Dept: HEALTH INFORMATION MANAGEMENT | Facility: OTHER | Age: 84
End: 2019-02-13

## 2019-02-13 PROCEDURE — A9270 NON-COVERED ITEM OR SERVICE: HCPCS | Performed by: HOSPITALIST

## 2019-02-13 PROCEDURE — 700102 HCHG RX REV CODE 250 W/ 637 OVERRIDE(OP): Performed by: HOSPITALIST

## 2019-02-13 PROCEDURE — 99231 SBSQ HOSP IP/OBS SF/LOW 25: CPT | Performed by: HOSPITALIST

## 2019-02-13 PROCEDURE — 770001 HCHG ROOM/CARE - MED/SURG/GYN PRIV*

## 2019-02-13 RX ADMIN — SENNOSIDES AND DOCUSATE SODIUM 2 TABLET: 8.6; 5 TABLET ORAL at 05:46

## 2019-02-13 RX ADMIN — FUROSEMIDE 20 MG: 20 TABLET ORAL at 05:46

## 2019-02-13 ASSESSMENT — ENCOUNTER SYMPTOMS
ABDOMINAL PAIN: 0
SHORTNESS OF BREATH: 0
VOMITING: 0
DIARRHEA: 0
EYE PAIN: 0
NERVOUS/ANXIOUS: 1
COUGH: 0
MYALGIAS: 0

## 2019-02-13 NOTE — PROGRESS NOTES
Hospital Medicine Daily Progress Note    Date of Service  2/12/2019    Chief Complaint  89 y.o. female admitted 11/13/2018 with fall.    Hospital Course      Patient is a pleasant 89 year old woman with history of HTN who presented following a fall and was found to have an acute ST elevation MI.  After discussion with her family, she was placed on comfort care.     Interval Problem Update  2/11: Very pleasant no behavioral disturbances, pending placement  2/12: Pt feels she his too tall for her bed     Consultants/Specialty  Palliative care  Critical care, cardiology    Code Status  Comfort care     Disposition  Anticipate group home placement when financial arrangements completed   working with [facility]    Review of Systems  Review of Systems   Constitutional: Negative for fever.   Eyes: Negative for pain.   Respiratory: Negative for cough and shortness of breath.    Cardiovascular: Negative for chest pain.   Gastrointestinal: Negative for abdominal pain, diarrhea and vomiting.   Genitourinary: Negative for flank pain and hematuria.   Musculoskeletal: Negative for back pain, myalgias and neck pain.   Psychiatric/Behavioral: The patient is nervous/anxious.         Physical Exam  Temp:  [36.7 °C (98 °F)-37.1 °C (98.8 °F)] 37.1 °C (98.8 °F)  Pulse:  [75-83] 75  Resp:  [16-17] 17  BP: ()/(56-58) 94/56  SpO2:  [91 %-97 %] 97 %    Physical Exam   Constitutional:   Drowsy, arouses to voice, no apparent distress  Frail   HENT:   Head: Normocephalic and atraumatic.   Eyes: EOM are normal. No scleral icterus.   Cardiovascular: Normal rate and regular rhythm.    Pulmonary/Chest: She has no wheezes. She has no rales.   Abdominal: Soft. She exhibits no distension.   Musculoskeletal: She exhibits no edema or tenderness.   Neurological: She is alert.   Skin: Skin is warm and dry. She is not diaphoretic. No pallor.   Psychiatric: Cognition and memory are impaired.   Nursing note and vitals  reviewed.      Fluids    Intake/Output Summary (Last 24 hours) at 02/12/19 1704  Last data filed at 02/12/19 1300   Gross per 24 hour   Intake              240 ml   Output                0 ml   Net              240 ml       Laboratory                        Imaging  DX-CHEST-PORTABLE (1 VIEW)   Final Result         1.  Hazy interstitial left pulmonary opacities suggests interstitial edema or infiltrate, similar to prior.   2.  Trace left pleural effusion   3.  Hyperexpansion of lungs favors changes of COPD.      DX-CHEST-PORTABLE (1 VIEW)   Final Result         1.  Interstitial infiltrates or edema, stable.   2.  Atherosclerosis      DX-CHEST-PORTABLE (1 VIEW)   Final Result         1.  Interstitial infiltrates or edema.   2.  Atherosclerosis      DX-CHEST-PORTABLE (1 VIEW)   Final Result      Moderate interstitial edema or interstitial lung disease and small left pleural effusion similar to previous.      DX-CHEST-PORTABLE (1 VIEW)   Final Result      Stable interstitial edema and/or interstitial lung disease with probable small pleural fluid      DX-CHEST-PORTABLE (1 VIEW)   Final Result      Ill-defined infrahilar interstitial opacities, atelectasis versus mild edema. No significant pleural effusions.      DX-CHEST-PORTABLE (1 VIEW)   Final Result      Mild left basilar atelectasis, improved since prior. No new consolidation or large pleural effusions.      DX-CHEST-PORTABLE (1 VIEW)   Final Result      1.  Left basilar opacification may be secondary to atelectasis. Developing pneumonia cannot be excluded.         DX-CHEST-PORTABLE (1 VIEW)   Final Result      1.  Unchanged mild interstitial pulmonary edema   2.  Unchanged bibasilar atelectasis, alveolar edema or pneumonia      DX-CHEST-PORTABLE (1 VIEW)   Final Result      1.  Decreased pulmonary edema   2.  Unchanged bibasilar atelectasis, alveolar edema or pneumonia      DX-CHEST-PORTABLE (1 VIEW)   Final Result      1.  There is diffuse interstitial and  alveolar density in the right mid and lower lung zone. This is increased since the previous study. This may represent mild pulmonary edema/consolidation.   2.  Mildly enlarged cardiomediastinal silhouette when compared with the previous chest x-ray.      DX-CHEST-PORTABLE (1 VIEW)   Final Result      Stable mild pulmonary edema.   New left basilar atelectasis.      DX-CHEST-PORTABLE (1 VIEW)   Final Result      Stable chest appearance compared with 11/16.      DX-CHEST-PORTABLE (1 VIEW)   Final Result      No acute cardiopulmonary abnormality identified.      DX-CHEST-PORTABLE (1 VIEW)   Final Result      No acute cardiopulmonary abnormality. No interval change.      DX-CHEST-PORTABLE (1 VIEW)   Final Result      No acute cardiopulmonary disease.      CT-HEAD W/O   Final Result      1.  No evidence of acute intracranial process.      2.  There is again seen interstitial pulmonary edema and bibasilar atelectasis.      CT-HIP W/O PLUS RECONS LEFT   Final Result      1.  No evidence of acute fracture or malalignment. No evidence of hardware failure or complication.   2.  Postsurgical changes of the left femur with broken screw fragment redemonstrated.   3.  Demineralization.   4.  Scattered colonic diverticula.   5.  Atherosclerosis.   6.  Small fat-containing umbilical hernia.      EC-ECHOCARDIOGRAM COMPLETE W/O CONT   Final Result      DX-HIP-COMPLETE - UNILATERAL 2+ LEFT   Final Result      1.  No definite acute osseous abnormality. In setting of demineralization, an occult fracture is difficult to exclude. If there is strong clinical suspicion, CT or MRI could be obtained for further evaluation.   2.  Postsurgical changes of the left femur with broken screw fragment redemonstrated.      DX-CHEST-PORTABLE (1 VIEW)   Final Result      No acute cardiopulmonary process is seen.      Atherosclerotic plaque.           Assessment/Plan  * Fall- (present on admission)   Assessment & Plan    Chronic debility  Continue  comfort care  Social service looking into group home options with limited finances in consideration--anticipate will need State waiver, family contribution, Letter of agreement from [facility] to fund   Patient is resting comfortably       Elevated brain natriuretic peptide (BNP) level- (present on admission)   Assessment & Plan    No dyspnea or symptoms of heart failure  Continue with Lasix for comfort  Monitor for signs of dehydration and poor intake.      ST elevation myocardial infarction involving right coronary artery (HCC)- (present on admission)   Assessment & Plan    No new chest pain.  Vital stable  Currently on comfort care measures        Positive QuantiFERON-TB Gold test   Assessment & Plan      AFB negative        Chronic kidney disease (CKD), stage III (moderate) ([facility])- (present on admission)   Assessment & Plan    Continue comfort care.       Essential hypertension- (present on admission)   Assessment & Plan          Currently normotensive  Continue diuretics     Constipation   Assessment & Plan    Improved . Continue bowel protocols      Pain of left hip joint- (present on admission)   Assessment & Plan    Likely secondary to OA. Currently denies pain.  As needed pain meds available.      Advanced directives, counseling/discussion- (present on admission)   Assessment & Plan    On comfort care.   Pending placement difficult discharge          VTE prophylaxis: Comfort care

## 2019-02-13 NOTE — CARE PLAN
Problem: Safety  Goal: Will remain free from injury  Outcome: PROGRESSING AS EXPECTED  Bed alarm in place, call light with in reach

## 2019-02-13 NOTE — PROGRESS NOTES
Situation    Pt on  CC services with goal to be able to identify and utilize community resources to remain independent in her home. While following pt outpatient she was and remains admitted at [facility] since 11/13/2018 due to ground level fall. Current concerns related to safe discharge planning.    Background       Prior to pt's admission to [facility] on 11/13/2018, pt had been placed on the [facility] for the Elder[facility] (COPE) wait-list. Pt was previously denied the Home and Community Based Waiver for in-home care services Oct 2018 due to her asset amount. Pt remains admitted at [facility] with concerns related to safe discharge plan.     Assessment    MSW CC, accompanied by RN CC (TRACI Awad), completed face to face collaboration with inpatient  (RADHA De Oliveira). Discussed current plan related to referral to group home waiver with [employer]. Informed inpatient  per this MSW last discussion with [employer] supervisor, agency did not have an active referral for the group home waiver. Agency is currently only processing the pt on the COPE wait-list. Discussed that supervisor with [employer] also addressed the concerns that pt would not qualify for program due to her cash surrender value (CSV) of her life insurance policies. This MSW was able to show inpatient SW outpatient note from this MSW wrote in October 2018 which outlined pt's insurance policies CSV at that time.  Inpatient  reported she had received information from [facility] that pt no longer has the life insurance policies and the only asset pt has is a burial. This information was evidently reported from pt's daughter to [facility] to inpatient hospital . This MSW discussed if this is accurate the daughter and pt will need to verify that the life insurances have been closed and provide evidence of the spend down. Inpatient SW reported she would place new group home waiver referral.     Recommendation MSW CC provided update to Community Care Management Manager to collaborate care and will continue to follow to assist as needed/collaborate care for continuity.

## 2019-02-13 NOTE — DISCHARGE PLANNING
RISHABH informed by MARINA Chang that she spoke with MARINA Burr last week and there was no GH waiver referral except the one that had been done last year. RISHABH left a VM for MARINA Hope asking for a call back to make another GH waiver referral.

## 2019-02-13 NOTE — CARE PLAN
Problem: Safety  Goal: Will remain free from injury  Outcome: PROGRESSING AS EXPECTED  Bed in the lowest locked position. Call light within reach. Bed alarm in use. Hourly rounding in place.     Problem: Discharge Barriers/Planning  Goal: Patient's continuum of care needs will be met  Outcome: PROGRESSING AS EXPECTED  Pending group home placement

## 2019-02-13 NOTE — DISCHARGE PLANNING
LS[facility]. LSW left return VM requesting a call back to follow up on a referral.

## 2019-02-13 NOTE — PROGRESS NOTES
Assumed care of patient at 0700. Patient wants to continue sleeping. Refuses turning and repositioning.

## 2019-02-13 NOTE — PROGRESS NOTES
· 2 RN skin check complete with Flores AVILES RN.  · Devices in place NONE.  · Confirmed pressure ulcers found NONE  · Sacrum redness noted intact blanching. Generalize abrasion to bilateral LE noted. Patient refusing waffle overlay and mepliex despite education. Pt encourage movement in bed.

## 2019-02-13 NOTE — PROGRESS NOTES
Met with Inpatient floor MARINA Chandler accompanied by MARINA Chang. Notified that this RN will not actively be following patient while inpatient but encouraged referral back to Community Care Management RN upon patient's discharge if services are needed.

## 2019-02-14 PROCEDURE — 770001 HCHG ROOM/CARE - MED/SURG/GYN PRIV*

## 2019-02-14 PROCEDURE — 99231 SBSQ HOSP IP/OBS SF/LOW 25: CPT | Performed by: HOSPITALIST

## 2019-02-14 ASSESSMENT — ENCOUNTER SYMPTOMS
SHORTNESS OF BREATH: 0
MYALGIAS: 0
COUGH: 0
ABDOMINAL PAIN: 0
VOMITING: 0
NAUSEA: 0

## 2019-02-14 NOTE — PROGRESS NOTES
Hospital Medicine Daily Progress Note    Date of Service  2/13/2019    Chief Complaint  89 y.o. female admitted 11/13/2018 with fall.    Hospital Course      Patient is a pleasant 89 year old woman with history of HTN who presented following a fall and was found to have an acute ST elevation MI.  After discussion with her family, she was placed on comfort care.     Interval Problem Update  2/11: Very pleasant no behavioral disturbances, pending placement  2/12: Pt feels she his too tall for her bed   2/13: Looking into group home waiver    Consultants/Specialty  Palliative care  Critical care, cardiology    Code Status  Comfort care     Disposition  Anticipate group home placement when financial arrangements completed   working with [facility]    Review of Systems  Review of Systems   Eyes: Negative for pain.   Respiratory: Negative for cough and shortness of breath.    Cardiovascular: Negative for chest pain.   Gastrointestinal: Negative for abdominal pain, diarrhea and vomiting.   Musculoskeletal: Negative for myalgias.   Psychiatric/Behavioral: The patient is nervous/anxious.         Physical Exam  Temp:  [36.3 °C (97.3 °F)-37.1 °C (98.8 °F)] 36.3 °C (97.3 °F)  Pulse:  [66-75] 66  Resp:  [16-17] 16  BP: (107-131)/(53-76) 107/53  SpO2:  [92 %-93 %] 92 %    Physical Exam   Constitutional:   Drowsy, arouses to voice, no apparent distress  Frail   HENT:   Head: Normocephalic and atraumatic.   Eyes: EOM are normal.   Cardiovascular: Normal rate and regular rhythm.    Pulmonary/Chest: No respiratory distress.   Abdominal: Soft. She exhibits no distension.   Musculoskeletal: She exhibits no edema or tenderness.   Neurological: She is alert.   Skin: Skin is warm and dry. She is not diaphoretic. No pallor.   Psychiatric: Cognition and memory are impaired.   Nursing note and vitals reviewed.      Fluids    Intake/Output Summary (Last 24 hours) at 02/13/19 1640  Last data filed at 02/13/19 0900   Gross per  24 hour   Intake              240 ml   Output                0 ml   Net              240 ml       Laboratory                        Imaging  DX-CHEST-PORTABLE (1 VIEW)   Final Result         1.  Hazy interstitial left pulmonary opacities suggests interstitial edema or infiltrate, similar to prior.   2.  Trace left pleural effusion   3.  Hyperexpansion of lungs favors changes of COPD.      DX-CHEST-PORTABLE (1 VIEW)   Final Result         1.  Interstitial infiltrates or edema, stable.   2.  Atherosclerosis      DX-CHEST-PORTABLE (1 VIEW)   Final Result         1.  Interstitial infiltrates or edema.   2.  Atherosclerosis      DX-CHEST-PORTABLE (1 VIEW)   Final Result      Moderate interstitial edema or interstitial lung disease and small left pleural effusion similar to previous.      DX-CHEST-PORTABLE (1 VIEW)   Final Result      Stable interstitial edema and/or interstitial lung disease with probable small pleural fluid      DX-CHEST-PORTABLE (1 VIEW)   Final Result      Ill-defined infrahilar interstitial opacities, atelectasis versus mild edema. No significant pleural effusions.      DX-CHEST-PORTABLE (1 VIEW)   Final Result      Mild left basilar atelectasis, improved since prior. No new consolidation or large pleural effusions.      DX-CHEST-PORTABLE (1 VIEW)   Final Result      1.  Left basilar opacification may be secondary to atelectasis. Developing pneumonia cannot be excluded.         DX-CHEST-PORTABLE (1 VIEW)   Final Result      1.  Unchanged mild interstitial pulmonary edema   2.  Unchanged bibasilar atelectasis, alveolar edema or pneumonia      DX-CHEST-PORTABLE (1 VIEW)   Final Result      1.  Decreased pulmonary edema   2.  Unchanged bibasilar atelectasis, alveolar edema or pneumonia      DX-CHEST-PORTABLE (1 VIEW)   Final Result      1.  There is diffuse interstitial and alveolar density in the right mid and lower lung zone. This is increased since the previous study. This may represent mild  pulmonary edema/consolidation.   2.  Mildly enlarged cardiomediastinal silhouette when compared with the previous chest x-ray.      DX-CHEST-PORTABLE (1 VIEW)   Final Result      Stable mild pulmonary edema.   New left basilar atelectasis.      DX-CHEST-PORTABLE (1 VIEW)   Final Result      Stable chest appearance compared with 11/16.      DX-CHEST-PORTABLE (1 VIEW)   Final Result      No acute cardiopulmonary abnormality identified.      DX-CHEST-PORTABLE (1 VIEW)   Final Result      No acute cardiopulmonary abnormality. No interval change.      DX-CHEST-PORTABLE (1 VIEW)   Final Result      No acute cardiopulmonary disease.      CT-HEAD W/O   Final Result      1.  No evidence of acute intracranial process.      2.  There is again seen interstitial pulmonary edema and bibasilar atelectasis.      CT-HIP W/O PLUS RECONS LEFT   Final Result      1.  No evidence of acute fracture or malalignment. No evidence of hardware failure or complication.   2.  Postsurgical changes of the left femur with broken screw fragment redemonstrated.   3.  Demineralization.   4.  Scattered colonic diverticula.   5.  Atherosclerosis.   6.  Small fat-containing umbilical hernia.      EC-ECHOCARDIOGRAM COMPLETE W/O CONT   Final Result      DX-HIP-COMPLETE - UNILATERAL 2+ LEFT   Final Result      1.  No definite acute osseous abnormality. In setting of demineralization, an occult fracture is difficult to exclude. If there is strong clinical suspicion, CT or MRI could be obtained for further evaluation.   2.  Postsurgical changes of the left femur with broken screw fragment redemonstrated.      DX-CHEST-PORTABLE (1 VIEW)   Final Result      No acute cardiopulmonary process is seen.      Atherosclerotic plaque.           Assessment/Plan  * Fall- (present on admission)   Assessment & Plan    Chronic debility  Continue comfort care  Social service looking into group home options with limited finances in consideration--anticipate will need State  waiver, family contribution, Letter of agreement from [facility] to fund   Patient is resting comfortably       Elevated brain natriuretic peptide (BNP) level- (present on admission)   Assessment & Plan    No dyspnea or symptoms of heart failure  Continue with Lasix for comfort  Monitor for signs of dehydration and poor intake.      ST elevation myocardial infarction involving right coronary artery (HCC)- (present on admission)   Assessment & Plan    No new chest pain.  Vital stable  Currently on comfort care measures        Positive QuantiFERON-TB Gold test   Assessment & Plan      AFB negative        Chronic kidney disease (CKD), stage III (moderate) (HCC)- (present on admission)   Assessment & Plan    Continue comfort care.       Essential hypertension- (present on admission)   Assessment & Plan    Currently normotensive  Continue diuretics to prevent volume overload for comfort     Constipation   Assessment & Plan    Improved . Continue bowel protocols      Pain of left hip joint- (present on admission)   Assessment & Plan    Likely secondary to OA. Currently denies pain.  As needed pain meds available.      Advanced directives, counseling/discussion- (present on admission)   Assessment & Plan    On comfort care.   Pending placement difficult discharge          VTE prophylaxis: Comfort care

## 2019-02-14 NOTE — CARE PLAN
Problem: Safety  Goal: Will remain free from falls    Intervention: Implement fall precautions  Pt calls appropriately, treaded socks in use. Personal belongings and call light with in reach and bed is locked in lowest position.      Problem: Mobility  Goal: Risk for activity intolerance will decrease  Outcome: PROGRESSING SLOWER THAN EXPECTED  Patient slow to ambulate to the bathroom.

## 2019-02-15 PROCEDURE — 770001 HCHG ROOM/CARE - MED/SURG/GYN PRIV*

## 2019-02-15 PROCEDURE — 700102 HCHG RX REV CODE 250 W/ 637 OVERRIDE(OP): Performed by: HOSPITALIST

## 2019-02-15 PROCEDURE — 99231 SBSQ HOSP IP/OBS SF/LOW 25: CPT | Performed by: HOSPITALIST

## 2019-02-15 PROCEDURE — A9270 NON-COVERED ITEM OR SERVICE: HCPCS | Performed by: HOSPITALIST

## 2019-02-15 RX ADMIN — SENNOSIDES AND DOCUSATE SODIUM 2 TABLET: 8.6; 5 TABLET ORAL at 18:00

## 2019-02-15 ASSESSMENT — ENCOUNTER SYMPTOMS
COUGH: 0
FEVER: 0
VOMITING: 0
NAUSEA: 0
SHORTNESS OF BREATH: 0
ABDOMINAL PAIN: 0
CHILLS: 0

## 2019-02-15 NOTE — CARE PLAN
"Problem: Safety  Goal: Will remain free from injury  Outcome: PROGRESSING AS EXPECTED  Pt instructed to call before getting OOB. Pt verbalized understanding and calls appropriately. Bed alarm in place and functional.    Problem: Skin Integrity  Goal: Risk for impaired skin integrity will decrease  Outcome: PROGRESSING SLOWER THAN EXPECTED  Pt educated about the importance of bed mobility to redistribute weight often while in bed. Pt refuses Q2 turns despite education and reporting that her \"butt hurts from sitting still all day\" Zinc cream recommended      "

## 2019-02-15 NOTE — CARE PLAN
Problem: Safety  Goal: Will remain free from falls    Intervention: Implement fall precautions  Pt calls appropriately, treaded socks in use. Personal belongings and call light with in reach and bed is locked in lowest position.      Problem: Bowel/Gastric:  Goal: Normal bowel function is maintained or improved  Outcome: PROGRESSING SLOWER THAN EXPECTED  Patient does not want stool softeners education provided

## 2019-02-15 NOTE — PROGRESS NOTES
Hospital Medicine Daily Progress Note    Date of Service  2/14/2019    Chief Complaint  89 y.o. female admitted 11/13/2018 with fall.    Hospital Course      Patient is a pleasant 89 year old woman with history of HTN who presented following a fall and was found to have an acute ST elevation MI.  After discussion with her family, she was placed on comfort care.     Interval Problem Update  2/11: Very pleasant no behavioral disturbances, pending placement  2/12: Pt feels she his too tall for her bed   2/13: Looking into group home waiver  2/14: Sleeping comfortably in bed    Consultants/Specialty  Palliative care  Critical care, cardiology    Code Status  Comfort care     Disposition  Anticipate group home placement when financial arrangements completed   working with [facility]    Review of Systems  Review of Systems   Respiratory: Negative for cough and shortness of breath.    Cardiovascular: Negative for chest pain.   Gastrointestinal: Negative for abdominal pain, nausea and vomiting.   Musculoskeletal: Negative for myalgias.        Physical Exam  Temp:  [36.1 °C (97 °F)-36.9 °C (98.4 °F)] 36.1 °C (97 °F)  Pulse:  [71-78] 74  Resp:  [16-17] 16  BP: (109-112)/(58-63) 110/58  SpO2:  [93 %-96 %] 93 %    Physical Exam   Constitutional:   Drowsy, arouses to voice, no apparent distress  Frail   HENT:   Head: Normocephalic and atraumatic.   Cardiovascular: Normal rate.    Pulmonary/Chest: No respiratory distress.   Abdominal: Soft. She exhibits no distension.   Musculoskeletal: She exhibits no edema or tenderness.   Neurological:   Sleepy   Skin: Skin is warm and dry. She is not diaphoretic.   Psychiatric: Cognition and memory are impaired.   Nursing note and vitals reviewed.      Fluids  No intake or output data in the 24 hours ending 02/14/19 1638    Laboratory                        Imaging  DX-CHEST-PORTABLE (1 VIEW)   Final Result         1.  Hazy interstitial left pulmonary opacities suggests  interstitial edema or infiltrate, similar to prior.   2.  Trace left pleural effusion   3.  Hyperexpansion of lungs favors changes of COPD.      DX-CHEST-PORTABLE (1 VIEW)   Final Result         1.  Interstitial infiltrates or edema, stable.   2.  Atherosclerosis      DX-CHEST-PORTABLE (1 VIEW)   Final Result         1.  Interstitial infiltrates or edema.   2.  Atherosclerosis      DX-CHEST-PORTABLE (1 VIEW)   Final Result      Moderate interstitial edema or interstitial lung disease and small left pleural effusion similar to previous.      DX-CHEST-PORTABLE (1 VIEW)   Final Result      Stable interstitial edema and/or interstitial lung disease with probable small pleural fluid      DX-CHEST-PORTABLE (1 VIEW)   Final Result      Ill-defined infrahilar interstitial opacities, atelectasis versus mild edema. No significant pleural effusions.      DX-CHEST-PORTABLE (1 VIEW)   Final Result      Mild left basilar atelectasis, improved since prior. No new consolidation or large pleural effusions.      DX-CHEST-PORTABLE (1 VIEW)   Final Result      1.  Left basilar opacification may be secondary to atelectasis. Developing pneumonia cannot be excluded.         DX-CHEST-PORTABLE (1 VIEW)   Final Result      1.  Unchanged mild interstitial pulmonary edema   2.  Unchanged bibasilar atelectasis, alveolar edema or pneumonia      DX-CHEST-PORTABLE (1 VIEW)   Final Result      1.  Decreased pulmonary edema   2.  Unchanged bibasilar atelectasis, alveolar edema or pneumonia      DX-CHEST-PORTABLE (1 VIEW)   Final Result      1.  There is diffuse interstitial and alveolar density in the right mid and lower lung zone. This is increased since the previous study. This may represent mild pulmonary edema/consolidation.   2.  Mildly enlarged cardiomediastinal silhouette when compared with the previous chest x-ray.      DX-CHEST-PORTABLE (1 VIEW)   Final Result      Stable mild pulmonary edema.   New left basilar atelectasis.       DX-CHEST-PORTABLE (1 VIEW)   Final Result      Stable chest appearance compared with 11/16.      DX-CHEST-PORTABLE (1 VIEW)   Final Result      No acute cardiopulmonary abnormality identified.      DX-CHEST-PORTABLE (1 VIEW)   Final Result      No acute cardiopulmonary abnormality. No interval change.      DX-CHEST-PORTABLE (1 VIEW)   Final Result      No acute cardiopulmonary disease.      CT-HEAD W/O   Final Result      1.  No evidence of acute intracranial process.      2.  There is again seen interstitial pulmonary edema and bibasilar atelectasis.      CT-HIP W/O PLUS RECONS LEFT   Final Result      1.  No evidence of acute fracture or malalignment. No evidence of hardware failure or complication.   2.  Postsurgical changes of the left femur with broken screw fragment redemonstrated.   3.  Demineralization.   4.  Scattered colonic diverticula.   5.  Atherosclerosis.   6.  Small fat-containing umbilical hernia.      EC-ECHOCARDIOGRAM COMPLETE W/O CONT   Final Result      DX-HIP-COMPLETE - UNILATERAL 2+ LEFT   Final Result      1.  No definite acute osseous abnormality. In setting of demineralization, an occult fracture is difficult to exclude. If there is strong clinical suspicion, CT or MRI could be obtained for further evaluation.   2.  Postsurgical changes of the left femur with broken screw fragment redemonstrated.      DX-CHEST-PORTABLE (1 VIEW)   Final Result      No acute cardiopulmonary process is seen.      Atherosclerotic plaque.           Assessment/Plan  * Fall- (present on admission)   Assessment & Plan    Chronic debility  Continue comfort care  Social service looking into group home options with limited finances in consideration--anticipate will need State waiver, family contribution, Letter of agreement from [facility] to fund      Elevated brain natriuretic peptide (BNP) level- (present on admission)   Assessment & Plan    No dyspnea or symptoms of heart failure  Continue with Lasix for  comfort  Monitor for signs of dehydration and poor intake.      ST elevation myocardial infarction involving right coronary artery (HCC)- (present on admission)   Assessment & Plan    No new chest pain.  Vital stable  Currently on comfort care measures        Positive QuantiFERON-TB Gold test   Assessment & Plan      AFB negative        Chronic kidney disease (CKD), stage III (moderate) (HCC)- (present on admission)   Assessment & Plan    Continue comfort care.       Essential hypertension- (present on admission)   Assessment & Plan    Currently normotensive  Continue diuretics to prevent volume overload for comfort     Constipation   Assessment & Plan    Improved . Continue bowel protocols      Pain of left hip joint- (present on admission)   Assessment & Plan    Likely secondary to OA. Currently denies pain.  As needed pain meds available.      Advanced directives, counseling/discussion- (present on admission)   Assessment & Plan    On comfort care.   Pending placement difficult discharge          VTE prophylaxis: Comfort care

## 2019-02-16 PROCEDURE — 99231 SBSQ HOSP IP/OBS SF/LOW 25: CPT | Performed by: HOSPITALIST

## 2019-02-16 PROCEDURE — 770001 HCHG ROOM/CARE - MED/SURG/GYN PRIV*

## 2019-02-16 ASSESSMENT — ENCOUNTER SYMPTOMS
COUGH: 0
SHORTNESS OF BREATH: 0
CHILLS: 0
NAUSEA: 0
VOMITING: 0
DEPRESSION: 0
FEVER: 0

## 2019-02-16 NOTE — PROGRESS NOTES
· 2 RN skin check complete with AQUILINO Salvador.  · Devices in place NONE.  · Skin assessed under devices N/A.  · Findings Bilateral heels redness noted, intact and blanching. Sacrum redness noted, blanching.  · The following interventions in place heels floated on pillows. Refusing mepliex and waffle overlay despite education

## 2019-02-16 NOTE — PROGRESS NOTES
Hospital Medicine Daily Progress Note    Date of Service  2/16/2019    Chief Complaint  89 y.o. female admitted 11/13/2018 with fall.    Hospital Course      Patient is a pleasant 89 year old woman with history of HTN who presented following a fall and was found to have an acute ST elevation MI.  After discussion with her family, she was placed on comfort care.     Interval Problem Update  2/11: Very pleasant no behavioral disturbances, pending placement  2/12: Pt feels she his too tall for her bed   2/13: Looking into group home waiver  2/14: Sleeping comfortably in bed  2/15: Patient eating dinner without nausea or discomfort   2/16: Lying comfortably in bed awake    Consultants/Specialty  Palliative care  Critical care, cardiology    Code Status  Comfort care     Disposition  Anticipate group home placement when financial arrangements completed   working with [facility]    Review of Systems  Review of Systems   Constitutional: Negative for chills and fever.   Respiratory: Negative for cough and shortness of breath.    Cardiovascular: Negative for chest pain.   Gastrointestinal: Negative for nausea and vomiting.   Psychiatric/Behavioral: Negative for depression.        Physical Exam  Temp:  [36.4 °C (97.5 °F)-36.6 °C (97.8 °F)] 36.4 °C (97.5 °F)  Pulse:  [69-71] 69  Resp:  [15-16] 16  BP: (111-116)/(62-63) 116/63  SpO2:  [90 %-92 %] 92 %    Physical Exam   Constitutional:   Drowsy, arouses to voice, no apparent distress  Frail   HENT:   Head: Normocephalic and atraumatic.   Eyes: Conjunctivae are normal.   Cardiovascular: Normal rate.    Pulmonary/Chest: No respiratory distress.   Abdominal: Soft. She exhibits no distension. There is no tenderness.   Musculoskeletal: She exhibits no edema.   Neurological:   Sleepy   Skin: Skin is warm and dry. She is not diaphoretic.   Psychiatric: She has a normal mood and affect. Cognition and memory are impaired.   Nursing note and vitals  reviewed.      Fluids  No intake or output data in the 24 hours ending 02/16/19 1521    Laboratory                        Imaging  DX-CHEST-PORTABLE (1 VIEW)   Final Result         1.  Hazy interstitial left pulmonary opacities suggests interstitial edema or infiltrate, similar to prior.   2.  Trace left pleural effusion   3.  Hyperexpansion of lungs favors changes of COPD.      DX-CHEST-PORTABLE (1 VIEW)   Final Result         1.  Interstitial infiltrates or edema, stable.   2.  Atherosclerosis      DX-CHEST-PORTABLE (1 VIEW)   Final Result         1.  Interstitial infiltrates or edema.   2.  Atherosclerosis      DX-CHEST-PORTABLE (1 VIEW)   Final Result      Moderate interstitial edema or interstitial lung disease and small left pleural effusion similar to previous.      DX-CHEST-PORTABLE (1 VIEW)   Final Result      Stable interstitial edema and/or interstitial lung disease with probable small pleural fluid      DX-CHEST-PORTABLE (1 VIEW)   Final Result      Ill-defined infrahilar interstitial opacities, atelectasis versus mild edema. No significant pleural effusions.      DX-CHEST-PORTABLE (1 VIEW)   Final Result      Mild left basilar atelectasis, improved since prior. No new consolidation or large pleural effusions.      DX-CHEST-PORTABLE (1 VIEW)   Final Result      1.  Left basilar opacification may be secondary to atelectasis. Developing pneumonia cannot be excluded.         DX-CHEST-PORTABLE (1 VIEW)   Final Result      1.  Unchanged mild interstitial pulmonary edema   2.  Unchanged bibasilar atelectasis, alveolar edema or pneumonia      DX-CHEST-PORTABLE (1 VIEW)   Final Result      1.  Decreased pulmonary edema   2.  Unchanged bibasilar atelectasis, alveolar edema or pneumonia      DX-CHEST-PORTABLE (1 VIEW)   Final Result      1.  There is diffuse interstitial and alveolar density in the right mid and lower lung zone. This is increased since the previous study. This may represent mild pulmonary  edema/consolidation.   2.  Mildly enlarged cardiomediastinal silhouette when compared with the previous chest x-ray.      DX-CHEST-PORTABLE (1 VIEW)   Final Result      Stable mild pulmonary edema.   New left basilar atelectasis.      DX-CHEST-PORTABLE (1 VIEW)   Final Result      Stable chest appearance compared with 11/16.      DX-CHEST-PORTABLE (1 VIEW)   Final Result      No acute cardiopulmonary abnormality identified.      DX-CHEST-PORTABLE (1 VIEW)   Final Result      No acute cardiopulmonary abnormality. No interval change.      DX-CHEST-PORTABLE (1 VIEW)   Final Result      No acute cardiopulmonary disease.      CT-HEAD W/O   Final Result      1.  No evidence of acute intracranial process.      2.  There is again seen interstitial pulmonary edema and bibasilar atelectasis.      CT-HIP W/O PLUS RECONS LEFT   Final Result      1.  No evidence of acute fracture or malalignment. No evidence of hardware failure or complication.   2.  Postsurgical changes of the left femur with broken screw fragment redemonstrated.   3.  Demineralization.   4.  Scattered colonic diverticula.   5.  Atherosclerosis.   6.  Small fat-containing umbilical hernia.      EC-ECHOCARDIOGRAM COMPLETE W/O CONT   Final Result      DX-HIP-COMPLETE - UNILATERAL 2+ LEFT   Final Result      1.  No definite acute osseous abnormality. In setting of demineralization, an occult fracture is difficult to exclude. If there is strong clinical suspicion, CT or MRI could be obtained for further evaluation.   2.  Postsurgical changes of the left femur with broken screw fragment redemonstrated.      DX-CHEST-PORTABLE (1 VIEW)   Final Result      No acute cardiopulmonary process is seen.      Atherosclerotic plaque.           Assessment/Plan  * Fall- (present on admission)   Assessment & Plan    Chronic debility  Continue comfort care  Social service looking into group home options with limited finances in consideration  may need State waiver, family  contribution, Letter of agreement from [facility] to fund      Elevated brain natriuretic peptide (BNP) level- (present on admission)   Assessment & Plan    No dyspnea or symptoms of heart failure  Continue with Lasix for comfort  Monitor for signs of dehydration and poor intake.      ST elevation myocardial infarction involving right coronary artery (HCC)- (present on admission)   Assessment & Plan    No new chest pain.  Vital stable  Currently on comfort care measures        Positive QuantiFERON-TB Gold test   Assessment & Plan      AFB negative        Chronic kidney disease (CKD), stage III (moderate) (HCC)- (present on admission)   Assessment & Plan    Continue comfort care.       Essential hypertension- (present on admission)   Assessment & Plan    Currently normotensive  Continue diuretics to prevent volume overload for comfort     Constipation   Assessment & Plan    Improved  Continue bowel protocol     Pain of left hip joint- (present on admission)   Assessment & Plan    Likely secondary to OA. Currently denies pain.  As needed pain meds available.      Advanced directives, counseling/discussion- (present on admission)   Assessment & Plan    On comfort care.   Pending placement difficult discharge          VTE prophylaxis: Comfort care

## 2019-02-16 NOTE — CARE PLAN
Problem: Safety  Goal: Will remain free from injury  Outcome: PROGRESSING AS EXPECTED  Bed in the lowest locked position. Call light within reach. Bed alarm in use. Hourly rounding in place.     Problem: Bowel/Gastric:  Goal: Normal bowel function is maintained or improved  Outcome: PROGRESSING AS EXPECTED  Pt encouraged mobility, refusing stool softners at this time despite education    Problem: Skin Integrity  Goal: Risk for impaired skin integrity will decrease  Outcome: PROGRESSING AS EXPECTED  2RN skin check done, pt encourage mobility in bed.

## 2019-02-16 NOTE — PROGRESS NOTES
Hospital Medicine Daily Progress Note    Date of Service  2/15/2019    Chief Complaint  89 y.o. female admitted 11/13/2018 with fall.    Hospital Course      Patient is a pleasant 89 year old woman with history of HTN who presented following a fall and was found to have an acute ST elevation MI.  After discussion with her family, she was placed on comfort care.     Interval Problem Update  2/11: Very pleasant no behavioral disturbances, pending placement  2/12: Pt feels she his too tall for her bed   2/13: Looking into group home waiver  2/14: Sleeping comfortably in bed  2/15: Patient eating dinner without nausea or discomfort     Consultants/Specialty  Palliative care  Critical care, cardiology    Code Status  Comfort care     Disposition  Anticipate group home placement when financial arrangements completed   working with [facility]    Review of Systems  Review of Systems   Constitutional: Negative for chills and fever.   Respiratory: Negative for cough and shortness of breath.    Cardiovascular: Negative for chest pain.   Gastrointestinal: Negative for abdominal pain, nausea and vomiting.        Physical Exam  Temp:  [36.3 °C (97.3 °F)-36.7 °C (98 °F)] 36.3 °C (97.3 °F)  Pulse:  [71-85] 71  Resp:  [15-18] 18  BP: (102-123)/(67-88) 123/88  SpO2:  [93 %-94 %] 94 %    Physical Exam   Constitutional:   Drowsy, arouses to voice, no apparent distress  Frail   HENT:   Head: Normocephalic and atraumatic.   Cardiovascular: Normal rate.    Pulmonary/Chest: No respiratory distress.   Abdominal: Soft. She exhibits no distension. There is no tenderness.   Musculoskeletal: She exhibits no edema or tenderness.   Neurological:   Sleepy   Skin: Skin is warm and dry. She is not diaphoretic.   Psychiatric: She has a normal mood and affect. Cognition and memory are impaired.   Nursing note and vitals reviewed.      Fluids    Intake/Output Summary (Last 24 hours) at 02/15/19 1712  Last data filed at 02/15/19 0400    Gross per 24 hour   Intake                0 ml   Output              275 ml   Net             -275 ml       Laboratory                        Imaging  DX-CHEST-PORTABLE (1 VIEW)   Final Result         1.  Hazy interstitial left pulmonary opacities suggests interstitial edema or infiltrate, similar to prior.   2.  Trace left pleural effusion   3.  Hyperexpansion of lungs favors changes of COPD.      DX-CHEST-PORTABLE (1 VIEW)   Final Result         1.  Interstitial infiltrates or edema, stable.   2.  Atherosclerosis      DX-CHEST-PORTABLE (1 VIEW)   Final Result         1.  Interstitial infiltrates or edema.   2.  Atherosclerosis      DX-CHEST-PORTABLE (1 VIEW)   Final Result      Moderate interstitial edema or interstitial lung disease and small left pleural effusion similar to previous.      DX-CHEST-PORTABLE (1 VIEW)   Final Result      Stable interstitial edema and/or interstitial lung disease with probable small pleural fluid      DX-CHEST-PORTABLE (1 VIEW)   Final Result      Ill-defined infrahilar interstitial opacities, atelectasis versus mild edema. No significant pleural effusions.      DX-CHEST-PORTABLE (1 VIEW)   Final Result      Mild left basilar atelectasis, improved since prior. No new consolidation or large pleural effusions.      DX-CHEST-PORTABLE (1 VIEW)   Final Result      1.  Left basilar opacification may be secondary to atelectasis. Developing pneumonia cannot be excluded.         DX-CHEST-PORTABLE (1 VIEW)   Final Result      1.  Unchanged mild interstitial pulmonary edema   2.  Unchanged bibasilar atelectasis, alveolar edema or pneumonia      DX-CHEST-PORTABLE (1 VIEW)   Final Result      1.  Decreased pulmonary edema   2.  Unchanged bibasilar atelectasis, alveolar edema or pneumonia      DX-CHEST-PORTABLE (1 VIEW)   Final Result      1.  There is diffuse interstitial and alveolar density in the right mid and lower lung zone. This is increased since the previous study. This may represent  mild pulmonary edema/consolidation.   2.  Mildly enlarged cardiomediastinal silhouette when compared with the previous chest x-ray.      DX-CHEST-PORTABLE (1 VIEW)   Final Result      Stable mild pulmonary edema.   New left basilar atelectasis.      DX-CHEST-PORTABLE (1 VIEW)   Final Result      Stable chest appearance compared with 11/16.      DX-CHEST-PORTABLE (1 VIEW)   Final Result      No acute cardiopulmonary abnormality identified.      DX-CHEST-PORTABLE (1 VIEW)   Final Result      No acute cardiopulmonary abnormality. No interval change.      DX-CHEST-PORTABLE (1 VIEW)   Final Result      No acute cardiopulmonary disease.      CT-HEAD W/O   Final Result      1.  No evidence of acute intracranial process.      2.  There is again seen interstitial pulmonary edema and bibasilar atelectasis.      CT-HIP W/O PLUS RECONS LEFT   Final Result      1.  No evidence of acute fracture or malalignment. No evidence of hardware failure or complication.   2.  Postsurgical changes of the left femur with broken screw fragment redemonstrated.   3.  Demineralization.   4.  Scattered colonic diverticula.   5.  Atherosclerosis.   6.  Small fat-containing umbilical hernia.      EC-ECHOCARDIOGRAM COMPLETE W/O CONT   Final Result      DX-HIP-COMPLETE - UNILATERAL 2+ LEFT   Final Result      1.  No definite acute osseous abnormality. In setting of demineralization, an occult fracture is difficult to exclude. If there is strong clinical suspicion, CT or MRI could be obtained for further evaluation.   2.  Postsurgical changes of the left femur with broken screw fragment redemonstrated.      DX-CHEST-PORTABLE (1 VIEW)   Final Result      No acute cardiopulmonary process is seen.      Atherosclerotic plaque.           Assessment/Plan  * Fall- (present on admission)   Assessment & Plan    Chronic debility  Continue comfort care  Social service looking into group home options with limited finances in consideration--anticipate will need  State waiver, family contribution, Letter of agreement from [facility] to fund      Elevated brain natriuretic peptide (BNP) level- (present on admission)   Assessment & Plan    No dyspnea or symptoms of heart failure  Continue with Lasix for comfort  Monitor for signs of dehydration and poor intake.      ST elevation myocardial infarction involving right coronary artery (HCC)- (present on admission)   Assessment & Plan    No new chest pain.  Vital stable  Currently on comfort care measures        Positive QuantiFERON-TB Gold test   Assessment & Plan      AFB negative        Chronic kidney disease (CKD), stage III (moderate) (HCC)- (present on admission)   Assessment & Plan    Continue comfort care.       Essential hypertension- (present on admission)   Assessment & Plan    Currently normotensive  Continue diuretics to prevent volume overload for comfort     Constipation   Assessment & Plan    Improved  Continue bowel protocol     Pain of left hip joint- (present on admission)   Assessment & Plan    Likely secondary to OA. Currently denies pain.  As needed pain meds available.      Advanced directives, counseling/discussion- (present on admission)   Assessment & Plan    On comfort care.   Pending placement difficult discharge          VTE prophylaxis: Comfort care

## 2019-02-16 NOTE — CARE PLAN
Problem: Safety  Goal: Will remain free from falls  Outcome: PROGRESSING AS EXPECTED  Pt instructed to call before getting OOB. Pt verbalized understanding and calls appropriately. Bed alarm in place and functional.    Problem: Skin Integrity  Goal: Risk for impaired skin integrity will decrease  Outcome: PROGRESSING SLOWER THAN EXPECTED  Pt encouraged to reposition self in bed and ambulate as desired.

## 2019-02-17 PROCEDURE — 99231 SBSQ HOSP IP/OBS SF/LOW 25: CPT | Performed by: HOSPITALIST

## 2019-02-17 PROCEDURE — 770001 HCHG ROOM/CARE - MED/SURG/GYN PRIV*

## 2019-02-17 ASSESSMENT — ENCOUNTER SYMPTOMS
SHORTNESS OF BREATH: 0
MYALGIAS: 0
BACK PAIN: 0
VOMITING: 0
NAUSEA: 0
FEVER: 0
DEPRESSION: 0
CHILLS: 0

## 2019-02-17 NOTE — CARE PLAN
Problem: Safety  Goal: Will remain free from injury  Outcome: PROGRESSING AS EXPECTED  Hourly rounds done. Call light within reach. Needs attended on a timely manner. Treaded socks on when OOB. Educated on the importance of calling for assistance when attempting to be OOB.  BED ALARM ON.    Problem: Venous Thromboembolism (VTW)/Deep Vein Thrombosis (DVT) Prevention:  Goal: Patient will participate in Venous Thrombosis (VTE)/Deep Vein Thrombosis (DVT)Prevention Measures  Outcome: PROGRESSING AS EXPECTED  Advocated for 3x daily mobilization >50 ft.  Up for all meals    Problem: Bowel/Gastric:  Goal: Normal bowel function is maintained or improved  Outcome: PROGRESSING AS EXPECTED  Last BM 2/16/19    Problem: Mobility  Goal: Risk for activity intolerance will decrease  Outcome: PROGRESSING AS EXPECTED  Up using a 4ww with SBA

## 2019-02-17 NOTE — CARE PLAN
Problem: Safety  Goal: Will remain free from falls  Outcome: PROGRESSING AS EXPECTED  Proper signs communicated in pts doorway; floor clear of clutter, debris, or cords; pts personal items and call light within reach; pt educated to use call light for assistance and prior to getting out of bed    Problem: Knowledge Deficit  Goal: Knowledge of disease process/condition, treatment plan, diagnostic tests, and medications will improve  Outcome: PROGRESSING AS EXPECTED  Pt educated regarding plan of care and medications. All questions answered.

## 2019-02-17 NOTE — PROGRESS NOTES
Nirmal Score 18   · 2 RN skin check complete with Connie RN   · Devices in place NONE.  · Skin assessed under devices N/A.  · Findings Bilateral heels redness noted, intact and blanching. Sacrum redness noted, blanching.  · The following interventions in place heels floated on pillows. Refusing mepilex and waffle overlay despite education

## 2019-02-18 PROBLEM — Z51.5 COMFORT MEASURES ONLY STATUS: Status: ACTIVE | Noted: 2019-02-18

## 2019-02-18 PROCEDURE — 700102 HCHG RX REV CODE 250 W/ 637 OVERRIDE(OP): Performed by: HOSPITALIST

## 2019-02-18 PROCEDURE — A9270 NON-COVERED ITEM OR SERVICE: HCPCS | Performed by: HOSPITALIST

## 2019-02-18 PROCEDURE — 770001 HCHG ROOM/CARE - MED/SURG/GYN PRIV*

## 2019-02-18 PROCEDURE — 99231 SBSQ HOSP IP/OBS SF/LOW 25: CPT | Performed by: HOSPITALIST

## 2019-02-18 RX ADMIN — SENNOSIDES AND DOCUSATE SODIUM 2 TABLET: 8.6; 5 TABLET ORAL at 07:39

## 2019-02-18 RX ADMIN — FUROSEMIDE 20 MG: 20 TABLET ORAL at 07:39

## 2019-02-18 ASSESSMENT — ENCOUNTER SYMPTOMS
MYALGIAS: 0
CONSTIPATION: 0
NAUSEA: 0
DIARRHEA: 0
DEPRESSION: 0
SHORTNESS OF BREATH: 0
VOMITING: 0
FEVER: 0
BACK PAIN: 0
CHILLS: 0

## 2019-02-18 NOTE — PROGRESS NOTES
Pharmacy Pharmacotherapy Consult   LOS >90 days  Admit Date: 11/13/2018    Medications were reviewed for appropriateness and ongoing need.   Current Facility-Administered Medications   Medication Dose Route Frequency Provider Last Rate Last Dose   • polyethylene glycol/lytes (MIRALAX) PACKET 1 Packet  1 Packet Oral DAILY Martinez Yi M.D.   1 Packet at 01/04/19 1720    And   • senna-docusate (PERICOLACE or SENOKOT S) 8.6-50 MG per tablet 2 Tab  2 Tab Oral BID Martinez Yi M.D.   2 Tab at 02/18/19 0739    And   • magnesium hydroxide (MILK OF MAGNESIA) suspension 30 mL  30 mL Oral QDAY PRN Martinez Yi M.D.   30 mL at 02/07/19 1608    And   • bisacodyl (DULCOLAX) suppository 10 mg  10 mg Rectal QDAY PRN Martinez Yi M.D.   10 mg at 12/23/18 0427   • ipratropium-albuterol (DUONEB) nebulizer solution  3 mL Nebulization Q4H PRN (RT) Asem RON Finn M.D.   3 mL at 12/15/18 0151   • LORazepam (ATIVAN) tablet 0.5 mg  0.5 mg Oral Q4HRS PRN Asem RON Finn M.D.   0.5 mg at 12/15/18 0143   • morphine (ROXANOL) 20 MG/ML oral conc 5-10 mg  5-10 mg Oral Q HOUR PRN Asem RON Finn M.D.       • acetaminophen (TYLENOL) tablet 500 mg  500 mg Oral Q6HRS PRN Asem RON Finn M.D.   500 mg at 01/29/19 1236   • diphenhydrAMINE-ZnAcetate (BENADRYL ITCH) 1-0.1 % cream   Topical 4X/DAY PRN Asem RON Finn M.D.       • MD ALERT...adult comfort care   Other PRN Asem RON Finn M.D.       • atropine 1 % ophthalmic solution 2 Drop  2 Drop Sublingual Q4HRS PRN Asem RON Finn M.D.       • furosemide (LASIX) tablet 20 mg  20 mg Oral Q DAY Asem RON Finn M.D.   20 mg at 02/18/19 0739     Recommendations:  1. PRN medications atropine eye drop, bisacodyl, diphenhydramine, duoneb, lorazepam, and morphine not used in past 30 days. Recommend discontinuing.    2. Scheduled senna-docusate and miralax have been periodically refused; recommend switching to PRN.    Soni Sultana, Pharmacy Intern    ADDENDUM  Agree with above  recommendations.  Per discussion with MD Walker - will continue scheduled bowel protocol, and PRN medications a part of the comfort care orderset.  All medications reordered per protocol.   Dale Lopez, VickiD, BCPS

## 2019-02-18 NOTE — PROGRESS NOTES
Hospital Medicine Daily Progress Note    Date of Service  2/17/2019    Chief Complaint  89 y.o. female admitted 11/13/2018 with fall.    Hospital Course      Patient is a pleasant 89 year old woman with history of HTN who presented following a fall and was found to have an acute ST elevation MI.  After discussion with her family, she was placed on comfort care.     Interval Problem Update  2/11: Very pleasant no behavioral disturbances, pending placement  2/12: Pt feels she his too tall for her bed   2/13: Looking into group home waiver  2/14: Sleeping comfortably in bed  2/15: Patient eating dinner without nausea or discomfort   2/16: Lying comfortably in bed awake  2/17: Denies overt pain    Consultants/Specialty  Palliative care  Critical care, cardiology    Code Status  Comfort care     Disposition  Anticipate group home placement when financial arrangements completed   working with [facility]    Review of Systems  Review of Systems   Constitutional: Negative for chills and fever.   Respiratory: Negative for shortness of breath.    Cardiovascular: Negative for chest pain.   Gastrointestinal: Negative for nausea and vomiting.   Musculoskeletal: Negative for back pain (just discomfort from being in bed) and myalgias.   Psychiatric/Behavioral: Negative for depression.      Physical Exam  Temp:  [36.6 °C (97.8 °F)-36.8 °C (98.2 °F)] 36.6 °C (97.8 °F)  Pulse:  [75-86] 75  Resp:  [16] 16  BP: (123-129)/(64-77) 123/64  SpO2:  [92 %-93 %] 93 %    Physical Exam   Constitutional:   Drowsy, arouses to voice, no apparent distress  Frail   HENT:   Head: Normocephalic.   Eyes: Conjunctivae are normal.   Cardiovascular: Normal rate.    Pulmonary/Chest: No respiratory distress.   Abdominal: Soft. She exhibits no distension.   Musculoskeletal: She exhibits no edema.   tall   Neurological:   Sleepy   Skin: Skin is warm and dry. She is not diaphoretic.   Psychiatric: She has a normal mood and affect. Cognition and  memory are impaired.   Nursing note and vitals reviewed.      Fluids    Intake/Output Summary (Last 24 hours) at 02/17/19 1620  Last data filed at 02/17/19 0900   Gross per 24 hour   Intake              720 ml   Output                0 ml   Net              720 ml       Laboratory                        Imaging  DX-CHEST-PORTABLE (1 VIEW)   Final Result         1.  Hazy interstitial left pulmonary opacities suggests interstitial edema or infiltrate, similar to prior.   2.  Trace left pleural effusion   3.  Hyperexpansion of lungs favors changes of COPD.      DX-CHEST-PORTABLE (1 VIEW)   Final Result         1.  Interstitial infiltrates or edema, stable.   2.  Atherosclerosis      DX-CHEST-PORTABLE (1 VIEW)   Final Result         1.  Interstitial infiltrates or edema.   2.  Atherosclerosis      DX-CHEST-PORTABLE (1 VIEW)   Final Result      Moderate interstitial edema or interstitial lung disease and small left pleural effusion similar to previous.      DX-CHEST-PORTABLE (1 VIEW)   Final Result      Stable interstitial edema and/or interstitial lung disease with probable small pleural fluid      DX-CHEST-PORTABLE (1 VIEW)   Final Result      Ill-defined infrahilar interstitial opacities, atelectasis versus mild edema. No significant pleural effusions.      DX-CHEST-PORTABLE (1 VIEW)   Final Result      Mild left basilar atelectasis, improved since prior. No new consolidation or large pleural effusions.      DX-CHEST-PORTABLE (1 VIEW)   Final Result      1.  Left basilar opacification may be secondary to atelectasis. Developing pneumonia cannot be excluded.         DX-CHEST-PORTABLE (1 VIEW)   Final Result      1.  Unchanged mild interstitial pulmonary edema   2.  Unchanged bibasilar atelectasis, alveolar edema or pneumonia      DX-CHEST-PORTABLE (1 VIEW)   Final Result      1.  Decreased pulmonary edema   2.  Unchanged bibasilar atelectasis, alveolar edema or pneumonia      DX-CHEST-PORTABLE (1 VIEW)   Final Result       1.  There is diffuse interstitial and alveolar density in the right mid and lower lung zone. This is increased since the previous study. This may represent mild pulmonary edema/consolidation.   2.  Mildly enlarged cardiomediastinal silhouette when compared with the previous chest x-ray.      DX-CHEST-PORTABLE (1 VIEW)   Final Result      Stable mild pulmonary edema.   New left basilar atelectasis.      DX-CHEST-PORTABLE (1 VIEW)   Final Result      Stable chest appearance compared with 11/16.      DX-CHEST-PORTABLE (1 VIEW)   Final Result      No acute cardiopulmonary abnormality identified.      DX-CHEST-PORTABLE (1 VIEW)   Final Result      No acute cardiopulmonary abnormality. No interval change.      DX-CHEST-PORTABLE (1 VIEW)   Final Result      No acute cardiopulmonary disease.      CT-HEAD W/O   Final Result      1.  No evidence of acute intracranial process.      2.  There is again seen interstitial pulmonary edema and bibasilar atelectasis.      CT-HIP W/O PLUS RECONS LEFT   Final Result      1.  No evidence of acute fracture or malalignment. No evidence of hardware failure or complication.   2.  Postsurgical changes of the left femur with broken screw fragment redemonstrated.   3.  Demineralization.   4.  Scattered colonic diverticula.   5.  Atherosclerosis.   6.  Small fat-containing umbilical hernia.      EC-ECHOCARDIOGRAM COMPLETE W/O CONT   Final Result      DX-HIP-COMPLETE - UNILATERAL 2+ LEFT   Final Result      1.  No definite acute osseous abnormality. In setting of demineralization, an occult fracture is difficult to exclude. If there is strong clinical suspicion, CT or MRI could be obtained for further evaluation.   2.  Postsurgical changes of the left femur with broken screw fragment redemonstrated.      DX-CHEST-PORTABLE (1 VIEW)   Final Result      No acute cardiopulmonary process is seen.      Atherosclerotic plaque.           Assessment/Plan  * Fall- (present on admission)   Assessment  & Plan    Chronic debility  Continue comfort care  Social service looking into group home options with limited finances in consideration  may need State waiver, family contribution, Letter of agreement from [facility] to fund      Elevated brain natriuretic peptide (BNP) level- (present on admission)   Assessment & Plan    No dyspnea or symptoms of heart failure  Continue with Lasix for comfort  Monitor for signs of dehydration and poor intake.      ST elevation myocardial infarction involving right coronary artery (HCC)- (present on admission)   Assessment & Plan    Chest pain resolved  Currently on comfort care measures        Positive QuantiFERON-TB Gold test   Assessment & Plan      AFB negative        Chronic kidney disease (CKD), stage III (moderate) (HCC)- (present on admission)   Assessment & Plan    Continue comfort care.       Essential hypertension- (present on admission)   Assessment & Plan    Currently normotensive  Continue diuretics to prevent volume overload for comfort     Constipation   Assessment & Plan    Improved  Continue bowel protocol     Pain of left hip joint- (present on admission)   Assessment & Plan    Likely secondary to OA. Currently denies pain.  As needed pain meds available.      Advanced directives, counseling/discussion- (present on admission)   Assessment & Plan    On comfort care.   Pending placement difficult discharge          VTE prophylaxis: Comfort care

## 2019-02-18 NOTE — CARE PLAN
Problem: Safety  Goal: Will remain free from injury  Outcome: PROGRESSING AS EXPECTED  Hourly rounds done. Call light within reach. Needs attended on a timely manner. Treaded socks on when OOB. Educated on the importance of calling for assistance when attempting to be OOB.  BED ALARM ON.    Problem: Venous Thromboembolism (VTW)/Deep Vein Thrombosis (DVT) Prevention:  Goal: Patient will participate in Venous Thrombosis (VTE)/Deep Vein Thrombosis (DVT)Prevention Measures  Outcome: PROGRESSING AS EXPECTED  Encourage early mobilization. Refusing SCDs    Problem: Mobility  Goal: Risk for activity intolerance will decrease  Outcome: PROGRESSING AS EXPECTED  Encourage to early mobilization >50ft 3x daily. Up for all meals.  SBA using a 4ww

## 2019-02-18 NOTE — PROGRESS NOTES
Hospital Medicine Daily Progress Note    Date of Service  2/18/2019    Chief Complaint  89 y.o. female admitted 11/13/2018 with fall.    Hospital Course      Patient is a pleasant 89 year old woman with history of HTN who presented following a fall and was found to have an acute ST elevation MI.  After discussion with her family, she was placed on comfort care.     Interval Problem Update  2/11: Very pleasant no behavioral disturbances, pending placement  2/12: Pt feels she his too tall for her bed   2/13: Looking into group home waiver  2/14: Sleeping comfortably in bed  2/15: Patient eating dinner without nausea or discomfort   2/16: Lying comfortably in bed awake  2/17: Denies overt pain  2/18: Eating and tolerating diet.  Keeping medications for comfort care    Consultants/Specialty  Palliative care  Critical care, cardiology    Code Status  Comfort care     Disposition  Anticipate group home placement when financial arrangements completed   working with [facility]    Review of Systems  Review of Systems   Constitutional: Negative for chills and fever.   Respiratory: Negative for shortness of breath.    Cardiovascular: Negative for chest pain.   Gastrointestinal: Negative for constipation, diarrhea, nausea and vomiting.   Musculoskeletal: Negative for back pain (just discomfort from being in bed) and myalgias.   Psychiatric/Behavioral: Negative for depression.      Physical Exam  Temp:  [36.3 °C (97.3 °F)-36.8 °C (98.3 °F)] 36.3 °C (97.3 °F)  Pulse:  [80-85] 80  Resp:  [16] 16  BP: (120-125)/(66-68) 125/66  SpO2:  [92 %-95 %] 95 %    Physical Exam   Constitutional:   Drowsy, arouses to voice, no apparent distress  Frail   HENT:   Head: Normocephalic.   Eyes: Conjunctivae are normal.   Cardiovascular: Normal rate.    Pulmonary/Chest: No respiratory distress.   Abdominal: Soft. She exhibits no distension.   Musculoskeletal: She exhibits no edema.   tall   Neurological: She is alert.   Skin: Skin  is warm and dry. She is not diaphoretic.   Psychiatric: She has a normal mood and affect. Cognition and memory are impaired.   Nursing note and vitals reviewed.      Fluids    Intake/Output Summary (Last 24 hours) at 02/18/19 1534  Last data filed at 02/17/19 2100   Gross per 24 hour   Intake              480 ml   Output                0 ml   Net              480 ml       Laboratory                        Imaging  DX-CHEST-PORTABLE (1 VIEW)   Final Result         1.  Hazy interstitial left pulmonary opacities suggests interstitial edema or infiltrate, similar to prior.   2.  Trace left pleural effusion   3.  Hyperexpansion of lungs favors changes of COPD.      DX-CHEST-PORTABLE (1 VIEW)   Final Result         1.  Interstitial infiltrates or edema, stable.   2.  Atherosclerosis      DX-CHEST-PORTABLE (1 VIEW)   Final Result         1.  Interstitial infiltrates or edema.   2.  Atherosclerosis      DX-CHEST-PORTABLE (1 VIEW)   Final Result      Moderate interstitial edema or interstitial lung disease and small left pleural effusion similar to previous.      DX-CHEST-PORTABLE (1 VIEW)   Final Result      Stable interstitial edema and/or interstitial lung disease with probable small pleural fluid      DX-CHEST-PORTABLE (1 VIEW)   Final Result      Ill-defined infrahilar interstitial opacities, atelectasis versus mild edema. No significant pleural effusions.      DX-CHEST-PORTABLE (1 VIEW)   Final Result      Mild left basilar atelectasis, improved since prior. No new consolidation or large pleural effusions.      DX-CHEST-PORTABLE (1 VIEW)   Final Result      1.  Left basilar opacification may be secondary to atelectasis. Developing pneumonia cannot be excluded.         DX-CHEST-PORTABLE (1 VIEW)   Final Result      1.  Unchanged mild interstitial pulmonary edema   2.  Unchanged bibasilar atelectasis, alveolar edema or pneumonia      DX-CHEST-PORTABLE (1 VIEW)   Final Result      1.  Decreased pulmonary edema   2.   Unchanged bibasilar atelectasis, alveolar edema or pneumonia      DX-CHEST-PORTABLE (1 VIEW)   Final Result      1.  There is diffuse interstitial and alveolar density in the right mid and lower lung zone. This is increased since the previous study. This may represent mild pulmonary edema/consolidation.   2.  Mildly enlarged cardiomediastinal silhouette when compared with the previous chest x-ray.      DX-CHEST-PORTABLE (1 VIEW)   Final Result      Stable mild pulmonary edema.   New left basilar atelectasis.      DX-CHEST-PORTABLE (1 VIEW)   Final Result      Stable chest appearance compared with 11/16.      DX-CHEST-PORTABLE (1 VIEW)   Final Result      No acute cardiopulmonary abnormality identified.      DX-CHEST-PORTABLE (1 VIEW)   Final Result      No acute cardiopulmonary abnormality. No interval change.      DX-CHEST-PORTABLE (1 VIEW)   Final Result      No acute cardiopulmonary disease.      CT-HEAD W/O   Final Result      1.  No evidence of acute intracranial process.      2.  There is again seen interstitial pulmonary edema and bibasilar atelectasis.      CT-HIP W/O PLUS RECONS LEFT   Final Result      1.  No evidence of acute fracture or malalignment. No evidence of hardware failure or complication.   2.  Postsurgical changes of the left femur with broken screw fragment redemonstrated.   3.  Demineralization.   4.  Scattered colonic diverticula.   5.  Atherosclerosis.   6.  Small fat-containing umbilical hernia.      EC-ECHOCARDIOGRAM COMPLETE W/O CONT   Final Result      DX-HIP-COMPLETE - UNILATERAL 2+ LEFT   Final Result      1.  No definite acute osseous abnormality. In setting of demineralization, an occult fracture is difficult to exclude. If there is strong clinical suspicion, CT or MRI could be obtained for further evaluation.   2.  Postsurgical changes of the left femur with broken screw fragment redemonstrated.      DX-CHEST-PORTABLE (1 VIEW)   Final Result      No acute cardiopulmonary process  is seen.      Atherosclerotic plaque.           Assessment/Plan  * Fall- (present on admission)   Assessment & Plan    Chronic debility  Continue comfort care  Social service looking into group home options with limited finances in consideration  may need State waiver, family contribution, Letter of agreement from [facility] to fund      Elevated brain natriuretic peptide (BNP) level- (present on admission)   Assessment & Plan    No dyspnea or symptoms of heart failure  Continue with Lasix for comfort  Monitor for signs of dehydration and poor intake.      ST elevation myocardial infarction involving right coronary artery (HCC)- (present on admission)   Assessment & Plan    Chest pain resolved  Currently on comfort care measures        Positive QuantiFERON-TB Gold test   Assessment & Plan      AFB negative        Chronic kidney disease (CKD), stage III (moderate) (HCC)- (present on admission)   Assessment & Plan    Continue comfort care.       Essential hypertension- (present on admission)   Assessment & Plan    Currently normotensive  Continue diuretics to prevent volume overload for comfort     Comfort measures only status   Assessment & Plan    On comfort care     Constipation   Assessment & Plan    Improved  Continue bowel protocol     Pain of left hip joint- (present on admission)   Assessment & Plan    Likely secondary to OA. Currently denies pain.  As needed pain meds available.      Advanced directives, counseling/discussion- (present on admission)   Assessment & Plan    On comfort care.   Pending placement difficult discharge          VTE prophylaxis: Comfort care

## 2019-02-19 PROCEDURE — 99232 SBSQ HOSP IP/OBS MODERATE 35: CPT | Performed by: INTERNAL MEDICINE

## 2019-02-19 PROCEDURE — 770001 HCHG ROOM/CARE - MED/SURG/GYN PRIV*

## 2019-02-19 ASSESSMENT — ENCOUNTER SYMPTOMS
FEVER: 0
MYALGIAS: 0
SHORTNESS OF BREATH: 0
PALPITATIONS: 0
DEPRESSION: 0
ABDOMINAL PAIN: 0

## 2019-02-19 NOTE — CARE PLAN
Problem: Safety  Goal: Will remain free from falls    Intervention: Implement fall precautions  Pt calls appropriately, treaded socks in use. Personal belongings and call light with in reach and bed is locked in lowest position. Hourly rounding in place      Problem: Mobility  Goal: Risk for activity intolerance will decrease  Outcome: PROGRESSING SLOWER THAN EXPECTED    Intervention: Provide rest periods  Patient offered rest periods during the trip to the restroom. Patient responded well to it

## 2019-02-19 NOTE — PROGRESS NOTES
Pt refusing  Waffle overlay despite education.  Pillows in use for support. Frequent turning and repositioning encouraged.

## 2019-02-19 NOTE — PROGRESS NOTES
[facility] Medicine Daily Progress Note    Date of Service  2/19/2019    Chief Complaint  89 y.o. female admitted 11/13/2018 with fall.    Hospital Course      Patient is a pleasant 89 year old woman with history of HTN who presented following a fall and was found to have an acute ST elevation MI.  After discussion with her family, she was placed on comfort care.     Interval Problem Update    HTN-well controlled, no dose adjustments needed at this time.  STEMI-denies any chest pain, no issues with BP or HR, no adjustment to medications needed at this time.  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 No new medications today.    Still remains debilitated and behavior is well controlled currently.     Consultants/Specialty  Palliative care  Critical care, cardiology    Code Status  Comfort care     Disposition  Anticipate group home placement when financial arrangements completed   working with [facility]    Review of Systems  Review of Systems   Constitutional: Negative for fever.   Respiratory: Negative for shortness of breath.    Cardiovascular: Negative for palpitations.   Gastrointestinal: Negative for abdominal pain.   Musculoskeletal: Negative for myalgias.   Psychiatric/Behavioral: Negative for depression.   All other systems reviewed and are negative.     Physical Exam  Temp:  [36.2 °C (97.1 °F)-36.8 °C (98.3 °F)] 36.2 °C (97.1 °F)  Pulse:  [70-81] 70  Resp:  [16] 16  BP: (118-121)/(63-67) 121/63    Physical Exam   Constitutional:   Awake and interactive  Frail   HENT:   Head: Normocephalic.   Eyes: Conjunctivae are normal.   Cardiovascular: Normal rate.    Pulmonary/Chest: No stridor. She has no wheezes.   Abdominal: Soft. There is no tenderness.   Musculoskeletal: She exhibits no edema or tenderness.   tall   Neurological: She is alert. No cranial nerve deficit.   Skin: Skin is warm and dry. She is not diaphoretic.   Psychiatric: She has a normal mood and affect. Cognition and memory are  impaired.   Nursing note and vitals reviewed.      Fluids    Intake/Output Summary (Last 24 hours) at 02/19/19 1239  Last data filed at 02/19/19 0900   Gross per 24 hour   Intake              240 ml   Output                0 ml   Net              240 ml       Laboratory                        Imaging  DX-CHEST-PORTABLE (1 VIEW)   Final Result         1.  Hazy interstitial left pulmonary opacities suggests interstitial edema or infiltrate, similar to prior.   2.  Trace left pleural effusion   3.  Hyperexpansion of lungs favors changes of COPD.      DX-CHEST-PORTABLE (1 VIEW)   Final Result         1.  Interstitial infiltrates or edema, stable.   2.  Atherosclerosis      DX-CHEST-PORTABLE (1 VIEW)   Final Result         1.  Interstitial infiltrates or edema.   2.  Atherosclerosis      DX-CHEST-PORTABLE (1 VIEW)   Final Result      Moderate interstitial edema or interstitial lung disease and small left pleural effusion similar to previous.      DX-CHEST-PORTABLE (1 VIEW)   Final Result      Stable interstitial edema and/or interstitial lung disease with probable small pleural fluid      DX-CHEST-PORTABLE (1 VIEW)   Final Result      Ill-defined infrahilar interstitial opacities, atelectasis versus mild edema. No significant pleural effusions.      DX-CHEST-PORTABLE (1 VIEW)   Final Result      Mild left basilar atelectasis, improved since prior. No new consolidation or large pleural effusions.      DX-CHEST-PORTABLE (1 VIEW)   Final Result      1.  Left basilar opacification may be secondary to atelectasis. Developing pneumonia cannot be excluded.         DX-CHEST-PORTABLE (1 VIEW)   Final Result      1.  Unchanged mild interstitial pulmonary edema   2.  Unchanged bibasilar atelectasis, alveolar edema or pneumonia      DX-CHEST-PORTABLE (1 VIEW)   Final Result      1.  Decreased pulmonary edema   2.  Unchanged bibasilar atelectasis, alveolar edema or pneumonia      DX-CHEST-PORTABLE (1 VIEW)   Final Result      1.   There is diffuse interstitial and alveolar density in the right mid and lower lung zone. This is increased since the previous study. This may represent mild pulmonary edema/consolidation.   2.  Mildly enlarged cardiomediastinal silhouette when compared with the previous chest x-ray.      DX-CHEST-PORTABLE (1 VIEW)   Final Result      Stable mild pulmonary edema.   New left basilar atelectasis.      DX-CHEST-PORTABLE (1 VIEW)   Final Result      Stable chest appearance compared with 11/16.      DX-CHEST-PORTABLE (1 VIEW)   Final Result      No acute cardiopulmonary abnormality identified.      DX-CHEST-PORTABLE (1 VIEW)   Final Result      No acute cardiopulmonary abnormality. No interval change.      DX-CHEST-PORTABLE (1 VIEW)   Final Result      No acute cardiopulmonary disease.      CT-HEAD W/O   Final Result      1.  No evidence of acute intracranial process.      2.  There is again seen interstitial pulmonary edema and bibasilar atelectasis.      CT-HIP W/O PLUS RECONS LEFT   Final Result      1.  No evidence of acute fracture or malalignment. No evidence of hardware failure or complication.   2.  Postsurgical changes of the left femur with broken screw fragment redemonstrated.   3.  Demineralization.   4.  Scattered colonic diverticula.   5.  Atherosclerosis.   6.  Small fat-containing umbilical hernia.      EC-ECHOCARDIOGRAM COMPLETE W/O CONT   Final Result      DX-HIP-COMPLETE - UNILATERAL 2+ LEFT   Final Result      1.  No definite acute osseous abnormality. In setting of demineralization, an occult fracture is difficult to exclude. If there is strong clinical suspicion, CT or MRI could be obtained for further evaluation.   2.  Postsurgical changes of the left femur with broken screw fragment redemonstrated.      DX-CHEST-PORTABLE (1 VIEW)   Final Result      No acute cardiopulmonary process is seen.      Atherosclerotic plaque.           Assessment/Plan  * Fall- (present on admission)   Assessment & Plan     Chronic debility  Continue comfort care  Social service looking into group home options with limited finances in consideration  may need State waiver, family contribution, Letter of agreement from [facility] to fund   -difficult disposition continues at this time     Elevated brain natriuretic peptide (BNP) level- (present on admission)   Assessment & Plan    No dyspnea or symptoms of heart failure  Continue with Lasix for comfort  Monitor for signs of dehydration and poor intake.      ST elevation myocardial infarction involving right coronary artery (HCC)- (present on admission)   Assessment & Plan    Chest pain resolved  Currently on comfort care measures , no adjustment to medications at this time     Positive QuantiFERON-TB Gold test   Assessment & Plan      AFB negative        Chronic kidney disease (CKD), stage III (moderate) (HCC)- (present on admission)   Assessment & Plan    Continue comfort care.       Essential hypertension- (present on admission)   Assessment & Plan    Currently normotensive  Continue diuretics to prevent volume overload for comfort, no adjustment in dosages needed today     Comfort measures only status- (present on admission)   Assessment & Plan    On comfort care     Constipation- (present on admission)   Assessment & Plan    Improved  Continue bowel protocol     Pain of left hip joint- (present on admission)   Assessment & Plan    Likely secondary to OA. Currently denies pain.  As needed pain meds available, no adjustments needed right now     Advanced directives, counseling/discussion- (present on admission)   Assessment & Plan    On comfort care.   Pending placement difficult discharge          VTE prophylaxis: Comfort care

## 2019-02-19 NOTE — PROGRESS NOTES
Assumed care of Nora at 0700. Patient is lying supine in bed. Refuses to reposition or get oob for breakfast this am, wants to continue to sleep at this time.

## 2019-02-19 NOTE — CARE PLAN
"Problem: Safety  Goal: Will remain free from injury  Outcome: PROGRESSING AS EXPECTED  Educated to use call light when need assistance, bed alarm in place    Problem: Skin Integrity  Goal: Risk for impaired skin integrity will decrease  Outcome: PROGRESSING SLOWER THAN EXPECTED  Nora refuses to have staff turn and reposition her every two hours, wants to be left in what ever \"confortable position\" she has placed her self in. Nora refuses to use the waffle over lay. Education provided of risk of skin breakdown.      "

## 2019-02-20 PROCEDURE — 99231 SBSQ HOSP IP/OBS SF/LOW 25: CPT | Performed by: HOSPITALIST

## 2019-02-20 PROCEDURE — A9270 NON-COVERED ITEM OR SERVICE: HCPCS | Performed by: HOSPITALIST

## 2019-02-20 PROCEDURE — 700102 HCHG RX REV CODE 250 W/ 637 OVERRIDE(OP): Performed by: HOSPITALIST

## 2019-02-20 PROCEDURE — 770001 HCHG ROOM/CARE - MED/SURG/GYN PRIV*

## 2019-02-20 RX ADMIN — SENNOSIDES AND DOCUSATE SODIUM 2 TABLET: 8.6; 5 TABLET ORAL at 07:03

## 2019-02-20 RX ADMIN — FUROSEMIDE 20 MG: 20 TABLET ORAL at 07:03

## 2019-02-20 ASSESSMENT — ENCOUNTER SYMPTOMS
FEVER: 0
DEPRESSION: 0
PALPITATIONS: 0
ABDOMINAL PAIN: 0
SHORTNESS OF BREATH: 0
MYALGIAS: 0

## 2019-02-20 NOTE — PROGRESS NOTES
Hospital Medicine Daily Progress Note    Date of Service  2/20/2019    Chief Complaint  89 y.o. female admitted 11/13/2018 with fall.    Hospital Course      Patient is a pleasant 89 year old woman with history of HTN who presented following a fall and was found to have an acute ST elevation MI.  After discussion with her family, she was placed on comfort care.     Interval Problem Update  No issues overnight  Vitals stable  She has no complaints    Consultants/Specialty  Palliative care  Critical care, cardiology    Code Status  Comfort care     Disposition  Anticipate group home placement when financial arrangements completed   working with [facility]    Review of Systems  Review of Systems   Constitutional: Negative for fever.   Respiratory: Negative for shortness of breath.    Cardiovascular: Negative for palpitations.   Gastrointestinal: Negative for abdominal pain.   Musculoskeletal: Negative for myalgias.   Psychiatric/Behavioral: Negative for depression.   All other systems reviewed and are negative.     Physical Exam  Temp:  [36.7 °C (98 °F)-37.1 °C (98.7 °F)] 36.7 °C (98 °F)  Pulse:  [70-86] 70  Resp:  [17-18] 17  BP: (109-113)/(56-67) 109/56  SpO2:  [92 %-93 %] 93 %    Physical Exam   Constitutional: No distress.   Awake and interactive  Frail   HENT:   Head: Normocephalic and atraumatic.   Eyes: Conjunctivae and EOM are normal.   Neck: Neck supple.   Cardiovascular: Normal rate and regular rhythm.    Pulmonary/Chest: No stridor. She has no wheezes.   Abdominal: Soft. Bowel sounds are normal. There is no tenderness.   Musculoskeletal: She exhibits no edema or tenderness.   tall   Neurological: She is alert. No cranial nerve deficit.   Skin: Skin is warm and dry. She is not diaphoretic.   Psychiatric: She has a normal mood and affect. Cognition and memory are impaired.   Nursing note and vitals reviewed.      Fluids    Intake/Output Summary (Last 24 hours) at 02/20/19 7512  Last data filed  at 02/20/19 1300   Gross per 24 hour   Intake              720 ml   Output                0 ml   Net              720 ml       Laboratory                        Imaging  DX-CHEST-PORTABLE (1 VIEW)   Final Result         1.  Hazy interstitial left pulmonary opacities suggests interstitial edema or infiltrate, similar to prior.   2.  Trace left pleural effusion   3.  Hyperexpansion of lungs favors changes of COPD.      DX-CHEST-PORTABLE (1 VIEW)   Final Result         1.  Interstitial infiltrates or edema, stable.   2.  Atherosclerosis      DX-CHEST-PORTABLE (1 VIEW)   Final Result         1.  Interstitial infiltrates or edema.   2.  Atherosclerosis      DX-CHEST-PORTABLE (1 VIEW)   Final Result      Moderate interstitial edema or interstitial lung disease and small left pleural effusion similar to previous.      DX-CHEST-PORTABLE (1 VIEW)   Final Result      Stable interstitial edema and/or interstitial lung disease with probable small pleural fluid      DX-CHEST-PORTABLE (1 VIEW)   Final Result      Ill-defined infrahilar interstitial opacities, atelectasis versus mild edema. No significant pleural effusions.      DX-CHEST-PORTABLE (1 VIEW)   Final Result      Mild left basilar atelectasis, improved since prior. No new consolidation or large pleural effusions.      DX-CHEST-PORTABLE (1 VIEW)   Final Result      1.  Left basilar opacification may be secondary to atelectasis. Developing pneumonia cannot be excluded.         DX-CHEST-PORTABLE (1 VIEW)   Final Result      1.  Unchanged mild interstitial pulmonary edema   2.  Unchanged bibasilar atelectasis, alveolar edema or pneumonia      DX-CHEST-PORTABLE (1 VIEW)   Final Result      1.  Decreased pulmonary edema   2.  Unchanged bibasilar atelectasis, alveolar edema or pneumonia      DX-CHEST-PORTABLE (1 VIEW)   Final Result      1.  There is diffuse interstitial and alveolar density in the right mid and lower lung zone. This is increased since the previous study.  This may represent mild pulmonary edema/consolidation.   2.  Mildly enlarged cardiomediastinal silhouette when compared with the previous chest x-ray.      DX-CHEST-PORTABLE (1 VIEW)   Final Result      Stable mild pulmonary edema.   New left basilar atelectasis.      DX-CHEST-PORTABLE (1 VIEW)   Final Result      Stable chest appearance compared with 11/16.      DX-CHEST-PORTABLE (1 VIEW)   Final Result      No acute cardiopulmonary abnormality identified.      DX-CHEST-PORTABLE (1 VIEW)   Final Result      No acute cardiopulmonary abnormality. No interval change.      DX-CHEST-PORTABLE (1 VIEW)   Final Result      No acute cardiopulmonary disease.      CT-HEAD W/O   Final Result      1.  No evidence of acute intracranial process.      2.  There is again seen interstitial pulmonary edema and bibasilar atelectasis.      CT-HIP W/O PLUS RECONS LEFT   Final Result      1.  No evidence of acute fracture or malalignment. No evidence of hardware failure or complication.   2.  Postsurgical changes of the left femur with broken screw fragment redemonstrated.   3.  Demineralization.   4.  Scattered colonic diverticula.   5.  Atherosclerosis.   6.  Small fat-containing umbilical hernia.      EC-ECHOCARDIOGRAM COMPLETE W/O CONT   Final Result      DX-HIP-COMPLETE - UNILATERAL 2+ LEFT   Final Result      1.  No definite acute osseous abnormality. In setting of demineralization, an occult fracture is difficult to exclude. If there is strong clinical suspicion, CT or MRI could be obtained for further evaluation.   2.  Postsurgical changes of the left femur with broken screw fragment redemonstrated.      DX-CHEST-PORTABLE (1 VIEW)   Final Result      No acute cardiopulmonary process is seen.      Atherosclerotic plaque.           Assessment/Plan  * Fall- (present on admission)   Assessment & Plan    Chronic debility  Continue comfort care  Social service looking into group home options with limited finances in consideration  may  need State waiver, family contribution, Letter of agreement from [facility] to fund   -difficult disposition continues at this time     Elevated brain natriuretic peptide (BNP) level- (present on admission)   Assessment & Plan    No dyspnea or symptoms of heart failure  Continue with Lasix for comfort  Monitor for signs of dehydration and poor intake.      ST elevation myocardial infarction involving right coronary artery (HCC)- (present on admission)   Assessment & Plan    Chest pain resolved  Currently on comfort care measures , no adjustment to medications at this time     Positive QuantiFERON-TB Gold test   Assessment & Plan      AFB negative        Chronic kidney disease (CKD), stage III (moderate) (HCC)- (present on admission)   Assessment & Plan    Continue comfort care.       Essential hypertension- (present on admission)   Assessment & Plan    Currently normotensive  Continue diuretics to prevent volume overload for comfort, no adjustment in dosages needed today     Comfort measures only status- (present on admission)   Assessment & Plan    On comfort care     Constipation- (present on admission)   Assessment & Plan    Improved  Continue bowel protocol     Pain of left hip joint- (present on admission)   Assessment & Plan    Likely secondary to OA. Currently denies pain.  As needed pain meds available, no adjustments needed right now     Advanced directives, counseling/discussion- (present on admission)   Assessment & Plan    On comfort care.   Pending placement difficult discharge          VTE prophylaxis: Comfort care

## 2019-02-20 NOTE — PROGRESS NOTES
· 2 RN skin check complete with AQUILINO Pelayo.  · Devices in place NA.  · Skin assessed under devices NA.  · Confirmed pressure ulcers found on NA.  · New potential pressure ulcers noted on NA. Wound consult placed and wound reported.  · Patient has blanchable redness to sacral area. Heels are intact.  · Patient refuses waffle cushion overlay and mepilex. Feels they cause more irritation to her skin

## 2019-02-20 NOTE — PROGRESS NOTES
Assumed care of Nora at 0700. She is resting in bed. Does not want to get oob for breakfast. Bed alarm in place. Refuses to have staff q2 turns and is refusing waffle overlay and mepilex to sacral area. Redness to sacral/coccyx area that is blanching. Patient states she will turn and reposition her self when she feels like it is time. Educated on pressure sore prevention and to uses call to call for assistance to get oob to go to the bathroom.

## 2019-02-20 NOTE — CARE PLAN
Problem: Safety  Goal: Will remain free from injury  Outcome: PROGRESSING AS EXPECTED  Nora educated on call light use for assistance and bed alarm in place    Problem: Skin Integrity  Goal: Risk for impaired skin integrity will decrease  Outcome: PROGRESSING SLOWER THAN EXPECTED  Nora refuses to q2 turns and likes to turn herself when she feels ready. Education provided on increased risks of skin breakdown, verbalizes understanding.

## 2019-02-21 PROCEDURE — 99231 SBSQ HOSP IP/OBS SF/LOW 25: CPT | Performed by: HOSPITALIST

## 2019-02-21 PROCEDURE — 770001 HCHG ROOM/CARE - MED/SURG/GYN PRIV*

## 2019-02-21 ASSESSMENT — ENCOUNTER SYMPTOMS
DEPRESSION: 0
SHORTNESS OF BREATH: 0
PALPITATIONS: 0
FEVER: 0
MYALGIAS: 0
ABDOMINAL PAIN: 0

## 2019-02-21 NOTE — PROGRESS NOTES
Hospital Medicine Daily Progress Note    Date of Service  2/21/2019    Chief Complaint  89 y.o. female admitted 11/13/2018 with fall.    Hospital Course      Patient is a pleasant 89 year old woman with history of HTN who presented following a fall and was found to have an acute ST elevation MI.  After discussion with her family, she was placed on comfort care.     Interval Problem Update  No new issues overnight and no new complaints  Vitals stable    Consultants/Specialty  Palliative care  Critical care, cardiology    Code Status  Comfort care     Disposition  Anticipate group home placement when financial arrangements completed   working with [facility]    Review of Systems  Review of Systems   Constitutional: Negative for fever.   Respiratory: Negative for shortness of breath.    Cardiovascular: Negative for palpitations.   Gastrointestinal: Negative for abdominal pain.   Musculoskeletal: Negative for myalgias.   Psychiatric/Behavioral: Negative for depression.   All other systems reviewed and are negative.     Physical Exam  Temp:  [36.2 °C (97.2 °F)-36.6 °C (97.8 °F)] 36.2 °C (97.2 °F)  Pulse:  [68-77] 68  Resp:  [16-18] 16  BP: ()/(56-57) 100/57  SpO2:  [95 %-96 %] 96 %    Physical Exam   Constitutional: No distress.   Awake and interactive  Frail   HENT:   Head: Normocephalic and atraumatic.   Eyes: Conjunctivae and EOM are normal.   Neck: Neck supple.   Cardiovascular: Normal rate and regular rhythm.    Pulmonary/Chest: No stridor. She has no wheezes.   Abdominal: Soft. Bowel sounds are normal. There is no tenderness.   Musculoskeletal: She exhibits no edema or tenderness.   tall   Neurological: She is alert. No cranial nerve deficit.   Skin: Skin is warm and dry. She is not diaphoretic.   Psychiatric: She has a normal mood and affect. Cognition and memory are impaired.   Nursing note and vitals reviewed.      Fluids    Intake/Output Summary (Last 24 hours) at 02/21/19 1310  Last data  filed at 02/21/19 0900   Gross per 24 hour   Intake              480 ml   Output                0 ml   Net              480 ml       Laboratory                        Imaging  DX-CHEST-PORTABLE (1 VIEW)   Final Result         1.  Hazy interstitial left pulmonary opacities suggests interstitial edema or infiltrate, similar to prior.   2.  Trace left pleural effusion   3.  Hyperexpansion of lungs favors changes of COPD.      DX-CHEST-PORTABLE (1 VIEW)   Final Result         1.  Interstitial infiltrates or edema, stable.   2.  Atherosclerosis      DX-CHEST-PORTABLE (1 VIEW)   Final Result         1.  Interstitial infiltrates or edema.   2.  Atherosclerosis      DX-CHEST-PORTABLE (1 VIEW)   Final Result      Moderate interstitial edema or interstitial lung disease and small left pleural effusion similar to previous.      DX-CHEST-PORTABLE (1 VIEW)   Final Result      Stable interstitial edema and/or interstitial lung disease with probable small pleural fluid      DX-CHEST-PORTABLE (1 VIEW)   Final Result      Ill-defined infrahilar interstitial opacities, atelectasis versus mild edema. No significant pleural effusions.      DX-CHEST-PORTABLE (1 VIEW)   Final Result      Mild left basilar atelectasis, improved since prior. No new consolidation or large pleural effusions.      DX-CHEST-PORTABLE (1 VIEW)   Final Result      1.  Left basilar opacification may be secondary to atelectasis. Developing pneumonia cannot be excluded.         DX-CHEST-PORTABLE (1 VIEW)   Final Result      1.  Unchanged mild interstitial pulmonary edema   2.  Unchanged bibasilar atelectasis, alveolar edema or pneumonia      DX-CHEST-PORTABLE (1 VIEW)   Final Result      1.  Decreased pulmonary edema   2.  Unchanged bibasilar atelectasis, alveolar edema or pneumonia      DX-CHEST-PORTABLE (1 VIEW)   Final Result      1.  There is diffuse interstitial and alveolar density in the right mid and lower lung zone. This is increased since the previous  study. This may represent mild pulmonary edema/consolidation.   2.  Mildly enlarged cardiomediastinal silhouette when compared with the previous chest x-ray.      DX-CHEST-PORTABLE (1 VIEW)   Final Result      Stable mild pulmonary edema.   New left basilar atelectasis.      DX-CHEST-PORTABLE (1 VIEW)   Final Result      Stable chest appearance compared with 11/16.      DX-CHEST-PORTABLE (1 VIEW)   Final Result      No acute cardiopulmonary abnormality identified.      DX-CHEST-PORTABLE (1 VIEW)   Final Result      No acute cardiopulmonary abnormality. No interval change.      DX-CHEST-PORTABLE (1 VIEW)   Final Result      No acute cardiopulmonary disease.      CT-HEAD W/O   Final Result      1.  No evidence of acute intracranial process.      2.  There is again seen interstitial pulmonary edema and bibasilar atelectasis.      CT-HIP W/O PLUS RECONS LEFT   Final Result      1.  No evidence of acute fracture or malalignment. No evidence of hardware failure or complication.   2.  Postsurgical changes of the left femur with broken screw fragment redemonstrated.   3.  Demineralization.   4.  Scattered colonic diverticula.   5.  Atherosclerosis.   6.  Small fat-containing umbilical hernia.      EC-ECHOCARDIOGRAM COMPLETE W/O CONT   Final Result      DX-HIP-COMPLETE - UNILATERAL 2+ LEFT   Final Result      1.  No definite acute osseous abnormality. In setting of demineralization, an occult fracture is difficult to exclude. If there is strong clinical suspicion, CT or MRI could be obtained for further evaluation.   2.  Postsurgical changes of the left femur with broken screw fragment redemonstrated.      DX-CHEST-PORTABLE (1 VIEW)   Final Result      No acute cardiopulmonary process is seen.      Atherosclerotic plaque.           Assessment/Plan  * Fall- (present on admission)   Assessment & Plan    Chronic debility  Continue comfort care  Social service looking into group home options with limited finances in  consideration  may need State waiver, family contribution, Letter of agreement from [facility] to fund   -difficult disposition continues at this time     Elevated brain natriuretic peptide (BNP) level- (present on admission)   Assessment & Plan    No dyspnea or symptoms of heart failure  Continue with Lasix for comfort  Monitor for signs of dehydration and poor intake.      ST elevation myocardial infarction involving right coronary artery (HCC)- (present on admission)   Assessment & Plan    Chest pain resolved  Currently on comfort care measures , no adjustment to medications at this time     Positive QuantiFERON-TB Gold test   Assessment & Plan      AFB negative        Chronic kidney disease (CKD), stage III (moderate) (HCC)- (present on admission)   Assessment & Plan    Continue comfort care.       Essential hypertension- (present on admission)   Assessment & Plan    Currently normotensive  Continue diuretics to prevent volume overload for comfort, no adjustment in dosages needed today     Comfort measures only status- (present on admission)   Assessment & Plan    On comfort care     Constipation- (present on admission)   Assessment & Plan    Improved  Continue bowel protocol     Pain of left hip joint- (present on admission)   Assessment & Plan    Likely secondary to OA. Currently denies pain.  As needed pain meds available, no adjustments needed right now     Advanced directives, counseling/discussion- (present on admission)   Assessment & Plan    On comfort care.   Pending placement difficult discharge          VTE prophylaxis: Comfort care

## 2019-02-21 NOTE — PROGRESS NOTES
· 2 RN skin check complete with AQUILINO Leblanc.  · Devices in place NONE.  · Skin assessed under devices N/A.  · Confirmed pressure ulcers found NONE.  · The following interventions in place Heels floated on pillows, pt encourage to turn side to side in bed.

## 2019-02-22 PROCEDURE — 700102 HCHG RX REV CODE 250 W/ 637 OVERRIDE(OP): Performed by: HOSPITALIST

## 2019-02-22 PROCEDURE — 770001 HCHG ROOM/CARE - MED/SURG/GYN PRIV*

## 2019-02-22 PROCEDURE — A9270 NON-COVERED ITEM OR SERVICE: HCPCS | Performed by: HOSPITALIST

## 2019-02-22 PROCEDURE — 99231 SBSQ HOSP IP/OBS SF/LOW 25: CPT | Performed by: HOSPITALIST

## 2019-02-22 RX ADMIN — SENNOSIDES AND DOCUSATE SODIUM 2 TABLET: 8.6; 5 TABLET ORAL at 07:34

## 2019-02-22 ASSESSMENT — ENCOUNTER SYMPTOMS
COUGH: 0
DEPRESSION: 0
MYALGIAS: 0
FEVER: 0
PALPITATIONS: 0
CHILLS: 0
ABDOMINAL PAIN: 0
SHORTNESS OF BREATH: 0

## 2019-02-22 NOTE — CARE PLAN
Problem: Safety  Goal: Will remain free from falls    Intervention: Assess risk factors for falls  Bed alarm in use.      Problem: Mobility  Goal: Risk for activity intolerance will decrease    Intervention: Encourage patient to increase activity level in collaboration with Interdisciplinary Team  Encourage out of bed as tolerated

## 2019-02-22 NOTE — CARE PLAN
Problem: Safety  Goal: Will remain free from falls  Outcome: PROGRESSING AS EXPECTED  Pt instructed to call before getting OOB. Pt verbalized understanding and calls appropriately. Bed locked and in lowest position, call light and belongings within reach, hourly rounding in progress. Bed alarm in place and functional.    Problem: Bowel/Gastric:  Goal: Normal bowel function is maintained or improved  Outcome: PROGRESSING SLOWER THAN EXPECTED  Pt had BM in the last 24 hrs

## 2019-02-22 NOTE — PROGRESS NOTES
Report received. Assumed patient care. Denies pain at this time, no signs of acute distress noted. Assessment done. Fall precaution in place, call light within reach. Hourly rounding in place.

## 2019-02-23 PROBLEM — K59.00 CONSTIPATION: Status: RESOLVED | Noted: 2019-01-31 | Resolved: 2019-02-23

## 2019-02-23 PROBLEM — M25.552 PAIN OF LEFT HIP JOINT: Status: RESOLVED | Noted: 2018-11-13 | Resolved: 2019-02-23

## 2019-02-23 PROBLEM — R79.89 ELEVATED BRAIN NATRIURETIC PEPTIDE (BNP) LEVEL: Status: RESOLVED | Noted: 2018-11-13 | Resolved: 2019-02-23

## 2019-02-23 PROBLEM — J40 BRONCHITIS: Status: ACTIVE | Noted: 2019-02-23

## 2019-02-23 PROCEDURE — A9270 NON-COVERED ITEM OR SERVICE: HCPCS | Performed by: HOSPITALIST

## 2019-02-23 PROCEDURE — 700102 HCHG RX REV CODE 250 W/ 637 OVERRIDE(OP): Performed by: HOSPITALIST

## 2019-02-23 PROCEDURE — 770001 HCHG ROOM/CARE - MED/SURG/GYN PRIV*

## 2019-02-23 PROCEDURE — 99232 SBSQ HOSP IP/OBS MODERATE 35: CPT | Performed by: HOSPITALIST

## 2019-02-23 RX ADMIN — BENZOCAINE AND MENTHOL 1 LOZENGE: 15; 3.6 LOZENGE ORAL at 18:34

## 2019-02-23 RX ADMIN — FUROSEMIDE 20 MG: 20 TABLET ORAL at 09:38

## 2019-02-23 ASSESSMENT — ENCOUNTER SYMPTOMS
FEVER: 0
CHILLS: 0
PALPITATIONS: 0
DEPRESSION: 0
ABDOMINAL PAIN: 0
COUGH: 0
MYALGIAS: 0
SHORTNESS OF BREATH: 0

## 2019-02-23 NOTE — PROGRESS NOTES
Nirmal Score 18   · 2 RN skin check complete   · Devices in place NONE.  · Skin assessed under devices N/A.  · Findings Bilateral heels redness noted, intact and blanching. Sacrum redness noted, blanching.  · The following interventions in place heels floated on pillows. Refusing mepilex and waffle overlay despite education

## 2019-02-23 NOTE — PROGRESS NOTES
Hospital Medicine Daily Progress Note    Date of Service  2/22/2019    Chief Complaint  89 y.o. female admitted 11/13/2018 with fall.    Hospital Course      Patient is a pleasant 89 year old woman with history of HTN who presented following a fall and was found to have an acute ST elevation MI.  After discussion with her family, she was placed on comfort care.     Interval Problem Update  No changes from yesterday  No acute events overnight  She has no new complaint    Consultants/Specialty  Palliative care  Critical care, cardiology    Code Status  Comfort care     Disposition  Anticipate group home placement when financial arrangements completed   working with [facility]    Review of Systems  Review of Systems   Constitutional: Negative for chills and fever.   Respiratory: Negative for cough and shortness of breath.    Cardiovascular: Negative for chest pain and palpitations.   Gastrointestinal: Negative for abdominal pain.   Musculoskeletal: Negative for myalgias.   Psychiatric/Behavioral: Negative for depression.   All other systems reviewed and are negative.     Physical Exam  Temp:  [36.1 °C (97 °F)-36.8 °C (98.2 °F)] 36.4 °C (97.6 °F)  Pulse:  [68-73] 68  Resp:  [16] 16  BP: ()/(52-63) 100/52  SpO2:  [91 %-92 %] 92 %    Physical Exam   Constitutional: No distress.   Awake and interactive  Frail   HENT:   Head: Normocephalic and atraumatic.   Eyes: Conjunctivae and EOM are normal.   Neck: Neck supple.   Cardiovascular: Normal rate and regular rhythm.    Pulmonary/Chest: No stridor. She has no wheezes.   Abdominal: Soft. Bowel sounds are normal. There is no tenderness.   Musculoskeletal: She exhibits no edema or tenderness.   tall   Neurological: She is alert. No cranial nerve deficit.   Skin: Skin is warm and dry. She is not diaphoretic.   Psychiatric: She has a normal mood and affect. Cognition and memory are impaired.   Nursing note and vitals reviewed.      Fluids    Intake/Output  Summary (Last 24 hours) at 02/22/19 1819  Last data filed at 02/22/19 0700   Gross per 24 hour   Intake              440 ml   Output                0 ml   Net              440 ml       Laboratory                        Imaging  DX-CHEST-PORTABLE (1 VIEW)   Final Result         1.  Hazy interstitial left pulmonary opacities suggests interstitial edema or infiltrate, similar to prior.   2.  Trace left pleural effusion   3.  Hyperexpansion of lungs favors changes of COPD.      DX-CHEST-PORTABLE (1 VIEW)   Final Result         1.  Interstitial infiltrates or edema, stable.   2.  Atherosclerosis      DX-CHEST-PORTABLE (1 VIEW)   Final Result         1.  Interstitial infiltrates or edema.   2.  Atherosclerosis      DX-CHEST-PORTABLE (1 VIEW)   Final Result      Moderate interstitial edema or interstitial lung disease and small left pleural effusion similar to previous.      DX-CHEST-PORTABLE (1 VIEW)   Final Result      Stable interstitial edema and/or interstitial lung disease with probable small pleural fluid      DX-CHEST-PORTABLE (1 VIEW)   Final Result      Ill-defined infrahilar interstitial opacities, atelectasis versus mild edema. No significant pleural effusions.      DX-CHEST-PORTABLE (1 VIEW)   Final Result      Mild left basilar atelectasis, improved since prior. No new consolidation or large pleural effusions.      DX-CHEST-PORTABLE (1 VIEW)   Final Result      1.  Left basilar opacification may be secondary to atelectasis. Developing pneumonia cannot be excluded.         DX-CHEST-PORTABLE (1 VIEW)   Final Result      1.  Unchanged mild interstitial pulmonary edema   2.  Unchanged bibasilar atelectasis, alveolar edema or pneumonia      DX-CHEST-PORTABLE (1 VIEW)   Final Result      1.  Decreased pulmonary edema   2.  Unchanged bibasilar atelectasis, alveolar edema or pneumonia      DX-CHEST-PORTABLE (1 VIEW)   Final Result      1.  There is diffuse interstitial and alveolar density in the right mid and lower  lung zone. This is increased since the previous study. This may represent mild pulmonary edema/consolidation.   2.  Mildly enlarged cardiomediastinal silhouette when compared with the previous chest x-ray.      DX-CHEST-PORTABLE (1 VIEW)   Final Result      Stable mild pulmonary edema.   New left basilar atelectasis.      DX-CHEST-PORTABLE (1 VIEW)   Final Result      Stable chest appearance compared with 11/16.      DX-CHEST-PORTABLE (1 VIEW)   Final Result      No acute cardiopulmonary abnormality identified.      DX-CHEST-PORTABLE (1 VIEW)   Final Result      No acute cardiopulmonary abnormality. No interval change.      DX-CHEST-PORTABLE (1 VIEW)   Final Result      No acute cardiopulmonary disease.      CT-HEAD W/O   Final Result      1.  No evidence of acute intracranial process.      2.  There is again seen interstitial pulmonary edema and bibasilar atelectasis.      CT-HIP W/O PLUS RECONS LEFT   Final Result      1.  No evidence of acute fracture or malalignment. No evidence of hardware failure or complication.   2.  Postsurgical changes of the left femur with broken screw fragment redemonstrated.   3.  Demineralization.   4.  Scattered colonic diverticula.   5.  Atherosclerosis.   6.  Small fat-containing umbilical hernia.      EC-ECHOCARDIOGRAM COMPLETE W/O CONT   Final Result      DX-HIP-COMPLETE - UNILATERAL 2+ LEFT   Final Result      1.  No definite acute osseous abnormality. In setting of demineralization, an occult fracture is difficult to exclude. If there is strong clinical suspicion, CT or MRI could be obtained for further evaluation.   2.  Postsurgical changes of the left femur with broken screw fragment redemonstrated.      DX-CHEST-PORTABLE (1 VIEW)   Final Result      No acute cardiopulmonary process is seen.      Atherosclerotic plaque.           Assessment/Plan  * Fall- (present on admission)   Assessment & Plan    Chronic debility  Continue comfort care  Social service looking into group  home options with limited finances in consideration  may need State waiver, family contribution, Letter of agreement from [facility] to fund   -difficult disposition continues at this time     Elevated brain natriuretic peptide (BNP) level- (present on admission)   Assessment & Plan    No dyspnea or symptoms of heart failure  Continue with Lasix for comfort  Monitor for signs of dehydration and poor intake.      ST elevation myocardial infarction involving right coronary artery (HCC)- (present on admission)   Assessment & Plan    Chest pain resolved  Currently on comfort care measures , no adjustment to medications at this time     Positive QuantiFERON-TB Gold test   Assessment & Plan      AFB negative        Chronic kidney disease (CKD), stage III (moderate) (HCC)- (present on admission)   Assessment & Plan    Continue comfort care.       Essential hypertension- (present on admission)   Assessment & Plan    Currently normotensive  Continue diuretics to prevent volume overload for comfort, no adjustment in dosages needed today     Comfort measures only status- (present on admission)   Assessment & Plan    On comfort care     Constipation- (present on admission)   Assessment & Plan    Improved  Continue bowel protocol     Pain of left hip joint- (present on admission)   Assessment & Plan    Likely secondary to OA. Currently denies pain.  As needed pain meds available, no adjustments needed right now     Advanced directives, counseling/discussion- (present on admission)   Assessment & Plan    On comfort care.   Pending placement difficult discharge          VTE prophylaxis: Comfort care

## 2019-02-23 NOTE — PROGRESS NOTES
[facility] Medicine Daily Progress Note    Date of Service  2/23/2019    Chief Complaint  89 y.o. female admitted 11/13/2018 with fall.    Hospital Course      Patient is a pleasant 89 year old woman with history of HTN who presented following a fall and was found to have an acute ST elevation MI.  After discussion with her family, she was placed on comfort care.     Interval Problem Update  Having a cough and congestion today  No acute events overnight    Consultants/Specialty  Palliative care  Critical care, cardiology    Code Status  Comfort care     Disposition  Anticipate group home placement when financial arrangements completed   working with [facility]    Review of Systems  Review of Systems   Constitutional: Negative for chills and fever.   Respiratory: Negative for cough and shortness of breath.    Cardiovascular: Negative for chest pain and palpitations.   Gastrointestinal: Negative for abdominal pain.   Musculoskeletal: Negative for myalgias.   Psychiatric/Behavioral: Negative for depression.   All other systems reviewed and are negative.     Physical Exam  Temp:  [36.2 °C (97.2 °F)-37.2 °C (98.9 °F)] 37.2 °C (98.9 °F)  Pulse:  [82-90] 90  Resp:  [16-18] 18  BP: (100-114)/(55-61) 114/55  SpO2:  [91 %-93 %] 91 %    Physical Exam   Constitutional: No distress.   Awake and interactive  Frail   HENT:   Head: Normocephalic and atraumatic.   Eyes: Conjunctivae and EOM are normal.   Neck: Normal range of motion. Neck supple.   Cardiovascular: Normal rate and regular rhythm.    Pulmonary/Chest: Effort normal. No stridor. She has decreased breath sounds in the right lower field and the left lower field. She has no wheezes. She has rhonchi (Scattered bilaterally).   Abdominal: Soft. Bowel sounds are normal. She exhibits no distension.   Musculoskeletal: She exhibits no edema or tenderness.   tall   Neurological: She is alert. No cranial nerve deficit.   Skin: Skin is warm and dry. She is not  diaphoretic.   Psychiatric: She has a normal mood and affect. Cognition and memory are impaired.   Nursing note and vitals reviewed.      Fluids    Intake/Output Summary (Last 24 hours) at 02/23/19 1205  Last data filed at 02/23/19 1000   Gross per 24 hour   Intake              550 ml   Output                0 ml   Net              550 ml       Laboratory                        Imaging  DX-CHEST-PORTABLE (1 VIEW)   Final Result         1.  Hazy interstitial left pulmonary opacities suggests interstitial edema or infiltrate, similar to prior.   2.  Trace left pleural effusion   3.  Hyperexpansion of lungs favors changes of COPD.      DX-CHEST-PORTABLE (1 VIEW)   Final Result         1.  Interstitial infiltrates or edema, stable.   2.  Atherosclerosis      DX-CHEST-PORTABLE (1 VIEW)   Final Result         1.  Interstitial infiltrates or edema.   2.  Atherosclerosis      DX-CHEST-PORTABLE (1 VIEW)   Final Result      Moderate interstitial edema or interstitial lung disease and small left pleural effusion similar to previous.      DX-CHEST-PORTABLE (1 VIEW)   Final Result      Stable interstitial edema and/or interstitial lung disease with probable small pleural fluid      DX-CHEST-PORTABLE (1 VIEW)   Final Result      Ill-defined infrahilar interstitial opacities, atelectasis versus mild edema. No significant pleural effusions.      DX-CHEST-PORTABLE (1 VIEW)   Final Result      Mild left basilar atelectasis, improved since prior. No new consolidation or large pleural effusions.      DX-CHEST-PORTABLE (1 VIEW)   Final Result      1.  Left basilar opacification may be secondary to atelectasis. Developing pneumonia cannot be excluded.         DX-CHEST-PORTABLE (1 VIEW)   Final Result      1.  Unchanged mild interstitial pulmonary edema   2.  Unchanged bibasilar atelectasis, alveolar edema or pneumonia      DX-CHEST-PORTABLE (1 VIEW)   Final Result      1.  Decreased pulmonary edema   2.  Unchanged bibasilar atelectasis,  alveolar edema or pneumonia      DX-CHEST-PORTABLE (1 VIEW)   Final Result      1.  There is diffuse interstitial and alveolar density in the right mid and lower lung zone. This is increased since the previous study. This may represent mild pulmonary edema/consolidation.   2.  Mildly enlarged cardiomediastinal silhouette when compared with the previous chest x-ray.      DX-CHEST-PORTABLE (1 VIEW)   Final Result      Stable mild pulmonary edema.   New left basilar atelectasis.      DX-CHEST-PORTABLE (1 VIEW)   Final Result      Stable chest appearance compared with 11/16.      DX-CHEST-PORTABLE (1 VIEW)   Final Result      No acute cardiopulmonary abnormality identified.      DX-CHEST-PORTABLE (1 VIEW)   Final Result      No acute cardiopulmonary abnormality. No interval change.      DX-CHEST-PORTABLE (1 VIEW)   Final Result      No acute cardiopulmonary disease.      CT-HEAD W/O   Final Result      1.  No evidence of acute intracranial process.      2.  There is again seen interstitial pulmonary edema and bibasilar atelectasis.      CT-HIP W/O PLUS RECONS LEFT   Final Result      1.  No evidence of acute fracture or malalignment. No evidence of hardware failure or complication.   2.  Postsurgical changes of the left femur with broken screw fragment redemonstrated.   3.  Demineralization.   4.  Scattered colonic diverticula.   5.  Atherosclerosis.   6.  Small fat-containing umbilical hernia.      EC-ECHOCARDIOGRAM COMPLETE W/O CONT   Final Result      DX-HIP-COMPLETE - UNILATERAL 2+ LEFT   Final Result      1.  No definite acute osseous abnormality. In setting of demineralization, an occult fracture is difficult to exclude. If there is strong clinical suspicion, CT or MRI could be obtained for further evaluation.   2.  Postsurgical changes of the left femur with broken screw fragment redemonstrated.      DX-CHEST-PORTABLE (1 VIEW)   Final Result      No acute cardiopulmonary process is seen.      Atherosclerotic  plaque.           Assessment/Plan  * Fall- (present on admission)   Assessment & Plan    Chronic debility  Continue comfort care  Social service looking into group home options with limited finances in consideration  may need State waiver, family contribution, Letter of agreement from [facility] to fund   -difficult disposition continues at this time     ST elevation myocardial infarction involving right coronary artery (HCC)- (present on admission)   Assessment & Plan    Chest pain resolved  Currently on comfort care measures , no adjustment to medications at this time     Positive QuantiFERON-TB Gold test   Assessment & Plan      AFB negative        Chronic kidney disease (CKD), stage III (moderate) (HCC)- (present on admission)   Assessment & Plan    Continue comfort care.       Essential hypertension- (present on admission)   Assessment & Plan    Currently normotensive  Continue diuretics to prevent volume overload for comfort, no adjustment in dosages needed today     Bronchitis   Assessment & Plan    Robitussin as needed     Comfort measures only status- (present on admission)   Assessment & Plan    On comfort care     Advanced directives, counseling/discussion- (present on admission)   Assessment & Plan    On comfort care.   Pending placement difficult discharge          VTE prophylaxis: Comfort care

## 2019-02-24 PROCEDURE — 770001 HCHG ROOM/CARE - MED/SURG/GYN PRIV*

## 2019-02-24 PROCEDURE — 99231 SBSQ HOSP IP/OBS SF/LOW 25: CPT | Performed by: HOSPITALIST

## 2019-02-24 ASSESSMENT — ENCOUNTER SYMPTOMS
MYALGIAS: 0
FEVER: 0
PALPITATIONS: 0
SHORTNESS OF BREATH: 0
ABDOMINAL PAIN: 0
CHILLS: 0
DEPRESSION: 0
COUGH: 0

## 2019-02-24 NOTE — CARE PLAN
Problem: Safety  Goal: Will remain free from injury  Outcome: PROGRESSING AS EXPECTED  Treaded socks in place, bed in the lowest position, bed alarm on, call light and belongings within reach, pt call for assistance appropriately    Problem: Discharge Barriers/Planning  Goal: Patient's continuum of care needs will be met  Outcome: PROGRESSING SLOWER THAN EXPECTED  Pending placement    Problem: Mobility  Goal: Risk for activity intolerance will decrease  Outcome: PROGRESSING AS EXPECTED  Pt. Up to bathroom, 1 assist with FWW, reports weakness.

## 2019-02-24 NOTE — PROGRESS NOTES
[facility] Medicine Daily Progress Note    Date of Service  2/24/2019    Chief Complaint  89 y.o. female admitted 11/13/2018 with fall.    Hospital Course      Patient is a pleasant 89 year old woman with history of HTN who presented following a fall and was found to have an acute ST elevation MI.  After discussion with her family, she was placed on comfort care.     Interval Problem Update  Cough improved  No events overnight  No new changes    Consultants/Specialty  Palliative care  Critical care, cardiology    Code Status  Comfort care     Disposition  Anticipate group home placement when financial arrangements completed   working with [facility]    Review of Systems  Review of Systems   Constitutional: Negative for chills and fever.   Respiratory: Negative for cough and shortness of breath.    Cardiovascular: Negative for chest pain and palpitations.   Gastrointestinal: Negative for abdominal pain.   Musculoskeletal: Negative for myalgias.   Psychiatric/Behavioral: Negative for depression.   All other systems reviewed and are negative.     Physical Exam  Temp:  [36.6 °C (97.8 °F)-37.4 °C (99.3 °F)] 36.6 °C (97.8 °F)  Pulse:  [70-90] 70  Resp:  [15-20] 15  BP: (106-112)/(57-65) 106/57  SpO2:  [90 %-91 %] 90 %    Physical Exam   Constitutional: No distress.   Awake and interactive  Frail   HENT:   Head: Normocephalic and atraumatic.   Mouth/Throat: No oropharyngeal exudate.   Eyes: Pupils are equal, round, and reactive to light. Conjunctivae and EOM are normal.   Neck: Normal range of motion. Neck supple.   Cardiovascular: Normal rate and regular rhythm.    Pulmonary/Chest: Effort normal. No stridor. No respiratory distress. She has decreased breath sounds in the right lower field and the left lower field. She has no wheezes. She has rhonchi (Scattered bilaterally).   Abdominal: Soft. Bowel sounds are normal. She exhibits no distension.   Musculoskeletal: She exhibits no edema or tenderness.   tall    Neurological: She is alert. No cranial nerve deficit.   Skin: Skin is warm and dry. She is not diaphoretic.   Psychiatric: She has a normal mood and affect. Cognition and memory are impaired.   Nursing note and vitals reviewed.      Fluids    Intake/Output Summary (Last 24 hours) at 02/24/19 1212  Last data filed at 02/24/19 0900   Gross per 24 hour   Intake              360 ml   Output                0 ml   Net              360 ml       Laboratory                        Imaging  DX-CHEST-PORTABLE (1 VIEW)   Final Result         1.  Hazy interstitial left pulmonary opacities suggests interstitial edema or infiltrate, similar to prior.   2.  Trace left pleural effusion   3.  Hyperexpansion of lungs favors changes of COPD.      DX-CHEST-PORTABLE (1 VIEW)   Final Result         1.  Interstitial infiltrates or edema, stable.   2.  Atherosclerosis      DX-CHEST-PORTABLE (1 VIEW)   Final Result         1.  Interstitial infiltrates or edema.   2.  Atherosclerosis      DX-CHEST-PORTABLE (1 VIEW)   Final Result      Moderate interstitial edema or interstitial lung disease and small left pleural effusion similar to previous.      DX-CHEST-PORTABLE (1 VIEW)   Final Result      Stable interstitial edema and/or interstitial lung disease with probable small pleural fluid      DX-CHEST-PORTABLE (1 VIEW)   Final Result      Ill-defined infrahilar interstitial opacities, atelectasis versus mild edema. No significant pleural effusions.      DX-CHEST-PORTABLE (1 VIEW)   Final Result      Mild left basilar atelectasis, improved since prior. No new consolidation or large pleural effusions.      DX-CHEST-PORTABLE (1 VIEW)   Final Result      1.  Left basilar opacification may be secondary to atelectasis. Developing pneumonia cannot be excluded.         DX-CHEST-PORTABLE (1 VIEW)   Final Result      1.  Unchanged mild interstitial pulmonary edema   2.  Unchanged bibasilar atelectasis, alveolar edema or pneumonia      DX-CHEST-PORTABLE  (1 VIEW)   Final Result      1.  Decreased pulmonary edema   2.  Unchanged bibasilar atelectasis, alveolar edema or pneumonia      DX-CHEST-PORTABLE (1 VIEW)   Final Result      1.  There is diffuse interstitial and alveolar density in the right mid and lower lung zone. This is increased since the previous study. This may represent mild pulmonary edema/consolidation.   2.  Mildly enlarged cardiomediastinal silhouette when compared with the previous chest x-ray.      DX-CHEST-PORTABLE (1 VIEW)   Final Result      Stable mild pulmonary edema.   New left basilar atelectasis.      DX-CHEST-PORTABLE (1 VIEW)   Final Result      Stable chest appearance compared with 11/16.      DX-CHEST-PORTABLE (1 VIEW)   Final Result      No acute cardiopulmonary abnormality identified.      DX-CHEST-PORTABLE (1 VIEW)   Final Result      No acute cardiopulmonary abnormality. No interval change.      DX-CHEST-PORTABLE (1 VIEW)   Final Result      No acute cardiopulmonary disease.      CT-HEAD W/O   Final Result      1.  No evidence of acute intracranial process.      2.  There is again seen interstitial pulmonary edema and bibasilar atelectasis.      CT-HIP W/O PLUS RECONS LEFT   Final Result      1.  No evidence of acute fracture or malalignment. No evidence of hardware failure or complication.   2.  Postsurgical changes of the left femur with broken screw fragment redemonstrated.   3.  Demineralization.   4.  Scattered colonic diverticula.   5.  Atherosclerosis.   6.  Small fat-containing umbilical hernia.      EC-ECHOCARDIOGRAM COMPLETE W/O CONT   Final Result      DX-HIP-COMPLETE - UNILATERAL 2+ LEFT   Final Result      1.  No definite acute osseous abnormality. In setting of demineralization, an occult fracture is difficult to exclude. If there is strong clinical suspicion, CT or MRI could be obtained for further evaluation.   2.  Postsurgical changes of the left femur with broken screw fragment redemonstrated.       DX-CHEST-PORTABLE (1 VIEW)   Final Result      No acute cardiopulmonary process is seen.      Atherosclerotic plaque.           Assessment/Plan  * Fall- (present on admission)   Assessment & Plan    Chronic debility  Continue comfort care  Social service looking into group home options with limited finances in consideration  may need State waiver, family contribution, Letter of agreement from [facility] to fund   -difficult disposition continues at this time     ST elevation myocardial infarction involving right coronary artery (HCC)- (present on admission)   Assessment & Plan    Chest pain resolved  Currently on comfort care measures , no adjustment to medications at this time     Positive QuantiFERON-TB Gold test   Assessment & Plan      AFB negative        Chronic kidney disease (CKD), stage III (moderate) (HCC)- (present on admission)   Assessment & Plan    Continue comfort care.       Essential hypertension- (present on admission)   Assessment & Plan    Currently normotensive  Continue diuretics to prevent volume overload for comfort, no adjustment in dosages needed today     Bronchitis   Assessment & Plan    Robitussin as needed     Comfort measures only status- (present on admission)   Assessment & Plan    On comfort care     Advanced directives, counseling/discussion- (present on admission)   Assessment & Plan    On comfort care.   Pending placement difficult discharge          VTE prophylaxis: Comfort care

## 2019-02-25 PROCEDURE — 770001 HCHG ROOM/CARE - MED/SURG/GYN PRIV*

## 2019-02-25 PROCEDURE — 99231 SBSQ HOSP IP/OBS SF/LOW 25: CPT | Performed by: HOSPITALIST

## 2019-02-25 ASSESSMENT — ENCOUNTER SYMPTOMS
FEVER: 0
DEPRESSION: 0
CHILLS: 0
MYALGIAS: 0
PALPITATIONS: 0
SHORTNESS OF BREATH: 0
COUGH: 0
ABDOMINAL PAIN: 0

## 2019-02-25 NOTE — CARE PLAN
Problem: Safety  Goal: Will remain free from injury  Outcome: PROGRESSING AS EXPECTED  Bed in low locked position. Call light with in reach pt demonstrated appropriate use of call light. Bed alarm in place.     Problem: Mobility  Goal: Risk for activity intolerance will decrease  Outcome: PROGRESSING AS EXPECTED  Pt experiencing increased weakness unable to ambulate to bathroom started use of commode.

## 2019-02-25 NOTE — PROGRESS NOTES
[facility] Medicine Daily Progress Note    Date of Service  2/25/2019    Chief Complaint  89 y.o. female admitted 11/13/2018 with fall.    Hospital Course      Patient is a pleasant 89 year old woman with history of HTN who presented following a fall and was found to have an acute ST elevation MI.  After discussion with her family, she was placed on comfort care.     Interval Problem Update  No acute events overnight  Still with cough    Consultants/Specialty  Palliative care  Critical care, cardiology    Code Status  Comfort care     Disposition  Anticipate group home placement when financial arrangements completed   working with [facility]    Review of Systems  Review of Systems   Constitutional: Negative for chills and fever.   Respiratory: Negative for cough and shortness of breath.    Cardiovascular: Negative for chest pain and palpitations.   Gastrointestinal: Negative for abdominal pain.   Musculoskeletal: Negative for myalgias.   Psychiatric/Behavioral: Negative for depression.   All other systems reviewed and are negative.     Physical Exam  Temp:  [36.3 °C (97.4 °F)-37.1 °C (98.8 °F)] 36.3 °C (97.4 °F)  Pulse:  [65-84] 71  Resp:  [15-17] 15  BP: (104-125)/(52-57) 104/54  SpO2:  [92 %-94 %] 94 %    Physical Exam   Constitutional: No distress.   Awake and interactive  Frail   HENT:   Head: Normocephalic and atraumatic.   Mouth/Throat: No oropharyngeal exudate.   Eyes: Pupils are equal, round, and reactive to light. EOM are normal. No scleral icterus.   Neck: Normal range of motion. Neck supple. No JVD present.   Cardiovascular: Normal rate and regular rhythm.    Pulmonary/Chest: Effort normal. No respiratory distress. She has decreased breath sounds in the right lower field and the left lower field. She has rhonchi (Scattered bilaterally).   Abdominal: Soft. Bowel sounds are normal. She exhibits no distension.   Musculoskeletal: She exhibits no edema or tenderness.   tall   Neurological: She  is alert. No cranial nerve deficit.   Skin: Skin is warm and dry. She is not diaphoretic.   Psychiatric: She has a normal mood and affect. Cognition and memory are impaired.   Nursing note and vitals reviewed.      Fluids    Intake/Output Summary (Last 24 hours) at 02/25/19 0927  Last data filed at 02/24/19 1800   Gross per 24 hour   Intake              480 ml   Output                0 ml   Net              480 ml       Laboratory                        Imaging  DX-CHEST-PORTABLE (1 VIEW)   Final Result         1.  Hazy interstitial left pulmonary opacities suggests interstitial edema or infiltrate, similar to prior.   2.  Trace left pleural effusion   3.  Hyperexpansion of lungs favors changes of COPD.      DX-CHEST-PORTABLE (1 VIEW)   Final Result         1.  Interstitial infiltrates or edema, stable.   2.  Atherosclerosis      DX-CHEST-PORTABLE (1 VIEW)   Final Result         1.  Interstitial infiltrates or edema.   2.  Atherosclerosis      DX-CHEST-PORTABLE (1 VIEW)   Final Result      Moderate interstitial edema or interstitial lung disease and small left pleural effusion similar to previous.      DX-CHEST-PORTABLE (1 VIEW)   Final Result      Stable interstitial edema and/or interstitial lung disease with probable small pleural fluid      DX-CHEST-PORTABLE (1 VIEW)   Final Result      Ill-defined infrahilar interstitial opacities, atelectasis versus mild edema. No significant pleural effusions.      DX-CHEST-PORTABLE (1 VIEW)   Final Result      Mild left basilar atelectasis, improved since prior. No new consolidation or large pleural effusions.      DX-CHEST-PORTABLE (1 VIEW)   Final Result      1.  Left basilar opacification may be secondary to atelectasis. Developing pneumonia cannot be excluded.         DX-CHEST-PORTABLE (1 VIEW)   Final Result      1.  Unchanged mild interstitial pulmonary edema   2.  Unchanged bibasilar atelectasis, alveolar edema or pneumonia      DX-CHEST-PORTABLE (1 VIEW)   Final  Result      1.  Decreased pulmonary edema   2.  Unchanged bibasilar atelectasis, alveolar edema or pneumonia      DX-CHEST-PORTABLE (1 VIEW)   Final Result      1.  There is diffuse interstitial and alveolar density in the right mid and lower lung zone. This is increased since the previous study. This may represent mild pulmonary edema/consolidation.   2.  Mildly enlarged cardiomediastinal silhouette when compared with the previous chest x-ray.      DX-CHEST-PORTABLE (1 VIEW)   Final Result      Stable mild pulmonary edema.   New left basilar atelectasis.      DX-CHEST-PORTABLE (1 VIEW)   Final Result      Stable chest appearance compared with 11/16.      DX-CHEST-PORTABLE (1 VIEW)   Final Result      No acute cardiopulmonary abnormality identified.      DX-CHEST-PORTABLE (1 VIEW)   Final Result      No acute cardiopulmonary abnormality. No interval change.      DX-CHEST-PORTABLE (1 VIEW)   Final Result      No acute cardiopulmonary disease.      CT-HEAD W/O   Final Result      1.  No evidence of acute intracranial process.      2.  There is again seen interstitial pulmonary edema and bibasilar atelectasis.      CT-HIP W/O PLUS RECONS LEFT   Final Result      1.  No evidence of acute fracture or malalignment. No evidence of hardware failure or complication.   2.  Postsurgical changes of the left femur with broken screw fragment redemonstrated.   3.  Demineralization.   4.  Scattered colonic diverticula.   5.  Atherosclerosis.   6.  Small fat-containing umbilical hernia.      EC-ECHOCARDIOGRAM COMPLETE W/O CONT   Final Result      DX-HIP-COMPLETE - UNILATERAL 2+ LEFT   Final Result      1.  No definite acute osseous abnormality. In setting of demineralization, an occult fracture is difficult to exclude. If there is strong clinical suspicion, CT or MRI could be obtained for further evaluation.   2.  Postsurgical changes of the left femur with broken screw fragment redemonstrated.      DX-CHEST-PORTABLE (1 VIEW)    Final Result      No acute cardiopulmonary process is seen.      Atherosclerotic plaque.           Assessment/Plan  * Fall- (present on admission)   Assessment & Plan    Chronic debility  Continue comfort care  Social service looking into group home options with limited finances in consideration  may need State waiver, family contribution, Letter of agreement from [facility] to fund   -difficult disposition continues at this time     ST elevation myocardial infarction involving right coronary artery (HCC)- (present on admission)   Assessment & Plan    Chest pain resolved  Currently on comfort care measures , no adjustment to medications at this time     Positive QuantiFERON-TB Gold test   Assessment & Plan      AFB negative        Chronic kidney disease (CKD), stage III (moderate) (HCC)- (present on admission)   Assessment & Plan    Continue comfort care.       Essential hypertension- (present on admission)   Assessment & Plan    Currently normotensive  Continue diuretics to prevent volume overload for comfort, no adjustment in dosages needed today     Bronchitis   Assessment & Plan    Robitussin as needed  Acute  Better     Comfort measures only status- (present on admission)   Assessment & Plan    On comfort care     Advanced directives, counseling/discussion- (present on admission)   Assessment & Plan    On comfort care.   Pending placement difficult discharge          VTE prophylaxis: Comfort care

## 2019-02-25 NOTE — DOCUMENTATION QUERY
[facility]                                                                         Query Response Note      PATIENT:               DIAN RAMOS  ACCT #:                  [id number]  MRN:                       [id number]  :                       [id number]  ADMIT DATE:       2018 12:43 PM  DISCH DATE:          RESPONDING  PROVIDER #:        [id number]           RESPONSE TEXT:    Acute    QUERY TEXT:    Acuity Specificity [id number]    Bronchitis is documented in the Medical Record. Please specify the acuity of this condition.    NOTE:  If the appropriate response is not listed below, please respond with a new note.    The patient's Clinical Indicators include:  Bronchitis, cough, congestion, decreased breath sounds in the right and left lower fields  Treatment: Robitussin, comfort measures  Risk Factors: prolonged hospitalization, decreased mobility  Query created by: Doreen Block on 2019 9:23 AM        Electronically signed by:  SARA VELASQUEZ MD 2019 9:27 AM

## 2019-02-25 NOTE — CARE PLAN
Problem: Safety  Goal: Will remain free from falls  Outcome: PROGRESSING AS EXPECTED  Patient remain free from falls. Bed alarm in place. Call light within reach and reminded to call when needing assistance, patient verbalized understanding.    Problem: Bowel/Gastric:  Goal: Normal bowel function is maintained or improved  Refusing to take stool softeners despite education.

## 2019-02-26 PROCEDURE — A9270 NON-COVERED ITEM OR SERVICE: HCPCS | Performed by: HOSPITALIST

## 2019-02-26 PROCEDURE — 770001 HCHG ROOM/CARE - MED/SURG/GYN PRIV*

## 2019-02-26 PROCEDURE — 99231 SBSQ HOSP IP/OBS SF/LOW 25: CPT | Performed by: HOSPITALIST

## 2019-02-26 PROCEDURE — 700102 HCHG RX REV CODE 250 W/ 637 OVERRIDE(OP): Performed by: HOSPITALIST

## 2019-02-26 RX ADMIN — SENNOSIDES AND DOCUSATE SODIUM 2 TABLET: 8.6; 5 TABLET ORAL at 06:13

## 2019-02-26 RX ADMIN — FUROSEMIDE 20 MG: 20 TABLET ORAL at 06:13

## 2019-02-26 RX ADMIN — POLYETHYLENE GLYCOL 3350 1 PACKET: 17 POWDER, FOR SOLUTION ORAL at 06:13

## 2019-02-26 ASSESSMENT — ENCOUNTER SYMPTOMS
DEPRESSION: 0
ABDOMINAL PAIN: 0
HEMOPTYSIS: 0
COUGH: 0
HEARTBURN: 0
FEVER: 0
MYALGIAS: 0
ORTHOPNEA: 0

## 2019-02-26 NOTE — CARE PLAN
Problem: Safety  Goal: Will remain free from injury  Outcome: PROGRESSING AS EXPECTED  BA in use, call light with inreach, verbalizes needs    Problem: Skin Integrity  Goal: Risk for impaired skin integrity will decrease  Outcome: PROGRESSING SLOWER THAN EXPECTED  Patient refuses waffle over lay and q 2 turning.

## 2019-02-26 NOTE — CARE PLAN
Problem: Venous Thromboembolism (VTW)/Deep Vein Thrombosis (DVT) Prevention:  Goal: Patient will participate in Venous Thrombosis (VTE)/Deep Vein Thrombosis (DVT)Prevention Measures  Outcome: PROGRESSING SLOWER THAN EXPECTED  Pt refuses SCDs despite education instructed on importance of ankle ROM and ambulation     Problem: Urinary Elimination:  Goal: Ability to reestablish a normal urinary elimination pattern will improve  Outcome: PROGRESSING AS EXPECTED  Pt at baseline

## 2019-02-26 NOTE — PROGRESS NOTES
Assumed care of patient at 0700. Sitting up in bed, refusing to get oob for breakfast. Call light with in reach

## 2019-02-26 NOTE — PROGRESS NOTES
[facility] Medicine Daily Progress Note    Date of Service  2/26/2019    Chief Complaint  89 y.o. female admitted 11/13/2018 with fall.    Hospital Course      Patient is a pleasant 89 year old woman with history of HTN who presented following a fall and was found to have an acute ST elevation MI.  After discussion with her family, she was placed on comfort care.     Interval Problem Update  Patient is resting in bed, no new complains, no pain no fever, tolerating diet.   No new events.     Consultants/Specialty  Palliative care  Critical care, cardiology    Code Status  Comfort care     Disposition  Anticipate group home placement when financial arrangements completed  Hospice.     Review of Systems  Review of Systems   Constitutional: Negative for fever.   HENT: Negative for congestion.    Respiratory: Negative for cough and hemoptysis.    Cardiovascular: Negative for chest pain and orthopnea.   Gastrointestinal: Negative for abdominal pain and heartburn.   Musculoskeletal: Negative for myalgias.   Skin: Negative for itching.   Psychiatric/Behavioral: Negative for depression.      Physical Exam  Temp:  [36.3 °C (97.3 °F)-36.8 °C (98.3 °F)] 36.7 °C (98.1 °F)  Pulse:  [64-78] 64  Resp:  [15-16] 15  BP: (101-105)/(54-64) 102/64  SpO2:  [90 %-91 %] 91 %    Physical Exam   Constitutional: No distress.   Awake and interactive  Frail   HENT:   Head: Normocephalic and atraumatic.   Mouth/Throat: No oropharyngeal exudate.   Eyes: Pupils are equal, round, and reactive to light. EOM are normal. No scleral icterus.   Neck: Normal range of motion. Neck supple. No JVD present.   Cardiovascular: Normal rate and regular rhythm.    Pulmonary/Chest: Effort normal. No respiratory distress. She has decreased breath sounds in the right lower field and the left lower field. She has no wheezes.   Abdominal: Soft. Bowel sounds are normal. She exhibits no distension. There is no tenderness. There is no rebound.   Musculoskeletal: She  exhibits no edema or tenderness.   tall   Neurological: She is alert. No cranial nerve deficit.   Skin: Skin is warm and dry. She is not diaphoretic.   Psychiatric: She has a normal mood and affect. Cognition and memory are impaired.   Nursing note and vitals reviewed.      Fluids    Intake/Output Summary (Last 24 hours) at 02/26/19 1252  Last data filed at 02/26/19 0800   Gross per 24 hour   Intake              720 ml   Output                0 ml   Net              720 ml       Laboratory                        Imaging  DX-CHEST-PORTABLE (1 VIEW)   Final Result         1.  Hazy interstitial left pulmonary opacities suggests interstitial edema or infiltrate, similar to prior.   2.  Trace left pleural effusion   3.  Hyperexpansion of lungs favors changes of COPD.      DX-CHEST-PORTABLE (1 VIEW)   Final Result         1.  Interstitial infiltrates or edema, stable.   2.  Atherosclerosis      DX-CHEST-PORTABLE (1 VIEW)   Final Result         1.  Interstitial infiltrates or edema.   2.  Atherosclerosis      DX-CHEST-PORTABLE (1 VIEW)   Final Result      Moderate interstitial edema or interstitial lung disease and small left pleural effusion similar to previous.      DX-CHEST-PORTABLE (1 VIEW)   Final Result      Stable interstitial edema and/or interstitial lung disease with probable small pleural fluid      DX-CHEST-PORTABLE (1 VIEW)   Final Result      Ill-defined infrahilar interstitial opacities, atelectasis versus mild edema. No significant pleural effusions.      DX-CHEST-PORTABLE (1 VIEW)   Final Result      Mild left basilar atelectasis, improved since prior. No new consolidation or large pleural effusions.      DX-CHEST-PORTABLE (1 VIEW)   Final Result      1.  Left basilar opacification may be secondary to atelectasis. Developing pneumonia cannot be excluded.         DX-CHEST-PORTABLE (1 VIEW)   Final Result      1.  Unchanged mild interstitial pulmonary edema   2.  Unchanged bibasilar atelectasis, alveolar  edema or pneumonia      DX-CHEST-PORTABLE (1 VIEW)   Final Result      1.  Decreased pulmonary edema   2.  Unchanged bibasilar atelectasis, alveolar edema or pneumonia      DX-CHEST-PORTABLE (1 VIEW)   Final Result      1.  There is diffuse interstitial and alveolar density in the right mid and lower lung zone. This is increased since the previous study. This may represent mild pulmonary edema/consolidation.   2.  Mildly enlarged cardiomediastinal silhouette when compared with the previous chest x-ray.      DX-CHEST-PORTABLE (1 VIEW)   Final Result      Stable mild pulmonary edema.   New left basilar atelectasis.      DX-CHEST-PORTABLE (1 VIEW)   Final Result      Stable chest appearance compared with 11/16.      DX-CHEST-PORTABLE (1 VIEW)   Final Result      No acute cardiopulmonary abnormality identified.      DX-CHEST-PORTABLE (1 VIEW)   Final Result      No acute cardiopulmonary abnormality. No interval change.      DX-CHEST-PORTABLE (1 VIEW)   Final Result      No acute cardiopulmonary disease.      CT-HEAD W/O   Final Result      1.  No evidence of acute intracranial process.      2.  There is again seen interstitial pulmonary edema and bibasilar atelectasis.      CT-HIP W/O PLUS RECONS LEFT   Final Result      1.  No evidence of acute fracture or malalignment. No evidence of hardware failure or complication.   2.  Postsurgical changes of the left femur with broken screw fragment redemonstrated.   3.  Demineralization.   4.  Scattered colonic diverticula.   5.  Atherosclerosis.   6.  Small fat-containing umbilical hernia.      EC-ECHOCARDIOGRAM COMPLETE W/O CONT   Final Result      DX-HIP-COMPLETE - UNILATERAL 2+ LEFT   Final Result      1.  No definite acute osseous abnormality. In setting of demineralization, an occult fracture is difficult to exclude. If there is strong clinical suspicion, CT or MRI could be obtained for further evaluation.   2.  Postsurgical changes of the left femur with broken screw  fragment redemonstrated.      DX-CHEST-PORTABLE (1 VIEW)   Final Result      No acute cardiopulmonary process is seen.      Atherosclerotic plaque.           Assessment/Plan  * Fall- (present on admission)   Assessment & Plan    Chronic debility  Continue comfort care  Social service looking into group home options with limited finances in consideration  may need State waiver, family contribution, Letter of agreement from [facility] to fund   difficult disposition continues at this time.     ST elevation myocardial infarction involving right coronary artery (HCC)- (present on admission)   Assessment & Plan    Chest pain resolved  Comfort care.     Positive QuantiFERON-TB Gold test   Assessment & Plan      AFB negative        Chronic kidney disease (CKD), stage III (moderate) (HCC)- (present on admission)   Assessment & Plan    Continue comfort care.       Essential hypertension- (present on admission)   Assessment & Plan    Currently normotensive  On comfort care protocol.      Bronchitis   Assessment & Plan    Resolving.      Comfort measures only status- (present on admission)   Assessment & Plan    On comfort care     Advanced directives, counseling/discussion- (present on admission)   Assessment & Plan    On comfort care.   Placement pending.           VTE prophylaxis: Comfort care

## 2019-02-27 ENCOUNTER — PATIENT OUTREACH (OUTPATIENT)
Dept: HEALTH INFORMATION MANAGEMENT | Facility: OTHER | Age: 84
End: 2019-02-27

## 2019-02-27 PROCEDURE — A9270 NON-COVERED ITEM OR SERVICE: HCPCS | Performed by: HOSPITALIST

## 2019-02-27 PROCEDURE — 770001 HCHG ROOM/CARE - MED/SURG/GYN PRIV*

## 2019-02-27 PROCEDURE — 700102 HCHG RX REV CODE 250 W/ 637 OVERRIDE(OP): Performed by: HOSPITALIST

## 2019-02-27 PROCEDURE — 99231 SBSQ HOSP IP/OBS SF/LOW 25: CPT | Performed by: FAMILY MEDICINE

## 2019-02-27 RX ADMIN — SENNOSIDES AND DOCUSATE SODIUM 2 TABLET: 8.6; 5 TABLET ORAL at 05:24

## 2019-02-27 RX ADMIN — FUROSEMIDE 20 MG: 20 TABLET ORAL at 05:24

## 2019-02-27 RX ADMIN — POLYETHYLENE GLYCOL 3350 1 PACKET: 17 POWDER, FOR SOLUTION ORAL at 05:24

## 2019-02-27 ASSESSMENT — ENCOUNTER SYMPTOMS
PHOTOPHOBIA: 0
ORTHOPNEA: 0
DOUBLE VISION: 0
HEADACHES: 0
HEMOPTYSIS: 0
CHILLS: 0
FEVER: 0
DEPRESSION: 0
PALPITATIONS: 0
HEARTBURN: 0
MYALGIAS: 0
NAUSEA: 0
BLURRED VISION: 0
ABDOMINAL PAIN: 0
COUGH: 0
DIZZINESS: 0
BRUISES/BLEEDS EASILY: 0

## 2019-02-27 NOTE — CARE PLAN
Problem: Safety  Goal: Will remain free from falls  Outcome: PROGRESSING AS EXPECTED   Treaded socks refused, bed in the lowest position, bed alarm on, call light and belongings within reach, pt call for assistance appropriately    Problem: Psychosocial Needs:  Goal: Level of anxiety will decrease  Outcome: PROGRESSING AS EXPECTED  Pt appropriately communicating needs

## 2019-02-28 PROCEDURE — A9270 NON-COVERED ITEM OR SERVICE: HCPCS | Performed by: HOSPITALIST

## 2019-02-28 PROCEDURE — 99231 SBSQ HOSP IP/OBS SF/LOW 25: CPT | Performed by: FAMILY MEDICINE

## 2019-02-28 PROCEDURE — 770001 HCHG ROOM/CARE - MED/SURG/GYN PRIV*

## 2019-02-28 PROCEDURE — 700102 HCHG RX REV CODE 250 W/ 637 OVERRIDE(OP): Performed by: HOSPITALIST

## 2019-02-28 RX ADMIN — FUROSEMIDE 20 MG: 20 TABLET ORAL at 06:25

## 2019-02-28 RX ADMIN — POLYETHYLENE GLYCOL 3350 1 PACKET: 17 POWDER, FOR SOLUTION ORAL at 06:25

## 2019-02-28 ASSESSMENT — ENCOUNTER SYMPTOMS
BLURRED VISION: 0
HEMOPTYSIS: 0
PALPITATIONS: 0
HEARTBURN: 0
TINGLING: 0
HEADACHES: 0
FEVER: 0
BRUISES/BLEEDS EASILY: 0
CLAUDICATION: 0
ABDOMINAL PAIN: 0
PHOTOPHOBIA: 0
ORTHOPNEA: 0
VOMITING: 0
COUGH: 0
DOUBLE VISION: 0
BACK PAIN: 0
DEPRESSION: 0
NECK PAIN: 0
SPUTUM PRODUCTION: 0
EYE PAIN: 0
CHILLS: 0
DIZZINESS: 0
NAUSEA: 0
MYALGIAS: 0

## 2019-02-28 ASSESSMENT — LIFESTYLE VARIABLES: SUBSTANCE_ABUSE: 0

## 2019-02-28 NOTE — CARE PLAN
Problem: Bowel/Gastric:  Goal: Normal bowel function is maintained or improved  Outcome: PROGRESSING AS EXPECTED  Pt has normal bowel sounds and reports that she is passing gas. Refusing stool softeners at this time

## 2019-02-28 NOTE — DISCHARGE PLANNING
LSW left  with [employer]  waiver worker asking for a call back regarding a waiver referral that had been made.

## 2019-02-28 NOTE — DISCHARGE PLANNING
LSW spoke with Fabiana from the state asking for an update on the [facility] waiver referral that this LSW made on 2/13/19. Fabiana stated she could not find the referral. LSW stated that this happened last month which was why this LSW made another [facility] referral. Fabiana stated she will be putting in another referral and will back date it to the date this LSW last made the referral on 2/13/19.

## 2019-02-28 NOTE — CARE PLAN
Problem: Psychosocial Needs:  Goal: Level of anxiety will decrease  Outcome: PROGRESSING AS EXPECTED  Pt calm and comfortable in bed. Spoken to with therapeutic communication and expresses that needs are met.

## 2019-02-28 NOTE — PROGRESS NOTES
Per chart review, pt still currently admitted at [facility]. No new information related to discharge planning.     Plan:  · MSW will continue to monitor and provide assistance and collaboration with inpatient team as needed for continuity of care.

## 2019-02-28 NOTE — CARE PLAN
Problem: Venous Thromboembolism (VTW)/Deep Vein Thrombosis (DVT) Prevention:  Goal: Patient will participate in Venous Thrombosis (VTE)/Deep Vein Thrombosis (DVT)Prevention Measures  Outcome: PROGRESSING SLOWER THAN EXPECTED  Pt refuses SCDs despite education instructed on importance of ankle ROM and ambulation       Problem: Mobility  Goal: Risk for activity intolerance will decrease  Outcome: PROGRESSING AS EXPECTED  Pt has been able to get up to bathroom after short period of using commode

## 2019-02-28 NOTE — PROGRESS NOTES
"Hospital Medicine Daily Progress Note    Date of Service  2/27/2019    Chief Complaint  89 y.o. female admitted 11/13/2018 with fall.    Hospital Course      Patient is a pleasant 89 year old woman with history of HTN who presented following a fall and was found to have an acute ST elevation MI.  After discussion with her family, she was placed on comfort care.     Interval Problem Update  Patient is resting in bed, no new complains, no pain no fever, tolerating diet.   No new events.   2/27: Sitting up in bed comfortably.  Stated that she \"just cleaned her house\" stated that this hospital room as her house.  No acute distress noted.  No new issues overnight.  Consultants/Specialty  Palliative care  Critical care, cardiology    Code Status  Comfort care     Disposition  Anticipate group home placement when financial arrangements completed  Hospice.     Review of Systems  Review of Systems   Constitutional: Negative for chills and fever.   HENT: Negative for congestion, hearing loss and tinnitus.    Eyes: Negative for blurred vision, double vision and photophobia.   Respiratory: Negative for cough and hemoptysis.    Cardiovascular: Negative for chest pain, palpitations and orthopnea.   Gastrointestinal: Negative for abdominal pain, heartburn and nausea.   Musculoskeletal: Negative for myalgias.   Skin: Negative for itching.   Neurological: Negative for dizziness and headaches.   Endo/Heme/Allergies: Does not bruise/bleed easily.   Psychiatric/Behavioral: Negative for depression.      Physical Exam  Temp:  [36.3 °C (97.3 °F)-36.3 °C (97.4 °F)] 36.3 °C (97.4 °F)  Pulse:  [65-67] 65  Resp:  [16] 16  BP: (102-112)/(52-62) 112/52  SpO2:  [91 %-93 %] 93 %    Physical Exam   Constitutional: No distress.   Awake and interactive  Frail   HENT:   Head: Normocephalic and atraumatic.   Mouth/Throat: No oropharyngeal exudate.   Eyes: Pupils are equal, round, and reactive to light. EOM are normal.   Neck: Normal range of motion. " Neck supple. No JVD present.   Cardiovascular: Normal rate and regular rhythm.  Exam reveals no friction rub.    Pulmonary/Chest: Effort normal. No stridor. No respiratory distress. She has decreased breath sounds in the right lower field and the left lower field.   Abdominal: Soft. Bowel sounds are normal. She exhibits no distension. There is no tenderness.   Musculoskeletal: She exhibits no edema, tenderness or deformity.   Neurological: She is alert.   Skin: Skin is warm and dry. She is not diaphoretic.   Psychiatric: She has a normal mood and affect. Cognition and memory are impaired.   Nursing note and vitals reviewed.      Fluids    Intake/Output Summary (Last 24 hours) at 02/27/19 1603  Last data filed at 02/26/19 1800   Gross per 24 hour   Intake              240 ml   Output                0 ml   Net              240 ml       Laboratory                        Imaging  DX-CHEST-PORTABLE (1 VIEW)   Final Result         1.  Hazy interstitial left pulmonary opacities suggests interstitial edema or infiltrate, similar to prior.   2.  Trace left pleural effusion   3.  Hyperexpansion of lungs favors changes of COPD.      DX-CHEST-PORTABLE (1 VIEW)   Final Result         1.  Interstitial infiltrates or edema, stable.   2.  Atherosclerosis      DX-CHEST-PORTABLE (1 VIEW)   Final Result         1.  Interstitial infiltrates or edema.   2.  Atherosclerosis      DX-CHEST-PORTABLE (1 VIEW)   Final Result      Moderate interstitial edema or interstitial lung disease and small left pleural effusion similar to previous.      DX-CHEST-PORTABLE (1 VIEW)   Final Result      Stable interstitial edema and/or interstitial lung disease with probable small pleural fluid      DX-CHEST-PORTABLE (1 VIEW)   Final Result      Ill-defined infrahilar interstitial opacities, atelectasis versus mild edema. No significant pleural effusions.      DX-CHEST-PORTABLE (1 VIEW)   Final Result      Mild left basilar atelectasis, improved since  prior. No new consolidation or large pleural effusions.      DX-CHEST-PORTABLE (1 VIEW)   Final Result      1.  Left basilar opacification may be secondary to atelectasis. Developing pneumonia cannot be excluded.         DX-CHEST-PORTABLE (1 VIEW)   Final Result      1.  Unchanged mild interstitial pulmonary edema   2.  Unchanged bibasilar atelectasis, alveolar edema or pneumonia      DX-CHEST-PORTABLE (1 VIEW)   Final Result      1.  Decreased pulmonary edema   2.  Unchanged bibasilar atelectasis, alveolar edema or pneumonia      DX-CHEST-PORTABLE (1 VIEW)   Final Result      1.  There is diffuse interstitial and alveolar density in the right mid and lower lung zone. This is increased since the previous study. This may represent mild pulmonary edema/consolidation.   2.  Mildly enlarged cardiomediastinal silhouette when compared with the previous chest x-ray.      DX-CHEST-PORTABLE (1 VIEW)   Final Result      Stable mild pulmonary edema.   New left basilar atelectasis.      DX-CHEST-PORTABLE (1 VIEW)   Final Result      Stable chest appearance compared with 11/16.      DX-CHEST-PORTABLE (1 VIEW)   Final Result      No acute cardiopulmonary abnormality identified.      DX-CHEST-PORTABLE (1 VIEW)   Final Result      No acute cardiopulmonary abnormality. No interval change.      DX-CHEST-PORTABLE (1 VIEW)   Final Result      No acute cardiopulmonary disease.      CT-HEAD W/O   Final Result      1.  No evidence of acute intracranial process.      2.  There is again seen interstitial pulmonary edema and bibasilar atelectasis.      CT-HIP W/O PLUS RECONS LEFT   Final Result      1.  No evidence of acute fracture or malalignment. No evidence of hardware failure or complication.   2.  Postsurgical changes of the left femur with broken screw fragment redemonstrated.   3.  Demineralization.   4.  Scattered colonic diverticula.   5.  Atherosclerosis.   6.  Small fat-containing umbilical hernia.      EC-ECHOCARDIOGRAM COMPLETE  W/O CONT   Final Result      DX-HIP-COMPLETE - UNILATERAL 2+ LEFT   Final Result      1.  No definite acute osseous abnormality. In setting of demineralization, an occult fracture is difficult to exclude. If there is strong clinical suspicion, CT or MRI could be obtained for further evaluation.   2.  Postsurgical changes of the left femur with broken screw fragment redemonstrated.      DX-CHEST-PORTABLE (1 VIEW)   Final Result      No acute cardiopulmonary process is seen.      Atherosclerotic plaque.           Assessment/Plan  * Fall- (present on admission)   Assessment & Plan    Chronic debility  Continue comfort care  Social service looking into group home options with limited finances in consideration  may need State waiver, family contribution, Letter of agreement from [facility] to fund   difficult disposition continues at this time.     ST elevation myocardial infarction involving right coronary artery (HCC)- (present on admission)   Assessment & Plan    Chest pain resolved  Comfort care.     Positive QuantiFERON-TB Gold test   Assessment & Plan      AFB negative        Chronic kidney disease (CKD), stage III (moderate) (HCC)- (present on admission)   Assessment & Plan    Continue comfort care.       Essential hypertension- (present on admission)   Assessment & Plan    Currently normotensive  On comfort care protocol.      Bronchitis   Assessment & Plan    Resolving.      Comfort measures only status- (present on admission)   Assessment & Plan    On comfort care     Advanced directives, counseling/discussion- (present on admission)   Assessment & Plan    On comfort care.   Placement pending.           VTE prophylaxis: Comfort care

## 2019-03-01 PROCEDURE — 770001 HCHG ROOM/CARE - MED/SURG/GYN PRIV*

## 2019-03-01 PROCEDURE — 99231 SBSQ HOSP IP/OBS SF/LOW 25: CPT | Performed by: HOSPITALIST

## 2019-03-01 ASSESSMENT — ENCOUNTER SYMPTOMS
FEVER: 0
HEARTBURN: 0
VOMITING: 0
NAUSEA: 0
HEMOPTYSIS: 0
ABDOMINAL PAIN: 0
BLURRED VISION: 0
TINGLING: 0
MYALGIAS: 0
PALPITATIONS: 0
EYE PAIN: 0
SPUTUM PRODUCTION: 0
CLAUDICATION: 0
COUGH: 0
DIZZINESS: 0
ORTHOPNEA: 0
NECK PAIN: 0
CHILLS: 0
HEADACHES: 0
BACK PAIN: 0
BRUISES/BLEEDS EASILY: 0
DEPRESSION: 0
DOUBLE VISION: 0
PHOTOPHOBIA: 0

## 2019-03-01 ASSESSMENT — LIFESTYLE VARIABLES: SUBSTANCE_ABUSE: 0

## 2019-03-01 NOTE — CARE PLAN
Problem: Safety  Goal: Will remain free from falls  Outcome: PROGRESSING AS EXPECTED  Pt instructed to call before getting OOB. Pt verbalized understanding and calls appropriately.    Problem: Bowel/Gastric:  Goal: Normal bowel function is maintained or improved  Outcome: PROGRESSING AS EXPECTED  Pt reports BM yesterday and no abdominal discomfort.

## 2019-03-01 NOTE — DISCHARGE PLANNING
RISHABH notified by Fabiana from [facility] that MARINA Mckoy is the assigned SW for the [facility] waiver. MARINA Leelee out of the office until Monday.

## 2019-03-01 NOTE — PROGRESS NOTES
"Hospital Medicine Daily Progress Note    Date of Service  2/28/2019    Chief Complaint  89 y.o. female admitted 11/13/2018 with fall.    Hospital Course      Patient is a pleasant 89 year old woman with history of HTN who presented following a fall and was found to have an acute ST elevation MI.  After discussion with her family, she was placed on comfort care.     Interval Problem Update  Patient is resting in bed, no new complains, no pain no fever, tolerating diet.   No new events.   2/27: Sitting up in bed comfortably.  Stated that she \"just cleaned her house\" stated that this hospital room as her house.  No acute distress noted.  No new issues overnight.  2/28: Laying in bed comfortably.  No acute distress noted.  No new issues overnight.  Consultants/Specialty  Palliative care  Critical care, cardiology    Code Status  Comfort care     Disposition  Anticipate group home placement when financial arrangements completed  Hospice.     Review of Systems  Review of Systems   Constitutional: Negative for chills and fever.   HENT: Negative for hearing loss and tinnitus.    Eyes: Negative for blurred vision, double vision, photophobia and pain.   Respiratory: Negative for cough, hemoptysis and sputum production.    Cardiovascular: Negative for chest pain, palpitations, orthopnea and claudication.   Gastrointestinal: Negative for abdominal pain, heartburn, nausea and vomiting.   Genitourinary: Negative for dysuria, frequency and urgency.   Musculoskeletal: Negative for back pain, myalgias and neck pain.   Skin: Negative for rash.   Neurological: Negative for dizziness, tingling and headaches.   Endo/Heme/Allergies: Negative for environmental allergies. Does not bruise/bleed easily.   Psychiatric/Behavioral: Negative for depression, substance abuse and suicidal ideas.      Physical Exam  Temp:  [36.4 °C (97.6 °F)-36.7 °C (98 °F)] 36.4 °C (97.6 °F)  Pulse:  [69-71] 71  Resp:  [17-18] 17  BP: (110-111)/(65-67) " 110/65  SpO2:  [92 %-97 %] 97 %    Physical Exam   Constitutional: No distress.   Awake and interactive  Frail   HENT:   Head: Normocephalic and atraumatic.   Eyes: Pupils are equal, round, and reactive to light. EOM are normal. No scleral icterus.   Neck: Normal range of motion. Neck supple. No JVD present. No tracheal deviation present.   Cardiovascular: Normal rate and regular rhythm.  Exam reveals no gallop and no friction rub.    Pulmonary/Chest: Effort normal. No stridor. No respiratory distress. She has decreased breath sounds in the right lower field and the left lower field.   Abdominal: Soft. Bowel sounds are normal. She exhibits no distension.   Musculoskeletal: She exhibits no edema, tenderness or deformity.   Neurological: She is alert.   Skin: Skin is warm and dry. She is not diaphoretic.   Psychiatric: She is slowed. Cognition and memory are impaired.   Nursing note and vitals reviewed.      Fluids  No intake or output data in the 24 hours ending 02/28/19 1712    Laboratory                        Imaging  DX-CHEST-PORTABLE (1 VIEW)   Final Result         1.  Hazy interstitial left pulmonary opacities suggests interstitial edema or infiltrate, similar to prior.   2.  Trace left pleural effusion   3.  Hyperexpansion of lungs favors changes of COPD.      DX-CHEST-PORTABLE (1 VIEW)   Final Result         1.  Interstitial infiltrates or edema, stable.   2.  Atherosclerosis      DX-CHEST-PORTABLE (1 VIEW)   Final Result         1.  Interstitial infiltrates or edema.   2.  Atherosclerosis      DX-CHEST-PORTABLE (1 VIEW)   Final Result      Moderate interstitial edema or interstitial lung disease and small left pleural effusion similar to previous.      DX-CHEST-PORTABLE (1 VIEW)   Final Result      Stable interstitial edema and/or interstitial lung disease with probable small pleural fluid      DX-CHEST-PORTABLE (1 VIEW)   Final Result      Ill-defined infrahilar interstitial opacities, atelectasis versus  mild edema. No significant pleural effusions.      DX-CHEST-PORTABLE (1 VIEW)   Final Result      Mild left basilar atelectasis, improved since prior. No new consolidation or large pleural effusions.      DX-CHEST-PORTABLE (1 VIEW)   Final Result      1.  Left basilar opacification may be secondary to atelectasis. Developing pneumonia cannot be excluded.         DX-CHEST-PORTABLE (1 VIEW)   Final Result      1.  Unchanged mild interstitial pulmonary edema   2.  Unchanged bibasilar atelectasis, alveolar edema or pneumonia      DX-CHEST-PORTABLE (1 VIEW)   Final Result      1.  Decreased pulmonary edema   2.  Unchanged bibasilar atelectasis, alveolar edema or pneumonia      DX-CHEST-PORTABLE (1 VIEW)   Final Result      1.  There is diffuse interstitial and alveolar density in the right mid and lower lung zone. This is increased since the previous study. This may represent mild pulmonary edema/consolidation.   2.  Mildly enlarged cardiomediastinal silhouette when compared with the previous chest x-ray.      DX-CHEST-PORTABLE (1 VIEW)   Final Result      Stable mild pulmonary edema.   New left basilar atelectasis.      DX-CHEST-PORTABLE (1 VIEW)   Final Result      Stable chest appearance compared with 11/16.      DX-CHEST-PORTABLE (1 VIEW)   Final Result      No acute cardiopulmonary abnormality identified.      DX-CHEST-PORTABLE (1 VIEW)   Final Result      No acute cardiopulmonary abnormality. No interval change.      DX-CHEST-PORTABLE (1 VIEW)   Final Result      No acute cardiopulmonary disease.      CT-HEAD W/O   Final Result      1.  No evidence of acute intracranial process.      2.  There is again seen interstitial pulmonary edema and bibasilar atelectasis.      CT-HIP W/O PLUS RECONS LEFT   Final Result      1.  No evidence of acute fracture or malalignment. No evidence of hardware failure or complication.   2.  Postsurgical changes of the left femur with broken screw fragment redemonstrated.   3.   Demineralization.   4.  Scattered colonic diverticula.   5.  Atherosclerosis.   6.  Small fat-containing umbilical hernia.      EC-ECHOCARDIOGRAM COMPLETE W/O CONT   Final Result      DX-HIP-COMPLETE - UNILATERAL 2+ LEFT   Final Result      1.  No definite acute osseous abnormality. In setting of demineralization, an occult fracture is difficult to exclude. If there is strong clinical suspicion, CT or MRI could be obtained for further evaluation.   2.  Postsurgical changes of the left femur with broken screw fragment redemonstrated.      DX-CHEST-PORTABLE (1 VIEW)   Final Result      No acute cardiopulmonary process is seen.      Atherosclerotic plaque.           Assessment/Plan  * ST elevation myocardial infarction involving right coronary artery (HCC)- (present on admission)   Assessment & Plan    Chest pain resolved  Comfort care.     Comfort measures only status- (present on admission)   Assessment & Plan    On comfort care     Positive QuantiFERON-TB Gold test   Assessment & Plan      AFB negative        Chronic kidney disease (CKD), stage III (moderate) (HCC)- (present on admission)   Assessment & Plan    Continue comfort care.       Essential hypertension- (present on admission)   Assessment & Plan    Currently normotensive  On comfort care protocol.      Fall- (present on admission)   Assessment & Plan    Chronic debility  Continue comfort care  Social service looking into group home options with limited finances in consideration  may need State waiver, family contribution, Letter of agreement from [facility] to fund   difficult disposition continues at this time.     Bronchitis   Assessment & Plan    Resolving.   On comfort measures.     Advanced directives, counseling/discussion- (present on admission)   Assessment & Plan    On comfort care.   Placement pending.           VTE prophylaxis: Comfort care

## 2019-03-01 NOTE — CARE PLAN
Problem: Safety  Goal: Will remain free from falls  Outcome: PROGRESSING AS EXPECTED   Treaded socks refused, bed in the lowest position, bed alarm on, call light and belongings within reach, pt call for assistance appropriately    Problem: Urinary Elimination:  Goal: Ability to reestablish a normal urinary elimination pattern will improve  Outcome: PROGRESSING AS EXPECTED  Pt eliminating in bedside commode and calling appropriately for assistance

## 2019-03-02 PROCEDURE — 770001 HCHG ROOM/CARE - MED/SURG/GYN PRIV*

## 2019-03-02 PROCEDURE — 99231 SBSQ HOSP IP/OBS SF/LOW 25: CPT | Performed by: HOSPITALIST

## 2019-03-02 ASSESSMENT — ENCOUNTER SYMPTOMS
NECK PAIN: 0
HEARTBURN: 0
NAUSEA: 0
DOUBLE VISION: 0
COUGH: 0
BLURRED VISION: 0
DIZZINESS: 0
MYALGIAS: 0
FEVER: 0
PALPITATIONS: 0
HEADACHES: 0
PHOTOPHOBIA: 0
DEPRESSION: 0

## 2019-03-02 NOTE — CARE PLAN
Problem: Safety  Goal: Will remain free from injury  Outcome: PROGRESSING AS EXPECTED  Educated on fall risk and precautions, bed alarm in  Place     Problem: Skin Integrity  Goal: Risk for impaired skin integrity will decrease  Outcome: PROGRESSING SLOWER THAN EXPECTED  Refuses waffle overlay, q2 turning, and moon boots. Using pillows to assist with floating heels, encourage patient to reposition frequently.

## 2019-03-02 NOTE — PROGRESS NOTES
"[facility] Medicine Daily Progress Note    Date of Service  3/2/2019    Chief Complaint  89 y.o. female admitted 11/13/2018 with fall.    Hospital Course      Patient is a pleasant 89 year old woman with history of HTN who presented following a fall and was found to have an acute ST elevation MI.  After discussion with her family, she was placed on comfort care.     Interval Problem Update  Patient is resting in bed, no new complains, no pain no fever, tolerating diet.   No new events.   2/27: Sitting up in bed comfortably.  Stated that she \"just cleaned her house\" stated that this hospital room as her house.  No acute distress noted.  No new issues overnight.  2/28: Laying in bed comfortably.  No acute distress noted.  No new issues overnight.  3/1: Continue comfort measure.  looking for placement.  3/2 resting in bed, no new complains, not in distress, tolerating diet, follows commands.     Consultants/Specialty  Palliative care  Critical care, cardiology    Code Status  Comfort care     Disposition  Anticipate group home placement when financial arrangements completed  Hospice.     Review of Systems  Review of Systems   Constitutional: Negative for fever.   HENT: Negative for hearing loss and tinnitus.    Eyes: Negative for blurred vision, double vision and photophobia.   Respiratory: Negative for cough.    Cardiovascular: Negative for chest pain and palpitations.   Gastrointestinal: Negative for heartburn and nausea.   Genitourinary: Negative for dysuria.   Musculoskeletal: Negative for myalgias and neck pain.   Skin: Negative for rash.   Neurological: Negative for dizziness and headaches.   Psychiatric/Behavioral: Negative for depression.      Physical Exam  Temp:  [36.2 °C (97.1 °F)-36.3 °C (97.3 °F)] 36.3 °C (97.3 °F)  Pulse:  [65-74] 74  Resp:  [16] 16  BP: (107-109)/(60-83) 108/83  SpO2:  [90 %-93 %] 90 %    Physical Exam   Constitutional: No distress.   Awake and interactive  Frail   HENT: "   Head: Normocephalic and atraumatic.   Eyes: Pupils are equal, round, and reactive to light. EOM are normal. No scleral icterus.   Neck: Normal range of motion. Neck supple.   Cardiovascular: Normal rate and regular rhythm.  Exam reveals no gallop and no friction rub.    Pulmonary/Chest: Effort normal. No respiratory distress. She has decreased breath sounds in the right lower field and the left lower field.   Abdominal: Soft. Bowel sounds are normal. She exhibits no distension.   Musculoskeletal: She exhibits no edema, tenderness or deformity.   Lymphadenopathy:     She has no cervical adenopathy.   Neurological: She is alert.   Skin: Skin is warm and dry. She is not diaphoretic.   Psychiatric: She is slowed. Cognition and memory are impaired.   Nursing note and vitals reviewed.      Fluids  No intake or output data in the 24 hours ending 03/02/19 1432    Laboratory                        Imaging  DX-CHEST-PORTABLE (1 VIEW)   Final Result         1.  Hazy interstitial left pulmonary opacities suggests interstitial edema or infiltrate, similar to prior.   2.  Trace left pleural effusion   3.  Hyperexpansion of lungs favors changes of COPD.      DX-CHEST-PORTABLE (1 VIEW)   Final Result         1.  Interstitial infiltrates or edema, stable.   2.  Atherosclerosis      DX-CHEST-PORTABLE (1 VIEW)   Final Result         1.  Interstitial infiltrates or edema.   2.  Atherosclerosis      DX-CHEST-PORTABLE (1 VIEW)   Final Result      Moderate interstitial edema or interstitial lung disease and small left pleural effusion similar to previous.      DX-CHEST-PORTABLE (1 VIEW)   Final Result      Stable interstitial edema and/or interstitial lung disease with probable small pleural fluid      DX-CHEST-PORTABLE (1 VIEW)   Final Result      Ill-defined infrahilar interstitial opacities, atelectasis versus mild edema. No significant pleural effusions.      DX-CHEST-PORTABLE (1 VIEW)   Final Result      Mild left basilar  atelectasis, improved since prior. No new consolidation or large pleural effusions.      DX-CHEST-PORTABLE (1 VIEW)   Final Result      1.  Left basilar opacification may be secondary to atelectasis. Developing pneumonia cannot be excluded.         DX-CHEST-PORTABLE (1 VIEW)   Final Result      1.  Unchanged mild interstitial pulmonary edema   2.  Unchanged bibasilar atelectasis, alveolar edema or pneumonia      DX-CHEST-PORTABLE (1 VIEW)   Final Result      1.  Decreased pulmonary edema   2.  Unchanged bibasilar atelectasis, alveolar edema or pneumonia      DX-CHEST-PORTABLE (1 VIEW)   Final Result      1.  There is diffuse interstitial and alveolar density in the right mid and lower lung zone. This is increased since the previous study. This may represent mild pulmonary edema/consolidation.   2.  Mildly enlarged cardiomediastinal silhouette when compared with the previous chest x-ray.      DX-CHEST-PORTABLE (1 VIEW)   Final Result      Stable mild pulmonary edema.   New left basilar atelectasis.      DX-CHEST-PORTABLE (1 VIEW)   Final Result      Stable chest appearance compared with 11/16.      DX-CHEST-PORTABLE (1 VIEW)   Final Result      No acute cardiopulmonary abnormality identified.      DX-CHEST-PORTABLE (1 VIEW)   Final Result      No acute cardiopulmonary abnormality. No interval change.      DX-CHEST-PORTABLE (1 VIEW)   Final Result      No acute cardiopulmonary disease.      CT-HEAD W/O   Final Result      1.  No evidence of acute intracranial process.      2.  There is again seen interstitial pulmonary edema and bibasilar atelectasis.      CT-HIP W/O PLUS RECONS LEFT   Final Result      1.  No evidence of acute fracture or malalignment. No evidence of hardware failure or complication.   2.  Postsurgical changes of the left femur with broken screw fragment redemonstrated.   3.  Demineralization.   4.  Scattered colonic diverticula.   5.  Atherosclerosis.   6.  Small fat-containing umbilical hernia.       EC-ECHOCARDIOGRAM COMPLETE W/O CONT   Final Result      DX-HIP-COMPLETE - UNILATERAL 2+ LEFT   Final Result      1.  No definite acute osseous abnormality. In setting of demineralization, an occult fracture is difficult to exclude. If there is strong clinical suspicion, CT or MRI could be obtained for further evaluation.   2.  Postsurgical changes of the left femur with broken screw fragment redemonstrated.      DX-CHEST-PORTABLE (1 VIEW)   Final Result      No acute cardiopulmonary process is seen.      Atherosclerotic plaque.           Assessment/Plan  * ST elevation myocardial infarction involving right coronary artery (HCC)- (present on admission)   Assessment & Plan    Chest pain resolved  Comfort care.     Comfort measures only status- (present on admission)   Assessment & Plan    On comfort care     Positive QuantiFERON-TB Gold test   Assessment & Plan      AFB negative        Chronic kidney disease (CKD), stage III (moderate) (HCC)- (present on admission)   Assessment & Plan    Continue comfort care.       Essential hypertension- (present on admission)   Assessment & Plan    Currently normotensive  On comfort care protocol.      Fall- (present on admission)   Assessment & Plan    Chronic debility  Continue comfort care  Social service looking into group home options with limited finances in consideration  may need State waiver, family contribution.        Bronchitis   Assessment & Plan    Resolved  On comfort measures.     Advanced directives, counseling/discussion- (present on admission)   Assessment & Plan    On comfort care.   Placement pending.           VTE prophylaxis: Comfort care

## 2019-03-02 NOTE — CARE PLAN
Problem: Safety  Goal: Will remain free from injury  Outcome: PROGRESSING AS EXPECTED  Hourly rounds done. Call light within reach. Needs attended on a timely manner. Treaded socks on when OOB. Educated on the importance of calling for assistance when attempting to be OOB.  BED ALARM ON.    Problem: Venous Thromboembolism (VTW)/Deep Vein Thrombosis (DVT) Prevention:  Goal: Patient will participate in Venous Thrombosis (VTE)/Deep Vein Thrombosis (DVT)Prevention Measures  Outcome: NOT MET  Encourage early mobilization. Refused SCDs. Education provided.     Problem: Bowel/Gastric:  Goal: Normal bowel function is maintained or improved  Outcome: PROGRESSING AS EXPECTED  Last BM 2/28/19

## 2019-03-02 NOTE — PROGRESS NOTES
"Hospital Medicine Daily Progress Note    Date of Service  3/1/2019    Chief Complaint  89 y.o. female admitted 11/13/2018 with fall.    Hospital Course      Patient is a pleasant 89 year old woman with history of HTN who presented following a fall and was found to have an acute ST elevation MI.  After discussion with her family, she was placed on comfort care.     Interval Problem Update  Patient is resting in bed, no new complains, no pain no fever, tolerating diet.   No new events.   2/27: Sitting up in bed comfortably.  Stated that she \"just cleaned her house\" stated that this hospital room as her house.  No acute distress noted.  No new issues overnight.  2/28: Laying in bed comfortably.  No acute distress noted.  No new issues overnight.  3/1: Continue comfort measure.  looking for placement.  Consultants/Specialty  Palliative care  Critical care, cardiology    Code Status  Comfort care     Disposition  Anticipate group home placement when financial arrangements completed  Hospice.     Review of Systems  Review of Systems   Constitutional: Negative for chills and fever.   HENT: Negative for hearing loss and tinnitus.    Eyes: Negative for blurred vision, double vision, photophobia and pain.   Respiratory: Negative for cough, hemoptysis and sputum production.    Cardiovascular: Negative for chest pain, palpitations, orthopnea and claudication.   Gastrointestinal: Negative for abdominal pain, heartburn, nausea and vomiting.   Genitourinary: Negative for dysuria, frequency and urgency.   Musculoskeletal: Negative for back pain, myalgias and neck pain.   Skin: Negative for rash.   Neurological: Negative for dizziness, tingling and headaches.   Endo/Heme/Allergies: Negative for environmental allergies. Does not bruise/bleed easily.   Psychiatric/Behavioral: Negative for depression, substance abuse and suicidal ideas.      Physical Exam  Temp:  [36.1 °C (97 °F)-36.7 °C (98 °F)] 36.1 °C (97 °F)  Pulse:  " [66-75] 66  Resp:  [16] 16  BP: (105-122)/(63-69) 105/63  SpO2:  [92 %] 92 %    Physical Exam   Constitutional: No distress.   Awake and interactive  Frail   HENT:   Head: Normocephalic and atraumatic.   Eyes: Pupils are equal, round, and reactive to light. EOM are normal. No scleral icterus.   Neck: Normal range of motion. Neck supple. No JVD present. No tracheal deviation present.   Cardiovascular: Normal rate and regular rhythm.  Exam reveals no gallop and no friction rub.    Pulmonary/Chest: Effort normal. No stridor. No respiratory distress. She has decreased breath sounds in the right lower field and the left lower field.   Abdominal: Soft. Bowel sounds are normal. She exhibits no distension.   Musculoskeletal: She exhibits no edema, tenderness or deformity.   Neurological: She is alert.   Skin: Skin is warm and dry. She is not diaphoretic.   Psychiatric: She is slowed. Cognition and memory are impaired.   Nursing note and vitals reviewed.      Fluids  No intake or output data in the 24 hours ending 03/01/19 1655    Laboratory                        Imaging  DX-CHEST-PORTABLE (1 VIEW)   Final Result         1.  Hazy interstitial left pulmonary opacities suggests interstitial edema or infiltrate, similar to prior.   2.  Trace left pleural effusion   3.  Hyperexpansion of lungs favors changes of COPD.      DX-CHEST-PORTABLE (1 VIEW)   Final Result         1.  Interstitial infiltrates or edema, stable.   2.  Atherosclerosis      DX-CHEST-PORTABLE (1 VIEW)   Final Result         1.  Interstitial infiltrates or edema.   2.  Atherosclerosis      DX-CHEST-PORTABLE (1 VIEW)   Final Result      Moderate interstitial edema or interstitial lung disease and small left pleural effusion similar to previous.      DX-CHEST-PORTABLE (1 VIEW)   Final Result      Stable interstitial edema and/or interstitial lung disease with probable small pleural fluid      DX-CHEST-PORTABLE (1 VIEW)   Final Result      Ill-defined infrahilar  interstitial opacities, atelectasis versus mild edema. No significant pleural effusions.      DX-CHEST-PORTABLE (1 VIEW)   Final Result      Mild left basilar atelectasis, improved since prior. No new consolidation or large pleural effusions.      DX-CHEST-PORTABLE (1 VIEW)   Final Result      1.  Left basilar opacification may be secondary to atelectasis. Developing pneumonia cannot be excluded.         DX-CHEST-PORTABLE (1 VIEW)   Final Result      1.  Unchanged mild interstitial pulmonary edema   2.  Unchanged bibasilar atelectasis, alveolar edema or pneumonia      DX-CHEST-PORTABLE (1 VIEW)   Final Result      1.  Decreased pulmonary edema   2.  Unchanged bibasilar atelectasis, alveolar edema or pneumonia      DX-CHEST-PORTABLE (1 VIEW)   Final Result      1.  There is diffuse interstitial and alveolar density in the right mid and lower lung zone. This is increased since the previous study. This may represent mild pulmonary edema/consolidation.   2.  Mildly enlarged cardiomediastinal silhouette when compared with the previous chest x-ray.      DX-CHEST-PORTABLE (1 VIEW)   Final Result      Stable mild pulmonary edema.   New left basilar atelectasis.      DX-CHEST-PORTABLE (1 VIEW)   Final Result      Stable chest appearance compared with 11/16.      DX-CHEST-PORTABLE (1 VIEW)   Final Result      No acute cardiopulmonary abnormality identified.      DX-CHEST-PORTABLE (1 VIEW)   Final Result      No acute cardiopulmonary abnormality. No interval change.      DX-CHEST-PORTABLE (1 VIEW)   Final Result      No acute cardiopulmonary disease.      CT-HEAD W/O   Final Result      1.  No evidence of acute intracranial process.      2.  There is again seen interstitial pulmonary edema and bibasilar atelectasis.      CT-HIP W/O PLUS RECONS LEFT   Final Result      1.  No evidence of acute fracture or malalignment. No evidence of hardware failure or complication.   2.  Postsurgical changes of the left femur with broken  screw fragment redemonstrated.   3.  Demineralization.   4.  Scattered colonic diverticula.   5.  Atherosclerosis.   6.  Small fat-containing umbilical hernia.      EC-ECHOCARDIOGRAM COMPLETE W/O CONT   Final Result      DX-HIP-COMPLETE - UNILATERAL 2+ LEFT   Final Result      1.  No definite acute osseous abnormality. In setting of demineralization, an occult fracture is difficult to exclude. If there is strong clinical suspicion, CT or MRI could be obtained for further evaluation.   2.  Postsurgical changes of the left femur with broken screw fragment redemonstrated.      DX-CHEST-PORTABLE (1 VIEW)   Final Result      No acute cardiopulmonary process is seen.      Atherosclerotic plaque.           Assessment/Plan  * ST elevation myocardial infarction involving right coronary artery (HCC)- (present on admission)   Assessment & Plan    Chest pain resolved  Comfort care.     Comfort measures only status- (present on admission)   Assessment & Plan    On comfort care     Positive QuantiFERON-TB Gold test   Assessment & Plan      AFB negative        Chronic kidney disease (CKD), stage III (moderate) (HCC)- (present on admission)   Assessment & Plan    Continue comfort care.       Essential hypertension- (present on admission)   Assessment & Plan    Currently normotensive  On comfort care protocol.      Fall- (present on admission)   Assessment & Plan    Chronic debility  Continue comfort care  Social service looking into group home options with limited finances in consideration  may need State waiver, family contribution, Letter of agreement from [facility] to fund   difficult disposition continues at this time.     Bronchitis   Assessment & Plan    Resolving.   On comfort measures.     Advanced directives, counseling/discussion- (present on admission)   Assessment & Plan    On comfort care.   Placement pending.           VTE prophylaxis: Comfort care

## 2019-03-02 NOTE — PROGRESS NOTES
Assumed care of violet at 0700. Patient does not want to get oob for meals or ambulate. Using bed side commode. Bed alarm in place and call light with in reach.

## 2019-03-03 PROCEDURE — 770001 HCHG ROOM/CARE - MED/SURG/GYN PRIV*

## 2019-03-03 PROCEDURE — 99232 SBSQ HOSP IP/OBS MODERATE 35: CPT | Performed by: INTERNAL MEDICINE

## 2019-03-03 ASSESSMENT — ENCOUNTER SYMPTOMS
COUGH: 0
ABDOMINAL PAIN: 0
BLURRED VISION: 0
DIZZINESS: 0
DEPRESSION: 0
HEADACHES: 0
MYALGIAS: 0
NECK PAIN: 0
VOMITING: 0
FEVER: 0

## 2019-03-03 NOTE — PROGRESS NOTES
[facility] Medicine Daily Progress Note    Date of Service  3/3/2019    Chief Complaint  89 y.o. female admitted 11/13/2018 with fall.    Hospital Course      Patient is a pleasant 89 year old woman with history of HTN who presented following a fall and was found to have an acute ST elevation MI.  After discussion with her family, she was placed on comfort care.     Interval Problem Update  Hypertension-well-controlled no adjustment to medications at this time.  Heart attack-no chest pain remains well controlled, no medication adjustments at this time seek  CKD stage III-no issues and urinating well.      Consultants/Specialty  Palliative care  Critical care, cardiology    Code Status  Comfort care     Disposition  Anticipate group home placement when financial arrangements completed  Hospice.     Review of Systems  Review of Systems   Constitutional: Negative for fever.        Feels tired today   HENT: Negative for hearing loss and tinnitus.    Eyes: Negative for blurred vision.   Respiratory: Negative for cough.    Cardiovascular: Negative for chest pain.   Gastrointestinal: Negative for abdominal pain and vomiting.   Genitourinary: Negative for urgency.   Musculoskeletal: Negative for myalgias and neck pain.   Skin: Negative for rash.   Neurological: Negative for dizziness and headaches.   Psychiatric/Behavioral: Negative for depression.   All other systems reviewed and are negative.     Physical Exam  Temp:  [36.3 °C (97.4 °F)-37.1 °C (98.7 °F)] 36.3 °C (97.4 °F)  Pulse:  [67-90] 90  Resp:  [17] 17  BP: ()/(59-67) 92/59  SpO2:  [90 %-94 %] 94 %    Physical Exam   Constitutional: No distress.   Awake and interactive  Frail   HENT:   Head: Normocephalic and atraumatic.   Eyes: Pupils are equal, round, and reactive to light. EOM are normal. No scleral icterus.   Neck: Normal range of motion. Neck supple.   Cardiovascular: Normal rate and regular rhythm.    Pulmonary/Chest: Effort normal. No stridor. She has  decreased breath sounds in the right lower field and the left lower field. She has no wheezes. She has no rales.   Abdominal: Soft. Bowel sounds are normal. There is no tenderness.   Musculoskeletal: She exhibits no edema.   Neurological: She is alert.   Skin: Skin is warm and dry. She is not diaphoretic.   Psychiatric: She is slowed. Cognition and memory are impaired.   Nursing note and vitals reviewed.      Fluids  No intake or output data in the 24 hours ending 03/03/19 1010    Laboratory                        Imaging  DX-CHEST-PORTABLE (1 VIEW)   Final Result         1.  Hazy interstitial left pulmonary opacities suggests interstitial edema or infiltrate, similar to prior.   2.  Trace left pleural effusion   3.  Hyperexpansion of lungs favors changes of COPD.      DX-CHEST-PORTABLE (1 VIEW)   Final Result         1.  Interstitial infiltrates or edema, stable.   2.  Atherosclerosis      DX-CHEST-PORTABLE (1 VIEW)   Final Result         1.  Interstitial infiltrates or edema.   2.  Atherosclerosis      DX-CHEST-PORTABLE (1 VIEW)   Final Result      Moderate interstitial edema or interstitial lung disease and small left pleural effusion similar to previous.      DX-CHEST-PORTABLE (1 VIEW)   Final Result      Stable interstitial edema and/or interstitial lung disease with probable small pleural fluid      DX-CHEST-PORTABLE (1 VIEW)   Final Result      Ill-defined infrahilar interstitial opacities, atelectasis versus mild edema. No significant pleural effusions.      DX-CHEST-PORTABLE (1 VIEW)   Final Result      Mild left basilar atelectasis, improved since prior. No new consolidation or large pleural effusions.      DX-CHEST-PORTABLE (1 VIEW)   Final Result      1.  Left basilar opacification may be secondary to atelectasis. Developing pneumonia cannot be excluded.         DX-CHEST-PORTABLE (1 VIEW)   Final Result      1.  Unchanged mild interstitial pulmonary edema   2.  Unchanged bibasilar atelectasis, alveolar  edema or pneumonia      DX-CHEST-PORTABLE (1 VIEW)   Final Result      1.  Decreased pulmonary edema   2.  Unchanged bibasilar atelectasis, alveolar edema or pneumonia      DX-CHEST-PORTABLE (1 VIEW)   Final Result      1.  There is diffuse interstitial and alveolar density in the right mid and lower lung zone. This is increased since the previous study. This may represent mild pulmonary edema/consolidation.   2.  Mildly enlarged cardiomediastinal silhouette when compared with the previous chest x-ray.      DX-CHEST-PORTABLE (1 VIEW)   Final Result      Stable mild pulmonary edema.   New left basilar atelectasis.      DX-CHEST-PORTABLE (1 VIEW)   Final Result      Stable chest appearance compared with 11/16.      DX-CHEST-PORTABLE (1 VIEW)   Final Result      No acute cardiopulmonary abnormality identified.      DX-CHEST-PORTABLE (1 VIEW)   Final Result      No acute cardiopulmonary abnormality. No interval change.      DX-CHEST-PORTABLE (1 VIEW)   Final Result      No acute cardiopulmonary disease.      CT-HEAD W/O   Final Result      1.  No evidence of acute intracranial process.      2.  There is again seen interstitial pulmonary edema and bibasilar atelectasis.      CT-HIP W/O PLUS RECONS LEFT   Final Result      1.  No evidence of acute fracture or malalignment. No evidence of hardware failure or complication.   2.  Postsurgical changes of the left femur with broken screw fragment redemonstrated.   3.  Demineralization.   4.  Scattered colonic diverticula.   5.  Atherosclerosis.   6.  Small fat-containing umbilical hernia.      EC-ECHOCARDIOGRAM COMPLETE W/O CONT   Final Result      DX-HIP-COMPLETE - UNILATERAL 2+ LEFT   Final Result      1.  No definite acute osseous abnormality. In setting of demineralization, an occult fracture is difficult to exclude. If there is strong clinical suspicion, CT or MRI could be obtained for further evaluation.   2.  Postsurgical changes of the left femur with broken screw  fragment redemonstrated.      DX-CHEST-PORTABLE (1 VIEW)   Final Result      No acute cardiopulmonary process is seen.      Atherosclerotic plaque.           Assessment/Plan  * ST elevation myocardial infarction involving right coronary artery (HCC)- (present on admission)   Assessment & Plan    Chest pain resolved  Comfort care.  No medication adjustments needed at this time     Comfort measures only status- (present on admission)   Assessment & Plan    On comfort care     Positive QuantiFERON-TB Gold test   Assessment & Plan      AFB negative        Chronic kidney disease (CKD), stage III (moderate) (HCC)- (present on admission)   Assessment & Plan    Continue comfort care.       Essential hypertension- (present on admission)   Assessment & Plan    Currently normotensive  On comfort care protocol.  No additional medications needed at this time, intermittently refusing Lasix     Fall- (present on admission)   Assessment & Plan    Chronic debility  Continue comfort care  Social service looking into group home options with limited finances in consideration  may need State waiver, family contribution.   -Working on disposition issues       Bronchitis   Assessment & Plan    Resolved  On comfort measures.     Advanced directives, counseling/discussion- (present on admission)   Assessment & Plan    On comfort care.   Placement pending.           VTE prophylaxis: Comfort care

## 2019-03-03 NOTE — CARE PLAN
Problem: Safety  Goal: Will remain free from injury  Outcome: PROGRESSING AS EXPECTED  Treaded socks in place, bed in the lowest position, bed alarm on, call light and belongings within reach, pt call for assistance appropriately    Problem: Bowel/Gastric:  Goal: Normal bowel function is maintained or improved  Outcome: PROGRESSING SLOWER THAN EXPECTED  Pt. refuses stool softeners.    Problem: Discharge Barriers/Planning  Goal: Patient's continuum of care needs will be met  Outcome: PROGRESSING AS EXPECTED  Awaiting placement.

## 2019-03-03 NOTE — CARE PLAN
Problem: Safety  Goal: Will remain free from injury  Outcome: PROGRESSING AS EXPECTED  Hourly rounds done. Call light within reach. Needs attended on a timely manner. Treaded socks on when OOB. Educated on the importance of calling for assistance when attempting to be OOB.  BED ALARM ON.    Problem: Venous Thromboembolism (VTW)/Deep Vein Thrombosis (DVT) Prevention:  Goal: Patient will participate in Venous Thrombosis (VTE)/Deep Vein Thrombosis (DVT)Prevention Measures  Outcome: PROGRESSING AS EXPECTED  Encourage early mobilization. Pt refusing SCDs.    Problem: Mobility  Goal: Risk for activity intolerance will decrease  Outcome: PROGRESSING AS EXPECTED  Up SBA using a 4WW

## 2019-03-04 PROCEDURE — 700102 HCHG RX REV CODE 250 W/ 637 OVERRIDE(OP): Performed by: HOSPITALIST

## 2019-03-04 PROCEDURE — 99231 SBSQ HOSP IP/OBS SF/LOW 25: CPT | Performed by: HOSPITALIST

## 2019-03-04 PROCEDURE — A9270 NON-COVERED ITEM OR SERVICE: HCPCS | Performed by: HOSPITALIST

## 2019-03-04 PROCEDURE — 770001 HCHG ROOM/CARE - MED/SURG/GYN PRIV*

## 2019-03-04 RX ADMIN — POLYETHYLENE GLYCOL 3350 1 PACKET: 17 POWDER, FOR SOLUTION ORAL at 05:18

## 2019-03-04 RX ADMIN — ACETAMINOPHEN 500 MG: 500 TABLET ORAL at 05:23

## 2019-03-04 RX ADMIN — FUROSEMIDE 20 MG: 20 TABLET ORAL at 05:17

## 2019-03-04 RX ADMIN — SENNOSIDES AND DOCUSATE SODIUM 2 TABLET: 8.6; 5 TABLET ORAL at 05:18

## 2019-03-04 NOTE — PROGRESS NOTES
[facility] Medicine Daily Progress Note    Date of Service  3/4/2019    Chief Complaint  89 y.o. female admitted 11/13/2018 with initially with chest pain    Hospital Course    3/4/2019-patient is in the hospital now 111 days.  She initially was admitted because of chest pain and was found to have an ST elevation myocardial infarction that she declined any workup or therapy for.  Patient has been on comfort care and continues at this point with pain management and anxiety optimization.  Placement at this point is an issue for her.      Interval Problem Update  Today patient does not have any significant complaints.  She is eating well, she has good bowel movements, she is breathing on room air, she is with no pain.  Pending placement    Consultants/Specialty  None    Code Status  DNR/DNI/comfort care    Disposition  To be determined    Review of Systems  Review of Systems   Unable to perform ROS: Acuity of condition        Physical Exam  Temp:  [36.3 °C (97.3 °F)-37 °C (98.6 °F)] 36.3 °C (97.3 °F)  Pulse:  [63-72] 63  Resp:  [16] 16  BP: ()/(54-62) 99/54  SpO2:  [91 %-95 %] 91 %    Physical Exam   Constitutional: She is oriented to person, place, and time. Vital signs are normal. She appears well-developed and well-nourished. She is cooperative. She does not appear ill.   HENT:   Head: Normocephalic and atraumatic.   Mouth/Throat: Oropharynx is clear and moist. No oropharyngeal exudate.   Eyes: Pupils are equal, round, and reactive to light. Conjunctivae and EOM are normal.   Neck: Normal range of motion. Neck supple. No JVD present. No thyromegaly present.   Cardiovascular: Normal rate, regular rhythm and normal heart sounds.  Exam reveals no gallop.    Pulmonary/Chest: Effort normal and breath sounds normal.   Abdominal: Soft. Bowel sounds are normal.   Musculoskeletal: Normal range of motion.   Neurological: She is alert and oriented to person, place, and time. She has normal reflexes. She is not disoriented.  She exhibits normal muscle tone. GCS eye subscore is 4. GCS verbal subscore is 5. GCS motor subscore is 6.   Skin: Skin is warm and dry. No rash noted. No pallor.   Psychiatric: Her speech is delayed. She is slowed. Cognition and memory are impaired.   Nursing note and vitals reviewed.      Fluids  No intake or output data in the 24 hours ending 03/04/19 1040    Laboratory                        Imaging  DX-CHEST-PORTABLE (1 VIEW)   Final Result         1.  Hazy interstitial left pulmonary opacities suggests interstitial edema or infiltrate, similar to prior.   2.  Trace left pleural effusion   3.  Hyperexpansion of lungs favors changes of COPD.      DX-CHEST-PORTABLE (1 VIEW)   Final Result         1.  Interstitial infiltrates or edema, stable.   2.  Atherosclerosis      DX-CHEST-PORTABLE (1 VIEW)   Final Result         1.  Interstitial infiltrates or edema.   2.  Atherosclerosis      DX-CHEST-PORTABLE (1 VIEW)   Final Result      Moderate interstitial edema or interstitial lung disease and small left pleural effusion similar to previous.      DX-CHEST-PORTABLE (1 VIEW)   Final Result      Stable interstitial edema and/or interstitial lung disease with probable small pleural fluid      DX-CHEST-PORTABLE (1 VIEW)   Final Result      Ill-defined infrahilar interstitial opacities, atelectasis versus mild edema. No significant pleural effusions.      DX-CHEST-PORTABLE (1 VIEW)   Final Result      Mild left basilar atelectasis, improved since prior. No new consolidation or large pleural effusions.      DX-CHEST-PORTABLE (1 VIEW)   Final Result      1.  Left basilar opacification may be secondary to atelectasis. Developing pneumonia cannot be excluded.         DX-CHEST-PORTABLE (1 VIEW)   Final Result      1.  Unchanged mild interstitial pulmonary edema   2.  Unchanged bibasilar atelectasis, alveolar edema or pneumonia      DX-CHEST-PORTABLE (1 VIEW)   Final Result      1.  Decreased pulmonary edema   2.  Unchanged  bibasilar atelectasis, alveolar edema or pneumonia      DX-CHEST-PORTABLE (1 VIEW)   Final Result      1.  There is diffuse interstitial and alveolar density in the right mid and lower lung zone. This is increased since the previous study. This may represent mild pulmonary edema/consolidation.   2.  Mildly enlarged cardiomediastinal silhouette when compared with the previous chest x-ray.      DX-CHEST-PORTABLE (1 VIEW)   Final Result      Stable mild pulmonary edema.   New left basilar atelectasis.      DX-CHEST-PORTABLE (1 VIEW)   Final Result      Stable chest appearance compared with 11/16.      DX-CHEST-PORTABLE (1 VIEW)   Final Result      No acute cardiopulmonary abnormality identified.      DX-CHEST-PORTABLE (1 VIEW)   Final Result      No acute cardiopulmonary abnormality. No interval change.      DX-CHEST-PORTABLE (1 VIEW)   Final Result      No acute cardiopulmonary disease.      CT-HEAD W/O   Final Result      1.  No evidence of acute intracranial process.      2.  There is again seen interstitial pulmonary edema and bibasilar atelectasis.      CT-HIP W/O PLUS RECONS LEFT   Final Result      1.  No evidence of acute fracture or malalignment. No evidence of hardware failure or complication.   2.  Postsurgical changes of the left femur with broken screw fragment redemonstrated.   3.  Demineralization.   4.  Scattered colonic diverticula.   5.  Atherosclerosis.   6.  Small fat-containing umbilical hernia.      EC-ECHOCARDIOGRAM COMPLETE W/O CONT   Final Result      DX-HIP-COMPLETE - UNILATERAL 2+ LEFT   Final Result      1.  No definite acute osseous abnormality. In setting of demineralization, an occult fracture is difficult to exclude. If there is strong clinical suspicion, CT or MRI could be obtained for further evaluation.   2.  Postsurgical changes of the left femur with broken screw fragment redemonstrated.      DX-CHEST-PORTABLE (1 VIEW)   Final Result      No acute cardiopulmonary process is seen.       Atherosclerotic plaque.           Assessment/Plan  * ST elevation myocardial infarction involving right coronary artery (HCC)- (present on admission)   Assessment & Plan    Chest pain resolved  Comfort care.  No medication adjustments needed at this time     Comfort measures only status- (present on admission)   Assessment & Plan    On comfort care     Positive QuantiFERON-TB Gold test   Assessment & Plan      AFB negative        Chronic kidney disease (CKD), stage III (moderate) ([facility])- (present on admission)   Assessment & Plan    Continue comfort care.       Essential hypertension- (present on admission)   Assessment & Plan    Currently normotensive  On comfort care protocol.  No additional medications needed at this time, intermittently refusing Lasix     Fall- (present on admission)   Assessment & Plan    Chronic debility  Continue comfort care  Social service looking into group home options with limited finances in consideration  may need State waiver, family contribution.   -Working on disposition issues       Bronchitis   Assessment & Plan    Resolved  On comfort measures.     Advanced directives, counseling/discussion- (present on admission)   Assessment & Plan    On comfort care.   Placement pending.           VTE prophylaxis: None

## 2019-03-04 NOTE — CARE PLAN
Problem: Safety  Goal: Will remain free from injury  Outcome: PROGRESSING AS EXPECTED  Treaded socks in place, bed in the lowest position, bed alarm on, call light and belongings within reach, pt call for assistance appropriately    Problem: Discharge Barriers/Planning  Goal: Patient's continuum of care needs will be met  Outcome: PROGRESSING AS EXPECTED  Awaiting placement.

## 2019-03-04 NOTE — CARE PLAN
Problem: Venous Thromboembolism (VTW)/Deep Vein Thrombosis (DVT) Prevention:  Goal: Patient will participate in Venous Thrombosis (VTE)/Deep Vein Thrombosis (DVT)Prevention Measures  Outcome: PROGRESSING SLOWER THAN EXPECTED  Refuses scds despite education. Educated regarding importance of ambulation and and ankle ROM     Problem: Mobility  Goal: Risk for activity intolerance will decrease  Outcome: PROGRESSING AS EXPECTED  Able to ambulate in [place] with SBA and 4ww.

## 2019-03-05 PROCEDURE — 99232 SBSQ HOSP IP/OBS MODERATE 35: CPT | Performed by: INTERNAL MEDICINE

## 2019-03-05 PROCEDURE — A9270 NON-COVERED ITEM OR SERVICE: HCPCS | Performed by: HOSPITALIST

## 2019-03-05 PROCEDURE — 770001 HCHG ROOM/CARE - MED/SURG/GYN PRIV*

## 2019-03-05 PROCEDURE — 700102 HCHG RX REV CODE 250 W/ 637 OVERRIDE(OP): Performed by: HOSPITALIST

## 2019-03-05 RX ADMIN — POLYETHYLENE GLYCOL 3350 1 PACKET: 17 POWDER, FOR SOLUTION ORAL at 06:25

## 2019-03-05 RX ADMIN — FUROSEMIDE 20 MG: 20 TABLET ORAL at 06:25

## 2019-03-05 RX ADMIN — SENNOSIDES AND DOCUSATE SODIUM 2 TABLET: 8.6; 5 TABLET ORAL at 06:25

## 2019-03-05 NOTE — CARE PLAN
Problem: Safety  Goal: Will remain free from injury  Outcome: PROGRESSING AS EXPECTED  Call light with in reach, educated to call for assistance    Problem: Skin Integrity  Goal: Risk for impaired skin integrity will decrease  Outcome: PROGRESSING SLOWER THAN EXPECTED  Refuses all skin care precautions. Educated to turn and reposition self

## 2019-03-05 NOTE — CARE PLAN
Problem: Venous Thromboembolism (VTW)/Deep Vein Thrombosis (DVT) Prevention:  Goal: Patient will participate in Venous Thrombosis (VTE)/Deep Vein Thrombosis (DVT)Prevention Measures  Outcome: PROGRESSING SLOWER THAN EXPECTED  Refusing scds despite education.    Problem: Bowel/Gastric:  Goal: Normal bowel function is maintained or improved  Outcome: PROGRESSING SLOWER THAN EXPECTED  :ast BM 5 days ago pt refuses additional bowel medication states not unusual to go long stretches with out BM

## 2019-03-05 NOTE — PROGRESS NOTES
Assumed care of patient at 0700. Pleasant and compliant. Resting comfortably in bed. Did not want to get oob for meal. Patient educated to call for assistance all the time when needing to get oob or bathroom.

## 2019-03-05 NOTE — PROGRESS NOTES
Hospital Medicine Daily Progress Note    Date of Service  3/5/2019    Chief Complaint  89 y.o. female admitted 11/13/2018 with initially with chest pain    Hospital Course    3/4/2019-patient is in the hospital now 111 days.  She initially was admitted because of chest pain and was found to have an ST elevation myocardial infarction that she declined any workup or therapy for.  Patient has been on comfort care and continues at this point with pain management and anxiety optimization.  Placement at this point is an issue for her.      Interval Problem Update  No new complaints  Appears comfortable  On comfort care    Consultants/Specialty  None    Code Status  DNR/DNI/comfort care    Disposition  Pending group home acceptance, will discharge to group home on hospice, accepted by [facility]    Review of Systems  Review of Systems   Unable to perform ROS: Medical condition        Physical Exam  Temp:  [36.1 °C (96.9 °F)-37 °C (98.6 °F)] 36.1 °C (96.9 °F)  Pulse:  [64-72] 64  Resp:  [12-16] 12  BP: (110-118)/(57-62) 110/57  SpO2:  [91 %-92 %] 92 %    Physical Exam   Constitutional: She is oriented to person, place, and time. Vital signs are normal. She appears well-developed. She is cooperative. She does not appear ill. No distress.   Elderly, chronically ill-appearing   HENT:   Head: Normocephalic and atraumatic.   Mouth/Throat: Oropharynx is clear and moist.   Eyes: Pupils are equal, round, and reactive to light. Conjunctivae and EOM are normal.   Neck: Normal range of motion. Neck supple. No thyromegaly present.   Cardiovascular: Normal rate and regular rhythm.    Pulmonary/Chest: Effort normal and breath sounds normal. No respiratory distress.   Abdominal: Soft. Bowel sounds are normal.   Musculoskeletal: Normal range of motion.   Neurological: She is alert and oriented to person, place, and time. She has normal reflexes. She is not disoriented. No cranial nerve deficit. GCS eye subscore is 4. GCS verbal  subscore is 5. GCS motor subscore is 6.   Skin: Skin is warm and dry. There is pallor.   Psychiatric: Judgment normal. Her speech is delayed. She is slowed. Cognition and memory are impaired.   Nursing note and vitals reviewed.      Fluids  No intake or output data in the 24 hours ending 03/05/19 1537    Laboratory                        Imaging  DX-CHEST-PORTABLE (1 VIEW)   Final Result         1.  Hazy interstitial left pulmonary opacities suggests interstitial edema or infiltrate, similar to prior.   2.  Trace left pleural effusion   3.  Hyperexpansion of lungs favors changes of COPD.      DX-CHEST-PORTABLE (1 VIEW)   Final Result         1.  Interstitial infiltrates or edema, stable.   2.  Atherosclerosis      DX-CHEST-PORTABLE (1 VIEW)   Final Result         1.  Interstitial infiltrates or edema.   2.  Atherosclerosis      DX-CHEST-PORTABLE (1 VIEW)   Final Result      Moderate interstitial edema or interstitial lung disease and small left pleural effusion similar to previous.      DX-CHEST-PORTABLE (1 VIEW)   Final Result      Stable interstitial edema and/or interstitial lung disease with probable small pleural fluid      DX-CHEST-PORTABLE (1 VIEW)   Final Result      Ill-defined infrahilar interstitial opacities, atelectasis versus mild edema. No significant pleural effusions.      DX-CHEST-PORTABLE (1 VIEW)   Final Result      Mild left basilar atelectasis, improved since prior. No new consolidation or large pleural effusions.      DX-CHEST-PORTABLE (1 VIEW)   Final Result      1.  Left basilar opacification may be secondary to atelectasis. Developing pneumonia cannot be excluded.         DX-CHEST-PORTABLE (1 VIEW)   Final Result      1.  Unchanged mild interstitial pulmonary edema   2.  Unchanged bibasilar atelectasis, alveolar edema or pneumonia      DX-CHEST-PORTABLE (1 VIEW)   Final Result      1.  Decreased pulmonary edema   2.  Unchanged bibasilar atelectasis, alveolar edema or pneumonia       DX-CHEST-PORTABLE (1 VIEW)   Final Result      1.  There is diffuse interstitial and alveolar density in the right mid and lower lung zone. This is increased since the previous study. This may represent mild pulmonary edema/consolidation.   2.  Mildly enlarged cardiomediastinal silhouette when compared with the previous chest x-ray.      DX-CHEST-PORTABLE (1 VIEW)   Final Result      Stable mild pulmonary edema.   New left basilar atelectasis.      DX-CHEST-PORTABLE (1 VIEW)   Final Result      Stable chest appearance compared with 11/16.      DX-CHEST-PORTABLE (1 VIEW)   Final Result      No acute cardiopulmonary abnormality identified.      DX-CHEST-PORTABLE (1 VIEW)   Final Result      No acute cardiopulmonary abnormality. No interval change.      DX-CHEST-PORTABLE (1 VIEW)   Final Result      No acute cardiopulmonary disease.      CT-HEAD W/O   Final Result      1.  No evidence of acute intracranial process.      2.  There is again seen interstitial pulmonary edema and bibasilar atelectasis.      CT-HIP W/O PLUS RECONS LEFT   Final Result      1.  No evidence of acute fracture or malalignment. No evidence of hardware failure or complication.   2.  Postsurgical changes of the left femur with broken screw fragment redemonstrated.   3.  Demineralization.   4.  Scattered colonic diverticula.   5.  Atherosclerosis.   6.  Small fat-containing umbilical hernia.      EC-ECHOCARDIOGRAM COMPLETE W/O CONT   Final Result      DX-HIP-COMPLETE - UNILATERAL 2+ LEFT   Final Result      1.  No definite acute osseous abnormality. In setting of demineralization, an occult fracture is difficult to exclude. If there is strong clinical suspicion, CT or MRI could be obtained for further evaluation.   2.  Postsurgical changes of the left femur with broken screw fragment redemonstrated.      DX-CHEST-PORTABLE (1 VIEW)   Final Result      No acute cardiopulmonary process is seen.      Atherosclerotic plaque.            Assessment/Plan  * ST elevation myocardial infarction involving right coronary artery (HCC)- (present on admission)   Assessment & Plan    Declines any intervention or therapeutic modality continue with comfort care     Comfort measures only status- (present on admission)   Assessment & Plan    Continued     Positive QuantiFERON-TB Gold test   Assessment & Plan      AFB negative        Chronic kidney disease (CKD), stage III (moderate) ([facility])- (present on admission)   Assessment & Plan    Continue comfort care.       Essential hypertension- (present on admission)   Assessment & Plan    We are not measuring blood pressure as the patient is on comfort care     Fall- (present on admission)   Assessment & Plan    Make sure that she is not falling and continue at this point with anxiety management       Advanced directives, counseling/discussion- (present on admission)   Assessment & Plan    Placement is an issue for the patient continued on comfort care    Pending group home acceptance,  assisting  We will need to arrange for a payer source          VTE prophylaxis: None

## 2019-03-05 NOTE — DISCHARGE PLANNING
RISHABH received a VM from MARINA Craig from [facility] [phone] stating that she is the SW assigned to the Pt and was wondering when the Pt will go to the  so that she can scheduled an assessment. RISHABH left a VM for Ericka from [facility] asking for a call back to discuss  placement.

## 2019-03-06 PROCEDURE — A9270 NON-COVERED ITEM OR SERVICE: HCPCS | Performed by: HOSPITALIST

## 2019-03-06 PROCEDURE — 99231 SBSQ HOSP IP/OBS SF/LOW 25: CPT | Performed by: INTERNAL MEDICINE

## 2019-03-06 PROCEDURE — 700102 HCHG RX REV CODE 250 W/ 637 OVERRIDE(OP): Performed by: HOSPITALIST

## 2019-03-06 PROCEDURE — 770001 HCHG ROOM/CARE - MED/SURG/GYN PRIV*

## 2019-03-06 RX ADMIN — SENNOSIDES AND DOCUSATE SODIUM 2 TABLET: 8.6; 5 TABLET ORAL at 05:32

## 2019-03-06 NOTE — PROGRESS NOTES
Hospital Medicine Daily Progress Note    Date of Service  3/6/2019    Chief Complaint  89 y.o. female admitted 11/13/2018 with initially with chest pain    Hospital Course    3/4/2019-patient is in the hospital now 111 days.  She initially was admitted because of chest pain and was found to have an ST elevation myocardial infarction that she declined any workup or therapy for.  Patient has been on comfort care and continues at this point with pain management and anxiety optimization.  Placement at this point is an issue for her.      Interval Problem Update  No new events  On comfort measures  Awaiting placement    Consultants/Specialty  None    Code Status  DNR/DNI/comfort care    Disposition  Pending group home acceptance, will discharge to group home on hospice, accepted by [facility]    Review of Systems  Review of Systems   Unable to perform ROS: Medical condition        Physical Exam  Temp:  [36.3 °C (97.3 °F)-36.7 °C (98 °F)] 36.6 °C (97.8 °F)  Pulse:  [65-68] 68  Resp:  [14-17] 14  BP: ()/(54-65) 99/60  SpO2:  [91 %] 91 %    Physical Exam   Constitutional: Vital signs are normal. She is cooperative. She does not appear ill. No distress.   Elderly, chronically ill-appearing   HENT:   Head: Normocephalic and atraumatic.   Mouth/Throat: No oropharyngeal exudate.   Eyes: Pupils are equal, round, and reactive to light. EOM are normal.   Neck: Normal range of motion. Neck supple.   Cardiovascular: Normal rate and regular rhythm.    Pulmonary/Chest: Effort normal. No respiratory distress.   Abdominal: Soft. Bowel sounds are normal.   Musculoskeletal: Normal range of motion.   Neurological: She is not disoriented. No cranial nerve deficit. GCS eye subscore is 4. GCS verbal subscore is 5. GCS motor subscore is 6.   arousable   Skin: Skin is warm and dry. She is not diaphoretic. There is pallor.   Psychiatric: Judgment normal. Her speech is delayed. She is slowed. Cognition and memory are impaired.    Nursing note and vitals reviewed.      Fluids    Intake/Output Summary (Last 24 hours) at 03/06/19 1049  Last data filed at 03/05/19 2200   Gross per 24 hour   Intake              200 ml   Output                0 ml   Net              200 ml       Laboratory                        Imaging  DX-CHEST-PORTABLE (1 VIEW)   Final Result         1.  Hazy interstitial left pulmonary opacities suggests interstitial edema or infiltrate, similar to prior.   2.  Trace left pleural effusion   3.  Hyperexpansion of lungs favors changes of COPD.      DX-CHEST-PORTABLE (1 VIEW)   Final Result         1.  Interstitial infiltrates or edema, stable.   2.  Atherosclerosis      DX-CHEST-PORTABLE (1 VIEW)   Final Result         1.  Interstitial infiltrates or edema.   2.  Atherosclerosis      DX-CHEST-PORTABLE (1 VIEW)   Final Result      Moderate interstitial edema or interstitial lung disease and small left pleural effusion similar to previous.      DX-CHEST-PORTABLE (1 VIEW)   Final Result      Stable interstitial edema and/or interstitial lung disease with probable small pleural fluid      DX-CHEST-PORTABLE (1 VIEW)   Final Result      Ill-defined infrahilar interstitial opacities, atelectasis versus mild edema. No significant pleural effusions.      DX-CHEST-PORTABLE (1 VIEW)   Final Result      Mild left basilar atelectasis, improved since prior. No new consolidation or large pleural effusions.      DX-CHEST-PORTABLE (1 VIEW)   Final Result      1.  Left basilar opacification may be secondary to atelectasis. Developing pneumonia cannot be excluded.         DX-CHEST-PORTABLE (1 VIEW)   Final Result      1.  Unchanged mild interstitial pulmonary edema   2.  Unchanged bibasilar atelectasis, alveolar edema or pneumonia      DX-CHEST-PORTABLE (1 VIEW)   Final Result      1.  Decreased pulmonary edema   2.  Unchanged bibasilar atelectasis, alveolar edema or pneumonia      DX-CHEST-PORTABLE (1 VIEW)   Final Result      1.  There is  diffuse interstitial and alveolar density in the right mid and lower lung zone. This is increased since the previous study. This may represent mild pulmonary edema/consolidation.   2.  Mildly enlarged cardiomediastinal silhouette when compared with the previous chest x-ray.      DX-CHEST-PORTABLE (1 VIEW)   Final Result      Stable mild pulmonary edema.   New left basilar atelectasis.      DX-CHEST-PORTABLE (1 VIEW)   Final Result      Stable chest appearance compared with 11/16.      DX-CHEST-PORTABLE (1 VIEW)   Final Result      No acute cardiopulmonary abnormality identified.      DX-CHEST-PORTABLE (1 VIEW)   Final Result      No acute cardiopulmonary abnormality. No interval change.      DX-CHEST-PORTABLE (1 VIEW)   Final Result      No acute cardiopulmonary disease.      CT-HEAD W/O   Final Result      1.  No evidence of acute intracranial process.      2.  There is again seen interstitial pulmonary edema and bibasilar atelectasis.      CT-HIP W/O PLUS RECONS LEFT   Final Result      1.  No evidence of acute fracture or malalignment. No evidence of hardware failure or complication.   2.  Postsurgical changes of the left femur with broken screw fragment redemonstrated.   3.  Demineralization.   4.  Scattered colonic diverticula.   5.  Atherosclerosis.   6.  Small fat-containing umbilical hernia.      EC-ECHOCARDIOGRAM COMPLETE W/O CONT   Final Result      DX-HIP-COMPLETE - UNILATERAL 2+ LEFT   Final Result      1.  No definite acute osseous abnormality. In setting of demineralization, an occult fracture is difficult to exclude. If there is strong clinical suspicion, CT or MRI could be obtained for further evaluation.   2.  Postsurgical changes of the left femur with broken screw fragment redemonstrated.      DX-CHEST-PORTABLE (1 VIEW)   Final Result      No acute cardiopulmonary process is seen.      Atherosclerotic plaque.           Assessment/Plan  * ST elevation myocardial infarction involving right coronary  artery (HCC)- (present on admission)   Assessment & Plan    Declines any intervention or therapeutic modality continue with comfort care     Comfort measures only status- (present on admission)   Assessment & Plan    Continued     Positive QuantiFERON-TB Gold test   Assessment & Plan      AFB negative        Chronic kidney disease (CKD), stage III (moderate) (HCC)- (present on admission)   Assessment & Plan    Continue comfort care.       Essential hypertension- (present on admission)   Assessment & Plan    We are not measuring blood pressure as the patient is on comfort care     Fall- (present on admission)   Assessment & Plan    Make sure that she is not falling and continue at this point with anxiety management       Advanced directives, counseling/discussion- (present on admission)   Assessment & Plan    Placement is an issue for the patient continued on comfort care    Pending group home acceptance,  assisting  We will need to arrange for a payer source          VTE prophylaxis: None

## 2019-03-06 NOTE — PROGRESS NOTES
Report received. Assumed pt care at 1915. Pt in bed resting no signs of distress noted. Denies pain and SOB. Assessment done. Pt repositioned for comfort. Fall precaution in place, call light within reach. Q2 rounding in place.

## 2019-03-06 NOTE — PROGRESS NOTES
· 2 RN skin check complete with Deana GANDARA RN.  · Devices in place NONE.  · Skin assessed under devices N/A.  · Findings: sacrum redness noted, blanching and intact, Bilateral heels redness noted blanching intact.  · The following interventions in place pt turned to left side and heels floated on pillows. Pt refusing waffle mattress and mepliex to sacrum despite education

## 2019-03-06 NOTE — CARE PLAN
Problem: Safety  Goal: Will remain free from injury  Outcome: PROGRESSING AS EXPECTED  Bed in the lowest locked position. Call light within reach. Bed alarm in use. Hourly rounding in place.     Problem: Bowel/Gastric:  Goal: Normal bowel function is maintained or improved  Outcome: PROGRESSING AS EXPECTED      Problem: Mobility  Goal: Risk for activity intolerance will decrease  Outcome: PROGRESSING AS EXPECTED  Pt up 1 assist with Four wheel walker

## 2019-03-07 PROCEDURE — 770001 HCHG ROOM/CARE - MED/SURG/GYN PRIV*

## 2019-03-07 PROCEDURE — 99231 SBSQ HOSP IP/OBS SF/LOW 25: CPT | Performed by: INTERNAL MEDICINE

## 2019-03-07 ASSESSMENT — ENCOUNTER SYMPTOMS
BACK PAIN: 0
FLANK PAIN: 0
VOMITING: 0
DIZZINESS: 0
WEAKNESS: 1
HEADACHES: 0
CHILLS: 0
FEVER: 0
ABDOMINAL PAIN: 0
DEPRESSION: 0
COUGH: 0
NAUSEA: 0
SHORTNESS OF BREATH: 0
FOCAL WEAKNESS: 0
NERVOUS/ANXIOUS: 0
SENSORY CHANGE: 0
MEMORY LOSS: 1
MYALGIAS: 0
PALPITATIONS: 0
SPEECH CHANGE: 0

## 2019-03-07 NOTE — PROGRESS NOTES
Skin assessment performed with Renuka ROLLE.  Sacral area red but blanching. No other skin issues seen.

## 2019-03-07 NOTE — CARE PLAN
Problem: Safety  Goal: Will remain free from falls    Intervention: Implement fall precautions  Bed alarm in use      Problem: Urinary Elimination:  Goal: Ability to reestablish a normal urinary elimination pattern will improve    Intervention: Assess and monitor for signs and symptoms of urinary retention  Voiding adequate amounts without difficulty

## 2019-03-07 NOTE — PROGRESS NOTES
[facility] Medicine Daily Progress Note    Date of Service  3/7/2019    Chief Complaint  89 y.o. female admitted 11/13/2018 with initially with chest pain    Hospital Course    3/4/2019-patient is in the hospital now 111 days.  She initially was admitted because of chest pain and was found to have an ST elevation myocardial infarction that she declined any workup or therapy for.  Patient has been on comfort care and continues at this point with pain management and anxiety optimization.  Placement at this point is an issue for her.      Interval Problem Update  Pt awake, ate small portions of meal, requesting warmed tea    Consultants/Specialty  None    Code Status  DNR/DNI/comfort care    Disposition  Pending group home acceptance, will discharge to group home on hospice, accepted by [facility]    Review of Systems  Review of Systems   Constitutional: Negative for chills, fever and malaise/fatigue.   HENT: Negative for congestion and hearing loss.    Respiratory: Negative for cough and shortness of breath.    Cardiovascular: Negative for chest pain, palpitations and leg swelling.   Gastrointestinal: Negative for abdominal pain, nausea and vomiting.   Genitourinary: Negative for dysuria and flank pain.   Musculoskeletal: Negative for back pain, joint pain and myalgias.   Neurological: Positive for weakness. Negative for dizziness, sensory change, speech change, focal weakness and headaches.   Psychiatric/Behavioral: Positive for memory loss. Negative for depression. The patient is not nervous/anxious.         Physical Exam  Temp:  [36.6 °C (97.8 °F)-36.8 °C (98.3 °F)] 36.6 °C (97.8 °F)  Pulse:  [69-71] 69  Resp:  [16] 16  BP: (105-107)/(58-59) 107/58  SpO2:  [91 %] 91 %    Physical Exam   Constitutional: She is oriented to person, place, and time. Vital signs are normal. She appears well-nourished. She is cooperative. She does not appear ill. No distress.   Elderly, chronically ill-appearing   HENT:   Head:  Normocephalic and atraumatic.   Mouth/Throat: No oropharyngeal exudate.   Eyes: Pupils are equal, round, and reactive to light. EOM are normal.   Neck: Normal range of motion. No thyromegaly present.   Cardiovascular: Normal rate.    No murmur heard.  Pulmonary/Chest: Effort normal. No respiratory distress.   Abdominal: Soft. She exhibits no distension. There is no tenderness.   Musculoskeletal: Normal range of motion. She exhibits no edema or tenderness.   Neurological: She is alert and oriented to person, place, and time. She is not disoriented. No cranial nerve deficit. Coordination normal. GCS eye subscore is 4. GCS verbal subscore is 5. GCS motor subscore is 6.   Skin: Skin is warm and dry. No rash noted. She is not diaphoretic. No erythema. No pallor.   Psychiatric: She has a normal mood and affect. Judgment and thought content normal. Her speech is delayed. She is slowed. Cognition and memory are impaired.   Nursing note and vitals reviewed.      Fluids  No intake or output data in the 24 hours ending 03/07/19 1435    Laboratory                        Imaging  DX-CHEST-PORTABLE (1 VIEW)   Final Result         1.  Hazy interstitial left pulmonary opacities suggests interstitial edema or infiltrate, similar to prior.   2.  Trace left pleural effusion   3.  Hyperexpansion of lungs favors changes of COPD.      DX-CHEST-PORTABLE (1 VIEW)   Final Result         1.  Interstitial infiltrates or edema, stable.   2.  Atherosclerosis      DX-CHEST-PORTABLE (1 VIEW)   Final Result         1.  Interstitial infiltrates or edema.   2.  Atherosclerosis      DX-CHEST-PORTABLE (1 VIEW)   Final Result      Moderate interstitial edema or interstitial lung disease and small left pleural effusion similar to previous.      DX-CHEST-PORTABLE (1 VIEW)   Final Result      Stable interstitial edema and/or interstitial lung disease with probable small pleural fluid      DX-CHEST-PORTABLE (1 VIEW)   Final Result      Ill-defined  infrahilar interstitial opacities, atelectasis versus mild edema. No significant pleural effusions.      DX-CHEST-PORTABLE (1 VIEW)   Final Result      Mild left basilar atelectasis, improved since prior. No new consolidation or large pleural effusions.      DX-CHEST-PORTABLE (1 VIEW)   Final Result      1.  Left basilar opacification may be secondary to atelectasis. Developing pneumonia cannot be excluded.         DX-CHEST-PORTABLE (1 VIEW)   Final Result      1.  Unchanged mild interstitial pulmonary edema   2.  Unchanged bibasilar atelectasis, alveolar edema or pneumonia      DX-CHEST-PORTABLE (1 VIEW)   Final Result      1.  Decreased pulmonary edema   2.  Unchanged bibasilar atelectasis, alveolar edema or pneumonia      DX-CHEST-PORTABLE (1 VIEW)   Final Result      1.  There is diffuse interstitial and alveolar density in the right mid and lower lung zone. This is increased since the previous study. This may represent mild pulmonary edema/consolidation.   2.  Mildly enlarged cardiomediastinal silhouette when compared with the previous chest x-ray.      DX-CHEST-PORTABLE (1 VIEW)   Final Result      Stable mild pulmonary edema.   New left basilar atelectasis.      DX-CHEST-PORTABLE (1 VIEW)   Final Result      Stable chest appearance compared with 11/16.      DX-CHEST-PORTABLE (1 VIEW)   Final Result      No acute cardiopulmonary abnormality identified.      DX-CHEST-PORTABLE (1 VIEW)   Final Result      No acute cardiopulmonary abnormality. No interval change.      DX-CHEST-PORTABLE (1 VIEW)   Final Result      No acute cardiopulmonary disease.      CT-HEAD W/O   Final Result      1.  No evidence of acute intracranial process.      2.  There is again seen interstitial pulmonary edema and bibasilar atelectasis.      CT-HIP W/O PLUS RECONS LEFT   Final Result      1.  No evidence of acute fracture or malalignment. No evidence of hardware failure or complication.   2.  Postsurgical changes of the left femur with  broken screw fragment redemonstrated.   3.  Demineralization.   4.  Scattered colonic diverticula.   5.  Atherosclerosis.   6.  Small fat-containing umbilical hernia.      EC-ECHOCARDIOGRAM COMPLETE W/O CONT   Final Result      DX-HIP-COMPLETE - UNILATERAL 2+ LEFT   Final Result      1.  No definite acute osseous abnormality. In setting of demineralization, an occult fracture is difficult to exclude. If there is strong clinical suspicion, CT or MRI could be obtained for further evaluation.   2.  Postsurgical changes of the left femur with broken screw fragment redemonstrated.      DX-CHEST-PORTABLE (1 VIEW)   Final Result      No acute cardiopulmonary process is seen.      Atherosclerotic plaque.           Assessment/Plan  * ST elevation myocardial infarction involving right coronary artery (HCC)- (present on admission)   Assessment & Plan    Declines any intervention or therapeutic modality continue with comfort care     Comfort measures only status- (present on admission)   Assessment & Plan    Continued     Positive QuantiFERON-TB Gold test   Assessment & Plan      AFB negative        Chronic kidney disease (CKD), stage III (moderate) (HCC)- (present on admission)   Assessment & Plan    Continue comfort care.       Essential hypertension- (present on admission)   Assessment & Plan    We are not measuring blood pressure as the patient is on comfort care     Fall- (present on admission)   Assessment & Plan    Make sure that she is not falling and continue at this point with anxiety management       Advanced directives, counseling/discussion- (present on admission)   Assessment & Plan    Placement is an issue for the patient continued on comfort care    Pending group home acceptance,  assisting  We will need to arrange for a payer source  - will need reeval by hospice at ,  assisting          VTE prophylaxis: None

## 2019-03-08 PROCEDURE — 99231 SBSQ HOSP IP/OBS SF/LOW 25: CPT | Performed by: INTERNAL MEDICINE

## 2019-03-08 PROCEDURE — 770001 HCHG ROOM/CARE - MED/SURG/GYN PRIV*

## 2019-03-08 PROCEDURE — 700102 HCHG RX REV CODE 250 W/ 637 OVERRIDE(OP): Performed by: HOSPITALIST

## 2019-03-08 PROCEDURE — A9270 NON-COVERED ITEM OR SERVICE: HCPCS | Performed by: HOSPITALIST

## 2019-03-08 RX ADMIN — SENNOSIDES AND DOCUSATE SODIUM 2 TABLET: 8.6; 5 TABLET ORAL at 06:00

## 2019-03-08 ASSESSMENT — ENCOUNTER SYMPTOMS
MEMORY LOSS: 1
CHILLS: 0
NAUSEA: 0
FOCAL WEAKNESS: 0
WEAKNESS: 1
MYALGIAS: 0
DEPRESSION: 0
FLANK PAIN: 0
BACK PAIN: 0
SHORTNESS OF BREATH: 0
PALPITATIONS: 0
ABDOMINAL PAIN: 0
HEADACHES: 0
FEVER: 0
NERVOUS/ANXIOUS: 0

## 2019-03-08 NOTE — CARE PLAN
Problem: Safety  Goal: Will remain free from falls    Intervention: Implement fall precautions  Pt calls appropriately, treaded socks refused, patient prefers her personal shoes when ambulating. Personal belongings and call light with in reach and bed is locked in lowest position. Hourly rounding in place      Problem: Discharge Barriers/Planning  Goal: Patient's continuum of care needs will be met    Intervention: Assess potential discharge barriers on admission and throughout hospital stay  Waiver acceptance estimated for July,  may not accept due to this date. SW/CM working on discharge.

## 2019-03-08 NOTE — PROGRESS NOTES
Hospital Medicine Daily Progress Note    Date of Service  3/8/2019    Chief Complaint  89 y.o. female admitted 11/13/2018 with initially with chest pain    Hospital Course    3/4/2019-patient is in the hospital now 111 days.  She initially was admitted because of chest pain and was found to have an ST elevation myocardial infarction that she declined any workup or therapy for.  Patient has been on comfort care and continues at this point with pain management and anxiety optimization.  Placement at this point is an issue for her.      Interval Problem Update  No new events  Appears comfortable    Consultants/Specialty  None    Code Status  DNR/DNI/comfort care    Disposition  Pending group home acceptance, will discharge to group home on hospice, accepted by [facility]    Review of Systems  Review of Systems   Constitutional: Negative for chills, fever and malaise/fatigue.   HENT: Negative for congestion and hearing loss.    Respiratory: Negative for shortness of breath.    Cardiovascular: Negative for chest pain, palpitations and leg swelling.   Gastrointestinal: Negative for abdominal pain and nausea.   Genitourinary: Negative for dysuria and flank pain.   Musculoskeletal: Negative for back pain, joint pain and myalgias.   Neurological: Positive for weakness. Negative for focal weakness and headaches.   Psychiatric/Behavioral: Positive for memory loss. Negative for depression. The patient is not nervous/anxious.         Physical Exam  Temp:  [36.5 °C (97.7 °F)-37.1 °C (98.7 °F)] 36.5 °C (97.7 °F)  Pulse:  [67-73] 71  Resp:  [17-18] 17  BP: (100-121)/(57-71) 121/71  SpO2:  [91 %-95 %] 91 %    Physical Exam   Constitutional: She is oriented to person, place, and time. Vital signs are normal. She appears well-nourished. She is cooperative. She does not appear ill. No distress.   Elderly, chronically ill-appearing   HENT:   Head: Normocephalic and atraumatic.   Eyes: Pupils are equal, round, and reactive to light.  EOM are normal.   Neck: Normal range of motion. No JVD present. No thyromegaly present.   Cardiovascular: Normal rate.    No murmur heard.  Pulmonary/Chest: Effort normal. No respiratory distress.   Abdominal: Soft. She exhibits no distension. There is no tenderness.   Musculoskeletal: Normal range of motion. She exhibits no edema or tenderness.   Neurological: She is alert and oriented to person, place, and time. She is not disoriented. No cranial nerve deficit. Coordination normal. GCS eye subscore is 4. GCS verbal subscore is 5. GCS motor subscore is 6.   Skin: Skin is warm and dry. She is not diaphoretic. No pallor.   Psychiatric: She has a normal mood and affect. Judgment and thought content normal. Her speech is delayed. She is slowed. Cognition and memory are impaired.   Nursing note and vitals reviewed.      Fluids    Intake/Output Summary (Last 24 hours) at 03/08/19 1402  Last data filed at 03/07/19 2000   Gross per 24 hour   Intake              240 ml   Output                0 ml   Net              240 ml       Laboratory                        Imaging  DX-CHEST-PORTABLE (1 VIEW)   Final Result         1.  Hazy interstitial left pulmonary opacities suggests interstitial edema or infiltrate, similar to prior.   2.  Trace left pleural effusion   3.  Hyperexpansion of lungs favors changes of COPD.      DX-CHEST-PORTABLE (1 VIEW)   Final Result         1.  Interstitial infiltrates or edema, stable.   2.  Atherosclerosis      DX-CHEST-PORTABLE (1 VIEW)   Final Result         1.  Interstitial infiltrates or edema.   2.  Atherosclerosis      DX-CHEST-PORTABLE (1 VIEW)   Final Result      Moderate interstitial edema or interstitial lung disease and small left pleural effusion similar to previous.      DX-CHEST-PORTABLE (1 VIEW)   Final Result      Stable interstitial edema and/or interstitial lung disease with probable small pleural fluid      DX-CHEST-PORTABLE (1 VIEW)   Final Result      Ill-defined infrahilar  interstitial opacities, atelectasis versus mild edema. No significant pleural effusions.      DX-CHEST-PORTABLE (1 VIEW)   Final Result      Mild left basilar atelectasis, improved since prior. No new consolidation or large pleural effusions.      DX-CHEST-PORTABLE (1 VIEW)   Final Result      1.  Left basilar opacification may be secondary to atelectasis. Developing pneumonia cannot be excluded.         DX-CHEST-PORTABLE (1 VIEW)   Final Result      1.  Unchanged mild interstitial pulmonary edema   2.  Unchanged bibasilar atelectasis, alveolar edema or pneumonia      DX-CHEST-PORTABLE (1 VIEW)   Final Result      1.  Decreased pulmonary edema   2.  Unchanged bibasilar atelectasis, alveolar edema or pneumonia      DX-CHEST-PORTABLE (1 VIEW)   Final Result      1.  There is diffuse interstitial and alveolar density in the right mid and lower lung zone. This is increased since the previous study. This may represent mild pulmonary edema/consolidation.   2.  Mildly enlarged cardiomediastinal silhouette when compared with the previous chest x-ray.      DX-CHEST-PORTABLE (1 VIEW)   Final Result      Stable mild pulmonary edema.   New left basilar atelectasis.      DX-CHEST-PORTABLE (1 VIEW)   Final Result      Stable chest appearance compared with 11/16.      DX-CHEST-PORTABLE (1 VIEW)   Final Result      No acute cardiopulmonary abnormality identified.      DX-CHEST-PORTABLE (1 VIEW)   Final Result      No acute cardiopulmonary abnormality. No interval change.      DX-CHEST-PORTABLE (1 VIEW)   Final Result      No acute cardiopulmonary disease.      CT-HEAD W/O   Final Result      1.  No evidence of acute intracranial process.      2.  There is again seen interstitial pulmonary edema and bibasilar atelectasis.      CT-HIP W/O PLUS RECONS LEFT   Final Result      1.  No evidence of acute fracture or malalignment. No evidence of hardware failure or complication.   2.  Postsurgical changes of the left femur with broken  screw fragment redemonstrated.   3.  Demineralization.   4.  Scattered colonic diverticula.   5.  Atherosclerosis.   6.  Small fat-containing umbilical hernia.      EC-ECHOCARDIOGRAM COMPLETE W/O CONT   Final Result      DX-HIP-COMPLETE - UNILATERAL 2+ LEFT   Final Result      1.  No definite acute osseous abnormality. In setting of demineralization, an occult fracture is difficult to exclude. If there is strong clinical suspicion, CT or MRI could be obtained for further evaluation.   2.  Postsurgical changes of the left femur with broken screw fragment redemonstrated.      DX-CHEST-PORTABLE (1 VIEW)   Final Result      No acute cardiopulmonary process is seen.      Atherosclerotic plaque.           Assessment/Plan  * ST elevation myocardial infarction involving right coronary artery (HCC)- (present on admission)   Assessment & Plan    Declines any intervention or therapeutic modality continue with comfort care     Comfort measures only status- (present on admission)   Assessment & Plan    Continued     Positive QuantiFERON-TB Gold test   Assessment & Plan      AFB negative        Chronic kidney disease (CKD), stage III (moderate) (HCC)- (present on admission)   Assessment & Plan    Continue comfort care.       Essential hypertension- (present on admission)   Assessment & Plan    We are not measuring blood pressure as the patient is on comfort care     Fall- (present on admission)   Assessment & Plan    Make sure that she is not falling and continue at this point with anxiety management       Advanced directives, counseling/discussion- (present on admission)   Assessment & Plan    Placement is an issue for the patient continued on comfort care    Pending group home acceptance,  assisting  We will need to arrange for a payer source  - will need reeval by hospice at ,  assisting    3/8 per SW needs another , d/t payor source  SW assisting          VTE prophylaxis: None

## 2019-03-08 NOTE — CARE PLAN
Problem: Skin Integrity  Goal: Risk for impaired skin integrity will decrease    Intervention: Implement precautions to protect skin integrity in collaboration with the interdisciplinary team  Heels floated with a pillow, frequent repositioning by patient and staff. Skin intact      Problem: Mobility  Goal: Risk for activity intolerance will decrease  Outcome: PROGRESSING SLOWER THAN EXPECTED  Patient very weak, using commode instead of walking to the bathroom.

## 2019-03-09 PROCEDURE — 770001 HCHG ROOM/CARE - MED/SURG/GYN PRIV*

## 2019-03-09 PROCEDURE — 99231 SBSQ HOSP IP/OBS SF/LOW 25: CPT | Performed by: INTERNAL MEDICINE

## 2019-03-09 ASSESSMENT — ENCOUNTER SYMPTOMS
CHILLS: 0
PALPITATIONS: 0
DIZZINESS: 0
MEMORY LOSS: 1
FEVER: 0
ABDOMINAL PAIN: 0
SHORTNESS OF BREATH: 0
HEADACHES: 0
MYALGIAS: 0
COUGH: 0
NERVOUS/ANXIOUS: 0
FLANK PAIN: 0
WEAKNESS: 1
HEARTBURN: 0

## 2019-03-09 NOTE — PROGRESS NOTES
Two RN skin check with Corinne. All bony prominences intact. Sacrum redness but blanching. Patient is encouraged to shift weights. Float heels and elbows.

## 2019-03-09 NOTE — CARE PLAN
Problem: Skin Integrity  Goal: Risk for impaired skin integrity will decrease  Outcome: PROGRESSING AS EXPECTED  Encouraged to shift weights. Heels floated. Elbows floated.     Problem: Mobility  Goal: Risk for activity intolerance will decrease  Outcome: PROGRESSING AS EXPECTED  Ambulates steady to the bathroom with 1 assist using a walker. Ambulates at least 3 x a day.

## 2019-03-09 NOTE — CARE PLAN
Problem: Safety  Goal: Will remain free from falls  Outcome: PROGRESSING AS EXPECTED   Treaded socks in place, bed in the lowest position, bed alarm on, call light and belongings within reach, pt call for assistance appropriately    Problem: Skin Integrity  Goal: Risk for impaired skin integrity will decrease  Outcome: PROGRESSING AS EXPECTED  Pt skin CDI, nutrition adequate. Moves in bed frequently

## 2019-03-10 PROCEDURE — 99231 SBSQ HOSP IP/OBS SF/LOW 25: CPT | Performed by: INTERNAL MEDICINE

## 2019-03-10 PROCEDURE — 770001 HCHG ROOM/CARE - MED/SURG/GYN PRIV*

## 2019-03-10 ASSESSMENT — ENCOUNTER SYMPTOMS
PALPITATIONS: 0
FLANK PAIN: 0
CHILLS: 0
MEMORY LOSS: 1
SHORTNESS OF BREATH: 0
HEADACHES: 0
NERVOUS/ANXIOUS: 0
NAUSEA: 0
ABDOMINAL PAIN: 0
FEVER: 0
DIAPHORESIS: 0
WEAKNESS: 1
DEPRESSION: 0

## 2019-03-10 NOTE — PROGRESS NOTES
Hospital Medicine Daily Progress Note    Date of Service  3/9/2019    Chief Complaint  89 y.o. female admitted 11/13/2018 with initially with chest pain    Hospital Course    3/4/2019-patient is in the hospital now 111 days.  She initially was admitted because of chest pain and was found to have an ST elevation myocardial infarction that she declined any workup or therapy for.  Patient has been on comfort care and continues at this point with pain management and anxiety optimization.  Placement at this point is an issue for her.      Interval Problem Update  Eating, no new complaints    Consultants/Specialty  None    Code Status  DNR/DNI/comfort care    Disposition  Pending group home acceptance, will discharge to group home on hospice, accepted by [facility]    Review of Systems  Review of Systems   Constitutional: Negative for chills and fever.   HENT: Negative for congestion and hearing loss.    Respiratory: Negative for cough and shortness of breath.    Cardiovascular: Negative for palpitations and leg swelling.   Gastrointestinal: Negative for abdominal pain and heartburn.   Genitourinary: Negative for dysuria and flank pain.   Musculoskeletal: Negative for myalgias.   Neurological: Positive for weakness. Negative for dizziness and headaches.   Psychiatric/Behavioral: Positive for memory loss. The patient is not nervous/anxious.         Physical Exam  Temp:  [36.1 °C (97 °F)-36.4 °C (97.5 °F)] 36.4 °C (97.5 °F)  Pulse:  [66-67] 66  Resp:  [14-16] 14  BP: (107-117)/(57-65) 107/57  SpO2:  [94 %-96 %] 94 %    Physical Exam   Constitutional: She is oriented to person, place, and time. Vital signs are normal. She appears well-nourished. She is cooperative. She does not appear ill. No distress.   Elderly, chronically ill-appearing   HENT:   Head: Normocephalic and atraumatic.   Mouth/Throat: No oropharyngeal exudate.   Eyes: Pupils are equal, round, and reactive to light. EOM are normal.   Neck: Normal range of  motion. No thyromegaly present.   Cardiovascular: Normal rate.    Pulmonary/Chest: Effort normal. No respiratory distress. She has no wheezes. She has no rales.   Abdominal: Soft. She exhibits no distension.   Musculoskeletal: Normal range of motion. She exhibits no edema.   Neurological: She is alert and oriented to person, place, and time. She is not disoriented. No cranial nerve deficit. GCS eye subscore is 4. GCS verbal subscore is 5. GCS motor subscore is 6.   Skin: Skin is warm and dry.   Psychiatric: She has a normal mood and affect. Judgment and thought content normal. Her speech is delayed. She is slowed. Cognition and memory are impaired.   Nursing note and vitals reviewed.      Fluids    Intake/Output Summary (Last 24 hours) at 03/09/19 1701  Last data filed at 03/08/19 2000   Gross per 24 hour   Intake               60 ml   Output                0 ml   Net               60 ml       Laboratory                        Imaging  DX-CHEST-PORTABLE (1 VIEW)   Final Result         1.  Hazy interstitial left pulmonary opacities suggests interstitial edema or infiltrate, similar to prior.   2.  Trace left pleural effusion   3.  Hyperexpansion of lungs favors changes of COPD.      DX-CHEST-PORTABLE (1 VIEW)   Final Result         1.  Interstitial infiltrates or edema, stable.   2.  Atherosclerosis      DX-CHEST-PORTABLE (1 VIEW)   Final Result         1.  Interstitial infiltrates or edema.   2.  Atherosclerosis      DX-CHEST-PORTABLE (1 VIEW)   Final Result      Moderate interstitial edema or interstitial lung disease and small left pleural effusion similar to previous.      DX-CHEST-PORTABLE (1 VIEW)   Final Result      Stable interstitial edema and/or interstitial lung disease with probable small pleural fluid      DX-CHEST-PORTABLE (1 VIEW)   Final Result      Ill-defined infrahilar interstitial opacities, atelectasis versus mild edema. No significant pleural effusions.      DX-CHEST-PORTABLE (1 VIEW)   Final  Result      Mild left basilar atelectasis, improved since prior. No new consolidation or large pleural effusions.      DX-CHEST-PORTABLE (1 VIEW)   Final Result      1.  Left basilar opacification may be secondary to atelectasis. Developing pneumonia cannot be excluded.         DX-CHEST-PORTABLE (1 VIEW)   Final Result      1.  Unchanged mild interstitial pulmonary edema   2.  Unchanged bibasilar atelectasis, alveolar edema or pneumonia      DX-CHEST-PORTABLE (1 VIEW)   Final Result      1.  Decreased pulmonary edema   2.  Unchanged bibasilar atelectasis, alveolar edema or pneumonia      DX-CHEST-PORTABLE (1 VIEW)   Final Result      1.  There is diffuse interstitial and alveolar density in the right mid and lower lung zone. This is increased since the previous study. This may represent mild pulmonary edema/consolidation.   2.  Mildly enlarged cardiomediastinal silhouette when compared with the previous chest x-ray.      DX-CHEST-PORTABLE (1 VIEW)   Final Result      Stable mild pulmonary edema.   New left basilar atelectasis.      DX-CHEST-PORTABLE (1 VIEW)   Final Result      Stable chest appearance compared with 11/16.      DX-CHEST-PORTABLE (1 VIEW)   Final Result      No acute cardiopulmonary abnormality identified.      DX-CHEST-PORTABLE (1 VIEW)   Final Result      No acute cardiopulmonary abnormality. No interval change.      DX-CHEST-PORTABLE (1 VIEW)   Final Result      No acute cardiopulmonary disease.      CT-HEAD W/O   Final Result      1.  No evidence of acute intracranial process.      2.  There is again seen interstitial pulmonary edema and bibasilar atelectasis.      CT-HIP W/O PLUS RECONS LEFT   Final Result      1.  No evidence of acute fracture or malalignment. No evidence of hardware failure or complication.   2.  Postsurgical changes of the left femur with broken screw fragment redemonstrated.   3.  Demineralization.   4.  Scattered colonic diverticula.   5.  Atherosclerosis.   6.  Small  fat-containing umbilical hernia.      EC-ECHOCARDIOGRAM COMPLETE W/O CONT   Final Result      DX-HIP-COMPLETE - UNILATERAL 2+ LEFT   Final Result      1.  No definite acute osseous abnormality. In setting of demineralization, an occult fracture is difficult to exclude. If there is strong clinical suspicion, CT or MRI could be obtained for further evaluation.   2.  Postsurgical changes of the left femur with broken screw fragment redemonstrated.      DX-CHEST-PORTABLE (1 VIEW)   Final Result      No acute cardiopulmonary process is seen.      Atherosclerotic plaque.           Assessment/Plan  * ST elevation myocardial infarction involving right coronary artery (HCC)- (present on admission)   Assessment & Plan    Declines any intervention or therapeutic modality continue with comfort care     Comfort measures only status- (present on admission)   Assessment & Plan    Continued     Positive QuantiFERON-TB Gold test   Assessment & Plan      AFB negative        Chronic kidney disease (CKD), stage III (moderate) (HCC)- (present on admission)   Assessment & Plan    Continue comfort care.       Essential hypertension- (present on admission)   Assessment & Plan    We are not measuring blood pressure as the patient is on comfort care     Fall- (present on admission)   Assessment & Plan    Make sure that she is not falling and continue at this point with anxiety management       Advanced directives, counseling/discussion- (present on admission)   Assessment & Plan    Placement is an issue for the patient continued on comfort care    Pending group home acceptance,  assisting  We will need to arrange for a payer source  - will need reeval by hospice at ,  assisting    3/9 per SW needs another , d/t payor source  SW assisting          VTE prophylaxis: None

## 2019-03-10 NOTE — CARE PLAN
Problem: Safety  Goal: Will remain free from injury  Outcome: PROGRESSING AS EXPECTED  Hourly rounds done. Call light within reach. Needs attended on a timely manner. Treaded socks on when OOB. Educated on the importance of calling for assistance when attempting to be OOB.  BED ALARM ON.    Problem: Venous Thromboembolism (VTW)/Deep Vein Thrombosis (DVT) Prevention:  Goal: Patient will participate in Venous Thrombosis (VTE)/Deep Vein Thrombosis (DVT)Prevention Measures  Outcome: PROGRESSING AS EXPECTED  Encourage early mobilization. Refused SCDs. Education provided.    Problem: Bowel/Gastric:  Goal: Normal bowel function is maintained or improved  Outcome: PROGRESSING AS EXPECTED  Last BM 3/7/19. Refused Stool softeners.    Problem: Mobility  Goal: Risk for activity intolerance will decrease  Outcome: PROGRESSING AS EXPECTED  Up SBA using a 4WW.

## 2019-03-10 NOTE — CARE PLAN
Problem: Safety  Goal: Will remain free from injury  Outcome: PROGRESSING AS EXPECTED  Treaded socks in place, bed in the lowest position, bed alarm on, call light and belongings within reach, pt call for assistance appropriately    Problem: Discharge Barriers/Planning  Goal: Patient's continuum of care needs will be met  Outcome: PROGRESSING AS EXPECTED  Pending placement.

## 2019-03-11 PROCEDURE — 770001 HCHG ROOM/CARE - MED/SURG/GYN PRIV*

## 2019-03-11 PROCEDURE — 99231 SBSQ HOSP IP/OBS SF/LOW 25: CPT | Performed by: INTERNAL MEDICINE

## 2019-03-11 ASSESSMENT — ENCOUNTER SYMPTOMS
HEADACHES: 0
PALPITATIONS: 0
DIZZINESS: 0
FEVER: 0
WEAKNESS: 1
CHILLS: 0
ABDOMINAL PAIN: 0
SHORTNESS OF BREATH: 0
COUGH: 0
MEMORY LOSS: 1

## 2019-03-11 NOTE — CARE PLAN
Problem: Safety  Goal: Will remain free from injury  Outcome: PROGRESSING AS EXPECTED  Hourly rounds done. Call light within reach. Needs attended on a timely manner. Treaded socks on when OOB. Educated on the importance of calling for assistance when attempting to be OOB.  BED ALARM ON.    Problem: Venous Thromboembolism (VTW)/Deep Vein Thrombosis (DVT) Prevention:  Goal: Patient will participate in Venous Thrombosis (VTE)/Deep Vein Thrombosis (DVT)Prevention Measures  Outcome: PROGRESSING AS EXPECTED  Pt refused SCDs. Education provided. Encouraged ambulation.     Problem: Bowel/Gastric:  Goal: Normal bowel function is maintained or improved  Outcome: PROGRESSING AS EXPECTED  Last BM 3/7/19. Refused stool softeners. Pt requested for prune juice.

## 2019-03-11 NOTE — PROGRESS NOTES
Hospital Medicine Daily Progress Note    Date of Service  3/10/2019    Chief Complaint  89 y.o. female admitted 11/13/2018 with initially with chest pain    Hospital Course    3/4/2019-patient is in the hospital now 111 days.  She initially was admitted because of chest pain and was found to have an ST elevation myocardial infarction that she declined any workup or therapy for.  Patient has been on comfort care and continues at this point with pain management and anxiety optimization.  Placement at this point is an issue for her.      Interval Problem Update  No new events    Consultants/Specialty  None    Code Status  DNR/DNI/comfort care    Disposition  Pending group home acceptance, will discharge to group home on hospice, accepted by [facility]    Review of Systems  Review of Systems   Constitutional: Negative for chills, diaphoresis, fever and malaise/fatigue.   HENT: Negative for congestion and tinnitus.    Respiratory: Negative for shortness of breath.    Cardiovascular: Negative for chest pain, palpitations and leg swelling.   Gastrointestinal: Negative for abdominal pain and nausea.   Genitourinary: Negative for flank pain and urgency.   Musculoskeletal: Negative for joint pain.   Neurological: Positive for weakness. Negative for headaches.   Psychiatric/Behavioral: Positive for memory loss. Negative for depression. The patient is not nervous/anxious.         Physical Exam  Temp:  [36.3 °C (97.3 °F)-36.7 °C (98 °F)] 36.7 °C (98 °F)  Pulse:  [65-70] 70  Resp:  [16-17] 17  BP: (105-124)/(55-69) 111/61  SpO2:  [91 %-93 %] 92 %    Physical Exam   Constitutional: Vital signs are normal. She is cooperative. She does not appear ill. No distress.   Elderly, chronically ill-appearing   HENT:   Head: Normocephalic and atraumatic.   Eyes: Pupils are equal, round, and reactive to light. EOM are normal.   Neck: Normal range of motion.   Cardiovascular: Normal rate.    Pulmonary/Chest: Effort normal. No respiratory  distress.   Abdominal: Soft. She exhibits no distension.   Musculoskeletal: Normal range of motion.   Neurological: She is alert. She is not disoriented. GCS eye subscore is 4. GCS verbal subscore is 5. GCS motor subscore is 6.   Skin: Skin is warm and dry. She is not diaphoretic.   Psychiatric: She has a normal mood and affect. Judgment and thought content normal. Her speech is delayed. She is slowed. Cognition and memory are impaired.   Nursing note and vitals reviewed.      Fluids    Intake/Output Summary (Last 24 hours) at 03/10/19 1827  Last data filed at 03/10/19 1804   Gross per 24 hour   Intake              720 ml   Output                0 ml   Net              720 ml       Laboratory                        Imaging  DX-CHEST-PORTABLE (1 VIEW)   Final Result         1.  Hazy interstitial left pulmonary opacities suggests interstitial edema or infiltrate, similar to prior.   2.  Trace left pleural effusion   3.  Hyperexpansion of lungs favors changes of COPD.      DX-CHEST-PORTABLE (1 VIEW)   Final Result         1.  Interstitial infiltrates or edema, stable.   2.  Atherosclerosis      DX-CHEST-PORTABLE (1 VIEW)   Final Result         1.  Interstitial infiltrates or edema.   2.  Atherosclerosis      DX-CHEST-PORTABLE (1 VIEW)   Final Result      Moderate interstitial edema or interstitial lung disease and small left pleural effusion similar to previous.      DX-CHEST-PORTABLE (1 VIEW)   Final Result      Stable interstitial edema and/or interstitial lung disease with probable small pleural fluid      DX-CHEST-PORTABLE (1 VIEW)   Final Result      Ill-defined infrahilar interstitial opacities, atelectasis versus mild edema. No significant pleural effusions.      DX-CHEST-PORTABLE (1 VIEW)   Final Result      Mild left basilar atelectasis, improved since prior. No new consolidation or large pleural effusions.      DX-CHEST-PORTABLE (1 VIEW)   Final Result      1.  Left basilar opacification may be secondary to  atelectasis. Developing pneumonia cannot be excluded.         DX-CHEST-PORTABLE (1 VIEW)   Final Result      1.  Unchanged mild interstitial pulmonary edema   2.  Unchanged bibasilar atelectasis, alveolar edema or pneumonia      DX-CHEST-PORTABLE (1 VIEW)   Final Result      1.  Decreased pulmonary edema   2.  Unchanged bibasilar atelectasis, alveolar edema or pneumonia      DX-CHEST-PORTABLE (1 VIEW)   Final Result      1.  There is diffuse interstitial and alveolar density in the right mid and lower lung zone. This is increased since the previous study. This may represent mild pulmonary edema/consolidation.   2.  Mildly enlarged cardiomediastinal silhouette when compared with the previous chest x-ray.      DX-CHEST-PORTABLE (1 VIEW)   Final Result      Stable mild pulmonary edema.   New left basilar atelectasis.      DX-CHEST-PORTABLE (1 VIEW)   Final Result      Stable chest appearance compared with 11/16.      DX-CHEST-PORTABLE (1 VIEW)   Final Result      No acute cardiopulmonary abnormality identified.      DX-CHEST-PORTABLE (1 VIEW)   Final Result      No acute cardiopulmonary abnormality. No interval change.      DX-CHEST-PORTABLE (1 VIEW)   Final Result      No acute cardiopulmonary disease.      CT-HEAD W/O   Final Result      1.  No evidence of acute intracranial process.      2.  There is again seen interstitial pulmonary edema and bibasilar atelectasis.      CT-HIP W/O PLUS RECONS LEFT   Final Result      1.  No evidence of acute fracture or malalignment. No evidence of hardware failure or complication.   2.  Postsurgical changes of the left femur with broken screw fragment redemonstrated.   3.  Demineralization.   4.  Scattered colonic diverticula.   5.  Atherosclerosis.   6.  Small fat-containing umbilical hernia.      EC-ECHOCARDIOGRAM COMPLETE W/O CONT   Final Result      DX-HIP-COMPLETE - UNILATERAL 2+ LEFT   Final Result      1.  No definite acute osseous abnormality. In setting of  demineralization, an occult fracture is difficult to exclude. If there is strong clinical suspicion, CT or MRI could be obtained for further evaluation.   2.  Postsurgical changes of the left femur with broken screw fragment redemonstrated.      DX-CHEST-PORTABLE (1 VIEW)   Final Result      No acute cardiopulmonary process is seen.      Atherosclerotic plaque.           Assessment/Plan  * ST elevation myocardial infarction involving right coronary artery (HCC)- (present on admission)   Assessment & Plan    Declines any intervention or therapeutic modality continue with comfort care     Comfort measures only status- (present on admission)   Assessment & Plan    Continued     Positive QuantiFERON-TB Gold test   Assessment & Plan      AFB negative        Chronic kidney disease (CKD), stage III (moderate) (HCC)- (present on admission)   Assessment & Plan    Continue comfort care.       Essential hypertension- (present on admission)   Assessment & Plan    We are not measuring blood pressure as the patient is on comfort care     Fall- (present on admission)   Assessment & Plan    Make sure that she is not falling and continue at this point with anxiety management       Advanced directives, counseling/discussion- (present on admission)   Assessment & Plan    Placement is an issue for the patient continued on comfort care    Pending group home acceptance,  assisting  We will need to arrange for a payer source  - will need reeval by hospice at ,  assisting    3/10 per SW needs another , d/t payor source  SW assisting          VTE prophylaxis: None

## 2019-03-11 NOTE — PROGRESS NOTES
Hospital Medicine Daily Progress Note    Date of Service  3/11/2019    Chief Complaint  89 y.o. female admitted 11/13/2018 with initially with chest pain    Hospital Course    3/4/2019-patient is in the hospital now 111 days.  She initially was admitted because of chest pain and was found to have an ST elevation myocardial infarction that she declined any workup or therapy for.  Patient has been on comfort care and continues at this point with pain management and anxiety optimization.  Placement at this point is an issue for her.      Interval Problem Update  Resting, easily arousable    Consultants/Specialty  None    Code Status  DNR/DNI/comfort care    Disposition  Pending group home acceptance, will discharge to group home on hospice, accepted by [facility]    Review of Systems  Review of Systems   Constitutional: Negative for chills and fever.   Respiratory: Negative for cough and shortness of breath.    Cardiovascular: Negative for palpitations and leg swelling.   Gastrointestinal: Negative for abdominal pain.   Musculoskeletal: Negative for joint pain.   Neurological: Positive for weakness. Negative for dizziness and headaches.   Psychiatric/Behavioral: Positive for memory loss.        Physical Exam  Temp:  [36.1 °C (97 °F)-36.6 °C (97.8 °F)] 36.1 °C (97 °F)  Pulse:  [65-71] 65  Resp:  [15-17] 15  BP: ()/(50-84) 96/50  SpO2:  [91 %-93 %] 93 %    Physical Exam   Constitutional: Vital signs are normal. She is cooperative. She does not appear ill. No distress.   Elderly, chronically ill-appearing   HENT:   Head: Normocephalic.   Eyes: Pupils are equal, round, and reactive to light. EOM are normal.   Neck: Normal range of motion.   Cardiovascular: Normal rate.    Pulmonary/Chest: Effort normal.   Abdominal: Soft. She exhibits no distension.   Musculoskeletal: Normal range of motion.   Neurological: She is not disoriented. GCS eye subscore is 4. GCS verbal subscore is 5. GCS motor subscore is 6.    Psychiatric: She has a normal mood and affect. Thought content normal. Her speech is delayed. She is slowed. Cognition and memory are impaired.   Nursing note and vitals reviewed.      Fluids    Intake/Output Summary (Last 24 hours) at 03/11/19 1624  Last data filed at 03/10/19 1804   Gross per 24 hour   Intake              240 ml   Output                0 ml   Net              240 ml       Laboratory                        Imaging  DX-CHEST-PORTABLE (1 VIEW)   Final Result         1.  Hazy interstitial left pulmonary opacities suggests interstitial edema or infiltrate, similar to prior.   2.  Trace left pleural effusion   3.  Hyperexpansion of lungs favors changes of COPD.      DX-CHEST-PORTABLE (1 VIEW)   Final Result         1.  Interstitial infiltrates or edema, stable.   2.  Atherosclerosis      DX-CHEST-PORTABLE (1 VIEW)   Final Result         1.  Interstitial infiltrates or edema.   2.  Atherosclerosis      DX-CHEST-PORTABLE (1 VIEW)   Final Result      Moderate interstitial edema or interstitial lung disease and small left pleural effusion similar to previous.      DX-CHEST-PORTABLE (1 VIEW)   Final Result      Stable interstitial edema and/or interstitial lung disease with probable small pleural fluid      DX-CHEST-PORTABLE (1 VIEW)   Final Result      Ill-defined infrahilar interstitial opacities, atelectasis versus mild edema. No significant pleural effusions.      DX-CHEST-PORTABLE (1 VIEW)   Final Result      Mild left basilar atelectasis, improved since prior. No new consolidation or large pleural effusions.      DX-CHEST-PORTABLE (1 VIEW)   Final Result      1.  Left basilar opacification may be secondary to atelectasis. Developing pneumonia cannot be excluded.         DX-CHEST-PORTABLE (1 VIEW)   Final Result      1.  Unchanged mild interstitial pulmonary edema   2.  Unchanged bibasilar atelectasis, alveolar edema or pneumonia      DX-CHEST-PORTABLE (1 VIEW)   Final Result      1.  Decreased  pulmonary edema   2.  Unchanged bibasilar atelectasis, alveolar edema or pneumonia      DX-CHEST-PORTABLE (1 VIEW)   Final Result      1.  There is diffuse interstitial and alveolar density in the right mid and lower lung zone. This is increased since the previous study. This may represent mild pulmonary edema/consolidation.   2.  Mildly enlarged cardiomediastinal silhouette when compared with the previous chest x-ray.      DX-CHEST-PORTABLE (1 VIEW)   Final Result      Stable mild pulmonary edema.   New left basilar atelectasis.      DX-CHEST-PORTABLE (1 VIEW)   Final Result      Stable chest appearance compared with 11/16.      DX-CHEST-PORTABLE (1 VIEW)   Final Result      No acute cardiopulmonary abnormality identified.      DX-CHEST-PORTABLE (1 VIEW)   Final Result      No acute cardiopulmonary abnormality. No interval change.      DX-CHEST-PORTABLE (1 VIEW)   Final Result      No acute cardiopulmonary disease.      CT-HEAD W/O   Final Result      1.  No evidence of acute intracranial process.      2.  There is again seen interstitial pulmonary edema and bibasilar atelectasis.      CT-HIP W/O PLUS RECONS LEFT   Final Result      1.  No evidence of acute fracture or malalignment. No evidence of hardware failure or complication.   2.  Postsurgical changes of the left femur with broken screw fragment redemonstrated.   3.  Demineralization.   4.  Scattered colonic diverticula.   5.  Atherosclerosis.   6.  Small fat-containing umbilical hernia.      EC-ECHOCARDIOGRAM COMPLETE W/O CONT   Final Result      DX-HIP-COMPLETE - UNILATERAL 2+ LEFT   Final Result      1.  No definite acute osseous abnormality. In setting of demineralization, an occult fracture is difficult to exclude. If there is strong clinical suspicion, CT or MRI could be obtained for further evaluation.   2.  Postsurgical changes of the left femur with broken screw fragment redemonstrated.      DX-CHEST-PORTABLE (1 VIEW)   Final Result      No acute  cardiopulmonary process is seen.      Atherosclerotic plaque.           Assessment/Plan  * ST elevation myocardial infarction involving right coronary artery (HCC)- (present on admission)   Assessment & Plan    Declines any intervention or therapeutic modality continue with comfort care     Comfort measures only status- (present on admission)   Assessment & Plan    Continued     Positive QuantiFERON-TB Gold test   Assessment & Plan      AFB negative        Chronic kidney disease (CKD), stage III (moderate) (HCC)- (present on admission)   Assessment & Plan    Continue comfort care.       Essential hypertension- (present on admission)   Assessment & Plan    We are not measuring blood pressure as the patient is on comfort care     Fall- (present on admission)   Assessment & Plan    Make sure that she is not falling and continue at this point with anxiety management       Advanced directives, counseling/discussion- (present on admission)   Assessment & Plan    Placement is an issue for the patient continued on comfort care    Pending group home acceptance,  assisting  We will need to arrange for a payer source  - will need reeval by hospice at ,  assisting    3/11 per SW needs another , d/t payor source  SW assisting          VTE prophylaxis: None

## 2019-03-11 NOTE — PROGRESS NOTES
Nirmal Score 17   · 2 RN skin check complete   · Devices in place NONE.  · Skin assessed under devices N/A.  · Findings Bilateral heels redness noted, intact and blanching. Sacrum redness noted, blanching.  · The following interventions in place heels floated on pillows. Refusing mepilex and waffle overlay despite education

## 2019-03-12 PROCEDURE — 99231 SBSQ HOSP IP/OBS SF/LOW 25: CPT | Performed by: HOSPITALIST

## 2019-03-12 PROCEDURE — 700102 HCHG RX REV CODE 250 W/ 637 OVERRIDE(OP): Performed by: HOSPITALIST

## 2019-03-12 PROCEDURE — A9270 NON-COVERED ITEM OR SERVICE: HCPCS | Performed by: HOSPITALIST

## 2019-03-12 PROCEDURE — 770001 HCHG ROOM/CARE - MED/SURG/GYN PRIV*

## 2019-03-12 RX ADMIN — SENNOSIDES AND DOCUSATE SODIUM 2 TABLET: 8.6; 5 TABLET ORAL at 05:16

## 2019-03-12 RX ADMIN — FUROSEMIDE 20 MG: 20 TABLET ORAL at 05:16

## 2019-03-12 ASSESSMENT — ENCOUNTER SYMPTOMS
FEVER: 0
BACK PAIN: 0
HALLUCINATIONS: 0
WEAKNESS: 0
NECK PAIN: 0
COUGH: 0
SHORTNESS OF BREATH: 0
MYALGIAS: 0

## 2019-03-12 NOTE — CARE PLAN
Problem: Safety  Goal: Will remain free from injury  Outcome: PROGRESSING AS EXPECTED  Treaded socks in place, bed in the lowest position, bed alarm on, call light and belongings within reach, pt call for assistance appropriately    Problem: Mobility  Goal: Risk for activity intolerance will decrease  Outcome: PROGRESSING AS EXPECTED  Pt. Up to bedside commode standby assist. Pt. Is encouraged to get up to chair for meals.

## 2019-03-12 NOTE — CARE PLAN
Problem: Safety  Goal: Will remain free from injury  Outcome: PROGRESSING AS EXPECTED  Bed in the lowest locked position. Call light within reach. Bed alarm in use. Hourly rounding in place.     Problem: Discharge Barriers/Planning  Goal: Patient's continuum of care needs will be met  Outcome: PROGRESSING AS EXPECTED  Plan Group home placement     Problem: Mobility  Goal: Risk for activity intolerance will decrease  Outcome: PROGRESSING SLOWER THAN EXPECTED  Pt ambulated to commode standby assist, pt has generalized weakness

## 2019-03-12 NOTE — DISCHARGE PLANNING
LSW spoke with Ericka at [facility] who stated [facility] is open to taking the Pt however cost is $3500 and Pt makes $1842 and dtr could contribute $600 with a remaining cost of $1058. LSW contacted MARINA Craig at [employer] [phone] asking if she thought the Pt would get the  waiver. MARINA Craig said she did not know and that she would have to assess the Pt once she is in a  and get necessary paperwork and then submit to Medicaid which will take between 2-3 months.

## 2019-03-13 ENCOUNTER — PATIENT OUTREACH (OUTPATIENT)
Dept: HEALTH INFORMATION MANAGEMENT | Facility: OTHER | Age: 84
End: 2019-03-13

## 2019-03-13 PROCEDURE — 99231 SBSQ HOSP IP/OBS SF/LOW 25: CPT | Performed by: HOSPITALIST

## 2019-03-13 PROCEDURE — 770001 HCHG ROOM/CARE - MED/SURG/GYN PRIV*

## 2019-03-13 ASSESSMENT — ENCOUNTER SYMPTOMS
HALLUCINATIONS: 0
MYALGIAS: 0
ABDOMINAL PAIN: 0
COUGH: 0
FEVER: 0
NECK PAIN: 0
WEAKNESS: 0
VOMITING: 0
DIARRHEA: 0
SHORTNESS OF BREATH: 0
BACK PAIN: 0

## 2019-03-13 NOTE — PROGRESS NOTES
Nirmal Score 17   · 2 RN skin check complete   · Findings Bilateral heels redness noted, intact and blanching. Sacrum redness noted, blanching.  · The following interventions in place heels floated on pillows. Refusing mepilex and waffle overlay. Education provided.

## 2019-03-13 NOTE — PROGRESS NOTES
Pt remains admitted at [facility], per chart review inpatient side currently pursing discharge to group home and have placed referral to [facility] for the [facility] waiver program.     Plan:  · MSW will continue to monitor and follow up with inpatient staff as needed for collaboration and continuity of care.

## 2019-03-13 NOTE — PROGRESS NOTES
[facility] Medicine Daily Progress Note    Date of Service  3/12/2019    Chief Complaint  89 y.o. female admitted 11/13/2018 with fall.    Hospital Course      Patient is a pleasant 89 year old woman with history of HTN who presented following a fall and was found to have an acute ST elevation MI.  After discussion with her family, she was placed on comfort care.     Interval Problem Update    Alert, calm cooperative this morning no signs of distress.  Discussed plan of care with case management-they will continue to pursue  group home options.    Consultants/Specialty  Palliative care  Critical care, cardiology    Code Status  Comfort care    Disposition    Anticipate group home placement when financial arrangements completed    Review of Systems  Review of Systems   Constitutional: Negative for fever.   Respiratory: Negative for cough and shortness of breath.    Cardiovascular: Negative for chest pain.   Musculoskeletal: Negative for back pain, myalgias and neck pain.   Neurological: Negative for weakness.   Psychiatric/Behavioral: Negative for hallucinations.        Physical Exam  Temp:  [36.7 °C (98 °F)-36.8 °C (98.3 °F)] 36.8 °C (98.3 °F)  Pulse:  [7-77] 7  Resp:  [14-16] 14  BP: (105-119)/(55-64) 112/55  SpO2:  [92 %-96 %] 96 %    Physical Exam   Constitutional: No distress.   Frail, alert, calm, cooperative, mildly confused   HENT:   Head: Normocephalic and atraumatic.   Eyes: EOM are normal. Right eye exhibits no discharge. Left eye exhibits no discharge.   Cardiovascular: Normal rate and regular rhythm.    Pulmonary/Chest: She has no wheezes. She has no rales.   Abdominal: Soft. She exhibits no distension. There is no tenderness.   Musculoskeletal: She exhibits no edema or tenderness.   Neurological: She is alert.   O x 2.   No gross focal weakness   Skin: Skin is warm and dry. She is not diaphoretic. No pallor.   Psychiatric: She is slowed. Cognition and memory are impaired.   Vitals  reviewed.      Fluids    Intake/Output Summary (Last 24 hours) at 03/12/19 1835  Last data filed at 03/12/19 1800   Gross per 24 hour   Intake              920 ml   Output                0 ml   Net              920 ml       Laboratory                        Imaging  DX-CHEST-PORTABLE (1 VIEW)   Final Result         1.  Hazy interstitial left pulmonary opacities suggests interstitial edema or infiltrate, similar to prior.   2.  Trace left pleural effusion   3.  Hyperexpansion of lungs favors changes of COPD.      DX-CHEST-PORTABLE (1 VIEW)   Final Result         1.  Interstitial infiltrates or edema, stable.   2.  Atherosclerosis      DX-CHEST-PORTABLE (1 VIEW)   Final Result         1.  Interstitial infiltrates or edema.   2.  Atherosclerosis      DX-CHEST-PORTABLE (1 VIEW)   Final Result      Moderate interstitial edema or interstitial lung disease and small left pleural effusion similar to previous.      DX-CHEST-PORTABLE (1 VIEW)   Final Result      Stable interstitial edema and/or interstitial lung disease with probable small pleural fluid      DX-CHEST-PORTABLE (1 VIEW)   Final Result      Ill-defined infrahilar interstitial opacities, atelectasis versus mild edema. No significant pleural effusions.      DX-CHEST-PORTABLE (1 VIEW)   Final Result      Mild left basilar atelectasis, improved since prior. No new consolidation or large pleural effusions.      DX-CHEST-PORTABLE (1 VIEW)   Final Result      1.  Left basilar opacification may be secondary to atelectasis. Developing pneumonia cannot be excluded.         DX-CHEST-PORTABLE (1 VIEW)   Final Result      1.  Unchanged mild interstitial pulmonary edema   2.  Unchanged bibasilar atelectasis, alveolar edema or pneumonia      DX-CHEST-PORTABLE (1 VIEW)   Final Result      1.  Decreased pulmonary edema   2.  Unchanged bibasilar atelectasis, alveolar edema or pneumonia      DX-CHEST-PORTABLE (1 VIEW)   Final Result      1.  There is diffuse interstitial and  alveolar density in the right mid and lower lung zone. This is increased since the previous study. This may represent mild pulmonary edema/consolidation.   2.  Mildly enlarged cardiomediastinal silhouette when compared with the previous chest x-ray.      DX-CHEST-PORTABLE (1 VIEW)   Final Result      Stable mild pulmonary edema.   New left basilar atelectasis.      DX-CHEST-PORTABLE (1 VIEW)   Final Result      Stable chest appearance compared with 11/16.      DX-CHEST-PORTABLE (1 VIEW)   Final Result      No acute cardiopulmonary abnormality identified.      DX-CHEST-PORTABLE (1 VIEW)   Final Result      No acute cardiopulmonary abnormality. No interval change.      DX-CHEST-PORTABLE (1 VIEW)   Final Result      No acute cardiopulmonary disease.      CT-HEAD W/O   Final Result      1.  No evidence of acute intracranial process.      2.  There is again seen interstitial pulmonary edema and bibasilar atelectasis.      CT-HIP W/O PLUS RECONS LEFT   Final Result      1.  No evidence of acute fracture or malalignment. No evidence of hardware failure or complication.   2.  Postsurgical changes of the left femur with broken screw fragment redemonstrated.   3.  Demineralization.   4.  Scattered colonic diverticula.   5.  Atherosclerosis.   6.  Small fat-containing umbilical hernia.      EC-ECHOCARDIOGRAM COMPLETE W/O CONT   Final Result      DX-HIP-COMPLETE - UNILATERAL 2+ LEFT   Final Result      1.  No definite acute osseous abnormality. In setting of demineralization, an occult fracture is difficult to exclude. If there is strong clinical suspicion, CT or MRI could be obtained for further evaluation.   2.  Postsurgical changes of the left femur with broken screw fragment redemonstrated.      DX-CHEST-PORTABLE (1 VIEW)   Final Result      No acute cardiopulmonary process is seen.      Atherosclerotic plaque.           Assessment/Plan  * ST elevation myocardial infarction involving right coronary artery (HCC)- (present on  admission)   Assessment & Plan    Declines any intervention or therapeutic modality continue with comfort care     Comfort measures only status- (present on admission)   Assessment & Plan    No signs of distress, continue comfort care options     Advanced directives, counseling/discussion- (present on admission)   Assessment & Plan    Placement is an issue for the patient continued on comfort care  Pending group home acceptance,  assisting  Patient does have life insurance did not want to relinquish for her half-way care cost.  Looking into financial options to fund a  group home.  Case management pursuing other group home options           Positive QuantiFERON-TB Gold test   Assessment & Plan      AFB negative        Chronic kidney disease (CKD), stage III (moderate) (HCC)- (present on admission)   Assessment & Plan    Continue comfort care.       Essential hypertension- (present on admission)   Assessment & Plan    We are not measuring blood pressure as the patient is on comfort care     Fall- (present on admission)   Assessment & Plan    Make sure that she is not falling and continue at this point with anxiety management            VTE prophylaxis: Comfort care        Discussed with case management plan of care

## 2019-03-13 NOTE — CARE PLAN
Problem: Safety  Goal: Will remain free from injury  Outcome: PROGRESSING AS EXPECTED  Treaded socks in place, bed in the lowest position, bed alarm on, call light and belongings within reach, pt call for assistance appropriately    Problem: Mobility  Goal: Risk for activity intolerance will decrease  Outcome: PROGRESSING AS EXPECTED  Pt. Up to commode, standby assist with FFW. Pt. Is encouraged to get out of bed to chair for meals.

## 2019-03-13 NOTE — PROGRESS NOTES
[facility] Medicine Daily Progress Note    Date of Service  3/13/2019    Chief Complaint  89 y.o. female admitted 11/13/2018 with fall.    Hospital Course      Patient is a pleasant 89 year old woman with history of HTN who presented following a fall and was found to have an acute ST elevation MI.  After discussion with her family, she was placed on comfort care.     Interval Problem Update    No new complaints. No signs of pain.     Consultants/Specialty  Palliative care  Critical care, cardiology    Code Status  Comfort care    Disposition    Anticipate group home placement when financial arrangements completed    Review of Systems  Review of Systems   Constitutional: Negative for fever.   Respiratory: Negative for cough and shortness of breath.    Cardiovascular: Negative for chest pain.   Gastrointestinal: Negative for abdominal pain, diarrhea and vomiting.   Musculoskeletal: Negative for back pain, myalgias and neck pain.   Neurological: Negative for weakness.   Psychiatric/Behavioral: Negative for hallucinations.        Physical Exam  Temp:  [36.1 °C (97 °F)-36.8 °C (98.3 °F)] 36.6 °C (97.9 °F)  Pulse:  [68-75] 68  Resp:  [14-17] 16  BP: (102-131)/(43-68) 107/68  SpO2:  [90 %-96 %] 96 %    Physical Exam   Constitutional: No distress.   Fail , alert, calm, cooperative.    HENT:   Head: Normocephalic and atraumatic.   Eyes: EOM are normal. No scleral icterus.   Cardiovascular: Normal rate and regular rhythm.    No murmur heard.  Pulmonary/Chest: No respiratory distress. She has no wheezes. She has no rales.   Abdominal: Soft. She exhibits no distension. There is no tenderness.   Musculoskeletal: She exhibits no edema or tenderness.   Neurological: She is alert.   O x 2.   No gross focal weakness   Skin: Skin is warm and dry. She is not diaphoretic. No pallor.   Psychiatric: She is slowed. Cognition and memory are impaired.   Vitals reviewed.      Fluids    Intake/Output Summary (Last 24 hours) at 03/13/19  1558  Last data filed at 03/13/19 0855   Gross per 24 hour   Intake              480 ml   Output                0 ml   Net              480 ml       Laboratory                        Imaging  DX-CHEST-PORTABLE (1 VIEW)   Final Result         1.  Hazy interstitial left pulmonary opacities suggests interstitial edema or infiltrate, similar to prior.   2.  Trace left pleural effusion   3.  Hyperexpansion of lungs favors changes of COPD.      DX-CHEST-PORTABLE (1 VIEW)   Final Result         1.  Interstitial infiltrates or edema, stable.   2.  Atherosclerosis      DX-CHEST-PORTABLE (1 VIEW)   Final Result         1.  Interstitial infiltrates or edema.   2.  Atherosclerosis      DX-CHEST-PORTABLE (1 VIEW)   Final Result      Moderate interstitial edema or interstitial lung disease and small left pleural effusion similar to previous.      DX-CHEST-PORTABLE (1 VIEW)   Final Result      Stable interstitial edema and/or interstitial lung disease with probable small pleural fluid      DX-CHEST-PORTABLE (1 VIEW)   Final Result      Ill-defined infrahilar interstitial opacities, atelectasis versus mild edema. No significant pleural effusions.      DX-CHEST-PORTABLE (1 VIEW)   Final Result      Mild left basilar atelectasis, improved since prior. No new consolidation or large pleural effusions.      DX-CHEST-PORTABLE (1 VIEW)   Final Result      1.  Left basilar opacification may be secondary to atelectasis. Developing pneumonia cannot be excluded.         DX-CHEST-PORTABLE (1 VIEW)   Final Result      1.  Unchanged mild interstitial pulmonary edema   2.  Unchanged bibasilar atelectasis, alveolar edema or pneumonia      DX-CHEST-PORTABLE (1 VIEW)   Final Result      1.  Decreased pulmonary edema   2.  Unchanged bibasilar atelectasis, alveolar edema or pneumonia      DX-CHEST-PORTABLE (1 VIEW)   Final Result      1.  There is diffuse interstitial and alveolar density in the right mid and lower lung zone. This is increased since  the previous study. This may represent mild pulmonary edema/consolidation.   2.  Mildly enlarged cardiomediastinal silhouette when compared with the previous chest x-ray.      DX-CHEST-PORTABLE (1 VIEW)   Final Result      Stable mild pulmonary edema.   New left basilar atelectasis.      DX-CHEST-PORTABLE (1 VIEW)   Final Result      Stable chest appearance compared with 11/16.      DX-CHEST-PORTABLE (1 VIEW)   Final Result      No acute cardiopulmonary abnormality identified.      DX-CHEST-PORTABLE (1 VIEW)   Final Result      No acute cardiopulmonary abnormality. No interval change.      DX-CHEST-PORTABLE (1 VIEW)   Final Result      No acute cardiopulmonary disease.      CT-HEAD W/O   Final Result      1.  No evidence of acute intracranial process.      2.  There is again seen interstitial pulmonary edema and bibasilar atelectasis.      CT-HIP W/O PLUS RECONS LEFT   Final Result      1.  No evidence of acute fracture or malalignment. No evidence of hardware failure or complication.   2.  Postsurgical changes of the left femur with broken screw fragment redemonstrated.   3.  Demineralization.   4.  Scattered colonic diverticula.   5.  Atherosclerosis.   6.  Small fat-containing umbilical hernia.      EC-ECHOCARDIOGRAM COMPLETE W/O CONT   Final Result      DX-HIP-COMPLETE - UNILATERAL 2+ LEFT   Final Result      1.  No definite acute osseous abnormality. In setting of demineralization, an occult fracture is difficult to exclude. If there is strong clinical suspicion, CT or MRI could be obtained for further evaluation.   2.  Postsurgical changes of the left femur with broken screw fragment redemonstrated.      DX-CHEST-PORTABLE (1 VIEW)   Final Result      No acute cardiopulmonary process is seen.      Atherosclerotic plaque.           Assessment/Plan  * Advanced directives, counseling/discussion- (present on admission)   Assessment & Plan    Placement is an issue for the patient continued on comfort care  Pending  group home acceptance,  assisting  Patient does have life insurance did not want to relinquish for her group home care cost.    Case management pursuing other group home options and funding assistance.            Comfort measures only status- (present on admission)   Assessment & Plan    No signs of distress, continue comfort care options     ST elevation myocardial infarction involving right coronary artery (HCC)- (present on admission)   Assessment & Plan    Declines any intervention or therapeutic modality continue with comfort care     Positive QuantiFERON-TB Gold test   Assessment & Plan      AFB negative        Chronic kidney disease (CKD), stage III (moderate) (HCC)- (present on admission)   Assessment & Plan    Continue comfort care.       Essential hypertension- (present on admission)   Assessment & Plan    We are not measuring blood pressure as the patient is on comfort care     Fall- (present on admission)   Assessment & Plan    Make sure that she is not falling and continue at this point with anxiety management            VTE prophylaxis: Comfort care      Discussed with multi disciplinary team plan of care.

## 2019-03-14 PROCEDURE — A9270 NON-COVERED ITEM OR SERVICE: HCPCS | Performed by: HOSPITALIST

## 2019-03-14 PROCEDURE — 700102 HCHG RX REV CODE 250 W/ 637 OVERRIDE(OP): Performed by: HOSPITALIST

## 2019-03-14 PROCEDURE — 99231 SBSQ HOSP IP/OBS SF/LOW 25: CPT | Performed by: HOSPITALIST

## 2019-03-14 PROCEDURE — 770001 HCHG ROOM/CARE - MED/SURG/GYN PRIV*

## 2019-03-14 RX ADMIN — FUROSEMIDE 20 MG: 20 TABLET ORAL at 06:00

## 2019-03-14 ASSESSMENT — ENCOUNTER SYMPTOMS
COUGH: 0
VOMITING: 0
HEADACHES: 0
MYALGIAS: 0
DIZZINESS: 0
DIAPHORESIS: 0
STRIDOR: 0
NECK PAIN: 0
SHORTNESS OF BREATH: 0
CHILLS: 0
ABDOMINAL PAIN: 0
DIARRHEA: 0
FOCAL WEAKNESS: 0
BACK PAIN: 0
FEVER: 0
WEAKNESS: 1

## 2019-03-14 NOTE — CARE PLAN
Problem: Safety  Goal: Will remain free from falls    Intervention: Implement fall precautions  Bed alarm in use, shoes worn when ambulating, FWW in use, patient calls appropriately. Safety measures in place      Problem: Skin Integrity  Goal: Risk for impaired skin integrity will decrease    Intervention: Implement precautions to protect skin integrity in collaboration with the interdisciplinary team  Feet elevated with 2 pillows intermittently. Patient continues to refuse waffle mattress as well as Mepilex despite education

## 2019-03-15 PROCEDURE — 770001 HCHG ROOM/CARE - MED/SURG/GYN PRIV*

## 2019-03-15 PROCEDURE — 99231 SBSQ HOSP IP/OBS SF/LOW 25: CPT | Performed by: HOSPITALIST

## 2019-03-15 PROCEDURE — 700102 HCHG RX REV CODE 250 W/ 637 OVERRIDE(OP): Performed by: HOSPITALIST

## 2019-03-15 PROCEDURE — A9270 NON-COVERED ITEM OR SERVICE: HCPCS | Performed by: HOSPITALIST

## 2019-03-15 RX ADMIN — SENNOSIDES AND DOCUSATE SODIUM 2 TABLET: 8.6; 5 TABLET ORAL at 17:27

## 2019-03-15 ASSESSMENT — ENCOUNTER SYMPTOMS
COUGH: 0
WEAKNESS: 1
FOCAL WEAKNESS: 0
SHORTNESS OF BREATH: 0
VOMITING: 0
FEVER: 0
CHILLS: 0
ABDOMINAL PAIN: 0

## 2019-03-15 NOTE — CARE PLAN
Problem: Safety  Goal: Will remain free from injury  Outcome: PROGRESSING AS EXPECTED  Bed in the lowest locked position. Call light within reach. Bed alarm in use. Hourly rounding in place.     Problem: Skin Integrity  Goal: Risk for impaired skin integrity will decrease  Outcome: PROGRESSING AS EXPECTED  Pt encourage repositioning in bed. Refusing waffle overlay and mepliex despite education    Problem: Mobility  Goal: Risk for activity intolerance will decrease  Outcome: PROGRESSING AS EXPECTED  Pt encouraged up to chair for all meals

## 2019-03-15 NOTE — PROGRESS NOTES
[facility] Medicine Daily Progress Note    Date of Service  3/14/2019    Chief Complaint  89 y.o. female admitted 11/13/2018 with fall.    Hospital Course      Patient is a pleasant 89 year old woman with history of HTN who presented following a fall and was found to have an acute ST elevation MI.  After discussion with her family, she was placed on comfort care.     Interval Problem Update    Tolerating diet.  Has been comfortable, resting in bed most of day.  No new questions.    Consultants/Specialty  Palliative care  Critical care, cardiology    Code Status  Comfort care    Disposition    Anticipate group home placement when financial arrangements completed    Review of Systems  Review of Systems   Constitutional: Negative for chills, diaphoresis and fever.   Respiratory: Negative for cough, shortness of breath and stridor.    Cardiovascular: Negative for chest pain.   Gastrointestinal: Negative for abdominal pain, diarrhea and vomiting.   Musculoskeletal: Negative for back pain, joint pain, myalgias and neck pain.   Neurological: Positive for weakness. Negative for dizziness, focal weakness and headaches.        Physical Exam  Temp:  [36.1 °C (97 °F)] 36.1 °C (97 °F)  Pulse:  [72] 72  Resp:  [17] 17  BP: (101)/(49) 101/49  SpO2:  [94 %] 94 %    Physical Exam   Constitutional: No distress.   HENT:   Head: Normocephalic and atraumatic.   Cardiovascular: Normal rate and regular rhythm.    No murmur heard.  Pulmonary/Chest: She has no wheezes. She has no rales.   Abdominal: Soft. She exhibits no distension. There is no tenderness.   Musculoskeletal: She exhibits no edema or tenderness.   Neurological: She is alert.   O x 2.   No gross focal weakness   Skin: Skin is warm and dry. She is not diaphoretic. No pallor.   Psychiatric: She is slowed. Cognition and memory are impaired.   Vitals reviewed.      Fluids    Intake/Output Summary (Last 24 hours) at 03/14/19 1712  Last data filed at 03/14/19 1455   Gross per 24 hour    Intake              600 ml   Output                0 ml   Net              600 ml       Laboratory                        Imaging  DX-CHEST-PORTABLE (1 VIEW)   Final Result         1.  Hazy interstitial left pulmonary opacities suggests interstitial edema or infiltrate, similar to prior.   2.  Trace left pleural effusion   3.  Hyperexpansion of lungs favors changes of COPD.      DX-CHEST-PORTABLE (1 VIEW)   Final Result         1.  Interstitial infiltrates or edema, stable.   2.  Atherosclerosis      DX-CHEST-PORTABLE (1 VIEW)   Final Result         1.  Interstitial infiltrates or edema.   2.  Atherosclerosis      DX-CHEST-PORTABLE (1 VIEW)   Final Result      Moderate interstitial edema or interstitial lung disease and small left pleural effusion similar to previous.      DX-CHEST-PORTABLE (1 VIEW)   Final Result      Stable interstitial edema and/or interstitial lung disease with probable small pleural fluid      DX-CHEST-PORTABLE (1 VIEW)   Final Result      Ill-defined infrahilar interstitial opacities, atelectasis versus mild edema. No significant pleural effusions.      DX-CHEST-PORTABLE (1 VIEW)   Final Result      Mild left basilar atelectasis, improved since prior. No new consolidation or large pleural effusions.      DX-CHEST-PORTABLE (1 VIEW)   Final Result      1.  Left basilar opacification may be secondary to atelectasis. Developing pneumonia cannot be excluded.         DX-CHEST-PORTABLE (1 VIEW)   Final Result      1.  Unchanged mild interstitial pulmonary edema   2.  Unchanged bibasilar atelectasis, alveolar edema or pneumonia      DX-CHEST-PORTABLE (1 VIEW)   Final Result      1.  Decreased pulmonary edema   2.  Unchanged bibasilar atelectasis, alveolar edema or pneumonia      DX-CHEST-PORTABLE (1 VIEW)   Final Result      1.  There is diffuse interstitial and alveolar density in the right mid and lower lung zone. This is increased since the previous study. This may represent mild pulmonary  edema/consolidation.   2.  Mildly enlarged cardiomediastinal silhouette when compared with the previous chest x-ray.      DX-CHEST-PORTABLE (1 VIEW)   Final Result      Stable mild pulmonary edema.   New left basilar atelectasis.      DX-CHEST-PORTABLE (1 VIEW)   Final Result      Stable chest appearance compared with 11/16.      DX-CHEST-PORTABLE (1 VIEW)   Final Result      No acute cardiopulmonary abnormality identified.      DX-CHEST-PORTABLE (1 VIEW)   Final Result      No acute cardiopulmonary abnormality. No interval change.      DX-CHEST-PORTABLE (1 VIEW)   Final Result      No acute cardiopulmonary disease.      CT-HEAD W/O   Final Result      1.  No evidence of acute intracranial process.      2.  There is again seen interstitial pulmonary edema and bibasilar atelectasis.      CT-HIP W/O PLUS RECONS LEFT   Final Result      1.  No evidence of acute fracture or malalignment. No evidence of hardware failure or complication.   2.  Postsurgical changes of the left femur with broken screw fragment redemonstrated.   3.  Demineralization.   4.  Scattered colonic diverticula.   5.  Atherosclerosis.   6.  Small fat-containing umbilical hernia.      EC-ECHOCARDIOGRAM COMPLETE W/O CONT   Final Result      DX-HIP-COMPLETE - UNILATERAL 2+ LEFT   Final Result      1.  No definite acute osseous abnormality. In setting of demineralization, an occult fracture is difficult to exclude. If there is strong clinical suspicion, CT or MRI could be obtained for further evaluation.   2.  Postsurgical changes of the left femur with broken screw fragment redemonstrated.      DX-CHEST-PORTABLE (1 VIEW)   Final Result      No acute cardiopulmonary process is seen.      Atherosclerotic plaque.           Assessment/Plan  * Advanced directives, counseling/discussion- (present on admission)   Assessment & Plan    Placement is an issue for the patient continued on comfort care  Pending group home acceptance,  assisting  Patient  does have life insurance did not want to relinquish for her correction care cost.     Discussed with case management, [facility] looking into more affordable group home options       Comfort measures only status- (present on admission)   Assessment & Plan    No signs of distress, continue comfort care options     ST elevation myocardial infarction involving right coronary artery (HCC)- (present on admission)   Assessment & Plan    Declines any intervention or therapeutic modality continue with comfort care     Positive QuantiFERON-TB Gold test   Assessment & Plan      AFB negative        Chronic kidney disease (CKD), stage III (moderate) (HCC)- (present on admission)   Assessment & Plan    Continue comfort care.       Essential hypertension- (present on admission)   Assessment & Plan    We are not measuring blood pressure as the patient is on comfort care     Fall- (present on admission)   Assessment & Plan    Make sure that she is not falling and continue at this point with anxiety management            VTE prophylaxis: Comfort care      Discussed case management plan of care

## 2019-03-16 PROCEDURE — 770001 HCHG ROOM/CARE - MED/SURG/GYN PRIV*

## 2019-03-16 PROCEDURE — 99231 SBSQ HOSP IP/OBS SF/LOW 25: CPT | Performed by: HOSPITALIST

## 2019-03-16 ASSESSMENT — ENCOUNTER SYMPTOMS
ABDOMINAL PAIN: 0
PALPITATIONS: 0
HEADACHES: 0
DIZZINESS: 0
VOMITING: 0
FEVER: 0
COUGH: 0
FOCAL WEAKNESS: 0
WEAKNESS: 1
SHORTNESS OF BREATH: 0
CHILLS: 0

## 2019-03-16 NOTE — CARE PLAN
Problem: Safety  Goal: Will remain free from injury  Outcome: PROGRESSING AS EXPECTED  Bed alarm in place, calls for assistance  appropriately    Problem: Skin Integrity  Goal: Risk for impaired skin integrity will decrease  Outcome: PROGRESSING SLOWER THAN EXPECTED  Refuses to turn and reposition every 2 hours, provided educations and encouraging frequent turning,

## 2019-03-16 NOTE — PROGRESS NOTES
[facility] Medicine Daily Progress Note    Date of Service  3/15/2019    Chief Complaint  89 y.o. female admitted 11/13/2018 with fall.    Hospital Course      Patient is a pleasant 89 year old woman with history of HTN who presented following a fall and was found to have an acute ST elevation MI.  After discussion with her family, she was placed on comfort care.     Interval Problem Update    Good intake with assist. No signs of discomfort. Discussed disposition w multi disciplinary team- Hospice looking for group home.     Consultants/Specialty  Palliative care  Critical care, cardiology    Code Status  Comfort care    Disposition    Anticipate group home placement when financial arrangements completed    Review of Systems  Review of Systems   Constitutional: Negative for chills and fever.   Respiratory: Negative for cough and shortness of breath.    Cardiovascular: Negative for chest pain and leg swelling.   Gastrointestinal: Negative for abdominal pain and vomiting.   Neurological: Positive for weakness. Negative for focal weakness.        Physical Exam  Temp:  [36.2 °C (97.2 °F)-36.7 °C (98 °F)] 36.2 °C (97.2 °F)  Pulse:  [71-77] 77  Resp:  [15-17] 15  BP: ()/(62-74) 99/62  SpO2:  [92 %-98 %] 92 %    Physical Exam   Constitutional: No distress.   Alert, mildly confused, NAD    HENT:   Head: Normocephalic and atraumatic.   Cardiovascular: Normal rate and regular rhythm.    No murmur heard.  Pulmonary/Chest: She has no wheezes. She has no rales.   Abdominal: Soft. She exhibits no distension. There is no tenderness.   Musculoskeletal: She exhibits no edema or tenderness.   Neurological: She is alert.   O x 2.   No gross focal weakness   Skin: Skin is warm and dry. She is not diaphoretic. No pallor.   Psychiatric: She is slowed. Cognition and memory are impaired.   Vitals reviewed.      Fluids    Intake/Output Summary (Last 24 hours) at 03/15/19 1731  Last data filed at 03/15/19 0800   Gross per 24 hour    Intake              600 ml   Output                0 ml   Net              600 ml       Laboratory                        Imaging  DX-CHEST-PORTABLE (1 VIEW)   Final Result         1.  Hazy interstitial left pulmonary opacities suggests interstitial edema or infiltrate, similar to prior.   2.  Trace left pleural effusion   3.  Hyperexpansion of lungs favors changes of COPD.      DX-CHEST-PORTABLE (1 VIEW)   Final Result         1.  Interstitial infiltrates or edema, stable.   2.  Atherosclerosis      DX-CHEST-PORTABLE (1 VIEW)   Final Result         1.  Interstitial infiltrates or edema.   2.  Atherosclerosis      DX-CHEST-PORTABLE (1 VIEW)   Final Result      Moderate interstitial edema or interstitial lung disease and small left pleural effusion similar to previous.      DX-CHEST-PORTABLE (1 VIEW)   Final Result      Stable interstitial edema and/or interstitial lung disease with probable small pleural fluid      DX-CHEST-PORTABLE (1 VIEW)   Final Result      Ill-defined infrahilar interstitial opacities, atelectasis versus mild edema. No significant pleural effusions.      DX-CHEST-PORTABLE (1 VIEW)   Final Result      Mild left basilar atelectasis, improved since prior. No new consolidation or large pleural effusions.      DX-CHEST-PORTABLE (1 VIEW)   Final Result      1.  Left basilar opacification may be secondary to atelectasis. Developing pneumonia cannot be excluded.         DX-CHEST-PORTABLE (1 VIEW)   Final Result      1.  Unchanged mild interstitial pulmonary edema   2.  Unchanged bibasilar atelectasis, alveolar edema or pneumonia      DX-CHEST-PORTABLE (1 VIEW)   Final Result      1.  Decreased pulmonary edema   2.  Unchanged bibasilar atelectasis, alveolar edema or pneumonia      DX-CHEST-PORTABLE (1 VIEW)   Final Result      1.  There is diffuse interstitial and alveolar density in the right mid and lower lung zone. This is increased since the previous study. This may represent mild pulmonary  edema/consolidation.   2.  Mildly enlarged cardiomediastinal silhouette when compared with the previous chest x-ray.      DX-CHEST-PORTABLE (1 VIEW)   Final Result      Stable mild pulmonary edema.   New left basilar atelectasis.      DX-CHEST-PORTABLE (1 VIEW)   Final Result      Stable chest appearance compared with 11/16.      DX-CHEST-PORTABLE (1 VIEW)   Final Result      No acute cardiopulmonary abnormality identified.      DX-CHEST-PORTABLE (1 VIEW)   Final Result      No acute cardiopulmonary abnormality. No interval change.      DX-CHEST-PORTABLE (1 VIEW)   Final Result      No acute cardiopulmonary disease.      CT-HEAD W/O   Final Result      1.  No evidence of acute intracranial process.      2.  There is again seen interstitial pulmonary edema and bibasilar atelectasis.      CT-HIP W/O PLUS RECONS LEFT   Final Result      1.  No evidence of acute fracture or malalignment. No evidence of hardware failure or complication.   2.  Postsurgical changes of the left femur with broken screw fragment redemonstrated.   3.  Demineralization.   4.  Scattered colonic diverticula.   5.  Atherosclerosis.   6.  Small fat-containing umbilical hernia.      EC-ECHOCARDIOGRAM COMPLETE W/O CONT   Final Result      DX-HIP-COMPLETE - UNILATERAL 2+ LEFT   Final Result      1.  No definite acute osseous abnormality. In setting of demineralization, an occult fracture is difficult to exclude. If there is strong clinical suspicion, CT or MRI could be obtained for further evaluation.   2.  Postsurgical changes of the left femur with broken screw fragment redemonstrated.      DX-CHEST-PORTABLE (1 VIEW)   Final Result      No acute cardiopulmonary process is seen.      Atherosclerotic plaque.           Assessment/Plan  * Advanced directives, counseling/discussion- (present on admission)   Assessment & Plan    Placement is an issue for the patient continued on comfort care  Needs group home- [facility] to assist with placement.    Patient does have life insurance did not want to relinquish for her group home care cost.            Comfort measures only status- (present on admission)   Assessment & Plan    No signs of distress, continue comfort care options     ST elevation myocardial infarction involving right coronary artery (HCC)- (present on admission)   Assessment & Plan    Declines any intervention or therapeutic modality continue with comfort care     Positive QuantiFERON-TB Gold test   Assessment & Plan      AFB negative        Chronic kidney disease (CKD), stage III (moderate) (HCC)- (present on admission)   Assessment & Plan    Continue comfort care.       Essential hypertension- (present on admission)   Assessment & Plan    We are not measuring blood pressure as the patient is on comfort care     Fall- (present on admission)   Assessment & Plan    Make sure that she is not falling and continue at this point with anxiety management            VTE prophylaxis: Comfort care

## 2019-03-16 NOTE — CARE PLAN
Problem: Safety  Goal: Will remain free from falls  Outcome: PROGRESSING AS EXPECTED  Pt instructed to call before getting OOB. Pt verbalized understanding and calls appropriately. Call light and belongings within reach, hourly rounding in progress.    Problem: Bowel/Gastric:  Goal: Normal bowel function is maintained or improved  Outcome: PROGRESSING SLOWER THAN EXPECTED  Pt does not have regular bowel movements and refuses bowel protocol medications despite education.     Problem: Skin Integrity  Goal: Risk for impaired skin integrity will decrease  Outcome: PROGRESSING AS EXPECTED  Pt demonstrated bed mobility and gets OOB for toileting with encouragement, but refuses other skin interventions such as mepilex and waffle overlay to protect bony prominences despite education.

## 2019-03-16 NOTE — PROGRESS NOTES
[facility] Medicine Daily Progress Note    Date of Service  3/16/2019    Chief Complaint  89 y.o. female admitted 11/13/2018 with fall.    Hospital Course      Patient is a pleasant 89 year old woman with history of HTN who presented following a fall and was found to have an acute ST elevation MI.  After discussion with her family, she was placed on comfort care.     Interval Problem Update    Feeding independently.  No complaints of pain.    Consultants/Specialty  Palliative care  Critical care, cardiology    Code Status  Comfort care    Disposition    [facility] assisting with group home search.     Review of Systems  Review of Systems   Constitutional: Negative for chills and fever.   HENT: Negative for congestion.    Respiratory: Negative for cough and shortness of breath.    Cardiovascular: Negative for chest pain and palpitations.   Gastrointestinal: Negative for abdominal pain and vomiting.   Skin: Negative for itching.   Neurological: Positive for weakness. Negative for dizziness, focal weakness and headaches.        Physical Exam  Temp:  [36.8 °C (98.2 °F)] 36.8 °C (98.2 °F)  Pulse:  [88] 88  Resp:  [17] 17  BP: (92)/(51) 92/51  SpO2:  [92 %] 92 %    Physical Exam   Constitutional: No distress.   Alert, mildly confused, NAD    HENT:   Head: Normocephalic and atraumatic.   Eyes: EOM are normal. No scleral icterus.   Cardiovascular: Normal rate and regular rhythm.    Pulmonary/Chest: She has no wheezes. She has no rales.   Abdominal: Soft. She exhibits no distension. There is no tenderness.   Musculoskeletal: She exhibits no edema or tenderness.   Neurological: She is alert.   O x 2.      Skin: Skin is warm and dry. She is not diaphoretic. No pallor.   Psychiatric: She is slowed. Cognition and memory are impaired.   Vitals reviewed.      Fluids  No intake or output data in the 24 hours ending 03/16/19 1520    Laboratory                        Imaging  DX-CHEST-PORTABLE (1 VIEW)   Final Result         1.   Hazy interstitial left pulmonary opacities suggests interstitial edema or infiltrate, similar to prior.   2.  Trace left pleural effusion   3.  Hyperexpansion of lungs favors changes of COPD.      DX-CHEST-PORTABLE (1 VIEW)   Final Result         1.  Interstitial infiltrates or edema, stable.   2.  Atherosclerosis      DX-CHEST-PORTABLE (1 VIEW)   Final Result         1.  Interstitial infiltrates or edema.   2.  Atherosclerosis      DX-CHEST-PORTABLE (1 VIEW)   Final Result      Moderate interstitial edema or interstitial lung disease and small left pleural effusion similar to previous.      DX-CHEST-PORTABLE (1 VIEW)   Final Result      Stable interstitial edema and/or interstitial lung disease with probable small pleural fluid      DX-CHEST-PORTABLE (1 VIEW)   Final Result      Ill-defined infrahilar interstitial opacities, atelectasis versus mild edema. No significant pleural effusions.      DX-CHEST-PORTABLE (1 VIEW)   Final Result      Mild left basilar atelectasis, improved since prior. No new consolidation or large pleural effusions.      DX-CHEST-PORTABLE (1 VIEW)   Final Result      1.  Left basilar opacification may be secondary to atelectasis. Developing pneumonia cannot be excluded.         DX-CHEST-PORTABLE (1 VIEW)   Final Result      1.  Unchanged mild interstitial pulmonary edema   2.  Unchanged bibasilar atelectasis, alveolar edema or pneumonia      DX-CHEST-PORTABLE (1 VIEW)   Final Result      1.  Decreased pulmonary edema   2.  Unchanged bibasilar atelectasis, alveolar edema or pneumonia      DX-CHEST-PORTABLE (1 VIEW)   Final Result      1.  There is diffuse interstitial and alveolar density in the right mid and lower lung zone. This is increased since the previous study. This may represent mild pulmonary edema/consolidation.   2.  Mildly enlarged cardiomediastinal silhouette when compared with the previous chest x-ray.      DX-CHEST-PORTABLE (1 VIEW)   Final Result      Stable mild pulmonary  edema.   New left basilar atelectasis.      DX-CHEST-PORTABLE (1 VIEW)   Final Result      Stable chest appearance compared with 11/16.      DX-CHEST-PORTABLE (1 VIEW)   Final Result      No acute cardiopulmonary abnormality identified.      DX-CHEST-PORTABLE (1 VIEW)   Final Result      No acute cardiopulmonary abnormality. No interval change.      DX-CHEST-PORTABLE (1 VIEW)   Final Result      No acute cardiopulmonary disease.      CT-HEAD W/O   Final Result      1.  No evidence of acute intracranial process.      2.  There is again seen interstitial pulmonary edema and bibasilar atelectasis.      CT-HIP W/O PLUS RECONS LEFT   Final Result      1.  No evidence of acute fracture or malalignment. No evidence of hardware failure or complication.   2.  Postsurgical changes of the left femur with broken screw fragment redemonstrated.   3.  Demineralization.   4.  Scattered colonic diverticula.   5.  Atherosclerosis.   6.  Small fat-containing umbilical hernia.      EC-ECHOCARDIOGRAM COMPLETE W/O CONT   Final Result      DX-HIP-COMPLETE - UNILATERAL 2+ LEFT   Final Result      1.  No definite acute osseous abnormality. In setting of demineralization, an occult fracture is difficult to exclude. If there is strong clinical suspicion, CT or MRI could be obtained for further evaluation.   2.  Postsurgical changes of the left femur with broken screw fragment redemonstrated.      DX-CHEST-PORTABLE (1 VIEW)   Final Result      No acute cardiopulmonary process is seen.      Atherosclerotic plaque.           Assessment/Plan  * Advanced directives, counseling/discussion- (present on admission)   Assessment & Plan    Placement is an issue for the patient continued on comfort care  Needs group home- [facility] to assist with placement.   Patient does have life insurance did not want to relinquish for her [facility] care cost.            ST elevation myocardial infarction involving right coronary artery (HCC)- (present on  admission)   Assessment & Plan    Declines any intervention or therapeutic modality continue with comfort care     Positive QuantiFERON-TB Gold test   Assessment & Plan      AFB negative        Chronic kidney disease (CKD), stage III (moderate) (HCC)- (present on admission)   Assessment & Plan    Continue comfort care.       Essential hypertension- (present on admission)   Assessment & Plan    We are not measuring blood pressure as the patient is on comfort care     Fall- (present on admission)   Assessment & Plan    Make sure that she is not falling and continue at this point with anxiety management       Comfort measures only status- (present on admission)   Assessment & Plan    No signs of distress, continue comfort care options          VTE prophylaxis: Comfort care

## 2019-03-17 PROCEDURE — 770001 HCHG ROOM/CARE - MED/SURG/GYN PRIV*

## 2019-03-17 PROCEDURE — 99231 SBSQ HOSP IP/OBS SF/LOW 25: CPT | Performed by: HOSPITALIST

## 2019-03-17 ASSESSMENT — ENCOUNTER SYMPTOMS
MYALGIAS: 0
NECK PAIN: 0
VOMITING: 0
ABDOMINAL PAIN: 0
SHORTNESS OF BREATH: 0
FEVER: 0
WEAKNESS: 1
CHILLS: 0
FOCAL WEAKNESS: 0
COUGH: 0

## 2019-03-17 NOTE — PROGRESS NOTES
[facility] Medicine Daily Progress Note    Date of Service  3/17/2019    Chief Complaint  89 y.o. female admitted 11/13/2018 with fall.    Hospital Course      Patient is a pleasant 89 year old woman with history of HTN who presented following a fall and was found to have an acute ST elevation MI.  After discussion with her family, she was placed on comfort care.     Interval Problem Update    Feeding independently.  No complaints of pain.    Consultants/Specialty  Palliative care  Critical care, cardiology    Code Status  Comfort care    Disposition    [facility] assisting with group home search.     Review of Systems  Review of Systems   Constitutional: Negative for chills and fever.   Respiratory: Negative for cough and shortness of breath.    Cardiovascular: Negative for chest pain.   Gastrointestinal: Negative for abdominal pain and vomiting.   Musculoskeletal: Negative for myalgias and neck pain.   Skin: Negative for itching.   Neurological: Positive for weakness. Negative for focal weakness.        Physical Exam  Temp:  [36.3 °C (97.3 °F)-36.7 °C (98.1 °F)] 36.7 °C (98.1 °F)  Pulse:  [60-74] 60  Resp:  [12-16] 14  BP: ()/(57-69) 97/57  SpO2:  [92 %-93 %] 92 %    Physical Exam   Constitutional: No distress.   HENT:   Head: Normocephalic and atraumatic.   Eyes: EOM are normal. No scleral icterus.   Cardiovascular: Normal rate and regular rhythm.    Pulmonary/Chest: She has no wheezes. She has no rales.   Abdominal: Soft. She exhibits no distension. There is no tenderness.   Musculoskeletal: She exhibits no edema or tenderness.   Neurological: She is alert.   O x 2.      Skin: Skin is warm and dry. She is not diaphoretic. No pallor.   Psychiatric: She is slowed. Cognition and memory are impaired.   Vitals reviewed.      Fluids    Intake/Output Summary (Last 24 hours) at 03/17/19 1410  Last data filed at 03/17/19 0300   Gross per 24 hour   Intake                0 ml   Output                1 ml   Net                -1 ml       Laboratory                        Imaging  DX-CHEST-PORTABLE (1 VIEW)   Final Result         1.  Hazy interstitial left pulmonary opacities suggests interstitial edema or infiltrate, similar to prior.   2.  Trace left pleural effusion   3.  Hyperexpansion of lungs favors changes of COPD.      DX-CHEST-PORTABLE (1 VIEW)   Final Result         1.  Interstitial infiltrates or edema, stable.   2.  Atherosclerosis      DX-CHEST-PORTABLE (1 VIEW)   Final Result         1.  Interstitial infiltrates or edema.   2.  Atherosclerosis      DX-CHEST-PORTABLE (1 VIEW)   Final Result      Moderate interstitial edema or interstitial lung disease and small left pleural effusion similar to previous.      DX-CHEST-PORTABLE (1 VIEW)   Final Result      Stable interstitial edema and/or interstitial lung disease with probable small pleural fluid      DX-CHEST-PORTABLE (1 VIEW)   Final Result      Ill-defined infrahilar interstitial opacities, atelectasis versus mild edema. No significant pleural effusions.      DX-CHEST-PORTABLE (1 VIEW)   Final Result      Mild left basilar atelectasis, improved since prior. No new consolidation or large pleural effusions.      DX-CHEST-PORTABLE (1 VIEW)   Final Result      1.  Left basilar opacification may be secondary to atelectasis. Developing pneumonia cannot be excluded.         DX-CHEST-PORTABLE (1 VIEW)   Final Result      1.  Unchanged mild interstitial pulmonary edema   2.  Unchanged bibasilar atelectasis, alveolar edema or pneumonia      DX-CHEST-PORTABLE (1 VIEW)   Final Result      1.  Decreased pulmonary edema   2.  Unchanged bibasilar atelectasis, alveolar edema or pneumonia      DX-CHEST-PORTABLE (1 VIEW)   Final Result      1.  There is diffuse interstitial and alveolar density in the right mid and lower lung zone. This is increased since the previous study. This may represent mild pulmonary edema/consolidation.   2.  Mildly enlarged cardiomediastinal silhouette  when compared with the previous chest x-ray.      DX-CHEST-PORTABLE (1 VIEW)   Final Result      Stable mild pulmonary edema.   New left basilar atelectasis.      DX-CHEST-PORTABLE (1 VIEW)   Final Result      Stable chest appearance compared with 11/16.      DX-CHEST-PORTABLE (1 VIEW)   Final Result      No acute cardiopulmonary abnormality identified.      DX-CHEST-PORTABLE (1 VIEW)   Final Result      No acute cardiopulmonary abnormality. No interval change.      DX-CHEST-PORTABLE (1 VIEW)   Final Result      No acute cardiopulmonary disease.      CT-HEAD W/O   Final Result      1.  No evidence of acute intracranial process.      2.  There is again seen interstitial pulmonary edema and bibasilar atelectasis.      CT-HIP W/O PLUS RECONS LEFT   Final Result      1.  No evidence of acute fracture or malalignment. No evidence of hardware failure or complication.   2.  Postsurgical changes of the left femur with broken screw fragment redemonstrated.   3.  Demineralization.   4.  Scattered colonic diverticula.   5.  Atherosclerosis.   6.  Small fat-containing umbilical hernia.      EC-ECHOCARDIOGRAM COMPLETE W/O CONT   Final Result      DX-HIP-COMPLETE - UNILATERAL 2+ LEFT   Final Result      1.  No definite acute osseous abnormality. In setting of demineralization, an occult fracture is difficult to exclude. If there is strong clinical suspicion, CT or MRI could be obtained for further evaluation.   2.  Postsurgical changes of the left femur with broken screw fragment redemonstrated.      DX-CHEST-PORTABLE (1 VIEW)   Final Result      No acute cardiopulmonary process is seen.      Atherosclerotic plaque.           Assessment/Plan  * Advanced directives, counseling/discussion- (present on admission)   Assessment & Plan    Placement is an issue for the patient continued on comfort care  Needs group home- [facility] to assist with placement.   Patient does have life insurance did not want to relinquish for her  nursing home care cost.            ST elevation myocardial infarction involving right coronary artery (HCC)- (present on admission)   Assessment & Plan    Declines any intervention or therapeutic modality continue with comfort care     Positive QuantiFERON-TB Gold test   Assessment & Plan      AFB negative        Chronic kidney disease (CKD), stage III (moderate) ([facility])- (present on admission)   Assessment & Plan    Continue comfort care.       Essential hypertension- (present on admission)   Assessment & Plan    We are not measuring blood pressure as the patient is on comfort care     Fall- (present on admission)   Assessment & Plan    Continue fall precautions        Comfort measures only status- (present on admission)   Assessment & Plan    No signs of distress, continue comfort care options          VTE prophylaxis: Comfort care

## 2019-03-17 NOTE — CARE PLAN
Problem: Safety  Goal: Will remain free from injury  Outcome: PROGRESSING AS EXPECTED  Treaded socks in place, bed in the lowest position, bed alarm on, call light and belongings within reach, pt call for assistance appropriately    Problem: Discharge Barriers/Planning  Goal: Patient's continuum of care needs will be met  Outcome: PROGRESSING AS EXPECTED  Awaiting placement.    Problem: Mobility  Goal: Risk for activity intolerance will decrease  Outcome: PROGRESSING AS EXPECTED  Pt. up to commode, x1 assist with FWW.

## 2019-03-17 NOTE — CARE PLAN
Problem: Safety  Goal: Will remain free from injury  Outcome: PROGRESSING AS EXPECTED  Hourly rounds done. Call light within reach. Needs attended on a timely manner. Treaded socks on when OOB. Educated on the importance of calling for assistance when attempting to be OOB.  BED ALARM ON.    Problem: Venous Thromboembolism (VTW)/Deep Vein Thrombosis (DVT) Prevention:  Goal: Patient will participate in Venous Thrombosis (VTE)/Deep Vein Thrombosis (DVT)Prevention Measures  Outcome: PROGRESSING AS EXPECTED  Encourage early mobilization. Pt refused SCDs despite education.     Problem: Bowel/Gastric:  Goal: Normal bowel function is maintained or improved  Outcome: PROGRESSING AS EXPECTED  Last BM 3/12/19. Pt refusing stool softener. Prune juice offered.

## 2019-03-18 PROCEDURE — 700102 HCHG RX REV CODE 250 W/ 637 OVERRIDE(OP): Performed by: HOSPITALIST

## 2019-03-18 PROCEDURE — 770001 HCHG ROOM/CARE - MED/SURG/GYN PRIV*

## 2019-03-18 PROCEDURE — A9270 NON-COVERED ITEM OR SERVICE: HCPCS | Performed by: HOSPITALIST

## 2019-03-18 PROCEDURE — 99231 SBSQ HOSP IP/OBS SF/LOW 25: CPT | Performed by: HOSPITALIST

## 2019-03-18 RX ORDER — SODIUM CHLORIDE 9 MG/ML
INJECTION, SOLUTION INTRAVENOUS
Status: COMPLETED
Start: 2019-03-18 | End: 2019-03-18

## 2019-03-18 RX ADMIN — SENNOSIDES AND DOCUSATE SODIUM 1 TABLET: 8.6; 5 TABLET ORAL at 06:00

## 2019-03-18 ASSESSMENT — ENCOUNTER SYMPTOMS
SHORTNESS OF BREATH: 0
VOMITING: 0
NECK PAIN: 0
COUGH: 0
ABDOMINAL PAIN: 0
FOCAL WEAKNESS: 0
CHILLS: 0
MYALGIAS: 0
WEAKNESS: 1
FEVER: 0

## 2019-03-18 NOTE — PROGRESS NOTES
[facility] Medicine Daily Progress Note    Date of Service  3/18/2019    Chief Complaint  89 y.o. female admitted 11/13/2018 with fall.    Hospital Course      Patient is a pleasant 89 year old woman with history of HTN who presented following a fall and was found to have an acute ST elevation MI.  After discussion with her family, she was placed on comfort care.     Interval Problem Update    No new complaints.  No reports of pain or discomfort.  Good appetite.   to reach out to [facility] to see if any progress in group home search.     Consultants/Specialty  Palliative care  Critical care, cardiology    Code Status  Comfort care    Disposition    [facility] assisting with group home search.     Review of Systems  Review of Systems   Constitutional: Negative for chills and fever.   Respiratory: Negative for cough and shortness of breath.    Cardiovascular: Negative for chest pain.   Gastrointestinal: Negative for abdominal pain and vomiting.   Musculoskeletal: Negative for myalgias and neck pain.   Neurological: Positive for weakness. Negative for focal weakness.        Physical Exam  Temp:  [36.3 °C (97.4 °F)-36.9 °C (98.4 °F)] 36.3 °C (97.4 °F)  Pulse:  [71-75] 74  Resp:  [16-17] 17  BP: (115-134)/(58-72) 134/58  SpO2:  [90 %-93 %] 93 %    Physical Exam   Constitutional: No distress.   Thin, alert, appropriate responses   HENT:   Head: Normocephalic and atraumatic.   Cardiovascular: Normal rate and regular rhythm.    Pulmonary/Chest: She has no wheezes. She has no rales.   Abdominal: Soft. She exhibits no distension. There is no tenderness.   Musculoskeletal: She exhibits no edema or tenderness.   Neurological: She is alert.   O x 2.      Skin: Skin is warm and dry. She is not diaphoretic. No pallor.   Psychiatric: She is slowed. Cognition and memory are impaired.   Vitals reviewed.      Fluids  No intake or output data in the 24 hours ending 03/18/19 1617    Laboratory                         Imaging  DX-CHEST-PORTABLE (1 VIEW)   Final Result         1.  Hazy interstitial left pulmonary opacities suggests interstitial edema or infiltrate, similar to prior.   2.  Trace left pleural effusion   3.  Hyperexpansion of lungs favors changes of COPD.      DX-CHEST-PORTABLE (1 VIEW)   Final Result         1.  Interstitial infiltrates or edema, stable.   2.  Atherosclerosis      DX-CHEST-PORTABLE (1 VIEW)   Final Result         1.  Interstitial infiltrates or edema.   2.  Atherosclerosis      DX-CHEST-PORTABLE (1 VIEW)   Final Result      Moderate interstitial edema or interstitial lung disease and small left pleural effusion similar to previous.      DX-CHEST-PORTABLE (1 VIEW)   Final Result      Stable interstitial edema and/or interstitial lung disease with probable small pleural fluid      DX-CHEST-PORTABLE (1 VIEW)   Final Result      Ill-defined infrahilar interstitial opacities, atelectasis versus mild edema. No significant pleural effusions.      DX-CHEST-PORTABLE (1 VIEW)   Final Result      Mild left basilar atelectasis, improved since prior. No new consolidation or large pleural effusions.      DX-CHEST-PORTABLE (1 VIEW)   Final Result      1.  Left basilar opacification may be secondary to atelectasis. Developing pneumonia cannot be excluded.         DX-CHEST-PORTABLE (1 VIEW)   Final Result      1.  Unchanged mild interstitial pulmonary edema   2.  Unchanged bibasilar atelectasis, alveolar edema or pneumonia      DX-CHEST-PORTABLE (1 VIEW)   Final Result      1.  Decreased pulmonary edema   2.  Unchanged bibasilar atelectasis, alveolar edema or pneumonia      DX-CHEST-PORTABLE (1 VIEW)   Final Result      1.  There is diffuse interstitial and alveolar density in the right mid and lower lung zone. This is increased since the previous study. This may represent mild pulmonary edema/consolidation.   2.  Mildly enlarged cardiomediastinal silhouette when compared with the previous chest x-ray.       DX-CHEST-PORTABLE (1 VIEW)   Final Result      Stable mild pulmonary edema.   New left basilar atelectasis.      DX-CHEST-PORTABLE (1 VIEW)   Final Result      Stable chest appearance compared with 11/16.      DX-CHEST-PORTABLE (1 VIEW)   Final Result      No acute cardiopulmonary abnormality identified.      DX-CHEST-PORTABLE (1 VIEW)   Final Result      No acute cardiopulmonary abnormality. No interval change.      DX-CHEST-PORTABLE (1 VIEW)   Final Result      No acute cardiopulmonary disease.      CT-HEAD W/O   Final Result      1.  No evidence of acute intracranial process.      2.  There is again seen interstitial pulmonary edema and bibasilar atelectasis.      CT-HIP W/O PLUS RECONS LEFT   Final Result      1.  No evidence of acute fracture or malalignment. No evidence of hardware failure or complication.   2.  Postsurgical changes of the left femur with broken screw fragment redemonstrated.   3.  Demineralization.   4.  Scattered colonic diverticula.   5.  Atherosclerosis.   6.  Small fat-containing umbilical hernia.      EC-ECHOCARDIOGRAM COMPLETE W/O CONT   Final Result      DX-HIP-COMPLETE - UNILATERAL 2+ LEFT   Final Result      1.  No definite acute osseous abnormality. In setting of demineralization, an occult fracture is difficult to exclude. If there is strong clinical suspicion, CT or MRI could be obtained for further evaluation.   2.  Postsurgical changes of the left femur with broken screw fragment redemonstrated.      DX-CHEST-PORTABLE (1 VIEW)   Final Result      No acute cardiopulmonary process is seen.      Atherosclerotic plaque.           Assessment/Plan  * Advanced directives, counseling/discussion- (present on admission)   Assessment & Plan    Placement is an issue for the patient continued on comfort care  Patient does have life insurance did not want to relinquish for her [facility] care cost.     Searching for group home placement.  [facility] assisting.         ST elevation  myocardial infarction involving right coronary artery (HCC)- (present on admission)   Assessment & Plan    Declines any intervention or therapeutic modality continue with comfort care     Positive QuantiFERON-TB Gold test   Assessment & Plan      AFB negative        Chronic kidney disease (CKD), stage III (moderate) (HCC)- (present on admission)   Assessment & Plan    Continue comfort care.       Essential hypertension- (present on admission)   Assessment & Plan    We are not measuring blood pressure as the patient is on comfort care     Fall- (present on admission)   Assessment & Plan    Continue fall precautions        Comfort measures only status- (present on admission)   Assessment & Plan    No signs of distress, continue comfort care options          VTE prophylaxis: Comfort care

## 2019-03-18 NOTE — CARE PLAN
Problem: Safety  Goal: Will remain free from falls  Outcome: PROGRESSING AS EXPECTED    Intervention: Assess risk factors for falls  Pt. Wearing threaded socks. Properly calls for assistance when needing to get OOB. Provided education about fall risk.  Bed locked and in lowest position, pts. Room near nurses station.      Problem: Venous Thromboembolism (VTW)/Deep Vein Thrombosis (DVT) Prevention:  Goal: Patient will participate in Venous Thrombosis (VTE)/Deep Vein Thrombosis (DVT)Prevention Measures  Outcome: PROGRESSING SLOWER THAN EXPECTED    Intervention: Ensure patient wears graduated elastic stockings (FRITZ hose) and/or SCDs, if ordered, when in bed or chair (Remove at least once per shift for skin check)  Pt. Refusing SCDs, provided education about importance pt. Verbalized understanding but continues to refuse.

## 2019-03-19 PROBLEM — J40 BRONCHITIS: Status: RESOLVED | Noted: 2019-02-23 | Resolved: 2019-03-19

## 2019-03-19 PROCEDURE — 770001 HCHG ROOM/CARE - MED/SURG/GYN PRIV*

## 2019-03-19 PROCEDURE — 99231 SBSQ HOSP IP/OBS SF/LOW 25: CPT | Performed by: INTERNAL MEDICINE

## 2019-03-19 ASSESSMENT — ENCOUNTER SYMPTOMS
HEADACHES: 0
SHORTNESS OF BREATH: 0
ABDOMINAL PAIN: 0
COUGH: 0
NAUSEA: 0
DIZZINESS: 0

## 2019-03-19 NOTE — PROGRESS NOTES
[facility] Medicine Daily Progress Note    Date of Service  3/19/2019    Chief Complaint  89 y.o. female admitted 11/13/2018 with fall and STEMI.    Hospital Course    She was admitted to ICU on medical management. Family elected for comfort care.      Interval Problem Update  She is sleepy, otherwise feels well.    Consultants/Specialty  Critical care  cardiology    Code Status  Comfort care, DNR    Disposition  Hospice, pending group home placement    Review of Systems  Review of Systems   Constitutional: Positive for malaise/fatigue.   HENT: Negative for congestion.    Respiratory: Negative for cough and shortness of breath.    Cardiovascular: Negative for chest pain.   Gastrointestinal: Negative for abdominal pain and nausea.   Neurological: Negative for dizziness and headaches.        Physical Exam  Temp:  [36.4 °C (97.6 °F)-36.8 °C (98.2 °F)] 36.4 °C (97.6 °F)  Pulse:  [64-76] 64  Resp:  [14-17] 14  BP: (107)/(60-64) 107/60  SpO2:  [93 %] 93 %    Physical Exam   Constitutional:   Thin, frail   HENT:   Mouth/Throat: Oropharynx is clear and moist.   Eyes: Conjunctivae are normal. Right eye exhibits no discharge. Left eye exhibits no discharge.   Cardiovascular: Normal rate and regular rhythm.    Pulmonary/Chest: Effort normal. She has no wheezes.   Diminished at bases   Abdominal: Soft. There is no tenderness. There is no rebound and no guarding.   Musculoskeletal: She exhibits no edema.   Diffuse muscle atrophy   Neurological: She is alert.   Oriented to self and place   Skin: Skin is warm and dry.   Nursing note and vitals reviewed.      Fluids  No intake or output data in the 24 hours ending 03/19/19 1130    Laboratory                        Imaging  DX-CHEST-PORTABLE (1 VIEW)   Final Result         1.  Hazy interstitial left pulmonary opacities suggests interstitial edema or infiltrate, similar to prior.   2.  Trace left pleural effusion   3.  Hyperexpansion of lungs favors changes of COPD.       DX-CHEST-PORTABLE (1 VIEW)   Final Result         1.  Interstitial infiltrates or edema, stable.   2.  Atherosclerosis      DX-CHEST-PORTABLE (1 VIEW)   Final Result         1.  Interstitial infiltrates or edema.   2.  Atherosclerosis      DX-CHEST-PORTABLE (1 VIEW)   Final Result      Moderate interstitial edema or interstitial lung disease and small left pleural effusion similar to previous.      DX-CHEST-PORTABLE (1 VIEW)   Final Result      Stable interstitial edema and/or interstitial lung disease with probable small pleural fluid      DX-CHEST-PORTABLE (1 VIEW)   Final Result      Ill-defined infrahilar interstitial opacities, atelectasis versus mild edema. No significant pleural effusions.      DX-CHEST-PORTABLE (1 VIEW)   Final Result      Mild left basilar atelectasis, improved since prior. No new consolidation or large pleural effusions.      DX-CHEST-PORTABLE (1 VIEW)   Final Result      1.  Left basilar opacification may be secondary to atelectasis. Developing pneumonia cannot be excluded.         DX-CHEST-PORTABLE (1 VIEW)   Final Result      1.  Unchanged mild interstitial pulmonary edema   2.  Unchanged bibasilar atelectasis, alveolar edema or pneumonia      DX-CHEST-PORTABLE (1 VIEW)   Final Result      1.  Decreased pulmonary edema   2.  Unchanged bibasilar atelectasis, alveolar edema or pneumonia      DX-CHEST-PORTABLE (1 VIEW)   Final Result      1.  There is diffuse interstitial and alveolar density in the right mid and lower lung zone. This is increased since the previous study. This may represent mild pulmonary edema/consolidation.   2.  Mildly enlarged cardiomediastinal silhouette when compared with the previous chest x-ray.      DX-CHEST-PORTABLE (1 VIEW)   Final Result      Stable mild pulmonary edema.   New left basilar atelectasis.      DX-CHEST-PORTABLE (1 VIEW)   Final Result      Stable chest appearance compared with 11/16.      DX-CHEST-PORTABLE (1 VIEW)   Final Result      No acute  cardiopulmonary abnormality identified.      DX-CHEST-PORTABLE (1 VIEW)   Final Result      No acute cardiopulmonary abnormality. No interval change.      DX-CHEST-PORTABLE (1 VIEW)   Final Result      No acute cardiopulmonary disease.      CT-HEAD W/O   Final Result      1.  No evidence of acute intracranial process.      2.  There is again seen interstitial pulmonary edema and bibasilar atelectasis.      CT-HIP W/O PLUS RECONS LEFT   Final Result      1.  No evidence of acute fracture or malalignment. No evidence of hardware failure or complication.   2.  Postsurgical changes of the left femur with broken screw fragment redemonstrated.   3.  Demineralization.   4.  Scattered colonic diverticula.   5.  Atherosclerosis.   6.  Small fat-containing umbilical hernia.      EC-ECHOCARDIOGRAM COMPLETE W/O CONT   Final Result      DX-HIP-COMPLETE - UNILATERAL 2+ LEFT   Final Result      1.  No definite acute osseous abnormality. In setting of demineralization, an occult fracture is difficult to exclude. If there is strong clinical suspicion, CT or MRI could be obtained for further evaluation.   2.  Postsurgical changes of the left femur with broken screw fragment redemonstrated.      DX-CHEST-PORTABLE (1 VIEW)   Final Result      No acute cardiopulmonary process is seen.      Atherosclerotic plaque.           Assessment/Plan  * Advanced directives, counseling/discussion- (present on admission)   Assessment & Plan    Placement is an issue for the patient continued on comfort care  Patient does have life insurance did not want to relinquish for her senior living care cost.     Searching for group home placement.  [facility] assisting.         ST elevation myocardial infarction involving right coronary artery (HCC)- (present on admission)   Assessment & Plan    Declines any intervention or therapeutic modality continue with comfort care     Positive QuantiFERON-TB Gold test   Assessment & Plan      AFB negative         Chronic kidney disease (CKD), stage III (moderate) (HCC)- (present on admission)   Assessment & Plan    Continue comfort care.       Essential hypertension- (present on admission)   Assessment & Plan    We are not measuring blood pressure as the patient is on comfort care     Fall- (present on admission)   Assessment & Plan    Continue fall precautions        Comfort measures only status- (present on admission)   Assessment & Plan    No signs of distress, continue comfort care options          VTE prophylaxis: comfort care

## 2019-03-19 NOTE — DISCHARGE PLANNING
Anticipated Discharge Disposition: GH     Action: LSW left  for Ericka at [facility] asking for update on GH search.     Barriers to Discharge: Gh placement    Plan: discharge to accepting GH

## 2019-03-19 NOTE — DISCHARGE PLANNING
Anticipated Discharge Disposition: GH    Action: LSW met with Ericka from [facility]. LSW notified that the problem they are running into in regards to a GH is since she is [facility] the Pt at a  will be at a total care level which makes the cost at each group home very pricey because of the care and that many GH that she spoke to will not accept the  waiver. Ericka stated whatever group home they find it will be costly.     Barriers to Discharge: GH placement and cost

## 2019-03-20 PROCEDURE — 99231 SBSQ HOSP IP/OBS SF/LOW 25: CPT | Performed by: HOSPITALIST

## 2019-03-20 PROCEDURE — A9270 NON-COVERED ITEM OR SERVICE: HCPCS | Performed by: HOSPITALIST

## 2019-03-20 PROCEDURE — 700102 HCHG RX REV CODE 250 W/ 637 OVERRIDE(OP): Performed by: HOSPITALIST

## 2019-03-20 PROCEDURE — 770001 HCHG ROOM/CARE - MED/SURG/GYN PRIV*

## 2019-03-20 RX ADMIN — SENNOSIDES AND DOCUSATE SODIUM 2 TABLET: 8.6; 5 TABLET ORAL at 17:12

## 2019-03-20 NOTE — PROGRESS NOTES
[facility] Medicine Daily Progress Note    Date of Service  3/20/2019    Chief Complaint  89 y.o. female admitted 11/13/2018 with fall and STEMI.    Hospital Course    She was admitted to ICU on medical management. Family elected for comfort care.      Interval Problem Update  3/20 - lethargic, did not wake to voice. She appears comfortable. Pending placement/hospice.     Consultants/Specialty  Critical care  cardiology    Code Status  Comfort care, DNR    Disposition  Hospice, pending group home placement    Review of Systems  Review of Systems   Unable to perform ROS: Medical condition        Physical Exam  Temp:  [36.6 °C (97.8 °F)-36.6 °C (97.9 °F)] 36.6 °C (97.9 °F)  Pulse:  [71-82] 82  Resp:  [14-15] 14  BP: (107-109)/(64-76) 109/76  SpO2:  [93 %-95 %] 93 %    Physical Exam   Constitutional: She appears well-developed.   She is frail, elderly, and quite lethargic   HENT:   Head: Normocephalic and atraumatic.   Mouth/Throat: Oropharynx is clear and moist.   Eyes: Pupils are equal, round, and reactive to light. Conjunctivae are normal. No scleral icterus.   Cardiovascular: Normal rate, regular rhythm, normal heart sounds and intact distal pulses.    No murmur heard.  Pulmonary/Chest: Effort normal and breath sounds normal. No respiratory distress. She has no wheezes.   Abdominal: Soft. Bowel sounds are normal. She exhibits no distension. There is no tenderness.   Musculoskeletal: Normal range of motion. She exhibits no edema or tenderness.   Vitals reviewed.      Fluids  No intake or output data in the 24 hours ending 03/20/19 1438    Laboratory                        Imaging  DX-CHEST-PORTABLE (1 VIEW)   Final Result         1.  Hazy interstitial left pulmonary opacities suggests interstitial edema or infiltrate, similar to prior.   2.  Trace left pleural effusion   3.  Hyperexpansion of lungs favors changes of COPD.      DX-CHEST-PORTABLE (1 VIEW)   Final Result         1.  Interstitial infiltrates or edema,  stable.   2.  Atherosclerosis      DX-CHEST-PORTABLE (1 VIEW)   Final Result         1.  Interstitial infiltrates or edema.   2.  Atherosclerosis      DX-CHEST-PORTABLE (1 VIEW)   Final Result      Moderate interstitial edema or interstitial lung disease and small left pleural effusion similar to previous.      DX-CHEST-PORTABLE (1 VIEW)   Final Result      Stable interstitial edema and/or interstitial lung disease with probable small pleural fluid      DX-CHEST-PORTABLE (1 VIEW)   Final Result      Ill-defined infrahilar interstitial opacities, atelectasis versus mild edema. No significant pleural effusions.      DX-CHEST-PORTABLE (1 VIEW)   Final Result      Mild left basilar atelectasis, improved since prior. No new consolidation or large pleural effusions.      DX-CHEST-PORTABLE (1 VIEW)   Final Result      1.  Left basilar opacification may be secondary to atelectasis. Developing pneumonia cannot be excluded.         DX-CHEST-PORTABLE (1 VIEW)   Final Result      1.  Unchanged mild interstitial pulmonary edema   2.  Unchanged bibasilar atelectasis, alveolar edema or pneumonia      DX-CHEST-PORTABLE (1 VIEW)   Final Result      1.  Decreased pulmonary edema   2.  Unchanged bibasilar atelectasis, alveolar edema or pneumonia      DX-CHEST-PORTABLE (1 VIEW)   Final Result      1.  There is diffuse interstitial and alveolar density in the right mid and lower lung zone. This is increased since the previous study. This may represent mild pulmonary edema/consolidation.   2.  Mildly enlarged cardiomediastinal silhouette when compared with the previous chest x-ray.      DX-CHEST-PORTABLE (1 VIEW)   Final Result      Stable mild pulmonary edema.   New left basilar atelectasis.      DX-CHEST-PORTABLE (1 VIEW)   Final Result      Stable chest appearance compared with 11/16.      DX-CHEST-PORTABLE (1 VIEW)   Final Result      No acute cardiopulmonary abnormality identified.      DX-CHEST-PORTABLE (1 VIEW)   Final Result       No acute cardiopulmonary abnormality. No interval change.      DX-CHEST-PORTABLE (1 VIEW)   Final Result      No acute cardiopulmonary disease.      CT-HEAD W/O   Final Result      1.  No evidence of acute intracranial process.      2.  There is again seen interstitial pulmonary edema and bibasilar atelectasis.      CT-HIP W/O PLUS RECONS LEFT   Final Result      1.  No evidence of acute fracture or malalignment. No evidence of hardware failure or complication.   2.  Postsurgical changes of the left femur with broken screw fragment redemonstrated.   3.  Demineralization.   4.  Scattered colonic diverticula.   5.  Atherosclerosis.   6.  Small fat-containing umbilical hernia.      EC-ECHOCARDIOGRAM COMPLETE W/O CONT   Final Result      DX-HIP-COMPLETE - UNILATERAL 2+ LEFT   Final Result      1.  No definite acute osseous abnormality. In setting of demineralization, an occult fracture is difficult to exclude. If there is strong clinical suspicion, CT or MRI could be obtained for further evaluation.   2.  Postsurgical changes of the left femur with broken screw fragment redemonstrated.      DX-CHEST-PORTABLE (1 VIEW)   Final Result      No acute cardiopulmonary process is seen.      Atherosclerotic plaque.           Assessment/Plan  * Advanced directives, counseling/discussion- (present on admission)   Assessment & Plan    Placement is an issue for the patient continued on comfort care  Patient does have life insurance did not want to relinquish for [facility] cost.     Searching for group home placement.  [facility] assisting.         ST elevation myocardial infarction involving right coronary artery (HCC)- (present on admission)   Assessment & Plan    Declines any intervention or therapeutic modality continue with comfort care     Positive QuantiFERON-TB Gold test   Assessment & Plan      AFB negative        Chronic kidney disease (CKD), stage III (moderate) (HCC)- (present on admission)   Assessment &  Plan    Continue comfort care.       Essential hypertension- (present on admission)   Assessment & Plan    We are not measuring blood pressure as the patient is on comfort care     Fall- (present on admission)   Assessment & Plan    Continue fall precautions        Comfort measures only status- (present on admission)   Assessment & Plan    No signs of distress, continue comfort care options        3/20 - plan unchanged    VTE prophylaxis: comfort care

## 2019-03-20 NOTE — CARE PLAN
Problem: Safety  Goal: Will remain free from injury  Outcome: PROGRESSING AS EXPECTED  All fall precautions in place, bed alarm in place, hourly rounding complete, call light and personal belongings kept within reach at all times. Education done with pt on importance of calling before getting OOB, pt verbalizes an understanding. Will continue to monitor.     Problem: Bowel/Gastric:  Goal: Normal bowel function is maintained or improved  No BM in 2dys. Education done with pt on importance of frequent mobilization, increased fluid intake and increased fiber, pt verbalizes an understanding. Continues to decline stool softeners despite education

## 2019-03-21 PROCEDURE — 700102 HCHG RX REV CODE 250 W/ 637 OVERRIDE(OP): Performed by: HOSPITALIST

## 2019-03-21 PROCEDURE — A9270 NON-COVERED ITEM OR SERVICE: HCPCS | Performed by: HOSPITALIST

## 2019-03-21 PROCEDURE — 770001 HCHG ROOM/CARE - MED/SURG/GYN PRIV*

## 2019-03-21 PROCEDURE — 99231 SBSQ HOSP IP/OBS SF/LOW 25: CPT | Performed by: HOSPITALIST

## 2019-03-21 RX ADMIN — FUROSEMIDE 20 MG: 20 TABLET ORAL at 05:59

## 2019-03-21 RX ADMIN — SENNOSIDES AND DOCUSATE SODIUM 2 TABLET: 8.6; 5 TABLET ORAL at 05:59

## 2019-03-21 NOTE — PROGRESS NOTES
[facility] Medicine Daily Progress Note    Date of Service  3/21/2019    Chief Complaint  89 y.o. female admitted 11/13/2018 with fall and STEMI.    Hospital Course    She was admitted to ICU on medical management. Family elected for comfort care.      Interval Problem Update  3/21 - alert, comfortable, tells me her biggest problem is getting the muffin paper off the muffin she is trying to eat. I helped her with this and she tells me that's all I can do for her. Questions answered.     3/20 - lethargic, did not wake to voice. She appears comfortable. Pending placement/hospice.     Consultants/Specialty  Critical care  cardiology    Code Status  Comfort care, DNR    Disposition  Hospice, pending group home placement    Review of Systems  Review of Systems   Unable to perform ROS: Medical condition        Physical Exam  Temp:  [36.7 °C (98 °F)-36.9 °C (98.4 °F)] 36.7 °C (98 °F)  Pulse:  [68-73] 68  Resp:  [15-16] 16  BP: (107-120)/(63) 107/63  SpO2:  [92 %-98 %] 92 %    Physical Exam   Constitutional: She appears well-developed.   She is frail, elderly, but less lethargic today   HENT:   Head: Normocephalic and atraumatic.   Mouth/Throat: Oropharynx is clear and moist.   Eyes: Pupils are equal, round, and reactive to light. Conjunctivae are normal. No scleral icterus.   Cardiovascular: Normal rate, regular rhythm, normal heart sounds and intact distal pulses.    No murmur heard.  Pulmonary/Chest: Effort normal and breath sounds normal. No respiratory distress. She has no wheezes.   Abdominal: Soft. Bowel sounds are normal. She exhibits no distension. There is no tenderness.   Musculoskeletal: Normal range of motion. She exhibits no edema or tenderness.   Vitals reviewed.  3/21 - more alert today but otherwise unchanged    Fluids  No intake or output data in the 24 hours ending 03/21/19 1314    Laboratory                        Imaging  DX-CHEST-PORTABLE (1 VIEW)   Final Result         1.  Hazy interstitial left  pulmonary opacities suggests interstitial edema or infiltrate, similar to prior.   2.  Trace left pleural effusion   3.  Hyperexpansion of lungs favors changes of COPD.      DX-CHEST-PORTABLE (1 VIEW)   Final Result         1.  Interstitial infiltrates or edema, stable.   2.  Atherosclerosis      DX-CHEST-PORTABLE (1 VIEW)   Final Result         1.  Interstitial infiltrates or edema.   2.  Atherosclerosis      DX-CHEST-PORTABLE (1 VIEW)   Final Result      Moderate interstitial edema or interstitial lung disease and small left pleural effusion similar to previous.      DX-CHEST-PORTABLE (1 VIEW)   Final Result      Stable interstitial edema and/or interstitial lung disease with probable small pleural fluid      DX-CHEST-PORTABLE (1 VIEW)   Final Result      Ill-defined infrahilar interstitial opacities, atelectasis versus mild edema. No significant pleural effusions.      DX-CHEST-PORTABLE (1 VIEW)   Final Result      Mild left basilar atelectasis, improved since prior. No new consolidation or large pleural effusions.      DX-CHEST-PORTABLE (1 VIEW)   Final Result      1.  Left basilar opacification may be secondary to atelectasis. Developing pneumonia cannot be excluded.         DX-CHEST-PORTABLE (1 VIEW)   Final Result      1.  Unchanged mild interstitial pulmonary edema   2.  Unchanged bibasilar atelectasis, alveolar edema or pneumonia      DX-CHEST-PORTABLE (1 VIEW)   Final Result      1.  Decreased pulmonary edema   2.  Unchanged bibasilar atelectasis, alveolar edema or pneumonia      DX-CHEST-PORTABLE (1 VIEW)   Final Result      1.  There is diffuse interstitial and alveolar density in the right mid and lower lung zone. This is increased since the previous study. This may represent mild pulmonary edema/consolidation.   2.  Mildly enlarged cardiomediastinal silhouette when compared with the previous chest x-ray.      DX-CHEST-PORTABLE (1 VIEW)   Final Result      Stable mild pulmonary edema.   New left basilar  atelectasis.      DX-CHEST-PORTABLE (1 VIEW)   Final Result      Stable chest appearance compared with 11/16.      DX-CHEST-PORTABLE (1 VIEW)   Final Result      No acute cardiopulmonary abnormality identified.      DX-CHEST-PORTABLE (1 VIEW)   Final Result      No acute cardiopulmonary abnormality. No interval change.      DX-CHEST-PORTABLE (1 VIEW)   Final Result      No acute cardiopulmonary disease.      CT-HEAD W/O   Final Result      1.  No evidence of acute intracranial process.      2.  There is again seen interstitial pulmonary edema and bibasilar atelectasis.      CT-HIP W/O PLUS RECONS LEFT   Final Result      1.  No evidence of acute fracture or malalignment. No evidence of hardware failure or complication.   2.  Postsurgical changes of the left femur with broken screw fragment redemonstrated.   3.  Demineralization.   4.  Scattered colonic diverticula.   5.  Atherosclerosis.   6.  Small fat-containing umbilical hernia.      EC-ECHOCARDIOGRAM COMPLETE W/O CONT   Final Result      DX-HIP-COMPLETE - UNILATERAL 2+ LEFT   Final Result      1.  No definite acute osseous abnormality. In setting of demineralization, an occult fracture is difficult to exclude. If there is strong clinical suspicion, CT or MRI could be obtained for further evaluation.   2.  Postsurgical changes of the left femur with broken screw fragment redemonstrated.      DX-CHEST-PORTABLE (1 VIEW)   Final Result      No acute cardiopulmonary process is seen.      Atherosclerotic plaque.           Assessment/Plan  * Advanced directives, counseling/discussion- (present on admission)   Assessment & Plan    Placement is an issue for the patient continued on comfort care  Patient does have life insurance did not want to relinquish for her jail care cost.     Searching for group home placement.  [facility] assisting.     ST elevation myocardial infarction involving right coronary artery (HCC)- (present on admission)   Assessment & Plan     Declines any intervention or therapeutic modality continue with comfort care     Positive QuantiFERON-TB Gold test   Assessment & Plan      AFB negative        Chronic kidney disease (CKD), stage III (moderate) (HCC)- (present on admission)   Assessment & Plan    Continue comfort care.       Essential hypertension- (present on admission)   Assessment & Plan    Comfort care     Fall- (present on admission)   Assessment & Plan    Continue fall precautions        Comfort measures only status- (present on admission)   Assessment & Plan    No signs of distress, continue comfort care options        3/21 - plan unchanged, looking at discharge options     VTE prophylaxis: comfort care

## 2019-03-22 PROCEDURE — 770001 HCHG ROOM/CARE - MED/SURG/GYN PRIV*

## 2019-03-22 PROCEDURE — 99231 SBSQ HOSP IP/OBS SF/LOW 25: CPT | Performed by: HOSPITALIST

## 2019-03-22 NOTE — PROGRESS NOTES
[facility] Medicine Daily Progress Note    Date of Service  3/22/2019    Chief Complaint  89 y.o. female admitted 11/13/2018 with fall and STEMI.    Hospital Course    She was admitted to ICU on medical management. Family elected for comfort care.      Interval Problem Update  3/22 - no acute changes today, comfortable, arousable to voice, no complaints.     3/21 - alert, comfortable, tells me her biggest problem is getting the muffin paper off the muffin she is trying to eat. I helped her with this and she tells me that's all I can do for her. Questions answered.     3/20 - lethargic, did not wake to voice. She appears comfortable. Pending placement/hospice.     Consultants/Specialty  Critical care  cardiology    Code Status  Comfort care, DNR    Disposition  Hospice, pending group home placement    Review of Systems  Review of Systems   Unable to perform ROS: Medical condition        Physical Exam  Temp:  [36.6 °C (97.9 °F)-37.1 °C (98.7 °F)] 36.6 °C (97.9 °F)  Pulse:  [69-81] 69  Resp:  [14-20] 14  BP: (101-133)/(56-68) 101/56  SpO2:  [90 %-95 %] 95 %    Physical Exam   Constitutional: She appears well-developed.   She is frail, elderly, but less lethargic today   HENT:   Head: Normocephalic and atraumatic.   Mouth/Throat: Oropharynx is clear and moist.   Eyes: Pupils are equal, round, and reactive to light. Conjunctivae are normal. No scleral icterus.   Cardiovascular: Normal rate, regular rhythm, normal heart sounds and intact distal pulses.    No murmur heard.  Pulmonary/Chest: Effort normal and breath sounds normal. No respiratory distress. She has no wheezes.   Abdominal: Soft. Bowel sounds are normal. She exhibits no distension. There is no tenderness.   Musculoskeletal: Normal range of motion. She exhibits no edema or tenderness.   Vitals reviewed.  3/22 - exam unchanged    Fluids    Intake/Output Summary (Last 24 hours) at 03/22/19 1047  Last data filed at 03/22/19 0600   Gross per 24 hour   Intake               480 ml   Output              240 ml   Net              240 ml       Laboratory                        Imaging  DX-CHEST-PORTABLE (1 VIEW)   Final Result         1.  Hazy interstitial left pulmonary opacities suggests interstitial edema or infiltrate, similar to prior.   2.  Trace left pleural effusion   3.  Hyperexpansion of lungs favors changes of COPD.      DX-CHEST-PORTABLE (1 VIEW)   Final Result         1.  Interstitial infiltrates or edema, stable.   2.  Atherosclerosis      DX-CHEST-PORTABLE (1 VIEW)   Final Result         1.  Interstitial infiltrates or edema.   2.  Atherosclerosis      DX-CHEST-PORTABLE (1 VIEW)   Final Result      Moderate interstitial edema or interstitial lung disease and small left pleural effusion similar to previous.      DX-CHEST-PORTABLE (1 VIEW)   Final Result      Stable interstitial edema and/or interstitial lung disease with probable small pleural fluid      DX-CHEST-PORTABLE (1 VIEW)   Final Result      Ill-defined infrahilar interstitial opacities, atelectasis versus mild edema. No significant pleural effusions.      DX-CHEST-PORTABLE (1 VIEW)   Final Result      Mild left basilar atelectasis, improved since prior. No new consolidation or large pleural effusions.      DX-CHEST-PORTABLE (1 VIEW)   Final Result      1.  Left basilar opacification may be secondary to atelectasis. Developing pneumonia cannot be excluded.         DX-CHEST-PORTABLE (1 VIEW)   Final Result      1.  Unchanged mild interstitial pulmonary edema   2.  Unchanged bibasilar atelectasis, alveolar edema or pneumonia      DX-CHEST-PORTABLE (1 VIEW)   Final Result      1.  Decreased pulmonary edema   2.  Unchanged bibasilar atelectasis, alveolar edema or pneumonia      DX-CHEST-PORTABLE (1 VIEW)   Final Result      1.  There is diffuse interstitial and alveolar density in the right mid and lower lung zone. This is increased since the previous study. This may represent mild pulmonary edema/consolidation.    2.  Mildly enlarged cardiomediastinal silhouette when compared with the previous chest x-ray.      DX-CHEST-PORTABLE (1 VIEW)   Final Result      Stable mild pulmonary edema.   New left basilar atelectasis.      DX-CHEST-PORTABLE (1 VIEW)   Final Result      Stable chest appearance compared with 11/16.      DX-CHEST-PORTABLE (1 VIEW)   Final Result      No acute cardiopulmonary abnormality identified.      DX-CHEST-PORTABLE (1 VIEW)   Final Result      No acute cardiopulmonary abnormality. No interval change.      DX-CHEST-PORTABLE (1 VIEW)   Final Result      No acute cardiopulmonary disease.      CT-HEAD W/O   Final Result      1.  No evidence of acute intracranial process.      2.  There is again seen interstitial pulmonary edema and bibasilar atelectasis.      CT-HIP W/O PLUS RECONS LEFT   Final Result      1.  No evidence of acute fracture or malalignment. No evidence of hardware failure or complication.   2.  Postsurgical changes of the left femur with broken screw fragment redemonstrated.   3.  Demineralization.   4.  Scattered colonic diverticula.   5.  Atherosclerosis.   6.  Small fat-containing umbilical hernia.      EC-ECHOCARDIOGRAM COMPLETE W/O CONT   Final Result      DX-HIP-COMPLETE - UNILATERAL 2+ LEFT   Final Result      1.  No definite acute osseous abnormality. In setting of demineralization, an occult fracture is difficult to exclude. If there is strong clinical suspicion, CT or MRI could be obtained for further evaluation.   2.  Postsurgical changes of the left femur with broken screw fragment redemonstrated.      DX-CHEST-PORTABLE (1 VIEW)   Final Result      No acute cardiopulmonary process is seen.      Atherosclerotic plaque.           Assessment/Plan  * Advanced directives, counseling/discussion- (present on admission)   Assessment & Plan    Placement is an issue for the patient continued on comfort care  Patient does have life insurance did not want to relinquish for her [facility] care  cost.     Searching for group home placement.  [facility] assisting.     ST elevation myocardial infarction involving right coronary artery (HCC)- (present on admission)   Assessment & Plan    Declines any intervention or therapeutic modality continue with comfort care     Positive QuantiFERON-TB Gold test   Assessment & Plan      AFB negative        Chronic kidney disease (CKD), stage III (moderate) (HCC)- (present on admission)   Assessment & Plan    Continue comfort care.       Essential hypertension- (present on admission)   Assessment & Plan    Comfort care     Fall- (present on admission)   Assessment & Plan    Continue fall precautions        Comfort measures only status- (present on admission)   Assessment & Plan    No signs of distress, continue comfort care options        3/22 - plan unchanged, still looking at discharge options     VTE prophylaxis: comfort care

## 2019-03-22 NOTE — DISCHARGE PLANNING
Anticipated Discharge Disposition: TBD    Action: Email and phone call to Supervisor Nena Dodge (ph: [phone] em: [email or web address]) to inquire as to [employer] Waiver process and pt's referral, and why no response as to if pt will qualify is being received.    Nena said that in October they processed a [employer] Waiver/Medicaid referral and pt was denied due to over assets for life insurance. She said they received proof/verification of at least one policy that was over $3K and there was one other policy. Pt cannot have over $1500. So despite what daughter reports of pt not having insurance policies, unless they were cashed out, this is not accurate.    LELAND Craig has been trying to reach daughter to confirm if any insurance policy has been cashed out as that is only way pt could qualify for [facility] Waiver.     Pt is on waitlist for [facility] which is in-home caregiving program but this waitlist is very extensive and they anticipate a long wait.     Left message for daughter Wild to discuss.     Unit LSW updated.     Barriers to Discharge: placement    Plan: F/U with daughter

## 2019-03-23 PROCEDURE — A9270 NON-COVERED ITEM OR SERVICE: HCPCS | Performed by: HOSPITALIST

## 2019-03-23 PROCEDURE — 700102 HCHG RX REV CODE 250 W/ 637 OVERRIDE(OP): Performed by: HOSPITALIST

## 2019-03-23 PROCEDURE — 99231 SBSQ HOSP IP/OBS SF/LOW 25: CPT | Performed by: INTERNAL MEDICINE

## 2019-03-23 PROCEDURE — 770001 HCHG ROOM/CARE - MED/SURG/GYN PRIV*

## 2019-03-23 RX ADMIN — FUROSEMIDE 20 MG: 20 TABLET ORAL at 08:20

## 2019-03-23 RX ADMIN — ACETAMINOPHEN 500 MG: 500 TABLET ORAL at 14:06

## 2019-03-23 NOTE — PROGRESS NOTES
[facility] Medicine Daily Progress Note    Date of Service  3/23/2019    Chief Complaint  89 y.o. female admitted 11/13/2018 with fall and STEMI.    Hospital Course    She was admitted to ICU on medical management. Family elected for comfort care.      Interval Problem Update  3/23 sleeping comfortably on the bed. Continue comfort care.        Consultants/Specialty  Critical care  cardiology    Code Status  Comfort care, DNR    Disposition  Hospice, pending group home placement    Review of Systems  Review of Systems   Unable to perform ROS: Medical condition        Physical Exam  Temp:  [36.2 °C (97.1 °F)-36.8 °C (98.3 °F)] 36.2 °C (97.1 °F)  Pulse:  [62-71] 62  Resp:  [12-16] 16  BP: (101-119)/(56-65) 119/57  SpO2:  [94 %-96 %] 94 %    Physical Exam   Constitutional: She appears well-developed.   She is frail, elderly, but less lethargic today   HENT:   Head: Normocephalic and atraumatic.   Eyes: Pupils are equal, round, and reactive to light. No scleral icterus.   Cardiovascular: Normal rate, regular rhythm, normal heart sounds and intact distal pulses.    No murmur heard.  Pulmonary/Chest: Effort normal and breath sounds normal. No respiratory distress.   Abdominal: Soft. Bowel sounds are normal. She exhibits no distension.   Musculoskeletal: Normal range of motion. She exhibits no edema.   Vitals reviewed.  3/21 - more alert today but otherwise unchanged    Fluids    Intake/Output Summary (Last 24 hours) at 03/23/19 0752  Last data filed at 03/22/19 1800   Gross per 24 hour   Intake              720 ml   Output                0 ml   Net              720 ml       Laboratory                        Imaging  DX-CHEST-PORTABLE (1 VIEW)   Final Result         1.  Hazy interstitial left pulmonary opacities suggests interstitial edema or infiltrate, similar to prior.   2.  Trace left pleural effusion   3.  Hyperexpansion of lungs favors changes of COPD.      DX-CHEST-PORTABLE (1 VIEW)   Final Result         1.   Interstitial infiltrates or edema, stable.   2.  Atherosclerosis      DX-CHEST-PORTABLE (1 VIEW)   Final Result         1.  Interstitial infiltrates or edema.   2.  Atherosclerosis      DX-CHEST-PORTABLE (1 VIEW)   Final Result      Moderate interstitial edema or interstitial lung disease and small left pleural effusion similar to previous.      DX-CHEST-PORTABLE (1 VIEW)   Final Result      Stable interstitial edema and/or interstitial lung disease with probable small pleural fluid      DX-CHEST-PORTABLE (1 VIEW)   Final Result      Ill-defined infrahilar interstitial opacities, atelectasis versus mild edema. No significant pleural effusions.      DX-CHEST-PORTABLE (1 VIEW)   Final Result      Mild left basilar atelectasis, improved since prior. No new consolidation or large pleural effusions.      DX-CHEST-PORTABLE (1 VIEW)   Final Result      1.  Left basilar opacification may be secondary to atelectasis. Developing pneumonia cannot be excluded.         DX-CHEST-PORTABLE (1 VIEW)   Final Result      1.  Unchanged mild interstitial pulmonary edema   2.  Unchanged bibasilar atelectasis, alveolar edema or pneumonia      DX-CHEST-PORTABLE (1 VIEW)   Final Result      1.  Decreased pulmonary edema   2.  Unchanged bibasilar atelectasis, alveolar edema or pneumonia      DX-CHEST-PORTABLE (1 VIEW)   Final Result      1.  There is diffuse interstitial and alveolar density in the right mid and lower lung zone. This is increased since the previous study. This may represent mild pulmonary edema/consolidation.   2.  Mildly enlarged cardiomediastinal silhouette when compared with the previous chest x-ray.      DX-CHEST-PORTABLE (1 VIEW)   Final Result      Stable mild pulmonary edema.   New left basilar atelectasis.      DX-CHEST-PORTABLE (1 VIEW)   Final Result      Stable chest appearance compared with 11/16.      DX-CHEST-PORTABLE (1 VIEW)   Final Result      No acute cardiopulmonary abnormality identified.       DX-CHEST-PORTABLE (1 VIEW)   Final Result      No acute cardiopulmonary abnormality. No interval change.      DX-CHEST-PORTABLE (1 VIEW)   Final Result      No acute cardiopulmonary disease.      CT-HEAD W/O   Final Result      1.  No evidence of acute intracranial process.      2.  There is again seen interstitial pulmonary edema and bibasilar atelectasis.      CT-HIP W/O PLUS RECONS LEFT   Final Result      1.  No evidence of acute fracture or malalignment. No evidence of hardware failure or complication.   2.  Postsurgical changes of the left femur with broken screw fragment redemonstrated.   3.  Demineralization.   4.  Scattered colonic diverticula.   5.  Atherosclerosis.   6.  Small fat-containing umbilical hernia.      EC-ECHOCARDIOGRAM COMPLETE W/O CONT   Final Result      DX-HIP-COMPLETE - UNILATERAL 2+ LEFT   Final Result      1.  No definite acute osseous abnormality. In setting of demineralization, an occult fracture is difficult to exclude. If there is strong clinical suspicion, CT or MRI could be obtained for further evaluation.   2.  Postsurgical changes of the left femur with broken screw fragment redemonstrated.      DX-CHEST-PORTABLE (1 VIEW)   Final Result      No acute cardiopulmonary process is seen.      Atherosclerotic plaque.           Assessment/Plan  * Advanced directives, counseling/discussion- (present on admission)   Assessment & Plan    Placement is an issue for the patient continued on comfort care  Patient does have life insurance did not want to relinquish for her jail care cost.     Searching for group home placement.  [facility] assisting.     ST elevation myocardial infarction involving right coronary artery (HCC)- (present on admission)   Assessment & Plan    Declines any intervention or therapeutic modality  continue with comfort care     Positive QuantiFERON-TB Gold test   Assessment & Plan      AFB negative        Chronic kidney disease (CKD), stage III (moderate)  (HCC)- (present on admission)   Assessment & Plan    Continue comfort care.       Essential hypertension- (present on admission)   Assessment & Plan    Comfort care     Fall- (present on admission)   Assessment & Plan    Continue fall precautions        Comfort measures only status- (present on admission)   Assessment & Plan    No signs of distress, continue comfort care options            VTE prophylaxis: comfort care

## 2019-03-23 NOTE — CARE PLAN
Problem: Safety  Goal: Will remain free from injury  Outcome: PROGRESSING AS EXPECTED  Hourly rounds done. Call light within reach. Needs attended on a timely manner. Treaded socks on when OOB. Educated on the importance of calling for assistance when attempting to be OOB.  BED ALARM ON.    Problem: Venous Thromboembolism (VTW)/Deep Vein Thrombosis (DVT) Prevention:  Goal: Patient will participate in Venous Thrombosis (VTE)/Deep Vein Thrombosis (DVT)Prevention Measures  Outcome: PROGRESSING AS EXPECTED  Encouraged mobilization.     Problem: Bowel/Gastric:  Goal: Normal bowel function is maintained or improved  Outcome: PROGRESSING AS EXPECTED  Last  3/22/19.

## 2019-03-23 NOTE — CARE PLAN
Problem: Safety  Goal: Will remain free from falls  Outcome: PROGRESSING AS EXPECTED  Staff assist to the bathroom w/FWW.    Problem: Skin Integrity  Goal: Risk for impaired skin integrity will decrease  Outcome: PROGRESSING AS EXPECTED  Pt moves frequently in bed, skin assessed for breakdown.

## 2019-03-24 PROCEDURE — 700102 HCHG RX REV CODE 250 W/ 637 OVERRIDE(OP): Performed by: HOSPITALIST

## 2019-03-24 PROCEDURE — 99231 SBSQ HOSP IP/OBS SF/LOW 25: CPT | Performed by: INTERNAL MEDICINE

## 2019-03-24 PROCEDURE — A9270 NON-COVERED ITEM OR SERVICE: HCPCS | Performed by: HOSPITALIST

## 2019-03-24 PROCEDURE — 770001 HCHG ROOM/CARE - MED/SURG/GYN PRIV*

## 2019-03-24 RX ADMIN — BISACODYL 10 MG: 10 SUPPOSITORY RECTAL at 20:51

## 2019-03-24 NOTE — PROGRESS NOTES
[facility] Medicine Daily Progress Note    Date of Service  3/24/2019    Chief Complaint  89 y.o. female admitted 11/13/2018 with fall and STEMI.    Hospital Course    She was admitted to ICU on medical management. Family elected for comfort care.      Interval Problem Update  3/23 sleeping comfortably on the bed. Continue comfort care.    3/24; no acute issue overnight.      Consultants/Specialty  Critical care  cardiology    Code Status  Comfort care, DNR    Disposition  Hospice, pending group home placement    Review of Systems  Review of Systems   Unable to perform ROS: Medical condition        Physical Exam  Temp:  [36.5 °C (97.7 °F)-36.6 °C (97.8 °F)] 36.6 °C (97.8 °F)  Pulse:  [66-69] 69  Resp:  [16] 16  BP: (100-117)/(53-64) 100/53  SpO2:  [92 %-93 %] 92 %    Physical Exam   Constitutional: She appears well-developed.   She is frail, elderly, but less lethargic today   HENT:   Head: Normocephalic and atraumatic.   Eyes: Pupils are equal, round, and reactive to light. Conjunctivae are normal.   Cardiovascular: Normal rate and regular rhythm.    Pulmonary/Chest: Effort normal and breath sounds normal.   Abdominal: Soft. She exhibits no distension.   Musculoskeletal: Normal range of motion.   Vitals reviewed.  3/21 - more alert today but otherwise unchanged    Fluids    Intake/Output Summary (Last 24 hours) at 03/24/19 0810  Last data filed at 03/23/19 2028   Gross per 24 hour   Intake             1020 ml   Output                0 ml   Net             1020 ml       Laboratory                        Imaging  DX-CHEST-PORTABLE (1 VIEW)   Final Result         1.  Hazy interstitial left pulmonary opacities suggests interstitial edema or infiltrate, similar to prior.   2.  Trace left pleural effusion   3.  Hyperexpansion of lungs favors changes of COPD.      DX-CHEST-PORTABLE (1 VIEW)   Final Result         1.  Interstitial infiltrates or edema, stable.   2.  Atherosclerosis      DX-CHEST-PORTABLE (1 VIEW)   Final  Result         1.  Interstitial infiltrates or edema.   2.  Atherosclerosis      DX-CHEST-PORTABLE (1 VIEW)   Final Result      Moderate interstitial edema or interstitial lung disease and small left pleural effusion similar to previous.      DX-CHEST-PORTABLE (1 VIEW)   Final Result      Stable interstitial edema and/or interstitial lung disease with probable small pleural fluid      DX-CHEST-PORTABLE (1 VIEW)   Final Result      Ill-defined infrahilar interstitial opacities, atelectasis versus mild edema. No significant pleural effusions.      DX-CHEST-PORTABLE (1 VIEW)   Final Result      Mild left basilar atelectasis, improved since prior. No new consolidation or large pleural effusions.      DX-CHEST-PORTABLE (1 VIEW)   Final Result      1.  Left basilar opacification may be secondary to atelectasis. Developing pneumonia cannot be excluded.         DX-CHEST-PORTABLE (1 VIEW)   Final Result      1.  Unchanged mild interstitial pulmonary edema   2.  Unchanged bibasilar atelectasis, alveolar edema or pneumonia      DX-CHEST-PORTABLE (1 VIEW)   Final Result      1.  Decreased pulmonary edema   2.  Unchanged bibasilar atelectasis, alveolar edema or pneumonia      DX-CHEST-PORTABLE (1 VIEW)   Final Result      1.  There is diffuse interstitial and alveolar density in the right mid and lower lung zone. This is increased since the previous study. This may represent mild pulmonary edema/consolidation.   2.  Mildly enlarged cardiomediastinal silhouette when compared with the previous chest x-ray.      DX-CHEST-PORTABLE (1 VIEW)   Final Result      Stable mild pulmonary edema.   New left basilar atelectasis.      DX-CHEST-PORTABLE (1 VIEW)   Final Result      Stable chest appearance compared with 11/16.      DX-CHEST-PORTABLE (1 VIEW)   Final Result      No acute cardiopulmonary abnormality identified.      DX-CHEST-PORTABLE (1 VIEW)   Final Result      No acute cardiopulmonary abnormality. No interval change.       DX-CHEST-PORTABLE (1 VIEW)   Final Result      No acute cardiopulmonary disease.      CT-HEAD W/O   Final Result      1.  No evidence of acute intracranial process.      2.  There is again seen interstitial pulmonary edema and bibasilar atelectasis.      CT-HIP W/O PLUS RECONS LEFT   Final Result      1.  No evidence of acute fracture or malalignment. No evidence of hardware failure or complication.   2.  Postsurgical changes of the left femur with broken screw fragment redemonstrated.   3.  Demineralization.   4.  Scattered colonic diverticula.   5.  Atherosclerosis.   6.  Small fat-containing umbilical hernia.      EC-ECHOCARDIOGRAM COMPLETE W/O CONT   Final Result      DX-HIP-COMPLETE - UNILATERAL 2+ LEFT   Final Result      1.  No definite acute osseous abnormality. In setting of demineralization, an occult fracture is difficult to exclude. If there is strong clinical suspicion, CT or MRI could be obtained for further evaluation.   2.  Postsurgical changes of the left femur with broken screw fragment redemonstrated.      DX-CHEST-PORTABLE (1 VIEW)   Final Result      No acute cardiopulmonary process is seen.      Atherosclerotic plaque.           Assessment/Plan  * Advanced directives, counseling/discussion- (present on admission)   Assessment & Plan    Placement is an issue for the patient continued on comfort care  Searching for group home placement.  [place] assisting.     ST elevation myocardial infarction involving right coronary artery (HCC)- (present on admission)   Assessment & Plan    Declines any intervention or therapeutic modality  continue with comfort care     Positive QuantiFERON-TB Gold test   Assessment & Plan      AFB negative        Chronic kidney disease (CKD), stage III (moderate) (HCC)- (present on admission)   Assessment & Plan    Continue comfort care.       Essential hypertension- (present on admission)   Assessment & Plan    Comfort care     Fall- (present on admission)    Assessment & Plan    Continue fall precautions        Comfort measures only status- (present on admission)   Assessment & Plan    No signs of distress, continue comfort care options            VTE prophylaxis: comfort care

## 2019-03-24 NOTE — CARE PLAN
Problem: Safety  Goal: Will remain free from injury  Outcome: PROGRESSING AS EXPECTED  Calls for assistance when appropriate. Call light and personal belongings within reach. Bed in low and locked position. Fall education provided to patient.     Problem: Skin Integrity  Goal: Risk for impaired skin integrity will decrease  Outcome: PROGRESSING AS EXPECTED  Frequent position changes. Education provided to patient on importance of Q2 hour turns. Monitor areas of redness and skin breakdown.

## 2019-03-24 NOTE — PROGRESS NOTES
Aox4. Vs stable. Pt resting comfortable in bed. No concerns tonight. Ambulates to the bathroom with a walker standby assist. Bed alarm on.

## 2019-03-24 NOTE — PROGRESS NOTES
Bedside shift report received from night RN.  Assumed care at 0715, patient sleeping in bed, belongings and call light within reach. Needs met at this time.    Reviewed current POC with family present. POC review with CNA including vital sign frequency/drains/mobility.  Labs, notes, orders reviewed at this time.

## 2019-03-25 PROCEDURE — 99231 SBSQ HOSP IP/OBS SF/LOW 25: CPT | Performed by: INTERNAL MEDICINE

## 2019-03-25 PROCEDURE — 770001 HCHG ROOM/CARE - MED/SURG/GYN PRIV*

## 2019-03-25 NOTE — PROGRESS NOTES
Pt felt constipated and had a hard time having BM. Dulcolax administered. Pt had 3 large formed BM. Pt feels better. Pt currently resting.

## 2019-03-25 NOTE — PROGRESS NOTES
[facility] Medicine Daily Progress Note    Date of Service  3/25/2019    Chief Complaint  89 y.o. female admitted 11/13/2018 with fall and STEMI.    Hospital Course    She was admitted to ICU on medical management. Family elected for comfort care.      Interval Problem Update  3/23 sleeping comfortably on the bed. Continue comfort care.    3/24; no acute issue overnight.    3/25: no event overnight. Lying and sleeping on the bed.      Consultants/Specialty  Critical care  cardiology    Code Status  Comfort care, DNR    Disposition  Hospice, pending group home placement    Review of Systems  Review of Systems   Unable to perform ROS: Medical condition        Physical Exam  Temp:  [36.2 °C (97.2 °F)-36.8 °C (98.2 °F)] 36.8 °C (98.2 °F)  Pulse:  [79] 79  Resp:  [16] 16  BP: ()/(58-68) 125/68  SpO2:  [95 %] 95 %    Physical Exam   Constitutional: She appears well-developed.   She is frail, elderly, but less lethargic today   HENT:   Head: Normocephalic.   Eyes: Pupils are equal, round, and reactive to light.   Cardiovascular: Normal rate and regular rhythm.    Pulmonary/Chest: Effort normal.   Abdominal: Soft. She exhibits no distension.   Musculoskeletal: Normal range of motion.   Vitals reviewed.  3/21 - more alert today but otherwise unchanged    Fluids    Intake/Output Summary (Last 24 hours) at 03/25/19 0730  Last data filed at 03/24/19 1700   Gross per 24 hour   Intake              320 ml   Output                0 ml   Net              320 ml       Laboratory                        Imaging  DX-CHEST-PORTABLE (1 VIEW)   Final Result         1.  Hazy interstitial left pulmonary opacities suggests interstitial edema or infiltrate, similar to prior.   2.  Trace left pleural effusion   3.  Hyperexpansion of lungs favors changes of COPD.      DX-CHEST-PORTABLE (1 VIEW)   Final Result         1.  Interstitial infiltrates or edema, stable.   2.  Atherosclerosis      DX-CHEST-PORTABLE (1 VIEW)   Final Result         1.   Interstitial infiltrates or edema.   2.  Atherosclerosis      DX-CHEST-PORTABLE (1 VIEW)   Final Result      Moderate interstitial edema or interstitial lung disease and small left pleural effusion similar to previous.      DX-CHEST-PORTABLE (1 VIEW)   Final Result      Stable interstitial edema and/or interstitial lung disease with probable small pleural fluid      DX-CHEST-PORTABLE (1 VIEW)   Final Result      Ill-defined infrahilar interstitial opacities, atelectasis versus mild edema. No significant pleural effusions.      DX-CHEST-PORTABLE (1 VIEW)   Final Result      Mild left basilar atelectasis, improved since prior. No new consolidation or large pleural effusions.      DX-CHEST-PORTABLE (1 VIEW)   Final Result      1.  Left basilar opacification may be secondary to atelectasis. Developing pneumonia cannot be excluded.         DX-CHEST-PORTABLE (1 VIEW)   Final Result      1.  Unchanged mild interstitial pulmonary edema   2.  Unchanged bibasilar atelectasis, alveolar edema or pneumonia      DX-CHEST-PORTABLE (1 VIEW)   Final Result      1.  Decreased pulmonary edema   2.  Unchanged bibasilar atelectasis, alveolar edema or pneumonia      DX-CHEST-PORTABLE (1 VIEW)   Final Result      1.  There is diffuse interstitial and alveolar density in the right mid and lower lung zone. This is increased since the previous study. This may represent mild pulmonary edema/consolidation.   2.  Mildly enlarged cardiomediastinal silhouette when compared with the previous chest x-ray.      DX-CHEST-PORTABLE (1 VIEW)   Final Result      Stable mild pulmonary edema.   New left basilar atelectasis.      DX-CHEST-PORTABLE (1 VIEW)   Final Result      Stable chest appearance compared with 11/16.      DX-CHEST-PORTABLE (1 VIEW)   Final Result      No acute cardiopulmonary abnormality identified.      DX-CHEST-PORTABLE (1 VIEW)   Final Result      No acute cardiopulmonary abnormality. No interval change.      DX-CHEST-PORTABLE (1  VIEW)   Final Result      No acute cardiopulmonary disease.      CT-HEAD W/O   Final Result      1.  No evidence of acute intracranial process.      2.  There is again seen interstitial pulmonary edema and bibasilar atelectasis.      CT-HIP W/O PLUS RECONS LEFT   Final Result      1.  No evidence of acute fracture or malalignment. No evidence of hardware failure or complication.   2.  Postsurgical changes of the left femur with broken screw fragment redemonstrated.   3.  Demineralization.   4.  Scattered colonic diverticula.   5.  Atherosclerosis.   6.  Small fat-containing umbilical hernia.      EC-ECHOCARDIOGRAM COMPLETE W/O CONT   Final Result      DX-HIP-COMPLETE - UNILATERAL 2+ LEFT   Final Result      1.  No definite acute osseous abnormality. In setting of demineralization, an occult fracture is difficult to exclude. If there is strong clinical suspicion, CT or MRI could be obtained for further evaluation.   2.  Postsurgical changes of the left femur with broken screw fragment redemonstrated.      DX-CHEST-PORTABLE (1 VIEW)   Final Result      No acute cardiopulmonary process is seen.      Atherosclerotic plaque.           Assessment/Plan  * Advanced directives, counseling/discussion- (present on admission)   Assessment & Plan    Placement is an issue for the patient continued on comfort care  Searching for group home placement.  [place] [facility] assisting.  Difficult discharge     ST elevation myocardial infarction involving right coronary artery (HCC)- (present on admission)   Assessment & Plan    Declines any intervention or therapeutic modality  continue with comfort care     Positive QuantiFERON-TB Gold test   Assessment & Plan      AFB negative        Chronic kidney disease (CKD), stage III (moderate) (HCC)- (present on admission)   Assessment & Plan    Continue comfort care.       Essential hypertension- (present on admission)   Assessment & Plan    Comfort care     Fall- (present on admission)    Assessment & Plan    Continue fall precautions        Comfort measures only status- (present on admission)   Assessment & Plan    No signs of distress, continue comfort care options     on comfort care. No change from previous day       VTE prophylaxis: comfort care

## 2019-03-25 NOTE — PROGRESS NOTES
Pharmacy Pharmacotherapy Consult for LOS >30 days    Admit Date: 11/13/2018      Medications were reviewed for appropriateness and ongoing need.     Current Facility-Administered Medications   Medication Dose Route Frequency Provider Last Rate Last Dose   • benzocaine-menthol (CEPACOL) lozenge 1 Lozenge  1 Lozenge Mouth/Throat Q2HRS PRN Pa Jacinto M.D.   1 Lozenge at 02/23/19 1834   • guaiFENesin (ROBITUSSIN) 100 MG/5ML solution 200 mg  10 mL Oral Q4HRS PRN Pa Jacinto M.D.       • polyethylene glycol/lytes (MIRALAX) PACKET 1 Packet  1 Packet Oral DAILY Martinez Yi M.D.   Stopped at 03/19/19 0900    And   • senna-docusate (PERICOLACE or SENOKOT S) 8.6-50 MG per tablet 2 Tab  2 Tab Oral BID Martinez Yi M.D.   2 Tab at 03/21/19 0559    And   • magnesium hydroxide (MILK OF MAGNESIA) suspension 30 mL  30 mL Oral QDAY PRN Martinez Yi M.D.   30 mL at 02/07/19 1608    And   • bisacodyl (DULCOLAX) suppository 10 mg  10 mg Rectal QDAY PRN Martinez Yi M.D.   10 mg at 03/24/19 2051   • ipratropium-albuterol (DUONEB) nebulizer solution  3 mL Nebulization Q4H PRN (RT) Asem RON Finn M.D.   3 mL at 12/15/18 0151   • LORazepam (ATIVAN) tablet 0.5 mg  0.5 mg Oral Q4HRS PRN Asem RON Finn M.D.   0.5 mg at 12/15/18 0143   • morphine (ROXANOL) 20 MG/ML oral conc 5-10 mg  5-10 mg Oral Q HOUR PRN Asem RON Finn M.D.       • acetaminophen (TYLENOL) tablet 500 mg  500 mg Oral Q6HRS PRN Asem RON Finn M.D.   500 mg at 03/23/19 1406   • diphenhydrAMINE-ZnAcetate (BENADRYL ITCH) 1-0.1 % cream   Topical 4X/DAY PRN Asem RON Finn M.D.       • MD ALERT...adult comfort care   Other PRN Caitlyn Finn M.D.       • atropine 1 % ophthalmic solution 2 Drop  2 Drop Sublingual Q4HRS PRN Caitlyn Finn M.D.       • furosemide (LASIX) tablet 20 mg  20 mg Oral Q DAY Caitlyn Finn M.D.   20 mg at 03/23/19 0820       Recommendations:  1. Consider discontinuation due to lack of ever using: Morphine, guaifenesin,  diphenhydramine, atropine    2. Consider discontinuation due to lack of use in past two weeks: PEG, milk of magnesia, lorazepam, Duoneb, Cepacol    Cody Andrea, Pharmacy Intern    ADDENDUM  Patient on comfort care.  All medications not being used consistently but have role as PRN agents.  No further recommendations at this time.    Dale Lopez, VickiD, BCPS

## 2019-03-25 NOTE — CARE PLAN
Problem: Safety  Goal: Will remain free from injury  Outcome: PROGRESSING AS EXPECTED  Treaded socks in place, bed in the lowest position, bed alarm on, call light and belongings within reach, pt call for assistance appropriately    Problem: Mobility  Goal: Risk for activity intolerance will decrease  Outcome: PROGRESSING AS EXPECTED  Pt. Up to commode x1 assist with FWW.

## 2019-03-26 PROCEDURE — 99231 SBSQ HOSP IP/OBS SF/LOW 25: CPT | Performed by: FAMILY MEDICINE

## 2019-03-26 PROCEDURE — 770001 HCHG ROOM/CARE - MED/SURG/GYN PRIV*

## 2019-03-26 NOTE — DISCHARGE PLANNING
Anticipated Discharge Disposition: TBD poss. shelter    Action: LSW left  for Pt dtr asking for a call back for consent for shelter referral.    LSW spoke with Ericka at [facility] who stated that she thinks the Pt will be fine at [facility] and that they have patients there    Barriers to Discharge: placement    Plan: follow up with the Pt dtr

## 2019-03-26 NOTE — CARE PLAN
Problem: Safety  Goal: Will remain free from injury  Outcome: PROGRESSING AS EXPECTED  Provided assistance with mobility. Fall prevention measures in place. rounds ongoing. Bed alarm in place    Problem: Skin Integrity  Goal: Risk for impaired skin integrity will decrease  Outcome: PROGRESSING AS EXPECTED  Assessed for signs of skin breakdown. Encouraged frequent turns and repositioning to prevent development of pressure ulcers.

## 2019-03-26 NOTE — DISCHARGE PLANNING
Anticipated Discharge Disposition: TBD     Action: Left message for daughter Wild.     Call to [facility] ([phone]/Janet). Did not provide pt identifier but gave overall needs of pt and Janet said that based on information provided pt would qualify for Level 1 care- $1500 for shared room/$1900 for private. Only private rooms available. If interested, they can come out and do assessment.     Spoke with Unit LSW with update.     Barriers to Discharge: placement in supportive living environment.    Plan: Unit LSW will reach out to [facility] to consult about possibly [facility]; This LCSW will try daughter later this PM.

## 2019-03-26 NOTE — PROGRESS NOTES
[facility] Medicine Daily Progress Note    Date of Service  3/26/2019    Chief Complaint  89 y.o. female admitted 11/13/2018 with fall and STEMI.    Hospital Course    She was admitted to ICU on medical management. Family elected for comfort care.      Interval Problem Update  3/23 sleeping comfortably on the bed. Continue comfort care.    3/24; no acute issue overnight.    3/25: no event overnight. Lying and sleeping on the bed.  3/26: Laying in bed comfortably.  No acute distress noted.  No issues overnight.    Consultants/Specialty  Critical care  cardiology    Code Status  Comfort care, DNR    Disposition  Hospice, pending group home placement    Review of Systems  Review of Systems   Unable to perform ROS: Medical condition        Physical Exam  Temp:  [36.1 °C (97 °F)-36.6 °C (97.8 °F)] 36.3 °C (97.4 °F)  Pulse:  [71-73] 71  Resp:  [16-17] 17  BP: (108-119)/(55-72) 119/55  SpO2:  [92 %-97 %] 92 %    Physical Exam   Constitutional: She appears lethargic. She appears cachectic.   Frail   Chronically ill appearing   HENT:   Head: Normocephalic and atraumatic.   Eyes: Pupils are equal, round, and reactive to light. Left eye exhibits no discharge. No scleral icterus.   Neck: No thyromegaly present.   Cardiovascular: Normal rate and regular rhythm.  Exam reveals no friction rub.    Pulmonary/Chest: Effort normal. No respiratory distress.   Abdominal: Soft. She exhibits no distension. There is no tenderness.   Musculoskeletal: Normal range of motion. She exhibits no deformity.   Neurological: She appears lethargic.   Psychiatric: She is slowed and withdrawn.   Vitals reviewed.  3/21 - more alert today but otherwise unchanged    Fluids    Intake/Output Summary (Last 24 hours) at 03/26/19 1647  Last data filed at 03/26/19 0800   Gross per 24 hour   Intake              240 ml   Output                0 ml   Net              240 ml       Laboratory                        Imaging  DX-CHEST-PORTABLE (1 VIEW)   Final Result          1.  Hazy interstitial left pulmonary opacities suggests interstitial edema or infiltrate, similar to prior.   2.  Trace left pleural effusion   3.  Hyperexpansion of lungs favors changes of COPD.      DX-CHEST-PORTABLE (1 VIEW)   Final Result         1.  Interstitial infiltrates or edema, stable.   2.  Atherosclerosis      DX-CHEST-PORTABLE (1 VIEW)   Final Result         1.  Interstitial infiltrates or edema.   2.  Atherosclerosis      DX-CHEST-PORTABLE (1 VIEW)   Final Result      Moderate interstitial edema or interstitial lung disease and small left pleural effusion similar to previous.      DX-CHEST-PORTABLE (1 VIEW)   Final Result      Stable interstitial edema and/or interstitial lung disease with probable small pleural fluid      DX-CHEST-PORTABLE (1 VIEW)   Final Result      Ill-defined infrahilar interstitial opacities, atelectasis versus mild edema. No significant pleural effusions.      DX-CHEST-PORTABLE (1 VIEW)   Final Result      Mild left basilar atelectasis, improved since prior. No new consolidation or large pleural effusions.      DX-CHEST-PORTABLE (1 VIEW)   Final Result      1.  Left basilar opacification may be secondary to atelectasis. Developing pneumonia cannot be excluded.         DX-CHEST-PORTABLE (1 VIEW)   Final Result      1.  Unchanged mild interstitial pulmonary edema   2.  Unchanged bibasilar atelectasis, alveolar edema or pneumonia      DX-CHEST-PORTABLE (1 VIEW)   Final Result      1.  Decreased pulmonary edema   2.  Unchanged bibasilar atelectasis, alveolar edema or pneumonia      DX-CHEST-PORTABLE (1 VIEW)   Final Result      1.  There is diffuse interstitial and alveolar density in the right mid and lower lung zone. This is increased since the previous study. This may represent mild pulmonary edema/consolidation.   2.  Mildly enlarged cardiomediastinal silhouette when compared with the previous chest x-ray.      DX-CHEST-PORTABLE (1 VIEW)   Final Result      Stable mild  pulmonary edema.   New left basilar atelectasis.      DX-CHEST-PORTABLE (1 VIEW)   Final Result      Stable chest appearance compared with 11/16.      DX-CHEST-PORTABLE (1 VIEW)   Final Result      No acute cardiopulmonary abnormality identified.      DX-CHEST-PORTABLE (1 VIEW)   Final Result      No acute cardiopulmonary abnormality. No interval change.      DX-CHEST-PORTABLE (1 VIEW)   Final Result      No acute cardiopulmonary disease.      CT-HEAD W/O   Final Result      1.  No evidence of acute intracranial process.      2.  There is again seen interstitial pulmonary edema and bibasilar atelectasis.      CT-HIP W/O PLUS RECONS LEFT   Final Result      1.  No evidence of acute fracture or malalignment. No evidence of hardware failure or complication.   2.  Postsurgical changes of the left femur with broken screw fragment redemonstrated.   3.  Demineralization.   4.  Scattered colonic diverticula.   5.  Atherosclerosis.   6.  Small fat-containing umbilical hernia.      EC-ECHOCARDIOGRAM COMPLETE W/O CONT   Final Result      DX-HIP-COMPLETE - UNILATERAL 2+ LEFT   Final Result      1.  No definite acute osseous abnormality. In setting of demineralization, an occult fracture is difficult to exclude. If there is strong clinical suspicion, CT or MRI could be obtained for further evaluation.   2.  Postsurgical changes of the left femur with broken screw fragment redemonstrated.      DX-CHEST-PORTABLE (1 VIEW)   Final Result      No acute cardiopulmonary process is seen.      Atherosclerotic plaque.           Assessment/Plan  * Advanced directives, counseling/discussion- (present on admission)   Assessment & Plan    Placement is an issue for the patient continued on comfort care  Searching for group home placement.  [facility] assisting.  Difficult discharge     ST elevation myocardial infarction involving right coronary artery (HCC)- (present on admission)   Assessment & Plan    Declines any intervention or  therapeutic modality  continue with comfort care     Positive QuantiFERON-TB Gold test   Assessment & Plan      AFB negative        Chronic kidney disease (CKD), stage III (moderate) (HCC)- (present on admission)   Assessment & Plan    Continue comfort care.       Essential hypertension- (present on admission)   Assessment & Plan    Comfort care     Fall- (present on admission)   Assessment & Plan    Continue fall precautions        Comfort measures only status- (present on admission)   Assessment & Plan    No signs of distress, continue comfort care options     on comfort care. No change from previous day       VTE prophylaxis: comfort care

## 2019-03-27 PROCEDURE — 770001 HCHG ROOM/CARE - MED/SURG/GYN PRIV*

## 2019-03-27 PROCEDURE — 99231 SBSQ HOSP IP/OBS SF/LOW 25: CPT | Performed by: FAMILY MEDICINE

## 2019-03-27 PROCEDURE — 700102 HCHG RX REV CODE 250 W/ 637 OVERRIDE(OP): Performed by: HOSPITALIST

## 2019-03-27 PROCEDURE — A9270 NON-COVERED ITEM OR SERVICE: HCPCS | Performed by: HOSPITALIST

## 2019-03-27 RX ADMIN — SENNOSIDES AND DOCUSATE SODIUM 2 TABLET: 8.6; 5 TABLET ORAL at 18:49

## 2019-03-27 NOTE — PROGRESS NOTES
[facility] Medicine Daily Progress Note    Date of Service  3/27/2019    Chief Complaint  89 y.o. female admitted 11/13/2018 with fall and STEMI.    Hospital Course    She was admitted to ICU on medical management. Family elected for comfort care.      Interval Problem Update  3/23 sleeping comfortably on the bed. Continue comfort care.    3/24; no acute issue overnight.    3/25: no event overnight. Lying and sleeping on the bed.  3/26: Laying in bed comfortably.  No acute distress noted.  No issues overnight.  3/27: Resting comfortably.  No distress noted.  Consultants/Specialty  Critical care  cardiology    Code Status  Comfort care, DNR    Disposition  Hospice, pending group home placement    Review of Systems  Review of Systems   Unable to perform ROS: Medical condition        Physical Exam  Temp:  [36.6 °C (97.9 °F)] 36.6 °C (97.9 °F)  Pulse:  [71-73] 71  Resp:  [16] 16  BP: (124-134)/(65-76) 134/65  SpO2:  [93 %] 93 %    Physical Exam   Constitutional: She appears lethargic. She appears cachectic.   Frail   Chronically ill appearing   HENT:   Head: Normocephalic and atraumatic.   Eyes: Pupils are equal, round, and reactive to light. No scleral icterus.   Cardiovascular: Normal rate and regular rhythm.  Exam reveals no friction rub.    Pulmonary/Chest: Effort normal.   Abdominal: Soft. She exhibits no distension.   Musculoskeletal: Normal range of motion. She exhibits no deformity.   Neurological: She appears lethargic.   Psychiatric: She is slowed and withdrawn.   Vitals reviewed.  3/21 - more alert today but otherwise unchanged    Fluids  No intake or output data in the 24 hours ending 03/27/19 1528    Laboratory                        Imaging  DX-CHEST-PORTABLE (1 VIEW)   Final Result         1.  Hazy interstitial left pulmonary opacities suggests interstitial edema or infiltrate, similar to prior.   2.  Trace left pleural effusion   3.  Hyperexpansion of lungs favors changes of COPD.      DX-CHEST-PORTABLE (1  VIEW)   Final Result         1.  Interstitial infiltrates or edema, stable.   2.  Atherosclerosis      DX-CHEST-PORTABLE (1 VIEW)   Final Result         1.  Interstitial infiltrates or edema.   2.  Atherosclerosis      DX-CHEST-PORTABLE (1 VIEW)   Final Result      Moderate interstitial edema or interstitial lung disease and small left pleural effusion similar to previous.      DX-CHEST-PORTABLE (1 VIEW)   Final Result      Stable interstitial edema and/or interstitial lung disease with probable small pleural fluid      DX-CHEST-PORTABLE (1 VIEW)   Final Result      Ill-defined infrahilar interstitial opacities, atelectasis versus mild edema. No significant pleural effusions.      DX-CHEST-PORTABLE (1 VIEW)   Final Result      Mild left basilar atelectasis, improved since prior. No new consolidation or large pleural effusions.      DX-CHEST-PORTABLE (1 VIEW)   Final Result      1.  Left basilar opacification may be secondary to atelectasis. Developing pneumonia cannot be excluded.         DX-CHEST-PORTABLE (1 VIEW)   Final Result      1.  Unchanged mild interstitial pulmonary edema   2.  Unchanged bibasilar atelectasis, alveolar edema or pneumonia      DX-CHEST-PORTABLE (1 VIEW)   Final Result      1.  Decreased pulmonary edema   2.  Unchanged bibasilar atelectasis, alveolar edema or pneumonia      DX-CHEST-PORTABLE (1 VIEW)   Final Result      1.  There is diffuse interstitial and alveolar density in the right mid and lower lung zone. This is increased since the previous study. This may represent mild pulmonary edema/consolidation.   2.  Mildly enlarged cardiomediastinal silhouette when compared with the previous chest x-ray.      DX-CHEST-PORTABLE (1 VIEW)   Final Result      Stable mild pulmonary edema.   New left basilar atelectasis.      DX-CHEST-PORTABLE (1 VIEW)   Final Result      Stable chest appearance compared with 11/16.      DX-CHEST-PORTABLE (1 VIEW)   Final Result      No acute cardiopulmonary  abnormality identified.      DX-CHEST-PORTABLE (1 VIEW)   Final Result      No acute cardiopulmonary abnormality. No interval change.      DX-CHEST-PORTABLE (1 VIEW)   Final Result      No acute cardiopulmonary disease.      CT-HEAD W/O   Final Result      1.  No evidence of acute intracranial process.      2.  There is again seen interstitial pulmonary edema and bibasilar atelectasis.      CT-HIP W/O PLUS RECONS LEFT   Final Result      1.  No evidence of acute fracture or malalignment. No evidence of hardware failure or complication.   2.  Postsurgical changes of the left femur with broken screw fragment redemonstrated.   3.  Demineralization.   4.  Scattered colonic diverticula.   5.  Atherosclerosis.   6.  Small fat-containing umbilical hernia.      EC-ECHOCARDIOGRAM COMPLETE W/O CONT   Final Result      DX-HIP-COMPLETE - UNILATERAL 2+ LEFT   Final Result      1.  No definite acute osseous abnormality. In setting of demineralization, an occult fracture is difficult to exclude. If there is strong clinical suspicion, CT or MRI could be obtained for further evaluation.   2.  Postsurgical changes of the left femur with broken screw fragment redemonstrated.      DX-CHEST-PORTABLE (1 VIEW)   Final Result      No acute cardiopulmonary process is seen.      Atherosclerotic plaque.           Assessment/Plan  * Advanced directives, counseling/discussion- (present on admission)   Assessment & Plan    Placement is an issue for the patient continued on comfort care  Searching for group home placement.  [facility] assisting.  Difficult discharge     ST elevation myocardial infarction involving right coronary artery (HCC)- (present on admission)   Assessment & Plan    Declines any intervention or therapeutic modality  continue with comfort care     Positive QuantiFERON-TB Gold test   Assessment & Plan      AFB negative        Essential hypertension- (present on admission)   Assessment & Plan    Comfort care     Comfort  measures only status- (present on admission)   Assessment & Plan    No signs of distress, continue comfort care options     Chronic kidney disease (CKD), stage III (moderate) ([facility])- (present on admission)   Assessment & Plan    Continue comfort care.       Fall- (present on admission)   Assessment & Plan    Continue fall precautions        on comfort care. No change from previous day       VTE prophylaxis: comfort care

## 2019-03-27 NOTE — CARE PLAN
Problem: Safety  Goal: Will remain free from falls  Outcome: PROGRESSING AS EXPECTED    Intervention: Implement fall precautions  Bed in low position, wheels locked, call light within reach, hourly rounding in place.      Problem: Mobility  Goal: Risk for activity intolerance will decrease  Outcome: PROGRESSING AS EXPECTED  Patient transferring to commode with x1 assist and FWW.    Problem: Urinary Elimination:  Goal: Ability to reestablish a normal urinary elimination pattern will improve  Outcome: PROGRESSING AS EXPECTED

## 2019-03-28 ENCOUNTER — PATIENT OUTREACH (OUTPATIENT)
Dept: MEDICAL GROUP | Facility: MEDICAL CENTER | Age: 84
End: 2019-03-28

## 2019-03-28 PROCEDURE — 770001 HCHG ROOM/CARE - MED/SURG/GYN PRIV*

## 2019-03-28 PROCEDURE — 99231 SBSQ HOSP IP/OBS SF/LOW 25: CPT | Performed by: FAMILY MEDICINE

## 2019-03-28 PROCEDURE — A9270 NON-COVERED ITEM OR SERVICE: HCPCS | Performed by: HOSPITALIST

## 2019-03-28 PROCEDURE — 700102 HCHG RX REV CODE 250 W/ 637 OVERRIDE(OP): Performed by: HOSPITALIST

## 2019-03-28 RX ADMIN — SENNOSIDES AND DOCUSATE SODIUM 2 TABLET: 8.6; 5 TABLET ORAL at 06:31

## 2019-03-28 RX ADMIN — FUROSEMIDE 20 MG: 20 TABLET ORAL at 06:31

## 2019-03-28 NOTE — PROGRESS NOTES
Report received at 0700. Assumed pt care. Pt up to chair for breakfast. On 3L mask Oxygen saturations at 90%. Pt encouraged to deep breath and cough. C/o of SOB assisted back to bed. Denies pain. Assessment completed. POC discussed, pt agreeable. Fall precaution in place, bed alarm on. Call light within reach. Pt calling appropriately.

## 2019-03-28 NOTE — CARE PLAN
Problem: Safety  Goal: Will remain free from falls  Outcome: PROGRESSING AS EXPECTED    Intervention: Implement fall precautions  Bed in low position, wheels locked, call light within reach, hourly rounding in place.      Problem: Mobility  Goal: Risk for activity intolerance will decrease  Outcome: PROGRESSING AS EXPECTED  Patient ambulating with x1 assist and FWW.

## 2019-03-28 NOTE — CARE PLAN
Problem: Safety  Goal: Will remain free from falls    Intervention: Implement fall precautions  Pt calls appropriately, treaded socks in use. Personal belongings and call light with in reach and bed is locked in lowest position.      Problem: Bowel/Gastric:  Goal: Will not experience complications related to bowel motility    Intervention: Implement Bowel Protocol, if applicable  Patient agreeable to take senna x2  tabs

## 2019-03-28 NOTE — PROGRESS NOTES
[facility] Medicine Daily Progress Note    Date of Service  3/28/2019    Chief Complaint  89 y.o. female admitted 11/13/2018 with fall and STEMI.    Hospital Course    She was admitted to ICU on medical management. Family elected for comfort care.      Interval Problem Update  3/23 sleeping comfortably on the bed. Continue comfort care.    3/24; no acute issue overnight.    3/25: no event overnight. Lying and sleeping on the bed.  3/26: Laying in bed comfortably.  No acute distress noted.  No issues overnight.  3/27: Resting comfortably.  No distress noted.  3/28: Sleeping.  Looks comfortable.  Not interacting or answer questions.  Consultants/Specialty  Critical care  cardiology    Code Status  Comfort care, DNR    Disposition  Hospice, pending group home placement    Review of Systems  Review of Systems   Unable to perform ROS: Medical condition        Physical Exam  Temp:  [36.3 °C (97.3 °F)-36.7 °C (98.1 °F)] 36.3 °C (97.3 °F)  Pulse:  [61-72] 71  Resp:  [16-17] 16  BP: (113-121)/(57-95) 121/95  SpO2:  [96 %-98 %] 98 %    Physical Exam   Constitutional: She appears lethargic. She appears cachectic. She has a sickly appearance. She appears ill.   Frail   Chronically ill appearing   HENT:   Head: Normocephalic and atraumatic.   Eyes: Pupils are equal, round, and reactive to light. No scleral icterus.   Neck: No thyromegaly present.   Cardiovascular: Normal rate and regular rhythm.  Exam reveals no friction rub.    Pulmonary/Chest: Effort normal.   Abdominal: Soft. She exhibits no distension.   Musculoskeletal: Normal range of motion. She exhibits no deformity.   Neurological: She appears lethargic.   Psychiatric: She is slowed and withdrawn.   Vitals reviewed.  3/21 - more alert today but otherwise unchanged    Fluids    Intake/Output Summary (Last 24 hours) at 03/28/19 1642  Last data filed at 03/28/19 0800   Gross per 24 hour   Intake              980 ml   Output                0 ml   Net              980 ml        Laboratory                        Imaging  DX-CHEST-PORTABLE (1 VIEW)   Final Result         1.  Hazy interstitial left pulmonary opacities suggests interstitial edema or infiltrate, similar to prior.   2.  Trace left pleural effusion   3.  Hyperexpansion of lungs favors changes of COPD.      DX-CHEST-PORTABLE (1 VIEW)   Final Result         1.  Interstitial infiltrates or edema, stable.   2.  Atherosclerosis      DX-CHEST-PORTABLE (1 VIEW)   Final Result         1.  Interstitial infiltrates or edema.   2.  Atherosclerosis      DX-CHEST-PORTABLE (1 VIEW)   Final Result      Moderate interstitial edema or interstitial lung disease and small left pleural effusion similar to previous.      DX-CHEST-PORTABLE (1 VIEW)   Final Result      Stable interstitial edema and/or interstitial lung disease with probable small pleural fluid      DX-CHEST-PORTABLE (1 VIEW)   Final Result      Ill-defined infrahilar interstitial opacities, atelectasis versus mild edema. No significant pleural effusions.      DX-CHEST-PORTABLE (1 VIEW)   Final Result      Mild left basilar atelectasis, improved since prior. No new consolidation or large pleural effusions.      DX-CHEST-PORTABLE (1 VIEW)   Final Result      1.  Left basilar opacification may be secondary to atelectasis. Developing pneumonia cannot be excluded.         DX-CHEST-PORTABLE (1 VIEW)   Final Result      1.  Unchanged mild interstitial pulmonary edema   2.  Unchanged bibasilar atelectasis, alveolar edema or pneumonia      DX-CHEST-PORTABLE (1 VIEW)   Final Result      1.  Decreased pulmonary edema   2.  Unchanged bibasilar atelectasis, alveolar edema or pneumonia      DX-CHEST-PORTABLE (1 VIEW)   Final Result      1.  There is diffuse interstitial and alveolar density in the right mid and lower lung zone. This is increased since the previous study. This may represent mild pulmonary edema/consolidation.   2.  Mildly enlarged cardiomediastinal silhouette when compared with  the previous chest x-ray.      DX-CHEST-PORTABLE (1 VIEW)   Final Result      Stable mild pulmonary edema.   New left basilar atelectasis.      DX-CHEST-PORTABLE (1 VIEW)   Final Result      Stable chest appearance compared with 11/16.      DX-CHEST-PORTABLE (1 VIEW)   Final Result      No acute cardiopulmonary abnormality identified.      DX-CHEST-PORTABLE (1 VIEW)   Final Result      No acute cardiopulmonary abnormality. No interval change.      DX-CHEST-PORTABLE (1 VIEW)   Final Result      No acute cardiopulmonary disease.      CT-HEAD W/O   Final Result      1.  No evidence of acute intracranial process.      2.  There is again seen interstitial pulmonary edema and bibasilar atelectasis.      CT-HIP W/O PLUS RECONS LEFT   Final Result      1.  No evidence of acute fracture or malalignment. No evidence of hardware failure or complication.   2.  Postsurgical changes of the left femur with broken screw fragment redemonstrated.   3.  Demineralization.   4.  Scattered colonic diverticula.   5.  Atherosclerosis.   6.  Small fat-containing umbilical hernia.      EC-ECHOCARDIOGRAM COMPLETE W/O CONT   Final Result      DX-HIP-COMPLETE - UNILATERAL 2+ LEFT   Final Result      1.  No definite acute osseous abnormality. In setting of demineralization, an occult fracture is difficult to exclude. If there is strong clinical suspicion, CT or MRI could be obtained for further evaluation.   2.  Postsurgical changes of the left femur with broken screw fragment redemonstrated.      DX-CHEST-PORTABLE (1 VIEW)   Final Result      No acute cardiopulmonary process is seen.      Atherosclerotic plaque.           Assessment/Plan  * Advanced directives, counseling/discussion- (present on admission)   Assessment & Plan    Placement is an issue for the patient continued on comfort care  Searching for group home placement.  [facility] assisting.  Difficult discharge     Comfort measures only status- (present on admission)   Assessment  & Plan    No signs of distress, continue comfort care options     ST elevation myocardial infarction involving right coronary artery (HCC)- (present on admission)   Assessment & Plan    Declines any intervention or therapeutic modality  continue with comfort care     Positive QuantiFERON-TB Gold test   Assessment & Plan      AFB negative        Essential hypertension- (present on admission)   Assessment & Plan    Comfort care     Chronic kidney disease (CKD), stage III (moderate) (HCC)- (present on admission)   Assessment & Plan    Continue comfort care.       Fall- (present on admission)   Assessment & Plan    Continue fall precautions        on comfort care. No change from previous day       VTE prophylaxis: comfort care

## 2019-03-28 NOTE — PROGRESS NOTES
Per chart review, pt remains admitted at [facility]. Per last discharge notes, from inpatient supervisor RADHA Arrieta, on  03/22/2019 conversation took place with Aging and Disability Services Division ([facility]) supervisor and [facility] supervisor. It was documented that [facility] notified inpatient side of concerns related to pt's verified life insurance polices from previous referral placed by this MSW in Oct 2018.      Per discharge notes it appears inpatient team has reached out to daughter without return call at this time and is looking into assisted living options (possibly at [facility] and [facility] for discharge.)      Plan:  · Email sent to inpatient floor SW (RADHA De Oliveira) and supervisor (RADHA Arrieta) to follow up and offer additional assistance through outpatient Community Care Management department, if needed for collaboration and continuity of care.  · MSW will continue to follow up/monitor while pt is admitted inpatient.

## 2019-03-29 PROCEDURE — 99231 SBSQ HOSP IP/OBS SF/LOW 25: CPT | Performed by: FAMILY MEDICINE

## 2019-03-29 PROCEDURE — 770001 HCHG ROOM/CARE - MED/SURG/GYN PRIV*

## 2019-03-29 NOTE — CARE PLAN
Problem: Safety  Goal: Will remain free from injury  Outcome: PROGRESSING AS EXPECTED  Bed in the lowest locked position. Call light within reach. Bed alarm in use. Hourly rounding in place.     Problem: Bowel/Gastric:  Goal: Normal bowel function is maintained or improved  Outcome: PROGRESSING AS EXPECTED      Problem: Discharge Barriers/Planning  Goal: Patient's continuum of care needs will be met  Outcome: PROGRESSING AS EXPECTED  Pending group home placement.

## 2019-03-29 NOTE — CARE PLAN
Problem: Safety  Goal: Will remain free from falls  Outcome: PROGRESSING AS EXPECTED   Treaded socks in place, bed in the lowest position, bed alarm on, call light and belongings within reach, pt call for assistance appropriately    Problem: Bowel/Gastric:  Goal: Normal bowel function is maintained or improved  Outcome: PROGRESSING AS EXPECTED   03/29

## 2019-03-29 NOTE — PROGRESS NOTES
Report received at 0700. Assumed pt care. Pt up to chair for breakfastPt encouraged to deep breath and cough. C/o of SOB assisted back to bed. Denies pain. Assessment completed. POC discussed, pt agreeable. Fall precaution in place, bed alarm on. Call light within reach. Pt calling appropriately.

## 2019-03-29 NOTE — DISCHARGE PLANNING
Anticipated Discharge Disposition: [facility] with Hospice    Action: Left message for daughter Wild to discuss [facility] option.   Trying to arrange [facility] to come for assessment as possible placement option.     Barriers to Discharge: affordable long-term placement/facility    Plan: Continue to f/u with vero Rome

## 2019-03-30 PROCEDURE — 770001 HCHG ROOM/CARE - MED/SURG/GYN PRIV*

## 2019-03-30 PROCEDURE — 99231 SBSQ HOSP IP/OBS SF/LOW 25: CPT | Performed by: INTERNAL MEDICINE

## 2019-03-30 NOTE — CARE PLAN
Problem: Safety  Goal: Will remain free from falls    Intervention: Implement fall precautions  Bed alarm in place, call light within reach, educated to call before ambulating, assistance provided with ambulation.      Problem: Mobility  Goal: Risk for activity intolerance will decrease    Intervention: Encourage patient to increase activity level in collaboration with Interdisciplinary Team  Patient encouraged to ambulate. Was ambulating in [place] with CNA before bed.

## 2019-03-30 NOTE — PROGRESS NOTES
[facility] Medicine Daily Progress Note    Date of Service  3/29/2019    Chief Complaint  89 y.o. female admitted 11/13/2018 with fall and STEMI.    Hospital Course    She was admitted to ICU on medical management. Family elected for comfort care.      Interval Problem Update  3/23 sleeping comfortably on the bed. Continue comfort care.    3/24; no acute issue overnight.    3/25: no event overnight. Lying and sleeping on the bed.  3/26: Laying in bed comfortably.  No acute distress noted.  No issues overnight.  3/27: Resting comfortably.  No distress noted.  3/28: Sleeping.  Looks comfortable.  Not interacting or answer questions.  3/29: Resting comfortably.  No distress noted.  No issues overnight.  Consultants/Specialty  Critical care  cardiology    Code Status  Comfort care, DNR    Disposition  Hospice, pending group home placement    Review of Systems  Review of Systems   Unable to perform ROS: Medical condition        Physical Exam  Temp:  [36.3 °C (97.3 °F)-36.6 °C (97.8 °F)] 36.3 °C (97.3 °F)  Pulse:  [66-76] 76  Resp:  [16-17] 16  BP: ()/(51-54) 109/54  SpO2:  [92 %-94 %] 94 %    Physical Exam   Constitutional: She appears lethargic. She appears cachectic. She has a sickly appearance. She appears ill.   Frail   Chronically ill appearing   HENT:   Head: Normocephalic and atraumatic.   Eyes: Pupils are equal, round, and reactive to light. Left eye exhibits no discharge.   Neck: No thyromegaly present.   Cardiovascular: Normal rate and regular rhythm.  Exam reveals no friction rub.    Pulmonary/Chest: Effort normal. She has decreased breath sounds.   Abdominal: Soft. She exhibits no distension.   Musculoskeletal: Normal range of motion. She exhibits no deformity.   Neurological: She appears lethargic.   Skin: No erythema.   Psychiatric: She is slowed and withdrawn.   Vitals reviewed.  3/21 - more alert today but otherwise unchanged    Fluids  No intake or output data in the 24 hours ending 03/29/19  1749    Laboratory                        Imaging  DX-CHEST-PORTABLE (1 VIEW)   Final Result         1.  Hazy interstitial left pulmonary opacities suggests interstitial edema or infiltrate, similar to prior.   2.  Trace left pleural effusion   3.  Hyperexpansion of lungs favors changes of COPD.      DX-CHEST-PORTABLE (1 VIEW)   Final Result         1.  Interstitial infiltrates or edema, stable.   2.  Atherosclerosis      DX-CHEST-PORTABLE (1 VIEW)   Final Result         1.  Interstitial infiltrates or edema.   2.  Atherosclerosis      DX-CHEST-PORTABLE (1 VIEW)   Final Result      Moderate interstitial edema or interstitial lung disease and small left pleural effusion similar to previous.      DX-CHEST-PORTABLE (1 VIEW)   Final Result      Stable interstitial edema and/or interstitial lung disease with probable small pleural fluid      DX-CHEST-PORTABLE (1 VIEW)   Final Result      Ill-defined infrahilar interstitial opacities, atelectasis versus mild edema. No significant pleural effusions.      DX-CHEST-PORTABLE (1 VIEW)   Final Result      Mild left basilar atelectasis, improved since prior. No new consolidation or large pleural effusions.      DX-CHEST-PORTABLE (1 VIEW)   Final Result      1.  Left basilar opacification may be secondary to atelectasis. Developing pneumonia cannot be excluded.         DX-CHEST-PORTABLE (1 VIEW)   Final Result      1.  Unchanged mild interstitial pulmonary edema   2.  Unchanged bibasilar atelectasis, alveolar edema or pneumonia      DX-CHEST-PORTABLE (1 VIEW)   Final Result      1.  Decreased pulmonary edema   2.  Unchanged bibasilar atelectasis, alveolar edema or pneumonia      DX-CHEST-PORTABLE (1 VIEW)   Final Result      1.  There is diffuse interstitial and alveolar density in the right mid and lower lung zone. This is increased since the previous study. This may represent mild pulmonary edema/consolidation.   2.  Mildly enlarged cardiomediastinal silhouette when compared  with the previous chest x-ray.      DX-CHEST-PORTABLE (1 VIEW)   Final Result      Stable mild pulmonary edema.   New left basilar atelectasis.      DX-CHEST-PORTABLE (1 VIEW)   Final Result      Stable chest appearance compared with 11/16.      DX-CHEST-PORTABLE (1 VIEW)   Final Result      No acute cardiopulmonary abnormality identified.      DX-CHEST-PORTABLE (1 VIEW)   Final Result      No acute cardiopulmonary abnormality. No interval change.      DX-CHEST-PORTABLE (1 VIEW)   Final Result      No acute cardiopulmonary disease.      CT-HEAD W/O   Final Result      1.  No evidence of acute intracranial process.      2.  There is again seen interstitial pulmonary edema and bibasilar atelectasis.      CT-HIP W/O PLUS RECONS LEFT   Final Result      1.  No evidence of acute fracture or malalignment. No evidence of hardware failure or complication.   2.  Postsurgical changes of the left femur with broken screw fragment redemonstrated.   3.  Demineralization.   4.  Scattered colonic diverticula.   5.  Atherosclerosis.   6.  Small fat-containing umbilical hernia.      EC-ECHOCARDIOGRAM COMPLETE W/O CONT   Final Result      DX-HIP-COMPLETE - UNILATERAL 2+ LEFT   Final Result      1.  No definite acute osseous abnormality. In setting of demineralization, an occult fracture is difficult to exclude. If there is strong clinical suspicion, CT or MRI could be obtained for further evaluation.   2.  Postsurgical changes of the left femur with broken screw fragment redemonstrated.      DX-CHEST-PORTABLE (1 VIEW)   Final Result      No acute cardiopulmonary process is seen.      Atherosclerotic plaque.           Assessment/Plan  * Advanced directives, counseling/discussion- (present on admission)   Assessment & Plan    Placement is an issue for the patient continued on comfort care  Searching for group home placement.  [facility] assisting.  Difficult discharge     Comfort measures only status- (present on admission)    Assessment & Plan    No signs of distress, continue comfort care options     Positive QuantiFERON-TB Gold test   Assessment & Plan      AFB negative        ST elevation myocardial infarction involving right coronary artery (HCC)- (present on admission)   Assessment & Plan    Declines any intervention or therapeutic modality  continue with comfort care     Essential hypertension- (present on admission)   Assessment & Plan    Comfort care     Chronic kidney disease (CKD), stage III (moderate) (HCC)- (present on admission)   Assessment & Plan    Continue comfort care.       Fall- (present on admission)   Assessment & Plan    Continue fall precautions        on comfort care. No change from previous day       VTE prophylaxis: comfort care

## 2019-03-30 NOTE — CARE PLAN
Problem: Safety  Goal: Will remain free from falls  Outcome: PROGRESSING AS EXPECTED   Treaded socks in place, bed in the lowest position, bed alarm on, call light and belongings within reach, pt call for assistance appropriately    Problem: Mobility  Goal: Risk for activity intolerance will decrease  Outcome: PROGRESSING AS EXPECTED  Pt mobilizing in halls

## 2019-03-31 PROCEDURE — 770001 HCHG ROOM/CARE - MED/SURG/GYN PRIV*

## 2019-03-31 PROCEDURE — 99231 SBSQ HOSP IP/OBS SF/LOW 25: CPT | Performed by: INTERNAL MEDICINE

## 2019-03-31 NOTE — PROGRESS NOTES
[facility] Medicine Daily Progress Note    Date of Service  3/30/2019    Chief Complaint  89 y.o. female admitted 11/13/2018 with fall and STEMI.    Hospital Course    She was admitted to ICU on medical management. Family elected for comfort care.      Interval Problem Update  3/23 sleeping comfortably on the bed. Continue comfort care.    3/24; no acute issue overnight.    3/25: no event overnight. Lying and sleeping on the bed.  3/26: Laying in bed comfortably.  No acute distress noted.  No issues overnight.  3/27: Resting comfortably.  No distress noted.  3/28: Sleeping.  Looks comfortable.  Not interacting or answer questions.  3/29: Resting comfortably.  No distress noted.  No issues overnight.  3/30:  Sleeping in bed.  No acute issues overnight.     Consultants/Specialty  Critical care  cardiology    Code Status  Comfort care, DNR    Disposition  Hospice, pending group home placement    Review of Systems  Review of Systems   Unable to perform ROS: Medical condition        Physical Exam  Temp:  [36 °C (96.8 °F)-36.2 °C (97.2 °F)] 36 °C (96.8 °F)  Pulse:  [78] 78  Resp:  [16-17] 16  BP: ()/(56-76) 130/76  SpO2:  [93 %] 93 %    Physical Exam   Constitutional: She appears lethargic. She appears cachectic. She has a sickly appearance. She appears ill.   Frail   Chronically ill appearing   HENT:   Head: Normocephalic and atraumatic.   Eyes: Pupils are equal, round, and reactive to light. Left eye exhibits no discharge.   Neck: No thyromegaly present.   Cardiovascular: Normal rate and regular rhythm.  Exam reveals no friction rub.    Pulmonary/Chest: Effort normal. She has decreased breath sounds.   Abdominal: Soft. She exhibits no distension.   Musculoskeletal: Normal range of motion. She exhibits no deformity.   Neurological: She appears lethargic.   Skin: No erythema.   Psychiatric: She is slowed and withdrawn.   Vitals reviewed.  3/21 - more alert today but otherwise unchanged    Fluids  No intake or output  data in the 24 hours ending 03/30/19 9370    Laboratory                        Imaging  DX-CHEST-PORTABLE (1 VIEW)   Final Result         1.  Hazy interstitial left pulmonary opacities suggests interstitial edema or infiltrate, similar to prior.   2.  Trace left pleural effusion   3.  Hyperexpansion of lungs favors changes of COPD.      DX-CHEST-PORTABLE (1 VIEW)   Final Result         1.  Interstitial infiltrates or edema, stable.   2.  Atherosclerosis      DX-CHEST-PORTABLE (1 VIEW)   Final Result         1.  Interstitial infiltrates or edema.   2.  Atherosclerosis      DX-CHEST-PORTABLE (1 VIEW)   Final Result      Moderate interstitial edema or interstitial lung disease and small left pleural effusion similar to previous.      DX-CHEST-PORTABLE (1 VIEW)   Final Result      Stable interstitial edema and/or interstitial lung disease with probable small pleural fluid      DX-CHEST-PORTABLE (1 VIEW)   Final Result      Ill-defined infrahilar interstitial opacities, atelectasis versus mild edema. No significant pleural effusions.      DX-CHEST-PORTABLE (1 VIEW)   Final Result      Mild left basilar atelectasis, improved since prior. No new consolidation or large pleural effusions.      DX-CHEST-PORTABLE (1 VIEW)   Final Result      1.  Left basilar opacification may be secondary to atelectasis. Developing pneumonia cannot be excluded.         DX-CHEST-PORTABLE (1 VIEW)   Final Result      1.  Unchanged mild interstitial pulmonary edema   2.  Unchanged bibasilar atelectasis, alveolar edema or pneumonia      DX-CHEST-PORTABLE (1 VIEW)   Final Result      1.  Decreased pulmonary edema   2.  Unchanged bibasilar atelectasis, alveolar edema or pneumonia      DX-CHEST-PORTABLE (1 VIEW)   Final Result      1.  There is diffuse interstitial and alveolar density in the right mid and lower lung zone. This is increased since the previous study. This may represent mild pulmonary edema/consolidation.   2.  Mildly enlarged  cardiomediastinal silhouette when compared with the previous chest x-ray.      DX-CHEST-PORTABLE (1 VIEW)   Final Result      Stable mild pulmonary edema.   New left basilar atelectasis.      DX-CHEST-PORTABLE (1 VIEW)   Final Result      Stable chest appearance compared with 11/16.      DX-CHEST-PORTABLE (1 VIEW)   Final Result      No acute cardiopulmonary abnormality identified.      DX-CHEST-PORTABLE (1 VIEW)   Final Result      No acute cardiopulmonary abnormality. No interval change.      DX-CHEST-PORTABLE (1 VIEW)   Final Result      No acute cardiopulmonary disease.      CT-HEAD W/O   Final Result      1.  No evidence of acute intracranial process.      2.  There is again seen interstitial pulmonary edema and bibasilar atelectasis.      CT-HIP W/O PLUS RECONS LEFT   Final Result      1.  No evidence of acute fracture or malalignment. No evidence of hardware failure or complication.   2.  Postsurgical changes of the left femur with broken screw fragment redemonstrated.   3.  Demineralization.   4.  Scattered colonic diverticula.   5.  Atherosclerosis.   6.  Small fat-containing umbilical hernia.      EC-ECHOCARDIOGRAM COMPLETE W/O CONT   Final Result      DX-HIP-COMPLETE - UNILATERAL 2+ LEFT   Final Result      1.  No definite acute osseous abnormality. In setting of demineralization, an occult fracture is difficult to exclude. If there is strong clinical suspicion, CT or MRI could be obtained for further evaluation.   2.  Postsurgical changes of the left femur with broken screw fragment redemonstrated.      DX-CHEST-PORTABLE (1 VIEW)   Final Result      No acute cardiopulmonary process is seen.      Atherosclerotic plaque.           Assessment/Plan  * Advanced directives, counseling/discussion- (present on admission)   Assessment & Plan    Placement is an issue for the patient continued on comfort care  Searching for group home placement.  [facility] assisting.  Difficult discharge  -awaiting for  placement     Comfort measures only status- (present on admission)   Assessment & Plan    No signs of distress, continue comfort care options  -- the patient appears comfortable.     Positive QuantiFERON-TB Gold test   Assessment & Plan      AFB negative        ST elevation myocardial infarction involving right coronary artery (HCC)- (present on admission)   Assessment & Plan    Declines any intervention or therapeutic modality  continue with comfort care     Essential hypertension- (present on admission)   Assessment & Plan    Comfort care     Chronic kidney disease (CKD), stage III (moderate) (HCC)- (present on admission)   Assessment & Plan    Continue comfort care.       Fall- (present on admission)   Assessment & Plan    Continue fall precautions        on comfort care. No change from previous day       VTE prophylaxis: comfort care

## 2019-03-31 NOTE — CARE PLAN
Problem: Safety  Goal: Will remain free from injury    Intervention: Provide assistance with mobility  Assistance provided with mobility.      Problem: Bowel/Gastric:  Goal: Normal bowel function is maintained or improved    Intervention: Educate patient and significant other/support system about diet, fluid intake, medications and activity to promote bowel function  Educated patient on diet and fluid intake to prevent constipation. Educated patient if no BM this shift, she should consider taking a stool softener in AM.

## 2019-03-31 NOTE — CARE PLAN
Problem: Safety  Goal: Will remain free from falls  Outcome: PROGRESSING AS EXPECTED   Treaded socks in place, bed in the lowest position, bed alarm on, call light and belongings within reach, pt educated to call for assistance    Problem: Respiratory:  Goal: Respiratory status will improve  Outcome: PROGRESSING AS EXPECTED

## 2019-04-01 PROCEDURE — 99231 SBSQ HOSP IP/OBS SF/LOW 25: CPT | Performed by: INTERNAL MEDICINE

## 2019-04-01 PROCEDURE — 770001 HCHG ROOM/CARE - MED/SURG/GYN PRIV*

## 2019-04-01 PROCEDURE — A9270 NON-COVERED ITEM OR SERVICE: HCPCS | Performed by: HOSPITALIST

## 2019-04-01 PROCEDURE — 700102 HCHG RX REV CODE 250 W/ 637 OVERRIDE(OP): Performed by: HOSPITALIST

## 2019-04-01 RX ADMIN — SENNOSIDES AND DOCUSATE SODIUM 2 TABLET: 8.6; 5 TABLET ORAL at 08:33

## 2019-04-01 RX ADMIN — FUROSEMIDE 20 MG: 20 TABLET ORAL at 08:33

## 2019-04-01 NOTE — PROGRESS NOTES
[facility] Medicine Daily Progress Note    Date of Service  3/31/2019    Chief Complaint  89 y.o. female admitted 11/13/2018 with fall and STEMI.    Hospital Course    She was admitted to ICU on medical management. Family elected for comfort care.      Interval Problem Update  3/23 sleeping comfortably on the bed. Continue comfort care.    3/24; no acute issue overnight.    3/25: no event overnight. Lying and sleeping on the bed.  3/26: Laying in bed comfortably.  No acute distress noted.  No issues overnight.  3/27: Resting comfortably.  No distress noted.  3/28: Sleeping.  Looks comfortable.  Not interacting or answer questions.  3/29: Resting comfortably.  No distress noted.  No issues overnight.  3/30:  Sleeping in bed.  No acute issues overnight.   4/1:  The patient is sleeping comfortably in bed.  No acute event overnight.    Consultants/Specialty  Critical care signed off  Cardiology signed off    Code Status  Comfort care, DNR    Disposition  Hospice, pending group home placement    Review of Systems  Review of Systems   Unable to perform ROS: Medical condition        Physical Exam  Temp:  [36.3 °C (97.4 °F)-36.6 °C (97.8 °F)] 36.3 °C (97.4 °F)  Pulse:  [76-77] 77  Resp:  [16-18] 16  BP: (112-114)/(62-68) 114/62  SpO2:  [94 %] 94 %    Physical Exam   Constitutional: She appears lethargic. She appears cachectic. She has a sickly appearance. She appears ill.   Frail   Chronically ill appearing   HENT:   Head: Normocephalic and atraumatic.   Eyes: Pupils are equal, round, and reactive to light. Left eye exhibits no discharge.   Neck: No thyromegaly present.   Cardiovascular: Normal rate and regular rhythm.  Exam reveals no friction rub.    Pulmonary/Chest: Effort normal. She has decreased breath sounds.   Abdominal: Soft. She exhibits no distension.   Musculoskeletal: Normal range of motion. She exhibits no deformity.   Neurological: She appears lethargic.   Skin: No erythema.   Psychiatric: She is slowed and  withdrawn.   Vitals reviewed.  3/21 - more alert today but otherwise unchanged    Fluids  No intake or output data in the 24 hours ending 03/31/19 2031    Laboratory                        Imaging  DX-CHEST-PORTABLE (1 VIEW)   Final Result         1.  Hazy interstitial left pulmonary opacities suggests interstitial edema or infiltrate, similar to prior.   2.  Trace left pleural effusion   3.  Hyperexpansion of lungs favors changes of COPD.      DX-CHEST-PORTABLE (1 VIEW)   Final Result         1.  Interstitial infiltrates or edema, stable.   2.  Atherosclerosis      DX-CHEST-PORTABLE (1 VIEW)   Final Result         1.  Interstitial infiltrates or edema.   2.  Atherosclerosis      DX-CHEST-PORTABLE (1 VIEW)   Final Result      Moderate interstitial edema or interstitial lung disease and small left pleural effusion similar to previous.      DX-CHEST-PORTABLE (1 VIEW)   Final Result      Stable interstitial edema and/or interstitial lung disease with probable small pleural fluid      DX-CHEST-PORTABLE (1 VIEW)   Final Result      Ill-defined infrahilar interstitial opacities, atelectasis versus mild edema. No significant pleural effusions.      DX-CHEST-PORTABLE (1 VIEW)   Final Result      Mild left basilar atelectasis, improved since prior. No new consolidation or large pleural effusions.      DX-CHEST-PORTABLE (1 VIEW)   Final Result      1.  Left basilar opacification may be secondary to atelectasis. Developing pneumonia cannot be excluded.         DX-CHEST-PORTABLE (1 VIEW)   Final Result      1.  Unchanged mild interstitial pulmonary edema   2.  Unchanged bibasilar atelectasis, alveolar edema or pneumonia      DX-CHEST-PORTABLE (1 VIEW)   Final Result      1.  Decreased pulmonary edema   2.  Unchanged bibasilar atelectasis, alveolar edema or pneumonia      DX-CHEST-PORTABLE (1 VIEW)   Final Result      1.  There is diffuse interstitial and alveolar density in the right mid and lower lung zone. This is increased  since the previous study. This may represent mild pulmonary edema/consolidation.   2.  Mildly enlarged cardiomediastinal silhouette when compared with the previous chest x-ray.      DX-CHEST-PORTABLE (1 VIEW)   Final Result      Stable mild pulmonary edema.   New left basilar atelectasis.      DX-CHEST-PORTABLE (1 VIEW)   Final Result      Stable chest appearance compared with 11/16.      DX-CHEST-PORTABLE (1 VIEW)   Final Result      No acute cardiopulmonary abnormality identified.      DX-CHEST-PORTABLE (1 VIEW)   Final Result      No acute cardiopulmonary abnormality. No interval change.      DX-CHEST-PORTABLE (1 VIEW)   Final Result      No acute cardiopulmonary disease.      CT-HEAD W/O   Final Result      1.  No evidence of acute intracranial process.      2.  There is again seen interstitial pulmonary edema and bibasilar atelectasis.      CT-HIP W/O PLUS RECONS LEFT   Final Result      1.  No evidence of acute fracture or malalignment. No evidence of hardware failure or complication.   2.  Postsurgical changes of the left femur with broken screw fragment redemonstrated.   3.  Demineralization.   4.  Scattered colonic diverticula.   5.  Atherosclerosis.   6.  Small fat-containing umbilical hernia.      EC-ECHOCARDIOGRAM COMPLETE W/O CONT   Final Result      DX-HIP-COMPLETE - UNILATERAL 2+ LEFT   Final Result      1.  No definite acute osseous abnormality. In setting of demineralization, an occult fracture is difficult to exclude. If there is strong clinical suspicion, CT or MRI could be obtained for further evaluation.   2.  Postsurgical changes of the left femur with broken screw fragment redemonstrated.      DX-CHEST-PORTABLE (1 VIEW)   Final Result      No acute cardiopulmonary process is seen.      Atherosclerotic plaque.           Assessment/Plan  * Advanced directives, counseling/discussion- (present on admission)   Assessment & Plan    Placement is an issue for the patient continued on comfort  care  Searching for group home placement.  [facility] assisting.  Difficult discharge  -awaiting for placement     Comfort measures only status- (present on admission)   Assessment & Plan    No signs of distress, continue comfort care options  -- the patient appears comfortable.     Positive QuantiFERON-TB Gold test   Assessment & Plan      AFB negative        ST elevation myocardial infarction involving right coronary artery (HCC)- (present on admission)   Assessment & Plan    Declines any intervention or therapeutic modality  continue with comfort care     Essential hypertension- (present on admission)   Assessment & Plan    Comfort care     Chronic kidney disease (CKD), stage III (moderate) (HCC)- (present on admission)   Assessment & Plan    Continue comfort care.       Fall- (present on admission)   Assessment & Plan    Continue fall precautions        on comfort care. No change from previous day.  Awaiting for placement      VTE prophylaxis: comfort care

## 2019-04-01 NOTE — CARE PLAN
Problem: Safety  Goal: Will remain free from injury  Outcome: PROGRESSING AS EXPECTED  Patient calls for help appropriately, bed alarm and falls precautions in place.     Problem: Mobility  Goal: Risk for activity intolerance will decrease  Outcome: PROGRESSING AS EXPECTED  Patient ambulating well with assistance.

## 2019-04-01 NOTE — PROGRESS NOTES
[facility] Medicine Daily Progress Note    Date of Service  4/1/2019    Chief Complaint  89 y.o. female admitted 11/13/2018 with fall and STEMI.    Hospital Course    She was admitted to ICU on medical management. Family elected for comfort care.      Interval Problem Update  3/23 sleeping comfortably on the bed. Continue comfort care.    3/24; no acute issue overnight.    3/25: no event overnight. Lying and sleeping on the bed.  3/26: Laying in bed comfortably.  No acute distress noted.  No issues overnight.  3/27: Resting comfortably.  No distress noted.  3/28: Sleeping.  Looks comfortable.  Not interacting or answer questions.  3/29: Resting comfortably.  No distress noted.  No issues overnight.  3/30:  Sleeping in bed.  No acute issues overnight.   3/31:  The patient is sleeping comfortably in bed.  No acute event overnight.  4/1:  No acute event overnight.    Consultants/Specialty  Critical care signed off  Cardiology signed off    Code Status  Comfort care, DNR    Disposition  Hospice, pending group home placement. Difficult placement due to no family available to make a choice.    Review of Systems  Review of Systems   Unable to perform ROS: Medical condition        Physical Exam  Temp:  [36.2 °C (97.1 °F)-36.3 °C (97.4 °F)] 36.2 °C (97.2 °F)  Pulse:  [70-79] 79  Resp:  [16] 16  BP: (114-126)/(61-62) 114/61  SpO2:  [92 %-94 %] 92 %    Physical Exam   Constitutional: She appears lethargic. She appears cachectic. She has a sickly appearance. She appears ill.   Frail   Chronically ill appearing   HENT:   Head: Normocephalic and atraumatic.   Eyes: Pupils are equal, round, and reactive to light. Left eye exhibits no discharge.   Neck: No thyromegaly present.   Cardiovascular: Normal rate and regular rhythm.  Exam reveals no friction rub.    Pulmonary/Chest: Effort normal. She has decreased breath sounds.   Abdominal: Soft. She exhibits no distension.   Musculoskeletal: Normal range of motion. She exhibits no  deformity.   Neurological: She appears lethargic.   Skin: No erythema.   Psychiatric: She is slowed and withdrawn.   Vitals reviewed.  3/21 - more alert today but otherwise unchanged    Fluids    Intake/Output Summary (Last 24 hours) at 04/01/19 1140  Last data filed at 04/01/19 0800   Gross per 24 hour   Intake              300 ml   Output              850 ml   Net             -550 ml       Laboratory                        Imaging  DX-CHEST-PORTABLE (1 VIEW)   Final Result         1.  Hazy interstitial left pulmonary opacities suggests interstitial edema or infiltrate, similar to prior.   2.  Trace left pleural effusion   3.  Hyperexpansion of lungs favors changes of COPD.      DX-CHEST-PORTABLE (1 VIEW)   Final Result         1.  Interstitial infiltrates or edema, stable.   2.  Atherosclerosis      DX-CHEST-PORTABLE (1 VIEW)   Final Result         1.  Interstitial infiltrates or edema.   2.  Atherosclerosis      DX-CHEST-PORTABLE (1 VIEW)   Final Result      Moderate interstitial edema or interstitial lung disease and small left pleural effusion similar to previous.      DX-CHEST-PORTABLE (1 VIEW)   Final Result      Stable interstitial edema and/or interstitial lung disease with probable small pleural fluid      DX-CHEST-PORTABLE (1 VIEW)   Final Result      Ill-defined infrahilar interstitial opacities, atelectasis versus mild edema. No significant pleural effusions.      DX-CHEST-PORTABLE (1 VIEW)   Final Result      Mild left basilar atelectasis, improved since prior. No new consolidation or large pleural effusions.      DX-CHEST-PORTABLE (1 VIEW)   Final Result      1.  Left basilar opacification may be secondary to atelectasis. Developing pneumonia cannot be excluded.         DX-CHEST-PORTABLE (1 VIEW)   Final Result      1.  Unchanged mild interstitial pulmonary edema   2.  Unchanged bibasilar atelectasis, alveolar edema or pneumonia      DX-CHEST-PORTABLE (1 VIEW)   Final Result      1.  Decreased  pulmonary edema   2.  Unchanged bibasilar atelectasis, alveolar edema or pneumonia      DX-CHEST-PORTABLE (1 VIEW)   Final Result      1.  There is diffuse interstitial and alveolar density in the right mid and lower lung zone. This is increased since the previous study. This may represent mild pulmonary edema/consolidation.   2.  Mildly enlarged cardiomediastinal silhouette when compared with the previous chest x-ray.      DX-CHEST-PORTABLE (1 VIEW)   Final Result      Stable mild pulmonary edema.   New left basilar atelectasis.      DX-CHEST-PORTABLE (1 VIEW)   Final Result      Stable chest appearance compared with 11/16.      DX-CHEST-PORTABLE (1 VIEW)   Final Result      No acute cardiopulmonary abnormality identified.      DX-CHEST-PORTABLE (1 VIEW)   Final Result      No acute cardiopulmonary abnormality. No interval change.      DX-CHEST-PORTABLE (1 VIEW)   Final Result      No acute cardiopulmonary disease.      CT-HEAD W/O   Final Result      1.  No evidence of acute intracranial process.      2.  There is again seen interstitial pulmonary edema and bibasilar atelectasis.      CT-HIP W/O PLUS RECONS LEFT   Final Result      1.  No evidence of acute fracture or malalignment. No evidence of hardware failure or complication.   2.  Postsurgical changes of the left femur with broken screw fragment redemonstrated.   3.  Demineralization.   4.  Scattered colonic diverticula.   5.  Atherosclerosis.   6.  Small fat-containing umbilical hernia.      EC-ECHOCARDIOGRAM COMPLETE W/O CONT   Final Result      DX-HIP-COMPLETE - UNILATERAL 2+ LEFT   Final Result      1.  No definite acute osseous abnormality. In setting of demineralization, an occult fracture is difficult to exclude. If there is strong clinical suspicion, CT or MRI could be obtained for further evaluation.   2.  Postsurgical changes of the left femur with broken screw fragment redemonstrated.      DX-CHEST-PORTABLE (1 VIEW)   Final Result      No acute  cardiopulmonary process is seen.      Atherosclerotic plaque.           Assessment/Plan  * Advanced directives, counseling/discussion- (present on admission)   Assessment & Plan    Placement is an issue for the patient continued on comfort care  Searching for group home placement.  [facility] assisting.  Difficult discharge  -awaiting for placement     Comfort measures only status- (present on admission)   Assessment & Plan    No signs of distress, continue comfort care options  -- the patient appears comfortable.     Positive QuantiFERON-TB Gold test   Assessment & Plan      AFB negative        ST elevation myocardial infarction involving right coronary artery (HCC)- (present on admission)   Assessment & Plan    Declines any intervention or therapeutic modality  continue with comfort care     Essential hypertension- (present on admission)   Assessment & Plan    Comfort care     Chronic kidney disease (CKD), stage III (moderate) (HCC)- (present on admission)   Assessment & Plan    Continue comfort care.       Fall- (present on admission)   Assessment & Plan    Continue fall precautions        on comfort care. No change from previous day.  Awaiting for placement.  Discussed discharge planning with .      VTE prophylaxis: comfort care

## 2019-04-02 PROCEDURE — 770001 HCHG ROOM/CARE - MED/SURG/GYN PRIV*

## 2019-04-02 PROCEDURE — A9270 NON-COVERED ITEM OR SERVICE: HCPCS | Performed by: HOSPITALIST

## 2019-04-02 PROCEDURE — 99231 SBSQ HOSP IP/OBS SF/LOW 25: CPT | Performed by: FAMILY MEDICINE

## 2019-04-02 PROCEDURE — 700102 HCHG RX REV CODE 250 W/ 637 OVERRIDE(OP): Performed by: HOSPITALIST

## 2019-04-02 RX ADMIN — SENNOSIDES AND DOCUSATE SODIUM 2 TABLET: 8.6; 5 TABLET ORAL at 17:00

## 2019-04-02 RX ADMIN — FUROSEMIDE 20 MG: 20 TABLET ORAL at 05:52

## 2019-04-02 RX ADMIN — SENNOSIDES AND DOCUSATE SODIUM 2 TABLET: 8.6; 5 TABLET ORAL at 05:52

## 2019-04-02 NOTE — PROGRESS NOTES
Assumed care of patient at 0700. Patient resting in bed. Refuses to get out of bed for breakfast. Patient does not want to reposition, states she is comfortable and will reposition when she feels the need to to. Hourly rounding in place.

## 2019-04-02 NOTE — PROGRESS NOTES
[facility] Medicine Daily Progress Note    Date of Service  4/2/2019    Chief Complaint  89 y.o. female admitted 11/13/2018 with fall and STEMI.    Hospital Course    She was admitted to ICU on medical management. Family elected for comfort care.      Interval Problem Update  3/23 sleeping comfortably on the bed. Continue comfort care.    3/24; no acute issue overnight.    3/25: no event overnight. Lying and sleeping on the bed.  3/26: Laying in bed comfortably.  No acute distress noted.  No issues overnight.  3/27: Resting comfortably.  No distress noted.  3/28: Sleeping.  Looks comfortable.  Not interacting or answer questions.  3/29: Resting comfortably.  No distress noted.  No issues overnight.  3/30:  Sleeping in bed.  No acute issues overnight.   3/31:  The patient is sleeping comfortably in bed.  No acute event overnight.  4/1:  No acute event overnight.  4/2: Resting comfortably in bed.  No distress noted.  No issues overnight per staff.  Consultants/Specialty  Critical care signed off  Cardiology signed off    Code Status  Comfort care, DNR    Disposition  Hospice, pending group home placement. Difficult placement due to no family available to make a choice.    Review of Systems  Review of Systems   Unable to perform ROS: Medical condition        Physical Exam  Temp:  [36.1 °C (97 °F)-36.3 °C (97.3 °F)] 36.1 °C (97 °F)  Pulse:  [69-75] 69  Resp:  [16] 16  BP: (106-128)/(61-72) 106/61  SpO2:  [92 %-95 %] 95 %    Physical Exam   Constitutional: She appears lethargic. She appears cachectic. She has a sickly appearance. She appears ill. No distress.   Frail   Chronically ill appearing   HENT:   Head: Normocephalic and atraumatic.   Eyes: Pupils are equal, round, and reactive to light. No scleral icterus.   Neck: No tracheal deviation present.   Cardiovascular: Normal rate and regular rhythm.  Exam reveals no friction rub.    Pulmonary/Chest: Effort normal. No respiratory distress. She has decreased breath sounds.    Abdominal: Soft. She exhibits no distension.   Musculoskeletal: Normal range of motion.   Neurological: She appears lethargic.   Psychiatric: She is slowed and withdrawn.   Vitals reviewed.  3/21 - more alert today but otherwise unchanged    Fluids    Intake/Output Summary (Last 24 hours) at 04/02/19 1640  Last data filed at 04/02/19 1300   Gross per 24 hour   Intake              360 ml   Output                0 ml   Net              360 ml       Laboratory                        Imaging  DX-CHEST-PORTABLE (1 VIEW)   Final Result         1.  Hazy interstitial left pulmonary opacities suggests interstitial edema or infiltrate, similar to prior.   2.  Trace left pleural effusion   3.  Hyperexpansion of lungs favors changes of COPD.      DX-CHEST-PORTABLE (1 VIEW)   Final Result         1.  Interstitial infiltrates or edema, stable.   2.  Atherosclerosis      DX-CHEST-PORTABLE (1 VIEW)   Final Result         1.  Interstitial infiltrates or edema.   2.  Atherosclerosis      DX-CHEST-PORTABLE (1 VIEW)   Final Result      Moderate interstitial edema or interstitial lung disease and small left pleural effusion similar to previous.      DX-CHEST-PORTABLE (1 VIEW)   Final Result      Stable interstitial edema and/or interstitial lung disease with probable small pleural fluid      DX-CHEST-PORTABLE (1 VIEW)   Final Result      Ill-defined infrahilar interstitial opacities, atelectasis versus mild edema. No significant pleural effusions.      DX-CHEST-PORTABLE (1 VIEW)   Final Result      Mild left basilar atelectasis, improved since prior. No new consolidation or large pleural effusions.      DX-CHEST-PORTABLE (1 VIEW)   Final Result      1.  Left basilar opacification may be secondary to atelectasis. Developing pneumonia cannot be excluded.         DX-CHEST-PORTABLE (1 VIEW)   Final Result      1.  Unchanged mild interstitial pulmonary edema   2.  Unchanged bibasilar atelectasis, alveolar edema or pneumonia       DX-CHEST-PORTABLE (1 VIEW)   Final Result      1.  Decreased pulmonary edema   2.  Unchanged bibasilar atelectasis, alveolar edema or pneumonia      DX-CHEST-PORTABLE (1 VIEW)   Final Result      1.  There is diffuse interstitial and alveolar density in the right mid and lower lung zone. This is increased since the previous study. This may represent mild pulmonary edema/consolidation.   2.  Mildly enlarged cardiomediastinal silhouette when compared with the previous chest x-ray.      DX-CHEST-PORTABLE (1 VIEW)   Final Result      Stable mild pulmonary edema.   New left basilar atelectasis.      DX-CHEST-PORTABLE (1 VIEW)   Final Result      Stable chest appearance compared with 11/16.      DX-CHEST-PORTABLE (1 VIEW)   Final Result      No acute cardiopulmonary abnormality identified.      DX-CHEST-PORTABLE (1 VIEW)   Final Result      No acute cardiopulmonary abnormality. No interval change.      DX-CHEST-PORTABLE (1 VIEW)   Final Result      No acute cardiopulmonary disease.      CT-HEAD W/O   Final Result      1.  No evidence of acute intracranial process.      2.  There is again seen interstitial pulmonary edema and bibasilar atelectasis.      CT-HIP W/O PLUS RECONS LEFT   Final Result      1.  No evidence of acute fracture or malalignment. No evidence of hardware failure or complication.   2.  Postsurgical changes of the left femur with broken screw fragment redemonstrated.   3.  Demineralization.   4.  Scattered colonic diverticula.   5.  Atherosclerosis.   6.  Small fat-containing umbilical hernia.      EC-ECHOCARDIOGRAM COMPLETE W/O CONT   Final Result      DX-HIP-COMPLETE - UNILATERAL 2+ LEFT   Final Result      1.  No definite acute osseous abnormality. In setting of demineralization, an occult fracture is difficult to exclude. If there is strong clinical suspicion, CT or MRI could be obtained for further evaluation.   2.  Postsurgical changes of the left femur with broken screw fragment redemonstrated.       DX-CHEST-PORTABLE (1 VIEW)   Final Result      No acute cardiopulmonary process is seen.      Atherosclerotic plaque.           Assessment/Plan  * Comfort measures only status- (present on admission)   Assessment & Plan    No signs of distress, continue comfort care options  -- the patient appears comfortable.     ST elevation myocardial infarction involving right coronary artery (HCC)- (present on admission)   Assessment & Plan    Declines any intervention or therapeutic modality  continue with comfort care     Positive QuantiFERON-TB Gold test   Assessment & Plan      AFB negative        Advanced directives, counseling/discussion- (present on admission)   Assessment & Plan    Placement is an issue for the patient continued on comfort care  Searching for group home placement.  [facility] assisting.  Difficult discharge  -awaiting for placement     Essential hypertension- (present on admission)   Assessment & Plan    Comfort care     Chronic kidney disease (CKD), stage III (moderate) (HCC)- (present on admission)   Assessment & Plan    Continue comfort care.       Fall- (present on admission)   Assessment & Plan    Continue fall precautions        on comfort care. No change from previous day.  Awaiting for placement.  Discussed discharge planning with .      VTE prophylaxis: comfort care

## 2019-04-02 NOTE — CARE PLAN
Problem: Safety  Goal: Will remain free from injury  Outcome: PROGRESSING AS EXPECTED  Calls appropriately for assistance at all times, bed alarm in use    Problem: Skin Integrity  Goal: Risk for impaired skin integrity will decrease  Outcome: PROGRESSING SLOWER THAN EXPECTED  cristian refuses to reposition every 2 hours. She turns and repositions herself

## 2019-04-02 NOTE — PROGRESS NOTES
"Assisted patient to the bedside commode. One person assist.   Heels are intact. Patient only wanting to use pillows to float heels.   Sacral area is red and blanching. Re-educated patient regarding skin break down prevention. Patient refused the waffle cushion overlay in bed, states she :is allergic to the plastic, even if there is a sheet over it\". discussed q2 turning and repositioning again, patient now agreeable to repositioning every 2 hours using pillows only.   "

## 2019-04-02 NOTE — CARE PLAN
Problem: Safety  Goal: Will remain free from injury  Outcome: PROGRESSING AS EXPECTED  Bed in the lowest locked position. Call light within reach. Bed alarm in use. Hourly rounding in place.     Problem: Discharge Barriers/Planning  Goal: Patient's continuum of care needs will be met  Outcome: PROGRESSING SLOWER THAN EXPECTED  A/w group home placement    Problem: Mobility  Goal: Risk for activity intolerance will decrease  Outcome: PROGRESSING AS EXPECTED  Patient encouraged to mobilize through the day and ambulate to bathroom for voiding needs.

## 2019-04-03 PROCEDURE — 700102 HCHG RX REV CODE 250 W/ 637 OVERRIDE(OP): Performed by: HOSPITALIST

## 2019-04-03 PROCEDURE — 99231 SBSQ HOSP IP/OBS SF/LOW 25: CPT | Performed by: FAMILY MEDICINE

## 2019-04-03 PROCEDURE — A9270 NON-COVERED ITEM OR SERVICE: HCPCS | Performed by: HOSPITALIST

## 2019-04-03 PROCEDURE — 770001 HCHG ROOM/CARE - MED/SURG/GYN PRIV*

## 2019-04-03 RX ADMIN — SENNOSIDES AND DOCUSATE SODIUM 2 TABLET: 8.6; 5 TABLET ORAL at 06:12

## 2019-04-03 RX ADMIN — FUROSEMIDE 20 MG: 20 TABLET ORAL at 06:12

## 2019-04-03 NOTE — DISCHARGE PLANNING
Anticipated Discharge Disposition: [facility] with [facility]    Action: LSW spoke with Ericka with [facility]. Ericka texted pt's daughter, Wild, who stated that she would love for a referral to be sent to [facility]. LSW informed Hospital Care .     Barriers to Discharge: Long term placement    Plan: Send referral to [facility].

## 2019-04-03 NOTE — CARE PLAN
Problem: Safety  Goal: Will remain free from injury  Outcome: PROGRESSING AS EXPECTED  Bed in the lowest locked position. Call light within reach. Bed alarm in use. Hourly rounding in place.     Problem: Bowel/Gastric:  Goal: Normal bowel function is maintained or improved  Outcome: PROGRESSING AS EXPECTED      Problem: Skin Integrity  Goal: Risk for impaired skin integrity will decrease  Outcome: PROGRESSING AS EXPECTED  Pt encouraged to turn side to side. Refusing mepliex and waffle despite education.

## 2019-04-03 NOTE — CARE PLAN
Problem: Safety  Goal: Will remain free from injury  Outcome: PROGRESSING AS EXPECTED  Bed alarm in place, patient calls appropriately for assistance    Problem: Skin Integrity  Goal: Risk for impaired skin integrity will decrease  Outcome: PROGRESSING SLOWER THAN EXPECTED  Educating and encouraging patient to turn and reposition. refuses most of the time.refusing waffle overlay and moon boots.

## 2019-04-03 NOTE — PROGRESS NOTES
Assumed care of Nora at 0700. Patient is resting in bed. Refusing to get OOB or reposition in bed. Calls nursing appropriately for assistance.

## 2019-04-04 PROCEDURE — A9270 NON-COVERED ITEM OR SERVICE: HCPCS | Performed by: HOSPITALIST

## 2019-04-04 PROCEDURE — 99231 SBSQ HOSP IP/OBS SF/LOW 25: CPT | Performed by: FAMILY MEDICINE

## 2019-04-04 PROCEDURE — 700102 HCHG RX REV CODE 250 W/ 637 OVERRIDE(OP): Performed by: HOSPITALIST

## 2019-04-04 PROCEDURE — 770001 HCHG ROOM/CARE - MED/SURG/GYN PRIV*

## 2019-04-04 RX ADMIN — FUROSEMIDE 20 MG: 20 TABLET ORAL at 06:32

## 2019-04-04 RX ADMIN — POLYETHYLENE GLYCOL 3350 1 PACKET: 17 POWDER, FOR SOLUTION ORAL at 06:32

## 2019-04-04 NOTE — CARE PLAN
Problem: Venous Thromboembolism (VTW)/Deep Vein Thrombosis (DVT) Prevention:  Goal: Patient will participate in Venous Thrombosis (VTE)/Deep Vein Thrombosis (DVT)Prevention Measures  Outcome: PROGRESSING AS EXPECTED  scds refused despite education     Problem: Mobility  Goal: Risk for activity intolerance will decrease  Outcome: PROGRESSING AS EXPECTED  Pt reports feeling too weak to ambulate 50 + ft at time

## 2019-04-04 NOTE — DISCHARGE PLANNING
Anticipated Discharge Disposition: Placement- GH/care home    Action: Call to Janet at [facility] ([phone]). They can come out to assess pt tomorrow. Provided Unit LSW contact information so Janet can let her know when they will come.     Janet said that only single room is available at this time. Pt would be self-pay.     Between pt/daughter's contribution (approx $2500) cost of [facility] would be covered.     Barriers to Discharge: appropriate placement/support    Plan: [facility] to assess and confirm if pt can transfer there

## 2019-04-04 NOTE — PROGRESS NOTES
[facility] Medicine Daily Progress Note    Date of Service  4/3/2019    Chief Complaint  89 y.o. female admitted 11/13/2018 with fall and STEMI.    Hospital Course    She was admitted to ICU on medical management. Family elected for comfort care.      Interval Problem Update  3/23 sleeping comfortably on the bed. Continue comfort care.    3/24; no acute issue overnight.    3/25: no event overnight. Lying and sleeping on the bed.  3/26: Laying in bed comfortably.  No acute distress noted.  No issues overnight.  3/27: Resting comfortably.  No distress noted.  3/28: Sleeping.  Looks comfortable.  Not interacting or answer questions.  3/29: Resting comfortably.  No distress noted.  No issues overnight.  3/30:  Sleeping in bed.  No acute issues overnight.   3/31:  The patient is sleeping comfortably in bed.  No acute event overnight.  4/1:  No acute event overnight.  4/2: Resting comfortably in bed.  No distress noted.  No issues overnight per staff.  4/3: Laying in bed comfortably.  Awake and alert.  Pleasant.  No distress noted.  Consultants/Specialty  Critical care signed off  Cardiology signed off    Code Status  Comfort care, DNR    Disposition  Hospice, pending group home placement. Difficult placement due to no family available to make a choice.    Review of Systems  Review of Systems   Unable to perform ROS: Medical condition        Physical Exam  Temp:  [36.3 °C (97.3 °F)-36.4 °C (97.5 °F)] 36.3 °C (97.3 °F)  Pulse:  [69-81] 81  Resp:  [17-18] 17  BP: (106-124)/(68-74) 106/69  SpO2:  [94 %] 94 %    Physical Exam   Constitutional: She appears cachectic. She has a sickly appearance. She appears ill. No distress.   Frail   Chronically ill appearing   HENT:   Head: Normocephalic and atraumatic.   Eyes: Pupils are equal, round, and reactive to light. Right eye exhibits no discharge. Left eye exhibits no discharge.   Neck: No tracheal deviation present.   Cardiovascular: Normal rate and regular rhythm.  Exam reveals no  friction rub.    Pulmonary/Chest: Effort normal. No respiratory distress. She has decreased breath sounds.   Abdominal: Soft.   Musculoskeletal: Normal range of motion.   Neurological: She is alert.   Skin: She is not diaphoretic.   Psychiatric: She is slowed and withdrawn.   Vitals reviewed.  3/21 - more alert today but otherwise unchanged    Fluids    Intake/Output Summary (Last 24 hours) at 04/03/19 1852  Last data filed at 04/02/19 2000   Gross per 24 hour   Intake              200 ml   Output                0 ml   Net              200 ml       Laboratory                        Imaging  DX-CHEST-PORTABLE (1 VIEW)   Final Result         1.  Hazy interstitial left pulmonary opacities suggests interstitial edema or infiltrate, similar to prior.   2.  Trace left pleural effusion   3.  Hyperexpansion of lungs favors changes of COPD.      DX-CHEST-PORTABLE (1 VIEW)   Final Result         1.  Interstitial infiltrates or edema, stable.   2.  Atherosclerosis      DX-CHEST-PORTABLE (1 VIEW)   Final Result         1.  Interstitial infiltrates or edema.   2.  Atherosclerosis      DX-CHEST-PORTABLE (1 VIEW)   Final Result      Moderate interstitial edema or interstitial lung disease and small left pleural effusion similar to previous.      DX-CHEST-PORTABLE (1 VIEW)   Final Result      Stable interstitial edema and/or interstitial lung disease with probable small pleural fluid      DX-CHEST-PORTABLE (1 VIEW)   Final Result      Ill-defined infrahilar interstitial opacities, atelectasis versus mild edema. No significant pleural effusions.      DX-CHEST-PORTABLE (1 VIEW)   Final Result      Mild left basilar atelectasis, improved since prior. No new consolidation or large pleural effusions.      DX-CHEST-PORTABLE (1 VIEW)   Final Result      1.  Left basilar opacification may be secondary to atelectasis. Developing pneumonia cannot be excluded.         DX-CHEST-PORTABLE (1 VIEW)   Final Result      1.  Unchanged mild  interstitial pulmonary edema   2.  Unchanged bibasilar atelectasis, alveolar edema or pneumonia      DX-CHEST-PORTABLE (1 VIEW)   Final Result      1.  Decreased pulmonary edema   2.  Unchanged bibasilar atelectasis, alveolar edema or pneumonia      DX-CHEST-PORTABLE (1 VIEW)   Final Result      1.  There is diffuse interstitial and alveolar density in the right mid and lower lung zone. This is increased since the previous study. This may represent mild pulmonary edema/consolidation.   2.  Mildly enlarged cardiomediastinal silhouette when compared with the previous chest x-ray.      DX-CHEST-PORTABLE (1 VIEW)   Final Result      Stable mild pulmonary edema.   New left basilar atelectasis.      DX-CHEST-PORTABLE (1 VIEW)   Final Result      Stable chest appearance compared with 11/16.      DX-CHEST-PORTABLE (1 VIEW)   Final Result      No acute cardiopulmonary abnormality identified.      DX-CHEST-PORTABLE (1 VIEW)   Final Result      No acute cardiopulmonary abnormality. No interval change.      DX-CHEST-PORTABLE (1 VIEW)   Final Result      No acute cardiopulmonary disease.      CT-HEAD W/O   Final Result      1.  No evidence of acute intracranial process.      2.  There is again seen interstitial pulmonary edema and bibasilar atelectasis.      CT-HIP W/O PLUS RECONS LEFT   Final Result      1.  No evidence of acute fracture or malalignment. No evidence of hardware failure or complication.   2.  Postsurgical changes of the left femur with broken screw fragment redemonstrated.   3.  Demineralization.   4.  Scattered colonic diverticula.   5.  Atherosclerosis.   6.  Small fat-containing umbilical hernia.      EC-ECHOCARDIOGRAM COMPLETE W/O CONT   Final Result      DX-HIP-COMPLETE - UNILATERAL 2+ LEFT   Final Result      1.  No definite acute osseous abnormality. In setting of demineralization, an occult fracture is difficult to exclude. If there is strong clinical suspicion, CT or MRI could be obtained for further  evaluation.   2.  Postsurgical changes of the left femur with broken screw fragment redemonstrated.      DX-CHEST-PORTABLE (1 VIEW)   Final Result      No acute cardiopulmonary process is seen.      Atherosclerotic plaque.           Assessment/Plan  * Comfort measures only status- (present on admission)   Assessment & Plan    No signs of distress, continue comfort care options  -- the patient appears comfortable.     Positive QuantiFERON-TB Gold test   Assessment & Plan      AFB negative        Advanced directives, counseling/discussion- (present on admission)   Assessment & Plan    Placement is an issue for the patient continued on comfort care  Searching for group home placement.  [facility] assisting.  Difficult discharge  -awaiting for placement     ST elevation myocardial infarction involving right coronary artery (HCC)- (present on admission)   Assessment & Plan    Declines any intervention or therapeutic modality  continue with comfort care     Essential hypertension- (present on admission)   Assessment & Plan    Comfort care     Chronic kidney disease (CKD), stage III (moderate) (HCC)- (present on admission)   Assessment & Plan    Continue comfort care.       Fall- (present on admission)   Assessment & Plan    Continue fall precautions        on comfort care. No change from previous day.  Awaiting for placement.  Discussed discharge planning with .      VTE prophylaxis: comfort care

## 2019-04-04 NOTE — CARE PLAN
Problem: Safety  Goal: Will remain free from falls    Intervention: Implement fall precautions  Pt calls appropriately, refuses treaded socks, personal shoes in use. Personal belongings and call light within reach and bed is locked in lowest position. Bed alarm in use, hourly rounding in place      Problem: Skin Integrity  Goal: Risk for impaired skin integrity will decrease  Outcome: PROGRESSING AS EXPECTED  Skin is intact with sporadic redness that is blanching. Patient refusing waffle overlay, SCD's and mepilex. Patient is able to turn self and has been educated on the importance of doing frequently

## 2019-04-04 NOTE — DISCHARGE PLANNING
Anticipated Discharge Disposition: [facility] vs [facility] with [facility]    Action: LSW left VM for Janet asking for a call back to come out to assess the Pt.     Barriers to Discharge: placement    Plan: Referral to [facility]

## 2019-04-05 PROCEDURE — 770001 HCHG ROOM/CARE - MED/SURG/GYN PRIV*

## 2019-04-05 PROCEDURE — 99231 SBSQ HOSP IP/OBS SF/LOW 25: CPT | Performed by: FAMILY MEDICINE

## 2019-04-05 PROCEDURE — A9270 NON-COVERED ITEM OR SERVICE: HCPCS | Performed by: HOSPITALIST

## 2019-04-05 PROCEDURE — 700102 HCHG RX REV CODE 250 W/ 637 OVERRIDE(OP): Performed by: HOSPITALIST

## 2019-04-05 RX ADMIN — SENNOSIDES AND DOCUSATE SODIUM 2 TABLET: 8.6; 5 TABLET ORAL at 18:57

## 2019-04-05 NOTE — PROGRESS NOTES
[facility] Medicine Daily Progress Note    Date of Service  4/4/2019    Chief Complaint  89 y.o. female admitted 11/13/2018 with fall and STEMI.    Hospital Course    She was admitted to ICU on medical management. Family elected for comfort care.      Interval Problem Update  3/23 sleeping comfortably on the bed. Continue comfort care.    3/24; no acute issue overnight.    3/25: no event overnight. Lying and sleeping on the bed.  3/26: Laying in bed comfortably.  No acute distress noted.  No issues overnight.  3/27: Resting comfortably.  No distress noted.  3/28: Sleeping.  Looks comfortable.  Not interacting or answer questions.  3/29: Resting comfortably.  No distress noted.  No issues overnight.  3/30:  Sleeping in bed.  No acute issues overnight.   3/31:  The patient is sleeping comfortably in bed.  No acute event overnight.  4/1:  No acute event overnight.  4/2: Resting comfortably in bed.  No distress noted.  No issues overnight per staff.  4/3: Laying in bed comfortably.  Awake and alert.  Pleasant.  No distress noted.  4/4: resting comfortably in bed. No distress noted. No issues overnight.   Consultants/Specialty  Critical care signed off  Cardiology signed off    Code Status  Comfort care, DNR    Disposition  Hospice, pending group home placement. Difficult placement due to no family available to make a choice.    Review of Systems  Review of Systems   Unable to perform ROS: Medical condition        Physical Exam  Temp:  [36.6 °C (97.9 °F)] 36.6 °C (97.9 °F)  Pulse:  [73] 73  Resp:  [18] 18  BP: (105)/(60) 105/60  SpO2:  [94 %] 94 %    Physical Exam   Constitutional: She appears cachectic. She has a sickly appearance. She appears ill. No distress.   Frail   Chronically ill appearing   HENT:   Head: Normocephalic and atraumatic.   Mouth/Throat: No oropharyngeal exudate.   Eyes: Pupils are equal, round, and reactive to light. No scleral icterus.   Neck: No thyromegaly present.   Cardiovascular: Normal rate and  regular rhythm.  Exam reveals no friction rub.    Pulmonary/Chest: Effort normal. No respiratory distress. She has decreased breath sounds.   Abdominal: Soft.   Musculoskeletal: Normal range of motion.   Neurological: She is alert.   Skin: She is not diaphoretic.   Psychiatric: She is slowed and withdrawn.   Vitals reviewed.  3/21 - more alert today but otherwise unchanged    Fluids    Intake/Output Summary (Last 24 hours) at 04/04/19 1811  Last data filed at 04/04/19 0800   Gross per 24 hour   Intake              360 ml   Output              200 ml   Net              160 ml       Laboratory                        Imaging  DX-CHEST-PORTABLE (1 VIEW)   Final Result         1.  Hazy interstitial left pulmonary opacities suggests interstitial edema or infiltrate, similar to prior.   2.  Trace left pleural effusion   3.  Hyperexpansion of lungs favors changes of COPD.      DX-CHEST-PORTABLE (1 VIEW)   Final Result         1.  Interstitial infiltrates or edema, stable.   2.  Atherosclerosis      DX-CHEST-PORTABLE (1 VIEW)   Final Result         1.  Interstitial infiltrates or edema.   2.  Atherosclerosis      DX-CHEST-PORTABLE (1 VIEW)   Final Result      Moderate interstitial edema or interstitial lung disease and small left pleural effusion similar to previous.      DX-CHEST-PORTABLE (1 VIEW)   Final Result      Stable interstitial edema and/or interstitial lung disease with probable small pleural fluid      DX-CHEST-PORTABLE (1 VIEW)   Final Result      Ill-defined infrahilar interstitial opacities, atelectasis versus mild edema. No significant pleural effusions.      DX-CHEST-PORTABLE (1 VIEW)   Final Result      Mild left basilar atelectasis, improved since prior. No new consolidation or large pleural effusions.      DX-CHEST-PORTABLE (1 VIEW)   Final Result      1.  Left basilar opacification may be secondary to atelectasis. Developing pneumonia cannot be excluded.         DX-CHEST-PORTABLE (1 VIEW)   Final Result       1.  Unchanged mild interstitial pulmonary edema   2.  Unchanged bibasilar atelectasis, alveolar edema or pneumonia      DX-CHEST-PORTABLE (1 VIEW)   Final Result      1.  Decreased pulmonary edema   2.  Unchanged bibasilar atelectasis, alveolar edema or pneumonia      DX-CHEST-PORTABLE (1 VIEW)   Final Result      1.  There is diffuse interstitial and alveolar density in the right mid and lower lung zone. This is increased since the previous study. This may represent mild pulmonary edema/consolidation.   2.  Mildly enlarged cardiomediastinal silhouette when compared with the previous chest x-ray.      DX-CHEST-PORTABLE (1 VIEW)   Final Result      Stable mild pulmonary edema.   New left basilar atelectasis.      DX-CHEST-PORTABLE (1 VIEW)   Final Result      Stable chest appearance compared with 11/16.      DX-CHEST-PORTABLE (1 VIEW)   Final Result      No acute cardiopulmonary abnormality identified.      DX-CHEST-PORTABLE (1 VIEW)   Final Result      No acute cardiopulmonary abnormality. No interval change.      DX-CHEST-PORTABLE (1 VIEW)   Final Result      No acute cardiopulmonary disease.      CT-HEAD W/O   Final Result      1.  No evidence of acute intracranial process.      2.  There is again seen interstitial pulmonary edema and bibasilar atelectasis.      CT-HIP W/O PLUS RECONS LEFT   Final Result      1.  No evidence of acute fracture or malalignment. No evidence of hardware failure or complication.   2.  Postsurgical changes of the left femur with broken screw fragment redemonstrated.   3.  Demineralization.   4.  Scattered colonic diverticula.   5.  Atherosclerosis.   6.  Small fat-containing umbilical hernia.      EC-ECHOCARDIOGRAM COMPLETE W/O CONT   Final Result      DX-HIP-COMPLETE - UNILATERAL 2+ LEFT   Final Result      1.  No definite acute osseous abnormality. In setting of demineralization, an occult fracture is difficult to exclude. If there is strong clinical suspicion, CT or MRI could be  obtained for further evaluation.   2.  Postsurgical changes of the left femur with broken screw fragment redemonstrated.      DX-CHEST-PORTABLE (1 VIEW)   Final Result      No acute cardiopulmonary process is seen.      Atherosclerotic plaque.           Assessment/Plan  * Comfort measures only status- (present on admission)   Assessment & Plan    No signs of distress, continue comfort care options  -- the patient appears comfortable.     Advanced directives, counseling/discussion- (present on admission)   Assessment & Plan    Placement is an issue for the patient continued on comfort care  Searching for group home placement.  [facility] assisting.  Difficult discharge  -awaiting for placement     ST elevation myocardial infarction involving right coronary artery (HCC)- (present on admission)   Assessment & Plan    Declines any intervention or therapeutic modality  continue with comfort care     Positive QuantiFERON-TB Gold test   Assessment & Plan      AFB negative        Essential hypertension- (present on admission)   Assessment & Plan    Comfort care     Chronic kidney disease (CKD), stage III (moderate) (HCC)- (present on admission)   Assessment & Plan    Continue comfort care.       Fall- (present on admission)   Assessment & Plan    Continue fall precautions        on comfort care. No change from previous day.  Awaiting for placement.  Discussed discharge planning with .      VTE prophylaxis: comfort care

## 2019-04-05 NOTE — PROGRESS NOTES
"Patient is upset because her daughter in POA, she states \"I am not incompetent, I am still sharp\" and feels that her daughter may listen to her grand daughter and not take care of her. This RN ensured that her needs were being met by her daughter and that she is working with staff to get patient in group home for good.  Patient wants to talk to the Dr. And social workers tomorrow regarding this.  "

## 2019-04-05 NOTE — CARE PLAN
Problem: Safety  Goal: Will remain free from falls    Intervention: Implement fall precautions  Pt calls appropriately, treaded socks in use. Personal belongings and call light with in reach and bed is locked in lowest position. Hourly rounding in place      Problem: Knowledge Deficit  Goal: Knowledge of disease process/condition, treatment plan, diagnostic tests, and medications will improve  Outcome: PROGRESSING SLOWER THAN EXPECTED  Patient refusing SCD's despite education, Pillows in use for floating the heels but patient continues to move the pillow under her heels. Education provided on the rationale for the placement of pillows sop the heels float and patient declined this

## 2019-04-05 NOTE — PROGRESS NOTES
"Pt very uspet about conversations with daughter. States \"I am not incompetent my daughter say the doctor's said I am. My daughter is POA and I feel that she is taking over things that are my business.\" Pt is concerned that granddaughter is influencing her daughter's opinion and decisions.  \"She (granddaughter) came her one time to check if I was her and I haven't seen her since she poked her head in and said hi grandma and left. She told her mother I screamed and cursed at her that isn't true. She has mental problems and she has done some terrible things in the past like empty my bank account into hers and I still would never yell at her. I think my daughter is upset with me because I've never gotten over it.\" RN assured patient that we would ensure appropriate decisions are being made for her and encouraged increased communication of patient's needs to daughter.   "

## 2019-04-05 NOTE — DISCHARGE PLANNING
Anticipated Discharge Disposition: Placement [facility] vs [facility]    Action: LSW received VM from Janet at [facility] stating that Shanelle will be out to assess the Pt at 9:30 today.    Barriers to Discharge: placement    Plan: follow up with sarah Amezquita

## 2019-04-06 PROCEDURE — 99231 SBSQ HOSP IP/OBS SF/LOW 25: CPT | Performed by: FAMILY MEDICINE

## 2019-04-06 PROCEDURE — 700102 HCHG RX REV CODE 250 W/ 637 OVERRIDE(OP): Performed by: HOSPITALIST

## 2019-04-06 PROCEDURE — A9270 NON-COVERED ITEM OR SERVICE: HCPCS | Performed by: HOSPITALIST

## 2019-04-06 PROCEDURE — 770001 HCHG ROOM/CARE - MED/SURG/GYN PRIV*

## 2019-04-06 RX ADMIN — FUROSEMIDE 20 MG: 20 TABLET ORAL at 06:29

## 2019-04-06 RX ADMIN — POLYETHYLENE GLYCOL 3350 1 PACKET: 17 POWDER, FOR SOLUTION ORAL at 06:29

## 2019-04-06 NOTE — PROGRESS NOTES
[facility] Medicine Daily Progress Note    Date of Service  4/6/2019    Chief Complaint  89 y.o. female admitted 11/13/2018 with fall and STEMI.    Hospital Course    She was admitted to ICU on medical management. Family elected for comfort care.      Interval Problem Update  3/23 sleeping comfortably on the bed. Continue comfort care.    3/24; no acute issue overnight.    3/25: no event overnight. Lying and sleeping on the bed.  3/26: Laying in bed comfortably.  No acute distress noted.  No issues overnight.  3/27: Resting comfortably.  No distress noted.  3/28: Sleeping.  Looks comfortable.  Not interacting or answer questions.  3/29: Resting comfortably.  No distress noted.  No issues overnight.  3/30:  Sleeping in bed.  No acute issues overnight.   3/31:  The patient is sleeping comfortably in bed.  No acute event overnight.  4/1:  No acute event overnight.  4/2: Resting comfortably in bed.  No distress noted.  No issues overnight per staff.  4/3: Laying in bed comfortably.  Awake and alert.  Pleasant.  No distress noted.  4/4: resting comfortably in bed. No distress noted. No issues overnight.   4/5: Sitting up in bed breathing.  No distress noted.  4/6: Sleeping this morning.  Resting comfortably.  No issues overnight per staff.  Consultants/Specialty  Critical care signed off  Cardiology signed off    Code Status  Comfort care, DNR    Disposition  Hospice, pending group home placement. Difficult placement due to no family available to make a choice.    Review of Systems  Review of Systems   Unable to perform ROS: Medical condition        Physical Exam  Temp:  [36.6 °C (97.8 °F)-36.7 °C (98 °F)] 36.6 °C (97.8 °F)  Pulse:  [75-86] 75  Resp:  [16] 16  BP: (106-127)/(63-71) 106/63  SpO2:  [91 %-93 %] 91 %    Physical Exam   Constitutional: She appears cachectic. She has a sickly appearance. She appears ill. No distress.   Frail   Chronically ill appearing   HENT:   Head: Normocephalic and atraumatic.   Eyes: Pupils  are equal, round, and reactive to light.   Neck: No thyromegaly present.   Cardiovascular: Normal rate and regular rhythm.    Pulmonary/Chest: Effort normal. No respiratory distress. She has decreased breath sounds. She has no wheezes.   Abdominal: Soft. She exhibits no distension.   Musculoskeletal: Normal range of motion.   Neurological: She is alert.   Skin: Skin is dry.   Psychiatric: She is slowed and withdrawn.   Vitals reviewed.  3/21 - more alert today but otherwise unchanged    Fluids  No intake or output data in the 24 hours ending 04/06/19 1650    Laboratory                        Imaging  DX-CHEST-PORTABLE (1 VIEW)   Final Result         1.  Hazy interstitial left pulmonary opacities suggests interstitial edema or infiltrate, similar to prior.   2.  Trace left pleural effusion   3.  Hyperexpansion of lungs favors changes of COPD.      DX-CHEST-PORTABLE (1 VIEW)   Final Result         1.  Interstitial infiltrates or edema, stable.   2.  Atherosclerosis      DX-CHEST-PORTABLE (1 VIEW)   Final Result         1.  Interstitial infiltrates or edema.   2.  Atherosclerosis      DX-CHEST-PORTABLE (1 VIEW)   Final Result      Moderate interstitial edema or interstitial lung disease and small left pleural effusion similar to previous.      DX-CHEST-PORTABLE (1 VIEW)   Final Result      Stable interstitial edema and/or interstitial lung disease with probable small pleural fluid      DX-CHEST-PORTABLE (1 VIEW)   Final Result      Ill-defined infrahilar interstitial opacities, atelectasis versus mild edema. No significant pleural effusions.      DX-CHEST-PORTABLE (1 VIEW)   Final Result      Mild left basilar atelectasis, improved since prior. No new consolidation or large pleural effusions.      DX-CHEST-PORTABLE (1 VIEW)   Final Result      1.  Left basilar opacification may be secondary to atelectasis. Developing pneumonia cannot be excluded.         DX-CHEST-PORTABLE (1 VIEW)   Final Result      1.  Unchanged mild  interstitial pulmonary edema   2.  Unchanged bibasilar atelectasis, alveolar edema or pneumonia      DX-CHEST-PORTABLE (1 VIEW)   Final Result      1.  Decreased pulmonary edema   2.  Unchanged bibasilar atelectasis, alveolar edema or pneumonia      DX-CHEST-PORTABLE (1 VIEW)   Final Result      1.  There is diffuse interstitial and alveolar density in the right mid and lower lung zone. This is increased since the previous study. This may represent mild pulmonary edema/consolidation.   2.  Mildly enlarged cardiomediastinal silhouette when compared with the previous chest x-ray.      DX-CHEST-PORTABLE (1 VIEW)   Final Result      Stable mild pulmonary edema.   New left basilar atelectasis.      DX-CHEST-PORTABLE (1 VIEW)   Final Result      Stable chest appearance compared with 11/16.      DX-CHEST-PORTABLE (1 VIEW)   Final Result      No acute cardiopulmonary abnormality identified.      DX-CHEST-PORTABLE (1 VIEW)   Final Result      No acute cardiopulmonary abnormality. No interval change.      DX-CHEST-PORTABLE (1 VIEW)   Final Result      No acute cardiopulmonary disease.      CT-HEAD W/O   Final Result      1.  No evidence of acute intracranial process.      2.  There is again seen interstitial pulmonary edema and bibasilar atelectasis.      CT-HIP W/O PLUS RECONS LEFT   Final Result      1.  No evidence of acute fracture or malalignment. No evidence of hardware failure or complication.   2.  Postsurgical changes of the left femur with broken screw fragment redemonstrated.   3.  Demineralization.   4.  Scattered colonic diverticula.   5.  Atherosclerosis.   6.  Small fat-containing umbilical hernia.      EC-ECHOCARDIOGRAM COMPLETE W/O CONT   Final Result      DX-HIP-COMPLETE - UNILATERAL 2+ LEFT   Final Result      1.  No definite acute osseous abnormality. In setting of demineralization, an occult fracture is difficult to exclude. If there is strong clinical suspicion, CT or MRI could be obtained for further  evaluation.   2.  Postsurgical changes of the left femur with broken screw fragment redemonstrated.      DX-CHEST-PORTABLE (1 VIEW)   Final Result      No acute cardiopulmonary process is seen.      Atherosclerotic plaque.           Assessment/Plan  * Comfort measures only status- (present on admission)   Assessment & Plan    No signs of distress, continue comfort care options  -- the patient appears comfortable.     Advanced directives, counseling/discussion- (present on admission)   Assessment & Plan    Placement is an issue for the patient continued on comfort care  Searching for group home placement.  [facility] assisting.  Difficult discharge  -awaiting for placement     Positive QuantiFERON-TB Gold test   Assessment & Plan      AFB negative        ST elevation myocardial infarction involving right coronary artery (HCC)- (present on admission)   Assessment & Plan    Declines any intervention or therapeutic modality  continue with comfort care     Chronic kidney disease (CKD), stage III (moderate) (HCC)- (present on admission)   Assessment & Plan    Continue comfort care.       Essential hypertension- (present on admission)   Assessment & Plan    Comfort care     Fall- (present on admission)   Assessment & Plan    Continue fall precautions        on comfort care. No change from previous day.  Awaiting for placement.  Discussed discharge planning with .      VTE prophylaxis: comfort care

## 2019-04-06 NOTE — CARE PLAN
Problem: Safety  Goal: Will remain free from injury  Outcome: PROGRESSING AS EXPECTED  Patient remains free from injury. Bed alarm in place. Call light within reach and reminded to call when needing assistance, patient verbalized understanding.     Problem: Venous Thromboembolism (VTW)/Deep Vein Thrombosis (DVT) Prevention:  Goal: Patient will participate in Venous Thrombosis (VTE)/Deep Vein Thrombosis (DVT)Prevention Measures  Patient refusing SCDs despite education. Patient on comfort care.

## 2019-04-06 NOTE — PROGRESS NOTES
[facility] Medicine Daily Progress Note    Date of Service  4/5/2019    Chief Complaint  89 y.o. female admitted 11/13/2018 with fall and STEMI.    Hospital Course    She was admitted to ICU on medical management. Family elected for comfort care.      Interval Problem Update  3/23 sleeping comfortably on the bed. Continue comfort care.    3/24; no acute issue overnight.    3/25: no event overnight. Lying and sleeping on the bed.  3/26: Laying in bed comfortably.  No acute distress noted.  No issues overnight.  3/27: Resting comfortably.  No distress noted.  3/28: Sleeping.  Looks comfortable.  Not interacting or answer questions.  3/29: Resting comfortably.  No distress noted.  No issues overnight.  3/30:  Sleeping in bed.  No acute issues overnight.   3/31:  The patient is sleeping comfortably in bed.  No acute event overnight.  4/1:  No acute event overnight.  4/2: Resting comfortably in bed.  No distress noted.  No issues overnight per staff.  4/3: Laying in bed comfortably.  Awake and alert.  Pleasant.  No distress noted.  4/4: resting comfortably in bed. No distress noted. No issues overnight.   4/5: Sitting up in bed breathing.  No distress noted.  Consultants/Specialty  Critical care signed off  Cardiology signed off    Code Status  Comfort care, DNR    Disposition  Hospice, pending group home placement. Difficult placement due to no family available to make a choice.    Review of Systems  Review of Systems   Unable to perform ROS: Medical condition        Physical Exam  Temp:  [36.2 °C (97.1 °F)-36.3 °C (97.3 °F)] 36.2 °C (97.1 °F)  Pulse:  [72-76] 76  Resp:  [16] 16  BP: (100-104)/(62-66) 104/62  SpO2:  [91 %-92 %] 92 %    Physical Exam   Constitutional: She appears cachectic. She has a sickly appearance. She appears ill.   Frail   Chronically ill appearing   HENT:   Head: Normocephalic and atraumatic.   Eyes: Pupils are equal, round, and reactive to light.   Neck: No thyromegaly present.   Cardiovascular:  Normal rate and regular rhythm.    Pulmonary/Chest: Effort normal. No respiratory distress. She has decreased breath sounds.   Abdominal: Soft.   Musculoskeletal: Normal range of motion.   Neurological: She is alert.   Skin: She is not diaphoretic.   Psychiatric: She is slowed and withdrawn.   Vitals reviewed.  3/21 - more alert today but otherwise unchanged    Fluids    Intake/Output Summary (Last 24 hours) at 04/05/19 1858  Last data filed at 04/05/19 0800   Gross per 24 hour   Intake              480 ml   Output                0 ml   Net              480 ml       Laboratory                        Imaging  DX-CHEST-PORTABLE (1 VIEW)   Final Result         1.  Hazy interstitial left pulmonary opacities suggests interstitial edema or infiltrate, similar to prior.   2.  Trace left pleural effusion   3.  Hyperexpansion of lungs favors changes of COPD.      DX-CHEST-PORTABLE (1 VIEW)   Final Result         1.  Interstitial infiltrates or edema, stable.   2.  Atherosclerosis      DX-CHEST-PORTABLE (1 VIEW)   Final Result         1.  Interstitial infiltrates or edema.   2.  Atherosclerosis      DX-CHEST-PORTABLE (1 VIEW)   Final Result      Moderate interstitial edema or interstitial lung disease and small left pleural effusion similar to previous.      DX-CHEST-PORTABLE (1 VIEW)   Final Result      Stable interstitial edema and/or interstitial lung disease with probable small pleural fluid      DX-CHEST-PORTABLE (1 VIEW)   Final Result      Ill-defined infrahilar interstitial opacities, atelectasis versus mild edema. No significant pleural effusions.      DX-CHEST-PORTABLE (1 VIEW)   Final Result      Mild left basilar atelectasis, improved since prior. No new consolidation or large pleural effusions.      DX-CHEST-PORTABLE (1 VIEW)   Final Result      1.  Left basilar opacification may be secondary to atelectasis. Developing pneumonia cannot be excluded.         DX-CHEST-PORTABLE (1 VIEW)   Final Result      1.   Unchanged mild interstitial pulmonary edema   2.  Unchanged bibasilar atelectasis, alveolar edema or pneumonia      DX-CHEST-PORTABLE (1 VIEW)   Final Result      1.  Decreased pulmonary edema   2.  Unchanged bibasilar atelectasis, alveolar edema or pneumonia      DX-CHEST-PORTABLE (1 VIEW)   Final Result      1.  There is diffuse interstitial and alveolar density in the right mid and lower lung zone. This is increased since the previous study. This may represent mild pulmonary edema/consolidation.   2.  Mildly enlarged cardiomediastinal silhouette when compared with the previous chest x-ray.      DX-CHEST-PORTABLE (1 VIEW)   Final Result      Stable mild pulmonary edema.   New left basilar atelectasis.      DX-CHEST-PORTABLE (1 VIEW)   Final Result      Stable chest appearance compared with 11/16.      DX-CHEST-PORTABLE (1 VIEW)   Final Result      No acute cardiopulmonary abnormality identified.      DX-CHEST-PORTABLE (1 VIEW)   Final Result      No acute cardiopulmonary abnormality. No interval change.      DX-CHEST-PORTABLE (1 VIEW)   Final Result      No acute cardiopulmonary disease.      CT-HEAD W/O   Final Result      1.  No evidence of acute intracranial process.      2.  There is again seen interstitial pulmonary edema and bibasilar atelectasis.      CT-HIP W/O PLUS RECONS LEFT   Final Result      1.  No evidence of acute fracture or malalignment. No evidence of hardware failure or complication.   2.  Postsurgical changes of the left femur with broken screw fragment redemonstrated.   3.  Demineralization.   4.  Scattered colonic diverticula.   5.  Atherosclerosis.   6.  Small fat-containing umbilical hernia.      EC-ECHOCARDIOGRAM COMPLETE W/O CONT   Final Result      DX-HIP-COMPLETE - UNILATERAL 2+ LEFT   Final Result      1.  No definite acute osseous abnormality. In setting of demineralization, an occult fracture is difficult to exclude. If there is strong clinical suspicion, CT or MRI could be obtained  for further evaluation.   2.  Postsurgical changes of the left femur with broken screw fragment redemonstrated.      DX-CHEST-PORTABLE (1 VIEW)   Final Result      No acute cardiopulmonary process is seen.      Atherosclerotic plaque.           Assessment/Plan  * Comfort measures only status- (present on admission)   Assessment & Plan    No signs of distress, continue comfort care options  -- the patient appears comfortable.     Advanced directives, counseling/discussion- (present on admission)   Assessment & Plan    Placement is an issue for the patient continued on comfort care  Searching for group home placement.  [facility] assisting.  Difficult discharge  -awaiting for placement     ST elevation myocardial infarction involving right coronary artery (HCC)- (present on admission)   Assessment & Plan    Declines any intervention or therapeutic modality  continue with comfort care     Positive QuantiFERON-TB Gold test   Assessment & Plan      AFB negative        Essential hypertension- (present on admission)   Assessment & Plan    Comfort care     Chronic kidney disease (CKD), stage III (moderate) (HCC)- (present on admission)   Assessment & Plan    Continue comfort care.       Fall- (present on admission)   Assessment & Plan    Continue fall precautions        on comfort care. No change from previous day.  Awaiting for placement.  Discussed discharge planning with .      VTE prophylaxis: comfort care

## 2019-04-06 NOTE — CARE PLAN
Problem: Mobility  Goal: Risk for activity intolerance will decrease  Outcome: PROGRESSING SLOWER THAN EXPECTED  Pt states feeling too week to ambulate 50 + feet per day     Problem: Urinary Elimination:  Goal: Ability to reestablish a normal urinary elimination pattern will improve  Outcome: PROGRESSING AS EXPECTED  At [facility]

## 2019-04-07 PROCEDURE — 770001 HCHG ROOM/CARE - MED/SURG/GYN PRIV*

## 2019-04-07 PROCEDURE — 700102 HCHG RX REV CODE 250 W/ 637 OVERRIDE(OP): Performed by: HOSPITALIST

## 2019-04-07 PROCEDURE — A9270 NON-COVERED ITEM OR SERVICE: HCPCS | Performed by: HOSPITALIST

## 2019-04-07 PROCEDURE — 99231 SBSQ HOSP IP/OBS SF/LOW 25: CPT | Performed by: FAMILY MEDICINE

## 2019-04-07 RX ADMIN — LORAZEPAM 0.5 MG: 0.5 TABLET ORAL at 10:02

## 2019-04-07 RX ADMIN — SENNOSIDES AND DOCUSATE SODIUM 2 TABLET: 8.6; 5 TABLET ORAL at 07:08

## 2019-04-07 RX ADMIN — FUROSEMIDE 20 MG: 20 TABLET ORAL at 07:08

## 2019-04-07 NOTE — CARE PLAN
Problem: Safety  Goal: Will remain free from injury  Outcome: PROGRESSING AS EXPECTED  Hourly rounds done. Call light within reach. Needs attended on a timely manner. Treaded socks on when OOB. Educated on the importance of calling for assistance when attempting to be OOB.  BED ALARM ON.    Problem: Venous Thromboembolism (VTW)/Deep Vein Thrombosis (DVT) Prevention:  Goal: Patient will participate in Venous Thrombosis (VTE)/Deep Vein Thrombosis (DVT)Prevention Measures  Outcome: PROGRESSING AS EXPECTED  Encouraged  mobilization. Patient on comfort care.     Problem: Bowel/Gastric:  Goal: Normal bowel function is maintained or improved  Outcome: PROGRESSING AS EXPECTED  Last BM 4/3/19. Patient refusing for stool softeners.     Problem: Discharge Barriers/Planning  Goal: Patient's continuum of care needs will be met  Outcome: PROGRESSING AS EXPECTED  Accepted by [facility] but still Pending group home placement.       Problem: Mobility  Goal: Risk for activity intolerance will decrease  Outcome: PROGRESSING SLOWER THAN EXPECTED  Encourage to early mobilization >50ft 3x daily. Up for all meals.  Up SBA  using a 4WW when OOB

## 2019-04-07 NOTE — CARE PLAN
Problem: Safety  Goal: Will remain free from falls    Intervention: Assess risk factors for falls  Bed alarm in use      Problem: Mobility  Goal: Risk for activity intolerance will decrease    Intervention: Encourage patient to increase activity level in collaboration with Interdisciplinary Team  Encourage out of bed for all meals. Ambulate with staff as tolerated.

## 2019-04-08 PROCEDURE — 99231 SBSQ HOSP IP/OBS SF/LOW 25: CPT | Performed by: FAMILY MEDICINE

## 2019-04-08 PROCEDURE — 770001 HCHG ROOM/CARE - MED/SURG/GYN PRIV*

## 2019-04-08 NOTE — CARE PLAN
Problem: Safety  Goal: Will remain free from injury  Outcome: PROGRESSING AS EXPECTED  Hourly rounds done. Call light within reach. Needs attended on a timely manner. Treaded socks on when OOB. Educated on the importance of calling for assistance when attempting to be OOB.  BED ALARM ON.    Problem: Venous Thromboembolism (VTW)/Deep Vein Thrombosis (DVT) Prevention:  Goal: Patient will participate in Venous Thrombosis (VTE)/Deep Vein Thrombosis (DVT)Prevention Measures  Outcome: PROGRESSING AS EXPECTED  Encouraged mobilization.     Problem: Bowel/Gastric:  Goal: Normal bowel function is maintained or improved  Outcome: PROGRESSING AS EXPECTED  Last Bm 4/3/19. Stool softeners given this Am.     Problem: Discharge Barriers/Planning  Goal: Patient's continuum of care needs will be met  Outcome: PROGRESSING AS EXPECTED  Accepted by [facility], however still pending group home placement

## 2019-04-08 NOTE — PROGRESS NOTES
[facility] Medicine Daily Progress Note    Date of Service  4/7/2019    Chief Complaint  89 y.o. female admitted 11/13/2018 with fall and STEMI.    Hospital Course    She was admitted to ICU on medical management. Family elected for comfort care.      Interval Problem Update  3/23 sleeping comfortably on the bed. Continue comfort care.    3/24; no acute issue overnight.    3/25: no event overnight. Lying and sleeping on the bed.  3/26: Laying in bed comfortably.  No acute distress noted.  No issues overnight.  3/27: Resting comfortably.  No distress noted.  3/28: Sleeping.  Looks comfortable.  Not interacting or answer questions.  3/29: Resting comfortably.  No distress noted.  No issues overnight.  3/30:  Sleeping in bed.  No acute issues overnight.   3/31:  The patient is sleeping comfortably in bed.  No acute event overnight.  4/1:  No acute event overnight.  4/2: Resting comfortably in bed.  No distress noted.  No issues overnight per staff.  4/3: Laying in bed comfortably.  Awake and alert.  Pleasant.  No distress noted.  4/4: resting comfortably in bed. No distress noted. No issues overnight.   4/5: Sitting up in bed breathing.  No distress noted.  4/6: Sleeping this morning.  Resting comfortably.  No issues overnight per staff.  4/7: Resting comfortably.  No distress noted.  Consultants/Specialty  Critical care signed off  Cardiology signed off    Code Status  Comfort care, DNR    Disposition  Hospice, pending group home placement. Difficult placement due to no family available to make a choice.    Review of Systems  Review of Systems   Unable to perform ROS: Medical condition        Physical Exam  Temp:  [36.8 °C (98.2 °F)-36.9 °C (98.5 °F)] 36.8 °C (98.2 °F)  Pulse:  [78-80] 78  Resp:  [16-18] 18  BP: ()/(54-61) 100/54  SpO2:  [91 %-95 %] 95 %    Physical Exam   Constitutional: She appears cachectic. She has a sickly appearance. She appears ill. No distress.   Frail   Chronically ill appearing   HENT:    Head: Normocephalic and atraumatic.   Eyes: Pupils are equal, round, and reactive to light.   Neck: No thyromegaly present.   Cardiovascular: Normal rate and regular rhythm.    Pulmonary/Chest: Effort normal. No respiratory distress. She has decreased breath sounds.   Abdominal: Soft. She exhibits no distension.   Musculoskeletal: Normal range of motion.   Neurological: She is alert.   Skin: Skin is dry.   Psychiatric: She is slowed and withdrawn.   Vitals reviewed.  3/21 - more alert today but otherwise unchanged    Fluids    Intake/Output Summary (Last 24 hours) at 04/07/19 1758  Last data filed at 04/06/19 2000   Gross per 24 hour   Intake              250 ml   Output                0 ml   Net              250 ml       Laboratory                        Imaging  DX-CHEST-PORTABLE (1 VIEW)   Final Result         1.  Hazy interstitial left pulmonary opacities suggests interstitial edema or infiltrate, similar to prior.   2.  Trace left pleural effusion   3.  Hyperexpansion of lungs favors changes of COPD.      DX-CHEST-PORTABLE (1 VIEW)   Final Result         1.  Interstitial infiltrates or edema, stable.   2.  Atherosclerosis      DX-CHEST-PORTABLE (1 VIEW)   Final Result         1.  Interstitial infiltrates or edema.   2.  Atherosclerosis      DX-CHEST-PORTABLE (1 VIEW)   Final Result      Moderate interstitial edema or interstitial lung disease and small left pleural effusion similar to previous.      DX-CHEST-PORTABLE (1 VIEW)   Final Result      Stable interstitial edema and/or interstitial lung disease with probable small pleural fluid      DX-CHEST-PORTABLE (1 VIEW)   Final Result      Ill-defined infrahilar interstitial opacities, atelectasis versus mild edema. No significant pleural effusions.      DX-CHEST-PORTABLE (1 VIEW)   Final Result      Mild left basilar atelectasis, improved since prior. No new consolidation or large pleural effusions.      DX-CHEST-PORTABLE (1 VIEW)   Final Result      1.  Left  basilar opacification may be secondary to atelectasis. Developing pneumonia cannot be excluded.         DX-CHEST-PORTABLE (1 VIEW)   Final Result      1.  Unchanged mild interstitial pulmonary edema   2.  Unchanged bibasilar atelectasis, alveolar edema or pneumonia      DX-CHEST-PORTABLE (1 VIEW)   Final Result      1.  Decreased pulmonary edema   2.  Unchanged bibasilar atelectasis, alveolar edema or pneumonia      DX-CHEST-PORTABLE (1 VIEW)   Final Result      1.  There is diffuse interstitial and alveolar density in the right mid and lower lung zone. This is increased since the previous study. This may represent mild pulmonary edema/consolidation.   2.  Mildly enlarged cardiomediastinal silhouette when compared with the previous chest x-ray.      DX-CHEST-PORTABLE (1 VIEW)   Final Result      Stable mild pulmonary edema.   New left basilar atelectasis.      DX-CHEST-PORTABLE (1 VIEW)   Final Result      Stable chest appearance compared with 11/16.      DX-CHEST-PORTABLE (1 VIEW)   Final Result      No acute cardiopulmonary abnormality identified.      DX-CHEST-PORTABLE (1 VIEW)   Final Result      No acute cardiopulmonary abnormality. No interval change.      DX-CHEST-PORTABLE (1 VIEW)   Final Result      No acute cardiopulmonary disease.      CT-HEAD W/O   Final Result      1.  No evidence of acute intracranial process.      2.  There is again seen interstitial pulmonary edema and bibasilar atelectasis.      CT-HIP W/O PLUS RECONS LEFT   Final Result      1.  No evidence of acute fracture or malalignment. No evidence of hardware failure or complication.   2.  Postsurgical changes of the left femur with broken screw fragment redemonstrated.   3.  Demineralization.   4.  Scattered colonic diverticula.   5.  Atherosclerosis.   6.  Small fat-containing umbilical hernia.      EC-ECHOCARDIOGRAM COMPLETE W/O CONT   Final Result      DX-HIP-COMPLETE - UNILATERAL 2+ LEFT   Final Result      1.  No definite acute osseous  abnormality. In setting of demineralization, an occult fracture is difficult to exclude. If there is strong clinical suspicion, CT or MRI could be obtained for further evaluation.   2.  Postsurgical changes of the left femur with broken screw fragment redemonstrated.      DX-CHEST-PORTABLE (1 VIEW)   Final Result      No acute cardiopulmonary process is seen.      Atherosclerotic plaque.           Assessment/Plan  * Comfort measures only status- (present on admission)   Assessment & Plan    No signs of distress, continue comfort care options  -- the patient appears comfortable.     Advanced directives, counseling/discussion- (present on admission)   Assessment & Plan    Placement is an issue for the patient continued on comfort care  Searching for group home placement.  [facility] assisting.  Difficult discharge  -awaiting for placement     ST elevation myocardial infarction involving right coronary artery (HCC)- (present on admission)   Assessment & Plan    Declines any intervention or therapeutic modality  continue with comfort care     Positive QuantiFERON-TB Gold test   Assessment & Plan      AFB negative        Chronic kidney disease (CKD), stage III (moderate) (HCC)- (present on admission)   Assessment & Plan    Continue comfort care.       Essential hypertension- (present on admission)   Assessment & Plan    Comfort care     Fall- (present on admission)   Assessment & Plan    Continue fall precautions        on comfort care. No change from previous day.  Awaiting for placement.  Discussed discharge planning with .      VTE prophylaxis: comfort care

## 2019-04-09 PROCEDURE — 700102 HCHG RX REV CODE 250 W/ 637 OVERRIDE(OP): Performed by: HOSPITALIST

## 2019-04-09 PROCEDURE — A9270 NON-COVERED ITEM OR SERVICE: HCPCS | Performed by: HOSPITALIST

## 2019-04-09 PROCEDURE — 99231 SBSQ HOSP IP/OBS SF/LOW 25: CPT | Performed by: INTERNAL MEDICINE

## 2019-04-09 PROCEDURE — 770001 HCHG ROOM/CARE - MED/SURG/GYN PRIV*

## 2019-04-09 RX ADMIN — FUROSEMIDE 20 MG: 20 TABLET ORAL at 05:56

## 2019-04-09 RX ADMIN — SENNOSIDES AND DOCUSATE SODIUM 2 TABLET: 8.6; 5 TABLET ORAL at 05:56

## 2019-04-09 NOTE — CARE PLAN
Problem: Safety  Goal: Will remain free from injury  Outcome: PROGRESSING AS EXPECTED  Bed in the lowest locked position. Call light within reach, phone within reach. Bed alarm in use.     Problem: Mobility  Goal: Risk for activity intolerance will decrease  Outcome: PROGRESSING AS EXPECTED      Problem: Urinary Elimination:  Goal: Ability to reestablish a normal urinary elimination pattern will improve  Outcome: PROGRESSING AS EXPECTED  Patient up to commode, generalize weakness noted

## 2019-04-09 NOTE — PROGRESS NOTES
Assumed care of patient at 0700. patient is resting in bed. Refuses tu get oob for meals. Patient like to turn and reposition herself. Refuses q2 turning.

## 2019-04-09 NOTE — PROGRESS NOTES
[facility] Medicine Daily Progress Note    Date of Service  4/8/2019    Chief Complaint  89 y.o. female admitted 11/13/2018 with fall and STEMI.    Hospital Course    She was admitted to ICU on medical management. Family elected for comfort care.      Interval Problem Update  3/23 sleeping comfortably on the bed. Continue comfort care.    3/24; no acute issue overnight.    3/25: no event overnight. Lying and sleeping on the bed.  3/26: Laying in bed comfortably.  No acute distress noted.  No issues overnight.  3/27: Resting comfortably.  No distress noted.  3/28: Sleeping.  Looks comfortable.  Not interacting or answer questions.  3/29: Resting comfortably.  No distress noted.  No issues overnight.  3/30:  Sleeping in bed.  No acute issues overnight.   3/31:  The patient is sleeping comfortably in bed.  No acute event overnight.  4/1:  No acute event overnight.  4/2: Resting comfortably in bed.  No distress noted.  No issues overnight per staff.  4/3: Laying in bed comfortably.  Awake and alert.  Pleasant.  No distress noted.  4/4: resting comfortably in bed. No distress noted. No issues overnight.   4/5: Sitting up in bed breathing.  No distress noted.  4/6: Sleeping this morning.  Resting comfortably.  No issues overnight per staff.  4/7: Resting comfortably.  No distress noted.  4/8: Resting in bed comfortably. No distress. No issues to report per staff.   Consultants/Specialty  Critical care signed off  Cardiology signed off    Code Status  Comfort care, DNR    Disposition  Hospice, pending group home placement. Difficult placement due to no family available to make a choice.    Review of Systems  Review of Systems   Unable to perform ROS: Medical condition        Physical Exam  Temp:  [36 °C (96.8 °F)-36.7 °C (98.1 °F)] 36.7 °C (98.1 °F)  Pulse:  [75-81] 78  Resp:  [14-17] 14  BP: (109-114)/(60-67) 109/63  SpO2:  [92 %-93 %] 93 %    Physical Exam   Constitutional: She appears cachectic. She has a sickly  appearance. She appears ill.   Frail   Chronically ill appearing   HENT:   Head: Normocephalic and atraumatic.   Eyes: Pupils are equal, round, and reactive to light.   Neck: No thyromegaly present.   Cardiovascular: Normal rate and regular rhythm.    Pulmonary/Chest: Effort normal. No respiratory distress. She has decreased breath sounds.   Abdominal: Soft. She exhibits no distension.   Musculoskeletal: Normal range of motion.   Neurological: She is alert.   Skin: Skin is dry.   Psychiatric: She is slowed and withdrawn.   Vitals reviewed.  3/21 - more alert today but otherwise unchanged    Fluids    Intake/Output Summary (Last 24 hours) at 04/08/19 1757  Last data filed at 04/08/19 1700   Gross per 24 hour   Intake              960 ml   Output              500 ml   Net              460 ml       Laboratory                        Imaging  DX-CHEST-PORTABLE (1 VIEW)   Final Result         1.  Hazy interstitial left pulmonary opacities suggests interstitial edema or infiltrate, similar to prior.   2.  Trace left pleural effusion   3.  Hyperexpansion of lungs favors changes of COPD.      DX-CHEST-PORTABLE (1 VIEW)   Final Result         1.  Interstitial infiltrates or edema, stable.   2.  Atherosclerosis      DX-CHEST-PORTABLE (1 VIEW)   Final Result         1.  Interstitial infiltrates or edema.   2.  Atherosclerosis      DX-CHEST-PORTABLE (1 VIEW)   Final Result      Moderate interstitial edema or interstitial lung disease and small left pleural effusion similar to previous.      DX-CHEST-PORTABLE (1 VIEW)   Final Result      Stable interstitial edema and/or interstitial lung disease with probable small pleural fluid      DX-CHEST-PORTABLE (1 VIEW)   Final Result      Ill-defined infrahilar interstitial opacities, atelectasis versus mild edema. No significant pleural effusions.      DX-CHEST-PORTABLE (1 VIEW)   Final Result      Mild left basilar atelectasis, improved since prior. No new consolidation or large pleural  effusions.      DX-CHEST-PORTABLE (1 VIEW)   Final Result      1.  Left basilar opacification may be secondary to atelectasis. Developing pneumonia cannot be excluded.         DX-CHEST-PORTABLE (1 VIEW)   Final Result      1.  Unchanged mild interstitial pulmonary edema   2.  Unchanged bibasilar atelectasis, alveolar edema or pneumonia      DX-CHEST-PORTABLE (1 VIEW)   Final Result      1.  Decreased pulmonary edema   2.  Unchanged bibasilar atelectasis, alveolar edema or pneumonia      DX-CHEST-PORTABLE (1 VIEW)   Final Result      1.  There is diffuse interstitial and alveolar density in the right mid and lower lung zone. This is increased since the previous study. This may represent mild pulmonary edema/consolidation.   2.  Mildly enlarged cardiomediastinal silhouette when compared with the previous chest x-ray.      DX-CHEST-PORTABLE (1 VIEW)   Final Result      Stable mild pulmonary edema.   New left basilar atelectasis.      DX-CHEST-PORTABLE (1 VIEW)   Final Result      Stable chest appearance compared with 11/16.      DX-CHEST-PORTABLE (1 VIEW)   Final Result      No acute cardiopulmonary abnormality identified.      DX-CHEST-PORTABLE (1 VIEW)   Final Result      No acute cardiopulmonary abnormality. No interval change.      DX-CHEST-PORTABLE (1 VIEW)   Final Result      No acute cardiopulmonary disease.      CT-HEAD W/O   Final Result      1.  No evidence of acute intracranial process.      2.  There is again seen interstitial pulmonary edema and bibasilar atelectasis.      CT-HIP W/O PLUS RECONS LEFT   Final Result      1.  No evidence of acute fracture or malalignment. No evidence of hardware failure or complication.   2.  Postsurgical changes of the left femur with broken screw fragment redemonstrated.   3.  Demineralization.   4.  Scattered colonic diverticula.   5.  Atherosclerosis.   6.  Small fat-containing umbilical hernia.      EC-ECHOCARDIOGRAM COMPLETE W/O CONT   Final Result      DX-HIP-COMPLETE  - UNILATERAL 2+ LEFT   Final Result      1.  No definite acute osseous abnormality. In setting of demineralization, an occult fracture is difficult to exclude. If there is strong clinical suspicion, CT or MRI could be obtained for further evaluation.   2.  Postsurgical changes of the left femur with broken screw fragment redemonstrated.      DX-CHEST-PORTABLE (1 VIEW)   Final Result      No acute cardiopulmonary process is seen.      Atherosclerotic plaque.           Assessment/Plan  * Comfort measures only status- (present on admission)   Assessment & Plan    No signs of distress, continue comfort care options  -- the patient appears comfortable.     ST elevation myocardial infarction involving right coronary artery (HCC)- (present on admission)   Assessment & Plan    Declines any intervention or therapeutic modality  continue with comfort care     Positive QuantiFERON-TB Gold test   Assessment & Plan      AFB negative        Advanced directives, counseling/discussion- (present on admission)   Assessment & Plan    Placement is an issue for the patient continued on comfort care  Searching for group home placement.  [facility] assisting.  Difficult discharge  -awaiting for placement     Chronic kidney disease (CKD), stage III (moderate) (HCC)- (present on admission)   Assessment & Plan    Continue comfort care.       Essential hypertension- (present on admission)   Assessment & Plan    Comfort care     Fall- (present on admission)   Assessment & Plan    Continue fall precautions        on comfort care. No change from previous day.  Awaiting for placement.  Discussed discharge planning with .      VTE prophylaxis: comfort care

## 2019-04-09 NOTE — CARE PLAN
Problem: Safety  Goal: Will remain free from injury  Outcome: PROGRESSING AS EXPECTED  Remains free from injury. Bed alarm in place. Patient using call light appropriately.     Problem: Venous Thromboembolism (VTW)/Deep Vein Thrombosis (DVT) Prevention:  Goal: Patient will participate in Venous Thrombosis (VTE)/Deep Vein Thrombosis (DVT)Prevention Measures  Patient on comfort care, refuses SCDs.        0

## 2019-04-10 PROCEDURE — 700102 HCHG RX REV CODE 250 W/ 637 OVERRIDE(OP): Performed by: INTERNAL MEDICINE

## 2019-04-10 PROCEDURE — 99232 SBSQ HOSP IP/OBS MODERATE 35: CPT | Performed by: INTERNAL MEDICINE

## 2019-04-10 PROCEDURE — 770001 HCHG ROOM/CARE - MED/SURG/GYN PRIV*

## 2019-04-10 RX ORDER — CAPSAICIN 0.025 %
CREAM (GRAM) TOPICAL 3 TIMES DAILY
Status: DISCONTINUED | OUTPATIENT
Start: 2019-04-10 | End: 2019-04-25

## 2019-04-10 RX ADMIN — CAPSAICIN: 0.25 CREAM TOPICAL at 14:00

## 2019-04-10 NOTE — CARE PLAN
Problem: Safety  Goal: Will remain free from injury  Outcome: PROGRESSING AS EXPECTED  Call light with in reach, bed alarm in place     Problem: Skin Integrity  Goal: Risk for impaired skin integrity will decrease  Outcome: PROGRESSING SLOWER THAN EXPECTED  Refuses all skin precautions, refuses waffle overlay, moon boots and q2 turning

## 2019-04-10 NOTE — CARE PLAN
Problem: Safety  Goal: Will remain free from injury  Outcome: PROGRESSING AS EXPECTED  Provided assistance with mobility. Fall prevention measures in place. rounds ongoing. Bed alarm in place.    Problem: Skin Integrity  Goal: Risk for impaired skin integrity will decrease  Outcome: PROGRESSING AS EXPECTED  Assessed for signs of skin breakdown. Encouraged frequent turns and repositioning to prevent development of pressure ulcers.

## 2019-04-11 PROCEDURE — 770001 HCHG ROOM/CARE - MED/SURG/GYN PRIV*

## 2019-04-11 PROCEDURE — 99232 SBSQ HOSP IP/OBS MODERATE 35: CPT | Performed by: INTERNAL MEDICINE

## 2019-04-11 NOTE — PROGRESS NOTES
[facility] Medicine Daily Progress Note    Date of Service  4/10/19    Chief Complaint  89 y.o. female admitted 11/13/2018 with fall and STEMI.    Hospital Course    She was admitted to ICU on medical management. Family elected for comfort care.      Interval Problem Update  Hand pain-she claims her hands are feeling better today.  She utilize capsaicin cream once and then refused thereafter.  No other acute medical issues or behavioral issues overnight or this morning.    Consultants/Specialty  Critical care signed off  Cardiology signed off    Code Status  Comfort care, DNR    Disposition  Hospice, pending group home placement. Difficult placement due to no family available to make a choice.  Working on possible assisted living facility transfer versus group home.    Review of Systems  Review of Systems   Unable to perform ROS: Medical condition        Physical Exam  Temp:  [35.9 °C (96.6 °F)-36.9 °C (98.5 °F)] 35.9 °C (96.6 °F)  Pulse:  [76-91] 91  Resp:  [17] 17  BP: (107-121)/(66-73) 121/73  SpO2:  [93 %] 93 %    Physical Exam   Constitutional: She appears cachectic. She has a sickly appearance. She appears ill.   Frail   Chronically ill appearing   HENT:   Head: Normocephalic and atraumatic.   Eyes: Pupils are equal, round, and reactive to light.   Neck: No thyromegaly present.   Cardiovascular: Normal rate and regular rhythm.    Pulmonary/Chest: Effort normal. No respiratory distress. She has decreased breath sounds.   Abdominal: Soft. She exhibits no distension.   Musculoskeletal: Normal range of motion.   Neurological: She is alert.   Skin: Skin is dry.   Psychiatric: She is slowed and withdrawn.   Vitals reviewed.  Heberden nodes appreciated mild tenderness but no extreme evidence of synovitis, otherwise no change in physical exam today    Fluids  No intake or output data in the 24 hours ending 04/11/19 1617    Laboratory                        Imaging  DX-CHEST-PORTABLE (1 VIEW)   Final Result         1.   Hazy interstitial left pulmonary opacities suggests interstitial edema or infiltrate, similar to prior.   2.  Trace left pleural effusion   3.  Hyperexpansion of lungs favors changes of COPD.      DX-CHEST-PORTABLE (1 VIEW)   Final Result         1.  Interstitial infiltrates or edema, stable.   2.  Atherosclerosis      DX-CHEST-PORTABLE (1 VIEW)   Final Result         1.  Interstitial infiltrates or edema.   2.  Atherosclerosis      DX-CHEST-PORTABLE (1 VIEW)   Final Result      Moderate interstitial edema or interstitial lung disease and small left pleural effusion similar to previous.      DX-CHEST-PORTABLE (1 VIEW)   Final Result      Stable interstitial edema and/or interstitial lung disease with probable small pleural fluid      DX-CHEST-PORTABLE (1 VIEW)   Final Result      Ill-defined infrahilar interstitial opacities, atelectasis versus mild edema. No significant pleural effusions.      DX-CHEST-PORTABLE (1 VIEW)   Final Result      Mild left basilar atelectasis, improved since prior. No new consolidation or large pleural effusions.      DX-CHEST-PORTABLE (1 VIEW)   Final Result      1.  Left basilar opacification may be secondary to atelectasis. Developing pneumonia cannot be excluded.         DX-CHEST-PORTABLE (1 VIEW)   Final Result      1.  Unchanged mild interstitial pulmonary edema   2.  Unchanged bibasilar atelectasis, alveolar edema or pneumonia      DX-CHEST-PORTABLE (1 VIEW)   Final Result      1.  Decreased pulmonary edema   2.  Unchanged bibasilar atelectasis, alveolar edema or pneumonia      DX-CHEST-PORTABLE (1 VIEW)   Final Result      1.  There is diffuse interstitial and alveolar density in the right mid and lower lung zone. This is increased since the previous study. This may represent mild pulmonary edema/consolidation.   2.  Mildly enlarged cardiomediastinal silhouette when compared with the previous chest x-ray.      DX-CHEST-PORTABLE (1 VIEW)   Final Result      Stable mild pulmonary  edema.   New left basilar atelectasis.      DX-CHEST-PORTABLE (1 VIEW)   Final Result      Stable chest appearance compared with 11/16.      DX-CHEST-PORTABLE (1 VIEW)   Final Result      No acute cardiopulmonary abnormality identified.      DX-CHEST-PORTABLE (1 VIEW)   Final Result      No acute cardiopulmonary abnormality. No interval change.      DX-CHEST-PORTABLE (1 VIEW)   Final Result      No acute cardiopulmonary disease.      CT-HEAD W/O   Final Result      1.  No evidence of acute intracranial process.      2.  There is again seen interstitial pulmonary edema and bibasilar atelectasis.      CT-HIP W/O PLUS RECONS LEFT   Final Result      1.  No evidence of acute fracture or malalignment. No evidence of hardware failure or complication.   2.  Postsurgical changes of the left femur with broken screw fragment redemonstrated.   3.  Demineralization.   4.  Scattered colonic diverticula.   5.  Atherosclerosis.   6.  Small fat-containing umbilical hernia.      EC-ECHOCARDIOGRAM COMPLETE W/O CONT   Final Result      DX-HIP-COMPLETE - UNILATERAL 2+ LEFT   Final Result      1.  No definite acute osseous abnormality. In setting of demineralization, an occult fracture is difficult to exclude. If there is strong clinical suspicion, CT or MRI could be obtained for further evaluation.   2.  Postsurgical changes of the left femur with broken screw fragment redemonstrated.      DX-CHEST-PORTABLE (1 VIEW)   Final Result      No acute cardiopulmonary process is seen.      Atherosclerotic plaque.           Assessment/Plan  * Comfort measures only status- (present on admission)   Assessment & Plan    No signs of distress, continue comfort care options  -- the patient appears comfortable, except for some osteoarthritis flare pain in bilateral hands  -Patient is adamantly against using any oral narcotics oral pain meds therefore I compromised with your of capsaicin cream which will see the if it works, she use this once and then  refused thereafter and claims her pain is a little bit better today     ST elevation myocardial infarction involving right coronary artery (HCC)- (present on admission)   Assessment & Plan    Declines any intervention or therapeutic modality  continue with comfort care     Positive QuantiFERON-TB Gold test   Assessment & Plan      AFB negative        Advanced directives, counseling/discussion- (present on admission)   Assessment & Plan    Placement is an issue for the patient continued on comfort care  Searching for group home placement.  [facility] assisting.  Difficult discharge  -awaiting for placement     Chronic kidney disease (CKD), stage III (moderate) (HCC)- (present on admission)   Assessment & Plan    Continue comfort care.       Essential hypertension- (present on admission)   Assessment & Plan    Comfort care     Fall- (present on admission)   Assessment & Plan    Continue fall precautions        on comfort care. No change from previous day.  Awaiting for placement.  Discussed discharge planning with .      VTE prophylaxis: comfort care

## 2019-04-11 NOTE — PROGRESS NOTES
[facility] Medicine Daily Progress Note    Date of Service  4/10/19    Chief Complaint  89 y.o. female admitted 11/13/2018 with fall and STEMI.    Hospital Course    She was admitted to ICU on medical management. Family elected for comfort care.      Interval Problem Update  Hand pain-both hands hurt with distal joints being the most affected and it likely appears to be osteoarthritis flare.  When I talked to the patient about possible pain medication she became very irate with me and I reason with her that we could possibly use capsaicin cream which she seems agreeable to.    No other acute behavioral issues in the last 24-hour.    Consultants/Specialty  Critical care signed off  Cardiology signed off    Code Status  Comfort care, DNR    Disposition  Hospice, pending group home placement. Difficult placement due to no family available to make a choice.  Working on possible assisted living facility transfer versus group home.    Review of Systems  Review of Systems   Unable to perform ROS: Medical condition        Physical Exam  Temp:  [36.5 °C (97.7 °F)-36.9 °C (98.5 °F)] 36.8 °C (98.2 °F)  Pulse:  [76-79] 76  Resp:  [17-18] 17  BP: (107-108)/(65-68) 108/66  SpO2:  [93 %] 93 %    Physical Exam   Constitutional: She appears cachectic. She has a sickly appearance. She appears ill.   Frail   Chronically ill appearing   HENT:   Head: Normocephalic and atraumatic.   Eyes: Pupils are equal, round, and reactive to light.   Neck: No thyromegaly present.   Cardiovascular: Normal rate and regular rhythm.    Pulmonary/Chest: Effort normal. No respiratory distress. She has decreased breath sounds.   Abdominal: Soft. She exhibits no distension.   Musculoskeletal: Normal range of motion.   Neurological: She is alert.   Skin: Skin is dry.   Psychiatric: She is slowed and withdrawn.   Vitals reviewed.  Heberden nodes appreciated mild tenderness but no extreme evidence of synovitis    Fluids  No intake or output data in the 24 hours  ending 04/11/19 0720    Laboratory                        Imaging  DX-CHEST-PORTABLE (1 VIEW)   Final Result         1.  Hazy interstitial left pulmonary opacities suggests interstitial edema or infiltrate, similar to prior.   2.  Trace left pleural effusion   3.  Hyperexpansion of lungs favors changes of COPD.      DX-CHEST-PORTABLE (1 VIEW)   Final Result         1.  Interstitial infiltrates or edema, stable.   2.  Atherosclerosis      DX-CHEST-PORTABLE (1 VIEW)   Final Result         1.  Interstitial infiltrates or edema.   2.  Atherosclerosis      DX-CHEST-PORTABLE (1 VIEW)   Final Result      Moderate interstitial edema or interstitial lung disease and small left pleural effusion similar to previous.      DX-CHEST-PORTABLE (1 VIEW)   Final Result      Stable interstitial edema and/or interstitial lung disease with probable small pleural fluid      DX-CHEST-PORTABLE (1 VIEW)   Final Result      Ill-defined infrahilar interstitial opacities, atelectasis versus mild edema. No significant pleural effusions.      DX-CHEST-PORTABLE (1 VIEW)   Final Result      Mild left basilar atelectasis, improved since prior. No new consolidation or large pleural effusions.      DX-CHEST-PORTABLE (1 VIEW)   Final Result      1.  Left basilar opacification may be secondary to atelectasis. Developing pneumonia cannot be excluded.         DX-CHEST-PORTABLE (1 VIEW)   Final Result      1.  Unchanged mild interstitial pulmonary edema   2.  Unchanged bibasilar atelectasis, alveolar edema or pneumonia      DX-CHEST-PORTABLE (1 VIEW)   Final Result      1.  Decreased pulmonary edema   2.  Unchanged bibasilar atelectasis, alveolar edema or pneumonia      DX-CHEST-PORTABLE (1 VIEW)   Final Result      1.  There is diffuse interstitial and alveolar density in the right mid and lower lung zone. This is increased since the previous study. This may represent mild pulmonary edema/consolidation.   2.  Mildly enlarged cardiomediastinal silhouette  when compared with the previous chest x-ray.      DX-CHEST-PORTABLE (1 VIEW)   Final Result      Stable mild pulmonary edema.   New left basilar atelectasis.      DX-CHEST-PORTABLE (1 VIEW)   Final Result      Stable chest appearance compared with 11/16.      DX-CHEST-PORTABLE (1 VIEW)   Final Result      No acute cardiopulmonary abnormality identified.      DX-CHEST-PORTABLE (1 VIEW)   Final Result      No acute cardiopulmonary abnormality. No interval change.      DX-CHEST-PORTABLE (1 VIEW)   Final Result      No acute cardiopulmonary disease.      CT-HEAD W/O   Final Result      1.  No evidence of acute intracranial process.      2.  There is again seen interstitial pulmonary edema and bibasilar atelectasis.      CT-HIP W/O PLUS RECONS LEFT   Final Result      1.  No evidence of acute fracture or malalignment. No evidence of hardware failure or complication.   2.  Postsurgical changes of the left femur with broken screw fragment redemonstrated.   3.  Demineralization.   4.  Scattered colonic diverticula.   5.  Atherosclerosis.   6.  Small fat-containing umbilical hernia.      EC-ECHOCARDIOGRAM COMPLETE W/O CONT   Final Result      DX-HIP-COMPLETE - UNILATERAL 2+ LEFT   Final Result      1.  No definite acute osseous abnormality. In setting of demineralization, an occult fracture is difficult to exclude. If there is strong clinical suspicion, CT or MRI could be obtained for further evaluation.   2.  Postsurgical changes of the left femur with broken screw fragment redemonstrated.      DX-CHEST-PORTABLE (1 VIEW)   Final Result      No acute cardiopulmonary process is seen.      Atherosclerotic plaque.           Assessment/Plan  * Comfort measures only status- (present on admission)   Assessment & Plan    No signs of distress, continue comfort care options  -- the patient appears comfortable, except for some osteoarthritis flare pain in bilateral hands  -Patient is adamantly against using any oral narcotics oral  pain meds therefore I compromised with your of capsaicin cream which will see the if it works     ST elevation myocardial infarction involving right coronary artery (HCC)- (present on admission)   Assessment & Plan    Declines any intervention or therapeutic modality  continue with comfort care     Positive QuantiFERON-TB Gold test   Assessment & Plan      AFB negative        Advanced directives, counseling/discussion- (present on admission)   Assessment & Plan    Placement is an issue for the patient continued on comfort care  Searching for group home placement.  [facility] assisting.  Difficult discharge  -awaiting for placement     Chronic kidney disease (CKD), stage III (moderate) (HCC)- (present on admission)   Assessment & Plan    Continue comfort care.       Essential hypertension- (present on admission)   Assessment & Plan    Comfort care     Fall- (present on admission)   Assessment & Plan    Continue fall precautions        on comfort care. No change from previous day.  Awaiting for placement.  Discussed discharge planning with .      VTE prophylaxis: comfort care

## 2019-04-11 NOTE — CARE PLAN
Problem: Safety  Goal: Will remain free from falls    Intervention: Assess risk factors for falls   Patient calls appropriately for assistance, fall precautions in place, bed alarm in use.       Problem: Mobility  Goal: Risk for activity intolerance will decrease    Intervention: Assess and monitor signs of activity intolerance  Patient standby assist with front wheel walker, tolerates well up to the commode.

## 2019-04-12 PROCEDURE — A9270 NON-COVERED ITEM OR SERVICE: HCPCS | Performed by: HOSPITALIST

## 2019-04-12 PROCEDURE — 99231 SBSQ HOSP IP/OBS SF/LOW 25: CPT | Performed by: INTERNAL MEDICINE

## 2019-04-12 PROCEDURE — 700102 HCHG RX REV CODE 250 W/ 637 OVERRIDE(OP): Performed by: HOSPITALIST

## 2019-04-12 PROCEDURE — 770001 HCHG ROOM/CARE - MED/SURG/GYN PRIV*

## 2019-04-12 RX ADMIN — SENNOSIDES AND DOCUSATE SODIUM 2 TABLET: 8.6; 5 TABLET ORAL at 06:32

## 2019-04-12 RX ADMIN — FUROSEMIDE 20 MG: 20 TABLET ORAL at 06:32

## 2019-04-12 NOTE — PROGRESS NOTES
[facility] Medicine Daily Progress Note    Date of Service  4/10/19    Chief Complaint  89 y.o. female admitted 11/13/2018 with fall and STEMI.    Hospital Course    She was admitted to ICU on medical management. Family elected for comfort care.      Interval Problem Update  Hand pain-no real change in pain today.    Dementia-remains near baseline with no obvious behavioral issues at this time.    Consultants/Specialty  Critical care signed off  Cardiology signed off    Code Status  Comfort care, DNR    Disposition  Hospice, pending group home placement. Difficult placement due to no family available to make a choice.  Working on possible assisted living facility transfer versus group home.    Review of Systems  Review of Systems   Unable to perform ROS: Medical condition        Physical Exam  Temp:  [36.2 °C (97.2 °F)-36.3 °C (97.4 °F)] 36.2 °C (97.2 °F)  Pulse:  [70-72] 70  Resp:  [17] 17  BP: ()/(61-93) 99/61  SpO2:  [93 %-97 %] 97 %    Physical Exam   Constitutional: She appears cachectic. She has a sickly appearance. She appears ill.   Frail   Chronically ill appearing   HENT:   Head: Normocephalic and atraumatic.   Eyes: Pupils are equal, round, and reactive to light.   Neck: No thyromegaly present.   Cardiovascular: Normal rate and regular rhythm.    Pulmonary/Chest: Effort normal. No respiratory distress. She has decreased breath sounds.   Abdominal: Soft. She exhibits no distension.   Musculoskeletal: Normal range of motion.   Neurological: She is alert.   Skin: Skin is dry.   Psychiatric: She is slowed and withdrawn.   Vitals reviewed.  No interval change in physical exam today    Fluids  No intake or output data in the 24 hours ending 04/12/19 1352    Laboratory                        Imaging  DX-CHEST-PORTABLE (1 VIEW)   Final Result         1.  Hazy interstitial left pulmonary opacities suggests interstitial edema or infiltrate, similar to prior.   2.  Trace left pleural effusion   3.   Hyperexpansion of lungs favors changes of COPD.      DX-CHEST-PORTABLE (1 VIEW)   Final Result         1.  Interstitial infiltrates or edema, stable.   2.  Atherosclerosis      DX-CHEST-PORTABLE (1 VIEW)   Final Result         1.  Interstitial infiltrates or edema.   2.  Atherosclerosis      DX-CHEST-PORTABLE (1 VIEW)   Final Result      Moderate interstitial edema or interstitial lung disease and small left pleural effusion similar to previous.      DX-CHEST-PORTABLE (1 VIEW)   Final Result      Stable interstitial edema and/or interstitial lung disease with probable small pleural fluid      DX-CHEST-PORTABLE (1 VIEW)   Final Result      Ill-defined infrahilar interstitial opacities, atelectasis versus mild edema. No significant pleural effusions.      DX-CHEST-PORTABLE (1 VIEW)   Final Result      Mild left basilar atelectasis, improved since prior. No new consolidation or large pleural effusions.      DX-CHEST-PORTABLE (1 VIEW)   Final Result      1.  Left basilar opacification may be secondary to atelectasis. Developing pneumonia cannot be excluded.         DX-CHEST-PORTABLE (1 VIEW)   Final Result      1.  Unchanged mild interstitial pulmonary edema   2.  Unchanged bibasilar atelectasis, alveolar edema or pneumonia      DX-CHEST-PORTABLE (1 VIEW)   Final Result      1.  Decreased pulmonary edema   2.  Unchanged bibasilar atelectasis, alveolar edema or pneumonia      DX-CHEST-PORTABLE (1 VIEW)   Final Result      1.  There is diffuse interstitial and alveolar density in the right mid and lower lung zone. This is increased since the previous study. This may represent mild pulmonary edema/consolidation.   2.  Mildly enlarged cardiomediastinal silhouette when compared with the previous chest x-ray.      DX-CHEST-PORTABLE (1 VIEW)   Final Result      Stable mild pulmonary edema.   New left basilar atelectasis.      DX-CHEST-PORTABLE (1 VIEW)   Final Result      Stable chest appearance compared with 11/16.       DX-CHEST-PORTABLE (1 VIEW)   Final Result      No acute cardiopulmonary abnormality identified.      DX-CHEST-PORTABLE (1 VIEW)   Final Result      No acute cardiopulmonary abnormality. No interval change.      DX-CHEST-PORTABLE (1 VIEW)   Final Result      No acute cardiopulmonary disease.      CT-HEAD W/O   Final Result      1.  No evidence of acute intracranial process.      2.  There is again seen interstitial pulmonary edema and bibasilar atelectasis.      CT-HIP W/O PLUS RECONS LEFT   Final Result      1.  No evidence of acute fracture or malalignment. No evidence of hardware failure or complication.   2.  Postsurgical changes of the left femur with broken screw fragment redemonstrated.   3.  Demineralization.   4.  Scattered colonic diverticula.   5.  Atherosclerosis.   6.  Small fat-containing umbilical hernia.      EC-ECHOCARDIOGRAM COMPLETE W/O CONT   Final Result      DX-HIP-COMPLETE - UNILATERAL 2+ LEFT   Final Result      1.  No definite acute osseous abnormality. In setting of demineralization, an occult fracture is difficult to exclude. If there is strong clinical suspicion, CT or MRI could be obtained for further evaluation.   2.  Postsurgical changes of the left femur with broken screw fragment redemonstrated.      DX-CHEST-PORTABLE (1 VIEW)   Final Result      No acute cardiopulmonary process is seen.      Atherosclerotic plaque.           Assessment/Plan  * Comfort measures only status- (present on admission)   Assessment & Plan    No signs of distress, continue comfort care options  -- the patient appears comfortable, except for some osteoarthritis flare pain in bilateral hands  -Patient is adamantly against using any oral narcotics oral pain meds therefore I compromised with your of capsaicin cream which will see the if it works, rarely using this cream and denies any new pain today therefore no further adjustments at this time     ST elevation myocardial infarction involving right coronary  artery (HCC)- (present on admission)   Assessment & Plan    Declines any intervention or therapeutic modality  continue with comfort care     Positive QuantiFERON-TB Gold test   Assessment & Plan      AFB negative        Advanced directives, counseling/discussion- (present on admission)   Assessment & Plan    Placement is an issue for the patient continued on comfort care  Searching for group home placement.  [facility] assisting.  Difficult discharge  -awaiting for placement     Chronic kidney disease (CKD), stage III (moderate) (HCC)- (present on admission)   Assessment & Plan    Continue comfort care.       Essential hypertension- (present on admission)   Assessment & Plan    Comfort care     Fall- (present on admission)   Assessment & Plan    Continue fall precautions        on comfort care. No change from previous day.  Awaiting for placement.  Discussed discharge planning with .      VTE prophylaxis: comfort care

## 2019-04-12 NOTE — CARE PLAN
Problem: Safety  Goal: Will remain free from injury  Outcome: PROGRESSING AS EXPECTED  Bed in the lowest locked position. Call light within reach. Bed alarm in use. Hourly rounding in place.     Problem: Mobility  Goal: Risk for activity intolerance will decrease  Outcome: PROGRESSING AS EXPECTED  Pt encouraged mobility to restroom instead of commode.

## 2019-04-13 PROCEDURE — A9270 NON-COVERED ITEM OR SERVICE: HCPCS | Performed by: HOSPITALIST

## 2019-04-13 PROCEDURE — 700102 HCHG RX REV CODE 250 W/ 637 OVERRIDE(OP): Performed by: HOSPITALIST

## 2019-04-13 PROCEDURE — 99231 SBSQ HOSP IP/OBS SF/LOW 25: CPT | Performed by: INTERNAL MEDICINE

## 2019-04-13 PROCEDURE — 770001 HCHG ROOM/CARE - MED/SURG/GYN PRIV*

## 2019-04-13 RX ADMIN — SENNOSIDES AND DOCUSATE SODIUM 1 TABLET: 8.6; 5 TABLET ORAL at 06:41

## 2019-04-13 RX ADMIN — FUROSEMIDE 20 MG: 20 TABLET ORAL at 06:41

## 2019-04-13 NOTE — CARE PLAN
Problem: Safety  Goal: Will remain free from falls  Outcome: PROGRESSING AS EXPECTED   Treaded socks in place, bed in the lowest position, bed alarm on, call light and belongings within reach, pt call for assistance appropriately    Problem: Mobility  Goal: Risk for activity intolerance will decrease  Outcome: PROGRESSING AS EXPECTED

## 2019-04-13 NOTE — PROGRESS NOTES
Hospital Medicine Daily Progress Note    Date of Service  4/10/19    Chief Complaint  89 y.o. female admitted 11/13/2018 with fall and STEMI.    Hospital Course    She was admitted to ICU on medical management. Family elected for comfort care.      Interval Problem Update  Hand pain-she says that the cold makes it worse and therefore she keeps her hands under her blanket.  She is very concerned that she lost her favorite blanket last night.    Dementia-no change today.    Consultants/Specialty  Critical care signed off  Cardiology signed off    Code Status  Comfort care, DNR    Disposition  Hospice, pending group home placement. Difficult placement due to no family available to make a choice.  Working on possible assisted living facility transfer versus group home.    Review of Systems  Review of Systems   Unable to perform ROS: Medical condition        Physical Exam  Temp:  [36.5 °C (97.7 °F)-36.7 °C (98 °F)] 36.5 °C (97.7 °F)  Pulse:  [66-77] 66  Resp:  [15-18] 18  BP: (111-125)/(61-69) 116/66  SpO2:  [89 %-91 %] 91 %    Physical Exam   Constitutional: She appears cachectic. She has a sickly appearance. She appears ill.   Frail   Chronically ill appearing   HENT:   Head: Normocephalic and atraumatic.   Eyes: Pupils are equal, round, and reactive to light.   Neck: No thyromegaly present.   Cardiovascular: Normal rate and regular rhythm.    Pulmonary/Chest: Effort normal. No respiratory distress. She has decreased breath sounds.   Abdominal: Soft. She exhibits no distension.   Musculoskeletal: Normal range of motion.   Neurological: She is alert.   Skin: Skin is dry.   Psychiatric: She is slowed and withdrawn.   Vitals reviewed.  Again no change in interval physical exam again today    Fluids  No intake or output data in the 24 hours ending 04/13/19 1302    Laboratory                        Imaging  DX-CHEST-PORTABLE (1 VIEW)   Final Result         1.  Hazy interstitial left pulmonary opacities suggests interstitial  edema or infiltrate, similar to prior.   2.  Trace left pleural effusion   3.  Hyperexpansion of lungs favors changes of COPD.      DX-CHEST-PORTABLE (1 VIEW)   Final Result         1.  Interstitial infiltrates or edema, stable.   2.  Atherosclerosis      DX-CHEST-PORTABLE (1 VIEW)   Final Result         1.  Interstitial infiltrates or edema.   2.  Atherosclerosis      DX-CHEST-PORTABLE (1 VIEW)   Final Result      Moderate interstitial edema or interstitial lung disease and small left pleural effusion similar to previous.      DX-CHEST-PORTABLE (1 VIEW)   Final Result      Stable interstitial edema and/or interstitial lung disease with probable small pleural fluid      DX-CHEST-PORTABLE (1 VIEW)   Final Result      Ill-defined infrahilar interstitial opacities, atelectasis versus mild edema. No significant pleural effusions.      DX-CHEST-PORTABLE (1 VIEW)   Final Result      Mild left basilar atelectasis, improved since prior. No new consolidation or large pleural effusions.      DX-CHEST-PORTABLE (1 VIEW)   Final Result      1.  Left basilar opacification may be secondary to atelectasis. Developing pneumonia cannot be excluded.         DX-CHEST-PORTABLE (1 VIEW)   Final Result      1.  Unchanged mild interstitial pulmonary edema   2.  Unchanged bibasilar atelectasis, alveolar edema or pneumonia      DX-CHEST-PORTABLE (1 VIEW)   Final Result      1.  Decreased pulmonary edema   2.  Unchanged bibasilar atelectasis, alveolar edema or pneumonia      DX-CHEST-PORTABLE (1 VIEW)   Final Result      1.  There is diffuse interstitial and alveolar density in the right mid and lower lung zone. This is increased since the previous study. This may represent mild pulmonary edema/consolidation.   2.  Mildly enlarged cardiomediastinal silhouette when compared with the previous chest x-ray.      DX-CHEST-PORTABLE (1 VIEW)   Final Result      Stable mild pulmonary edema.   New left basilar atelectasis.      DX-CHEST-PORTABLE (1  VIEW)   Final Result      Stable chest appearance compared with 11/16.      DX-CHEST-PORTABLE (1 VIEW)   Final Result      No acute cardiopulmonary abnormality identified.      DX-CHEST-PORTABLE (1 VIEW)   Final Result      No acute cardiopulmonary abnormality. No interval change.      DX-CHEST-PORTABLE (1 VIEW)   Final Result      No acute cardiopulmonary disease.      CT-HEAD W/O   Final Result      1.  No evidence of acute intracranial process.      2.  There is again seen interstitial pulmonary edema and bibasilar atelectasis.      CT-HIP W/O PLUS RECONS LEFT   Final Result      1.  No evidence of acute fracture or malalignment. No evidence of hardware failure or complication.   2.  Postsurgical changes of the left femur with broken screw fragment redemonstrated.   3.  Demineralization.   4.  Scattered colonic diverticula.   5.  Atherosclerosis.   6.  Small fat-containing umbilical hernia.      EC-ECHOCARDIOGRAM COMPLETE W/O CONT   Final Result      DX-HIP-COMPLETE - UNILATERAL 2+ LEFT   Final Result      1.  No definite acute osseous abnormality. In setting of demineralization, an occult fracture is difficult to exclude. If there is strong clinical suspicion, CT or MRI could be obtained for further evaluation.   2.  Postsurgical changes of the left femur with broken screw fragment redemonstrated.      DX-CHEST-PORTABLE (1 VIEW)   Final Result      No acute cardiopulmonary process is seen.      Atherosclerotic plaque.           Assessment/Plan  * Comfort measures only status- (present on admission)   Assessment & Plan    No signs of distress, continue comfort care options  -- the patient appears comfortable, except for some osteoarthritis flare pain in bilateral hands  -Patient is adamantly against using any oral narcotics oral pain meds therefore I compromised with your of capsaicin cream, she is only using sparingly and usually keeps her hands under a blanket which keeps him better  -Otherwise no change plan  to medications     ST elevation myocardial infarction involving right coronary artery (HCC)- (present on admission)   Assessment & Plan    Declines any intervention or therapeutic modality  continue with comfort care     Positive QuantiFERON-TB Gold test   Assessment & Plan      AFB negative        Advanced directives, counseling/discussion- (present on admission)   Assessment & Plan    Placement is an issue for the patient continued on comfort care  Searching for group home placement.  [facility] assisting.  Difficult discharge  -awaiting for placement     Chronic kidney disease (CKD), stage III (moderate) ([facility])- (present on admission)   Assessment & Plan    Continue comfort care.       Essential hypertension- (present on admission)   Assessment & Plan    Comfort care     Fall- (present on admission)   Assessment & Plan    Continue fall precautions        on comfort care. No change from previous day.  Awaiting for placement.  Discussed discharge planning with .      VTE prophylaxis: comfort care

## 2019-04-14 PROCEDURE — 770001 HCHG ROOM/CARE - MED/SURG/GYN PRIV*

## 2019-04-14 NOTE — CARE PLAN
Problem: Safety  Goal: Will remain free from injury    Intervention: Provide assistance with mobility  Assistance from staff provided with mobility.       Problem: Bowel/Gastric:  Goal: Normal bowel function is maintained or improved    Intervention: Educate patient and significant other/support system about diet, fluid intake, medications and activity to promote bowel function  Patient had BM today 4/13. Patient educated about fluid intake and stool softeners if necessary to remain having regular bowel movements.

## 2019-04-15 PROCEDURE — A9270 NON-COVERED ITEM OR SERVICE: HCPCS | Performed by: HOSPITALIST

## 2019-04-15 PROCEDURE — 700102 HCHG RX REV CODE 250 W/ 637 OVERRIDE(OP): Performed by: HOSPITALIST

## 2019-04-15 PROCEDURE — 770001 HCHG ROOM/CARE - MED/SURG/GYN PRIV*

## 2019-04-15 PROCEDURE — 99231 SBSQ HOSP IP/OBS SF/LOW 25: CPT | Performed by: INTERNAL MEDICINE

## 2019-04-15 RX ADMIN — FUROSEMIDE 20 MG: 20 TABLET ORAL at 06:34

## 2019-04-15 NOTE — CARE PLAN
Problem: Bowel/Gastric:  Goal: Normal bowel function is maintained or improved  Outcome: PROGRESSING AS EXPECTED  Patient having regular bowel movements.     Problem: Urinary Elimination:  Goal: Ability to reestablish a normal urinary elimination pattern will improve    Intervention: Assist patient to sit on commode or toilet for voiding  Patient assisted to toilet for voiding, no bed pan used.

## 2019-04-15 NOTE — PROGRESS NOTES
[facility] Medicine Daily Progress Note    Date of Service  4/10/19    Chief Complaint  89 y.o. female admitted 11/13/2018 with fall and STEMI.    Hospital Course    She was admitted to ICU on medical management. Family elected for comfort care.      Interval Problem Update  Hand pain-she denies any hand pain this morning.    Dementia-told me that she has to go on the commode this morning but otherwise no other behavioral changes.    Consultants/Specialty  Critical care signed off  Cardiology signed off    Code Status  Comfort care, DNR    Disposition  Hospice, pending group home placement. Difficult placement due to no family available to make a choice.  Working on possible assisted living facility transfer versus group home.    Review of Systems  Review of Systems   Unable to perform ROS: Medical condition        Physical Exam  Temp:  [36.5 °C (97.7 °F)] 36.5 °C (97.7 °F)  Pulse:  [71] 71  Resp:  [16] 16  BP: (113)/(54) 113/54  SpO2:  [90 %] 90 %    Physical Exam   Constitutional: She appears cachectic. She has a sickly appearance. She appears ill.   Frail   Chronically ill appearing   HENT:   Head: Normocephalic and atraumatic.   Eyes: Pupils are equal, round, and reactive to light.   Neck: No thyromegaly present.   Cardiovascular: Normal rate and regular rhythm.    Pulmonary/Chest: Effort normal. No respiratory distress. She has decreased breath sounds.   Abdominal: Soft. She exhibits no distension.   Musculoskeletal: Normal range of motion.   Neurological: She is alert.   Skin: Skin is dry.   Psychiatric: She is slowed and withdrawn.   Vitals reviewed.  Physical exam remains unchanged again    Fluids    Intake/Output Summary (Last 24 hours) at 04/15/19 1428  Last data filed at 04/15/19 0259   Gross per 24 hour   Intake              240 ml   Output                0 ml   Net              240 ml       Laboratory                        Imaging  DX-CHEST-PORTABLE (1 VIEW)   Final Result         1.  Hazy interstitial  left pulmonary opacities suggests interstitial edema or infiltrate, similar to prior.   2.  Trace left pleural effusion   3.  Hyperexpansion of lungs favors changes of COPD.      DX-CHEST-PORTABLE (1 VIEW)   Final Result         1.  Interstitial infiltrates or edema, stable.   2.  Atherosclerosis      DX-CHEST-PORTABLE (1 VIEW)   Final Result         1.  Interstitial infiltrates or edema.   2.  Atherosclerosis      DX-CHEST-PORTABLE (1 VIEW)   Final Result      Moderate interstitial edema or interstitial lung disease and small left pleural effusion similar to previous.      DX-CHEST-PORTABLE (1 VIEW)   Final Result      Stable interstitial edema and/or interstitial lung disease with probable small pleural fluid      DX-CHEST-PORTABLE (1 VIEW)   Final Result      Ill-defined infrahilar interstitial opacities, atelectasis versus mild edema. No significant pleural effusions.      DX-CHEST-PORTABLE (1 VIEW)   Final Result      Mild left basilar atelectasis, improved since prior. No new consolidation or large pleural effusions.      DX-CHEST-PORTABLE (1 VIEW)   Final Result      1.  Left basilar opacification may be secondary to atelectasis. Developing pneumonia cannot be excluded.         DX-CHEST-PORTABLE (1 VIEW)   Final Result      1.  Unchanged mild interstitial pulmonary edema   2.  Unchanged bibasilar atelectasis, alveolar edema or pneumonia      DX-CHEST-PORTABLE (1 VIEW)   Final Result      1.  Decreased pulmonary edema   2.  Unchanged bibasilar atelectasis, alveolar edema or pneumonia      DX-CHEST-PORTABLE (1 VIEW)   Final Result      1.  There is diffuse interstitial and alveolar density in the right mid and lower lung zone. This is increased since the previous study. This may represent mild pulmonary edema/consolidation.   2.  Mildly enlarged cardiomediastinal silhouette when compared with the previous chest x-ray.      DX-CHEST-PORTABLE (1 VIEW)   Final Result      Stable mild pulmonary edema.   New left  basilar atelectasis.      DX-CHEST-PORTABLE (1 VIEW)   Final Result      Stable chest appearance compared with 11/16.      DX-CHEST-PORTABLE (1 VIEW)   Final Result      No acute cardiopulmonary abnormality identified.      DX-CHEST-PORTABLE (1 VIEW)   Final Result      No acute cardiopulmonary abnormality. No interval change.      DX-CHEST-PORTABLE (1 VIEW)   Final Result      No acute cardiopulmonary disease.      CT-HEAD W/O   Final Result      1.  No evidence of acute intracranial process.      2.  There is again seen interstitial pulmonary edema and bibasilar atelectasis.      CT-HIP W/O PLUS RECONS LEFT   Final Result      1.  No evidence of acute fracture or malalignment. No evidence of hardware failure or complication.   2.  Postsurgical changes of the left femur with broken screw fragment redemonstrated.   3.  Demineralization.   4.  Scattered colonic diverticula.   5.  Atherosclerosis.   6.  Small fat-containing umbilical hernia.      EC-ECHOCARDIOGRAM COMPLETE W/O CONT   Final Result      DX-HIP-COMPLETE - UNILATERAL 2+ LEFT   Final Result      1.  No definite acute osseous abnormality. In setting of demineralization, an occult fracture is difficult to exclude. If there is strong clinical suspicion, CT or MRI could be obtained for further evaluation.   2.  Postsurgical changes of the left femur with broken screw fragment redemonstrated.      DX-CHEST-PORTABLE (1 VIEW)   Final Result      No acute cardiopulmonary process is seen.      Atherosclerotic plaque.           Assessment/Plan  * Comfort measures only status- (present on admission)   Assessment & Plan    No signs of distress, continue comfort care options  -- the patient appears comfortable, except for some osteoarthritis flare pain in bilateral hands  -Patient does not want any oral pain medications and even refused capsaicin cream therefore continue supportive care and reassess as needed  -Otherwise no change plan to medications     ST elevation  myocardial infarction involving right coronary artery (HCC)- (present on admission)   Assessment & Plan    Declines any intervention or therapeutic modality  continue with comfort care     Positive QuantiFERON-TB Gold test   Assessment & Plan      AFB negative        Advanced directives, counseling/discussion- (present on admission)   Assessment & Plan    Placement is an issue for the patient continued on comfort care  Searching for group home placement.  [facility] assisting.  Difficult discharge  -awaiting for placement     Chronic kidney disease (CKD), stage III (moderate) (HCC)- (present on admission)   Assessment & Plan    Continue comfort care.       Essential hypertension- (present on admission)   Assessment & Plan    Comfort care     Fall- (present on admission)   Assessment & Plan    Continue fall precautions        on comfort care. No change from previous day.  Awaiting for placement.  Discussed discharge planning with .      VTE prophylaxis: comfort care

## 2019-04-15 NOTE — CARE PLAN
Problem: Safety  Goal: Will remain free from injury  Outcome: PROGRESSING AS EXPECTED  Patient remains free from injury. Bed alarm in place. Call light within reach. Hourly rounding in place.     Problem: Venous Thromboembolism (VTW)/Deep Vein Thrombosis (DVT) Prevention:  Goal: Patient will participate in Venous Thrombosis (VTE)/Deep Vein Thrombosis (DVT)Prevention Measures  Patient refuses SCDs despite education. Patient is comfort care.

## 2019-04-16 ENCOUNTER — PATIENT OUTREACH (OUTPATIENT)
Dept: HEALTH INFORMATION MANAGEMENT | Facility: OTHER | Age: 84
End: 2019-04-16

## 2019-04-16 PROCEDURE — 99232 SBSQ HOSP IP/OBS MODERATE 35: CPT | Performed by: HOSPITALIST

## 2019-04-16 PROCEDURE — 770001 HCHG ROOM/CARE - MED/SURG/GYN PRIV*

## 2019-04-16 PROCEDURE — A9270 NON-COVERED ITEM OR SERVICE: HCPCS | Performed by: HOSPITALIST

## 2019-04-16 PROCEDURE — 700102 HCHG RX REV CODE 250 W/ 637 OVERRIDE(OP): Performed by: HOSPITALIST

## 2019-04-16 RX ADMIN — FUROSEMIDE 20 MG: 20 TABLET ORAL at 06:31

## 2019-04-16 NOTE — PROGRESS NOTES
Per chart review, pt remains admitted to [facility] pending discharge plan. Inpatient discharge notes indicate continued planning for assisted living at [facility] and [facility]. Appears inpatient staff are still currently pending determination from [facility] regarding approval/denial for placement.     Plan:  · MSW will continue to monitor at this time for continuity of care.

## 2019-04-16 NOTE — CARE PLAN
Problem: Safety  Goal: Will remain free from injury  Outcome: PROGRESSING AS EXPECTED  Provided assistance with mobility. Fall prevention measures in place. rounds ongoing. Bed alarm in use      Problem: Skin Integrity  Goal: Risk for impaired skin integrity will decrease  Outcome: PROGRESSING AS EXPECTED  Assessed for signs of skin breakdown. Encouraged frequent turns and repositioning to prevent development of pressure ulcers.

## 2019-04-16 NOTE — DISCHARGE PLANNING
Anticipated Discharge Disposition: nursing home vs [facility]    Action: LSW followed up with Janet from [facility] who apologized for not getting back to this LSW. Janet stated she never heard back from karen but will follow up with him regarding  The Pt assessment and if she is appropriate for their facility.     Barriers to Discharge: placement    Plan: follow up with Janet from [facility]

## 2019-04-16 NOTE — CARE PLAN
Problem: Safety  Goal: Will remain free from injury  Outcome: PROGRESSING AS EXPECTED  Bed alarm in use, violet uses the call ligt appropriately to call for assistance, fall prevention educartion    Problem: Skin Integrity  Goal: Risk for impaired skin integrity will decrease  Outcome: PROGRESSING SLOWER THAN EXPECTED  Encouraged to reposition every 2 hours,

## 2019-04-17 PROCEDURE — 99231 SBSQ HOSP IP/OBS SF/LOW 25: CPT | Performed by: HOSPITALIST

## 2019-04-17 PROCEDURE — 770001 HCHG ROOM/CARE - MED/SURG/GYN PRIV*

## 2019-04-17 NOTE — PROGRESS NOTES
Assumed care of patient at 0700. Resting in bed. Call light with in reach. Patient refusing q2 turning repositioning. Encouraged to turn and reposition more frequently. Bed alarm in use.

## 2019-04-17 NOTE — PROGRESS NOTES
[facility] Medicine Daily Progress Note    Date of Service  4/10/19    Chief Complaint  89 y.o. female admitted 11/13/2018 with fall and STEMI.  Herminia is a 89 y.o. female PMH of essential hypertension, who presents with ground-level fall last night around 9 PM.  She stated that she has a bad left hip and fell because of that.  The patient denied palpitation, dizziness, syncope episode.  She presented to ER today and unfortunately she was found to have acute ST elevation myocardial infarction with troponin over 50.  Because of that cardiology was consulted in ER.  She stated that she has one episode of chest pain last night over the sternum area, lasted for a few minutes and it went away on its own.  Currently the patient denies any chest pain.  She was explained in detail about her medical condition and she stated that she does not want any aggressive intervention including angiogram and wanted to be treated only medically.  I also had a long discussion with the patient regarding her CODE STATUS and she does not want to be intubated or had CPR.  She was started on heparin drip in ER and she will be admitted to cardiac intensive care unit in critical condition.             Hospital Course    She was admitted to ICU on medical management. Family elected for comfort care.      Interval Problem Update  4/16:  dementia noted on exam, upset about daughter in [place], states daughter lying about patient being crazy.  Patient states she had previously lived alone in an apartment, but has since given her apartment up.  She has no plans as to where she will live.      Consultants/Specialty  Critical care signed off  Cardiology signed off    Code Status  Comfort care, DNR    Disposition  Hospice, pending group home placement. 11/20/18 quantiferon gold +. afb negative. Working on possible assisted living facility transfer versus group home.  Patient tells me she has a daughter in [place].    Review of Systems  Review of Systems    Unable to perform ROS: Dementia        Physical Exam  Temp:  [36.7 °C (98.1 °F)-36.8 °C (98.2 °F)] 36.7 °C (98.1 °F)  Pulse:  [72-89] 72  Resp:  [15-17] 15  BP: (127-134)/(59-62) 134/59  SpO2:  [93 %-94 %] 94 %    Physical Exam   Constitutional: She is oriented to person, place, and time. She appears well-developed and well-nourished. No distress.   HENT:   Head: Normocephalic and atraumatic.   Nose: Nose normal.   Mouth/Throat: Oropharynx is clear and moist. No oropharyngeal exudate.   Eyes: Pupils are equal, round, and reactive to light. Conjunctivae and EOM are normal. Right eye exhibits no discharge. Left eye exhibits no discharge. No scleral icterus.   Neck: Normal range of motion. Neck supple. No JVD present. No tracheal deviation present. No thyromegaly present.   Cardiovascular: Normal rate, regular rhythm, normal heart sounds and intact distal pulses.  Exam reveals no gallop and no friction rub.    No murmur heard.  Pulmonary/Chest: Effort normal and breath sounds normal. No stridor. No respiratory distress. She has no wheezes. She has no rales. She exhibits no tenderness.   Abdominal: Soft. Bowel sounds are normal. She exhibits no distension and no mass. There is no tenderness. There is no rebound and no guarding.   Musculoskeletal: Normal range of motion. She exhibits no edema or tenderness.   Lymphadenopathy:     She has no cervical adenopathy.   Neurological: She is alert and oriented to person, place, and time. No cranial nerve deficit. She exhibits normal muscle tone. Coordination normal.   Skin: Skin is warm and dry. No rash noted. She is not diaphoretic. No erythema.   Psychiatric: She has a normal mood and affect. Her behavior is normal. Judgment and thought content normal.   Nursing note and vitals reviewed.  Physical exam remains unchanged again    Fluids    Intake/Output Summary (Last 24 hours) at 04/16/19 1726  Last data filed at 04/16/19 1300   Gross per 24 hour   Intake              480 ml    Output                0 ml   Net              480 ml       Laboratory                        Imaging  DX-CHEST-PORTABLE (1 VIEW)   Final Result         1.  Hazy interstitial left pulmonary opacities suggests interstitial edema or infiltrate, similar to prior.   2.  Trace left pleural effusion   3.  Hyperexpansion of lungs favors changes of COPD.      DX-CHEST-PORTABLE (1 VIEW)   Final Result         1.  Interstitial infiltrates or edema, stable.   2.  Atherosclerosis      DX-CHEST-PORTABLE (1 VIEW)   Final Result         1.  Interstitial infiltrates or edema.   2.  Atherosclerosis      DX-CHEST-PORTABLE (1 VIEW)   Final Result      Moderate interstitial edema or interstitial lung disease and small left pleural effusion similar to previous.      DX-CHEST-PORTABLE (1 VIEW)   Final Result      Stable interstitial edema and/or interstitial lung disease with probable small pleural fluid      DX-CHEST-PORTABLE (1 VIEW)   Final Result      Ill-defined infrahilar interstitial opacities, atelectasis versus mild edema. No significant pleural effusions.      DX-CHEST-PORTABLE (1 VIEW)   Final Result      Mild left basilar atelectasis, improved since prior. No new consolidation or large pleural effusions.      DX-CHEST-PORTABLE (1 VIEW)   Final Result      1.  Left basilar opacification may be secondary to atelectasis. Developing pneumonia cannot be excluded.         DX-CHEST-PORTABLE (1 VIEW)   Final Result      1.  Unchanged mild interstitial pulmonary edema   2.  Unchanged bibasilar atelectasis, alveolar edema or pneumonia      DX-CHEST-PORTABLE (1 VIEW)   Final Result      1.  Decreased pulmonary edema   2.  Unchanged bibasilar atelectasis, alveolar edema or pneumonia      DX-CHEST-PORTABLE (1 VIEW)   Final Result      1.  There is diffuse interstitial and alveolar density in the right mid and lower lung zone. This is increased since the previous study. This may represent mild pulmonary edema/consolidation.   2.  Mildly  enlarged cardiomediastinal silhouette when compared with the previous chest x-ray.      DX-CHEST-PORTABLE (1 VIEW)   Final Result      Stable mild pulmonary edema.   New left basilar atelectasis.      DX-CHEST-PORTABLE (1 VIEW)   Final Result      Stable chest appearance compared with 11/16.      DX-CHEST-PORTABLE (1 VIEW)   Final Result      No acute cardiopulmonary abnormality identified.      DX-CHEST-PORTABLE (1 VIEW)   Final Result      No acute cardiopulmonary abnormality. No interval change.      DX-CHEST-PORTABLE (1 VIEW)   Final Result      No acute cardiopulmonary disease.      CT-HEAD W/O   Final Result      1.  No evidence of acute intracranial process.      2.  There is again seen interstitial pulmonary edema and bibasilar atelectasis.      CT-HIP W/O PLUS RECONS LEFT   Final Result      1.  No evidence of acute fracture or malalignment. No evidence of hardware failure or complication.   2.  Postsurgical changes of the left femur with broken screw fragment redemonstrated.   3.  Demineralization.   4.  Scattered colonic diverticula.   5.  Atherosclerosis.   6.  Small fat-containing umbilical hernia.      EC-ECHOCARDIOGRAM COMPLETE W/O CONT   Final Result      DX-HIP-COMPLETE - UNILATERAL 2+ LEFT   Final Result      1.  No definite acute osseous abnormality. In setting of demineralization, an occult fracture is difficult to exclude. If there is strong clinical suspicion, CT or MRI could be obtained for further evaluation.   2.  Postsurgical changes of the left femur with broken screw fragment redemonstrated.      DX-CHEST-PORTABLE (1 VIEW)   Final Result      No acute cardiopulmonary process is seen.      Atherosclerotic plaque.           Assessment/Plan  * Comfort measures only status- (present on admission)   Assessment & Plan    No signs of distress, continue comfort care options  -- the patient appears comfortable, except for some osteoarthritis flare pain in bilateral hands  -Patient does not want  any oral pain medications and even refused capsaicin cream therefore continue supportive care and reassess as needed  -Otherwise no change plan to medications     ST elevation myocardial infarction involving right coronary artery (HCC)- (present on admission)   Assessment & Plan    Declines any intervention or therapeutic modality  continue with comfort care     Positive QuantiFERON-TB Gold test- (present on admission)   Assessment & Plan      AFB negative        Advanced directives, counseling/discussion- (present on admission)   Assessment & Plan    Placement is an issue for the patient continued on comfort care  Searching for group home placement.  [facility] assisting.  Difficult discharge  -awaiting for placement     Chronic kidney disease (CKD), stage III (moderate) (HCC)- (present on admission)   Assessment & Plan    Continue comfort care.       Essential hypertension- (present on admission)   Assessment & Plan    Comfort care     Fall- (present on admission)   Assessment & Plan    Continue fall precautions          VTE prophylaxis: comfort care

## 2019-04-17 NOTE — CARE PLAN
Problem: Safety  Goal: Will remain free from injury  Outcome: PROGRESSING AS EXPECTED  Bed alarm in place and call light with in reach

## 2019-04-18 PROCEDURE — 99231 SBSQ HOSP IP/OBS SF/LOW 25: CPT | Performed by: HOSPITALIST

## 2019-04-18 PROCEDURE — 700102 HCHG RX REV CODE 250 W/ 637 OVERRIDE(OP): Performed by: HOSPITALIST

## 2019-04-18 PROCEDURE — A9270 NON-COVERED ITEM OR SERVICE: HCPCS | Performed by: HOSPITALIST

## 2019-04-18 PROCEDURE — 770001 HCHG ROOM/CARE - MED/SURG/GYN PRIV*

## 2019-04-18 RX ADMIN — SENNOSIDES AND DOCUSATE SODIUM 2 TABLET: 8.6; 5 TABLET ORAL at 19:22

## 2019-04-18 NOTE — CARE PLAN
Problem: Safety  Goal: Will remain free from injury  Outcome: PROGRESSING AS EXPECTED  Bed in the lowest locked position. Call light within reach. Bed alarm in use. Hourly rounding in place.     Problem: Venous Thromboembolism (VTW)/Deep Vein Thrombosis (DVT) Prevention:  Goal: Patient will participate in Venous Thrombosis (VTE)/Deep Vein Thrombosis (DVT)Prevention Measures  Outcome: PROGRESSING AS EXPECTED  Patient encouraged ambulation to bathroom instead of commode and up to chair for all meals

## 2019-04-18 NOTE — DISCHARGE PLANNING
Anticipated Discharge Disposition: intermediate vs GH Placement    Action: LSW informed Janet from [facility] [phone] that she has yet to get a follow up regarding the Pt. Jnaet apologized and stated she will call Cosme right now and then follow up with this LSW.     Barriers to Discharge: placement    Plan: Follow up with Janet

## 2019-04-18 NOTE — PROGRESS NOTES
[facility] Medicine Daily Progress Note    Date of Service  4/10/19    Chief Complaint  89 y.o. female admitted 11/13/2018 with fall and STEMI.  Herminia is a 89 y.o. female PMH of essential hypertension, who presents with ground-level fall last night around 9 PM.  She stated that she has a bad left hip and fell because of that.  The patient denied palpitation, dizziness, syncope episode.  She presented to ER today and unfortunately she was found to have acute ST elevation myocardial infarction with troponin over 50.  Because of that cardiology was consulted in ER.  She stated that she has one episode of chest pain last night over the sternum area, lasted for a few minutes and it went away on its own.  Currently the patient denies any chest pain.  She was explained in detail about her medical condition and she stated that she does not want any aggressive intervention including angiogram and wanted to be treated only medically.  I also had a long discussion with the patient regarding her CODE STATUS and she does not want to be intubated or had CPR.  She was started on heparin drip in ER and she will be admitted to cardiac intensive care unit in critical condition.             Hospital Course    She was admitted to ICU on medical management. Family elected for comfort care.      Interval Problem Update  4/16:  dementia noted on exam, upset about daughter in [place], states daughter lying about patient being crazy.  Patient states she had previously lived alone in an apartment, but has since given her apartment up.  She has no plans as to where she will live.   4/17:  Up ambulating with FWW, no complaints.       Consultants/Specialty  Critical care signed off  Cardiology signed off    Code Status  Comfort care, DNR    Disposition  Hospice, pending group home placement. 11/20/18 quantiferon gold +. afb negative. Working on possible assisted living facility transfer versus group home.  Patient tells me she has a daughter in  [place].    Review of Systems  Review of Systems   Unable to perform ROS: Dementia        Physical Exam  Temp:  [36.3 °C (97.3 °F)-36.6 °C (97.9 °F)] 36.6 °C (97.9 °F)  Pulse:  [70-76] 70  Resp:  [12-16] 12  BP: (132-141)/(57-65) 141/57  SpO2:  [95 %] 95 %    Physical Exam   Constitutional: She is oriented to person, place, and time. She appears well-developed and well-nourished. No distress.   HENT:   Head: Normocephalic and atraumatic.   Nose: Nose normal.   Mouth/Throat: Oropharynx is clear and moist. No oropharyngeal exudate.   Eyes: Pupils are equal, round, and reactive to light. Conjunctivae and EOM are normal. Right eye exhibits no discharge. Left eye exhibits no discharge. No scleral icterus.   Neck: Normal range of motion. Neck supple. No JVD present. No tracheal deviation present. No thyromegaly present.   Cardiovascular: Normal rate, regular rhythm, normal heart sounds and intact distal pulses.  Exam reveals no gallop and no friction rub.    No murmur heard.  Pulmonary/Chest: Effort normal and breath sounds normal. No stridor. No respiratory distress. She has no wheezes. She has no rales. She exhibits no tenderness.   Abdominal: Soft. Bowel sounds are normal. She exhibits no distension and no mass. There is no tenderness. There is no rebound and no guarding.   Musculoskeletal: Normal range of motion. She exhibits no edema or tenderness.   Lymphadenopathy:     She has no cervical adenopathy.   Neurological: She is alert and oriented to person, place, and time. No cranial nerve deficit. She exhibits normal muscle tone. Coordination normal.   Skin: Skin is warm and dry. No rash noted. She is not diaphoretic. No erythema.   Psychiatric: She has a normal mood and affect. Her behavior is normal. Judgment and thought content normal.   Nursing note and vitals reviewed.  Physical exam remains unchanged again    Fluids  No intake or output data in the 24 hours ending 04/17/19 1803    Laboratory                         Imaging  DX-CHEST-PORTABLE (1 VIEW)   Final Result         1.  Hazy interstitial left pulmonary opacities suggests interstitial edema or infiltrate, similar to prior.   2.  Trace left pleural effusion   3.  Hyperexpansion of lungs favors changes of COPD.      DX-CHEST-PORTABLE (1 VIEW)   Final Result         1.  Interstitial infiltrates or edema, stable.   2.  Atherosclerosis      DX-CHEST-PORTABLE (1 VIEW)   Final Result         1.  Interstitial infiltrates or edema.   2.  Atherosclerosis      DX-CHEST-PORTABLE (1 VIEW)   Final Result      Moderate interstitial edema or interstitial lung disease and small left pleural effusion similar to previous.      DX-CHEST-PORTABLE (1 VIEW)   Final Result      Stable interstitial edema and/or interstitial lung disease with probable small pleural fluid      DX-CHEST-PORTABLE (1 VIEW)   Final Result      Ill-defined infrahilar interstitial opacities, atelectasis versus mild edema. No significant pleural effusions.      DX-CHEST-PORTABLE (1 VIEW)   Final Result      Mild left basilar atelectasis, improved since prior. No new consolidation or large pleural effusions.      DX-CHEST-PORTABLE (1 VIEW)   Final Result      1.  Left basilar opacification may be secondary to atelectasis. Developing pneumonia cannot be excluded.         DX-CHEST-PORTABLE (1 VIEW)   Final Result      1.  Unchanged mild interstitial pulmonary edema   2.  Unchanged bibasilar atelectasis, alveolar edema or pneumonia      DX-CHEST-PORTABLE (1 VIEW)   Final Result      1.  Decreased pulmonary edema   2.  Unchanged bibasilar atelectasis, alveolar edema or pneumonia      DX-CHEST-PORTABLE (1 VIEW)   Final Result      1.  There is diffuse interstitial and alveolar density in the right mid and lower lung zone. This is increased since the previous study. This may represent mild pulmonary edema/consolidation.   2.  Mildly enlarged cardiomediastinal silhouette when compared with the previous chest x-ray.       DX-CHEST-PORTABLE (1 VIEW)   Final Result      Stable mild pulmonary edema.   New left basilar atelectasis.      DX-CHEST-PORTABLE (1 VIEW)   Final Result      Stable chest appearance compared with 11/16.      DX-CHEST-PORTABLE (1 VIEW)   Final Result      No acute cardiopulmonary abnormality identified.      DX-CHEST-PORTABLE (1 VIEW)   Final Result      No acute cardiopulmonary abnormality. No interval change.      DX-CHEST-PORTABLE (1 VIEW)   Final Result      No acute cardiopulmonary disease.      CT-HEAD W/O   Final Result      1.  No evidence of acute intracranial process.      2.  There is again seen interstitial pulmonary edema and bibasilar atelectasis.      CT-HIP W/O PLUS RECONS LEFT   Final Result      1.  No evidence of acute fracture or malalignment. No evidence of hardware failure or complication.   2.  Postsurgical changes of the left femur with broken screw fragment redemonstrated.   3.  Demineralization.   4.  Scattered colonic diverticula.   5.  Atherosclerosis.   6.  Small fat-containing umbilical hernia.      EC-ECHOCARDIOGRAM COMPLETE W/O CONT   Final Result      DX-HIP-COMPLETE - UNILATERAL 2+ LEFT   Final Result      1.  No definite acute osseous abnormality. In setting of demineralization, an occult fracture is difficult to exclude. If there is strong clinical suspicion, CT or MRI could be obtained for further evaluation.   2.  Postsurgical changes of the left femur with broken screw fragment redemonstrated.      DX-CHEST-PORTABLE (1 VIEW)   Final Result      No acute cardiopulmonary process is seen.      Atherosclerotic plaque.           Assessment/Plan  * Comfort measures only status- (present on admission)   Assessment & Plan    No signs of distress, continue comfort care options  -- the patient appears comfortable, except for some osteoarthritis flare pain in bilateral hands  -Patient does not want any oral pain medications and even refused capsaicin cream therefore continue supportive  care and reassess as needed  -Otherwise no change plan to medications     ST elevation myocardial infarction involving right coronary artery (HCC)- (present on admission)   Assessment & Plan    Declines any intervention or therapeutic modality  continue with comfort care     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  [facility] assisting.  Difficult discharge  -awaiting for placement     Chronic kidney disease (CKD), stage III (moderate) (HCC)- (present on admission)   Assessment & Plan    Continue comfort care.       Essential hypertension- (present on admission)   Assessment & Plan    Comfort care     Fall- (present on admission)   Assessment & Plan    Continue fall precautions          VTE prophylaxis: comfort care

## 2019-04-18 NOTE — DISCHARGE PLANNING
Anticipated Discharge Disposition: [facility] vs  with [facility]    Action: LSW received VM from Cosme from [facility] asking for a call back at [phone]. LSw attempted to phone calls at 9am and 10:40am however this LSW unable to leave a VM as his mailbox is full.    Barriers to Discharge: placement    Plan: attempt another call to Cosme at [facility]

## 2019-04-19 PROCEDURE — 770001 HCHG ROOM/CARE - MED/SURG/GYN PRIV*

## 2019-04-19 PROCEDURE — 700102 HCHG RX REV CODE 250 W/ 637 OVERRIDE(OP): Performed by: HOSPITALIST

## 2019-04-19 PROCEDURE — 99231 SBSQ HOSP IP/OBS SF/LOW 25: CPT | Performed by: HOSPITALIST

## 2019-04-19 PROCEDURE — A9270 NON-COVERED ITEM OR SERVICE: HCPCS | Performed by: HOSPITALIST

## 2019-04-19 RX ADMIN — FUROSEMIDE 20 MG: 20 TABLET ORAL at 04:22

## 2019-04-19 NOTE — PROGRESS NOTES
[facility] Medicine Daily Progress Note    Date of Service  4/10/19    Chief Complaint  89 y.o. female admitted 11/13/2018 with fall and STEMI.  Herminia is a 89 y.o. female PMH of essential hypertension, who presents with ground-level fall last night around 9 PM.  She stated that she has a bad left hip and fell because of that.  The patient denied palpitation, dizziness, syncope episode.  She presented to ER today and unfortunately she was found to have acute ST elevation myocardial infarction with troponin over 50.  Because of that cardiology was consulted in ER.  She stated that she has one episode of chest pain last night over the sternum area, lasted for a few minutes and it went away on its own.  Currently the patient denies any chest pain.  She was explained in detail about her medical condition and she stated that she does not want any aggressive intervention including angiogram and wanted to be treated only medically.  I also had a long discussion with the patient regarding her CODE STATUS and she does not want to be intubated or had CPR.  She was started on heparin drip in ER and she will be admitted to cardiac intensive care unit in critical condition.             Hospital Course    She was admitted to ICU on medical management. Family elected for comfort care.      Interval Problem Update  4/16:  dementia noted on exam, upset about daughter in [place], states daughter lying about patient being crazy.  Patient states she had previously lived alone in an apartment, but has since given her apartment up.  She has no plans as to where she will live.   4/17:  Up ambulating with FWW, no complaints.     4/18:  Sleeping today, but conversant upon awakening.  No complaints.  4/19: conversant, comfortable, no complaints.  No chest pain.    Consultants/Specialty  Critical care signed off  Cardiology signed off    Code Status  Comfort care, DNR    Disposition  Hospice, pending group home placement. 11/20/18 dee  gold +. afb negative. Working on possible assisted living facility transfer versus group home.  Patient tells me she has a daughter in [place].    Review of Systems  Review of Systems   Unable to perform ROS: Dementia        Physical Exam  Temp:  [36.2 °C (97.2 °F)-36.3 °C (97.3 °F)] 36.3 °C (97.3 °F)  Pulse:  [66-75] 66  Resp:  [16-17] 17  BP: ()/(47-68) 115/68    Physical Exam   Constitutional: She is oriented to person, place, and time. She appears well-developed and well-nourished. No distress.   HENT:   Head: Normocephalic and atraumatic.   Nose: Nose normal.   Mouth/Throat: Oropharynx is clear and moist. No oropharyngeal exudate.   Eyes: Pupils are equal, round, and reactive to light. Conjunctivae and EOM are normal. Right eye exhibits no discharge. Left eye exhibits no discharge. No scleral icterus.   Neck: Normal range of motion. Neck supple. No JVD present. No tracheal deviation present. No thyromegaly present.   Cardiovascular: Normal rate, regular rhythm, normal heart sounds and intact distal pulses.  Exam reveals no gallop and no friction rub.    No murmur heard.  Pulmonary/Chest: Effort normal and breath sounds normal. No stridor. No respiratory distress. She has no wheezes. She has no rales. She exhibits no tenderness.   Abdominal: Soft. Bowel sounds are normal. She exhibits no distension and no mass. There is no tenderness. There is no rebound and no guarding.   Musculoskeletal: Normal range of motion. She exhibits no edema or tenderness.   Lymphadenopathy:     She has no cervical adenopathy.   Neurological: She is alert and oriented to person, place, and time. No cranial nerve deficit. She exhibits normal muscle tone. Coordination normal.   Skin: Skin is warm and dry. No rash noted. She is not diaphoretic. No erythema.   Psychiatric: She has a normal mood and affect. Her behavior is normal. Judgment and thought content normal.   Nursing note and vitals reviewed.  Physical exam remains unchanged  again    Fluids    Intake/Output Summary (Last 24 hours) at 04/19/19 1508  Last data filed at 04/19/19 1300   Gross per 24 hour   Intake              330 ml   Output                0 ml   Net              330 ml       Laboratory                        Imaging  DX-CHEST-PORTABLE (1 VIEW)   Final Result         1.  Hazy interstitial left pulmonary opacities suggests interstitial edema or infiltrate, similar to prior.   2.  Trace left pleural effusion   3.  Hyperexpansion of lungs favors changes of COPD.      DX-CHEST-PORTABLE (1 VIEW)   Final Result         1.  Interstitial infiltrates or edema, stable.   2.  Atherosclerosis      DX-CHEST-PORTABLE (1 VIEW)   Final Result         1.  Interstitial infiltrates or edema.   2.  Atherosclerosis      DX-CHEST-PORTABLE (1 VIEW)   Final Result      Moderate interstitial edema or interstitial lung disease and small left pleural effusion similar to previous.      DX-CHEST-PORTABLE (1 VIEW)   Final Result      Stable interstitial edema and/or interstitial lung disease with probable small pleural fluid      DX-CHEST-PORTABLE (1 VIEW)   Final Result      Ill-defined infrahilar interstitial opacities, atelectasis versus mild edema. No significant pleural effusions.      DX-CHEST-PORTABLE (1 VIEW)   Final Result      Mild left basilar atelectasis, improved since prior. No new consolidation or large pleural effusions.      DX-CHEST-PORTABLE (1 VIEW)   Final Result      1.  Left basilar opacification may be secondary to atelectasis. Developing pneumonia cannot be excluded.         DX-CHEST-PORTABLE (1 VIEW)   Final Result      1.  Unchanged mild interstitial pulmonary edema   2.  Unchanged bibasilar atelectasis, alveolar edema or pneumonia      DX-CHEST-PORTABLE (1 VIEW)   Final Result      1.  Decreased pulmonary edema   2.  Unchanged bibasilar atelectasis, alveolar edema or pneumonia      DX-CHEST-PORTABLE (1 VIEW)   Final Result      1.  There is diffuse interstitial and alveolar  density in the right mid and lower lung zone. This is increased since the previous study. This may represent mild pulmonary edema/consolidation.   2.  Mildly enlarged cardiomediastinal silhouette when compared with the previous chest x-ray.      DX-CHEST-PORTABLE (1 VIEW)   Final Result      Stable mild pulmonary edema.   New left basilar atelectasis.      DX-CHEST-PORTABLE (1 VIEW)   Final Result      Stable chest appearance compared with 11/16.      DX-CHEST-PORTABLE (1 VIEW)   Final Result      No acute cardiopulmonary abnormality identified.      DX-CHEST-PORTABLE (1 VIEW)   Final Result      No acute cardiopulmonary abnormality. No interval change.      DX-CHEST-PORTABLE (1 VIEW)   Final Result      No acute cardiopulmonary disease.      CT-HEAD W/O   Final Result      1.  No evidence of acute intracranial process.      2.  There is again seen interstitial pulmonary edema and bibasilar atelectasis.      CT-HIP W/O PLUS RECONS LEFT   Final Result      1.  No evidence of acute fracture or malalignment. No evidence of hardware failure or complication.   2.  Postsurgical changes of the left femur with broken screw fragment redemonstrated.   3.  Demineralization.   4.  Scattered colonic diverticula.   5.  Atherosclerosis.   6.  Small fat-containing umbilical hernia.      EC-ECHOCARDIOGRAM COMPLETE W/O CONT   Final Result      DX-HIP-COMPLETE - UNILATERAL 2+ LEFT   Final Result      1.  No definite acute osseous abnormality. In setting of demineralization, an occult fracture is difficult to exclude. If there is strong clinical suspicion, CT or MRI could be obtained for further evaluation.   2.  Postsurgical changes of the left femur with broken screw fragment redemonstrated.      DX-CHEST-PORTABLE (1 VIEW)   Final Result      No acute cardiopulmonary process is seen.      Atherosclerotic plaque.           Assessment/Plan  * Comfort measures only status- (present on admission)   Assessment & Plan    No signs of  distress, continue comfort care options  -- the patient appears comfortable, except for some osteoarthritis flare pain in bilateral hands  -Patient does not want any oral pain medications and even refused capsaicin cream therefore continue supportive care and reassess as needed  -Otherwise no change plan to medications     ST elevation myocardial infarction involving right coronary artery (HCC)- (present on admission)   Assessment & Plan    Declines any intervention or therapeutic modality  continue with comfort care     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  [facility] assisting.  Difficult discharge  -awaiting for placement     Chronic kidney disease (CKD), stage III (moderate) (HCC)- (present on admission)   Assessment & Plan    Continue comfort care.       Essential hypertension- (present on admission)   Assessment & Plan    Comfort care     Fall- (present on admission)   Assessment & Plan    Continue fall precautions          VTE prophylaxis: comfort care

## 2019-04-19 NOTE — CARE PLAN
Problem: Safety  Goal: Will remain free from injury  Outcome: PROGRESSING AS EXPECTED  Bed in the lowest locked position. Call light within reach. Bed alarm in use. Hourly rounding in place.   Intervention: Provide assistance with mobility  Bed in the lowest locked position. Call light within reach. Bed alarm in use. Hourly rounding in place.       Problem: Discharge Barriers/Planning  Goal: Patient's continuum of care needs will be met  Pending [facility] placement    Problem: Skin Integrity  Goal: Risk for impaired skin integrity will decrease  Patient encouraged mobility in bed

## 2019-04-19 NOTE — PROGRESS NOTES
[facility] Medicine Daily Progress Note    Date of Service  4/10/19    Chief Complaint  89 y.o. female admitted 11/13/2018 with fall and STEMI.  Herminia is a 89 y.o. female PMH of essential hypertension, who presents with ground-level fall last night around 9 PM.  She stated that she has a bad left hip and fell because of that.  The patient denied palpitation, dizziness, syncope episode.  She presented to ER today and unfortunately she was found to have acute ST elevation myocardial infarction with troponin over 50.  Because of that cardiology was consulted in ER.  She stated that she has one episode of chest pain last night over the sternum area, lasted for a few minutes and it went away on its own.  Currently the patient denies any chest pain.  She was explained in detail about her medical condition and she stated that she does not want any aggressive intervention including angiogram and wanted to be treated only medically.  I also had a long discussion with the patient regarding her CODE STATUS and she does not want to be intubated or had CPR.  She was started on heparin drip in ER and she will be admitted to cardiac intensive care unit in critical condition.             Hospital Course    She was admitted to ICU on medical management. Family elected for comfort care.      Interval Problem Update  4/16:  dementia noted on exam, upset about daughter in [place], states daughter lying about patient being crazy.  Patient states she had previously lived alone in an apartment, but has since given her apartment up.  She has no plans as to where she will live.   4/17:  Up ambulating with FWW, no complaints.     4/18:  Sleeping today, but conversant upon awakening.  No complaints.    Consultants/Specialty  Critical care signed off  Cardiology signed off    Code Status  Comfort care, DNR    Disposition  Hospice, pending group home placement. 11/20/18 quantiferon gold +. afb negative. Working on possible assisted living  facility transfer versus group home.  Patient tells me she has a daughter in [place].    Review of Systems  Review of Systems   Unable to perform ROS: Dementia        Physical Exam  Temp:  [35.9 °C (96.7 °F)-36.6 °C (97.9 °F)] 35.9 °C (96.7 °F)  Pulse:  [67-71] 67  Resp:  [14-16] 14  BP: (112-121)/(59-61) 112/59  SpO2:  [94 %] 94 %    Physical Exam   Constitutional: She is oriented to person, place, and time. She appears well-developed and well-nourished. No distress.   HENT:   Head: Normocephalic and atraumatic.   Nose: Nose normal.   Mouth/Throat: Oropharynx is clear and moist. No oropharyngeal exudate.   Eyes: Pupils are equal, round, and reactive to light. Conjunctivae and EOM are normal. Right eye exhibits no discharge. Left eye exhibits no discharge. No scleral icterus.   Neck: Normal range of motion. Neck supple. No JVD present. No tracheal deviation present. No thyromegaly present.   Cardiovascular: Normal rate, regular rhythm, normal heart sounds and intact distal pulses.  Exam reveals no gallop and no friction rub.    No murmur heard.  Pulmonary/Chest: Effort normal and breath sounds normal. No stridor. No respiratory distress. She has no wheezes. She has no rales. She exhibits no tenderness.   Abdominal: Soft. Bowel sounds are normal. She exhibits no distension and no mass. There is no tenderness. There is no rebound and no guarding.   Musculoskeletal: Normal range of motion. She exhibits no edema or tenderness.   Lymphadenopathy:     She has no cervical adenopathy.   Neurological: She is alert and oriented to person, place, and time. No cranial nerve deficit. She exhibits normal muscle tone. Coordination normal.   Skin: Skin is warm and dry. No rash noted. She is not diaphoretic. No erythema.   Psychiatric: She has a normal mood and affect. Her behavior is normal. Judgment and thought content normal.   Nursing note and vitals reviewed.  Physical exam remains unchanged again    Fluids  No intake or output  data in the 24 hours ending 04/18/19 5608    Laboratory                        Imaging  DX-CHEST-PORTABLE (1 VIEW)   Final Result         1.  Hazy interstitial left pulmonary opacities suggests interstitial edema or infiltrate, similar to prior.   2.  Trace left pleural effusion   3.  Hyperexpansion of lungs favors changes of COPD.      DX-CHEST-PORTABLE (1 VIEW)   Final Result         1.  Interstitial infiltrates or edema, stable.   2.  Atherosclerosis      DX-CHEST-PORTABLE (1 VIEW)   Final Result         1.  Interstitial infiltrates or edema.   2.  Atherosclerosis      DX-CHEST-PORTABLE (1 VIEW)   Final Result      Moderate interstitial edema or interstitial lung disease and small left pleural effusion similar to previous.      DX-CHEST-PORTABLE (1 VIEW)   Final Result      Stable interstitial edema and/or interstitial lung disease with probable small pleural fluid      DX-CHEST-PORTABLE (1 VIEW)   Final Result      Ill-defined infrahilar interstitial opacities, atelectasis versus mild edema. No significant pleural effusions.      DX-CHEST-PORTABLE (1 VIEW)   Final Result      Mild left basilar atelectasis, improved since prior. No new consolidation or large pleural effusions.      DX-CHEST-PORTABLE (1 VIEW)   Final Result      1.  Left basilar opacification may be secondary to atelectasis. Developing pneumonia cannot be excluded.         DX-CHEST-PORTABLE (1 VIEW)   Final Result      1.  Unchanged mild interstitial pulmonary edema   2.  Unchanged bibasilar atelectasis, alveolar edema or pneumonia      DX-CHEST-PORTABLE (1 VIEW)   Final Result      1.  Decreased pulmonary edema   2.  Unchanged bibasilar atelectasis, alveolar edema or pneumonia      DX-CHEST-PORTABLE (1 VIEW)   Final Result      1.  There is diffuse interstitial and alveolar density in the right mid and lower lung zone. This is increased since the previous study. This may represent mild pulmonary edema/consolidation.   2.  Mildly enlarged  cardiomediastinal silhouette when compared with the previous chest x-ray.      DX-CHEST-PORTABLE (1 VIEW)   Final Result      Stable mild pulmonary edema.   New left basilar atelectasis.      DX-CHEST-PORTABLE (1 VIEW)   Final Result      Stable chest appearance compared with 11/16.      DX-CHEST-PORTABLE (1 VIEW)   Final Result      No acute cardiopulmonary abnormality identified.      DX-CHEST-PORTABLE (1 VIEW)   Final Result      No acute cardiopulmonary abnormality. No interval change.      DX-CHEST-PORTABLE (1 VIEW)   Final Result      No acute cardiopulmonary disease.      CT-HEAD W/O   Final Result      1.  No evidence of acute intracranial process.      2.  There is again seen interstitial pulmonary edema and bibasilar atelectasis.      CT-HIP W/O PLUS RECONS LEFT   Final Result      1.  No evidence of acute fracture or malalignment. No evidence of hardware failure or complication.   2.  Postsurgical changes of the left femur with broken screw fragment redemonstrated.   3.  Demineralization.   4.  Scattered colonic diverticula.   5.  Atherosclerosis.   6.  Small fat-containing umbilical hernia.      EC-ECHOCARDIOGRAM COMPLETE W/O CONT   Final Result      DX-HIP-COMPLETE - UNILATERAL 2+ LEFT   Final Result      1.  No definite acute osseous abnormality. In setting of demineralization, an occult fracture is difficult to exclude. If there is strong clinical suspicion, CT or MRI could be obtained for further evaluation.   2.  Postsurgical changes of the left femur with broken screw fragment redemonstrated.      DX-CHEST-PORTABLE (1 VIEW)   Final Result      No acute cardiopulmonary process is seen.      Atherosclerotic plaque.           Assessment/Plan  * Comfort measures only status- (present on admission)   Assessment & Plan    No signs of distress, continue comfort care options  -- the patient appears comfortable, except for some osteoarthritis flare pain in bilateral hands  -Patient does not want any oral  pain medications and even refused capsaicin cream therefore continue supportive care and reassess as needed  -Otherwise no change plan to medications     ST elevation myocardial infarction involving right coronary artery (HCC)- (present on admission)   Assessment & Plan    Declines any intervention or therapeutic modality  continue with comfort care     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  [facility] assisting.  Difficult discharge  -awaiting for placement     Chronic kidney disease (CKD), stage III (moderate) (HCC)- (present on admission)   Assessment & Plan    Continue comfort care.       Essential hypertension- (present on admission)   Assessment & Plan    Comfort care     Fall- (present on admission)   Assessment & Plan    Continue fall precautions          VTE prophylaxis: comfort care

## 2019-04-19 NOTE — DISCHARGE PLANNING
Anticipated Discharge Disposition: [facility] with [facility]    Action: LSW left VM for Cosme from [facility] asking for a call back.     Barriers to Discharge: Placement    Plan: follow up with Cosme from [facility]

## 2019-04-20 PROCEDURE — 99231 SBSQ HOSP IP/OBS SF/LOW 25: CPT | Performed by: HOSPITALIST

## 2019-04-20 PROCEDURE — 770001 HCHG ROOM/CARE - MED/SURG/GYN PRIV*

## 2019-04-20 NOTE — PROGRESS NOTES
[facility] Medicine Daily Progress Note    Date of Service  4/10/19    Chief Complaint  89 y.o. female admitted 11/13/2018 with fall and STEMI.  Herminia is a 89 y.o. female PMH of essential hypertension, who presents with ground-level fall last night around 9 PM.  She stated that she has a bad left hip and fell because of that.  The patient denied palpitation, dizziness, syncope episode.  She presented to ER today and unfortunately she was found to have acute ST elevation myocardial infarction with troponin over 50.  Because of that cardiology was consulted in ER.  She stated that she has one episode of chest pain last night over the sternum area, lasted for a few minutes and it went away on its own.  Currently the patient denies any chest pain.  She was explained in detail about her medical condition and she stated that she does not want any aggressive intervention including angiogram and wanted to be treated only medically.  I also had a long discussion with the patient regarding her CODE STATUS and she does not want to be intubated or had CPR.  She was started on heparin drip in ER and she will be admitted to cardiac intensive care unit in critical condition.             Hospital Course    She was admitted to ICU on medical management. Family elected for comfort care.      Interval Problem Update  4/16:  dementia noted on exam, upset about daughter in [place], states daughter lying about patient being crazy.  Patient states she had previously lived alone in an apartment, but has since given her apartment up.  She has no plans as to where she will live.   4/17:  Up ambulating with FWW, no complaints.     4/18:  Sleeping today, but conversant upon awakening.  No complaints.  4/19: conversant, comfortable, no complaints.  No chest pain.  4/20:  Comfortable, conversant.  No chest pain, continue comfort care.    Consultants/Specialty  Critical care signed off  Cardiology signed off    Code Status  Comfort care,  DNR    Disposition  Hospice, pending group home placement. 11/20/18 quantiferon gold +. afb negative. Working on possible assisted living facility transfer versus group home.  Patient tells me she has a daughter in [place].    Review of Systems  Review of Systems   Unable to perform ROS: Dementia        Physical Exam  Temp:  [36.6 °C (97.8 °F)] 36.6 °C (97.8 °F)  Pulse:  [77] 77  Resp:  [18] 18  BP: (108)/(62) 108/62  SpO2:  [90 %] 90 %    Physical Exam   Constitutional: She is oriented to person, place, and time. She appears well-developed and well-nourished. No distress.   HENT:   Head: Normocephalic and atraumatic.   Nose: Nose normal.   Mouth/Throat: Oropharynx is clear and moist. No oropharyngeal exudate.   Eyes: Pupils are equal, round, and reactive to light. Conjunctivae and EOM are normal. Right eye exhibits no discharge. Left eye exhibits no discharge. No scleral icterus.   Neck: Normal range of motion. Neck supple. No JVD present. No tracheal deviation present. No thyromegaly present.   Cardiovascular: Normal rate, regular rhythm, normal heart sounds and intact distal pulses.  Exam reveals no gallop and no friction rub.    No murmur heard.  Pulmonary/Chest: Effort normal and breath sounds normal. No stridor. No respiratory distress. She has no wheezes. She has no rales. She exhibits no tenderness.   Abdominal: Soft. Bowel sounds are normal. She exhibits no distension and no mass. There is no tenderness. There is no rebound and no guarding.   Musculoskeletal: Normal range of motion. She exhibits no edema or tenderness.   Lymphadenopathy:     She has no cervical adenopathy.   Neurological: She is alert and oriented to person, place, and time. No cranial nerve deficit. She exhibits normal muscle tone. Coordination normal.   Skin: Skin is warm and dry. No rash noted. She is not diaphoretic. No erythema.   Psychiatric: She has a normal mood and affect. Her behavior is normal. Judgment and thought content normal.    Nursing note and vitals reviewed.  Physical exam remains unchanged again    Fluids  No intake or output data in the 24 hours ending 04/20/19 1534    Laboratory                        Imaging  DX-CHEST-PORTABLE (1 VIEW)   Final Result         1.  Hazy interstitial left pulmonary opacities suggests interstitial edema or infiltrate, similar to prior.   2.  Trace left pleural effusion   3.  Hyperexpansion of lungs favors changes of COPD.      DX-CHEST-PORTABLE (1 VIEW)   Final Result         1.  Interstitial infiltrates or edema, stable.   2.  Atherosclerosis      DX-CHEST-PORTABLE (1 VIEW)   Final Result         1.  Interstitial infiltrates or edema.   2.  Atherosclerosis      DX-CHEST-PORTABLE (1 VIEW)   Final Result      Moderate interstitial edema or interstitial lung disease and small left pleural effusion similar to previous.      DX-CHEST-PORTABLE (1 VIEW)   Final Result      Stable interstitial edema and/or interstitial lung disease with probable small pleural fluid      DX-CHEST-PORTABLE (1 VIEW)   Final Result      Ill-defined infrahilar interstitial opacities, atelectasis versus mild edema. No significant pleural effusions.      DX-CHEST-PORTABLE (1 VIEW)   Final Result      Mild left basilar atelectasis, improved since prior. No new consolidation or large pleural effusions.      DX-CHEST-PORTABLE (1 VIEW)   Final Result      1.  Left basilar opacification may be secondary to atelectasis. Developing pneumonia cannot be excluded.         DX-CHEST-PORTABLE (1 VIEW)   Final Result      1.  Unchanged mild interstitial pulmonary edema   2.  Unchanged bibasilar atelectasis, alveolar edema or pneumonia      DX-CHEST-PORTABLE (1 VIEW)   Final Result      1.  Decreased pulmonary edema   2.  Unchanged bibasilar atelectasis, alveolar edema or pneumonia      DX-CHEST-PORTABLE (1 VIEW)   Final Result      1.  There is diffuse interstitial and alveolar density in the right mid and lower lung zone. This is increased since  the previous study. This may represent mild pulmonary edema/consolidation.   2.  Mildly enlarged cardiomediastinal silhouette when compared with the previous chest x-ray.      DX-CHEST-PORTABLE (1 VIEW)   Final Result      Stable mild pulmonary edema.   New left basilar atelectasis.      DX-CHEST-PORTABLE (1 VIEW)   Final Result      Stable chest appearance compared with 11/16.      DX-CHEST-PORTABLE (1 VIEW)   Final Result      No acute cardiopulmonary abnormality identified.      DX-CHEST-PORTABLE (1 VIEW)   Final Result      No acute cardiopulmonary abnormality. No interval change.      DX-CHEST-PORTABLE (1 VIEW)   Final Result      No acute cardiopulmonary disease.      CT-HEAD W/O   Final Result      1.  No evidence of acute intracranial process.      2.  There is again seen interstitial pulmonary edema and bibasilar atelectasis.      CT-HIP W/O PLUS RECONS LEFT   Final Result      1.  No evidence of acute fracture or malalignment. No evidence of hardware failure or complication.   2.  Postsurgical changes of the left femur with broken screw fragment redemonstrated.   3.  Demineralization.   4.  Scattered colonic diverticula.   5.  Atherosclerosis.   6.  Small fat-containing umbilical hernia.      EC-ECHOCARDIOGRAM COMPLETE W/O CONT   Final Result      DX-HIP-COMPLETE - UNILATERAL 2+ LEFT   Final Result      1.  No definite acute osseous abnormality. In setting of demineralization, an occult fracture is difficult to exclude. If there is strong clinical suspicion, CT or MRI could be obtained for further evaluation.   2.  Postsurgical changes of the left femur with broken screw fragment redemonstrated.      DX-CHEST-PORTABLE (1 VIEW)   Final Result      No acute cardiopulmonary process is seen.      Atherosclerotic plaque.           Assessment/Plan  * Comfort measures only status- (present on admission)   Assessment & Plan    No signs of distress, continue comfort care options  -- the patient appears comfortable,  except for some osteoarthritis flare pain in bilateral hands  -Patient does not want any oral pain medications and even refused capsaicin cream therefore continue supportive care and reassess as needed  -Otherwise no change plan to medications     ST elevation myocardial infarction involving right coronary artery (HCC)- (present on admission)   Assessment & Plan    Declines any intervention or therapeutic modality  continue with comfort care     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  [facility] assisting.  Difficult discharge  -awaiting for placement     Chronic kidney disease (CKD), stage III (moderate) (HCC)- (present on admission)   Assessment & Plan    Continue comfort care.       Essential hypertension- (present on admission)   Assessment & Plan    Comfort care     Fall- (present on admission)   Assessment & Plan    Continue fall precautions          VTE prophylaxis: comfort care

## 2019-04-20 NOTE — CARE PLAN
Problem: Safety  Goal: Will remain free from falls  Outcome: PROGRESSING AS EXPECTED  Patient remains free from falls, bed alarm in place and hourly rounding done. Call light within reach and reminded to call when needing assistance, patient verbalized understanding.     Problem: Venous Thromboembolism (VTW)/Deep Vein Thrombosis (DVT) Prevention:  Goal: Patient will participate in Venous Thrombosis (VTE)/Deep Vein Thrombosis (DVT)Prevention Measures  Patient on comfort care, refuses to wear SCDs despite education.

## 2019-04-21 PROCEDURE — 770001 HCHG ROOM/CARE - MED/SURG/GYN PRIV*

## 2019-04-21 PROCEDURE — 99231 SBSQ HOSP IP/OBS SF/LOW 25: CPT | Performed by: HOSPITALIST

## 2019-04-21 NOTE — PROGRESS NOTES
[facility] Medicine Daily Progress Note    Date of Service  4/10/19    Chief Complaint  89 y.o. female admitted 11/13/2018 with fall and STEMI.  Herminia is a 89 y.o. female PMH of essential hypertension, who presents with ground-level fall last night around 9 PM.  She stated that she has a bad left hip and fell because of that.  The patient denied palpitation, dizziness, syncope episode.  She presented to ER today and unfortunately she was found to have acute ST elevation myocardial infarction with troponin over 50.  Because of that cardiology was consulted in ER.  She stated that she has one episode of chest pain last night over the sternum area, lasted for a few minutes and it went away on its own.  Currently the patient denies any chest pain.  She was explained in detail about her medical condition and she stated that she does not want any aggressive intervention including angiogram and wanted to be treated only medically.  I also had a long discussion with the patient regarding her CODE STATUS and she does not want to be intubated or had CPR.  She was started on heparin drip in ER and she will be admitted to cardiac intensive care unit in critical condition.             Hospital Course    She was admitted to ICU on medical management. Family elected for comfort care.      Interval Problem Update  4/16:  dementia noted on exam, upset about daughter in [place], states daughter lying about patient being crazy.  Patient states she had previously lived alone in an apartment, but has since given her apartment up.  She has no plans as to where she will live.   4/17:  Up ambulating with FWW, no complaints.     4/18:  Sleeping today, but conversant upon awakening.  No complaints.  4/19: conversant, comfortable, no complaints.  No chest pain.  4/20:  Comfortable, conversant.  No chest pain, continue comfort care.  4/21:  Continues to do well, eating her meals per bedside RN.  Comfort care.    Consultants/Specialty  Critical  care signed off  Cardiology signed off    Code Status  Comfort care, DNR    Disposition  Hospice, pending group home placement. 11/20/18 quantiferon gold +. afb negative. Working on possible assisted living facility transfer versus group home.  Patient tells me she has a daughter in [place].    Review of Systems  Review of Systems   Unable to perform ROS: Dementia        Physical Exam  Temp:  [36.1 °C (97 °F)-36.8 °C (98.2 °F)] 36.1 °C (97 °F)  Pulse:  [65-73] 65  Resp:  [15-17] 15  BP: (100-155)/(55-68) 155/65  SpO2:  [93 %-95 %] 95 %    Physical Exam   Constitutional: She is oriented to person, place, and time. She appears well-developed and well-nourished. No distress.   HENT:   Head: Normocephalic and atraumatic.   Nose: Nose normal.   Mouth/Throat: Oropharynx is clear and moist. No oropharyngeal exudate.   Eyes: Pupils are equal, round, and reactive to light. Conjunctivae and EOM are normal. Right eye exhibits no discharge. Left eye exhibits no discharge. No scleral icterus.   Neck: Normal range of motion. Neck supple. No JVD present. No tracheal deviation present. No thyromegaly present.   Cardiovascular: Normal rate, regular rhythm, normal heart sounds and intact distal pulses.  Exam reveals no gallop and no friction rub.    No murmur heard.  Pulmonary/Chest: Effort normal and breath sounds normal. No stridor. No respiratory distress. She has no wheezes. She has no rales. She exhibits no tenderness.   Abdominal: Soft. Bowel sounds are normal. She exhibits no distension and no mass. There is no tenderness. There is no rebound and no guarding.   Musculoskeletal: Normal range of motion. She exhibits no edema or tenderness.   Lymphadenopathy:     She has no cervical adenopathy.   Neurological: She is alert and oriented to person, place, and time. No cranial nerve deficit. She exhibits normal muscle tone. Coordination normal.   Skin: Skin is warm and dry. No rash noted. She is not diaphoretic. No erythema.    Psychiatric: She has a normal mood and affect. Her behavior is normal. Judgment and thought content normal.   Nursing note and vitals reviewed.  Physical exam remains unchanged again    Fluids    Intake/Output Summary (Last 24 hours) at 04/21/19 1427  Last data filed at 04/20/19 2000   Gross per 24 hour   Intake              350 ml   Output                0 ml   Net              350 ml       Laboratory                        Imaging  DX-CHEST-PORTABLE (1 VIEW)   Final Result         1.  Hazy interstitial left pulmonary opacities suggests interstitial edema or infiltrate, similar to prior.   2.  Trace left pleural effusion   3.  Hyperexpansion of lungs favors changes of COPD.      DX-CHEST-PORTABLE (1 VIEW)   Final Result         1.  Interstitial infiltrates or edema, stable.   2.  Atherosclerosis      DX-CHEST-PORTABLE (1 VIEW)   Final Result         1.  Interstitial infiltrates or edema.   2.  Atherosclerosis      DX-CHEST-PORTABLE (1 VIEW)   Final Result      Moderate interstitial edema or interstitial lung disease and small left pleural effusion similar to previous.      DX-CHEST-PORTABLE (1 VIEW)   Final Result      Stable interstitial edema and/or interstitial lung disease with probable small pleural fluid      DX-CHEST-PORTABLE (1 VIEW)   Final Result      Ill-defined infrahilar interstitial opacities, atelectasis versus mild edema. No significant pleural effusions.      DX-CHEST-PORTABLE (1 VIEW)   Final Result      Mild left basilar atelectasis, improved since prior. No new consolidation or large pleural effusions.      DX-CHEST-PORTABLE (1 VIEW)   Final Result      1.  Left basilar opacification may be secondary to atelectasis. Developing pneumonia cannot be excluded.         DX-CHEST-PORTABLE (1 VIEW)   Final Result      1.  Unchanged mild interstitial pulmonary edema   2.  Unchanged bibasilar atelectasis, alveolar edema or pneumonia      DX-CHEST-PORTABLE (1 VIEW)   Final Result      1.  Decreased  pulmonary edema   2.  Unchanged bibasilar atelectasis, alveolar edema or pneumonia      DX-CHEST-PORTABLE (1 VIEW)   Final Result      1.  There is diffuse interstitial and alveolar density in the right mid and lower lung zone. This is increased since the previous study. This may represent mild pulmonary edema/consolidation.   2.  Mildly enlarged cardiomediastinal silhouette when compared with the previous chest x-ray.      DX-CHEST-PORTABLE (1 VIEW)   Final Result      Stable mild pulmonary edema.   New left basilar atelectasis.      DX-CHEST-PORTABLE (1 VIEW)   Final Result      Stable chest appearance compared with 11/16.      DX-CHEST-PORTABLE (1 VIEW)   Final Result      No acute cardiopulmonary abnormality identified.      DX-CHEST-PORTABLE (1 VIEW)   Final Result      No acute cardiopulmonary abnormality. No interval change.      DX-CHEST-PORTABLE (1 VIEW)   Final Result      No acute cardiopulmonary disease.      CT-HEAD W/O   Final Result      1.  No evidence of acute intracranial process.      2.  There is again seen interstitial pulmonary edema and bibasilar atelectasis.      CT-HIP W/O PLUS RECONS LEFT   Final Result      1.  No evidence of acute fracture or malalignment. No evidence of hardware failure or complication.   2.  Postsurgical changes of the left femur with broken screw fragment redemonstrated.   3.  Demineralization.   4.  Scattered colonic diverticula.   5.  Atherosclerosis.   6.  Small fat-containing umbilical hernia.      EC-ECHOCARDIOGRAM COMPLETE W/O CONT   Final Result      DX-HIP-COMPLETE - UNILATERAL 2+ LEFT   Final Result      1.  No definite acute osseous abnormality. In setting of demineralization, an occult fracture is difficult to exclude. If there is strong clinical suspicion, CT or MRI could be obtained for further evaluation.   2.  Postsurgical changes of the left femur with broken screw fragment redemonstrated.      DX-CHEST-PORTABLE (1 VIEW)   Final Result      No acute  cardiopulmonary process is seen.      Atherosclerotic plaque.           Assessment/Plan  * Comfort measures only status- (present on admission)   Assessment & Plan    No signs of distress, continue comfort care options  -- the patient appears comfortable, except for some osteoarthritis flare pain in bilateral hands  -Patient does not want any oral pain medications and even refused capsaicin cream therefore continue supportive care and reassess as needed  -Otherwise no change plan to medications     ST elevation myocardial infarction involving right coronary artery (HCC)- (present on admission)   Assessment & Plan    Declines any intervention or therapeutic modality  continue with comfort care     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  [facility] assisting.  Difficult discharge  -awaiting for placement     Chronic kidney disease (CKD), stage III (moderate) (HCC)- (present on admission)   Assessment & Plan    Continue comfort care.       Essential hypertension- (present on admission)   Assessment & Plan    Comfort care     Fall- (present on admission)   Assessment & Plan    Continue fall precautions          VTE prophylaxis: comfort care

## 2019-04-21 NOTE — CARE PLAN
Problem: Safety  Goal: Will remain free from injury  Outcome: PROGRESSING AS EXPECTED  Hourly rounds done. Call light within reach. Needs attended on a timely manner. Treaded socks on when OOB. Educated on the importance of calling for assistance when attempting to be OOB.  BED ALARM ON.    Problem: Venous Thromboembolism (VTW)/Deep Vein Thrombosis (DVT) Prevention:  Goal: Patient will participate in Venous Thrombosis (VTE)/Deep Vein Thrombosis (DVT)Prevention Measures  Outcome: PROGRESSING AS EXPECTED  Encouraged early ambulation. Refused SCDs. Pt is comfort care.     Problem: Bowel/Gastric:  Goal: Normal bowel function is maintained or improved  Outcome: PROGRESSING AS EXPECTED  Last Bm 4/19/19 per patient    Problem: Mobility  Goal: Risk for activity intolerance will decrease  Outcome: PROGRESSING AS EXPECTED  Up SBA using a 4WW.

## 2019-04-21 NOTE — CARE PLAN
Problem: Safety  Goal: Will remain free from injury  Outcome: PROGRESSING AS EXPECTED  Treaded socks in place, bed in the lowest position, bed alarm on, call light and belongings within reach, pt call for assistance appropriately    Problem: Venous Thromboembolism (VTW)/Deep Vein Thrombosis (DVT) Prevention:  Goal: Patient will participate in Venous Thrombosis (VTE)/Deep Vein Thrombosis (DVT)Prevention Measures  Outcome: PROGRESSING SLOWER THAN EXPECTED  Pt. refuses to wear SCDs regardless of education.    Problem: Mobility  Goal: Risk for activity intolerance will decrease  Outcome: PROGRESSING AS EXPECTED  Pt. up to commode x1 assist with 4WW

## 2019-04-22 PROCEDURE — 770001 HCHG ROOM/CARE - MED/SURG/GYN PRIV*

## 2019-04-22 PROCEDURE — 99231 SBSQ HOSP IP/OBS SF/LOW 25: CPT | Performed by: HOSPITALIST

## 2019-04-22 NOTE — PROGRESS NOTES
[facility] Medicine Daily Progress Note    Date of Service  4/10/19    Chief Complaint  89 y.o. female admitted 11/13/2018 with fall and STEMI.  Herminia is a 89 y.o. female PMH of essential hypertension, who presents with ground-level fall last night around 9 PM.  She stated that she has a bad left hip and fell because of that.  The patient denied palpitation, dizziness, syncope episode.  She presented to ER today and unfortunately she was found to have acute ST elevation myocardial infarction with troponin over 50.  Because of that cardiology was consulted in ER.  She stated that she has one episode of chest pain last night over the sternum area, lasted for a few minutes and it went away on its own.  Currently the patient denies any chest pain.  She was explained in detail about her medical condition and she stated that she does not want any aggressive intervention including angiogram and wanted to be treated only medically.  I also had a long discussion with the patient regarding her CODE STATUS and she does not want to be intubated or had CPR.  She was started on heparin drip in ER and she will be admitted to cardiac intensive care unit in critical condition.             Hospital Course    She was admitted to ICU on medical management. Family elected for comfort care.      Interval Problem Update  4/16:  dementia noted on exam, upset about daughter in [place], states daughter lying about patient being crazy.  Patient states she had previously lived alone in an apartment, but has since given her apartment up.  She has no plans as to where she will live.   4/17:  Up ambulating with FWW, no complaints.     4/18:  Sleeping today, but conversant upon awakening.  No complaints.  4/19: conversant, comfortable, no complaints.  No chest pain.  4/20:  Comfortable, conversant.  No chest pain, continue comfort care.  4/21:  Continues to do well, eating her meals per bedside RN.  Comfort care.  4/22:  No complaints, comfort  care.  Resting in bed, eating well.    Consultants/Specialty  Critical care signed off  Cardiology signed off    Code Status  Comfort care, DNR    Disposition  Hospice, pending group home placement. 11/20/18 quantiferon gold +. afb negative. Working on possible assisted living facility transfer versus group home.  Patient tells me she has a daughter in [place].    Review of Systems  Review of Systems   Unable to perform ROS: Dementia        Physical Exam  Temp:  [36.4 °C (97.5 °F)-36.8 °C (98.3 °F)] 36.4 °C (97.5 °F)  Pulse:  [68-71] 71  Resp:  [16] 16  BP: (100-106)/(59-65) 100/59  SpO2:  [93 %-95 %] 95 %    Physical Exam   Constitutional: She is oriented to person, place, and time. She appears well-developed and well-nourished. No distress.   HENT:   Head: Normocephalic and atraumatic.   Nose: Nose normal.   Mouth/Throat: Oropharynx is clear and moist. No oropharyngeal exudate.   Eyes: Pupils are equal, round, and reactive to light. Conjunctivae and EOM are normal. Right eye exhibits no discharge. Left eye exhibits no discharge. No scleral icterus.   Neck: Normal range of motion. Neck supple. No JVD present. No tracheal deviation present. No thyromegaly present.   Cardiovascular: Normal rate, regular rhythm, normal heart sounds and intact distal pulses.  Exam reveals no gallop and no friction rub.    No murmur heard.  Pulmonary/Chest: Effort normal and breath sounds normal. No stridor. No respiratory distress. She has no wheezes. She has no rales. She exhibits no tenderness.   Abdominal: Soft. Bowel sounds are normal. She exhibits no distension and no mass. There is no tenderness. There is no rebound and no guarding.   Musculoskeletal: Normal range of motion. She exhibits no edema or tenderness.   Lymphadenopathy:     She has no cervical adenopathy.   Neurological: She is alert and oriented to person, place, and time. No cranial nerve deficit. She exhibits normal muscle tone. Coordination normal.   Skin: Skin is  warm and dry. No rash noted. She is not diaphoretic. No erythema.   Psychiatric: She has a normal mood and affect. Her behavior is normal. Judgment and thought content normal.   Nursing note and vitals reviewed.  Physical exam remains unchanged again    Fluids    Intake/Output Summary (Last 24 hours) at 04/22/19 1541  Last data filed at 04/21/19 2000   Gross per 24 hour   Intake              350 ml   Output                0 ml   Net              350 ml       Laboratory                        Imaging  DX-CHEST-PORTABLE (1 VIEW)   Final Result         1.  Hazy interstitial left pulmonary opacities suggests interstitial edema or infiltrate, similar to prior.   2.  Trace left pleural effusion   3.  Hyperexpansion of lungs favors changes of COPD.      DX-CHEST-PORTABLE (1 VIEW)   Final Result         1.  Interstitial infiltrates or edema, stable.   2.  Atherosclerosis      DX-CHEST-PORTABLE (1 VIEW)   Final Result         1.  Interstitial infiltrates or edema.   2.  Atherosclerosis      DX-CHEST-PORTABLE (1 VIEW)   Final Result      Moderate interstitial edema or interstitial lung disease and small left pleural effusion similar to previous.      DX-CHEST-PORTABLE (1 VIEW)   Final Result      Stable interstitial edema and/or interstitial lung disease with probable small pleural fluid      DX-CHEST-PORTABLE (1 VIEW)   Final Result      Ill-defined infrahilar interstitial opacities, atelectasis versus mild edema. No significant pleural effusions.      DX-CHEST-PORTABLE (1 VIEW)   Final Result      Mild left basilar atelectasis, improved since prior. No new consolidation or large pleural effusions.      DX-CHEST-PORTABLE (1 VIEW)   Final Result      1.  Left basilar opacification may be secondary to atelectasis. Developing pneumonia cannot be excluded.         DX-CHEST-PORTABLE (1 VIEW)   Final Result      1.  Unchanged mild interstitial pulmonary edema   2.  Unchanged bibasilar atelectasis, alveolar edema or pneumonia       DX-CHEST-PORTABLE (1 VIEW)   Final Result      1.  Decreased pulmonary edema   2.  Unchanged bibasilar atelectasis, alveolar edema or pneumonia      DX-CHEST-PORTABLE (1 VIEW)   Final Result      1.  There is diffuse interstitial and alveolar density in the right mid and lower lung zone. This is increased since the previous study. This may represent mild pulmonary edema/consolidation.   2.  Mildly enlarged cardiomediastinal silhouette when compared with the previous chest x-ray.      DX-CHEST-PORTABLE (1 VIEW)   Final Result      Stable mild pulmonary edema.   New left basilar atelectasis.      DX-CHEST-PORTABLE (1 VIEW)   Final Result      Stable chest appearance compared with 11/16.      DX-CHEST-PORTABLE (1 VIEW)   Final Result      No acute cardiopulmonary abnormality identified.      DX-CHEST-PORTABLE (1 VIEW)   Final Result      No acute cardiopulmonary abnormality. No interval change.      DX-CHEST-PORTABLE (1 VIEW)   Final Result      No acute cardiopulmonary disease.      CT-HEAD W/O   Final Result      1.  No evidence of acute intracranial process.      2.  There is again seen interstitial pulmonary edema and bibasilar atelectasis.      CT-HIP W/O PLUS RECONS LEFT   Final Result      1.  No evidence of acute fracture or malalignment. No evidence of hardware failure or complication.   2.  Postsurgical changes of the left femur with broken screw fragment redemonstrated.   3.  Demineralization.   4.  Scattered colonic diverticula.   5.  Atherosclerosis.   6.  Small fat-containing umbilical hernia.      EC-ECHOCARDIOGRAM COMPLETE W/O CONT   Final Result      DX-HIP-COMPLETE - UNILATERAL 2+ LEFT   Final Result      1.  No definite acute osseous abnormality. In setting of demineralization, an occult fracture is difficult to exclude. If there is strong clinical suspicion, CT or MRI could be obtained for further evaluation.   2.  Postsurgical changes of the left femur with broken screw fragment redemonstrated.       DX-CHEST-PORTABLE (1 VIEW)   Final Result      No acute cardiopulmonary process is seen.      Atherosclerotic plaque.           Assessment/Plan  * Comfort measures only status- (present on admission)   Assessment & Plan    No signs of distress, continue comfort care options  -- the patient appears comfortable, except for some osteoarthritis flare pain in bilateral hands  -Patient does not want any oral pain medications and even refused capsaicin cream therefore continue supportive care and reassess as needed  -Otherwise no change plan to medications     ST elevation myocardial infarction involving right coronary artery (HCC)- (present on admission)   Assessment & Plan    Declines any intervention or therapeutic modality  continue with comfort care     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  [facility] assisting.  Difficult discharge  -awaiting for placement     Chronic kidney disease (CKD), stage III (moderate) (HCC)- (present on admission)   Assessment & Plan    Continue comfort care.       Essential hypertension- (present on admission)   Assessment & Plan    Comfort care     Fall- (present on admission)   Assessment & Plan    Continue fall precautions          VTE prophylaxis: comfort care

## 2019-04-22 NOTE — CARE PLAN
Problem: Safety  Goal: Will remain free from injury  Outcome: PROGRESSING AS EXPECTED  Hourly rounds done. Call light within reach. Needs attended on a timely manner. Treaded socks on when OOB. Educated on the importance of calling for assistance when attempting to be OOB.  BED ALARM ON.    Problem: Venous Thromboembolism (VTW)/Deep Vein Thrombosis (DVT) Prevention:  Goal: Patient will participate in Venous Thrombosis (VTE)/Deep Vein Thrombosis (DVT)Prevention Measures  Outcome: PROGRESSING AS EXPECTED  Encouraged early mobilization.      Problem: Mobility  Goal: Risk for activity intolerance will decrease  Outcome: PROGRESSING AS EXPECTED  Up SBA using a 4ww

## 2019-04-22 NOTE — CARE PLAN
Problem: Safety  Goal: Will remain free from injury  Outcome: PROGRESSING AS EXPECTED  Remains free from injury. Bed alarm in place and reminded to call when needing assistance.     Problem: Urinary Elimination:  Goal: Ability to reestablish a normal urinary elimination pattern will improve  Outcome: PROGRESSING AS EXPECTED  Patient using bedside commode to void. Voiding without difficulty.

## 2019-04-22 NOTE — DISCHARGE PLANNING
Anticipated Discharge Disposition: [facility] vs [facility]    Action: LSW left a Vm for Cosme at [facility] asking for a call back regarding the Pt and her assessment for possible placement.     Barriers to Discharge: placement    Plan: Follow up with Nate Amezquita

## 2019-04-23 PROCEDURE — A9270 NON-COVERED ITEM OR SERVICE: HCPCS | Performed by: HOSPITALIST

## 2019-04-23 PROCEDURE — 99231 SBSQ HOSP IP/OBS SF/LOW 25: CPT | Performed by: INTERNAL MEDICINE

## 2019-04-23 PROCEDURE — 770001 HCHG ROOM/CARE - MED/SURG/GYN PRIV*

## 2019-04-23 PROCEDURE — 700102 HCHG RX REV CODE 250 W/ 637 OVERRIDE(OP): Performed by: HOSPITALIST

## 2019-04-23 RX ADMIN — SENNOSIDES AND DOCUSATE SODIUM 2 TABLET: 8.6; 5 TABLET ORAL at 17:42

## 2019-04-23 RX ADMIN — FUROSEMIDE 20 MG: 20 TABLET ORAL at 06:15

## 2019-04-23 RX ADMIN — SENNOSIDES AND DOCUSATE SODIUM 2 TABLET: 8.6; 5 TABLET ORAL at 06:15

## 2019-04-23 NOTE — CARE PLAN
Problem: Safety  Goal: Will remain free from injury  Outcome: PROGRESSING AS EXPECTED  Bed in the lowest locked position. Call light within reach. Bed alarm in use. Hourly rounding in place.     Problem: Discharge Barriers/Planning  Goal: Patient's continuum of care needs will be met  Working with MARINA for [facility] placement with hospice

## 2019-04-23 NOTE — DISCHARGE PLANNING
Anticipated Discharge Disposition: [facility] vs [facility]    Action: GELYW spoke with Consuelo from [facility] LSW asked if she knew if the Pt is appropriate for their facility. GELYW was told Cosme would be the one to speak to and he is not back in the office until Monday of next week.     Barriers to Discharge: placement    Plan: follow up with Cosme on Monday

## 2019-04-24 PROCEDURE — 700102 HCHG RX REV CODE 250 W/ 637 OVERRIDE(OP): Performed by: HOSPITALIST

## 2019-04-24 PROCEDURE — 770001 HCHG ROOM/CARE - MED/SURG/GYN PRIV*

## 2019-04-24 PROCEDURE — A9270 NON-COVERED ITEM OR SERVICE: HCPCS | Performed by: HOSPITALIST

## 2019-04-24 PROCEDURE — 99231 SBSQ HOSP IP/OBS SF/LOW 25: CPT | Performed by: INTERNAL MEDICINE

## 2019-04-24 RX ADMIN — FUROSEMIDE 20 MG: 20 TABLET ORAL at 10:56

## 2019-04-24 NOTE — CARE PLAN
Problem: Safety  Goal: Will remain free from falls  Outcome: PROGRESSING AS EXPECTED  Calls for assistance when appropriate. Call light and personal belongings within reach. Bed in low and locked position. Fall education provided to patient.     Problem: Bowel/Gastric:  Goal: Normal bowel function is maintained or improved  Outcome: PROGRESSING AS EXPECTED  Patient provided education regarding regular diet.  Medications administered as ordered to promote bowel function including stool softeners/laxatives/suppositories.  Monitor for signs and symptoms of constipation. Document last bowel movement.     04/23/19 2867   OTHER   Last BM 04/23/19

## 2019-04-24 NOTE — PROGRESS NOTES
Bedside shift report received from night RN.  Assumed care at 0715, patient sleeping in bed, belongings and call light within reach Needs met at this time.    Reviewed current POC with family present. POC review with CNA including vital sign frequency/drains/mobility.  Labs, notes, orders reviewed at this time.

## 2019-04-24 NOTE — PROGRESS NOTES
[facility] Medicine Daily Progress Note    Date of Service  4/10/19    Chief Complaint  89 y.o. female admitted 11/13/2018 with fall and STEMI.  Herminia is a 89 y.o. female PMH of essential hypertension, who presents with ground-level fall last night around 9 PM.  She stated that she has a bad left hip and fell because of that.  The patient denied palpitation, dizziness, syncope episode.  She presented to ER today and unfortunately she was found to have acute ST elevation myocardial infarction with troponin over 50.  Because of that cardiology was consulted in ER.  She stated that she has one episode of chest pain last night over the sternum area, lasted for a few minutes and it went away on its own.  Currently the patient denies any chest pain.  She was explained in detail about her medical condition and she stated that she does not want any aggressive intervention including angiogram and wanted to be treated only medically.  I also had a long discussion with the patient regarding her CODE STATUS and she does not want to be intubated or had CPR.  She was started on heparin drip in ER and she will be admitted to cardiac intensive care unit in critical condition.             Hospital Course    She was admitted to ICU on medical management. Family elected for comfort care.      Interval Problem Update  4/16:  dementia noted on exam, upset about daughter in [place], states daughter lying about patient being crazy.  Patient states she had previously lived alone in an apartment, but has since given her apartment up.  She has no plans as to where she will live.   4/17:  Up ambulating with FWW, no complaints.     4/18:  Sleeping today, but conversant upon awakening.  No complaints.  4/19: conversant, comfortable, no complaints.  No chest pain.  4/20:  Comfortable, conversant.  No chest pain, continue comfort care.  4/21:  Continues to do well, eating her meals per bedside RN.  Comfort care.  4/22:  No complaints, comfort  care.  Resting in bed, eating well.  4/23. Sleeping comfortably.    Consultants/Specialty  Critical care signed off  Cardiology signed off    Code Status  Comfort care, DNR    Disposition  Hospice, pending group home placement. 11/20/18 quantiferon gold +. afb negative. Working on possible assisted living facility transfer versus group home.  Patient tells me she has a daughter in [place].    Review of Systems  Review of Systems   Unable to perform ROS: Dementia        Physical Exam  Temp:  [36.3 °C (97.4 °F)-36.6 °C (97.9 °F)] 36.3 °C (97.4 °F)  Pulse:  [65-69] 65  Resp:  [14-16] 14  BP: (102-107)/(59-61) 102/61  SpO2:  [90 %-95 %] 95 %    Physical Exam   Constitutional: She is oriented to person, place, and time. She appears well-developed and well-nourished. No distress.   HENT:   Head: Normocephalic and atraumatic.   Nose: Nose normal.   Mouth/Throat: Oropharynx is clear and moist. No oropharyngeal exudate.   Eyes: Pupils are equal, round, and reactive to light. Conjunctivae and EOM are normal. Right eye exhibits no discharge. Left eye exhibits no discharge. No scleral icterus.   Neck: Normal range of motion. Neck supple. No JVD present. No tracheal deviation present. No thyromegaly present.   Cardiovascular: Normal rate, regular rhythm, normal heart sounds and intact distal pulses.  Exam reveals no gallop and no friction rub.    No murmur heard.  Pulmonary/Chest: Effort normal and breath sounds normal. No stridor. No respiratory distress. She has no wheezes. She has no rales. She exhibits no tenderness.   Abdominal: Soft. Bowel sounds are normal. She exhibits no distension and no mass. There is no tenderness. There is no rebound and no guarding.   Musculoskeletal: Normal range of motion. She exhibits no edema or tenderness.   Lymphadenopathy:     She has no cervical adenopathy.   Neurological: She is alert and oriented to person, place, and time. No cranial nerve deficit. She exhibits normal muscle tone.  Coordination normal.   Skin: Skin is warm and dry. No rash noted. She is not diaphoretic. No erythema.   Psychiatric: She has a normal mood and affect. Her behavior is normal. Judgment and thought content normal.   Nursing note and vitals reviewed.  Physical exam remains unchanged again    Fluids  No intake or output data in the 24 hours ending 04/23/19 1822    Laboratory                        Imaging  DX-CHEST-PORTABLE (1 VIEW)   Final Result         1.  Hazy interstitial left pulmonary opacities suggests interstitial edema or infiltrate, similar to prior.   2.  Trace left pleural effusion   3.  Hyperexpansion of lungs favors changes of COPD.      DX-CHEST-PORTABLE (1 VIEW)   Final Result         1.  Interstitial infiltrates or edema, stable.   2.  Atherosclerosis      DX-CHEST-PORTABLE (1 VIEW)   Final Result         1.  Interstitial infiltrates or edema.   2.  Atherosclerosis      DX-CHEST-PORTABLE (1 VIEW)   Final Result      Moderate interstitial edema or interstitial lung disease and small left pleural effusion similar to previous.      DX-CHEST-PORTABLE (1 VIEW)   Final Result      Stable interstitial edema and/or interstitial lung disease with probable small pleural fluid      DX-CHEST-PORTABLE (1 VIEW)   Final Result      Ill-defined infrahilar interstitial opacities, atelectasis versus mild edema. No significant pleural effusions.      DX-CHEST-PORTABLE (1 VIEW)   Final Result      Mild left basilar atelectasis, improved since prior. No new consolidation or large pleural effusions.      DX-CHEST-PORTABLE (1 VIEW)   Final Result      1.  Left basilar opacification may be secondary to atelectasis. Developing pneumonia cannot be excluded.         DX-CHEST-PORTABLE (1 VIEW)   Final Result      1.  Unchanged mild interstitial pulmonary edema   2.  Unchanged bibasilar atelectasis, alveolar edema or pneumonia      DX-CHEST-PORTABLE (1 VIEW)   Final Result      1.  Decreased pulmonary edema   2.  Unchanged  bibasilar atelectasis, alveolar edema or pneumonia      DX-CHEST-PORTABLE (1 VIEW)   Final Result      1.  There is diffuse interstitial and alveolar density in the right mid and lower lung zone. This is increased since the previous study. This may represent mild pulmonary edema/consolidation.   2.  Mildly enlarged cardiomediastinal silhouette when compared with the previous chest x-ray.      DX-CHEST-PORTABLE (1 VIEW)   Final Result      Stable mild pulmonary edema.   New left basilar atelectasis.      DX-CHEST-PORTABLE (1 VIEW)   Final Result      Stable chest appearance compared with 11/16.      DX-CHEST-PORTABLE (1 VIEW)   Final Result      No acute cardiopulmonary abnormality identified.      DX-CHEST-PORTABLE (1 VIEW)   Final Result      No acute cardiopulmonary abnormality. No interval change.      DX-CHEST-PORTABLE (1 VIEW)   Final Result      No acute cardiopulmonary disease.      CT-HEAD W/O   Final Result      1.  No evidence of acute intracranial process.      2.  There is again seen interstitial pulmonary edema and bibasilar atelectasis.      CT-HIP W/O PLUS RECONS LEFT   Final Result      1.  No evidence of acute fracture or malalignment. No evidence of hardware failure or complication.   2.  Postsurgical changes of the left femur with broken screw fragment redemonstrated.   3.  Demineralization.   4.  Scattered colonic diverticula.   5.  Atherosclerosis.   6.  Small fat-containing umbilical hernia.      EC-ECHOCARDIOGRAM COMPLETE W/O CONT   Final Result      DX-HIP-COMPLETE - UNILATERAL 2+ LEFT   Final Result      1.  No definite acute osseous abnormality. In setting of demineralization, an occult fracture is difficult to exclude. If there is strong clinical suspicion, CT or MRI could be obtained for further evaluation.   2.  Postsurgical changes of the left femur with broken screw fragment redemonstrated.      DX-CHEST-PORTABLE (1 VIEW)   Final Result      No acute cardiopulmonary process is seen.       Atherosclerotic plaque.           Assessment/Plan  * Comfort measures only status- (present on admission)   Assessment & Plan    No signs of distress, continue comfort care options  -- the patient appears comfortable, except for some osteoarthritis flare pain in bilateral hands  -Patient does not want any oral pain medications and even refused capsaicin cream therefore continue supportive care and reassess as needed  -Otherwise no change plan to medications     ST elevation myocardial infarction involving right coronary artery (HCC)- (present on admission)   Assessment & Plan    Declines any intervention or therapeutic modality  continue with comfort care     Positive QuantiFERON-TB Gold test- (present on admission)   Assessment & Plan      AFB negative        Advanced directives, counseling/discussion- (present on admission)   Assessment & Plan    Placement is an issue for the patient continued on comfort care  Searching for group home placement.  [facility] assisting.  Difficult discharge  -awaiting for placement     Chronic kidney disease (CKD), stage III (moderate) (HCC)- (present on admission)   Assessment & Plan    Continue comfort care.       Essential hypertension- (present on admission)   Assessment & Plan    Comfort care     Fall- (present on admission)   Assessment & Plan    Continue fall precautions          VTE prophylaxis: comfort care

## 2019-04-25 PROCEDURE — 770001 HCHG ROOM/CARE - MED/SURG/GYN PRIV*

## 2019-04-25 PROCEDURE — 99231 SBSQ HOSP IP/OBS SF/LOW 25: CPT | Performed by: INTERNAL MEDICINE

## 2019-04-25 NOTE — CARE PLAN
Problem: Safety  Goal: Will remain free from falls    Intervention: Implement fall precautions  Pt calls appropriately, treaded socks in use. Personal belongings and call light with in reach and bed is locked in lowest position.      Problem: Discharge Barriers/Planning  Goal: Patient's continuum of care needs will be met    Intervention: Collaborate with Transitional Care Team and Interdisciplinary Team to meet discharge needs  SW working extensively with outside services to get patient into a group home

## 2019-04-25 NOTE — PROGRESS NOTES
[facility] Medicine Daily Progress Note    Date of Service  4/10/19    Chief Complaint  89 y.o. female admitted 11/13/2018 with fall and STEMI.  Herminia is a 89 y.o. female PMH of essential hypertension, who presents with ground-level fall last night around 9 PM.  She stated that she has a bad left hip and fell because of that.  The patient denied palpitation, dizziness, syncope episode.  She presented to ER today and unfortunately she was found to have acute ST elevation myocardial infarction with troponin over 50.  Because of that cardiology was consulted in ER.  She stated that she has one episode of chest pain last night over the sternum area, lasted for a few minutes and it went away on its own.  Currently the patient denies any chest pain.  She was explained in detail about her medical condition and she stated that she does not want any aggressive intervention including angiogram and wanted to be treated only medically.  I also had a long discussion with the patient regarding her CODE STATUS and she does not want to be intubated or had CPR.  She was started on heparin drip in ER and she will be admitted to cardiac intensive care unit in critical condition.             Hospital Course    She was admitted to ICU on medical management. Family elected for comfort care.      Interval Problem Update  4/16:  dementia noted on exam, upset about daughter in [place], states daughter lying about patient being crazy.  Patient states she had previously lived alone in an apartment, but has since given her apartment up.  She has no plans as to where she will live.   4/17:  Up ambulating with FWW, no complaints.     4/18:  Sleeping today, but conversant upon awakening.  No complaints.  4/19: conversant, comfortable, no complaints.  No chest pain.  4/20:  Comfortable, conversant.  No chest pain, continue comfort care.  4/21:  Continues to do well, eating her meals per bedside RN.  Comfort care.  4/22:  No complaints, comfort  care.  Resting in bed, eating well.  4/23. Sleeping comfortably.  4/24. No new issues or complaints. Sleeping comfortably.    Consultants/Specialty  Critical care signed off  Cardiology signed off    Code Status  Comfort care, DNR    Disposition  Hospice, pending group home placement. 11/20/18 quantiferon gold +. afb negative. Working on possible assisted living facility transfer versus group home.  Patient tells me she has a daughter in [place]. Plan for hospice to receive her int the outpatient according to SW.    Review of Systems  Review of Systems   Unable to perform ROS: Dementia        Physical Exam  Temp:  [36.6 °C (97.9 °F)-36.9 °C (98.4 °F)] 36.6 °C (97.9 °F)  Pulse:  [65-72] 65  Resp:  [14-16] 14  BP: (114-116)/(62-69) 114/69  SpO2:  [94 %] 94 %    Physical Exam   Constitutional: She is oriented to person, place, and time. She appears well-developed and well-nourished. No distress.   HENT:   Head: Normocephalic and atraumatic.   Nose: Nose normal.   Mouth/Throat: Oropharynx is clear and moist. No oropharyngeal exudate.   Eyes: Pupils are equal, round, and reactive to light. Conjunctivae and EOM are normal. Right eye exhibits no discharge. Left eye exhibits no discharge. No scleral icterus.   Neck: Normal range of motion. Neck supple. No JVD present. No tracheal deviation present. No thyromegaly present.   Cardiovascular: Normal rate, regular rhythm, normal heart sounds and intact distal pulses.  Exam reveals no gallop and no friction rub.    No murmur heard.  Pulmonary/Chest: Effort normal and breath sounds normal. No stridor. No respiratory distress. She has no wheezes. She has no rales. She exhibits no tenderness.   Abdominal: Soft. Bowel sounds are normal. She exhibits no distension and no mass. There is no tenderness. There is no rebound and no guarding.   Musculoskeletal: Normal range of motion. She exhibits no edema or tenderness.   Lymphadenopathy:     She has no cervical adenopathy.   Neurological:  She is alert and oriented to person, place, and time. No cranial nerve deficit. She exhibits normal muscle tone. Coordination normal.   Somnolent but arousable.   Skin: Skin is warm and dry. No rash noted. She is not diaphoretic. No erythema.   Psychiatric: She has a normal mood and affect. Her behavior is normal. Judgment and thought content normal.   Not irritable.   Nursing note and vitals reviewed.  Physical exam remains unchanged again    Fluids  No intake or output data in the 24 hours ending 04/24/19 0070    Laboratory                        Imaging  DX-CHEST-PORTABLE (1 VIEW)   Final Result         1.  Hazy interstitial left pulmonary opacities suggests interstitial edema or infiltrate, similar to prior.   2.  Trace left pleural effusion   3.  Hyperexpansion of lungs favors changes of COPD.      DX-CHEST-PORTABLE (1 VIEW)   Final Result         1.  Interstitial infiltrates or edema, stable.   2.  Atherosclerosis      DX-CHEST-PORTABLE (1 VIEW)   Final Result         1.  Interstitial infiltrates or edema.   2.  Atherosclerosis      DX-CHEST-PORTABLE (1 VIEW)   Final Result      Moderate interstitial edema or interstitial lung disease and small left pleural effusion similar to previous.      DX-CHEST-PORTABLE (1 VIEW)   Final Result      Stable interstitial edema and/or interstitial lung disease with probable small pleural fluid      DX-CHEST-PORTABLE (1 VIEW)   Final Result      Ill-defined infrahilar interstitial opacities, atelectasis versus mild edema. No significant pleural effusions.      DX-CHEST-PORTABLE (1 VIEW)   Final Result      Mild left basilar atelectasis, improved since prior. No new consolidation or large pleural effusions.      DX-CHEST-PORTABLE (1 VIEW)   Final Result      1.  Left basilar opacification may be secondary to atelectasis. Developing pneumonia cannot be excluded.         DX-CHEST-PORTABLE (1 VIEW)   Final Result      1.  Unchanged mild interstitial pulmonary edema   2.   Unchanged bibasilar atelectasis, alveolar edema or pneumonia      DX-CHEST-PORTABLE (1 VIEW)   Final Result      1.  Decreased pulmonary edema   2.  Unchanged bibasilar atelectasis, alveolar edema or pneumonia      DX-CHEST-PORTABLE (1 VIEW)   Final Result      1.  There is diffuse interstitial and alveolar density in the right mid and lower lung zone. This is increased since the previous study. This may represent mild pulmonary edema/consolidation.   2.  Mildly enlarged cardiomediastinal silhouette when compared with the previous chest x-ray.      DX-CHEST-PORTABLE (1 VIEW)   Final Result      Stable mild pulmonary edema.   New left basilar atelectasis.      DX-CHEST-PORTABLE (1 VIEW)   Final Result      Stable chest appearance compared with 11/16.      DX-CHEST-PORTABLE (1 VIEW)   Final Result      No acute cardiopulmonary abnormality identified.      DX-CHEST-PORTABLE (1 VIEW)   Final Result      No acute cardiopulmonary abnormality. No interval change.      DX-CHEST-PORTABLE (1 VIEW)   Final Result      No acute cardiopulmonary disease.      CT-HEAD W/O   Final Result      1.  No evidence of acute intracranial process.      2.  There is again seen interstitial pulmonary edema and bibasilar atelectasis.      CT-HIP W/O PLUS RECONS LEFT   Final Result      1.  No evidence of acute fracture or malalignment. No evidence of hardware failure or complication.   2.  Postsurgical changes of the left femur with broken screw fragment redemonstrated.   3.  Demineralization.   4.  Scattered colonic diverticula.   5.  Atherosclerosis.   6.  Small fat-containing umbilical hernia.      EC-ECHOCARDIOGRAM COMPLETE W/O CONT   Final Result      DX-HIP-COMPLETE - UNILATERAL 2+ LEFT   Final Result      1.  No definite acute osseous abnormality. In setting of demineralization, an occult fracture is difficult to exclude. If there is strong clinical suspicion, CT or MRI could be obtained for further evaluation.   2.  Postsurgical  changes of the left femur with broken screw fragment redemonstrated.      DX-CHEST-PORTABLE (1 VIEW)   Final Result      No acute cardiopulmonary process is seen.      Atherosclerotic plaque.           Assessment/Plan  * Comfort measures only status- (present on admission)   Assessment & Plan    No signs of distress, continue comfort care options  -- the patient appears comfortable, except for some osteoarthritis flare pain in bilateral hands  -Patient does not want any oral pain medications and even refused capsaicin cream therefore continue supportive care and reassess as needed  -Otherwise no change plan to medications  4/24. Looks comfortable. Sleeping.     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  [facility] assisting.  Difficult discharge  -awaiting for placement     Chronic kidney disease (CKD), stage III (moderate) (HCC)- (present on admission)   Assessment & Plan    Continue comfort care.       Essential hypertension- (present on admission)   Assessment & Plan    Comfort care     Fall- (present on admission)   Assessment & Plan    Continue fall precautions          VTE prophylaxis: comfort care

## 2019-04-25 NOTE — PROGRESS NOTES
Pharmacy Pharmacotherapy Consult for LOS >30 days    Admit Date: 11/13/2018      Medications were reviewed for appropriateness and ongoing need.     Current Facility-Administered Medications   Medication Dose Route Frequency Provider Last Rate Last Dose   • capsaicin (ZOSTRIX) 0.025 % cream   Topical TID Fawad Luna M.D.       • benzocaine-menthol (CEPACOL) lozenge 1 Lozenge  1 Lozenge Mouth/Throat Q2HRS PRN Pa Jacinto M.D.   1 Lozenge at 02/23/19 1834   • guaiFENesin (ROBITUSSIN) 100 MG/5ML solution 200 mg  10 mL Oral Q4HRS PRN Pa Jacinto M.D.       • senna-docusate (PERICOLACE or SENOKOT S) 8.6-50 MG per tablet 2 Tab  2 Tab Oral BID Martinez Yi M.D.   2 Tab at 04/23/19 1742    And   • magnesium hydroxide (MILK OF MAGNESIA) suspension 30 mL  30 mL Oral QDAY PRN Martinez Yi M.D.   30 mL at 02/07/19 1608    And   • bisacodyl (DULCOLAX) suppository 10 mg  10 mg Rectal QDAY PRN Martinez Yi M.D.   10 mg at 03/24/19 2051   • ipratropium-albuterol (DUONEB) nebulizer solution  3 mL Nebulization Q4H PRN (RT) Asem RON Finn M.D.   3 mL at 12/15/18 0151   • LORazepam (ATIVAN) tablet 0.5 mg  0.5 mg Oral Q4HRS PRN Asem RON Finn M.D.   0.5 mg at 04/07/19 1002   • morphine (ROXANOL) 20 MG/ML oral conc 5-10 mg  5-10 mg Oral Q HOUR PRN Asem RON Finn M.D.       • acetaminophen (TYLENOL) tablet 500 mg  500 mg Oral Q6HRS PRN Asem RON Finn M.D.   500 mg at 03/23/19 1406   • diphenhydrAMINE-ZnAcetate (BENADRYL ITCH) 1-0.1 % cream   Topical 4X/DAY PRN Asem RON Finn M.D.       • MD ALERT...adult comfort care   Other PRN Asem A Mutasher, M.D.       • atropine 1 % ophthalmic solution 2 Drop  2 Drop Sublingual Q4HRS PRN Caitlyn Finn M.D.       • furosemide (LASIX) tablet 20 mg  20 mg Oral Q DAY Caitlyn Finn M.D.   20 mg at 04/24/19 1056       Recommendations:    Pt is consistently refusing capsaicin - recommend changing to PRN.    Patient on comfort care.  All medications not being used  consistently but have role as PRN agents.  No further recommendations at this time.    Cody Andrea, Pharmacy Intern

## 2019-04-25 NOTE — PROGRESS NOTES
[facility] Medicine Daily Progress Note    Date of Service  4/10/19    Chief Complaint  89 y.o. female admitted 11/13/2018 with fall and STEMI.  Herminia is a 89 y.o. female PMH of essential hypertension, who presents with ground-level fall last night around 9 PM.  She stated that she has a bad left hip and fell because of that.  The patient denied palpitation, dizziness, syncope episode.  She presented to ER today and unfortunately she was found to have acute ST elevation myocardial infarction with troponin over 50.  Because of that cardiology was consulted in ER.  She stated that she has one episode of chest pain last night over the sternum area, lasted for a few minutes and it went away on its own.  Currently the patient denies any chest pain.  She was explained in detail about her medical condition and she stated that she does not want any aggressive intervention including angiogram and wanted to be treated only medically.  I also had a long discussion with the patient regarding her CODE STATUS and she does not want to be intubated or had CPR.  She was started on heparin drip in ER and she will be admitted to cardiac intensive care unit in critical condition.             Hospital Course    She was admitted to ICU on medical management. Family elected for comfort care.      Interval Problem Update  4/16:  dementia noted on exam, upset about daughter in [place], states daughter lying about patient being crazy.  Patient states she had previously lived alone in an apartment, but has since given her apartment up.  She has no plans as to where she will live.   4/17:  Up ambulating with FWW, no complaints.     4/18:  Sleeping today, but conversant upon awakening.  No complaints.  4/19: conversant, comfortable, no complaints.  No chest pain.  4/20:  Comfortable, conversant.  No chest pain, continue comfort care.  4/21:  Continues to do well, eating her meals per bedside RN.  Comfort care.  4/22:  No complaints, comfort  care.  Resting in bed, eating well.  4/23. Sleeping comfortably.  4/24. No new issues or complaints. Sleeping comfortably.\4/25. Sleeping. Can arouse but severe dementia    Consultants/Specialty  Critical care signed off  Cardiology signed off    Code Status  Comfort care, DNR    Disposition  Hospice, pending group home placement. 11/20/18 quantiferon gold +. afb negative. Working on possible assisted living facility transfer versus group home.  Patient tells me she has a daughter in [place]. Plan for hospice to receive her int the outpatient according to SW.    Review of Systems  Review of Systems   Unable to perform ROS: Dementia        Physical Exam  Temp:  [36.4 °C (97.5 °F)-36.6 °C (97.8 °F)] 36.6 °C (97.8 °F)  Pulse:  [66] 66  Resp:  [16] 16  BP: (113-118)/(60-71) 113/60  SpO2:  [94 %] 94 %    Physical Exam   Constitutional: She is oriented to person, place, and time. She appears well-developed and well-nourished. No distress.   ELderly   HENT:   Head: Normocephalic and atraumatic.   Nose: Nose normal.   Mouth/Throat: Oropharynx is clear and moist. No oropharyngeal exudate.   Eyes: Pupils are equal, round, and reactive to light. Conjunctivae and EOM are normal. Right eye exhibits no discharge. Left eye exhibits no discharge. No scleral icterus.   Neck: Normal range of motion. Neck supple. No JVD present. No tracheal deviation present. No thyromegaly present.   Cardiovascular: Normal rate, regular rhythm, normal heart sounds and intact distal pulses.  Exam reveals no gallop and no friction rub.    No murmur heard.  Pulmonary/Chest: Effort normal and breath sounds normal. No stridor. No respiratory distress. She has no wheezes. She has no rales. She exhibits no tenderness.   Abdominal: Soft. Bowel sounds are normal. She exhibits no distension and no mass. There is no tenderness. There is no rebound and no guarding.   Musculoskeletal: Normal range of motion. She exhibits no edema or tenderness.   Lymphadenopathy:      She has no cervical adenopathy.   Neurological: She is alert and oriented to person, place, and time. No cranial nerve deficit. She exhibits normal muscle tone. Coordination normal.   Somnolent but arousable.  Fatigue   Skin: Skin is warm and dry. No rash noted. She is not diaphoretic. No erythema.   Psychiatric: She has a normal mood and affect. Her behavior is normal. Judgment and thought content normal.   Not irritable.   Nursing note and vitals reviewed.  Physical exam remains unchanged again    Fluids    Intake/Output Summary (Last 24 hours) at 04/25/19 1640  Last data filed at 04/24/19 2017   Gross per 24 hour   Intake              240 ml   Output                0 ml   Net              240 ml       Laboratory                        Imaging  DX-CHEST-PORTABLE (1 VIEW)   Final Result         1.  Hazy interstitial left pulmonary opacities suggests interstitial edema or infiltrate, similar to prior.   2.  Trace left pleural effusion   3.  Hyperexpansion of lungs favors changes of COPD.      DX-CHEST-PORTABLE (1 VIEW)   Final Result         1.  Interstitial infiltrates or edema, stable.   2.  Atherosclerosis      DX-CHEST-PORTABLE (1 VIEW)   Final Result         1.  Interstitial infiltrates or edema.   2.  Atherosclerosis      DX-CHEST-PORTABLE (1 VIEW)   Final Result      Moderate interstitial edema or interstitial lung disease and small left pleural effusion similar to previous.      DX-CHEST-PORTABLE (1 VIEW)   Final Result      Stable interstitial edema and/or interstitial lung disease with probable small pleural fluid      DX-CHEST-PORTABLE (1 VIEW)   Final Result      Ill-defined infrahilar interstitial opacities, atelectasis versus mild edema. No significant pleural effusions.      DX-CHEST-PORTABLE (1 VIEW)   Final Result      Mild left basilar atelectasis, improved since prior. No new consolidation or large pleural effusions.      DX-CHEST-PORTABLE (1 VIEW)   Final Result      1.  Left basilar  opacification may be secondary to atelectasis. Developing pneumonia cannot be excluded.         DX-CHEST-PORTABLE (1 VIEW)   Final Result      1.  Unchanged mild interstitial pulmonary edema   2.  Unchanged bibasilar atelectasis, alveolar edema or pneumonia      DX-CHEST-PORTABLE (1 VIEW)   Final Result      1.  Decreased pulmonary edema   2.  Unchanged bibasilar atelectasis, alveolar edema or pneumonia      DX-CHEST-PORTABLE (1 VIEW)   Final Result      1.  There is diffuse interstitial and alveolar density in the right mid and lower lung zone. This is increased since the previous study. This may represent mild pulmonary edema/consolidation.   2.  Mildly enlarged cardiomediastinal silhouette when compared with the previous chest x-ray.      DX-CHEST-PORTABLE (1 VIEW)   Final Result      Stable mild pulmonary edema.   New left basilar atelectasis.      DX-CHEST-PORTABLE (1 VIEW)   Final Result      Stable chest appearance compared with 11/16.      DX-CHEST-PORTABLE (1 VIEW)   Final Result      No acute cardiopulmonary abnormality identified.      DX-CHEST-PORTABLE (1 VIEW)   Final Result      No acute cardiopulmonary abnormality. No interval change.      DX-CHEST-PORTABLE (1 VIEW)   Final Result      No acute cardiopulmonary disease.      CT-HEAD W/O   Final Result      1.  No evidence of acute intracranial process.      2.  There is again seen interstitial pulmonary edema and bibasilar atelectasis.      CT-HIP W/O PLUS RECONS LEFT   Final Result      1.  No evidence of acute fracture or malalignment. No evidence of hardware failure or complication.   2.  Postsurgical changes of the left femur with broken screw fragment redemonstrated.   3.  Demineralization.   4.  Scattered colonic diverticula.   5.  Atherosclerosis.   6.  Small fat-containing umbilical hernia.      EC-ECHOCARDIOGRAM COMPLETE W/O CONT   Final Result      DX-HIP-COMPLETE - UNILATERAL 2+ LEFT   Final Result      1.  No definite acute osseous  abnormality. In setting of demineralization, an occult fracture is difficult to exclude. If there is strong clinical suspicion, CT or MRI could be obtained for further evaluation.   2.  Postsurgical changes of the left femur with broken screw fragment redemonstrated.      DX-CHEST-PORTABLE (1 VIEW)   Final Result      No acute cardiopulmonary process is seen.      Atherosclerotic plaque.           Assessment/Plan  * Comfort measures only status- (present on admission)   Assessment & Plan    No signs of distress, continue comfort care options  -- the patient appears comfortable, except for some osteoarthritis flare pain in bilateral hands  -Patient does not want any oral pain medications and even refused capsaicin cream therefore continue supportive care and reassess as needed  -Otherwise no change plan to medications  4/24. Looks comfortable. Sleeping.     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  [facility] assisting.  Difficult discharge  -awaiting for placement     Chronic kidney disease (CKD), stage III (moderate) (HCC)- (present on admission)   Assessment & Plan    Continue comfort care.       Essential hypertension- (present on admission)   Assessment & Plan    Comfort care     Fall- (present on admission)   Assessment & Plan    Continue fall precautions          VTE prophylaxis: comfort care

## 2019-04-26 PROCEDURE — A9270 NON-COVERED ITEM OR SERVICE: HCPCS | Performed by: HOSPITALIST

## 2019-04-26 PROCEDURE — 770001 HCHG ROOM/CARE - MED/SURG/GYN PRIV*

## 2019-04-26 PROCEDURE — 700102 HCHG RX REV CODE 250 W/ 637 OVERRIDE(OP): Performed by: HOSPITALIST

## 2019-04-26 PROCEDURE — 99231 SBSQ HOSP IP/OBS SF/LOW 25: CPT | Performed by: INTERNAL MEDICINE

## 2019-04-26 RX ADMIN — SENNOSIDES AND DOCUSATE SODIUM 2 TABLET: 8.6; 5 TABLET ORAL at 18:20

## 2019-04-26 NOTE — CARE PLAN
Problem: Safety  Goal: Will remain free from injury  Outcome: PROGRESSING AS EXPECTED  Bed in the lowest locked position. Call light within reach. Bed alarm in use. Hourly rounding in place. Patient encouraged to call for assistance.     Problem: Respiratory:  Goal: Respiratory status will improve  Patient sit up for all meals     Problem: Urinary Elimination:  Goal: Ability to reestablish a normal urinary elimination pattern will improve  Outcome: PROGRESSING AS EXPECTED  Patient up to commode voiding adequate amounts

## 2019-04-26 NOTE — CARE PLAN
Problem: Venous Thromboembolism (VTW)/Deep Vein Thrombosis (DVT) Prevention:  Goal: Patient will participate in Venous Thrombosis (VTE)/Deep Vein Thrombosis (DVT)Prevention Measures  Outcome: PROGRESSING AS EXPECTED  Refused SCDs. Encourage mobilization.    Problem: Bowel/Gastric:  Goal: Normal bowel function is maintained or improved  Outcome: PROGRESSING AS EXPECTED  Last BM 4/24/19.     Problem: Mobility  Goal: Risk for activity intolerance will decrease  Outcome: PROGRESSING AS EXPECTED  Up 1-assist using a 4ww.

## 2019-04-27 PROCEDURE — 770001 HCHG ROOM/CARE - MED/SURG/GYN PRIV*

## 2019-04-27 PROCEDURE — 99231 SBSQ HOSP IP/OBS SF/LOW 25: CPT | Performed by: INTERNAL MEDICINE

## 2019-04-27 NOTE — PROGRESS NOTES
[facility] Medicine Daily Progress Note    Date of Service  4/10/19    Chief Complaint  89 y.o. female admitted 11/13/2018 with fall and STEMI.  Herminia is a 89 y.o. female PMH of essential hypertension, who presents with ground-level fall last night around 9 PM.  She stated that she has a bad left hip and fell because of that.  The patient denied palpitation, dizziness, syncope episode.  She presented to ER today and unfortunately she was found to have acute ST elevation myocardial infarction with troponin over 50.  Because of that cardiology was consulted in ER.  She stated that she has one episode of chest pain last night over the sternum area, lasted for a few minutes and it went away on its own.  Currently the patient denies any chest pain.  She was explained in detail about her medical condition and she stated that she does not want any aggressive intervention including angiogram and wanted to be treated only medically.  I also had a long discussion with the patient regarding her CODE STATUS and she does not want to be intubated or had CPR.  She was started on heparin drip in ER and she will be admitted to cardiac intensive care unit in critical condition.             Hospital Course    She was admitted to ICU on medical management. Family elected for comfort care.      Interval Problem Update  4/16:  dementia noted on exam, upset about daughter in [place], states daughter lying about patient being crazy.  Patient states she had previously lived alone in an apartment, but has since given her apartment up.  She has no plans as to where she will live.   4/17:  Up ambulating with FWW, no complaints.     4/18:  Sleeping today, but conversant upon awakening.  No complaints.  4/19: conversant, comfortable, no complaints.  No chest pain.  4/20:  Comfortable, conversant.  No chest pain, continue comfort care.  4/21:  Continues to do well, eating her meals per bedside RN.  Comfort care.  4/22:  No complaints, comfort  care.  Resting in bed, eating well.  4/23. Sleeping comfortably.  4/24. No new issues or complaints. Sleeping comfortably.\4/25. Sleeping. Can arouse but severe dementia  4/25. Awake and conversing but severe cognitive deficits. She tells me she is not complaining of pain or nausea.  4/26. No new issues or concerns. Resting.    Consultants/Specialty  Critical care signed off  Cardiology signed off    Code Status  Comfort care, DNR    Disposition  Hospice, pending group home placement. 11/20/18 quantiferon gold +. afb negative. Working on possible assisted living facility transfer versus group home.  Patient tells me she has a daughter in [place]. Plan for hospice to receive her int the outpatient according to SW.    Review of Systems  Review of Systems   Unable to perform ROS: Dementia        Physical Exam  Temp:  [36.4 °C (97.6 °F)-36.5 °C (97.7 °F)] 36.5 °C (97.7 °F)  Pulse:  [65-72] 65  Resp:  [16] 16  BP: (116-125)/(65-66) 116/65  SpO2:  [92 %-100 %] 100 %    Physical Exam   Constitutional: She appears well-developed and well-nourished. No distress.   ELderly   HENT:   Head: Normocephalic and atraumatic.   Nose: Nose normal.   Mouth/Throat: Oropharynx is clear and moist. No oropharyngeal exudate.   Eyes: Pupils are equal, round, and reactive to light. Conjunctivae and EOM are normal. Right eye exhibits no discharge. Left eye exhibits no discharge. No scleral icterus.   Neck: Normal range of motion. Neck supple. No JVD present. No tracheal deviation present. No thyromegaly present.   Cardiovascular: Normal rate, regular rhythm, normal heart sounds and intact distal pulses.  Exam reveals no gallop and no friction rub.    No murmur heard.  Pulmonary/Chest: Effort normal and breath sounds normal. No stridor. No respiratory distress. She has no wheezes. She has no rales. She exhibits no tenderness.   Abdominal: Soft. Bowel sounds are normal. She exhibits no distension and no mass. There is no tenderness. There is no  rebound and no guarding.   Musculoskeletal: Normal range of motion. She exhibits no edema or tenderness.   Lymphadenopathy:     She has no cervical adenopathy.   Neurological: She is alert. No cranial nerve deficit. She exhibits normal muscle tone. Coordination normal.   Awake  Severe cognitive deficits  Fatigue   Skin: Skin is warm and dry. No rash noted. She is not diaphoretic. No erythema.   Psychiatric: She has a normal mood and affect. Her behavior is normal. Judgment and thought content normal.   Not irritable.   Nursing note and vitals reviewed.  Physical exam remains unchanged again    Fluids    Intake/Output Summary (Last 24 hours) at 04/26/19 1753  Last data filed at 04/26/19 0930   Gross per 24 hour   Intake             1090 ml   Output                0 ml   Net             1090 ml       Laboratory                        Imaging  DX-CHEST-PORTABLE (1 VIEW)   Final Result         1.  Hazy interstitial left pulmonary opacities suggests interstitial edema or infiltrate, similar to prior.   2.  Trace left pleural effusion   3.  Hyperexpansion of lungs favors changes of COPD.      DX-CHEST-PORTABLE (1 VIEW)   Final Result         1.  Interstitial infiltrates or edema, stable.   2.  Atherosclerosis      DX-CHEST-PORTABLE (1 VIEW)   Final Result         1.  Interstitial infiltrates or edema.   2.  Atherosclerosis      DX-CHEST-PORTABLE (1 VIEW)   Final Result      Moderate interstitial edema or interstitial lung disease and small left pleural effusion similar to previous.      DX-CHEST-PORTABLE (1 VIEW)   Final Result      Stable interstitial edema and/or interstitial lung disease with probable small pleural fluid      DX-CHEST-PORTABLE (1 VIEW)   Final Result      Ill-defined infrahilar interstitial opacities, atelectasis versus mild edema. No significant pleural effusions.      DX-CHEST-PORTABLE (1 VIEW)   Final Result      Mild left basilar atelectasis, improved since prior. No new consolidation or large  pleural effusions.      DX-CHEST-PORTABLE (1 VIEW)   Final Result      1.  Left basilar opacification may be secondary to atelectasis. Developing pneumonia cannot be excluded.         DX-CHEST-PORTABLE (1 VIEW)   Final Result      1.  Unchanged mild interstitial pulmonary edema   2.  Unchanged bibasilar atelectasis, alveolar edema or pneumonia      DX-CHEST-PORTABLE (1 VIEW)   Final Result      1.  Decreased pulmonary edema   2.  Unchanged bibasilar atelectasis, alveolar edema or pneumonia      DX-CHEST-PORTABLE (1 VIEW)   Final Result      1.  There is diffuse interstitial and alveolar density in the right mid and lower lung zone. This is increased since the previous study. This may represent mild pulmonary edema/consolidation.   2.  Mildly enlarged cardiomediastinal silhouette when compared with the previous chest x-ray.      DX-CHEST-PORTABLE (1 VIEW)   Final Result      Stable mild pulmonary edema.   New left basilar atelectasis.      DX-CHEST-PORTABLE (1 VIEW)   Final Result      Stable chest appearance compared with 11/16.      DX-CHEST-PORTABLE (1 VIEW)   Final Result      No acute cardiopulmonary abnormality identified.      DX-CHEST-PORTABLE (1 VIEW)   Final Result      No acute cardiopulmonary abnormality. No interval change.      DX-CHEST-PORTABLE (1 VIEW)   Final Result      No acute cardiopulmonary disease.      CT-HEAD W/O   Final Result      1.  No evidence of acute intracranial process.      2.  There is again seen interstitial pulmonary edema and bibasilar atelectasis.      CT-HIP W/O PLUS RECONS LEFT   Final Result      1.  No evidence of acute fracture or malalignment. No evidence of hardware failure or complication.   2.  Postsurgical changes of the left femur with broken screw fragment redemonstrated.   3.  Demineralization.   4.  Scattered colonic diverticula.   5.  Atherosclerosis.   6.  Small fat-containing umbilical hernia.      EC-ECHOCARDIOGRAM COMPLETE W/O CONT   Final Result       DX-HIP-COMPLETE - UNILATERAL 2+ LEFT   Final Result      1.  No definite acute osseous abnormality. In setting of demineralization, an occult fracture is difficult to exclude. If there is strong clinical suspicion, CT or MRI could be obtained for further evaluation.   2.  Postsurgical changes of the left femur with broken screw fragment redemonstrated.      DX-CHEST-PORTABLE (1 VIEW)   Final Result      No acute cardiopulmonary process is seen.      Atherosclerotic plaque.           Assessment/Plan  * Comfort measures only status- (present on admission)   Assessment & Plan    No signs of distress, continue comfort care options  -- the patient appears comfortable, except for some osteoarthritis flare pain in bilateral hands  -Patient does not want any oral pain medications and even refused capsaicin cream therefore continue supportive care and reassess as needed  -Otherwise no change plan to medications  4/26. Awake, not irritable.     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  [facility] assisting.  Difficult discharge  -awaiting for placement     ST elevation myocardial infarction involving right coronary artery (HCC)- (present on admission)   Assessment & Plan    Declines any intervention or therapeutic modality  continue with comfort care     Chronic kidney disease (CKD), stage III (moderate) (HCC)- (present on admission)   Assessment & Plan    Continue comfort care.       Essential hypertension- (present on admission)   Assessment & Plan    Comfort care     Fall- (present on admission)   Assessment & Plan    Continue fall precautions          VTE prophylaxis: comfort care

## 2019-04-27 NOTE — PROGRESS NOTES
[facility] Medicine Daily Progress Note    Date of Service  4/10/19    Chief Complaint  89 y.o. female admitted 11/13/2018 with fall and STEMI.  Herminia is a 89 y.o. female PMH of essential hypertension, who presents with ground-level fall last night around 9 PM.  She stated that she has a bad left hip and fell because of that.  The patient denied palpitation, dizziness, syncope episode.  She presented to ER today and unfortunately she was found to have acute ST elevation myocardial infarction with troponin over 50.  Because of that cardiology was consulted in ER.  She stated that she has one episode of chest pain last night over the sternum area, lasted for a few minutes and it went away on its own.  Currently the patient denies any chest pain.  She was explained in detail about her medical condition and she stated that she does not want any aggressive intervention including angiogram and wanted to be treated only medically.  I also had a long discussion with the patient regarding her CODE STATUS and she does not want to be intubated or had CPR.  She was started on heparin drip in ER and she will be admitted to cardiac intensive care unit in critical condition.             Hospital Course    She was admitted to ICU on medical management. Family elected for comfort care.      Interval Problem Update  4/16:  dementia noted on exam, upset about daughter in [place], states daughter lying about patient being crazy.  Patient states she had previously lived alone in an apartment, but has since given her apartment up.  She has no plans as to where she will live.   4/17:  Up ambulating with FWW, no complaints.     4/18:  Sleeping today, but conversant upon awakening.  No complaints.  4/19: conversant, comfortable, no complaints.  No chest pain.  4/20:  Comfortable, conversant.  No chest pain, continue comfort care.  4/21:  Continues to do well, eating her meals per bedside RN.  Comfort care.  4/22:  No complaints, comfort  care.  Resting in bed, eating well.  4/23. Sleeping comfortably.  4/24. No new issues or complaints. Sleeping comfortably.\4/25. Sleeping. Can arouse but severe dementia  4/25. Awake and conversing but severe cognitive deficits. She tells me she is not complaining of pain or nausea.  4/26. No new issues or concerns. Resting.  4.27. Awake. Not eating very much. She has no complaints of pain or nausea.    Consultants/Specialty  Critical care signed off  Cardiology signed off    Code Status  Comfort care, DNR    Disposition  Hospice, pending group home placement. 11/20/18 quantiferon gold +. afb negative. Working on possible assisted living facility transfer versus group home.  Patient tells me she has a daughter in [place]. Plan for hospice to receive her int the outpatient according to [facility].    Review of Systems  Review of Systems   Unable to perform ROS: Dementia        Physical Exam  Temp:  [36.1 °C (96.9 °F)-36.6 °C (97.8 °F)] 36.6 °C (97.8 °F)  Pulse:  [67-68] 68  Resp:  [16-17] 17  BP: (107-122)/(58-67) 107/58  SpO2:  [92 %-93 %] 93 %    Physical Exam   Constitutional: She appears well-developed and well-nourished. No distress.   ELderly   HENT:   Head: Normocephalic and atraumatic.   Nose: Nose normal.   Mouth/Throat: Oropharynx is clear and moist. No oropharyngeal exudate.   Eyes: Pupils are equal, round, and reactive to light. Conjunctivae and EOM are normal. Right eye exhibits no discharge. Left eye exhibits no discharge. No scleral icterus.   Neck: Normal range of motion. Neck supple. No JVD present. No tracheal deviation present. No thyromegaly present.   Cardiovascular: Normal rate, regular rhythm, normal heart sounds and intact distal pulses.  Exam reveals no gallop and no friction rub.    No murmur heard.  Pulmonary/Chest: Effort normal and breath sounds normal. No stridor. No respiratory distress. She has no wheezes. She has no rales. She exhibits no tenderness.   Abdominal: Soft. Bowel sounds are normal.  She exhibits no distension and no mass. There is no tenderness. There is no rebound and no guarding.   Musculoskeletal: Normal range of motion. She exhibits no edema or tenderness.   Lymphadenopathy:     She has no cervical adenopathy.   Neurological: She is alert. No cranial nerve deficit. She exhibits normal muscle tone. Coordination normal.   Awake  Severe cognitive deficits  Fatigue   Skin: Skin is warm and dry. No rash noted. She is not diaphoretic. No erythema.   Psychiatric: She has a normal mood and affect. Her behavior is normal. Judgment and thought content normal.   Pleasant   Nursing note and vitals reviewed.  Physical exam remains unchanged again    Fluids    Intake/Output Summary (Last 24 hours) at 04/27/19 1605  Last data filed at 04/27/19 1000   Gross per 24 hour   Intake              970 ml   Output                0 ml   Net              970 ml       Laboratory                        Imaging  DX-CHEST-PORTABLE (1 VIEW)   Final Result         1.  Hazy interstitial left pulmonary opacities suggests interstitial edema or infiltrate, similar to prior.   2.  Trace left pleural effusion   3.  Hyperexpansion of lungs favors changes of COPD.      DX-CHEST-PORTABLE (1 VIEW)   Final Result         1.  Interstitial infiltrates or edema, stable.   2.  Atherosclerosis      DX-CHEST-PORTABLE (1 VIEW)   Final Result         1.  Interstitial infiltrates or edema.   2.  Atherosclerosis      DX-CHEST-PORTABLE (1 VIEW)   Final Result      Moderate interstitial edema or interstitial lung disease and small left pleural effusion similar to previous.      DX-CHEST-PORTABLE (1 VIEW)   Final Result      Stable interstitial edema and/or interstitial lung disease with probable small pleural fluid      DX-CHEST-PORTABLE (1 VIEW)   Final Result      Ill-defined infrahilar interstitial opacities, atelectasis versus mild edema. No significant pleural effusions.      DX-CHEST-PORTABLE (1 VIEW)   Final Result      Mild left basilar  atelectasis, improved since prior. No new consolidation or large pleural effusions.      DX-CHEST-PORTABLE (1 VIEW)   Final Result      1.  Left basilar opacification may be secondary to atelectasis. Developing pneumonia cannot be excluded.         DX-CHEST-PORTABLE (1 VIEW)   Final Result      1.  Unchanged mild interstitial pulmonary edema   2.  Unchanged bibasilar atelectasis, alveolar edema or pneumonia      DX-CHEST-PORTABLE (1 VIEW)   Final Result      1.  Decreased pulmonary edema   2.  Unchanged bibasilar atelectasis, alveolar edema or pneumonia      DX-CHEST-PORTABLE (1 VIEW)   Final Result      1.  There is diffuse interstitial and alveolar density in the right mid and lower lung zone. This is increased since the previous study. This may represent mild pulmonary edema/consolidation.   2.  Mildly enlarged cardiomediastinal silhouette when compared with the previous chest x-ray.      DX-CHEST-PORTABLE (1 VIEW)   Final Result      Stable mild pulmonary edema.   New left basilar atelectasis.      DX-CHEST-PORTABLE (1 VIEW)   Final Result      Stable chest appearance compared with 11/16.      DX-CHEST-PORTABLE (1 VIEW)   Final Result      No acute cardiopulmonary abnormality identified.      DX-CHEST-PORTABLE (1 VIEW)   Final Result      No acute cardiopulmonary abnormality. No interval change.      DX-CHEST-PORTABLE (1 VIEW)   Final Result      No acute cardiopulmonary disease.      CT-HEAD W/O   Final Result      1.  No evidence of acute intracranial process.      2.  There is again seen interstitial pulmonary edema and bibasilar atelectasis.      CT-HIP W/O PLUS RECONS LEFT   Final Result      1.  No evidence of acute fracture or malalignment. No evidence of hardware failure or complication.   2.  Postsurgical changes of the left femur with broken screw fragment redemonstrated.   3.  Demineralization.   4.  Scattered colonic diverticula.   5.  Atherosclerosis.   6.  Small fat-containing umbilical hernia.       EC-ECHOCARDIOGRAM COMPLETE W/O CONT   Final Result      DX-HIP-COMPLETE - UNILATERAL 2+ LEFT   Final Result      1.  No definite acute osseous abnormality. In setting of demineralization, an occult fracture is difficult to exclude. If there is strong clinical suspicion, CT or MRI could be obtained for further evaluation.   2.  Postsurgical changes of the left femur with broken screw fragment redemonstrated.      DX-CHEST-PORTABLE (1 VIEW)   Final Result      No acute cardiopulmonary process is seen.      Atherosclerotic plaque.           Assessment/Plan  * Comfort measures only status- (present on admission)   Assessment & Plan    No signs of distress, continue comfort care options  -- the patient appears comfortable, except for some osteoarthritis flare pain in bilateral hands  -Patient does not want any oral pain medications and even refused capsaicin cream therefore continue supportive care and reassess as needed  -Otherwise no change plan to medications  4/27. 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  [facility] assisting.  Difficult discharge  -awaiting for placement     ST elevation myocardial infarction involving right coronary artery (HCC)- (present on admission)   Assessment & Plan    Declines any intervention or therapeutic modality  continue with comfort care     Chronic kidney disease (CKD), stage III (moderate) (HCC)- (present on admission)   Assessment & Plan    Continue comfort care.       Essential hypertension- (present on admission)   Assessment & Plan    Comfort care     Fall- (present on admission)   Assessment & Plan    Continue fall precautions          VTE prophylaxis: comfort care

## 2019-04-28 PROCEDURE — 770001 HCHG ROOM/CARE - MED/SURG/GYN PRIV*

## 2019-04-28 PROCEDURE — 99231 SBSQ HOSP IP/OBS SF/LOW 25: CPT | Performed by: INTERNAL MEDICINE

## 2019-04-28 NOTE — CARE PLAN
Problem: Safety  Goal: Will remain free from falls  Outcome: PROGRESSING AS EXPECTED  Safety precautions in place. Call light within reach. Bed is low and in locked position. Hourly rounding in place.     Problem: Mobility  Goal: Risk for activity intolerance will decrease  Outcome: PROGRESSING AS EXPECTED  Pt ambulating in room and intermittently rivas

## 2019-04-29 PROCEDURE — A9270 NON-COVERED ITEM OR SERVICE: HCPCS | Performed by: HOSPITALIST

## 2019-04-29 PROCEDURE — 99231 SBSQ HOSP IP/OBS SF/LOW 25: CPT | Performed by: INTERNAL MEDICINE

## 2019-04-29 PROCEDURE — 700102 HCHG RX REV CODE 250 W/ 637 OVERRIDE(OP): Performed by: HOSPITALIST

## 2019-04-29 PROCEDURE — 770001 HCHG ROOM/CARE - MED/SURG/GYN PRIV*

## 2019-04-29 RX ADMIN — SENNOSIDES AND DOCUSATE SODIUM 2 TABLET: 8.6; 5 TABLET ORAL at 06:22

## 2019-04-29 RX ADMIN — FUROSEMIDE 20 MG: 20 TABLET ORAL at 06:22

## 2019-04-29 NOTE — PROGRESS NOTES
[facility] Medicine Daily Progress Note    Date of Service  4/10/19    Chief Complaint  89 y.o. female admitted 11/13/2018 with fall and STEMI.  Hemrinia is a 89 y.o. female PMH of essential hypertension, who presents with ground-level fall last night around 9 PM.  She stated that she has a bad left hip and fell because of that.  The patient denied palpitation, dizziness, syncope episode.  She presented to ER today and unfortunately she was found to have acute ST elevation myocardial infarction with troponin over 50.  Because of that cardiology was consulted in ER.  She stated that she has one episode of chest pain last night over the sternum area, lasted for a few minutes and it went away on its own.  Currently the patient denies any chest pain.  She was explained in detail about her medical condition and she stated that she does not want any aggressive intervention including angiogram and wanted to be treated only medically.  I also had a long discussion with the patient regarding her CODE STATUS and she does not want to be intubated or had CPR.  She was started on heparin drip in ER and she will be admitted to cardiac intensive care unit in critical condition.             Hospital Course    She was admitted to ICU on medical management. Family elected for comfort care.      Interval Problem Update  4/16:  dementia noted on exam, upset about daughter in [place], states daughter lying about patient being crazy.  Patient states she had previously lived alone in an apartment, but has since given her apartment up.  She has no plans as to where she will live.   4/17:  Up ambulating with FWW, no complaints.     4/18:  Sleeping today, but conversant upon awakening.  No complaints.  4/19: conversant, comfortable, no complaints.  No chest pain.  4/20:  Comfortable, conversant.  No chest pain, continue comfort care.  4/21:  Continues to do well, eating her meals per bedside RN.  Comfort care.  4/22:  No complaints, comfort  care.  Resting in bed, eating well.  4/23. Sleeping comfortably.  4/24. No new issues or complaints. Sleeping comfortably.\4/25. Sleeping. Can arouse but severe dementia  4/25. Awake and conversing but severe cognitive deficits. She tells me she is not complaining of pain or nausea.  4/26. No new issues or concerns. Resting.  4/27. Awake. Not eating very much. She has no complaints of pain or nausea.  4/28. Sleeping but arousable.   4/29. Smiling. More energy. Not complaining of discomfort or nausea    Consultants/Specialty  Critical care signed off  Cardiology signed off    Code Status  Comfort care, DNR    Disposition  Hospice, pending group home placement. 11/20/18 quantiferon gold +. afb negative. Working on possible assisted living facility transfer versus group home.  Patient tells me she has a daughter in [place]. Plan for hospice to receive her int the outpatient according to SW.    Review of Systems  Review of Systems   Unable to perform ROS: Dementia        Physical Exam  Temp:  [36.6 °C (97.8 °F)-36.8 °C (98.2 °F)] 36.6 °C (97.8 °F)  Pulse:  [63-66] 66  Resp:  [14-17] 14  BP: (107-108)/(59-76) 108/59    Physical Exam   Constitutional: She appears well-developed and well-nourished. No distress.   ELderly   HENT:   Head: Normocephalic and atraumatic.   Nose: Nose normal.   Mouth/Throat: Oropharynx is clear and moist. No oropharyngeal exudate.   Eyes: Pupils are equal, round, and reactive to light. Conjunctivae and EOM are normal. Right eye exhibits no discharge. Left eye exhibits no discharge. No scleral icterus.   Neck: Normal range of motion. Neck supple. No JVD present. No tracheal deviation present. No thyromegaly present.   Cardiovascular: Normal rate, regular rhythm, normal heart sounds and intact distal pulses.  Exam reveals no gallop and no friction rub.    No murmur heard.  Pulmonary/Chest: Effort normal and breath sounds normal. No stridor. No respiratory distress. She has no wheezes. She has no  rales. She exhibits no tenderness.   Abdominal: Soft. Bowel sounds are normal. She exhibits no distension and no mass. There is no tenderness. There is no rebound and no guarding.   Musculoskeletal: Normal range of motion. She exhibits no edema or tenderness.   Lymphadenopathy:     She has no cervical adenopathy.   Neurological: She is alert. No cranial nerve deficit. She exhibits normal muscle tone. Coordination normal.   Awake  Severe cognitive deficits  Better energy   Skin: Skin is warm and dry. No rash noted. She is not diaphoretic. No erythema.   Psychiatric: She has a normal mood and affect. Her behavior is normal. Judgment and thought content normal.   Comfortable.   Nursing note and vitals reviewed.  Physical exam remains unchanged again    Fluids    Intake/Output Summary (Last 24 hours) at 04/29/19 1650  Last data filed at 04/28/19 2000   Gross per 24 hour   Intake              500 ml   Output                0 ml   Net              500 ml       Laboratory                        Imaging  DX-CHEST-PORTABLE (1 VIEW)   Final Result         1.  Hazy interstitial left pulmonary opacities suggests interstitial edema or infiltrate, similar to prior.   2.  Trace left pleural effusion   3.  Hyperexpansion of lungs favors changes of COPD.      DX-CHEST-PORTABLE (1 VIEW)   Final Result         1.  Interstitial infiltrates or edema, stable.   2.  Atherosclerosis      DX-CHEST-PORTABLE (1 VIEW)   Final Result         1.  Interstitial infiltrates or edema.   2.  Atherosclerosis      DX-CHEST-PORTABLE (1 VIEW)   Final Result      Moderate interstitial edema or interstitial lung disease and small left pleural effusion similar to previous.      DX-CHEST-PORTABLE (1 VIEW)   Final Result      Stable interstitial edema and/or interstitial lung disease with probable small pleural fluid      DX-CHEST-PORTABLE (1 VIEW)   Final Result      Ill-defined infrahilar interstitial opacities, atelectasis versus mild edema. No significant  pleural effusions.      DX-CHEST-PORTABLE (1 VIEW)   Final Result      Mild left basilar atelectasis, improved since prior. No new consolidation or large pleural effusions.      DX-CHEST-PORTABLE (1 VIEW)   Final Result      1.  Left basilar opacification may be secondary to atelectasis. Developing pneumonia cannot be excluded.         DX-CHEST-PORTABLE (1 VIEW)   Final Result      1.  Unchanged mild interstitial pulmonary edema   2.  Unchanged bibasilar atelectasis, alveolar edema or pneumonia      DX-CHEST-PORTABLE (1 VIEW)   Final Result      1.  Decreased pulmonary edema   2.  Unchanged bibasilar atelectasis, alveolar edema or pneumonia      DX-CHEST-PORTABLE (1 VIEW)   Final Result      1.  There is diffuse interstitial and alveolar density in the right mid and lower lung zone. This is increased since the previous study. This may represent mild pulmonary edema/consolidation.   2.  Mildly enlarged cardiomediastinal silhouette when compared with the previous chest x-ray.      DX-CHEST-PORTABLE (1 VIEW)   Final Result      Stable mild pulmonary edema.   New left basilar atelectasis.      DX-CHEST-PORTABLE (1 VIEW)   Final Result      Stable chest appearance compared with 11/16.      DX-CHEST-PORTABLE (1 VIEW)   Final Result      No acute cardiopulmonary abnormality identified.      DX-CHEST-PORTABLE (1 VIEW)   Final Result      No acute cardiopulmonary abnormality. No interval change.      DX-CHEST-PORTABLE (1 VIEW)   Final Result      No acute cardiopulmonary disease.      CT-HEAD W/O   Final Result      1.  No evidence of acute intracranial process.      2.  There is again seen interstitial pulmonary edema and bibasilar atelectasis.      CT-HIP W/O PLUS RECONS LEFT   Final Result      1.  No evidence of acute fracture or malalignment. No evidence of hardware failure or complication.   2.  Postsurgical changes of the left femur with broken screw fragment redemonstrated.   3.  Demineralization.   4.  Scattered  colonic diverticula.   5.  Atherosclerosis.   6.  Small fat-containing umbilical hernia.      EC-ECHOCARDIOGRAM COMPLETE W/O CONT   Final Result      DX-HIP-COMPLETE - UNILATERAL 2+ LEFT   Final Result      1.  No definite acute osseous abnormality. In setting of demineralization, an occult fracture is difficult to exclude. If there is strong clinical suspicion, CT or MRI could be obtained for further evaluation.   2.  Postsurgical changes of the left femur with broken screw fragment redemonstrated.      DX-CHEST-PORTABLE (1 VIEW)   Final Result      No acute cardiopulmonary process is seen.      Atherosclerotic plaque.           Assessment/Plan  * Comfort measures only status- (present on admission)   Assessment & Plan    No signs of distress, continue comfort care options  -- the patient appears comfortable, except for some osteoarthritis flare pain in bilateral hands  -Patient does not want any oral pain medications and even refused capsaicin cream therefore continue supportive care and reassess as needed  -Otherwise no change plan to medications  4/29. No complaints of pain. Cooperative. 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  [facility] assisting.  Difficult discharge  -awaiting for placement     ST elevation myocardial infarction involving right coronary artery (HCC)- (present on admission)   Assessment & Plan    Declines any intervention or therapeutic modality  continue with comfort care     Chronic kidney disease (CKD), stage III (moderate) (HCC)- (present on admission)   Assessment & Plan    Continue comfort care.       Essential hypertension- (present on admission)   Assessment & Plan    Comfort care     Fall- (present on admission)    Assessment & Plan    Continue fall precautions          VTE prophylaxis: comfort care

## 2019-04-29 NOTE — CARE PLAN
Problem: Safety  Goal: Will remain free from injury  Outcome: PROGRESSING AS EXPECTED  Patient remains free from injury. Bed alarm in place and using call light appropriately. Pt resting in bed.     Problem: Pain Management  Goal: Pain level will decrease to patient's comfort goal  Outcome: PROGRESSING AS EXPECTED  Patient denies any pain.

## 2019-04-29 NOTE — DISCHARGE PLANNING
Anticipated Discharge Disposition: [facility] vs [facility] with [facility]    Action: LSW contacted [facility]. LSW asked to speak with Cosme and was told by Deejay that Cosme is back tomorrow and to call around 10am.     Barriers to Discharge: placement    Plan: follow up with Cosme

## 2019-04-29 NOTE — PROGRESS NOTES
[facility] Medicine Daily Progress Note    Date of Service  4/10/19    Chief Complaint  89 y.o. female admitted 11/13/2018 with fall and STEMI.  Herminia is a 89 y.o. female PMH of essential hypertension, who presents with ground-level fall last night around 9 PM.  She stated that she has a bad left hip and fell because of that.  The patient denied palpitation, dizziness, syncope episode.  She presented to ER today and unfortunately she was found to have acute ST elevation myocardial infarction with troponin over 50.  Because of that cardiology was consulted in ER.  She stated that she has one episode of chest pain last night over the sternum area, lasted for a few minutes and it went away on its own.  Currently the patient denies any chest pain.  She was explained in detail about her medical condition and she stated that she does not want any aggressive intervention including angiogram and wanted to be treated only medically.  I also had a long discussion with the patient regarding her CODE STATUS and she does not want to be intubated or had CPR.  She was started on heparin drip in ER and she will be admitted to cardiac intensive care unit in critical condition.             Hospital Course    She was admitted to ICU on medical management. Family elected for comfort care.      Interval Problem Update  4/16:  dementia noted on exam, upset about daughter in [place], states daughter lying about patient being crazy.  Patient states she had previously lived alone in an apartment, but has since given her apartment up.  She has no plans as to where she will live.   4/17:  Up ambulating with FWW, no complaints.     4/18:  Sleeping today, but conversant upon awakening.  No complaints.  4/19: conversant, comfortable, no complaints.  No chest pain.  4/20:  Comfortable, conversant.  No chest pain, continue comfort care.  4/21:  Continues to do well, eating her meals per bedside RN.  Comfort care.  4/22:  No complaints, comfort  care.  Resting in bed, eating well.  4/23. Sleeping comfortably.  4/24. No new issues or complaints. Sleeping comfortably.\4/25. Sleeping. Can arouse but severe dementia  4/25. Awake and conversing but severe cognitive deficits. She tells me she is not complaining of pain or nausea.  4/26. No new issues or concerns. Resting.  4/27. Awake. Not eating very much. She has no complaints of pain or nausea.  4/28. Sleeping but arousable.     Consultants/Specialty  Critical care signed off  Cardiology signed off    Code Status  Comfort care, DNR    Disposition  Hospice, pending group home placement. 11/20/18 quantiferon gold +. afb negative. Working on possible assisted living facility transfer versus group home.  Patient tells me she has a daughter in [place]. Plan for hospice to receive her int the outpatient according to MARINA.    Review of Systems  Review of Systems   Unable to perform ROS: Dementia        Physical Exam  Temp:  [36.1 °C (97 °F)] 36.1 °C (97 °F)  Pulse:  [62-65] 65  Resp:  [14-18] 14  BP: (110)/(59-60) 110/60  SpO2:  [94 %] 94 %    Physical Exam   Constitutional: She appears well-developed and well-nourished. No distress.   ELderly   HENT:   Head: Normocephalic and atraumatic.   Nose: Nose normal.   Mouth/Throat: Oropharynx is clear and moist. No oropharyngeal exudate.   Eyes: Pupils are equal, round, and reactive to light. Conjunctivae and EOM are normal. Right eye exhibits no discharge. Left eye exhibits no discharge. No scleral icterus.   Neck: Normal range of motion. Neck supple. No JVD present. No tracheal deviation present. No thyromegaly present.   Cardiovascular: Normal rate, regular rhythm, normal heart sounds and intact distal pulses.  Exam reveals no gallop and no friction rub.    No murmur heard.  Pulmonary/Chest: Effort normal and breath sounds normal. No stridor. No respiratory distress. She has no wheezes. She has no rales. She exhibits no tenderness.   Abdominal: Soft. Bowel sounds are  normal. She exhibits no distension and no mass. There is no tenderness. There is no rebound and no guarding.   Musculoskeletal: Normal range of motion. She exhibits no edema or tenderness.   Lymphadenopathy:     She has no cervical adenopathy.   Neurological: She is alert. No cranial nerve deficit. She exhibits normal muscle tone. Coordination normal.   Awake  Severe cognitive deficits  Fatigue   Skin: Skin is warm and dry. No rash noted. She is not diaphoretic. No erythema.   Psychiatric: She has a normal mood and affect. Her behavior is normal. Judgment and thought content normal.   Comfortable.   Nursing note and vitals reviewed.  Physical exam remains unchanged again    Fluids  No intake or output data in the 24 hours ending 04/28/19 1716    Laboratory                        Imaging  DX-CHEST-PORTABLE (1 VIEW)   Final Result         1.  Hazy interstitial left pulmonary opacities suggests interstitial edema or infiltrate, similar to prior.   2.  Trace left pleural effusion   3.  Hyperexpansion of lungs favors changes of COPD.      DX-CHEST-PORTABLE (1 VIEW)   Final Result         1.  Interstitial infiltrates or edema, stable.   2.  Atherosclerosis      DX-CHEST-PORTABLE (1 VIEW)   Final Result         1.  Interstitial infiltrates or edema.   2.  Atherosclerosis      DX-CHEST-PORTABLE (1 VIEW)   Final Result      Moderate interstitial edema or interstitial lung disease and small left pleural effusion similar to previous.      DX-CHEST-PORTABLE (1 VIEW)   Final Result      Stable interstitial edema and/or interstitial lung disease with probable small pleural fluid      DX-CHEST-PORTABLE (1 VIEW)   Final Result      Ill-defined infrahilar interstitial opacities, atelectasis versus mild edema. No significant pleural effusions.      DX-CHEST-PORTABLE (1 VIEW)   Final Result      Mild left basilar atelectasis, improved since prior. No new consolidation or large pleural effusions.      DX-CHEST-PORTABLE (1 VIEW)   Final  Result      1.  Left basilar opacification may be secondary to atelectasis. Developing pneumonia cannot be excluded.         DX-CHEST-PORTABLE (1 VIEW)   Final Result      1.  Unchanged mild interstitial pulmonary edema   2.  Unchanged bibasilar atelectasis, alveolar edema or pneumonia      DX-CHEST-PORTABLE (1 VIEW)   Final Result      1.  Decreased pulmonary edema   2.  Unchanged bibasilar atelectasis, alveolar edema or pneumonia      DX-CHEST-PORTABLE (1 VIEW)   Final Result      1.  There is diffuse interstitial and alveolar density in the right mid and lower lung zone. This is increased since the previous study. This may represent mild pulmonary edema/consolidation.   2.  Mildly enlarged cardiomediastinal silhouette when compared with the previous chest x-ray.      DX-CHEST-PORTABLE (1 VIEW)   Final Result      Stable mild pulmonary edema.   New left basilar atelectasis.      DX-CHEST-PORTABLE (1 VIEW)   Final Result      Stable chest appearance compared with 11/16.      DX-CHEST-PORTABLE (1 VIEW)   Final Result      No acute cardiopulmonary abnormality identified.      DX-CHEST-PORTABLE (1 VIEW)   Final Result      No acute cardiopulmonary abnormality. No interval change.      DX-CHEST-PORTABLE (1 VIEW)   Final Result      No acute cardiopulmonary disease.      CT-HEAD W/O   Final Result      1.  No evidence of acute intracranial process.      2.  There is again seen interstitial pulmonary edema and bibasilar atelectasis.      CT-HIP W/O PLUS RECONS LEFT   Final Result      1.  No evidence of acute fracture or malalignment. No evidence of hardware failure or complication.   2.  Postsurgical changes of the left femur with broken screw fragment redemonstrated.   3.  Demineralization.   4.  Scattered colonic diverticula.   5.  Atherosclerosis.   6.  Small fat-containing umbilical hernia.      EC-ECHOCARDIOGRAM COMPLETE W/O CONT   Final Result      DX-HIP-COMPLETE - UNILATERAL 2+ LEFT   Final Result      1.  No  definite acute osseous abnormality. In setting of demineralization, an occult fracture is difficult to exclude. If there is strong clinical suspicion, CT or MRI could be obtained for further evaluation.   2.  Postsurgical changes of the left femur with broken screw fragment redemonstrated.      DX-CHEST-PORTABLE (1 VIEW)   Final Result      No acute cardiopulmonary process is seen.      Atherosclerotic plaque.           Assessment/Plan  * Comfort measures only status- (present on admission)   Assessment & Plan    No signs of distress, continue comfort care options  -- the patient appears comfortable, except for some osteoarthritis flare pain in bilateral hands  -Patient does not want any oral pain medications and even refused capsaicin cream therefore continue supportive care and reassess as needed  -Otherwise no change plan to medications  4/28. No complaints of pain. Sleeping.     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  [facility] assisting.  Difficult discharge  -awaiting for placement     ST elevation myocardial infarction involving right coronary artery (HCC)- (present on admission)   Assessment & Plan    Declines any intervention or therapeutic modality  continue with comfort care     Chronic kidney disease (CKD), stage III (moderate) (HCC)- (present on admission)   Assessment & Plan    Continue comfort care.       Essential hypertension- (present on admission)   Assessment & Plan    Comfort care     Fall- (present on admission)   Assessment & Plan    Continue fall precautions          VTE prophylaxis: comfort care

## 2019-04-30 PROCEDURE — A9270 NON-COVERED ITEM OR SERVICE: HCPCS | Performed by: HOSPITALIST

## 2019-04-30 PROCEDURE — 99231 SBSQ HOSP IP/OBS SF/LOW 25: CPT | Performed by: HOSPITALIST

## 2019-04-30 PROCEDURE — 770001 HCHG ROOM/CARE - MED/SURG/GYN PRIV*

## 2019-04-30 PROCEDURE — 700102 HCHG RX REV CODE 250 W/ 637 OVERRIDE(OP): Performed by: HOSPITALIST

## 2019-04-30 RX ADMIN — FUROSEMIDE 20 MG: 20 TABLET ORAL at 06:40

## 2019-04-30 NOTE — PROGRESS NOTES
[facility] Medicine Daily Progress Note    Date of Service  4/10/19    Chief Complaint  89 y.o. female admitted 11/13/2018 with fall, weakness and found to have a STEMI.    Hospital Course    She was admitted to ICU on medical management. Family elected for comfort care.      Interval Problem Update  Cachectic, appears comfortable, and denies any needs.  Pending placement.  I discussed with .  Discussed with patient, patient's nurse and case management.      Consultants/Specialty  Critical care signed off  Cardiology signed off    Code Status  Comfort care, DNR     Disposition  Hospice, pending group home placement. 11/20/18 quantiferon gold +, AFB negative.     Review of Systems  Review of Systems   Unable to perform ROS: Dementia        Physical Exam  Temp:  [36.1 °C (97 °F)] 36.1 °C (97 °F)  Pulse:  [67] 67  Resp:  [16] 16  BP: (113)/(64) 113/64  SpO2:  [93 %] 93 %    Physical Exam   Constitutional: No distress.   Cachectic    HENT:   Head: Normocephalic and atraumatic.   Nose: Nose normal.   Mouth/Throat: Oropharynx is clear and moist. No oropharyngeal exudate.   Eyes: Pupils are equal, round, and reactive to light. Conjunctivae and EOM are normal. Right eye exhibits no discharge. Left eye exhibits no discharge. No scleral icterus.   Neck: Normal range of motion. Neck supple. No JVD present. No tracheal deviation present. No thyromegaly present.   Cardiovascular: Normal rate, regular rhythm and normal heart sounds.  Exam reveals no friction rub.    No murmur heard.  Pulmonary/Chest: Effort normal and breath sounds normal. No stridor. No respiratory distress. She has no wheezes. She has no rales.   Abdominal: Soft. Bowel sounds are normal. She exhibits no distension. There is no tenderness. There is no rebound.   Musculoskeletal: Normal range of motion. She exhibits no edema, tenderness or deformity.   Neurological: She is alert. No cranial nerve deficit. She exhibits normal muscle tone. Coordination  normal.   Severe cognitive deficits   Skin: Skin is warm and dry. No rash noted. She is not diaphoretic. No erythema. There is pallor.   Psychiatric: She has a normal mood and affect.   Impaired judgment    Physical exam remains unchanged again    Fluids  No intake or output data in the 24 hours ending 04/30/19 1621    Laboratory                        Imaging  DX-CHEST-PORTABLE (1 VIEW)   Final Result         1.  Hazy interstitial left pulmonary opacities suggests interstitial edema or infiltrate, similar to prior.   2.  Trace left pleural effusion   3.  Hyperexpansion of lungs favors changes of COPD.      DX-CHEST-PORTABLE (1 VIEW)   Final Result         1.  Interstitial infiltrates or edema, stable.   2.  Atherosclerosis      DX-CHEST-PORTABLE (1 VIEW)   Final Result         1.  Interstitial infiltrates or edema.   2.  Atherosclerosis      DX-CHEST-PORTABLE (1 VIEW)   Final Result      Moderate interstitial edema or interstitial lung disease and small left pleural effusion similar to previous.      DX-CHEST-PORTABLE (1 VIEW)   Final Result      Stable interstitial edema and/or interstitial lung disease with probable small pleural fluid      DX-CHEST-PORTABLE (1 VIEW)   Final Result      Ill-defined infrahilar interstitial opacities, atelectasis versus mild edema. No significant pleural effusions.      DX-CHEST-PORTABLE (1 VIEW)   Final Result      Mild left basilar atelectasis, improved since prior. No new consolidation or large pleural effusions.      DX-CHEST-PORTABLE (1 VIEW)   Final Result      1.  Left basilar opacification may be secondary to atelectasis. Developing pneumonia cannot be excluded.         DX-CHEST-PORTABLE (1 VIEW)   Final Result      1.  Unchanged mild interstitial pulmonary edema   2.  Unchanged bibasilar atelectasis, alveolar edema or pneumonia      DX-CHEST-PORTABLE (1 VIEW)   Final Result      1.  Decreased pulmonary edema   2.  Unchanged bibasilar atelectasis, alveolar edema or  pneumonia      DX-CHEST-PORTABLE (1 VIEW)   Final Result      1.  There is diffuse interstitial and alveolar density in the right mid and lower lung zone. This is increased since the previous study. This may represent mild pulmonary edema/consolidation.   2.  Mildly enlarged cardiomediastinal silhouette when compared with the previous chest x-ray.      DX-CHEST-PORTABLE (1 VIEW)   Final Result      Stable mild pulmonary edema.   New left basilar atelectasis.      DX-CHEST-PORTABLE (1 VIEW)   Final Result      Stable chest appearance compared with 11/16.      DX-CHEST-PORTABLE (1 VIEW)   Final Result      No acute cardiopulmonary abnormality identified.      DX-CHEST-PORTABLE (1 VIEW)   Final Result      No acute cardiopulmonary abnormality. No interval change.      DX-CHEST-PORTABLE (1 VIEW)   Final Result      No acute cardiopulmonary disease.      CT-HEAD W/O   Final Result      1.  No evidence of acute intracranial process.      2.  There is again seen interstitial pulmonary edema and bibasilar atelectasis.      CT-HIP W/O PLUS RECONS LEFT   Final Result      1.  No evidence of acute fracture or malalignment. No evidence of hardware failure or complication.   2.  Postsurgical changes of the left femur with broken screw fragment redemonstrated.   3.  Demineralization.   4.  Scattered colonic diverticula.   5.  Atherosclerosis.   6.  Small fat-containing umbilical hernia.      EC-ECHOCARDIOGRAM COMPLETE W/O CONT   Final Result      DX-HIP-COMPLETE - UNILATERAL 2+ LEFT   Final Result      1.  No definite acute osseous abnormality. In setting of demineralization, an occult fracture is difficult to exclude. If there is strong clinical suspicion, CT or MRI could be obtained for further evaluation.   2.  Postsurgical changes of the left femur with broken screw fragment redemonstrated.      DX-CHEST-PORTABLE (1 VIEW)   Final Result      No acute cardiopulmonary process is seen.      Atherosclerotic plaque.            Assessment/Plan  * Comfort measures only status- (present on admission)   Assessment & Plan    No signs of distress, continue comfort care options  4/30 No complaints of pain. 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  [facility] assisting.  Difficult discharge  -awaiting for placement      ST elevation myocardial infarction involving right coronary artery (HCC)- (present on admission)   Assessment & Plan    Declines any intervention or therapeutic modality  Continue with comfort care      Chronic kidney disease (CKD), stage III (moderate) (HCC)- (present on admission)   Assessment & Plan    Continue comfort care.        Essential hypertension- (present on admission)   Assessment & Plan    Comfort care      Fall- (present on admission)   Assessment & Plan    Continue fall precautions        VTE prophylaxis: Comfort care

## 2019-04-30 NOTE — DISCHARGE PLANNING
Anticipated Discharge Disposition: [facility] vs [facility] with [facility]    Action: LSW left another VM for [facility] on their admissions line asking for a call back in regards to the assessment they completed to see if the Pt is appropriate for their facility.     Barriers to Discharge: placement    Plan: follow up with Cosme from [facility]

## 2019-04-30 NOTE — CARE PLAN
Problem: Safety  Goal: Will remain free from injury  Outcome: PROGRESSING AS EXPECTED  Bed alarm in place, calls for assistance appropriately    Problem: Skin Integrity  Goal: Risk for impaired skin integrity will decrease  Outcome: PROGRESSING SLOWER THAN EXPECTED  Refuses to turn and reposition every 2 ho urs, education  And encouragement

## 2019-04-30 NOTE — PROGRESS NOTES
Assumed care of Nora at 0700. refusing to get out of bed for breakfast, resting in bed comfortably. Bed alarm in place.

## 2019-04-30 NOTE — CARE PLAN
Problem: Safety  Goal: Will remain free from injury    Intervention: Provide assistance with mobility  Assistance provided with mobility. Patient uses call light to alert staff before getting out of bed. Bed alarm in place incase patient forgets.       Problem: Bowel/Gastric:  Goal: Normal bowel function is maintained or improved  Outcome: PROGRESSING AS EXPECTED  Patient having regular bowel movements.

## 2019-05-01 PROCEDURE — 770001 HCHG ROOM/CARE - MED/SURG/GYN PRIV*

## 2019-05-01 PROCEDURE — 700102 HCHG RX REV CODE 250 W/ 637 OVERRIDE(OP): Performed by: HOSPITALIST

## 2019-05-01 PROCEDURE — A9270 NON-COVERED ITEM OR SERVICE: HCPCS | Performed by: HOSPITALIST

## 2019-05-01 PROCEDURE — 99231 SBSQ HOSP IP/OBS SF/LOW 25: CPT | Performed by: HOSPITALIST

## 2019-05-01 RX ADMIN — SENNOSIDES AND DOCUSATE SODIUM 2 TABLET: 8.6; 5 TABLET ORAL at 17:14

## 2019-05-01 RX ADMIN — SENNOSIDES AND DOCUSATE SODIUM 2 TABLET: 8.6; 5 TABLET ORAL at 05:37

## 2019-05-01 NOTE — PROGRESS NOTES
[facility] Medicine Daily Progress Note    Date of Service  4/10/19     Chief Complaint  89 y.o. female admitted 11/13/2018 with fall, weakness and found to have a STEMI.     Hospital Course      She was admitted to ICU on medical management. Family elected for comfort care.           Interval Problem Update  On comfort care, denies pain,    Appears comfortable, and denies any needs.   Pending placement.   I discussed with .   Discussed with patient, patient's nurse and case management.       Consultants/Specialty  Critical care signed off    Cardiology signed off      Code Status  Comfort care, DNR      Disposition  Hospice, pending group home placement. 11/20/18 quantiferon gold +, AFB negative.      Review of Systems  Review of Systems   Unable to perform ROS: Dementia      Physical Exam  Temp:  [36.1 °C (97 °F)-36.6 °C (97.9 °F)] 36.6 °C (97.8 °F)  Pulse:  [68-71] 68  Resp:  [16-17] 17  BP: (106-119)/(56-71) 106/60  SpO2:  [90 %-92 %] 92 %    Physical Exam   Constitutional: No distress.   Cachectic    HENT:   Head: Normocephalic and atraumatic.   Nose: Nose normal.   Mouth/Throat: Oropharynx is clear and moist. No oropharyngeal exudate.   Eyes: Pupils are equal, round, and reactive to light. Conjunctivae and EOM are normal. Right eye exhibits no discharge. Left eye exhibits no discharge. No scleral icterus.   Neck: Normal range of motion. Neck supple. No JVD present. No tracheal deviation present. No thyromegaly present.   Cardiovascular: Normal rate, regular rhythm and normal heart sounds.  Exam reveals no friction rub.    No murmur heard.  Pulmonary/Chest: Effort normal and breath sounds normal. No stridor. No respiratory distress. She has no wheezes. She has no rales.   Abdominal: Soft. Bowel sounds are normal. She exhibits no distension. There is no tenderness. There is no rebound.   Musculoskeletal: Normal range of motion. She exhibits no edema, tenderness or deformity.   Neurological: She is  alert. No cranial nerve deficit. She exhibits normal muscle tone. Coordination normal.   Severe cognitive deficits   Skin: Skin is warm and dry. No rash noted. She is not diaphoretic. No erythema. There is pallor.   Psychiatric: She has a normal mood and affect.   Impaired judgment    Physical exam remains unchanged again    Fluids    Intake/Output Summary (Last 24 hours) at 05/01/19 1331  Last data filed at 04/30/19 2100   Gross per 24 hour   Intake              100 ml   Output                0 ml   Net              100 ml       Laboratory                        Imaging  DX-CHEST-PORTABLE (1 VIEW)   Final Result         1.  Hazy interstitial left pulmonary opacities suggests interstitial edema or infiltrate, similar to prior.   2.  Trace left pleural effusion   3.  Hyperexpansion of lungs favors changes of COPD.      DX-CHEST-PORTABLE (1 VIEW)   Final Result         1.  Interstitial infiltrates or edema, stable.   2.  Atherosclerosis      DX-CHEST-PORTABLE (1 VIEW)   Final Result         1.  Interstitial infiltrates or edema.   2.  Atherosclerosis      DX-CHEST-PORTABLE (1 VIEW)   Final Result      Moderate interstitial edema or interstitial lung disease and small left pleural effusion similar to previous.      DX-CHEST-PORTABLE (1 VIEW)   Final Result      Stable interstitial edema and/or interstitial lung disease with probable small pleural fluid      DX-CHEST-PORTABLE (1 VIEW)   Final Result      Ill-defined infrahilar interstitial opacities, atelectasis versus mild edema. No significant pleural effusions.      DX-CHEST-PORTABLE (1 VIEW)   Final Result      Mild left basilar atelectasis, improved since prior. No new consolidation or large pleural effusions.      DX-CHEST-PORTABLE (1 VIEW)   Final Result      1.  Left basilar opacification may be secondary to atelectasis. Developing pneumonia cannot be excluded.         DX-CHEST-PORTABLE (1 VIEW)   Final Result      1.  Unchanged mild interstitial pulmonary edema    2.  Unchanged bibasilar atelectasis, alveolar edema or pneumonia      DX-CHEST-PORTABLE (1 VIEW)   Final Result      1.  Decreased pulmonary edema   2.  Unchanged bibasilar atelectasis, alveolar edema or pneumonia      DX-CHEST-PORTABLE (1 VIEW)   Final Result      1.  There is diffuse interstitial and alveolar density in the right mid and lower lung zone. This is increased since the previous study. This may represent mild pulmonary edema/consolidation.   2.  Mildly enlarged cardiomediastinal silhouette when compared with the previous chest x-ray.      DX-CHEST-PORTABLE (1 VIEW)   Final Result      Stable mild pulmonary edema.   New left basilar atelectasis.      DX-CHEST-PORTABLE (1 VIEW)   Final Result      Stable chest appearance compared with 11/16.      DX-CHEST-PORTABLE (1 VIEW)   Final Result      No acute cardiopulmonary abnormality identified.      DX-CHEST-PORTABLE (1 VIEW)   Final Result      No acute cardiopulmonary abnormality. No interval change.      DX-CHEST-PORTABLE (1 VIEW)   Final Result      No acute cardiopulmonary disease.      CT-HEAD W/O   Final Result      1.  No evidence of acute intracranial process.      2.  There is again seen interstitial pulmonary edema and bibasilar atelectasis.      CT-HIP W/O PLUS RECONS LEFT   Final Result      1.  No evidence of acute fracture or malalignment. No evidence of hardware failure or complication.   2.  Postsurgical changes of the left femur with broken screw fragment redemonstrated.   3.  Demineralization.   4.  Scattered colonic diverticula.   5.  Atherosclerosis.   6.  Small fat-containing umbilical hernia.      EC-ECHOCARDIOGRAM COMPLETE W/O CONT   Final Result      DX-HIP-COMPLETE - UNILATERAL 2+ LEFT   Final Result      1.  No definite acute osseous abnormality. In setting of demineralization, an occult fracture is difficult to exclude. If there is strong clinical suspicion, CT or MRI could be obtained for further evaluation.   2.  Postsurgical  changes of the left femur with broken screw fragment redemonstrated.      DX-CHEST-PORTABLE (1 VIEW)   Final Result      No acute cardiopulmonary process is seen.      Atherosclerotic plaque.           Assessment/Plan  * Comfort measures only status- (present on admission)   Assessment & Plan    No signs of distress, continue comfort care options  4/30 No complaints of pain. 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  [place] [facility] assisting.   Difficult discharge   -awaiting for placement      ST elevation myocardial infarction involving right coronary artery (HCC)- (present on admission)   Assessment & Plan    Declines any intervention or therapeutic modality  Continue with comfort care       Chronic kidney disease (CKD), stage III (moderate) (HCC)- (present on admission)   Assessment & Plan    Continue comfort care.         Essential hypertension- (present on admission)   Assessment & Plan    Comfort care       Fall- (present on admission)   Assessment & Plan    Continue fall precautions         VTE prophylaxis: Comfort care

## 2019-05-01 NOTE — PROGRESS NOTES
Report received from Ailin ROLLE. Assumed care at 1400. Pt in bed and resting. AxOx4. VSS. Responds appropriately. Safety precautions in place. Assessment completed. Agree with previous RN charting.

## 2019-05-01 NOTE — CARE PLAN
Problem: Safety  Goal: Will remain free from falls    Intervention: Implement fall precautions  Fall precautions in place, no DME at bedside. Bed alarm in place.         Problem: Urinary Elimination:  Goal: Ability to reestablish a normal urinary elimination pattern will improve  Pt able to void using bathroom or bedside commode.

## 2019-05-02 PROCEDURE — 99231 SBSQ HOSP IP/OBS SF/LOW 25: CPT | Performed by: HOSPITALIST

## 2019-05-02 PROCEDURE — 770001 HCHG ROOM/CARE - MED/SURG/GYN PRIV*

## 2019-05-02 NOTE — PROGRESS NOTES
[facility] Medicine Daily Progress Note    Date of Service  4/10/19     Chief Complaint  89 y.o. female admitted 11/13/2018 with fall, weakness and found to have a STEMI.     Hospital Course      She was admitted to ICU on medical management. Family elected for comfort care.           Interval Problem Update  No changes, continue comfort care  Does not appear to be in pain, and denies pain.    Pending placement. I discussed with case management   Discussed with patient, patient's nurse and care coordination.       Consultants/Specialty  Critical care signed off    Cardiology signed off      Code Status  Comfort care, DNR      Disposition  Hospice, pending group home placement. 11/20/18 quantiferon gold +, AFB negative.      Review of Systems  Review of Systems   Unable to perform ROS: Dementia      Physical Exam  Temp:  [36.5 °C (97.7 °F)-36.8 °C (98.2 °F)] 36.5 °C (97.7 °F)  Pulse:  [72-93] 72  Resp:  [17] 17  BP: (106-109)/(49) 106/49  SpO2:  [91 %-93 %] 91 %    Physical Exam   Constitutional: No distress.   Cachectic    HENT:   Head: Normocephalic and atraumatic.   Nose: Nose normal.   Mouth/Throat: Oropharynx is clear and moist. No oropharyngeal exudate.   Eyes: Pupils are equal, round, and reactive to light. Conjunctivae and EOM are normal. Right eye exhibits no discharge. Left eye exhibits no discharge. No scleral icterus.   Neck: Normal range of motion. Neck supple. No JVD present. No tracheal deviation present. No thyromegaly present.   Cardiovascular: Normal rate, regular rhythm and normal heart sounds.  Exam reveals no friction rub.    No murmur heard.  Pulmonary/Chest: Effort normal and breath sounds normal. No stridor. No respiratory distress. She has no wheezes. She has no rales.   Abdominal: Soft. Bowel sounds are normal. She exhibits no distension. There is no tenderness. There is no rebound.   Musculoskeletal: Normal range of motion. She exhibits no edema, tenderness or deformity.   Neurological: She  is alert. No cranial nerve deficit. She exhibits normal muscle tone. Coordination normal.   Severe cognitive deficits   Skin: Skin is warm and dry. No rash noted. She is not diaphoretic. No erythema. There is pallor.   Psychiatric: She has a normal mood and affect.   Impaired judgment    Physical exam remains unchanged again    Fluids    Intake/Output Summary (Last 24 hours) at 05/02/19 1314  Last data filed at 05/01/19 1930   Gross per 24 hour   Intake              100 ml   Output                0 ml   Net              100 ml       Laboratory                        Imaging  DX-CHEST-PORTABLE (1 VIEW)   Final Result         1.  Hazy interstitial left pulmonary opacities suggests interstitial edema or infiltrate, similar to prior.   2.  Trace left pleural effusion   3.  Hyperexpansion of lungs favors changes of COPD.      DX-CHEST-PORTABLE (1 VIEW)   Final Result         1.  Interstitial infiltrates or edema, stable.   2.  Atherosclerosis      DX-CHEST-PORTABLE (1 VIEW)   Final Result         1.  Interstitial infiltrates or edema.   2.  Atherosclerosis      DX-CHEST-PORTABLE (1 VIEW)   Final Result      Moderate interstitial edema or interstitial lung disease and small left pleural effusion similar to previous.      DX-CHEST-PORTABLE (1 VIEW)   Final Result      Stable interstitial edema and/or interstitial lung disease with probable small pleural fluid      DX-CHEST-PORTABLE (1 VIEW)   Final Result      Ill-defined infrahilar interstitial opacities, atelectasis versus mild edema. No significant pleural effusions.      DX-CHEST-PORTABLE (1 VIEW)   Final Result      Mild left basilar atelectasis, improved since prior. No new consolidation or large pleural effusions.      DX-CHEST-PORTABLE (1 VIEW)   Final Result      1.  Left basilar opacification may be secondary to atelectasis. Developing pneumonia cannot be excluded.         DX-CHEST-PORTABLE (1 VIEW)   Final Result      1.  Unchanged mild interstitial pulmonary  edema   2.  Unchanged bibasilar atelectasis, alveolar edema or pneumonia      DX-CHEST-PORTABLE (1 VIEW)   Final Result      1.  Decreased pulmonary edema   2.  Unchanged bibasilar atelectasis, alveolar edema or pneumonia      DX-CHEST-PORTABLE (1 VIEW)   Final Result      1.  There is diffuse interstitial and alveolar density in the right mid and lower lung zone. This is increased since the previous study. This may represent mild pulmonary edema/consolidation.   2.  Mildly enlarged cardiomediastinal silhouette when compared with the previous chest x-ray.      DX-CHEST-PORTABLE (1 VIEW)   Final Result      Stable mild pulmonary edema.   New left basilar atelectasis.      DX-CHEST-PORTABLE (1 VIEW)   Final Result      Stable chest appearance compared with 11/16.      DX-CHEST-PORTABLE (1 VIEW)   Final Result      No acute cardiopulmonary abnormality identified.      DX-CHEST-PORTABLE (1 VIEW)   Final Result      No acute cardiopulmonary abnormality. No interval change.      DX-CHEST-PORTABLE (1 VIEW)   Final Result      No acute cardiopulmonary disease.      CT-HEAD W/O   Final Result      1.  No evidence of acute intracranial process.      2.  There is again seen interstitial pulmonary edema and bibasilar atelectasis.      CT-HIP W/O PLUS RECONS LEFT   Final Result      1.  No evidence of acute fracture or malalignment. No evidence of hardware failure or complication.   2.  Postsurgical changes of the left femur with broken screw fragment redemonstrated.   3.  Demineralization.   4.  Scattered colonic diverticula.   5.  Atherosclerosis.   6.  Small fat-containing umbilical hernia.      EC-ECHOCARDIOGRAM COMPLETE W/O CONT   Final Result      DX-HIP-COMPLETE - UNILATERAL 2+ LEFT   Final Result      1.  No definite acute osseous abnormality. In setting of demineralization, an occult fracture is difficult to exclude. If there is strong clinical suspicion, CT or MRI could be obtained for further evaluation.   2.   Postsurgical changes of the left femur with broken screw fragment redemonstrated.      DX-CHEST-PORTABLE (1 VIEW)   Final Result      No acute cardiopulmonary process is seen.      Atherosclerotic plaque.           Assessment/Plan  * Comfort measures only status- (present on admission)   Assessment & Plan    No signs of distress, continue comfort care    4/30 No complaints of pain. 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  [place] [facility] assisting.   Difficult discharge   -awaiting for placement      ST elevation myocardial infarction involving right coronary artery (HCC)- (present on admission)   Assessment & Plan    Declines any intervention or therapeutic modality  Continue with comfort care       Chronic kidney disease (CKD), stage III (moderate) (HCC)- (present on admission)   Assessment & Plan    Continue comfort care.         Essential hypertension- (present on admission)   Assessment & Plan    Comfort care       Fall- (present on admission)   Assessment & Plan    Continue fall precautions         VTE prophylaxis: Comfort care

## 2019-05-03 PROCEDURE — A9270 NON-COVERED ITEM OR SERVICE: HCPCS | Performed by: HOSPITALIST

## 2019-05-03 PROCEDURE — 700102 HCHG RX REV CODE 250 W/ 637 OVERRIDE(OP): Performed by: HOSPITALIST

## 2019-05-03 PROCEDURE — 99231 SBSQ HOSP IP/OBS SF/LOW 25: CPT | Performed by: HOSPITALIST

## 2019-05-03 PROCEDURE — 770001 HCHG ROOM/CARE - MED/SURG/GYN PRIV*

## 2019-05-03 RX ADMIN — SENNOSIDES AND DOCUSATE SODIUM 2 TABLET: 8.6; 5 TABLET ORAL at 18:13

## 2019-05-03 NOTE — PROGRESS NOTES
[facility] Medicine Daily Progress Note    Date of Service  4/10/19     Chief Complaint  89 y.o. female admitted 11/13/2018 with fall, weakness and found to have a STEMI.     Hospital Course      She was admitted to ICU on medical management. Family elected for comfort care.           Interval Problem Update  Heart rate trending mid 60s, saturating well on room air.  Blood pressure within normal limits  The patient is on comfort care  Denies having any pain, no complaints.  Still pending placement. I discussed with case management.      Consultants/Specialty  Critical care signed off    Cardiology signed off      Code Status  Comfort care, DNR      Disposition  Hospice, pending group home placement. 11/20/18 quantiferon gold +, AFB negative.      Review of Systems  Review of Systems   Unable to perform ROS: Dementia      Physical Exam  Temp:  [36.3 °C (97.3 °F)-36.4 °C (97.5 °F)] 36.4 °C (97.5 °F)  Pulse:  [64-67] 64  Resp:  [15-17] 15  BP: (105-115)/(58-60) 115/60  SpO2:  [92 %] 92 %    Physical Exam   Constitutional: No distress.   Cachectic    HENT:   Head: Normocephalic and atraumatic.   Nose: Nose normal.   Mouth/Throat: Oropharynx is clear and moist. No oropharyngeal exudate.   Eyes: Pupils are equal, round, and reactive to light. Conjunctivae and EOM are normal. Right eye exhibits no discharge. Left eye exhibits no discharge. No scleral icterus.   Neck: Normal range of motion. Neck supple. No JVD present. No tracheal deviation present. No thyromegaly present.   Cardiovascular: Normal rate, regular rhythm and normal heart sounds.  Exam reveals no friction rub.    No murmur heard.  Pulmonary/Chest: Effort normal and breath sounds normal. No stridor. No respiratory distress. She has no wheezes. She has no rales.   Abdominal: Soft. Bowel sounds are normal. She exhibits no distension. There is no tenderness. There is no rebound.   Musculoskeletal: Normal range of motion. She exhibits no edema, tenderness or  deformity.   Neurological: She is alert. No cranial nerve deficit. She exhibits normal muscle tone. Coordination normal.   Severe cognitive deficits   Skin: Skin is warm and dry. No rash noted. She is not diaphoretic. No erythema. There is pallor.   Psychiatric: She has a normal mood and affect.   Impaired judgment    Physical exam remains unchanged again    Fluids    Intake/Output Summary (Last 24 hours) at 05/03/19 1332  Last data filed at 05/03/19 1000   Gross per 24 hour   Intake              350 ml   Output                0 ml   Net              350 ml       Laboratory                        Imaging  DX-CHEST-PORTABLE (1 VIEW)   Final Result         1.  Hazy interstitial left pulmonary opacities suggests interstitial edema or infiltrate, similar to prior.   2.  Trace left pleural effusion   3.  Hyperexpansion of lungs favors changes of COPD.      DX-CHEST-PORTABLE (1 VIEW)   Final Result         1.  Interstitial infiltrates or edema, stable.   2.  Atherosclerosis      DX-CHEST-PORTABLE (1 VIEW)   Final Result         1.  Interstitial infiltrates or edema.   2.  Atherosclerosis      DX-CHEST-PORTABLE (1 VIEW)   Final Result      Moderate interstitial edema or interstitial lung disease and small left pleural effusion similar to previous.      DX-CHEST-PORTABLE (1 VIEW)   Final Result      Stable interstitial edema and/or interstitial lung disease with probable small pleural fluid      DX-CHEST-PORTABLE (1 VIEW)   Final Result      Ill-defined infrahilar interstitial opacities, atelectasis versus mild edema. No significant pleural effusions.      DX-CHEST-PORTABLE (1 VIEW)   Final Result      Mild left basilar atelectasis, improved since prior. No new consolidation or large pleural effusions.      DX-CHEST-PORTABLE (1 VIEW)   Final Result      1.  Left basilar opacification may be secondary to atelectasis. Developing pneumonia cannot be excluded.         DX-CHEST-PORTABLE (1 VIEW)   Final Result      1.  Unchanged  mild interstitial pulmonary edema   2.  Unchanged bibasilar atelectasis, alveolar edema or pneumonia      DX-CHEST-PORTABLE (1 VIEW)   Final Result      1.  Decreased pulmonary edema   2.  Unchanged bibasilar atelectasis, alveolar edema or pneumonia      DX-CHEST-PORTABLE (1 VIEW)   Final Result      1.  There is diffuse interstitial and alveolar density in the right mid and lower lung zone. This is increased since the previous study. This may represent mild pulmonary edema/consolidation.   2.  Mildly enlarged cardiomediastinal silhouette when compared with the previous chest x-ray.      DX-CHEST-PORTABLE (1 VIEW)   Final Result      Stable mild pulmonary edema.   New left basilar atelectasis.      DX-CHEST-PORTABLE (1 VIEW)   Final Result      Stable chest appearance compared with 11/16.      DX-CHEST-PORTABLE (1 VIEW)   Final Result      No acute cardiopulmonary abnormality identified.      DX-CHEST-PORTABLE (1 VIEW)   Final Result      No acute cardiopulmonary abnormality. No interval change.      DX-CHEST-PORTABLE (1 VIEW)   Final Result      No acute cardiopulmonary disease.      CT-HEAD W/O   Final Result      1.  No evidence of acute intracranial process.      2.  There is again seen interstitial pulmonary edema and bibasilar atelectasis.      CT-HIP W/O PLUS RECONS LEFT   Final Result      1.  No evidence of acute fracture or malalignment. No evidence of hardware failure or complication.   2.  Postsurgical changes of the left femur with broken screw fragment redemonstrated.   3.  Demineralization.   4.  Scattered colonic diverticula.   5.  Atherosclerosis.   6.  Small fat-containing umbilical hernia.      EC-ECHOCARDIOGRAM COMPLETE W/O CONT   Final Result      DX-HIP-COMPLETE - UNILATERAL 2+ LEFT   Final Result      1.  No definite acute osseous abnormality. In setting of demineralization, an occult fracture is difficult to exclude. If there is strong clinical suspicion, CT or MRI could be obtained for  further evaluation.   2.  Postsurgical changes of the left femur with broken screw fragment redemonstrated.      DX-CHEST-PORTABLE (1 VIEW)   Final Result      No acute cardiopulmonary process is seen.      Atherosclerotic plaque.           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  [facility] assisting.   Difficult discharge   -awaiting for placement      ST elevation myocardial infarction involving right coronary artery (HCC)- (present on admission)   Assessment & Plan    Declines any intervention or therapeutic modality  Continue with comfort care       Chronic kidney disease (CKD), stage III (moderate) (HCC)- (present on admission)   Assessment & Plan    Continue comfort care.         Essential hypertension- (present on admission)   Assessment & Plan    Comfort care       Fall- (present on admission)   Assessment & Plan    Continue fall precautions         VTE prophylaxis: Comfort care

## 2019-05-03 NOTE — CARE PLAN
Problem: Safety  Goal: Will remain free from falls  Outcome: PROGRESSING AS EXPECTED  Calls for assistance when appropriate. Call light and personal belongings within reach. Bed in low and locked position. Fall education provided to patient.     Problem: Skin Integrity  Goal: Risk for impaired skin integrity will decrease  Outcome: PROGRESSING AS EXPECTED  Frequent position changes. Education provided to patient on importance of Q2 hour turns. Monitor areas of redness and skin breakdown.

## 2019-05-04 PROCEDURE — 770001 HCHG ROOM/CARE - MED/SURG/GYN PRIV*

## 2019-05-04 PROCEDURE — 99231 SBSQ HOSP IP/OBS SF/LOW 25: CPT | Performed by: HOSPITALIST

## 2019-05-04 NOTE — PROGRESS NOTES
[facility] Medicine Daily Progress Note    Date of Service  4/10/19     Chief Complaint  89 y.o. female admitted 11/13/2018 with fall, weakness and found to have a STEMI.     Hospital Course      She was admitted to ICU on medical management. Family elected for comfort care.           Interval Problem Update  Denies having pain, does not appear to be in pain.  Continue comfort care measures   Still pending placement.   I discussed with case management, no progress in placement.      Consultants/Specialty  Critical care signed off    Cardiology signed off      Code Status  Comfort care, DNR      Disposition  Hospice, pending group home placement. 11/20/18 quantiferon gold +, AFB negative.      Review of Systems  Review of Systems   Unable to perform ROS: Dementia      Physical Exam  Temp:  [36.3 °C (97.4 °F)-36.4 °C (97.5 °F)] 36.3 °C (97.4 °F)  Pulse:  [65-70] 65  Resp:  [16-18] 16  BP: (103-118)/(55-62) 103/55  SpO2:  [91 %-93 %] 93 %    Physical Exam   Constitutional: No distress.   Cachectic    HENT:   Head: Normocephalic and atraumatic.   Nose: Nose normal.   Mouth/Throat: Oropharynx is clear and moist. No oropharyngeal exudate.   Eyes: Pupils are equal, round, and reactive to light. Conjunctivae and EOM are normal. Right eye exhibits no discharge. Left eye exhibits no discharge. No scleral icterus.   Neck: Normal range of motion. Neck supple. No JVD present. No tracheal deviation present. No thyromegaly present.   Cardiovascular: Normal rate, regular rhythm and normal heart sounds.  Exam reveals no friction rub.    No murmur heard.  Pulmonary/Chest: Effort normal and breath sounds normal. No stridor. No respiratory distress. She has no wheezes. She has no rales.   Abdominal: Soft. Bowel sounds are normal. She exhibits no distension. There is no tenderness. There is no rebound.   Musculoskeletal: Normal range of motion. She exhibits no edema, tenderness or deformity.   Neurological: She is alert. No cranial nerve  deficit. She exhibits normal muscle tone. Coordination normal.   Severe cognitive deficits   Skin: Skin is warm and dry. No rash noted. She is not diaphoretic. No erythema. There is pallor.   Psychiatric: She has a normal mood and affect.   Impaired judgment    Physical exam remains unchanged again    Fluids    Intake/Output Summary (Last 24 hours) at 05/04/19 1324  Last data filed at 05/03/19 2237   Gross per 24 hour   Intake              780 ml   Output                0 ml   Net              780 ml       Laboratory                        Imaging  DX-CHEST-PORTABLE (1 VIEW)   Final Result         1.  Hazy interstitial left pulmonary opacities suggests interstitial edema or infiltrate, similar to prior.   2.  Trace left pleural effusion   3.  Hyperexpansion of lungs favors changes of COPD.      DX-CHEST-PORTABLE (1 VIEW)   Final Result         1.  Interstitial infiltrates or edema, stable.   2.  Atherosclerosis      DX-CHEST-PORTABLE (1 VIEW)   Final Result         1.  Interstitial infiltrates or edema.   2.  Atherosclerosis      DX-CHEST-PORTABLE (1 VIEW)   Final Result      Moderate interstitial edema or interstitial lung disease and small left pleural effusion similar to previous.      DX-CHEST-PORTABLE (1 VIEW)   Final Result      Stable interstitial edema and/or interstitial lung disease with probable small pleural fluid      DX-CHEST-PORTABLE (1 VIEW)   Final Result      Ill-defined infrahilar interstitial opacities, atelectasis versus mild edema. No significant pleural effusions.      DX-CHEST-PORTABLE (1 VIEW)   Final Result      Mild left basilar atelectasis, improved since prior. No new consolidation or large pleural effusions.      DX-CHEST-PORTABLE (1 VIEW)   Final Result      1.  Left basilar opacification may be secondary to atelectasis. Developing pneumonia cannot be excluded.         DX-CHEST-PORTABLE (1 VIEW)   Final Result      1.  Unchanged mild interstitial pulmonary edema   2.  Unchanged  bibasilar atelectasis, alveolar edema or pneumonia      DX-CHEST-PORTABLE (1 VIEW)   Final Result      1.  Decreased pulmonary edema   2.  Unchanged bibasilar atelectasis, alveolar edema or pneumonia      DX-CHEST-PORTABLE (1 VIEW)   Final Result      1.  There is diffuse interstitial and alveolar density in the right mid and lower lung zone. This is increased since the previous study. This may represent mild pulmonary edema/consolidation.   2.  Mildly enlarged cardiomediastinal silhouette when compared with the previous chest x-ray.      DX-CHEST-PORTABLE (1 VIEW)   Final Result      Stable mild pulmonary edema.   New left basilar atelectasis.      DX-CHEST-PORTABLE (1 VIEW)   Final Result      Stable chest appearance compared with 11/16.      DX-CHEST-PORTABLE (1 VIEW)   Final Result      No acute cardiopulmonary abnormality identified.      DX-CHEST-PORTABLE (1 VIEW)   Final Result      No acute cardiopulmonary abnormality. No interval change.      DX-CHEST-PORTABLE (1 VIEW)   Final Result      No acute cardiopulmonary disease.      CT-HEAD W/O   Final Result      1.  No evidence of acute intracranial process.      2.  There is again seen interstitial pulmonary edema and bibasilar atelectasis.      CT-HIP W/O PLUS RECONS LEFT   Final Result      1.  No evidence of acute fracture or malalignment. No evidence of hardware failure or complication.   2.  Postsurgical changes of the left femur with broken screw fragment redemonstrated.   3.  Demineralization.   4.  Scattered colonic diverticula.   5.  Atherosclerosis.   6.  Small fat-containing umbilical hernia.      EC-ECHOCARDIOGRAM COMPLETE W/O CONT   Final Result      DX-HIP-COMPLETE - UNILATERAL 2+ LEFT   Final Result      1.  No definite acute osseous abnormality. In setting of demineralization, an occult fracture is difficult to exclude. If there is strong clinical suspicion, CT or MRI could be obtained for further evaluation.   2.  Postsurgical changes of the  left femur with broken screw fragment redemonstrated.      DX-CHEST-PORTABLE (1 VIEW)   Final Result      No acute cardiopulmonary process is seen.      Atherosclerotic plaque.           Assessment/Plan  * Comfort measures only status- (present on admission)   Assessment & Plan    5/4/ Denies having pain, does not appear to be in pain.  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  [facility] assisting.   Difficult discharge   -awaiting for placement      ST elevation myocardial infarction involving right coronary artery (HCC)- (present on admission)   Assessment & Plan    Declines any intervention or therapeutic modality  Continue with comfort care       Chronic kidney disease (CKD), stage III (moderate) (HCC)- (present on admission)   Assessment & Plan    Continue comfort care.         Essential hypertension- (present on admission)   Assessment & Plan    Comfort care       Fall- (present on admission)   Assessment & Plan    Continue fall precautions         VTE prophylaxis: Comfort care

## 2019-05-04 NOTE — CARE PLAN
Problem: Safety  Goal: Will remain free from falls    Intervention: Implement fall precautions  Call light and belongings within reach, bed down and locked, hourly rounding in progress. Pt calls appropriately. Bed alarm in place.       Problem: Bowel/Gastric:  Goal: Will not experience complications related to bowel motility  Last BM 5/2

## 2019-05-04 NOTE — CARE PLAN
Problem: Safety  Goal: Will remain free from injury  Outcome: PROGRESSING AS EXPECTED  Patient remains free from injury. Bed alarm in place and uses call light appropriately.     Problem: Venous Thromboembolism (VTW)/Deep Vein Thrombosis (DVT) Prevention:  Goal: Patient will participate in Venous Thrombosis (VTE)/Deep Vein Thrombosis (DVT)Prevention Measures  Patient is on comfort care. Patient refuses SCDs despite education.

## 2019-05-05 PROCEDURE — 770001 HCHG ROOM/CARE - MED/SURG/GYN PRIV*

## 2019-05-05 PROCEDURE — A9270 NON-COVERED ITEM OR SERVICE: HCPCS | Performed by: HOSPITALIST

## 2019-05-05 PROCEDURE — 700102 HCHG RX REV CODE 250 W/ 637 OVERRIDE(OP): Performed by: HOSPITALIST

## 2019-05-05 PROCEDURE — 99231 SBSQ HOSP IP/OBS SF/LOW 25: CPT | Performed by: HOSPITALIST

## 2019-05-05 RX ADMIN — SENNOSIDES AND DOCUSATE SODIUM 2 TABLET: 8.6; 5 TABLET ORAL at 19:08

## 2019-05-05 NOTE — PROGRESS NOTES
Received bedside shift report at 1900.     Pt sitting up in bed.   Bed alarm in place.   No needs at this time.   Call light and belongings within reach, bed down and locked, hourly rounding in progress.

## 2019-05-05 NOTE — PROGRESS NOTES
[facility] Medicine Daily Progress Note    Date of Service  4/10/19     Chief Complaint  89 y.o. female admitted 11/13/2018 with fall, weakness and found to have a STEMI.     Hospital Course      She was admitted to ICU on medical management. Family elected for comfort care.           Interval Problem Update  The patient is on comfort care, denies pain. No other complaints.  Still pending placement. I discussed with case management, difficult discharge, no progress.    Consultants/Specialty  Critical care signed off    Cardiology signed off      Code Status  Comfort care, DNR      Disposition  Hospice, pending group home placement. 11/20/18 quantiferon gold +, AFB negative.      Review of Systems  Review of Systems   Unable to perform ROS: Dementia      Physical Exam  Temp:  [36.2 °C (97.2 °F)-36.4 °C (97.6 °F)] 36.4 °C (97.6 °F)  Pulse:  [68] 68  Resp:  [14-19] 14  BP: (115-125)/(65-68) 115/65  SpO2:  [93 %] 93 %    Physical Exam   Constitutional: No distress.   Cachectic    HENT:   Head: Normocephalic and atraumatic.   Nose: Nose normal.   Mouth/Throat: Oropharynx is clear and moist. No oropharyngeal exudate.   Eyes: Pupils are equal, round, and reactive to light. Conjunctivae and EOM are normal. Right eye exhibits no discharge. Left eye exhibits no discharge. No scleral icterus.   Neck: Normal range of motion. Neck supple. No JVD present. No tracheal deviation present. No thyromegaly present.   Cardiovascular: Normal rate, regular rhythm and normal heart sounds.  Exam reveals no friction rub.    No murmur heard.  Pulmonary/Chest: Effort normal and breath sounds normal. No stridor. No respiratory distress. She has no wheezes. She has no rales.   Abdominal: Soft. Bowel sounds are normal. She exhibits no distension. There is no tenderness. There is no rebound.   Musculoskeletal: Normal range of motion. She exhibits no edema, tenderness or deformity.   Neurological: She is alert. No cranial nerve deficit. She exhibits  normal muscle tone. Coordination normal.   Severe cognitive deficits   Skin: Skin is warm and dry. No rash noted. She is not diaphoretic. No erythema. There is pallor.   Psychiatric: She has a normal mood and affect.   Impaired judgment    Physical exam remains unchanged again    Fluids    Intake/Output Summary (Last 24 hours) at 05/05/19 1415  Last data filed at 05/04/19 2015   Gross per 24 hour   Intake              240 ml   Output                0 ml   Net              240 ml       Laboratory                        Imaging  DX-CHEST-PORTABLE (1 VIEW)   Final Result         1.  Hazy interstitial left pulmonary opacities suggests interstitial edema or infiltrate, similar to prior.   2.  Trace left pleural effusion   3.  Hyperexpansion of lungs favors changes of COPD.      DX-CHEST-PORTABLE (1 VIEW)   Final Result         1.  Interstitial infiltrates or edema, stable.   2.  Atherosclerosis      DX-CHEST-PORTABLE (1 VIEW)   Final Result         1.  Interstitial infiltrates or edema.   2.  Atherosclerosis      DX-CHEST-PORTABLE (1 VIEW)   Final Result      Moderate interstitial edema or interstitial lung disease and small left pleural effusion similar to previous.      DX-CHEST-PORTABLE (1 VIEW)   Final Result      Stable interstitial edema and/or interstitial lung disease with probable small pleural fluid      DX-CHEST-PORTABLE (1 VIEW)   Final Result      Ill-defined infrahilar interstitial opacities, atelectasis versus mild edema. No significant pleural effusions.      DX-CHEST-PORTABLE (1 VIEW)   Final Result      Mild left basilar atelectasis, improved since prior. No new consolidation or large pleural effusions.      DX-CHEST-PORTABLE (1 VIEW)   Final Result      1.  Left basilar opacification may be secondary to atelectasis. Developing pneumonia cannot be excluded.         DX-CHEST-PORTABLE (1 VIEW)   Final Result      1.  Unchanged mild interstitial pulmonary edema   2.  Unchanged bibasilar atelectasis,  alveolar edema or pneumonia      DX-CHEST-PORTABLE (1 VIEW)   Final Result      1.  Decreased pulmonary edema   2.  Unchanged bibasilar atelectasis, alveolar edema or pneumonia      DX-CHEST-PORTABLE (1 VIEW)   Final Result      1.  There is diffuse interstitial and alveolar density in the right mid and lower lung zone. This is increased since the previous study. This may represent mild pulmonary edema/consolidation.   2.  Mildly enlarged cardiomediastinal silhouette when compared with the previous chest x-ray.      DX-CHEST-PORTABLE (1 VIEW)   Final Result      Stable mild pulmonary edema.   New left basilar atelectasis.      DX-CHEST-PORTABLE (1 VIEW)   Final Result      Stable chest appearance compared with 11/16.      DX-CHEST-PORTABLE (1 VIEW)   Final Result      No acute cardiopulmonary abnormality identified.      DX-CHEST-PORTABLE (1 VIEW)   Final Result      No acute cardiopulmonary abnormality. No interval change.      DX-CHEST-PORTABLE (1 VIEW)   Final Result      No acute cardiopulmonary disease.      CT-HEAD W/O   Final Result      1.  No evidence of acute intracranial process.      2.  There is again seen interstitial pulmonary edema and bibasilar atelectasis.      CT-HIP W/O PLUS RECONS LEFT   Final Result      1.  No evidence of acute fracture or malalignment. No evidence of hardware failure or complication.   2.  Postsurgical changes of the left femur with broken screw fragment redemonstrated.   3.  Demineralization.   4.  Scattered colonic diverticula.   5.  Atherosclerosis.   6.  Small fat-containing umbilical hernia.      EC-ECHOCARDIOGRAM COMPLETE W/O CONT   Final Result      DX-HIP-COMPLETE - UNILATERAL 2+ LEFT   Final Result      1.  No definite acute osseous abnormality. In setting of demineralization, an occult fracture is difficult to exclude. If there is strong clinical suspicion, CT or MRI could be obtained for further evaluation.   2.  Postsurgical changes of the left femur with broken  screw fragment redemonstrated.      DX-CHEST-PORTABLE (1 VIEW)   Final Result      No acute cardiopulmonary process is seen.      Atherosclerotic plaque.           Assessment/Plan  * Comfort measures only status- (present on admission)   Assessment & Plan    5/5/ The patient is on comfort care, denies pain. No other complaints.  Still pending placement. Difficult discharge.     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  [facility] assisting.   Difficult discharge   -awaiting for placement      ST elevation myocardial infarction involving right coronary artery (HCC)- (present on admission)   Assessment & Plan    Declines any intervention or therapeutic modality  Continue with comfort care       Chronic kidney disease (CKD), stage III (moderate) (HCC)- (present on admission)   Assessment & Plan    Continue comfort care.         Essential hypertension- (present on admission)   Assessment & Plan    Comfort care       Fall- (present on admission)   Assessment & Plan    Continue fall precautions         VTE prophylaxis: Comfort care

## 2019-05-06 ENCOUNTER — PATIENT OUTREACH (OUTPATIENT)
Dept: HEALTH INFORMATION MANAGEMENT | Facility: OTHER | Age: 84
End: 2019-05-06

## 2019-05-06 PROCEDURE — 770001 HCHG ROOM/CARE - MED/SURG/GYN PRIV*

## 2019-05-06 PROCEDURE — 99231 SBSQ HOSP IP/OBS SF/LOW 25: CPT | Performed by: HOSPITALIST

## 2019-05-06 NOTE — PROGRESS NOTES
[facility] Medicine Daily Progress Note    Date of Service  4/10/19     Chief Complaint  89 y.o. female admitted 11/13/2018 with fall, weakness and found to have a STEMI.     Hospital Course      She was admitted to ICU on medical management. Family elected for comfort care.           Interval Problem Update  Denies having pain, does not appear to be in pain.  On comfort care measures, continue.    Still pending placement. I discussed with case management    Consultants/Specialty  Critical care signed off    Cardiology signed off      Code Status  Comfort care, DNR      Disposition  Hospice, pending group home placement. 11/20/18 quantiferon gold +, AFB negative.      Review of Systems  Review of Systems   Unable to perform ROS: Dementia      Physical Exam  Temp:  [36.3 °C (97.4 °F)-36.9 °C (98.5 °F)] 36.9 °C (98.5 °F)  Pulse:  [65-73] 65  Resp:  [14-15] 15  BP: (109-113)/(58-61) 109/61  SpO2:  [90 %-93 %] 93 %    Physical Exam   Constitutional: No distress.   Cachectic    HENT:   Head: Normocephalic and atraumatic.   Nose: Nose normal.   Mouth/Throat: Oropharynx is clear and moist. No oropharyngeal exudate.   Eyes: Pupils are equal, round, and reactive to light. Conjunctivae and EOM are normal. Right eye exhibits no discharge. Left eye exhibits no discharge. No scleral icterus.   Neck: Normal range of motion. Neck supple. No JVD present. No tracheal deviation present. No thyromegaly present.   Cardiovascular: Normal rate, regular rhythm and normal heart sounds.  Exam reveals no friction rub.    No murmur heard.  Pulmonary/Chest: Effort normal and breath sounds normal. No stridor. No respiratory distress. She has no wheezes. She has no rales.   Abdominal: Soft. Bowel sounds are normal. She exhibits no distension. There is no tenderness. There is no rebound.   Musculoskeletal: Normal range of motion. She exhibits no edema, tenderness or deformity.   Neurological: She is alert. No cranial nerve deficit. She exhibits  normal muscle tone. Coordination normal.   Severe cognitive deficits   Skin: Skin is warm and dry. No rash noted. She is not diaphoretic. No erythema. There is pallor.   Psychiatric: She has a normal mood and affect.   Impaired judgment    Physical exam remains unchanged again    Fluids  No intake or output data in the 24 hours ending 05/06/19 1248    Laboratory                        Imaging  DX-CHEST-PORTABLE (1 VIEW)   Final Result         1.  Hazy interstitial left pulmonary opacities suggests interstitial edema or infiltrate, similar to prior.   2.  Trace left pleural effusion   3.  Hyperexpansion of lungs favors changes of COPD.      DX-CHEST-PORTABLE (1 VIEW)   Final Result         1.  Interstitial infiltrates or edema, stable.   2.  Atherosclerosis      DX-CHEST-PORTABLE (1 VIEW)   Final Result         1.  Interstitial infiltrates or edema.   2.  Atherosclerosis      DX-CHEST-PORTABLE (1 VIEW)   Final Result      Moderate interstitial edema or interstitial lung disease and small left pleural effusion similar to previous.      DX-CHEST-PORTABLE (1 VIEW)   Final Result      Stable interstitial edema and/or interstitial lung disease with probable small pleural fluid      DX-CHEST-PORTABLE (1 VIEW)   Final Result      Ill-defined infrahilar interstitial opacities, atelectasis versus mild edema. No significant pleural effusions.      DX-CHEST-PORTABLE (1 VIEW)   Final Result      Mild left basilar atelectasis, improved since prior. No new consolidation or large pleural effusions.      DX-CHEST-PORTABLE (1 VIEW)   Final Result      1.  Left basilar opacification may be secondary to atelectasis. Developing pneumonia cannot be excluded.         DX-CHEST-PORTABLE (1 VIEW)   Final Result      1.  Unchanged mild interstitial pulmonary edema   2.  Unchanged bibasilar atelectasis, alveolar edema or pneumonia      DX-CHEST-PORTABLE (1 VIEW)   Final Result      1.  Decreased pulmonary edema   2.  Unchanged bibasilar  atelectasis, alveolar edema or pneumonia      DX-CHEST-PORTABLE (1 VIEW)   Final Result      1.  There is diffuse interstitial and alveolar density in the right mid and lower lung zone. This is increased since the previous study. This may represent mild pulmonary edema/consolidation.   2.  Mildly enlarged cardiomediastinal silhouette when compared with the previous chest x-ray.      DX-CHEST-PORTABLE (1 VIEW)   Final Result      Stable mild pulmonary edema.   New left basilar atelectasis.      DX-CHEST-PORTABLE (1 VIEW)   Final Result      Stable chest appearance compared with 11/16.      DX-CHEST-PORTABLE (1 VIEW)   Final Result      No acute cardiopulmonary abnormality identified.      DX-CHEST-PORTABLE (1 VIEW)   Final Result      No acute cardiopulmonary abnormality. No interval change.      DX-CHEST-PORTABLE (1 VIEW)   Final Result      No acute cardiopulmonary disease.      CT-HEAD W/O   Final Result      1.  No evidence of acute intracranial process.      2.  There is again seen interstitial pulmonary edema and bibasilar atelectasis.      CT-HIP W/O PLUS RECONS LEFT   Final Result      1.  No evidence of acute fracture or malalignment. No evidence of hardware failure or complication.   2.  Postsurgical changes of the left femur with broken screw fragment redemonstrated.   3.  Demineralization.   4.  Scattered colonic diverticula.   5.  Atherosclerosis.   6.  Small fat-containing umbilical hernia.      EC-ECHOCARDIOGRAM COMPLETE W/O CONT   Final Result      DX-HIP-COMPLETE - UNILATERAL 2+ LEFT   Final Result      1.  No definite acute osseous abnormality. In setting of demineralization, an occult fracture is difficult to exclude. If there is strong clinical suspicion, CT or MRI could be obtained for further evaluation.   2.  Postsurgical changes of the left femur with broken screw fragment redemonstrated.      DX-CHEST-PORTABLE (1 VIEW)   Final Result      No acute cardiopulmonary process is seen.       Atherosclerotic plaque.           Assessment/Plan  * Comfort measures only status- (present on admission)   Assessment & Plan    5/6 Denies having pain, does not appear to be in pain.  On comfort care measures, continue.    Still pending placement.       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  [facility] assisting.   Difficult discharge   -awaiting for placement      ST elevation myocardial infarction involving right coronary artery (HCC)- (present on admission)   Assessment & Plan    Declines any intervention or therapeutic modality  Continue with comfort care       Chronic kidney disease (CKD), stage III (moderate) (HCC)- (present on admission)   Assessment & Plan    Continue comfort care.         Essential hypertension- (present on admission)   Assessment & Plan    Comfort care       Fall- (present on admission)   Assessment & Plan    Continue fall precautions         VTE prophylaxis: Comfort care

## 2019-05-06 NOTE — PROGRESS NOTES
Per chart review, pt remains admitted to [facility] pending discharge plan. Inpatient discharge notes indicate continued planning for assisted living at [facility]. Per review and last discharge note on 04/30/49, discharge is pending determination from [facility] regarding approval/denial for placement.      Plan:  · MSW will continue to monitor at this time for continuity of care.

## 2019-05-06 NOTE — CARE PLAN
Problem: Safety  Goal: Will remain free from injury  Outcome: PROGRESSING AS EXPECTED  Remains free from injury. Bed alarm in place. Call light within reach and reminded to call when needing assistance, pt verbalized understanding.     Problem: Pain Management  Goal: Pain level will decrease to patient's comfort goal  Outcome: PROGRESSING AS EXPECTED  Patient denies any pain. Resting comfortably in bed.

## 2019-05-07 PROCEDURE — 99231 SBSQ HOSP IP/OBS SF/LOW 25: CPT | Performed by: INTERNAL MEDICINE

## 2019-05-07 PROCEDURE — A9270 NON-COVERED ITEM OR SERVICE: HCPCS | Performed by: HOSPITALIST

## 2019-05-07 PROCEDURE — 700102 HCHG RX REV CODE 250 W/ 637 OVERRIDE(OP): Performed by: HOSPITALIST

## 2019-05-07 PROCEDURE — 770001 HCHG ROOM/CARE - MED/SURG/GYN PRIV*

## 2019-05-07 RX ADMIN — SENNOSIDES AND DOCUSATE SODIUM 2 TABLET: 8.6; 5 TABLET ORAL at 07:11

## 2019-05-07 RX ADMIN — FUROSEMIDE 20 MG: 20 TABLET ORAL at 06:00

## 2019-05-07 NOTE — CARE PLAN
Problem: Safety  Goal: Will remain free from injury  Outcome: PROGRESSING AS EXPECTED  Bed alarm in place, calls for assistance as needed    Problem: Skin Integrity  Goal: Risk for impaired skin integrity will decrease  Outcome: PROGRESSING SLOWER THAN EXPECTED  Educated and encourgared to turn an reposition frequently, as violet refuses [id number] from staff       Vascular Surgery

## 2019-05-07 NOTE — PROGRESS NOTES
Assumed care of Nora at 0700. Nora is resting in bed and refuses to get oob for breakfast. Bed alarm in place, call light with in reach,

## 2019-05-08 PROCEDURE — A9270 NON-COVERED ITEM OR SERVICE: HCPCS | Performed by: HOSPITALIST

## 2019-05-08 PROCEDURE — 700102 HCHG RX REV CODE 250 W/ 637 OVERRIDE(OP): Performed by: HOSPITALIST

## 2019-05-08 PROCEDURE — 770001 HCHG ROOM/CARE - MED/SURG/GYN PRIV*

## 2019-05-08 PROCEDURE — 99231 SBSQ HOSP IP/OBS SF/LOW 25: CPT | Performed by: INTERNAL MEDICINE

## 2019-05-08 RX ADMIN — SENNOSIDES AND DOCUSATE SODIUM 2 TABLET: 8.6; 5 TABLET ORAL at 07:29

## 2019-05-08 RX ADMIN — FUROSEMIDE 20 MG: 20 TABLET ORAL at 07:29

## 2019-05-08 NOTE — PROGRESS NOTES
Received bedside shift report at 1900.     Pt resting in bed  No needs at this time     Call light and belongings within reach, bed down and locked, hourly rounding in progress.

## 2019-05-08 NOTE — CARE PLAN
Problem: Safety  Goal: Will remain free from injury  Outcome: PROGRESSING AS EXPECTED  Treaded socks in place, bed in the lowest position, bed alarm on, call light and belongings within reach, pt call for assistance appropriately    Problem: Mobility  Goal: Risk for activity intolerance will decrease  Outcome: PROGRESSING AS EXPECTED  Pt. up to commode standby assist with FWW.

## 2019-05-08 NOTE — PROGRESS NOTES
[facility] Medicine Daily Progress Note    Date of Service  4/10/19     Chief Complaint  89 y.o. female admitted 11/13/2018 with fall, weakness and found to have a STEMI.     Hospital Course      She was admitted to ICU on medical management. Family elected for comfort care.           Interval Problem Update  5/7. Sleeping but arousable. No new issues, not nauseated, no pain complaints.    Consultants/Specialty  Critical care signed off    Cardiology signed off      Code Status  Comfort care, DNR      Disposition  Hospice, pending group home placement. 11/20/18 quantiferon gold +, AFB negative.      Review of Systems  Review of Systems   Unable to perform ROS: Dementia      Physical Exam  Temp:  [36.2 °C (97.1 °F)-36.5 °C (97.7 °F)] 36.4 °C (97.6 °F)  Pulse:  [62-73] 73  Resp:  [15-16] 15  BP: (105-124)/(60-74) 124/74  SpO2:  [92 %-96 %] 96 %    Physical Exam   Constitutional: No distress.   Cachectic    HENT:   Head: Normocephalic and atraumatic.   Nose: Nose normal.   Mouth/Throat: Oropharynx is clear and moist. No oropharyngeal exudate.   Eyes: Pupils are equal, round, and reactive to light. Conjunctivae and EOM are normal. Right eye exhibits no discharge. Left eye exhibits no discharge. No scleral icterus.   Neck: Normal range of motion. Neck supple. No JVD present. No tracheal deviation present. No thyromegaly present.   Cardiovascular: Normal rate, regular rhythm and normal heart sounds.  Exam reveals no friction rub.    No murmur heard.  Pulmonary/Chest: Effort normal and breath sounds normal. No stridor. No respiratory distress. She has no wheezes. She has no rales.   Abdominal: Soft. Bowel sounds are normal. She exhibits no distension. There is no tenderness. There is no rebound.   Musculoskeletal: Normal range of motion. She exhibits no edema, tenderness or deformity.   Neurological: She is alert. No cranial nerve deficit. She exhibits normal muscle tone. Coordination normal.   Severe cognitive deficits    Skin: Skin is warm and dry. No rash noted. She is not diaphoretic. No erythema. There is pallor.   Psychiatric: She has a normal mood and affect.   Impaired judgment   Not irritable.       Fluids  No intake or output data in the 24 hours ending 05/07/19 1709    Laboratory                        Imaging  DX-CHEST-PORTABLE (1 VIEW)   Final Result         1.  Hazy interstitial left pulmonary opacities suggests interstitial edema or infiltrate, similar to prior.   2.  Trace left pleural effusion   3.  Hyperexpansion of lungs favors changes of COPD.      DX-CHEST-PORTABLE (1 VIEW)   Final Result         1.  Interstitial infiltrates or edema, stable.   2.  Atherosclerosis      DX-CHEST-PORTABLE (1 VIEW)   Final Result         1.  Interstitial infiltrates or edema.   2.  Atherosclerosis      DX-CHEST-PORTABLE (1 VIEW)   Final Result      Moderate interstitial edema or interstitial lung disease and small left pleural effusion similar to previous.      DX-CHEST-PORTABLE (1 VIEW)   Final Result      Stable interstitial edema and/or interstitial lung disease with probable small pleural fluid      DX-CHEST-PORTABLE (1 VIEW)   Final Result      Ill-defined infrahilar interstitial opacities, atelectasis versus mild edema. No significant pleural effusions.      DX-CHEST-PORTABLE (1 VIEW)   Final Result      Mild left basilar atelectasis, improved since prior. No new consolidation or large pleural effusions.      DX-CHEST-PORTABLE (1 VIEW)   Final Result      1.  Left basilar opacification may be secondary to atelectasis. Developing pneumonia cannot be excluded.         DX-CHEST-PORTABLE (1 VIEW)   Final Result      1.  Unchanged mild interstitial pulmonary edema   2.  Unchanged bibasilar atelectasis, alveolar edema or pneumonia      DX-CHEST-PORTABLE (1 VIEW)   Final Result      1.  Decreased pulmonary edema   2.  Unchanged bibasilar atelectasis, alveolar edema or pneumonia      DX-CHEST-PORTABLE (1 VIEW)   Final Result      1.   There is diffuse interstitial and alveolar density in the right mid and lower lung zone. This is increased since the previous study. This may represent mild pulmonary edema/consolidation.   2.  Mildly enlarged cardiomediastinal silhouette when compared with the previous chest x-ray.      DX-CHEST-PORTABLE (1 VIEW)   Final Result      Stable mild pulmonary edema.   New left basilar atelectasis.      DX-CHEST-PORTABLE (1 VIEW)   Final Result      Stable chest appearance compared with 11/16.      DX-CHEST-PORTABLE (1 VIEW)   Final Result      No acute cardiopulmonary abnormality identified.      DX-CHEST-PORTABLE (1 VIEW)   Final Result      No acute cardiopulmonary abnormality. No interval change.      DX-CHEST-PORTABLE (1 VIEW)   Final Result      No acute cardiopulmonary disease.      CT-HEAD W/O   Final Result      1.  No evidence of acute intracranial process.      2.  There is again seen interstitial pulmonary edema and bibasilar atelectasis.      CT-HIP W/O PLUS RECONS LEFT   Final Result      1.  No evidence of acute fracture or malalignment. No evidence of hardware failure or complication.   2.  Postsurgical changes of the left femur with broken screw fragment redemonstrated.   3.  Demineralization.   4.  Scattered colonic diverticula.   5.  Atherosclerosis.   6.  Small fat-containing umbilical hernia.      EC-ECHOCARDIOGRAM COMPLETE W/O CONT   Final Result      DX-HIP-COMPLETE - UNILATERAL 2+ LEFT   Final Result      1.  No definite acute osseous abnormality. In setting of demineralization, an occult fracture is difficult to exclude. If there is strong clinical suspicion, CT or MRI could be obtained for further evaluation.   2.  Postsurgical changes of the left femur with broken screw fragment redemonstrated.      DX-CHEST-PORTABLE (1 VIEW)   Final Result      No acute cardiopulmonary process is seen.      Atherosclerotic plaque.           Assessment/Plan  * Comfort measures only status- (present on  admission)   Assessment & Plan    5/6 Denies having pain, does not appear to be in pain.  On comfort care measures, continue.    Still pending placement.  5/7. Comfortable.       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  [facility] assisting.   Difficult discharge   -awaiting for placement      ST elevation myocardial infarction involving right coronary artery (HCC)- (present on admission)   Assessment & Plan    Declines any intervention or therapeutic modality  Continue with comfort care       Chronic kidney disease (CKD), stage III (moderate) (HCC)- (present on admission)   Assessment & Plan    Continue comfort care.         Essential hypertension- (present on admission)   Assessment & Plan    Comfort care       Fall- (present on admission)   Assessment & Plan    Continue fall precautions         VTE prophylaxis: Comfort care

## 2019-05-09 PROBLEM — R21 RASH: Status: ACTIVE | Noted: 2019-05-09

## 2019-05-09 PROCEDURE — 770001 HCHG ROOM/CARE - MED/SURG/GYN PRIV*

## 2019-05-09 PROCEDURE — 99232 SBSQ HOSP IP/OBS MODERATE 35: CPT | Performed by: INTERNAL MEDICINE

## 2019-05-09 NOTE — PROGRESS NOTES
[facility] Medicine Daily Progress Note    Date of Service  4/10/19     Chief Complaint  89 y.o. female admitted 11/13/2018 with fall, weakness and found to have a STEMI.     Hospital Course      She was admitted to ICU on medical management. Family elected for comfort care.           Interval Problem Update  5/7. Sleeping but arousable. No new issues, not nauseated, no pain complaints.  5/8. Sleeping and comfortable.    Consultants/Specialty  Critical care signed off    Cardiology signed off      Code Status  Comfort care, DNR      Disposition  Hospice, pending group home placement. 11/20/18 quantiferon gold +, AFB negative.      Review of Systems  Review of Systems   Unable to perform ROS: Dementia      Physical Exam  Temp:  [36.2 °C (97.2 °F)-36.4 °C (97.6 °F)] 36.4 °C (97.6 °F)  Pulse:  [65-67] 65  Resp:  [17-18] 18  BP: (103-123)/(62-77) 123/62  SpO2:  [93 %-94 %] 94 %    Physical Exam   Constitutional: No distress.   Cachectic    HENT:   Head: Normocephalic and atraumatic.   Nose: Nose normal.   Mouth/Throat: Oropharynx is clear and moist. No oropharyngeal exudate.   Eyes: Pupils are equal, round, and reactive to light. Conjunctivae and EOM are normal. Right eye exhibits no discharge. Left eye exhibits no discharge. No scleral icterus.   Neck: Normal range of motion. Neck supple. No JVD present. No tracheal deviation present. No thyromegaly present.   Cardiovascular: Normal rate, regular rhythm and normal heart sounds.  Exam reveals no friction rub.    No murmur heard.  Pulmonary/Chest: Effort normal and breath sounds normal. No stridor. No respiratory distress. She has no wheezes. She has no rales.   Abdominal: Soft. Bowel sounds are normal. She exhibits no distension. There is no tenderness. There is no rebound.   Musculoskeletal: Normal range of motion. She exhibits no edema, tenderness or deformity.   Neurological: She is alert. No cranial nerve deficit. She exhibits normal muscle tone. Coordination normal.    Severe cognitive deficits   Skin: Skin is warm and dry. No rash noted. She is not diaphoretic. No erythema. There is pallor.   Psychiatric: She has a normal mood and affect.   Impaired judgment   Pleasant       Fluids    Intake/Output Summary (Last 24 hours) at 05/08/19 1714  Last data filed at 05/08/19 1400   Gross per 24 hour   Intake              480 ml   Output                0 ml   Net              480 ml       Laboratory                        Imaging  DX-CHEST-PORTABLE (1 VIEW)   Final Result         1.  Hazy interstitial left pulmonary opacities suggests interstitial edema or infiltrate, similar to prior.   2.  Trace left pleural effusion   3.  Hyperexpansion of lungs favors changes of COPD.      DX-CHEST-PORTABLE (1 VIEW)   Final Result         1.  Interstitial infiltrates or edema, stable.   2.  Atherosclerosis      DX-CHEST-PORTABLE (1 VIEW)   Final Result         1.  Interstitial infiltrates or edema.   2.  Atherosclerosis      DX-CHEST-PORTABLE (1 VIEW)   Final Result      Moderate interstitial edema or interstitial lung disease and small left pleural effusion similar to previous.      DX-CHEST-PORTABLE (1 VIEW)   Final Result      Stable interstitial edema and/or interstitial lung disease with probable small pleural fluid      DX-CHEST-PORTABLE (1 VIEW)   Final Result      Ill-defined infrahilar interstitial opacities, atelectasis versus mild edema. No significant pleural effusions.      DX-CHEST-PORTABLE (1 VIEW)   Final Result      Mild left basilar atelectasis, improved since prior. No new consolidation or large pleural effusions.      DX-CHEST-PORTABLE (1 VIEW)   Final Result      1.  Left basilar opacification may be secondary to atelectasis. Developing pneumonia cannot be excluded.         DX-CHEST-PORTABLE (1 VIEW)   Final Result      1.  Unchanged mild interstitial pulmonary edema   2.  Unchanged bibasilar atelectasis, alveolar edema or pneumonia      DX-CHEST-PORTABLE (1 VIEW)   Final Result       1.  Decreased pulmonary edema   2.  Unchanged bibasilar atelectasis, alveolar edema or pneumonia      DX-CHEST-PORTABLE (1 VIEW)   Final Result      1.  There is diffuse interstitial and alveolar density in the right mid and lower lung zone. This is increased since the previous study. This may represent mild pulmonary edema/consolidation.   2.  Mildly enlarged cardiomediastinal silhouette when compared with the previous chest x-ray.      DX-CHEST-PORTABLE (1 VIEW)   Final Result      Stable mild pulmonary edema.   New left basilar atelectasis.      DX-CHEST-PORTABLE (1 VIEW)   Final Result      Stable chest appearance compared with 11/16.      DX-CHEST-PORTABLE (1 VIEW)   Final Result      No acute cardiopulmonary abnormality identified.      DX-CHEST-PORTABLE (1 VIEW)   Final Result      No acute cardiopulmonary abnormality. No interval change.      DX-CHEST-PORTABLE (1 VIEW)   Final Result      No acute cardiopulmonary disease.      CT-HEAD W/O   Final Result      1.  No evidence of acute intracranial process.      2.  There is again seen interstitial pulmonary edema and bibasilar atelectasis.      CT-HIP W/O PLUS RECONS LEFT   Final Result      1.  No evidence of acute fracture or malalignment. No evidence of hardware failure or complication.   2.  Postsurgical changes of the left femur with broken screw fragment redemonstrated.   3.  Demineralization.   4.  Scattered colonic diverticula.   5.  Atherosclerosis.   6.  Small fat-containing umbilical hernia.      EC-ECHOCARDIOGRAM COMPLETE W/O CONT   Final Result      DX-HIP-COMPLETE - UNILATERAL 2+ LEFT   Final Result      1.  No definite acute osseous abnormality. In setting of demineralization, an occult fracture is difficult to exclude. If there is strong clinical suspicion, CT or MRI could be obtained for further evaluation.   2.  Postsurgical changes of the left femur with broken screw fragment redemonstrated.      DX-CHEST-PORTABLE (1 VIEW)   Final  Result      No acute cardiopulmonary process is seen.      Atherosclerotic plaque.           Assessment/Plan  * Comfort measures only status- (present on admission)   Assessment & Plan    5/6 Denies having pain, does not appear to be in pain.  On comfort care measures, continue.    Still pending placement.  5/7. Comfortable.       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  [facility] assisting.   Difficult discharge   -awaiting for placement      ST elevation myocardial infarction involving right coronary artery (HCC)- (present on admission)   Assessment & Plan    Declines any intervention or therapeutic modality  Continue with comfort care       Chronic kidney disease (CKD), stage III (moderate) (HCC)- (present on admission)   Assessment & Plan    Continue comfort care.         Essential hypertension- (present on admission)   Assessment & Plan    Comfort care       Fall- (present on admission)   Assessment & Plan    Continue fall precautions         VTE prophylaxis: Comfort care

## 2019-05-09 NOTE — DISCHARGE PLANNING
Anticipated Discharge Disposition: [facility] vs [facility]    Action: LSW left VM with the  asking to have Cameron call this LSW back to discuss possible placement of the Pt.    Barriers to Discharge: placement    Plan: follow up with Nate Amezquita

## 2019-05-09 NOTE — CARE PLAN
Problem: Safety  Goal: Will remain free from injury  Outcome: PROGRESSING AS EXPECTED  Hourly rounds done. Call light within reach. Needs attended on a timely manner. Treaded socks on when OOB. Educated on the importance of calling for assistance when attempting to be OOB.  BED ALARM ON.    Problem: Bowel/Gastric:  Goal: Normal bowel function is maintained or improved  Outcome: PROGRESSING SLOWER THAN EXPECTED  Last documented BM 5/5/19. Pt refusing stool softeners. Offered prune juice    Problem: Mobility  Goal: Risk for activity intolerance will decrease  Outcome: PROGRESSING AS EXPECTED  Encourage to early mobilization >50ft 3x daily. Up for all meals.  Up SBA using a  4WW when OOB

## 2019-05-09 NOTE — PROGRESS NOTES
[facility] Medicine Daily Progress Note    Date of Service  4/10/19     Chief Complaint  89 y.o. female admitted 11/13/2018 with fall, weakness and found to have a STEMI.     Hospital Course      She was admitted to ICU on medical management. Family elected for comfort care.           Interval Problem Update  5/7. Sleeping but arousable. No new issues, not nauseated, no pain complaints.  5/8. Sleeping and comfortable.  5/9. Sleeping but arousable. Itchy rash noted on shin.    Consultants/Specialty  Critical care signed off    Cardiology signed off      Code Status  Comfort care, DNR      Disposition  Hospice, pending group home placement. 11/20/18 quantiferon gold +, AFB negative.      Review of Systems  Review of Systems   Unable to perform ROS: Dementia      Physical Exam  Temp:  [36.2 °C (97.2 °F)-36.8 °C (98.2 °F)] 36.5 °C (97.7 °F)  Pulse:  [64-72] 64  Resp:  [16-18] 16  BP: (108-121)/(54-76) 119/54  SpO2:  [92 %-96 %] 92 %    Physical Exam   Constitutional: No distress.   Cachectic    HENT:   Head: Normocephalic and atraumatic.   Nose: Nose normal.   Mouth/Throat: Oropharynx is clear and moist. No oropharyngeal exudate.   Eyes: Pupils are equal, round, and reactive to light. Conjunctivae and EOM are normal. Right eye exhibits no discharge. Left eye exhibits no discharge. No scleral icterus.   Neck: Normal range of motion. Neck supple. No JVD present. No tracheal deviation present. No thyromegaly present.   Cardiovascular: Normal rate, regular rhythm and normal heart sounds.  Exam reveals no friction rub.    No murmur heard.  Pulmonary/Chest: Effort normal and breath sounds normal. No stridor. No respiratory distress. She has no wheezes. She has no rales.   Abdominal: Soft. Bowel sounds are normal. She exhibits no distension. There is no tenderness. There is no rebound.   Musculoskeletal: Normal range of motion. She exhibits no edema, tenderness or deformity.   Neurological: She is alert. No cranial nerve  deficit. She exhibits normal muscle tone. Coordination normal.   Severe cognitive deficits   Skin: Skin is warm and dry. No rash (shin) noted. She is not diaphoretic. No erythema. There is pallor.   Psychiatric: She has a normal mood and affect.   Impaired judgment   Pleasant       Fluids    Intake/Output Summary (Last 24 hours) at 05/09/19 1529  Last data filed at 05/09/19 1400   Gross per 24 hour   Intake              780 ml   Output                0 ml   Net              780 ml       Laboratory                        Imaging  DX-CHEST-PORTABLE (1 VIEW)   Final Result         1.  Hazy interstitial left pulmonary opacities suggests interstitial edema or infiltrate, similar to prior.   2.  Trace left pleural effusion   3.  Hyperexpansion of lungs favors changes of COPD.      DX-CHEST-PORTABLE (1 VIEW)   Final Result         1.  Interstitial infiltrates or edema, stable.   2.  Atherosclerosis      DX-CHEST-PORTABLE (1 VIEW)   Final Result         1.  Interstitial infiltrates or edema.   2.  Atherosclerosis      DX-CHEST-PORTABLE (1 VIEW)   Final Result      Moderate interstitial edema or interstitial lung disease and small left pleural effusion similar to previous.      DX-CHEST-PORTABLE (1 VIEW)   Final Result      Stable interstitial edema and/or interstitial lung disease with probable small pleural fluid      DX-CHEST-PORTABLE (1 VIEW)   Final Result      Ill-defined infrahilar interstitial opacities, atelectasis versus mild edema. No significant pleural effusions.      DX-CHEST-PORTABLE (1 VIEW)   Final Result      Mild left basilar atelectasis, improved since prior. No new consolidation or large pleural effusions.      DX-CHEST-PORTABLE (1 VIEW)   Final Result      1.  Left basilar opacification may be secondary to atelectasis. Developing pneumonia cannot be excluded.         DX-CHEST-PORTABLE (1 VIEW)   Final Result      1.  Unchanged mild interstitial pulmonary edema   2.  Unchanged bibasilar atelectasis,  alveolar edema or pneumonia      DX-CHEST-PORTABLE (1 VIEW)   Final Result      1.  Decreased pulmonary edema   2.  Unchanged bibasilar atelectasis, alveolar edema or pneumonia      DX-CHEST-PORTABLE (1 VIEW)   Final Result      1.  There is diffuse interstitial and alveolar density in the right mid and lower lung zone. This is increased since the previous study. This may represent mild pulmonary edema/consolidation.   2.  Mildly enlarged cardiomediastinal silhouette when compared with the previous chest x-ray.      DX-CHEST-PORTABLE (1 VIEW)   Final Result      Stable mild pulmonary edema.   New left basilar atelectasis.      DX-CHEST-PORTABLE (1 VIEW)   Final Result      Stable chest appearance compared with 11/16.      DX-CHEST-PORTABLE (1 VIEW)   Final Result      No acute cardiopulmonary abnormality identified.      DX-CHEST-PORTABLE (1 VIEW)   Final Result      No acute cardiopulmonary abnormality. No interval change.      DX-CHEST-PORTABLE (1 VIEW)   Final Result      No acute cardiopulmonary disease.      CT-HEAD W/O   Final Result      1.  No evidence of acute intracranial process.      2.  There is again seen interstitial pulmonary edema and bibasilar atelectasis.      CT-HIP W/O PLUS RECONS LEFT   Final Result      1.  No evidence of acute fracture or malalignment. No evidence of hardware failure or complication.   2.  Postsurgical changes of the left femur with broken screw fragment redemonstrated.   3.  Demineralization.   4.  Scattered colonic diverticula.   5.  Atherosclerosis.   6.  Small fat-containing umbilical hernia.      EC-ECHOCARDIOGRAM COMPLETE W/O CONT   Final Result      DX-HIP-COMPLETE - UNILATERAL 2+ LEFT   Final Result      1.  No definite acute osseous abnormality. In setting of demineralization, an occult fracture is difficult to exclude. If there is strong clinical suspicion, CT or MRI could be obtained for further evaluation.   2.  Postsurgical changes of the left femur with broken  screw fragment redemonstrated.      DX-CHEST-PORTABLE (1 VIEW)   Final Result      No acute cardiopulmonary process is seen.      Atherosclerotic plaque.           Assessment/Plan  * Comfort measures only status- (present on admission)   Assessment & Plan    5/6 Denies having pain, does not appear to be in pain.  On comfort care measures, continue.    Still pending placement.  5/8. Pleasant. Not irritable.     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  [facility] assisting.   Difficult discharge   -awaiting for placement      ST elevation myocardial infarction involving right coronary artery (HCC)- (present on admission)   Assessment & Plan    Declines any intervention or therapeutic modality  Continue with comfort care       Rash   Assessment & Plan    She seems to be sscratching her shin  Ordered PRN Benadryl cream to apply at that rash     Chronic kidney disease (CKD), stage III (moderate) (HCC)- (present on admission)   Assessment & Plan    Continue comfort care.         Essential hypertension- (present on admission)   Assessment & Plan    Comfort care       Fall- (present on admission)   Assessment & Plan    Continue fall precautions         VTE prophylaxis: Comfort care

## 2019-05-10 PROCEDURE — 99231 SBSQ HOSP IP/OBS SF/LOW 25: CPT | Performed by: INTERNAL MEDICINE

## 2019-05-10 PROCEDURE — 770001 HCHG ROOM/CARE - MED/SURG/GYN PRIV*

## 2019-05-10 NOTE — CARE PLAN
Problem: Safety  Goal: Will remain free from injury  Outcome: PROGRESSING AS EXPECTED  Hourly rounds done. Call light within reach. Needs attended on a timely manner. Treaded socks on when OOB. Educated on the importance of calling for assistance when attempting to be OOB.  BED ALARM ON.    Problem: Mobility  Goal: Risk for activity intolerance will decrease  Outcome: PROGRESSING AS EXPECTED  Encourage to early mobilization >50ft 3x daily. Up for all meals.  Up SBA  With SBA using a 4WW when OOB

## 2019-05-11 PROCEDURE — A9270 NON-COVERED ITEM OR SERVICE: HCPCS | Performed by: HOSPITALIST

## 2019-05-11 PROCEDURE — 99231 SBSQ HOSP IP/OBS SF/LOW 25: CPT | Performed by: INTERNAL MEDICINE

## 2019-05-11 PROCEDURE — 700102 HCHG RX REV CODE 250 W/ 637 OVERRIDE(OP): Performed by: HOSPITALIST

## 2019-05-11 PROCEDURE — 770001 HCHG ROOM/CARE - MED/SURG/GYN PRIV*

## 2019-05-11 RX ADMIN — SENNOSIDES AND DOCUSATE SODIUM 2 TABLET: 8.6; 5 TABLET ORAL at 07:26

## 2019-05-11 NOTE — PROGRESS NOTES
[facility] Medicine Daily Progress Note    Date of Service  4/10/19     Chief Complaint  89 y.o. female admitted 11/13/2018 with fall, weakness and found to have a STEMI.     Hospital Course      She was admitted to ICU on medical management. Family elected for comfort care.           Interval Problem Update  5/7. Sleeping but arousable. No new issues, not nauseated, no pain complaints.  5/8. Sleeping and comfortable.  5/9. Sleeping but arousable. Itchy rash noted on shin.  5/10. Eating breakfast. Good mood. Rash improved, scabbed.     Consultants/Specialty  Critical care signed off    Cardiology signed off      Code Status  Comfort care, DNR      Disposition  Hospice, pending group home placement. 11/20/18 quantiferon gold +, AFB negative.      Review of Systems  Review of Systems   Unable to perform ROS: Dementia      Physical Exam  Temp:  [36.2 °C (97.2 °F)-36.3 °C (97.4 °F)] 36.3 °C (97.3 °F)  Pulse:  [67-75] 75  Resp:  [18] 18  BP: (106-123)/(63-67) 107/67  SpO2:  [92 %-95 %] 95 %    Physical Exam   Constitutional: No distress.   Cachectic    HENT:   Head: Normocephalic and atraumatic.   Nose: Nose normal.   Mouth/Throat: Oropharynx is clear and moist. No oropharyngeal exudate.   Eyes: Pupils are equal, round, and reactive to light. Conjunctivae and EOM are normal. Right eye exhibits no discharge. Left eye exhibits no discharge. No scleral icterus.   Neck: Normal range of motion. Neck supple. No JVD present. No tracheal deviation present. No thyromegaly present.   Cardiovascular: Normal rate, regular rhythm and normal heart sounds.  Exam reveals no friction rub.    No murmur heard.  Pulmonary/Chest: Effort normal and breath sounds normal. No stridor. No respiratory distress. She has no wheezes. She has no rales.   Abdominal: Soft. Bowel sounds are normal. She exhibits no distension. There is no tenderness. There is no rebound.   Musculoskeletal: Normal range of motion. She exhibits no edema, tenderness or  deformity.   Neurological: She is alert. No cranial nerve deficit. She exhibits normal muscle tone. Coordination normal.   Severe cognitive deficits   Skin: Skin is warm and dry. No rash (shin, now scabs and improved) noted. She is not diaphoretic. No erythema. There is pallor.   Psychiatric: She has a normal mood and affect.   Impaired judgment   Pleasant       Fluids    Intake/Output Summary (Last 24 hours) at 05/10/19 1759  Last data filed at 05/10/19 1200   Gross per 24 hour   Intake              840 ml   Output                0 ml   Net              840 ml       Laboratory                        Imaging  DX-CHEST-PORTABLE (1 VIEW)   Final Result         1.  Hazy interstitial left pulmonary opacities suggests interstitial edema or infiltrate, similar to prior.   2.  Trace left pleural effusion   3.  Hyperexpansion of lungs favors changes of COPD.      DX-CHEST-PORTABLE (1 VIEW)   Final Result         1.  Interstitial infiltrates or edema, stable.   2.  Atherosclerosis      DX-CHEST-PORTABLE (1 VIEW)   Final Result         1.  Interstitial infiltrates or edema.   2.  Atherosclerosis      DX-CHEST-PORTABLE (1 VIEW)   Final Result      Moderate interstitial edema or interstitial lung disease and small left pleural effusion similar to previous.      DX-CHEST-PORTABLE (1 VIEW)   Final Result      Stable interstitial edema and/or interstitial lung disease with probable small pleural fluid      DX-CHEST-PORTABLE (1 VIEW)   Final Result      Ill-defined infrahilar interstitial opacities, atelectasis versus mild edema. No significant pleural effusions.      DX-CHEST-PORTABLE (1 VIEW)   Final Result      Mild left basilar atelectasis, improved since prior. No new consolidation or large pleural effusions.      DX-CHEST-PORTABLE (1 VIEW)   Final Result      1.  Left basilar opacification may be secondary to atelectasis. Developing pneumonia cannot be excluded.         DX-CHEST-PORTABLE (1 VIEW)   Final Result      1.   Unchanged mild interstitial pulmonary edema   2.  Unchanged bibasilar atelectasis, alveolar edema or pneumonia      DX-CHEST-PORTABLE (1 VIEW)   Final Result      1.  Decreased pulmonary edema   2.  Unchanged bibasilar atelectasis, alveolar edema or pneumonia      DX-CHEST-PORTABLE (1 VIEW)   Final Result      1.  There is diffuse interstitial and alveolar density in the right mid and lower lung zone. This is increased since the previous study. This may represent mild pulmonary edema/consolidation.   2.  Mildly enlarged cardiomediastinal silhouette when compared with the previous chest x-ray.      DX-CHEST-PORTABLE (1 VIEW)   Final Result      Stable mild pulmonary edema.   New left basilar atelectasis.      DX-CHEST-PORTABLE (1 VIEW)   Final Result      Stable chest appearance compared with 11/16.      DX-CHEST-PORTABLE (1 VIEW)   Final Result      No acute cardiopulmonary abnormality identified.      DX-CHEST-PORTABLE (1 VIEW)   Final Result      No acute cardiopulmonary abnormality. No interval change.      DX-CHEST-PORTABLE (1 VIEW)   Final Result      No acute cardiopulmonary disease.      CT-HEAD W/O   Final Result      1.  No evidence of acute intracranial process.      2.  There is again seen interstitial pulmonary edema and bibasilar atelectasis.      CT-HIP W/O PLUS RECONS LEFT   Final Result      1.  No evidence of acute fracture or malalignment. No evidence of hardware failure or complication.   2.  Postsurgical changes of the left femur with broken screw fragment redemonstrated.   3.  Demineralization.   4.  Scattered colonic diverticula.   5.  Atherosclerosis.   6.  Small fat-containing umbilical hernia.      EC-ECHOCARDIOGRAM COMPLETE W/O CONT   Final Result      DX-HIP-COMPLETE - UNILATERAL 2+ LEFT   Final Result      1.  No definite acute osseous abnormality. In setting of demineralization, an occult fracture is difficult to exclude. If there is strong clinical suspicion, CT or MRI could be obtained  for further evaluation.   2.  Postsurgical changes of the left femur with broken screw fragment redemonstrated.      DX-CHEST-PORTABLE (1 VIEW)   Final Result      No acute cardiopulmonary process is seen.      Atherosclerotic plaque.           Assessment/Plan  * Comfort measures only status- (present on admission)   Assessment & Plan    5/6 Denies having pain, does not appear to be in pain.  On comfort care measures, continue.    Still pending placement.  5/10. Awake, pleasant. Promises not to scratch her shins.     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  [facility] assisting.   Difficult discharge   -awaiting for placement      ST elevation myocardial infarction involving right coronary artery (HCC)- (present on admission)   Assessment & Plan    Declines any intervention or therapeutic modality  Continue with comfort care       Rash   Assessment & Plan    She seems to be sscratching her shin  Ordered PRN Benadryl cream to apply at that rash     Chronic kidney disease (CKD), stage III (moderate) (HCC)- (present on admission)   Assessment & Plan    Continue comfort care.         Essential hypertension- (present on admission)   Assessment & Plan    Comfort care       Fall- (present on admission)   Assessment & Plan    Continue fall precautions         VTE prophylaxis: Comfort care

## 2019-05-11 NOTE — CARE PLAN
Problem: Safety  Goal: Will remain free from injury  Outcome: PROGRESSING AS EXPECTED  Bed in low locked position. Bed alarm in place. Room free of clutter and well lit. DME out of sight. Pt room near nursing station     Problem: Venous Thromboembolism (VTW)/Deep Vein Thrombosis (DVT) Prevention:  Goal: Patient will participate in Venous Thrombosis (VTE)/Deep Vein Thrombosis (DVT)Prevention Measures  Outcome: PROGRESSING SLOWER THAN EXPECTED  Pt refusing scds despite education (pt is comfort care). Educated imprtance of ankle ROM pt demonstrated ability to perform ankle ROM.

## 2019-05-12 PROCEDURE — 770001 HCHG ROOM/CARE - MED/SURG/GYN PRIV*

## 2019-05-12 PROCEDURE — A9270 NON-COVERED ITEM OR SERVICE: HCPCS | Performed by: HOSPITALIST

## 2019-05-12 PROCEDURE — 700102 HCHG RX REV CODE 250 W/ 637 OVERRIDE(OP): Performed by: HOSPITALIST

## 2019-05-12 PROCEDURE — 99231 SBSQ HOSP IP/OBS SF/LOW 25: CPT | Performed by: INTERNAL MEDICINE

## 2019-05-12 RX ADMIN — FUROSEMIDE 20 MG: 20 TABLET ORAL at 07:10

## 2019-05-12 RX ADMIN — SENNOSIDES AND DOCUSATE SODIUM 2 TABLET: 8.6; 5 TABLET ORAL at 07:10

## 2019-05-12 NOTE — PROGRESS NOTES
[facility] Medicine Daily Progress Note    Date of Service  4/10/19     Chief Complaint  89 y.o. female admitted 11/13/2018 with fall, weakness and found to have a STEMI.     Hospital Course      She was admitted to ICU on medical management. Family elected for comfort care.           Interval Problem Update  5/7. Sleeping but arousable. No new issues, not nauseated, no pain complaints.  5/8. Sleeping and comfortable.  5/9. Sleeping but arousable. Itchy rash noted on shin.  5/10. Eating breakfast. Good mood. Rash improved, scabbed.   5/11. Sleeping comfortably    Consultants/Specialty  Critical care signed off    Cardiology signed off      Code Status  Comfort care, DNR      Disposition  Hospice, pending group home placement. 11/20/18 quantiferon gold +, AFB negative.      Review of Systems  Review of Systems   Unable to perform ROS: Dementia      Physical Exam  Temp:  [36.3 °C (97.3 °F)-36.4 °C (97.6 °F)] 36.4 °C (97.6 °F)  Pulse:  [70-72] 70  Resp:  [16] 16  BP: ()/(55-69) 96/55  SpO2:  [92 %-96 %] 92 %    Physical Exam   Constitutional: No distress.   Cachectic    HENT:   Head: Normocephalic and atraumatic.   Nose: Nose normal.   Mouth/Throat: Oropharynx is clear and moist. No oropharyngeal exudate.   Eyes: Pupils are equal, round, and reactive to light. Conjunctivae and EOM are normal. Right eye exhibits no discharge. Left eye exhibits no discharge. No scleral icterus.   Neck: Normal range of motion. Neck supple. No JVD present. No tracheal deviation present. No thyromegaly present.   Cardiovascular: Normal rate, regular rhythm and normal heart sounds.  Exam reveals no friction rub.    No murmur heard.  Pulmonary/Chest: Effort normal and breath sounds normal. No stridor. No respiratory distress. She has no wheezes. She has no rales.   Abdominal: Soft. Bowel sounds are normal. She exhibits no distension. There is no tenderness. There is no rebound.   Musculoskeletal: Normal range of motion. She exhibits no  edema, tenderness or deformity.   Neurological: She is alert. No cranial nerve deficit. She exhibits normal muscle tone. Coordination normal.   Severe cognitive deficits   Skin: Skin is warm and dry. No rash (shin, now scabs and improved) noted. She is not diaphoretic. No erythema. There is pallor.   Psychiatric: She has a normal mood and affect.   Impaired judgment   Cooperative       Fluids    Intake/Output Summary (Last 24 hours) at 05/11/19 1832  Last data filed at 05/10/19 2000   Gross per 24 hour   Intake              200 ml   Output                0 ml   Net              200 ml       Laboratory                        Imaging  DX-CHEST-PORTABLE (1 VIEW)   Final Result         1.  Hazy interstitial left pulmonary opacities suggests interstitial edema or infiltrate, similar to prior.   2.  Trace left pleural effusion   3.  Hyperexpansion of lungs favors changes of COPD.      DX-CHEST-PORTABLE (1 VIEW)   Final Result         1.  Interstitial infiltrates or edema, stable.   2.  Atherosclerosis      DX-CHEST-PORTABLE (1 VIEW)   Final Result         1.  Interstitial infiltrates or edema.   2.  Atherosclerosis      DX-CHEST-PORTABLE (1 VIEW)   Final Result      Moderate interstitial edema or interstitial lung disease and small left pleural effusion similar to previous.      DX-CHEST-PORTABLE (1 VIEW)   Final Result      Stable interstitial edema and/or interstitial lung disease with probable small pleural fluid      DX-CHEST-PORTABLE (1 VIEW)   Final Result      Ill-defined infrahilar interstitial opacities, atelectasis versus mild edema. No significant pleural effusions.      DX-CHEST-PORTABLE (1 VIEW)   Final Result      Mild left basilar atelectasis, improved since prior. No new consolidation or large pleural effusions.      DX-CHEST-PORTABLE (1 VIEW)   Final Result      1.  Left basilar opacification may be secondary to atelectasis. Developing pneumonia cannot be excluded.         DX-CHEST-PORTABLE (1 VIEW)    Final Result      1.  Unchanged mild interstitial pulmonary edema   2.  Unchanged bibasilar atelectasis, alveolar edema or pneumonia      DX-CHEST-PORTABLE (1 VIEW)   Final Result      1.  Decreased pulmonary edema   2.  Unchanged bibasilar atelectasis, alveolar edema or pneumonia      DX-CHEST-PORTABLE (1 VIEW)   Final Result      1.  There is diffuse interstitial and alveolar density in the right mid and lower lung zone. This is increased since the previous study. This may represent mild pulmonary edema/consolidation.   2.  Mildly enlarged cardiomediastinal silhouette when compared with the previous chest x-ray.      DX-CHEST-PORTABLE (1 VIEW)   Final Result      Stable mild pulmonary edema.   New left basilar atelectasis.      DX-CHEST-PORTABLE (1 VIEW)   Final Result      Stable chest appearance compared with 11/16.      DX-CHEST-PORTABLE (1 VIEW)   Final Result      No acute cardiopulmonary abnormality identified.      DX-CHEST-PORTABLE (1 VIEW)   Final Result      No acute cardiopulmonary abnormality. No interval change.      DX-CHEST-PORTABLE (1 VIEW)   Final Result      No acute cardiopulmonary disease.      CT-HEAD W/O   Final Result      1.  No evidence of acute intracranial process.      2.  There is again seen interstitial pulmonary edema and bibasilar atelectasis.      CT-HIP W/O PLUS RECONS LEFT   Final Result      1.  No evidence of acute fracture or malalignment. No evidence of hardware failure or complication.   2.  Postsurgical changes of the left femur with broken screw fragment redemonstrated.   3.  Demineralization.   4.  Scattered colonic diverticula.   5.  Atherosclerosis.   6.  Small fat-containing umbilical hernia.      EC-ECHOCARDIOGRAM COMPLETE W/O CONT   Final Result      DX-HIP-COMPLETE - UNILATERAL 2+ LEFT   Final Result      1.  No definite acute osseous abnormality. In setting of demineralization, an occult fracture is difficult to exclude. If there is strong clinical suspicion, CT  or MRI could be obtained for further evaluation.   2.  Postsurgical changes of the left femur with broken screw fragment redemonstrated.      DX-CHEST-PORTABLE (1 VIEW)   Final Result      No acute cardiopulmonary process is seen.      Atherosclerotic plaque.           Assessment/Plan  * Comfort measures only status- (present on admission)   Assessment & Plan    5/6 Denies having pain, does not appear to be in pain.  On comfort care measures, continue.    Still pending placement.  5/10. Sleeping but arousable. Good mood.     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  [facility] assisting.   Difficult discharge   -awaiting for placement      ST elevation myocardial infarction involving right coronary artery (HCC)- (present on admission)   Assessment & Plan    Declines any intervention or therapeutic modality  Continue with comfort care       Rash   Assessment & Plan    She seems to be sscratching her shin  Ordered PRN Benadryl cream to apply at that rash     Chronic kidney disease (CKD), stage III (moderate) (HCC)- (present on admission)   Assessment & Plan    Continue comfort care.         Essential hypertension- (present on admission)   Assessment & Plan    Comfort care       Fall- (present on admission)   Assessment & Plan    Continue fall precautions         VTE prophylaxis: Comfort care

## 2019-05-12 NOTE — CARE PLAN
Problem: Skin Integrity  Goal: Risk for impaired skin integrity will decrease  Outcome: PROGRESSING AS EXPECTED  Skin is in tact

## 2019-05-12 NOTE — CARE PLAN
Problem: Mobility  Goal: Risk for activity intolerance will decrease    Intervention: Encourage patient to increase activity level in collaboration with Interdisciplinary Team  Patient encouraged to mobilize at a level comfortable for her, and to continue getting out of bed to commode or bathroom to void.      Problem: Urinary Elimination:  Goal: Ability to reestablish a normal urinary elimination pattern will improve    Intervention: Assist patient to sit on commode or toilet for voiding  Patient assisted to commode or toilet for voiding.

## 2019-05-12 NOTE — PROGRESS NOTES
[facility] Medicine Daily Progress Note    Date of Service  4/10/19     Chief Complaint  89 y.o. female admitted 11/13/2018 with fall, weakness and found to have a STEMI.     Hospital Course      She was admitted to ICU on medical management. Family elected for comfort care.           Interval Problem Update  5/7. Sleeping but arousable. No new issues, not nauseated, no pain complaints.  5/8. Sleeping and comfortable.  5/9. Sleeping but arousable. Itchy rash noted on shin.  5/10. Eating breakfast. Good mood. Rash improved, scabbed.   5/11. Sleeping comfortably  5/12. Tolerated meals.    Consultants/Specialty  Critical care signed off    Cardiology signed off      Code Status  Comfort care, DNR      Disposition  Hospice, pending group home placement. 11/20/18 quantiferon gold +, AFB negative.      Review of Systems  Review of Systems   Unable to perform ROS: Dementia      Physical Exam  Temp:  [36.5 °C (97.7 °F)] 36.5 °C (97.7 °F)  Pulse:  [72] 72  Resp:  [16] 16  BP: (110)/(65) 110/65  SpO2:  [94 %] 94 %    Physical Exam   Constitutional: No distress.   Cachectic    HENT:   Head: Normocephalic and atraumatic.   Nose: Nose normal.   Mouth/Throat: Oropharynx is clear and moist. No oropharyngeal exudate.   Eyes: Pupils are equal, round, and reactive to light. Conjunctivae and EOM are normal. Right eye exhibits no discharge. Left eye exhibits no discharge. No scleral icterus.   Neck: Normal range of motion. Neck supple. No JVD present. No tracheal deviation present. No thyromegaly present.   Cardiovascular: Normal rate, regular rhythm and normal heart sounds.  Exam reveals no friction rub.    No murmur heard.  Pulmonary/Chest: Effort normal and breath sounds normal. No stridor. No respiratory distress. She has no wheezes. She has no rales.   Abdominal: Soft. Bowel sounds are normal. She exhibits no distension. There is no tenderness. There is no rebound.   Musculoskeletal: Normal range of motion. She exhibits no edema,  tenderness or deformity.   Neurological: She is alert. No cranial nerve deficit. She exhibits normal muscle tone. Coordination normal.   Severe cognitive deficits, unchanged   Skin: Skin is warm and dry. No rash (shin, now scabs and improved) noted. She is not diaphoretic. No erythema. There is pallor.   Psychiatric: She has a normal mood and affect.   Impaired judgment   Cheerful       Fluids  No intake or output data in the 24 hours ending 05/12/19 1626    Laboratory                        Imaging  DX-CHEST-PORTABLE (1 VIEW)   Final Result         1.  Hazy interstitial left pulmonary opacities suggests interstitial edema or infiltrate, similar to prior.   2.  Trace left pleural effusion   3.  Hyperexpansion of lungs favors changes of COPD.      DX-CHEST-PORTABLE (1 VIEW)   Final Result         1.  Interstitial infiltrates or edema, stable.   2.  Atherosclerosis      DX-CHEST-PORTABLE (1 VIEW)   Final Result         1.  Interstitial infiltrates or edema.   2.  Atherosclerosis      DX-CHEST-PORTABLE (1 VIEW)   Final Result      Moderate interstitial edema or interstitial lung disease and small left pleural effusion similar to previous.      DX-CHEST-PORTABLE (1 VIEW)   Final Result      Stable interstitial edema and/or interstitial lung disease with probable small pleural fluid      DX-CHEST-PORTABLE (1 VIEW)   Final Result      Ill-defined infrahilar interstitial opacities, atelectasis versus mild edema. No significant pleural effusions.      DX-CHEST-PORTABLE (1 VIEW)   Final Result      Mild left basilar atelectasis, improved since prior. No new consolidation or large pleural effusions.      DX-CHEST-PORTABLE (1 VIEW)   Final Result      1.  Left basilar opacification may be secondary to atelectasis. Developing pneumonia cannot be excluded.         DX-CHEST-PORTABLE (1 VIEW)   Final Result      1.  Unchanged mild interstitial pulmonary edema   2.  Unchanged bibasilar atelectasis, alveolar edema or pneumonia       DX-CHEST-PORTABLE (1 VIEW)   Final Result      1.  Decreased pulmonary edema   2.  Unchanged bibasilar atelectasis, alveolar edema or pneumonia      DX-CHEST-PORTABLE (1 VIEW)   Final Result      1.  There is diffuse interstitial and alveolar density in the right mid and lower lung zone. This is increased since the previous study. This may represent mild pulmonary edema/consolidation.   2.  Mildly enlarged cardiomediastinal silhouette when compared with the previous chest x-ray.      DX-CHEST-PORTABLE (1 VIEW)   Final Result      Stable mild pulmonary edema.   New left basilar atelectasis.      DX-CHEST-PORTABLE (1 VIEW)   Final Result      Stable chest appearance compared with 11/16.      DX-CHEST-PORTABLE (1 VIEW)   Final Result      No acute cardiopulmonary abnormality identified.      DX-CHEST-PORTABLE (1 VIEW)   Final Result      No acute cardiopulmonary abnormality. No interval change.      DX-CHEST-PORTABLE (1 VIEW)   Final Result      No acute cardiopulmonary disease.      CT-HEAD W/O   Final Result      1.  No evidence of acute intracranial process.      2.  There is again seen interstitial pulmonary edema and bibasilar atelectasis.      CT-HIP W/O PLUS RECONS LEFT   Final Result      1.  No evidence of acute fracture or malalignment. No evidence of hardware failure or complication.   2.  Postsurgical changes of the left femur with broken screw fragment redemonstrated.   3.  Demineralization.   4.  Scattered colonic diverticula.   5.  Atherosclerosis.   6.  Small fat-containing umbilical hernia.      EC-ECHOCARDIOGRAM COMPLETE W/O CONT   Final Result      DX-HIP-COMPLETE - UNILATERAL 2+ LEFT   Final Result      1.  No definite acute osseous abnormality. In setting of demineralization, an occult fracture is difficult to exclude. If there is strong clinical suspicion, CT or MRI could be obtained for further evaluation.   2.  Postsurgical changes of the left femur with broken screw fragment redemonstrated.       DX-CHEST-PORTABLE (1 VIEW)   Final Result      No acute cardiopulmonary process is seen.      Atherosclerotic plaque.           Assessment/Plan  * Comfort measures only status- (present on admission)   Assessment & Plan    5/6 Denies having pain, does not appear to be in pain.  On comfort care measures, continue.    Still pending placement.  5/10. Sleeping but arousable. Good mood.     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  [facility] assisting.   Difficult discharge   -awaiting for placement      ST elevation myocardial infarction involving right coronary artery (HCC)- (present on admission)   Assessment & Plan    Declines any intervention or therapeutic modality  Continue with comfort care       Rash   Assessment & Plan    She seems to be sscratching her shin  Ordered PRN Benadryl cream to apply at that rash     Chronic kidney disease (CKD), stage III (moderate) (HCC)- (present on admission)   Assessment & Plan    Continue comfort care.         Essential hypertension- (present on admission)   Assessment & Plan    Comfort care       Fall- (present on admission)   Assessment & Plan    Continue fall precautions         VTE prophylaxis: Comfort care

## 2019-05-12 NOTE — CARE PLAN
Problem: Safety  Goal: Will remain free from injury  Outcome: PROGRESSING AS EXPECTED  Treaded socks in place, bed in the lowest position, bed alarm on, call light and belongings within reach, pt call for assistance appropriately    Problem: Venous Thromboembolism (VTW)/Deep Vein Thrombosis (DVT) Prevention:  Goal: Patient will participate in Venous Thrombosis (VTE)/Deep Vein Thrombosis (DVT)Prevention Measures  Outcome: PROGRESSING SLOWER THAN EXPECTED  Pt. refuses SCDs regardless of education.    Problem: Discharge Barriers/Planning  Goal: Patient's continuum of care needs will be met  Outcome: PROGRESSING SLOWER THAN EXPECTED  Pending placement.

## 2019-05-13 PROCEDURE — A9270 NON-COVERED ITEM OR SERVICE: HCPCS | Performed by: HOSPITALIST

## 2019-05-13 PROCEDURE — 99231 SBSQ HOSP IP/OBS SF/LOW 25: CPT | Performed by: INTERNAL MEDICINE

## 2019-05-13 PROCEDURE — 770001 HCHG ROOM/CARE - MED/SURG/GYN PRIV*

## 2019-05-13 PROCEDURE — 700102 HCHG RX REV CODE 250 W/ 637 OVERRIDE(OP): Performed by: HOSPITALIST

## 2019-05-13 RX ADMIN — FUROSEMIDE 20 MG: 20 TABLET ORAL at 06:43

## 2019-05-13 NOTE — DISCHARGE PLANNING
Anticipated Discharge Disposition: [facility] vs [facility]    Action: LSW left VM for [facility] [phone] and VM left for [facility]  [phone].    Barriers to Discharge: placement    Plan: follow up with more

## 2019-05-13 NOTE — DISCHARGE PLANNING
Anticipated Discharge Disposition: [facility] vs     Action: LSW left  for admissions and resident services at [facility] asking for a call back.     Barriers to Discharge: placement    Plan: follow up on placement options

## 2019-05-13 NOTE — PROGRESS NOTES
[facility] Medicine Daily Progress Note    Date of Service  4/10/19     Chief Complaint  89 y.o. female admitted 11/13/2018 with fall, weakness and found to have a STEMI.     Hospital Course      She was admitted to ICU on medical management. Family elected for comfort care.           Interval Problem Update  5/7. Sleeping but arousable. No new issues, not nauseated, no pain complaints.  5/8. Sleeping and comfortable.  5/9. Sleeping but arousable. Itchy rash noted on shin.  5/10. Eating breakfast. Good mood. Rash improved, scabbed.   5/11. Sleeping comfortably  5/12. Tolerated meals.  5/13. Sleeping but arousable. Ate her breakfast    Consultants/Specialty  Critical care signed off    Cardiology signed off      Code Status  Comfort care, DNR      Disposition  Hospice, pending group home placement. 11/20/18 quantiferon gold +, AFB negative.      Review of Systems  Review of Systems   Unable to perform ROS: Dementia      Physical Exam  Temp:  [36.4 °C (97.6 °F)-36.6 °C (97.8 °F)] 36.4 °C (97.6 °F)  Pulse:  [73-93] 73  Resp:  [16] 16  BP: (118-132)/(61-70) 118/61  SpO2:  [92 %-95 %] 95 %    Physical Exam   Constitutional: No distress.   Cachectic    HENT:   Head: Normocephalic and atraumatic.   Nose: Nose normal.   Mouth/Throat: Oropharynx is clear and moist. No oropharyngeal exudate.   Eyes: Pupils are equal, round, and reactive to light. Conjunctivae and EOM are normal. Right eye exhibits no discharge. Left eye exhibits no discharge. No scleral icterus.   Neck: Normal range of motion. Neck supple. No JVD present. No tracheal deviation present. No thyromegaly present.   Cardiovascular: Normal rate, regular rhythm and normal heart sounds.  Exam reveals no friction rub.    No murmur heard.  Pulmonary/Chest: Effort normal and breath sounds normal. No stridor. No respiratory distress. She has no wheezes. She has no rales.   Abdominal: Soft. Bowel sounds are normal. She exhibits no distension. There is no tenderness. There  is no rebound.   Musculoskeletal: Normal range of motion. She exhibits no edema, tenderness or deformity.   Neurological: She is alert. No cranial nerve deficit. She exhibits normal muscle tone. Coordination normal.   Severe cognitive deficits, unchanged   Skin: Skin is warm and dry. No rash (shin, now scabs and improved) noted. She is not diaphoretic. No erythema. There is pallor.   Psychiatric: She has a normal mood and affect.   Impaired judgment   Pleasant       Fluids  No intake or output data in the 24 hours ending 05/13/19 1650    Laboratory                        Imaging  DX-CHEST-PORTABLE (1 VIEW)   Final Result         1.  Hazy interstitial left pulmonary opacities suggests interstitial edema or infiltrate, similar to prior.   2.  Trace left pleural effusion   3.  Hyperexpansion of lungs favors changes of COPD.      DX-CHEST-PORTABLE (1 VIEW)   Final Result         1.  Interstitial infiltrates or edema, stable.   2.  Atherosclerosis      DX-CHEST-PORTABLE (1 VIEW)   Final Result         1.  Interstitial infiltrates or edema.   2.  Atherosclerosis      DX-CHEST-PORTABLE (1 VIEW)   Final Result      Moderate interstitial edema or interstitial lung disease and small left pleural effusion similar to previous.      DX-CHEST-PORTABLE (1 VIEW)   Final Result      Stable interstitial edema and/or interstitial lung disease with probable small pleural fluid      DX-CHEST-PORTABLE (1 VIEW)   Final Result      Ill-defined infrahilar interstitial opacities, atelectasis versus mild edema. No significant pleural effusions.      DX-CHEST-PORTABLE (1 VIEW)   Final Result      Mild left basilar atelectasis, improved since prior. No new consolidation or large pleural effusions.      DX-CHEST-PORTABLE (1 VIEW)   Final Result      1.  Left basilar opacification may be secondary to atelectasis. Developing pneumonia cannot be excluded.         DX-CHEST-PORTABLE (1 VIEW)   Final Result      1.  Unchanged mild interstitial  pulmonary edema   2.  Unchanged bibasilar atelectasis, alveolar edema or pneumonia      DX-CHEST-PORTABLE (1 VIEW)   Final Result      1.  Decreased pulmonary edema   2.  Unchanged bibasilar atelectasis, alveolar edema or pneumonia      DX-CHEST-PORTABLE (1 VIEW)   Final Result      1.  There is diffuse interstitial and alveolar density in the right mid and lower lung zone. This is increased since the previous study. This may represent mild pulmonary edema/consolidation.   2.  Mildly enlarged cardiomediastinal silhouette when compared with the previous chest x-ray.      DX-CHEST-PORTABLE (1 VIEW)   Final Result      Stable mild pulmonary edema.   New left basilar atelectasis.      DX-CHEST-PORTABLE (1 VIEW)   Final Result      Stable chest appearance compared with 11/16.      DX-CHEST-PORTABLE (1 VIEW)   Final Result      No acute cardiopulmonary abnormality identified.      DX-CHEST-PORTABLE (1 VIEW)   Final Result      No acute cardiopulmonary abnormality. No interval change.      DX-CHEST-PORTABLE (1 VIEW)   Final Result      No acute cardiopulmonary disease.      CT-HEAD W/O   Final Result      1.  No evidence of acute intracranial process.      2.  There is again seen interstitial pulmonary edema and bibasilar atelectasis.      CT-HIP W/O PLUS RECONS LEFT   Final Result      1.  No evidence of acute fracture or malalignment. No evidence of hardware failure or complication.   2.  Postsurgical changes of the left femur with broken screw fragment redemonstrated.   3.  Demineralization.   4.  Scattered colonic diverticula.   5.  Atherosclerosis.   6.  Small fat-containing umbilical hernia.      EC-ECHOCARDIOGRAM COMPLETE W/O CONT   Final Result      DX-HIP-COMPLETE - UNILATERAL 2+ LEFT   Final Result      1.  No definite acute osseous abnormality. In setting of demineralization, an occult fracture is difficult to exclude. If there is strong clinical suspicion, CT or MRI could be obtained for further evaluation.    2.  Postsurgical changes of the left femur with broken screw fragment redemonstrated.      DX-CHEST-PORTABLE (1 VIEW)   Final Result      No acute cardiopulmonary process is seen.      Atherosclerotic plaque.           Assessment/Plan  * Comfort measures only status- (present on admission)   Assessment & Plan    5/6 Denies having pain, does not appear to be in pain.  On comfort care measures, continue.    Still pending placement.  5/10. Sleeping but arousable. Good mood.  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  [facility] assisting.   Difficult discharge   -awaiting for placement      ST elevation myocardial infarction involving right coronary artery (HCC)- (present on admission)   Assessment & Plan    Declines any intervention or therapeutic modality  Continue with comfort care       Rash   Assessment & Plan    She seems to be sscratching her shin  Ordered PRN Benadryl cream to apply at that rash     Chronic kidney disease (CKD), stage III (moderate) (HCC)- (present on admission)   Assessment & Plan    Continue comfort care.         Essential hypertension- (present on admission)   Assessment & Plan    Comfort care       Fall- (present on admission)   Assessment & Plan    Continue fall precautions         VTE prophylaxis: Comfort care

## 2019-05-13 NOTE — CARE PLAN
Problem: Safety  Goal: Will remain free from falls    Intervention: Implement fall precautions  Bed alarm on, shoes in use when out of bed, call light within reach, assistance provided with mobility.      Problem: Skin Integrity  Goal: Risk for impaired skin integrity will decrease    Intervention: Implement precautions to protect skin integrity in collaboration with the interdisciplinary team  Patient refuses waffle cushion, frequent turning encouraged. Patient able to turn self side to side.

## 2019-05-13 NOTE — CARE PLAN
Problem: Safety  Goal: Will remain free from injury  Outcome: PROGRESSING AS EXPECTED  Treaded socks in place, bed in the lowest position, bed alarm on, call light and belongings within reach, pt call for assistance appropriately    Problem: Skin Integrity  Goal: Risk for impaired skin integrity will decrease  Outcome: PROGRESSING AS EXPECTED      Problem: Mobility  Goal: Risk for activity intolerance will decrease  Outcome: PROGRESSING AS EXPECTED  Pt. up to commode x1 assist with FFW.

## 2019-05-14 PROBLEM — D64.9 ANEMIA: Status: ACTIVE | Noted: 2019-05-14

## 2019-05-14 PROBLEM — I07.1 TRICUSPID REGURGITATION: Status: ACTIVE | Noted: 2019-05-14

## 2019-05-14 PROCEDURE — 99231 SBSQ HOSP IP/OBS SF/LOW 25: CPT | Performed by: FAMILY MEDICINE

## 2019-05-14 PROCEDURE — 700102 HCHG RX REV CODE 250 W/ 637 OVERRIDE(OP): Performed by: HOSPITALIST

## 2019-05-14 PROCEDURE — 770001 HCHG ROOM/CARE - MED/SURG/GYN PRIV*

## 2019-05-14 PROCEDURE — A9270 NON-COVERED ITEM OR SERVICE: HCPCS | Performed by: HOSPITALIST

## 2019-05-14 RX ADMIN — FUROSEMIDE 20 MG: 20 TABLET ORAL at 05:38

## 2019-05-14 ASSESSMENT — ENCOUNTER SYMPTOMS
NERVOUS/ANXIOUS: 0
PALPITATIONS: 0
SENSORY CHANGE: 0
BACK PAIN: 0
WHEEZING: 0
COUGH: 0
FEVER: 0
FOCAL WEAKNESS: 0
NAUSEA: 0
NECK PAIN: 0
HEARTBURN: 0
ABDOMINAL PAIN: 0
BLURRED VISION: 1
SHORTNESS OF BREATH: 0
HEADACHES: 0
SORE THROAT: 0
DIZZINESS: 0
CHILLS: 0
VOMITING: 0
SPEECH CHANGE: 0
WEAKNESS: 0
DIARRHEA: 0

## 2019-05-14 NOTE — CARE PLAN
Problem: Safety  Goal: Will remain free from injury  Outcome: PROGRESSING AS EXPECTED  Bed in the lowest locked position. Call light within reach. Bed alarm in use.     Problem: Skin Integrity  Goal: Risk for impaired skin integrity will decrease  Outcome: PROGRESSING AS EXPECTED  Patient refused mepliex to sacrum and waffle overlay, educated regarding importance.  Patient continues to refuse.    Patient bilateral heels floated on pillows    Problem: Urinary Elimination:  Goal: Ability to reestablish a normal urinary elimination pattern will improve  Outcome: PROGRESSING AS EXPECTED  Patient voiding via commode adequate amounts    Problem: Pain Management  Goal: Pain level will decrease to patient's comfort goal  Outcome: MET Date Met: 05/13/19  Patient repositioned for comfort denies pain at this time

## 2019-05-14 NOTE — PROGRESS NOTES
"Hospital Medicine Daily Progress Note    Date of Service  5/14/2019    Chief Complaint  89 y.o. female admitted 11/13/2018 with STEMI    Hospital Course  Admitted with ST elevation myocardial infarction, patient did not want to pursue aggressive interventions or procedures, wanted to withhold all medical treatment    Interval Problem Update  Denies pain, appears comfortable, states \"I just need to be adjusted in bed \"    Consultants/Specialty  Cardiology signed off  Critical care signed off    Code Status  DNR/DNI/comfort care    Disposition  Placement pending    Review of Systems  Review of Systems   Constitutional: Negative for chills, fever and malaise/fatigue.   HENT: Negative for hearing loss and sore throat.    Eyes: Positive for blurred vision.   Respiratory: Negative for cough, shortness of breath and wheezing.    Cardiovascular: Negative for chest pain, palpitations and leg swelling.   Gastrointestinal: Negative for abdominal pain, diarrhea, heartburn, nausea and vomiting.   Genitourinary: Negative for dysuria.   Musculoskeletal: Negative for back pain and neck pain.   Skin: Negative for rash.   Neurological: Negative for dizziness, sensory change, speech change, focal weakness, weakness and headaches.   Psychiatric/Behavioral: The patient is not nervous/anxious.         Physical Exam  Temp:  [36.2 °C (97.2 °F)-36.7 °C (98 °F)] 36.5 °C (97.7 °F)  Pulse:  [66-76] 76  Resp:  [15-17] 16  BP: (106-126)/(51-68) 106/66  SpO2:  [91 %-94 %] 91 %    Physical Exam   Constitutional: She appears well-developed.   HENT:   Head: Normocephalic and atraumatic.   Eyes: Pupils are equal, round, and reactive to light. Conjunctivae are normal.   Neck: No tracheal deviation present. No thyromegaly present.   Cardiovascular: Normal rate and regular rhythm.    Murmur heard.  Pulmonary/Chest: Effort normal and breath sounds normal.   Abdominal: Soft. Bowel sounds are normal. She exhibits no distension. There is no tenderness. "   Musculoskeletal: She exhibits edema.   Lymphadenopathy:     She has no cervical adenopathy.   Neurological: She is alert.   Oriented to person and place   Skin: Skin is warm and dry.   Nursing note and vitals reviewed.      Fluids    Intake/Output Summary (Last 24 hours) at 05/14/19 1655  Last data filed at 05/14/19 1418   Gross per 24 hour   Intake              960 ml   Output                0 ml   Net              960 ml       Laboratory                        Imaging  DX-CHEST-PORTABLE (1 VIEW)   Final Result         1.  Hazy interstitial left pulmonary opacities suggests interstitial edema or infiltrate, similar to prior.   2.  Trace left pleural effusion   3.  Hyperexpansion of lungs favors changes of COPD.      DX-CHEST-PORTABLE (1 VIEW)   Final Result         1.  Interstitial infiltrates or edema, stable.   2.  Atherosclerosis      DX-CHEST-PORTABLE (1 VIEW)   Final Result         1.  Interstitial infiltrates or edema.   2.  Atherosclerosis      DX-CHEST-PORTABLE (1 VIEW)   Final Result      Moderate interstitial edema or interstitial lung disease and small left pleural effusion similar to previous.      DX-CHEST-PORTABLE (1 VIEW)   Final Result      Stable interstitial edema and/or interstitial lung disease with probable small pleural fluid      DX-CHEST-PORTABLE (1 VIEW)   Final Result      Ill-defined infrahilar interstitial opacities, atelectasis versus mild edema. No significant pleural effusions.      DX-CHEST-PORTABLE (1 VIEW)   Final Result      Mild left basilar atelectasis, improved since prior. No new consolidation or large pleural effusions.      DX-CHEST-PORTABLE (1 VIEW)   Final Result      1.  Left basilar opacification may be secondary to atelectasis. Developing pneumonia cannot be excluded.         DX-CHEST-PORTABLE (1 VIEW)   Final Result      1.  Unchanged mild interstitial pulmonary edema   2.  Unchanged bibasilar atelectasis, alveolar edema or pneumonia      DX-CHEST-PORTABLE (1 VIEW)    Final Result      1.  Decreased pulmonary edema   2.  Unchanged bibasilar atelectasis, alveolar edema or pneumonia      DX-CHEST-PORTABLE (1 VIEW)   Final Result      1.  There is diffuse interstitial and alveolar density in the right mid and lower lung zone. This is increased since the previous study. This may represent mild pulmonary edema/consolidation.   2.  Mildly enlarged cardiomediastinal silhouette when compared with the previous chest x-ray.      DX-CHEST-PORTABLE (1 VIEW)   Final Result      Stable mild pulmonary edema.   New left basilar atelectasis.      DX-CHEST-PORTABLE (1 VIEW)   Final Result      Stable chest appearance compared with 11/16.      DX-CHEST-PORTABLE (1 VIEW)   Final Result      No acute cardiopulmonary abnormality identified.      DX-CHEST-PORTABLE (1 VIEW)   Final Result      No acute cardiopulmonary abnormality. No interval change.      DX-CHEST-PORTABLE (1 VIEW)   Final Result      No acute cardiopulmonary disease.      CT-HEAD W/O   Final Result      1.  No evidence of acute intracranial process.      2.  There is again seen interstitial pulmonary edema and bibasilar atelectasis.      CT-HIP W/O PLUS RECONS LEFT   Final Result      1.  No evidence of acute fracture or malalignment. No evidence of hardware failure or complication.   2.  Postsurgical changes of the left femur with broken screw fragment redemonstrated.   3.  Demineralization.   4.  Scattered colonic diverticula.   5.  Atherosclerosis.   6.  Small fat-containing umbilical hernia.      EC-ECHOCARDIOGRAM COMPLETE W/O CONT   Final Result      DX-HIP-COMPLETE - UNILATERAL 2+ LEFT   Final Result      1.  No definite acute osseous abnormality. In setting of demineralization, an occult fracture is difficult to exclude. If there is strong clinical suspicion, CT or MRI could be obtained for further evaluation.   2.  Postsurgical changes of the left femur with broken screw fragment redemonstrated.      DX-CHEST-PORTABLE (1  VIEW)   Final Result      No acute cardiopulmonary process is seen.      Atherosclerotic plaque.           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)- (present on admission)   Assessment & Plan    comfort care.         Essential hypertension- (present on admission)   Assessment & Plan    Comfort care       Comfort measures only status- (present on admission)   Assessment & Plan    In place     Advanced directives, counseling/discussion- (present on admission)   Assessment & Plan    Comfort care     Fall- (present on admission)   Assessment & Plan    fall precautions            VTE prophylaxis: Comfort care

## 2019-05-14 NOTE — CARE PLAN
Problem: Safety  Goal: Will remain free from injury  Outcome: PROGRESSING AS EXPECTED  Treaded socks in place, bed in the lowest position, bed alarm on, call light and belongings within reach, pt call for assistance appropriately    Problem: Mobility  Goal: Risk for activity intolerance will decrease  Outcome: PROGRESSING AS EXPECTED  Pt. up to commode, x1 assist with FWW, encouraged pt. to edge of bed for meals.

## 2019-05-15 PROCEDURE — A9270 NON-COVERED ITEM OR SERVICE: HCPCS | Performed by: HOSPITALIST

## 2019-05-15 PROCEDURE — 700102 HCHG RX REV CODE 250 W/ 637 OVERRIDE(OP): Performed by: HOSPITALIST

## 2019-05-15 PROCEDURE — 99231 SBSQ HOSP IP/OBS SF/LOW 25: CPT | Performed by: FAMILY MEDICINE

## 2019-05-15 PROCEDURE — 770001 HCHG ROOM/CARE - MED/SURG/GYN PRIV*

## 2019-05-15 RX ADMIN — SENNOSIDES AND DOCUSATE SODIUM 2 TABLET: 8.6; 5 TABLET ORAL at 18:38

## 2019-05-15 RX ADMIN — FUROSEMIDE 20 MG: 20 TABLET ORAL at 05:37

## 2019-05-15 ASSESSMENT — ENCOUNTER SYMPTOMS
DIARRHEA: 0
WHEEZING: 0
BACK PAIN: 0
WEAKNESS: 0
HEADACHES: 0
NECK PAIN: 0
VOMITING: 0
FEVER: 0
SENSORY CHANGE: 0
FOCAL WEAKNESS: 0
HEARTBURN: 0
CHILLS: 0
NAUSEA: 0
DIZZINESS: 0
BLURRED VISION: 1
NERVOUS/ANXIOUS: 0
SORE THROAT: 0
SHORTNESS OF BREATH: 0
SPEECH CHANGE: 0
ABDOMINAL PAIN: 0
COUGH: 0
PALPITATIONS: 0

## 2019-05-15 NOTE — CARE PLAN
Problem: Safety  Goal: Will remain free from falls    Intervention: Implement fall precautions  Pt calls appropriately, treaded socks in use. Personal belongings and call light with in reach and bed is locked in lowest position.      Problem: Skin Integrity  Goal: Risk for impaired skin integrity will decrease    Intervention: Assess and monitor skin integrity, appearance and/or temperature  Assessment complete. Patient moves self in bed.

## 2019-05-15 NOTE — PROGRESS NOTES
[facility] Medicine Daily Progress Note    Date of Service  5/15/2019    Chief Complaint  89 y.o. female admitted 11/13/2018 with STEMI    Hospital Course  Admitted with ST elevation myocardial infarction, patient did not want to pursue aggressive interventions or procedures, wanted to withhold all medical treatment    Interval Problem Update  Denies pain or discomfort, appetite seems fair to good.    Consultants/Specialty  Cardiology signed off  Critical care signed off    Code Status  DNR/DNI/comfort care    Disposition  Placement pending    Review of Systems  Review of Systems   Constitutional: Negative for chills, fever and malaise/fatigue.   HENT: Negative for hearing loss and sore throat.    Eyes: Positive for blurred vision.   Respiratory: Negative for cough, shortness of breath and wheezing.    Cardiovascular: Negative for chest pain, palpitations and leg swelling.   Gastrointestinal: Negative for abdominal pain, diarrhea, heartburn, nausea and vomiting.   Genitourinary: Negative for dysuria.   Musculoskeletal: Negative for back pain and neck pain.   Skin: Negative for rash.   Neurological: Negative for dizziness, sensory change, speech change, focal weakness, weakness and headaches.   Psychiatric/Behavioral: The patient is not nervous/anxious.         Physical Exam  Temp:  [36.2 °C (97.2 °F)-36.6 °C (97.9 °F)] 36.6 °C (97.9 °F)  Pulse:  [63-76] 63  Resp:  [15-17] 15  BP: (106-112)/(55-66) 106/61  SpO2:  [91 %-93 %] 91 %    Physical Exam   Constitutional: She appears well-developed.   HENT:   Head: Normocephalic and atraumatic.   Eyes: Pupils are equal, round, and reactive to light. Conjunctivae are normal.   Neck: No tracheal deviation present. No thyromegaly present.   Cardiovascular: Normal rate and regular rhythm.    Murmur heard.  Pulmonary/Chest: Effort normal and breath sounds normal.   Abdominal: Soft. Bowel sounds are normal. She exhibits no distension. There is no tenderness.   Musculoskeletal: She  exhibits edema.   Lymphadenopathy:     She has no cervical adenopathy.   Neurological: She is alert.   Oriented to person and place   Skin: Skin is warm and dry.   Nursing note and vitals reviewed.      Fluids    Intake/Output Summary (Last 24 hours) at 05/15/19 1444  Last data filed at 05/15/19 1000   Gross per 24 hour   Intake              700 ml   Output                0 ml   Net              700 ml       Laboratory                        Imaging  DX-CHEST-PORTABLE (1 VIEW)   Final Result         1.  Hazy interstitial left pulmonary opacities suggests interstitial edema or infiltrate, similar to prior.   2.  Trace left pleural effusion   3.  Hyperexpansion of lungs favors changes of COPD.      DX-CHEST-PORTABLE (1 VIEW)   Final Result         1.  Interstitial infiltrates or edema, stable.   2.  Atherosclerosis      DX-CHEST-PORTABLE (1 VIEW)   Final Result         1.  Interstitial infiltrates or edema.   2.  Atherosclerosis      DX-CHEST-PORTABLE (1 VIEW)   Final Result      Moderate interstitial edema or interstitial lung disease and small left pleural effusion similar to previous.      DX-CHEST-PORTABLE (1 VIEW)   Final Result      Stable interstitial edema and/or interstitial lung disease with probable small pleural fluid      DX-CHEST-PORTABLE (1 VIEW)   Final Result      Ill-defined infrahilar interstitial opacities, atelectasis versus mild edema. No significant pleural effusions.      DX-CHEST-PORTABLE (1 VIEW)   Final Result      Mild left basilar atelectasis, improved since prior. No new consolidation or large pleural effusions.      DX-CHEST-PORTABLE (1 VIEW)   Final Result      1.  Left basilar opacification may be secondary to atelectasis. Developing pneumonia cannot be excluded.         DX-CHEST-PORTABLE (1 VIEW)   Final Result      1.  Unchanged mild interstitial pulmonary edema   2.  Unchanged bibasilar atelectasis, alveolar edema or pneumonia      DX-CHEST-PORTABLE (1 VIEW)   Final Result      1.   Decreased pulmonary edema   2.  Unchanged bibasilar atelectasis, alveolar edema or pneumonia      DX-CHEST-PORTABLE (1 VIEW)   Final Result      1.  There is diffuse interstitial and alveolar density in the right mid and lower lung zone. This is increased since the previous study. This may represent mild pulmonary edema/consolidation.   2.  Mildly enlarged cardiomediastinal silhouette when compared with the previous chest x-ray.      DX-CHEST-PORTABLE (1 VIEW)   Final Result      Stable mild pulmonary edema.   New left basilar atelectasis.      DX-CHEST-PORTABLE (1 VIEW)   Final Result      Stable chest appearance compared with 11/16.      DX-CHEST-PORTABLE (1 VIEW)   Final Result      No acute cardiopulmonary abnormality identified.      DX-CHEST-PORTABLE (1 VIEW)   Final Result      No acute cardiopulmonary abnormality. No interval change.      DX-CHEST-PORTABLE (1 VIEW)   Final Result      No acute cardiopulmonary disease.      CT-HEAD W/O   Final Result      1.  No evidence of acute intracranial process.      2.  There is again seen interstitial pulmonary edema and bibasilar atelectasis.      CT-HIP W/O PLUS RECONS LEFT   Final Result      1.  No evidence of acute fracture or malalignment. No evidence of hardware failure or complication.   2.  Postsurgical changes of the left femur with broken screw fragment redemonstrated.   3.  Demineralization.   4.  Scattered colonic diverticula.   5.  Atherosclerosis.   6.  Small fat-containing umbilical hernia.      EC-ECHOCARDIOGRAM COMPLETE W/O CONT   Final Result      DX-HIP-COMPLETE - UNILATERAL 2+ LEFT   Final Result      1.  No definite acute osseous abnormality. In setting of demineralization, an occult fracture is difficult to exclude. If there is strong clinical suspicion, CT or MRI could be obtained for further evaluation.   2.  Postsurgical changes of the left femur with broken screw fragment redemonstrated.      DX-CHEST-PORTABLE (1 VIEW)   Final Result      No  acute cardiopulmonary process is seen.      Atherosclerotic plaque.           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         Essential hypertension- (present on admission)   Assessment & Plan    Comfort care       Comfort measures only status- (present on admission)   Assessment & Plan    In place     Advanced directives, counseling/discussion- (present on admission)   Assessment & Plan    Comfort care     Fall- (present on admission)   Assessment & Plan    fall precautions            VTE prophylaxis: Comfort care

## 2019-05-15 NOTE — CARE PLAN
Problem: Skin Integrity  Goal: Risk for impaired skin integrity will decrease  Outcome: PROGRESSING AS EXPECTED  Patient encouraged mobility in bed. Bilateral heels floated on pillows    Problem: Psychosocial Needs:  Goal: Level of anxiety will decrease  Outcome: PROGRESSING AS EXPECTED  Patient brushed her teeth this evening and hair was combed. Repositioned in bed for comfort

## 2019-05-16 PROCEDURE — 770001 HCHG ROOM/CARE - MED/SURG/GYN PRIV*

## 2019-05-16 PROCEDURE — 99231 SBSQ HOSP IP/OBS SF/LOW 25: CPT | Performed by: FAMILY MEDICINE

## 2019-05-16 PROCEDURE — A9270 NON-COVERED ITEM OR SERVICE: HCPCS | Performed by: HOSPITALIST

## 2019-05-16 PROCEDURE — 700102 HCHG RX REV CODE 250 W/ 637 OVERRIDE(OP): Performed by: HOSPITALIST

## 2019-05-16 RX ADMIN — FUROSEMIDE 20 MG: 20 TABLET ORAL at 06:02

## 2019-05-16 ASSESSMENT — ENCOUNTER SYMPTOMS
ABDOMINAL PAIN: 0
COUGH: 0
WEAKNESS: 0
HEARTBURN: 0
NERVOUS/ANXIOUS: 0
BACK PAIN: 0
VOMITING: 0
CHILLS: 0
SPEECH CHANGE: 0
NECK PAIN: 0
WHEEZING: 0
SHORTNESS OF BREATH: 0
HEADACHES: 0
SORE THROAT: 0
FOCAL WEAKNESS: 0
FEVER: 0
NAUSEA: 0
PALPITATIONS: 0
DIZZINESS: 0
SENSORY CHANGE: 0
BLURRED VISION: 1
DIARRHEA: 0

## 2019-05-16 NOTE — CARE PLAN
Problem: Safety  Goal: Will remain free from injury  Outcome: PROGRESSING AS EXPECTED  Pt aware of safety precautions and calls appropriately all appropriate fall precautions in place.     Problem: Bowel/Gastric:  Goal: Normal bowel function is maintained or improved  Outcome: PROGRESSING SLOWER THAN EXPECTED  LBM 3 days ago pt reports this is normal for her. Took stool softener 5/15/19.

## 2019-05-17 PROCEDURE — 770001 HCHG ROOM/CARE - MED/SURG/GYN PRIV*

## 2019-05-17 PROCEDURE — 99231 SBSQ HOSP IP/OBS SF/LOW 25: CPT | Performed by: FAMILY MEDICINE

## 2019-05-17 ASSESSMENT — ENCOUNTER SYMPTOMS
SORE THROAT: 0
VOMITING: 0
FEVER: 0
HEADACHES: 0
ABDOMINAL PAIN: 0
BACK PAIN: 0
DIARRHEA: 0
COUGH: 0
PALPITATIONS: 0
FOCAL WEAKNESS: 0
HEARTBURN: 0
SPEECH CHANGE: 0
WHEEZING: 0
SHORTNESS OF BREATH: 0
WEAKNESS: 0
SENSORY CHANGE: 0
DIZZINESS: 0
NAUSEA: 0
NERVOUS/ANXIOUS: 0
NECK PAIN: 0
CHILLS: 0
BLURRED VISION: 1

## 2019-05-17 NOTE — PROGRESS NOTES
[facility] Medicine Daily Progress Note    Date of Service  5/17/2019    Chief Complaint  89 y.o. female admitted 11/13/2018 with STEMI    Hospital Course  Admitted with ST elevation Myocardial infarction, patient did not want to pursue aggressive interventions or procedures, wanted to withhold all medical treatment    Interval Problem Update  Seen eating breakfast, appetite seems to be good.    Consultants/Specialty  Cardiology signed off  Critical care signed off    Code Status  DNR/DNI/comfort care    Disposition  Placement pending    Review of Systems  Review of Systems   Constitutional: Negative for chills, fever and malaise/fatigue.   HENT: Negative for hearing loss and sore throat.    Eyes: Positive for blurred vision.   Respiratory: Negative for cough, shortness of breath and wheezing.    Cardiovascular: Negative for chest pain, palpitations and leg swelling.   Gastrointestinal: Negative for abdominal pain, diarrhea, heartburn, nausea and vomiting.   Genitourinary: Negative for dysuria.   Musculoskeletal: Negative for back pain and neck pain.   Skin: Negative for rash.   Neurological: Negative for dizziness, sensory change, speech change, focal weakness, weakness and headaches.   Psychiatric/Behavioral: The patient is not nervous/anxious.         Physical Exam  Temp:  [36.3 °C (97.4 °F)-36.7 °C (98 °F)] 36.7 °C (98 °F)  Pulse:  [67-71] 71  Resp:  [16-18] 16  BP: (112-114)/(71-75) 114/75  SpO2:  [95 %-96 %] 96 %    Physical Exam   Constitutional: She appears well-developed.   HENT:   Head: Normocephalic and atraumatic.   Eyes: Pupils are equal, round, and reactive to light. Conjunctivae are normal.   Neck: No tracheal deviation present. No thyromegaly present.   Cardiovascular: Normal rate and regular rhythm.    Murmur heard.  Pulmonary/Chest: Effort normal and breath sounds normal.   Abdominal: Soft. Bowel sounds are normal. She exhibits no distension. There is no tenderness.   Musculoskeletal: She exhibits  edema.   Lymphadenopathy:     She has no cervical adenopathy.   Neurological: She is alert.   Oriented to person and place   Skin: Skin is warm and dry.   Nursing note and vitals reviewed.      Fluids    Intake/Output Summary (Last 24 hours) at 05/17/19 1456  Last data filed at 05/16/19 2000   Gross per 24 hour   Intake              510 ml   Output                0 ml   Net              510 ml       Laboratory                        Imaging  DX-CHEST-PORTABLE (1 VIEW)   Final Result         1.  Hazy interstitial left pulmonary opacities suggests interstitial edema or infiltrate, similar to prior.   2.  Trace left pleural effusion   3.  Hyperexpansion of lungs favors changes of COPD.      DX-CHEST-PORTABLE (1 VIEW)   Final Result         1.  Interstitial infiltrates or edema, stable.   2.  Atherosclerosis      DX-CHEST-PORTABLE (1 VIEW)   Final Result         1.  Interstitial infiltrates or edema.   2.  Atherosclerosis      DX-CHEST-PORTABLE (1 VIEW)   Final Result      Moderate interstitial edema or interstitial lung disease and small left pleural effusion similar to previous.      DX-CHEST-PORTABLE (1 VIEW)   Final Result      Stable interstitial edema and/or interstitial lung disease with probable small pleural fluid      DX-CHEST-PORTABLE (1 VIEW)   Final Result      Ill-defined infrahilar interstitial opacities, atelectasis versus mild edema. No significant pleural effusions.      DX-CHEST-PORTABLE (1 VIEW)   Final Result      Mild left basilar atelectasis, improved since prior. No new consolidation or large pleural effusions.      DX-CHEST-PORTABLE (1 VIEW)   Final Result      1.  Left basilar opacification may be secondary to atelectasis. Developing pneumonia cannot be excluded.         DX-CHEST-PORTABLE (1 VIEW)   Final Result      1.  Unchanged mild interstitial pulmonary edema   2.  Unchanged bibasilar atelectasis, alveolar edema or pneumonia      DX-CHEST-PORTABLE (1 VIEW)   Final Result      1.  Decreased  pulmonary edema   2.  Unchanged bibasilar atelectasis, alveolar edema or pneumonia      DX-CHEST-PORTABLE (1 VIEW)   Final Result      1.  There is diffuse interstitial and alveolar density in the right mid and lower lung zone. This is increased since the previous study. This may represent mild pulmonary edema/consolidation.   2.  Mildly enlarged cardiomediastinal silhouette when compared with the previous chest x-ray.      DX-CHEST-PORTABLE (1 VIEW)   Final Result      Stable mild pulmonary edema.   New left basilar atelectasis.      DX-CHEST-PORTABLE (1 VIEW)   Final Result      Stable chest appearance compared with 11/16.      DX-CHEST-PORTABLE (1 VIEW)   Final Result      No acute cardiopulmonary abnormality identified.      DX-CHEST-PORTABLE (1 VIEW)   Final Result      No acute cardiopulmonary abnormality. No interval change.      DX-CHEST-PORTABLE (1 VIEW)   Final Result      No acute cardiopulmonary disease.      CT-HEAD W/O   Final Result      1.  No evidence of acute intracranial process.      2.  There is again seen interstitial pulmonary edema and bibasilar atelectasis.      CT-HIP W/O PLUS RECONS LEFT   Final Result      1.  No evidence of acute fracture or malalignment. No evidence of hardware failure or complication.   2.  Postsurgical changes of the left femur with broken screw fragment redemonstrated.   3.  Demineralization.   4.  Scattered colonic diverticula.   5.  Atherosclerosis.   6.  Small fat-containing umbilical hernia.      EC-ECHOCARDIOGRAM COMPLETE W/O CONT   Final Result      DX-HIP-COMPLETE - UNILATERAL 2+ LEFT   Final Result      1.  No definite acute osseous abnormality. In setting of demineralization, an occult fracture is difficult to exclude. If there is strong clinical suspicion, CT or MRI could be obtained for further evaluation.   2.  Postsurgical changes of the left femur with broken screw fragment redemonstrated.      DX-CHEST-PORTABLE (1 VIEW)   Final Result      No acute  cardiopulmonary process is seen.      Atherosclerotic plaque.           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         Essential hypertension- (present on admission)   Assessment & Plan    Comfort care       Comfort measures only status- (present on admission)   Assessment & Plan    In place     Advanced directives, counseling/discussion- (present on admission)   Assessment & Plan    Comfort care     Fall- (present on admission)   Assessment & Plan    fall precautions            VTE prophylaxis: Comfort care

## 2019-05-17 NOTE — CARE PLAN
Problem: Safety  Goal: Will remain free from injury  Outcome: PROGRESSING AS EXPECTED  Hourly rounds done. Call light within reach. Needs attended on a timely manner. Treaded socks on when OOB. Educated on the importance of calling for assistance when attempting to be OOB.  BED ALARM ON.    Problem: Mobility  Goal: Risk for activity intolerance will decrease  Outcome: PROGRESSING AS EXPECTED  Encourage to early mobilization >50ft 3x daily. Up for all meals. Up 1-assist with a 4WW

## 2019-05-17 NOTE — CARE PLAN
Problem: Venous Thromboembolism (VTW)/Deep Vein Thrombosis (DVT) Prevention:  Goal: Patient will participate in Venous Thrombosis (VTE)/Deep Vein Thrombosis (DVT)Prevention Measures  Outcome: PROGRESSING SLOWER THAN EXPECTED   Patient refusing to wear SCD's despite education. Foot pumping exercises, and leg movement encouraged. Patient is Comfort Care    Problem: Mobility  Goal: Risk for activity intolerance will decrease  Outcome: PROGRESSING SLOWER THAN EXPECTED  Encouraged pt to sit edge of bed often and to get up to chair. Patient wants to rest.

## 2019-05-18 PROCEDURE — 99231 SBSQ HOSP IP/OBS SF/LOW 25: CPT | Performed by: FAMILY MEDICINE

## 2019-05-18 PROCEDURE — 770001 HCHG ROOM/CARE - MED/SURG/GYN PRIV*

## 2019-05-18 PROCEDURE — A9270 NON-COVERED ITEM OR SERVICE: HCPCS | Performed by: HOSPITALIST

## 2019-05-18 PROCEDURE — 700102 HCHG RX REV CODE 250 W/ 637 OVERRIDE(OP): Performed by: HOSPITALIST

## 2019-05-18 RX ADMIN — FUROSEMIDE 20 MG: 20 TABLET ORAL at 07:33

## 2019-05-18 ASSESSMENT — ENCOUNTER SYMPTOMS
HEADACHES: 0
BACK PAIN: 0
NAUSEA: 0
DIZZINESS: 0
PALPITATIONS: 0
COUGH: 0
VOMITING: 0
SORE THROAT: 0
SHORTNESS OF BREATH: 0
WEAKNESS: 0
DIARRHEA: 0
BLURRED VISION: 1
WHEEZING: 0
NECK PAIN: 0
HEARTBURN: 0
NERVOUS/ANXIOUS: 0
ABDOMINAL PAIN: 0
FEVER: 0
SPEECH CHANGE: 0
CHILLS: 0
SENSORY CHANGE: 0
FOCAL WEAKNESS: 0

## 2019-05-18 NOTE — CARE PLAN
Problem: Safety  Goal: Will remain free from injury  Outcome: PROGRESSING AS EXPECTED  Treaded socks in place, bed in the lowest position, bed alarm on, call light and belongings within reach, pt call for assistance appropriately    Problem: Mobility  Goal: Risk for activity intolerance will decrease  Outcome: PROGRESSING AS EXPECTED  Encouraged pt. to get up for meals, pt. Up to commode x1 assist with FWW.

## 2019-05-18 NOTE — PROGRESS NOTES
"Hospital Medicine Daily Progress Note    Date of Service  5/18/2019    Chief Complaint  89 y.o. female admitted 11/13/2018 with STEMI    Hospital Course  Admitted with ST elevation Myocardial infarction, patient did not want to pursue aggressive interventions or procedures, wanted to withhold all medical treatment    Interval Problem Update  Seen while she was watching baseball on TV, states \"I like watching baseball\"    Consultants/Specialty  Cardiology signed off  Critical care signed off    Code Status  DNR/DNI/comfort care    Disposition  Placement pending    Review of Systems  Review of Systems   Constitutional: Negative for chills, fever and malaise/fatigue.   HENT: Negative for hearing loss and sore throat.    Eyes: Positive for blurred vision.   Respiratory: Negative for cough, shortness of breath and wheezing.    Cardiovascular: Negative for chest pain, palpitations and leg swelling.   Gastrointestinal: Negative for abdominal pain, diarrhea, heartburn, nausea and vomiting.   Genitourinary: Negative for dysuria.   Musculoskeletal: Negative for back pain and neck pain.   Skin: Negative for rash.   Neurological: Negative for dizziness, sensory change, speech change, focal weakness, weakness and headaches.   Psychiatric/Behavioral: The patient is not nervous/anxious.         Physical Exam  Temp:  [36.3 °C (97.3 °F)-36.9 °C (98.5 °F)] 36.3 °C (97.3 °F)  Pulse:  [63-72] 63  Resp:  [16] 16  BP: (109-112)/(59-75) 109/59  SpO2:  [96 %] 96 %    Physical Exam   Constitutional: She appears well-developed.   HENT:   Head: Normocephalic and atraumatic.   Eyes: Pupils are equal, round, and reactive to light. Conjunctivae are normal.   Neck: No tracheal deviation present. No thyromegaly present.   Cardiovascular: Normal rate and regular rhythm.    Murmur heard.  Pulmonary/Chest: Effort normal and breath sounds normal.   Abdominal: Soft. Bowel sounds are normal. She exhibits no distension. There is no tenderness. "   Musculoskeletal: She exhibits edema.   Lymphadenopathy:     She has no cervical adenopathy.   Neurological: She is alert.   Oriented to person and place   Skin: Skin is warm and dry.   Nursing note and vitals reviewed.      Fluids  No intake or output data in the 24 hours ending 05/18/19 1529    Laboratory                        Imaging  DX-CHEST-PORTABLE (1 VIEW)   Final Result         1.  Hazy interstitial left pulmonary opacities suggests interstitial edema or infiltrate, similar to prior.   2.  Trace left pleural effusion   3.  Hyperexpansion of lungs favors changes of COPD.      DX-CHEST-PORTABLE (1 VIEW)   Final Result         1.  Interstitial infiltrates or edema, stable.   2.  Atherosclerosis      DX-CHEST-PORTABLE (1 VIEW)   Final Result         1.  Interstitial infiltrates or edema.   2.  Atherosclerosis      DX-CHEST-PORTABLE (1 VIEW)   Final Result      Moderate interstitial edema or interstitial lung disease and small left pleural effusion similar to previous.      DX-CHEST-PORTABLE (1 VIEW)   Final Result      Stable interstitial edema and/or interstitial lung disease with probable small pleural fluid      DX-CHEST-PORTABLE (1 VIEW)   Final Result      Ill-defined infrahilar interstitial opacities, atelectasis versus mild edema. No significant pleural effusions.      DX-CHEST-PORTABLE (1 VIEW)   Final Result      Mild left basilar atelectasis, improved since prior. No new consolidation or large pleural effusions.      DX-CHEST-PORTABLE (1 VIEW)   Final Result      1.  Left basilar opacification may be secondary to atelectasis. Developing pneumonia cannot be excluded.         DX-CHEST-PORTABLE (1 VIEW)   Final Result      1.  Unchanged mild interstitial pulmonary edema   2.  Unchanged bibasilar atelectasis, alveolar edema or pneumonia      DX-CHEST-PORTABLE (1 VIEW)   Final Result      1.  Decreased pulmonary edema   2.  Unchanged bibasilar atelectasis, alveolar edema or pneumonia       DX-CHEST-PORTABLE (1 VIEW)   Final Result      1.  There is diffuse interstitial and alveolar density in the right mid and lower lung zone. This is increased since the previous study. This may represent mild pulmonary edema/consolidation.   2.  Mildly enlarged cardiomediastinal silhouette when compared with the previous chest x-ray.      DX-CHEST-PORTABLE (1 VIEW)   Final Result      Stable mild pulmonary edema.   New left basilar atelectasis.      DX-CHEST-PORTABLE (1 VIEW)   Final Result      Stable chest appearance compared with 11/16.      DX-CHEST-PORTABLE (1 VIEW)   Final Result      No acute cardiopulmonary abnormality identified.      DX-CHEST-PORTABLE (1 VIEW)   Final Result      No acute cardiopulmonary abnormality. No interval change.      DX-CHEST-PORTABLE (1 VIEW)   Final Result      No acute cardiopulmonary disease.      CT-HEAD W/O   Final Result      1.  No evidence of acute intracranial process.      2.  There is again seen interstitial pulmonary edema and bibasilar atelectasis.      CT-HIP W/O PLUS RECONS LEFT   Final Result      1.  No evidence of acute fracture or malalignment. No evidence of hardware failure or complication.   2.  Postsurgical changes of the left femur with broken screw fragment redemonstrated.   3.  Demineralization.   4.  Scattered colonic diverticula.   5.  Atherosclerosis.   6.  Small fat-containing umbilical hernia.      EC-ECHOCARDIOGRAM COMPLETE W/O CONT   Final Result      DX-HIP-COMPLETE - UNILATERAL 2+ LEFT   Final Result      1.  No definite acute osseous abnormality. In setting of demineralization, an occult fracture is difficult to exclude. If there is strong clinical suspicion, CT or MRI could be obtained for further evaluation.   2.  Postsurgical changes of the left femur with broken screw fragment redemonstrated.      DX-CHEST-PORTABLE (1 VIEW)   Final Result      No acute cardiopulmonary process is seen.      Atherosclerotic plaque.            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         Essential hypertension- (present on admission)   Assessment & Plan    Comfort care       Comfort measures only status- (present on admission)   Assessment & Plan    In place     Advanced directives, counseling/discussion- (present on admission)   Assessment & Plan    Comfort care     Fall- (present on admission)   Assessment & Plan    fall precautions            VTE prophylaxis: Comfort care

## 2019-05-18 NOTE — CARE PLAN
Problem: Bowel/Gastric:  Goal: Normal bowel function is maintained or improved    Intervention: Educate patient and significant other/support system about diet, fluid intake, medications and activity to promote bowel function  Patient had small BM start of shift. Refusing senna, but accepted prune juice. Educated on fluid intake increase to promote regular bowel movements.      Problem: Urinary Elimination:  Goal: Ability to reestablish a normal urinary elimination pattern will improve    Intervention: Assist patient to sit on commode or toilet for voiding  Patient assisted to commode to void.

## 2019-05-19 PROCEDURE — A9270 NON-COVERED ITEM OR SERVICE: HCPCS | Performed by: HOSPITALIST

## 2019-05-19 PROCEDURE — 770001 HCHG ROOM/CARE - MED/SURG/GYN PRIV*

## 2019-05-19 PROCEDURE — 700102 HCHG RX REV CODE 250 W/ 637 OVERRIDE(OP): Performed by: HOSPITALIST

## 2019-05-19 PROCEDURE — 99231 SBSQ HOSP IP/OBS SF/LOW 25: CPT | Performed by: FAMILY MEDICINE

## 2019-05-19 RX ADMIN — FUROSEMIDE 20 MG: 20 TABLET ORAL at 05:22

## 2019-05-19 ASSESSMENT — ENCOUNTER SYMPTOMS
HEADACHES: 0
FEVER: 0
WHEEZING: 0
ABDOMINAL PAIN: 0
WEAKNESS: 0
SHORTNESS OF BREATH: 0
NAUSEA: 0
COUGH: 0
DIZZINESS: 0
SPEECH CHANGE: 0
HEARTBURN: 0
VOMITING: 0
DIARRHEA: 0
FOCAL WEAKNESS: 0
BLURRED VISION: 1
NECK PAIN: 0
PALPITATIONS: 0
BACK PAIN: 0
NERVOUS/ANXIOUS: 0
SENSORY CHANGE: 0
SORE THROAT: 0
CHILLS: 0

## 2019-05-19 NOTE — CARE PLAN
Problem: Bowel/Gastric:  Goal: Normal bowel function is maintained or improved  Outcome: PROGRESSING AS EXPECTED  Patient had a BM 5/19. Not accepting stool softeners currently, but educated on fluid intake and diet to help promote regular BMS.     Problem: Mobility  Goal: Risk for activity intolerance will decrease    Intervention: Encourage patient to increase activity level in collaboration with Interdisciplinary Team  Patient walked to the bathroom this AM. Encouraged to walk around room or into hallway with staff.

## 2019-05-19 NOTE — CARE PLAN
Problem: Safety  Goal: Will remain free from injury  Outcome: PROGRESSING AS EXPECTED  Treaded socks in place, bed in the lowest position, bed alarm on, call light and belongings within reach, pt call for assistance appropriately    Problem: Venous Thromboembolism (VTW)/Deep Vein Thrombosis (DVT) Prevention:  Goal: Patient will participate in Venous Thrombosis (VTE)/Deep Vein Thrombosis (DVT)Prevention Measures  Outcome: PROGRESSING SLOWER THAN EXPECTED  Pt. refuses SCDs regardless of education.    Problem: Mobility  Goal: Risk for activity intolerance will decrease  Outcome: PROGRESSING AS EXPECTED  Pt. up to commode x1 assist with FWW, encouraged pt. to get out of bed for meals.

## 2019-05-19 NOTE — PROGRESS NOTES
"Hospital Medicine Daily Progress Note    Date of Service  5/19/2019    Chief Complaint  89 y.o. female admitted 11/13/2018 with STEMI    Hospital Course  Admitted with ST elevation Myocardial infarction, patient did not want to pursue aggressive interventions or procedures, wanted to withhold all medical treatment    Interval Problem Update  Denies pain or discomfort, \"I'm alright I don't need anything.\"    Consultants/Specialty  Cardiology signed off  Critical care signed off    Code Status  DNR/DNI/comfort care    Disposition  Placement pending    Review of Systems  Review of Systems   Constitutional: Negative for chills, fever and malaise/fatigue.   HENT: Negative for hearing loss and sore throat.    Eyes: Positive for blurred vision.   Respiratory: Negative for cough, shortness of breath and wheezing.    Cardiovascular: Negative for chest pain, palpitations and leg swelling.   Gastrointestinal: Negative for abdominal pain, diarrhea, heartburn, nausea and vomiting.   Genitourinary: Negative for dysuria.   Musculoskeletal: Negative for back pain and neck pain.   Skin: Negative for rash.   Neurological: Negative for dizziness, sensory change, speech change, focal weakness, weakness and headaches.   Psychiatric/Behavioral: The patient is not nervous/anxious.         Physical Exam  Temp:  [36.3 °C (97.3 °F)-36.4 °C (97.6 °F)] 36.3 °C (97.3 °F)  Pulse:  [62-73] 62  Resp:  [16] 16  BP: ()/(56-61) 99/56  SpO2:  [92 %-94 %] 92 %    Physical Exam   Constitutional: She appears well-developed.   HENT:   Head: Normocephalic and atraumatic.   Eyes: Pupils are equal, round, and reactive to light. Conjunctivae are normal.   Neck: No tracheal deviation present. No thyromegaly present.   Cardiovascular: Normal rate and regular rhythm.    Murmur heard.  Pulmonary/Chest: Effort normal and breath sounds normal.   Abdominal: Soft. Bowel sounds are normal. She exhibits no distension. There is no tenderness.   Musculoskeletal: " She exhibits edema.   Lymphadenopathy:     She has no cervical adenopathy.   Neurological: She is alert.   Oriented to person and place   Skin: Skin is warm and dry.   Nursing note and vitals reviewed.      Fluids    Intake/Output Summary (Last 24 hours) at 05/19/19 1406  Last data filed at 05/19/19 1000   Gross per 24 hour   Intake              120 ml   Output                0 ml   Net              120 ml       Laboratory                        Imaging  DX-CHEST-PORTABLE (1 VIEW)   Final Result         1.  Hazy interstitial left pulmonary opacities suggests interstitial edema or infiltrate, similar to prior.   2.  Trace left pleural effusion   3.  Hyperexpansion of lungs favors changes of COPD.      DX-CHEST-PORTABLE (1 VIEW)   Final Result         1.  Interstitial infiltrates or edema, stable.   2.  Atherosclerosis      DX-CHEST-PORTABLE (1 VIEW)   Final Result         1.  Interstitial infiltrates or edema.   2.  Atherosclerosis      DX-CHEST-PORTABLE (1 VIEW)   Final Result      Moderate interstitial edema or interstitial lung disease and small left pleural effusion similar to previous.      DX-CHEST-PORTABLE (1 VIEW)   Final Result      Stable interstitial edema and/or interstitial lung disease with probable small pleural fluid      DX-CHEST-PORTABLE (1 VIEW)   Final Result      Ill-defined infrahilar interstitial opacities, atelectasis versus mild edema. No significant pleural effusions.      DX-CHEST-PORTABLE (1 VIEW)   Final Result      Mild left basilar atelectasis, improved since prior. No new consolidation or large pleural effusions.      DX-CHEST-PORTABLE (1 VIEW)   Final Result      1.  Left basilar opacification may be secondary to atelectasis. Developing pneumonia cannot be excluded.         DX-CHEST-PORTABLE (1 VIEW)   Final Result      1.  Unchanged mild interstitial pulmonary edema   2.  Unchanged bibasilar atelectasis, alveolar edema or pneumonia      DX-CHEST-PORTABLE (1 VIEW)   Final Result       1.  Decreased pulmonary edema   2.  Unchanged bibasilar atelectasis, alveolar edema or pneumonia      DX-CHEST-PORTABLE (1 VIEW)   Final Result      1.  There is diffuse interstitial and alveolar density in the right mid and lower lung zone. This is increased since the previous study. This may represent mild pulmonary edema/consolidation.   2.  Mildly enlarged cardiomediastinal silhouette when compared with the previous chest x-ray.      DX-CHEST-PORTABLE (1 VIEW)   Final Result      Stable mild pulmonary edema.   New left basilar atelectasis.      DX-CHEST-PORTABLE (1 VIEW)   Final Result      Stable chest appearance compared with 11/16.      DX-CHEST-PORTABLE (1 VIEW)   Final Result      No acute cardiopulmonary abnormality identified.      DX-CHEST-PORTABLE (1 VIEW)   Final Result      No acute cardiopulmonary abnormality. No interval change.      DX-CHEST-PORTABLE (1 VIEW)   Final Result      No acute cardiopulmonary disease.      CT-HEAD W/O   Final Result      1.  No evidence of acute intracranial process.      2.  There is again seen interstitial pulmonary edema and bibasilar atelectasis.      CT-HIP W/O PLUS RECONS LEFT   Final Result      1.  No evidence of acute fracture or malalignment. No evidence of hardware failure or complication.   2.  Postsurgical changes of the left femur with broken screw fragment redemonstrated.   3.  Demineralization.   4.  Scattered colonic diverticula.   5.  Atherosclerosis.   6.  Small fat-containing umbilical hernia.      EC-ECHOCARDIOGRAM COMPLETE W/O CONT   Final Result      DX-HIP-COMPLETE - UNILATERAL 2+ LEFT   Final Result      1.  No definite acute osseous abnormality. In setting of demineralization, an occult fracture is difficult to exclude. If there is strong clinical suspicion, CT or MRI could be obtained for further evaluation.   2.  Postsurgical changes of the left femur with broken screw fragment redemonstrated.      DX-CHEST-PORTABLE (1 VIEW)   Final Result       No acute cardiopulmonary process is seen.      Atherosclerotic plaque.           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         Essential hypertension- (present on admission)   Assessment & Plan    Comfort care       Comfort measures only status- (present on admission)   Assessment & Plan    In place     Advanced directives, counseling/discussion- (present on admission)   Assessment & Plan    Comfort care     Fall- (present on admission)   Assessment & Plan    fall precautions            VTE prophylaxis: Comfort care

## 2019-05-20 PROCEDURE — 770001 HCHG ROOM/CARE - MED/SURG/GYN PRIV*

## 2019-05-20 PROCEDURE — 99231 SBSQ HOSP IP/OBS SF/LOW 25: CPT | Performed by: FAMILY MEDICINE

## 2019-05-20 ASSESSMENT — ENCOUNTER SYMPTOMS
HEARTBURN: 0
NECK PAIN: 0
FOCAL WEAKNESS: 0
NERVOUS/ANXIOUS: 0
COUGH: 0
CHILLS: 0
HEADACHES: 0
BACK PAIN: 0
ABDOMINAL PAIN: 0
VOMITING: 0
DIZZINESS: 0
SENSORY CHANGE: 0
DIARRHEA: 0
WEAKNESS: 0
PALPITATIONS: 0
SHORTNESS OF BREATH: 0
FEVER: 0
BLURRED VISION: 1
SPEECH CHANGE: 0
SORE THROAT: 0
WHEEZING: 0
NAUSEA: 0

## 2019-05-20 NOTE — PROGRESS NOTES
"Hospital Medicine Daily Progress Note    Date of Service  5/20/2019    Chief Complaint  89 y.o. female admitted 11/13/2018 with STEMI    Hospital Course  Admitted with ST elevation Myocardial infarction, patient did not want to pursue aggressive interventions or procedures, wanted to withhold all medical treatment    Interval Problem Update  Pt. lying on bed just finished all of her breakfast. She was watching baseball on TV. States \"I really like watching baseball, it doesn't matter who's playing or who wins.\"     Consultants/Specialty  Cardiology signed off  Critical care signed off    Code Status  DNR/DNI/comfort care    Disposition  Placement pending    Review of Systems  Review of Systems   Constitutional: Negative for chills, fever and malaise/fatigue.   HENT: Negative for hearing loss and sore throat.    Eyes: Positive for blurred vision.   Respiratory: Negative for cough, shortness of breath and wheezing.    Cardiovascular: Negative for chest pain, palpitations and leg swelling.   Gastrointestinal: Negative for abdominal pain, diarrhea, heartburn, nausea and vomiting.   Genitourinary: Negative for dysuria.   Musculoskeletal: Negative for back pain and neck pain.   Skin: Negative for rash.   Neurological: Negative for dizziness, sensory change, speech change, focal weakness, weakness and headaches.   Psychiatric/Behavioral: The patient is not nervous/anxious.         Physical Exam  Temp:  [36.3 °C (97.4 °F)-36.6 °C (97.8 °F)] 36.3 °C (97.4 °F)  Pulse:  [67-71] 71  Resp:  [16] 16  BP: (110-126)/(57-62) 126/62  SpO2:  [91 %-97 %] 97 %    Physical Exam   Constitutional: She appears well-developed.   HENT:   Head: Normocephalic and atraumatic.   Eyes: Pupils are equal, round, and reactive to light. Conjunctivae are normal.   Neck: No tracheal deviation present. No thyromegaly present.   Cardiovascular: Normal rate and regular rhythm.    Murmur heard.  Pulmonary/Chest: Effort normal and breath sounds normal. "   Abdominal: Soft. Bowel sounds are normal. She exhibits no distension. There is no tenderness.   Musculoskeletal: She exhibits edema.   Lymphadenopathy:     She has no cervical adenopathy.   Neurological: She is alert.   Oriented to person and place   Skin: Skin is warm and dry.   Nursing note and vitals reviewed.      Fluids    Intake/Output Summary (Last 24 hours) at 05/20/19 1140  Last data filed at 05/19/19 1436   Gross per 24 hour   Intake              120 ml   Output                0 ml   Net              120 ml       Laboratory                        Imaging  DX-CHEST-PORTABLE (1 VIEW)   Final Result         1.  Hazy interstitial left pulmonary opacities suggests interstitial edema or infiltrate, similar to prior.   2.  Trace left pleural effusion   3.  Hyperexpansion of lungs favors changes of COPD.      DX-CHEST-PORTABLE (1 VIEW)   Final Result         1.  Interstitial infiltrates or edema, stable.   2.  Atherosclerosis      DX-CHEST-PORTABLE (1 VIEW)   Final Result         1.  Interstitial infiltrates or edema.   2.  Atherosclerosis      DX-CHEST-PORTABLE (1 VIEW)   Final Result      Moderate interstitial edema or interstitial lung disease and small left pleural effusion similar to previous.      DX-CHEST-PORTABLE (1 VIEW)   Final Result      Stable interstitial edema and/or interstitial lung disease with probable small pleural fluid      DX-CHEST-PORTABLE (1 VIEW)   Final Result      Ill-defined infrahilar interstitial opacities, atelectasis versus mild edema. No significant pleural effusions.      DX-CHEST-PORTABLE (1 VIEW)   Final Result      Mild left basilar atelectasis, improved since prior. No new consolidation or large pleural effusions.      DX-CHEST-PORTABLE (1 VIEW)   Final Result      1.  Left basilar opacification may be secondary to atelectasis. Developing pneumonia cannot be excluded.         DX-CHEST-PORTABLE (1 VIEW)   Final Result      1.  Unchanged mild interstitial pulmonary edema   2.   Unchanged bibasilar atelectasis, alveolar edema or pneumonia      DX-CHEST-PORTABLE (1 VIEW)   Final Result      1.  Decreased pulmonary edema   2.  Unchanged bibasilar atelectasis, alveolar edema or pneumonia      DX-CHEST-PORTABLE (1 VIEW)   Final Result      1.  There is diffuse interstitial and alveolar density in the right mid and lower lung zone. This is increased since the previous study. This may represent mild pulmonary edema/consolidation.   2.  Mildly enlarged cardiomediastinal silhouette when compared with the previous chest x-ray.      DX-CHEST-PORTABLE (1 VIEW)   Final Result      Stable mild pulmonary edema.   New left basilar atelectasis.      DX-CHEST-PORTABLE (1 VIEW)   Final Result      Stable chest appearance compared with 11/16.      DX-CHEST-PORTABLE (1 VIEW)   Final Result      No acute cardiopulmonary abnormality identified.      DX-CHEST-PORTABLE (1 VIEW)   Final Result      No acute cardiopulmonary abnormality. No interval change.      DX-CHEST-PORTABLE (1 VIEW)   Final Result      No acute cardiopulmonary disease.      CT-HEAD W/O   Final Result      1.  No evidence of acute intracranial process.      2.  There is again seen interstitial pulmonary edema and bibasilar atelectasis.      CT-HIP W/O PLUS RECONS LEFT   Final Result      1.  No evidence of acute fracture or malalignment. No evidence of hardware failure or complication.   2.  Postsurgical changes of the left femur with broken screw fragment redemonstrated.   3.  Demineralization.   4.  Scattered colonic diverticula.   5.  Atherosclerosis.   6.  Small fat-containing umbilical hernia.      EC-ECHOCARDIOGRAM COMPLETE W/O CONT   Final Result      DX-HIP-COMPLETE - UNILATERAL 2+ LEFT   Final Result      1.  No definite acute osseous abnormality. In setting of demineralization, an occult fracture is difficult to exclude. If there is strong clinical suspicion, CT or MRI could be obtained for further evaluation.   2.  Postsurgical  changes of the left femur with broken screw fragment redemonstrated.      DX-CHEST-PORTABLE (1 VIEW)   Final Result      No acute cardiopulmonary process is seen.      Atherosclerotic plaque.           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)- (present on admission)   Assessment & Plan    Comfort care.         Essential hypertension- (present on admission)   Assessment & Plan    Comfort care       Comfort measures only status- (present on admission)   Assessment & Plan    In place     Advanced directives, counseling/discussion- (present on admission)   Assessment & Plan    Comfort care     Fall- (present on admission)   Assessment & Plan    fall precautions            VTE prophylaxis: Comfort care
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
## 2019-05-20 NOTE — CARE PLAN
Problem: Safety  Goal: Will remain free from injury  Outcome: PROGRESSING AS EXPECTED  Patient remains free from injury. Bed alarm in place and call light within reach.     Problem: Urinary Elimination:  Goal: Ability to reestablish a normal urinary elimination pattern will improve  Outcome: PROGRESSING AS EXPECTED  Patient voiding without difficulty.

## 2019-05-21 PROCEDURE — 99231 SBSQ HOSP IP/OBS SF/LOW 25: CPT | Performed by: HOSPITALIST

## 2019-05-21 PROCEDURE — 700102 HCHG RX REV CODE 250 W/ 637 OVERRIDE(OP): Performed by: HOSPITALIST

## 2019-05-21 PROCEDURE — A9270 NON-COVERED ITEM OR SERVICE: HCPCS | Performed by: HOSPITALIST

## 2019-05-21 PROCEDURE — 770001 HCHG ROOM/CARE - MED/SURG/GYN PRIV*

## 2019-05-21 RX ADMIN — SENNOSIDES AND DOCUSATE SODIUM 2 TABLET: 8.6; 5 TABLET ORAL at 17:35

## 2019-05-21 RX ADMIN — FUROSEMIDE 20 MG: 20 TABLET ORAL at 05:38

## 2019-05-21 ASSESSMENT — ENCOUNTER SYMPTOMS
MYALGIAS: 0
COUGH: 0
DIZZINESS: 0
WEAKNESS: 0
PALPITATIONS: 0
CHILLS: 0
SHORTNESS OF BREATH: 0
FEVER: 0
ABDOMINAL PAIN: 0
FOCAL WEAKNESS: 0
NAUSEA: 0
CONSTIPATION: 0
DIARRHEA: 0
HEADACHES: 0
VOMITING: 0

## 2019-05-21 NOTE — CARE PLAN
Problem: Mobility  Goal: Risk for activity intolerance will decrease    Intervention: Encourage patient to increase activity level in collaboration with Interdisciplinary Team  Patient ambulates to commode. Weakness with a shufle

## 2019-05-21 NOTE — PROGRESS NOTES
"Hospital Medicine Daily Progress Note    Date of Service  5/21/2019    Chief Complaint  89 y.o. female admitted 11/13/2018 with STEMI    Hospital Course  Admitted with ST elevation Myocardial infarction, patient did not want to pursue aggressive interventions or procedures, wanted to withhold all medical treatment    Interval Problem Update  5/21 - comfortable and lying in bed. She was asleep but arousable and watching the baseball channel, \"this is my favorite.\"    Consultants/Specialty  Cardiology signed off  Critical care signed off    Code Status  DNR/DNI/comfort care    Disposition  Placement pending    Review of Systems  Review of Systems   Constitutional: Negative for chills and fever.   Respiratory: Negative for cough and shortness of breath.    Cardiovascular: Negative for chest pain and palpitations.   Gastrointestinal: Negative for abdominal pain, constipation, diarrhea, nausea and vomiting.   Genitourinary: Negative for dysuria.   Musculoskeletal: Negative for myalgias.   Skin: Negative for itching.   Neurological: Negative for dizziness, focal weakness, weakness and headaches.   All other systems reviewed and are negative.       Physical Exam  Temp:  [36.6 °C (97.8 °F)-36.7 °C (98.1 °F)] 36.7 °C (98.1 °F)  Pulse:  [66-75] 75  Resp:  [16-17] 16  BP: (113-120)/(65-70) 115/68  SpO2:  [90 %-93 %] 90 %    Physical Exam   Constitutional: She is oriented to person, place, and time. She appears well-developed and well-nourished. No distress.   HENT:   Head: Normocephalic and atraumatic.   Eyes: Pupils are equal, round, and reactive to light. Conjunctivae are normal. No scleral icterus.   Neck: Normal range of motion. Neck supple.   Cardiovascular: Normal rate, regular rhythm and intact distal pulses.    Murmur heard.  Pulmonary/Chest: Effort normal and breath sounds normal. No respiratory distress. She has no rales.   Abdominal: Soft. Bowel sounds are normal. She exhibits no distension.   Musculoskeletal: " Normal range of motion. She exhibits edema.   Neurological: She is alert and oriented to person, place, and time.   Skin: Skin is warm and dry.   Vitals reviewed.      Fluids    Intake/Output Summary (Last 24 hours) at 05/21/19 0957  Last data filed at 05/20/19 2335   Gross per 24 hour   Intake              480 ml   Output                0 ml   Net              480 ml       Laboratory                        Imaging  DX-CHEST-PORTABLE (1 VIEW)   Final Result         1.  Hazy interstitial left pulmonary opacities suggests interstitial edema or infiltrate, similar to prior.   2.  Trace left pleural effusion   3.  Hyperexpansion of lungs favors changes of COPD.      DX-CHEST-PORTABLE (1 VIEW)   Final Result         1.  Interstitial infiltrates or edema, stable.   2.  Atherosclerosis      DX-CHEST-PORTABLE (1 VIEW)   Final Result         1.  Interstitial infiltrates or edema.   2.  Atherosclerosis      DX-CHEST-PORTABLE (1 VIEW)   Final Result      Moderate interstitial edema or interstitial lung disease and small left pleural effusion similar to previous.      DX-CHEST-PORTABLE (1 VIEW)   Final Result      Stable interstitial edema and/or interstitial lung disease with probable small pleural fluid      DX-CHEST-PORTABLE (1 VIEW)   Final Result      Ill-defined infrahilar interstitial opacities, atelectasis versus mild edema. No significant pleural effusions.      DX-CHEST-PORTABLE (1 VIEW)   Final Result      Mild left basilar atelectasis, improved since prior. No new consolidation or large pleural effusions.      DX-CHEST-PORTABLE (1 VIEW)   Final Result      1.  Left basilar opacification may be secondary to atelectasis. Developing pneumonia cannot be excluded.         DX-CHEST-PORTABLE (1 VIEW)   Final Result      1.  Unchanged mild interstitial pulmonary edema   2.  Unchanged bibasilar atelectasis, alveolar edema or pneumonia      DX-CHEST-PORTABLE (1 VIEW)   Final Result      1.  Decreased pulmonary edema   2.   Unchanged bibasilar atelectasis, alveolar edema or pneumonia      DX-CHEST-PORTABLE (1 VIEW)   Final Result      1.  There is diffuse interstitial and alveolar density in the right mid and lower lung zone. This is increased since the previous study. This may represent mild pulmonary edema/consolidation.   2.  Mildly enlarged cardiomediastinal silhouette when compared with the previous chest x-ray.      DX-CHEST-PORTABLE (1 VIEW)   Final Result      Stable mild pulmonary edema.   New left basilar atelectasis.      DX-CHEST-PORTABLE (1 VIEW)   Final Result      Stable chest appearance compared with 11/16.      DX-CHEST-PORTABLE (1 VIEW)   Final Result      No acute cardiopulmonary abnormality identified.      DX-CHEST-PORTABLE (1 VIEW)   Final Result      No acute cardiopulmonary abnormality. No interval change.      DX-CHEST-PORTABLE (1 VIEW)   Final Result      No acute cardiopulmonary disease.      CT-HEAD W/O   Final Result      1.  No evidence of acute intracranial process.      2.  There is again seen interstitial pulmonary edema and bibasilar atelectasis.      CT-HIP W/O PLUS RECONS LEFT   Final Result      1.  No evidence of acute fracture or malalignment. No evidence of hardware failure or complication.   2.  Postsurgical changes of the left femur with broken screw fragment redemonstrated.   3.  Demineralization.   4.  Scattered colonic diverticula.   5.  Atherosclerosis.   6.  Small fat-containing umbilical hernia.      EC-ECHOCARDIOGRAM COMPLETE W/O CONT   Final Result      DX-HIP-COMPLETE - UNILATERAL 2+ LEFT   Final Result      1.  No definite acute osseous abnormality. In setting of demineralization, an occult fracture is difficult to exclude. If there is strong clinical suspicion, CT or MRI could be obtained for further evaluation.   2.  Postsurgical changes of the left femur with broken screw fragment redemonstrated.      DX-CHEST-PORTABLE (1 VIEW)   Final Result      No acute cardiopulmonary process  is seen.      Atherosclerotic plaque.           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         Essential hypertension- (present on admission)   Assessment & Plan    Comfort care       Comfort measures only status- (present on admission)   Assessment & Plan    In place     Advanced directives, counseling/discussion- (present on admission)   Assessment & Plan    Comfort care     Fall- (present on admission)   Assessment & Plan    fall precautions       5/21 - no changes in plan    VTE prophylaxis: Comfort care

## 2019-05-21 NOTE — DISCHARGE PLANNING
Anticipated Discharge Disposition:     Action: LSW contacted the following :  [facility] [phone] Naomy (owner) no female beds available    [facility]- [facility] [phone]. Has a shared room. LSW provided the dtr's phone number to Kellee to discuss finances. Kellee stated she will call the Pt dtr and will follow up with this LSW.      Barriers to Discharge: placement    Plan: follow up with [facility]

## 2019-05-22 ENCOUNTER — PATIENT OUTREACH (OUTPATIENT)
Dept: HEALTH INFORMATION MANAGEMENT | Facility: OTHER | Age: 84
End: 2019-05-22

## 2019-05-22 PROCEDURE — 99231 SBSQ HOSP IP/OBS SF/LOW 25: CPT | Performed by: HOSPITALIST

## 2019-05-22 PROCEDURE — 770001 HCHG ROOM/CARE - MED/SURG/GYN PRIV*

## 2019-05-22 PROCEDURE — A9270 NON-COVERED ITEM OR SERVICE: HCPCS | Performed by: HOSPITALIST

## 2019-05-22 PROCEDURE — 700102 HCHG RX REV CODE 250 W/ 637 OVERRIDE(OP): Performed by: HOSPITALIST

## 2019-05-22 RX ADMIN — FUROSEMIDE 20 MG: 20 TABLET ORAL at 07:04

## 2019-05-22 ASSESSMENT — ENCOUNTER SYMPTOMS
PALPITATIONS: 0
COUGH: 0
MYALGIAS: 0
ABDOMINAL PAIN: 0
HEADACHES: 0
DIARRHEA: 0
CHILLS: 0
CONSTIPATION: 0
FEVER: 0
NAUSEA: 0
VOMITING: 0
WEAKNESS: 0
FOCAL WEAKNESS: 0
SHORTNESS OF BREATH: 0
DIZZINESS: 0

## 2019-05-22 NOTE — PROGRESS NOTES
Per chart review, pt remains admitted to [facility] pending discharge plan. Inpatient discharge notes indicate continued planning for placement at Assisted Living or Group home.     Plan:  · At this time MSW will graduate pt from outpatient SW CC services as pt continues to be admitted inpatient with no anticipated discharge.   · Inpatient SW to address current social needs while admitted at [facility].

## 2019-05-22 NOTE — PROGRESS NOTES
"Hospital Medicine Daily Progress Note    Date of Service  5/22/2019    Chief Complaint  89 y.o. female admitted 11/13/2018 with STEMI    Hospital Course  Admitted with ST elevation Myocardial infarction, patient did not want to pursue aggressive interventions or procedures, wanted to withhold all medical treatment    Interval Problem Update  5/22 - she is comfortable and in no distress. No questions. Calm.    5/21 - comfortable and lying in bed. She was asleep but arousable and watching the baseball channel, \"this is my favorite.\"    Consultants/Specialty  Cardiology signed off  Critical care signed off    Code Status  DNR/DNI/comfort care    Disposition  Placement pending    Review of Systems  Review of Systems   Constitutional: Negative for chills and fever.   Respiratory: Negative for cough and shortness of breath.    Cardiovascular: Negative for chest pain and palpitations.   Gastrointestinal: Negative for abdominal pain, constipation, diarrhea, nausea and vomiting.   Genitourinary: Negative for dysuria.   Musculoskeletal: Negative for myalgias.   Skin: Negative for itching.   Neurological: Negative for dizziness, focal weakness, weakness and headaches.   All other systems reviewed and are negative.       Physical Exam  Temp:  [36.7 °C (98 °F)-36.8 °C (98.3 °F)] 36.8 °C (98.3 °F)  Pulse:  [64-73] 64  Resp:  [15-16] 15  BP: (115-123)/(61-81) 115/67  SpO2:  [90 %-93 %] 93 %    Physical Exam   Constitutional: She is oriented to person, place, and time. She appears well-developed and well-nourished. No distress.   HENT:   Head: Normocephalic and atraumatic.   Eyes: Pupils are equal, round, and reactive to light. Conjunctivae are normal. No scleral icterus.   Neck: Normal range of motion. Neck supple.   Cardiovascular: Normal rate, regular rhythm and intact distal pulses.    Murmur heard.  Pulmonary/Chest: Effort normal and breath sounds normal. No respiratory distress. She has no rales.   Abdominal: Soft. Bowel " sounds are normal. She exhibits no distension.   Musculoskeletal: Normal range of motion. She exhibits edema.   Neurological: She is alert and oriented to person, place, and time.   Skin: Skin is warm and dry.   Vitals reviewed.      Fluids    Intake/Output Summary (Last 24 hours) at 05/22/19 1102  Last data filed at 05/22/19 0900   Gross per 24 hour   Intake              440 ml   Output                0 ml   Net              440 ml       Laboratory                        Imaging  DX-CHEST-PORTABLE (1 VIEW)   Final Result         1.  Hazy interstitial left pulmonary opacities suggests interstitial edema or infiltrate, similar to prior.   2.  Trace left pleural effusion   3.  Hyperexpansion of lungs favors changes of COPD.      DX-CHEST-PORTABLE (1 VIEW)   Final Result         1.  Interstitial infiltrates or edema, stable.   2.  Atherosclerosis      DX-CHEST-PORTABLE (1 VIEW)   Final Result         1.  Interstitial infiltrates or edema.   2.  Atherosclerosis      DX-CHEST-PORTABLE (1 VIEW)   Final Result      Moderate interstitial edema or interstitial lung disease and small left pleural effusion similar to previous.      DX-CHEST-PORTABLE (1 VIEW)   Final Result      Stable interstitial edema and/or interstitial lung disease with probable small pleural fluid      DX-CHEST-PORTABLE (1 VIEW)   Final Result      Ill-defined infrahilar interstitial opacities, atelectasis versus mild edema. No significant pleural effusions.      DX-CHEST-PORTABLE (1 VIEW)   Final Result      Mild left basilar atelectasis, improved since prior. No new consolidation or large pleural effusions.      DX-CHEST-PORTABLE (1 VIEW)   Final Result      1.  Left basilar opacification may be secondary to atelectasis. Developing pneumonia cannot be excluded.         DX-CHEST-PORTABLE (1 VIEW)   Final Result      1.  Unchanged mild interstitial pulmonary edema   2.  Unchanged bibasilar atelectasis, alveolar edema or pneumonia      DX-CHEST-PORTABLE (1  VIEW)   Final Result      1.  Decreased pulmonary edema   2.  Unchanged bibasilar atelectasis, alveolar edema or pneumonia      DX-CHEST-PORTABLE (1 VIEW)   Final Result      1.  There is diffuse interstitial and alveolar density in the right mid and lower lung zone. This is increased since the previous study. This may represent mild pulmonary edema/consolidation.   2.  Mildly enlarged cardiomediastinal silhouette when compared with the previous chest x-ray.      DX-CHEST-PORTABLE (1 VIEW)   Final Result      Stable mild pulmonary edema.   New left basilar atelectasis.      DX-CHEST-PORTABLE (1 VIEW)   Final Result      Stable chest appearance compared with 11/16.      DX-CHEST-PORTABLE (1 VIEW)   Final Result      No acute cardiopulmonary abnormality identified.      DX-CHEST-PORTABLE (1 VIEW)   Final Result      No acute cardiopulmonary abnormality. No interval change.      DX-CHEST-PORTABLE (1 VIEW)   Final Result      No acute cardiopulmonary disease.      CT-HEAD W/O   Final Result      1.  No evidence of acute intracranial process.      2.  There is again seen interstitial pulmonary edema and bibasilar atelectasis.      CT-HIP W/O PLUS RECONS LEFT   Final Result      1.  No evidence of acute fracture or malalignment. No evidence of hardware failure or complication.   2.  Postsurgical changes of the left femur with broken screw fragment redemonstrated.   3.  Demineralization.   4.  Scattered colonic diverticula.   5.  Atherosclerosis.   6.  Small fat-containing umbilical hernia.      EC-ECHOCARDIOGRAM COMPLETE W/O CONT   Final Result      DX-HIP-COMPLETE - UNILATERAL 2+ LEFT   Final Result      1.  No definite acute osseous abnormality. In setting of demineralization, an occult fracture is difficult to exclude. If there is strong clinical suspicion, CT or MRI could be obtained for further evaluation.   2.  Postsurgical changes of the left femur with broken screw fragment redemonstrated.      DX-CHEST-PORTABLE  (1 VIEW)   Final Result      No acute cardiopulmonary process is seen.      Atherosclerotic plaque.           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         Essential hypertension- (present on admission)   Assessment & Plan    Comfort care       Comfort measures only status- (present on admission)   Assessment & Plan    In place     Advanced directives, counseling/discussion- (present on admission)   Assessment & Plan    Comfort care     Fall- (present on admission)   Assessment & Plan    fall precautions       5/22 - no changes in plan    VTE prophylaxis: Comfort care

## 2019-05-22 NOTE — DISCHARGE PLANNING
Anticipated Discharge Disposition:     Action: LSW received a call from [facility] owner Kellee who stated she spoke with [employer] who said she can pay months up front to get the Pt down to the $2000 range so that the Pt could qualify for Medicaid. Kellee asked if this LSW could get the Pt applied for Medicaid if she gives this LSW a receipt of the down payment the Pt dtr will make. LSW stated that she can contact the PFA to apply for Medicaid. Kellee stated she will either be out tomorrow or Friday to meet with the Pt to see if she can manage the Pt care.    Barriers to Discharge: [facility] placement and acceptance    Plan: follow up with Kellee

## 2019-05-22 NOTE — CARE PLAN
Problem: Safety  Goal: Will remain free from injury  Outcome: PROGRESSING AS EXPECTED  Bed in the lowest locked position. Call light within reach. Bed alarm in use. Hourly rounding in place.     Problem: Mobility  Goal: Risk for activity intolerance will decrease  Outcome: PROGRESSING AS EXPECTED  Patient encouraged and educated on the importance of ambulating up to chair refused at this time.

## 2019-05-23 PROCEDURE — A9270 NON-COVERED ITEM OR SERVICE: HCPCS | Performed by: HOSPITALIST

## 2019-05-23 PROCEDURE — 700102 HCHG RX REV CODE 250 W/ 637 OVERRIDE(OP): Performed by: HOSPITALIST

## 2019-05-23 PROCEDURE — 99231 SBSQ HOSP IP/OBS SF/LOW 25: CPT | Performed by: HOSPITALIST

## 2019-05-23 PROCEDURE — 770001 HCHG ROOM/CARE - MED/SURG/GYN PRIV*

## 2019-05-23 RX ADMIN — FUROSEMIDE 20 MG: 20 TABLET ORAL at 08:08

## 2019-05-23 RX ADMIN — SENNOSIDES AND DOCUSATE SODIUM 2 TABLET: 8.6; 5 TABLET ORAL at 08:08

## 2019-05-23 ASSESSMENT — ENCOUNTER SYMPTOMS
CHILLS: 0
MYALGIAS: 0
CONSTIPATION: 0
SHORTNESS OF BREATH: 0
DIZZINESS: 0
PALPITATIONS: 0
FEVER: 0
FOCAL WEAKNESS: 0
WEAKNESS: 0
HEADACHES: 0
VOMITING: 0
DIARRHEA: 0
COUGH: 0
ABDOMINAL PAIN: 0
NAUSEA: 0

## 2019-05-23 NOTE — CARE PLAN
Problem: Safety  Goal: Will remain free from falls    Intervention: Implement fall precautions  Call light and belongings within reach, bed down and locked, hourly rounding in progress. Treaded socks in use, no DME at bedside.         Problem: Mobility  Goal: Risk for activity intolerance will decrease  Pt is standby assist with FWW to the commode.

## 2019-05-23 NOTE — PROGRESS NOTES
"Hospital Medicine Daily Progress Note    Date of Service  5/23/2019    Chief Complaint  89 y.o. female admitted 11/13/2018 with STEMI    Hospital Course  Admitted with ST elevation Myocardial infarction, patient did not want to pursue aggressive interventions or procedures, wanted to withhold all medical treatment    Interval Problem Update  5/23 - clinically stable and the same. There is movement on a group home and someone will be out to speak with her today or tomorrow. No chest pain.     5/22 - she is comfortable and in no distress. No questions. Calm.    5/21 - comfortable and lying in bed. She was asleep but arousable and watching the baseball channel, \"this is my favorite.\"    Consultants/Specialty  Cardiology signed off  Critical care signed off    Code Status  DNR/DNI/comfort care    Disposition  Placement pending    Review of Systems  Review of Systems   Constitutional: Negative for chills and fever.   Respiratory: Negative for cough and shortness of breath.    Cardiovascular: Negative for chest pain and palpitations.   Gastrointestinal: Negative for abdominal pain, constipation, diarrhea, nausea and vomiting.   Genitourinary: Negative for dysuria.   Musculoskeletal: Negative for myalgias.   Skin: Negative for itching.   Neurological: Negative for dizziness, focal weakness, weakness and headaches.   All other systems reviewed and are negative.       Physical Exam  Temp:  [36.6 °C (97.9 °F)-36.8 °C (98.3 °F)] 36.6 °C (97.9 °F)  Pulse:  [64-73] 73  Resp:  [15-16] 16  BP: (115-127)/(59-67) 127/59  SpO2:  [92 %-93 %] 92 %    Physical Exam   Constitutional: She is oriented to person, place, and time. She appears well-developed and well-nourished. No distress.   HENT:   Head: Normocephalic and atraumatic.   Eyes: Pupils are equal, round, and reactive to light. Conjunctivae are normal. No scleral icterus.   Neck: Normal range of motion. Neck supple.   Cardiovascular: Normal rate, regular rhythm and intact distal " pulses.    Murmur heard.  Pulmonary/Chest: Effort normal and breath sounds normal. No respiratory distress. She has no rales.   Abdominal: Soft. Bowel sounds are normal. She exhibits no distension.   Musculoskeletal: Normal range of motion. She exhibits edema.   Neurological: She is alert and oriented to person, place, and time.   Skin: Skin is warm and dry.   Vitals reviewed.  5/23 - exam unchanged    Fluids    Intake/Output Summary (Last 24 hours) at 05/23/19 0735  Last data filed at 05/22/19 0900   Gross per 24 hour   Intake              200 ml   Output                0 ml   Net              200 ml       Laboratory                        Imaging  DX-CHEST-PORTABLE (1 VIEW)   Final Result         1.  Hazy interstitial left pulmonary opacities suggests interstitial edema or infiltrate, similar to prior.   2.  Trace left pleural effusion   3.  Hyperexpansion of lungs favors changes of COPD.      DX-CHEST-PORTABLE (1 VIEW)   Final Result         1.  Interstitial infiltrates or edema, stable.   2.  Atherosclerosis      DX-CHEST-PORTABLE (1 VIEW)   Final Result         1.  Interstitial infiltrates or edema.   2.  Atherosclerosis      DX-CHEST-PORTABLE (1 VIEW)   Final Result      Moderate interstitial edema or interstitial lung disease and small left pleural effusion similar to previous.      DX-CHEST-PORTABLE (1 VIEW)   Final Result      Stable interstitial edema and/or interstitial lung disease with probable small pleural fluid      DX-CHEST-PORTABLE (1 VIEW)   Final Result      Ill-defined infrahilar interstitial opacities, atelectasis versus mild edema. No significant pleural effusions.      DX-CHEST-PORTABLE (1 VIEW)   Final Result      Mild left basilar atelectasis, improved since prior. No new consolidation or large pleural effusions.      DX-CHEST-PORTABLE (1 VIEW)   Final Result      1.  Left basilar opacification may be secondary to atelectasis. Developing pneumonia cannot be excluded.          DX-CHEST-PORTABLE (1 VIEW)   Final Result      1.  Unchanged mild interstitial pulmonary edema   2.  Unchanged bibasilar atelectasis, alveolar edema or pneumonia      DX-CHEST-PORTABLE (1 VIEW)   Final Result      1.  Decreased pulmonary edema   2.  Unchanged bibasilar atelectasis, alveolar edema or pneumonia      DX-CHEST-PORTABLE (1 VIEW)   Final Result      1.  There is diffuse interstitial and alveolar density in the right mid and lower lung zone. This is increased since the previous study. This may represent mild pulmonary edema/consolidation.   2.  Mildly enlarged cardiomediastinal silhouette when compared with the previous chest x-ray.      DX-CHEST-PORTABLE (1 VIEW)   Final Result      Stable mild pulmonary edema.   New left basilar atelectasis.      DX-CHEST-PORTABLE (1 VIEW)   Final Result      Stable chest appearance compared with 11/16.      DX-CHEST-PORTABLE (1 VIEW)   Final Result      No acute cardiopulmonary abnormality identified.      DX-CHEST-PORTABLE (1 VIEW)   Final Result      No acute cardiopulmonary abnormality. No interval change.      DX-CHEST-PORTABLE (1 VIEW)   Final Result      No acute cardiopulmonary disease.      CT-HEAD W/O   Final Result      1.  No evidence of acute intracranial process.      2.  There is again seen interstitial pulmonary edema and bibasilar atelectasis.      CT-HIP W/O PLUS RECONS LEFT   Final Result      1.  No evidence of acute fracture or malalignment. No evidence of hardware failure or complication.   2.  Postsurgical changes of the left femur with broken screw fragment redemonstrated.   3.  Demineralization.   4.  Scattered colonic diverticula.   5.  Atherosclerosis.   6.  Small fat-containing umbilical hernia.      EC-ECHOCARDIOGRAM COMPLETE W/O CONT   Final Result      DX-HIP-COMPLETE - UNILATERAL 2+ LEFT   Final Result      1.  No definite acute osseous abnormality. In setting of demineralization, an occult fracture is difficult to exclude. If there is  strong clinical suspicion, CT or MRI could be obtained for further evaluation.   2.  Postsurgical changes of the left femur with broken screw fragment redemonstrated.      DX-CHEST-PORTABLE (1 VIEW)   Final Result      No acute cardiopulmonary process is seen.      Atherosclerotic plaque.           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         Essential hypertension- (present on admission)   Assessment & Plan    Comfort care       Comfort measures only status- (present on admission)   Assessment & Plan    In place     Advanced directives, counseling/discussion- (present on admission)   Assessment & Plan    Comfort care     Fall- (present on admission)   Assessment & Plan    fall precautions       5/23 - no changes in plan, but there is movement towards a group home.     VTE prophylaxis: Comfort care

## 2019-05-24 PROCEDURE — 99231 SBSQ HOSP IP/OBS SF/LOW 25: CPT | Performed by: INTERNAL MEDICINE

## 2019-05-24 PROCEDURE — 770001 HCHG ROOM/CARE - MED/SURG/GYN PRIV*

## 2019-05-24 ASSESSMENT — ENCOUNTER SYMPTOMS
VOMITING: 0
BACK PAIN: 1
NAUSEA: 0
PHOTOPHOBIA: 1
DEPRESSION: 0
PALPITATIONS: 0
ABDOMINAL PAIN: 0
DIZZINESS: 0
ORTHOPNEA: 0
FEVER: 0
CHILLS: 0

## 2019-05-24 NOTE — CARE PLAN
Problem: Safety  Goal: Will remain free from injury  Outcome: PROGRESSING AS EXPECTED  Bed in the lowest locked position. Call light within reach. Bed alarm in use. Hourly rounding in place.     Problem: Mobility  Goal: Risk for activity intolerance will decrease  Patient up 90 degrees for all meals    Problem: Urinary Elimination:  Goal: Ability to reestablish a normal urinary elimination pattern will improve  Outcome: PROGRESSING AS EXPECTED  Patient up to commode several times during shift

## 2019-05-24 NOTE — PROGRESS NOTES
[facility] Medicine Daily Progress Note    Date of Service  5/24/2019    Chief Complaint  89 y.o. female admitted 11/13/2018 with ground level fall, hip pain.     Hospital Course    83-year-old female past medical history of eye cancer, hypertension, glaucoma was admitted for hip pain, gorund level fall.  admission she was found she did have STEMI. She denies any intervention or treatment. She is comfort care. Pending placement. Her daughter is the POA. Pt capable to take medical decisions. She has severe dementia.       Interval Problem Update  No changes, pt has no complain. She is A&O x2 (self, age, not date, she does not know date of birth, and she is aware of the place). She tells me that she had slept well     Consultants/Specialty  Cardiology     Code Status  DNAR/DNI    Disposition  Comfort care. Possible placement to group home     Review of Systems  Review of Systems   Constitutional: Negative for chills and fever.   Eyes: Positive for photophobia.   Cardiovascular: Negative for chest pain, palpitations, orthopnea and leg swelling.   Gastrointestinal: Negative for abdominal pain, nausea and vomiting.   Genitourinary: Negative for dysuria.   Musculoskeletal: Positive for back pain.   Skin: Negative for rash.   Neurological: Negative for dizziness.   Psychiatric/Behavioral: Negative for depression.        Physical Exam  Temp:  [36.2 °C (97.2 °F)-37 °C (98.6 °F)] 36.2 °C (97.2 °F)  Pulse:  [67-71] 67  Resp:  [15-16] 15  BP: (113-124)/(53-72) 124/68  SpO2:  [92 %-94 %] 92 %    Physical Exam   Constitutional: No distress.   frail   HENT:   Head: Normocephalic and atraumatic.   Nose: Nose normal.   Eyes: EOM are normal. No scleral icterus.   Cardiovascular: Normal rate.    Murmur heard.  Pulmonary/Chest: Breath sounds normal. No respiratory distress.   Abdominal: Soft. She exhibits no distension. There is no tenderness. There is no rebound.   Neurological:   A&O x2   Skin: Skin is warm. She is not diaphoretic.    Psychiatric: She has a normal mood and affect.       Fluids    Intake/Output Summary (Last 24 hours) at 05/24/19 1421  Last data filed at 05/24/19 1025   Gross per 24 hour   Intake              960 ml   Output                0 ml   Net              960 ml       Laboratory      Imaging  DX-CHEST-PORTABLE (1 VIEW)   Final Result         1.  Hazy interstitial left pulmonary opacities suggests interstitial edema or infiltrate, similar to prior.   2.  Trace left pleural effusion   3.  Hyperexpansion of lungs favors changes of COPD.      DX-CHEST-PORTABLE (1 VIEW)   Final Result         1.  Interstitial infiltrates or edema, stable.   2.  Atherosclerosis      DX-CHEST-PORTABLE (1 VIEW)   Final Result         1.  Interstitial infiltrates or edema.   2.  Atherosclerosis      DX-CHEST-PORTABLE (1 VIEW)   Final Result      Moderate interstitial edema or interstitial lung disease and small left pleural effusion similar to previous.      DX-CHEST-PORTABLE (1 VIEW)   Final Result      Stable interstitial edema and/or interstitial lung disease with probable small pleural fluid      DX-CHEST-PORTABLE (1 VIEW)   Final Result      Ill-defined infrahilar interstitial opacities, atelectasis versus mild edema. No significant pleural effusions.      DX-CHEST-PORTABLE (1 VIEW)   Final Result      Mild left basilar atelectasis, improved since prior. No new consolidation or large pleural effusions.      DX-CHEST-PORTABLE (1 VIEW)   Final Result      1.  Left basilar opacification may be secondary to atelectasis. Developing pneumonia cannot be excluded.         DX-CHEST-PORTABLE (1 VIEW)   Final Result      1.  Unchanged mild interstitial pulmonary edema   2.  Unchanged bibasilar atelectasis, alveolar edema or pneumonia      DX-CHEST-PORTABLE (1 VIEW)   Final Result      1.  Decreased pulmonary edema   2.  Unchanged bibasilar atelectasis, alveolar edema or pneumonia      DX-CHEST-PORTABLE (1 VIEW)   Final Result      1.  There is diffuse  interstitial and alveolar density in the right mid and lower lung zone. This is increased since the previous study. This may represent mild pulmonary edema/consolidation.   2.  Mildly enlarged cardiomediastinal silhouette when compared with the previous chest x-ray.      DX-CHEST-PORTABLE (1 VIEW)   Final Result      Stable mild pulmonary edema.   New left basilar atelectasis.      DX-CHEST-PORTABLE (1 VIEW)   Final Result      Stable chest appearance compared with 11/16.      DX-CHEST-PORTABLE (1 VIEW)   Final Result      No acute cardiopulmonary abnormality identified.      DX-CHEST-PORTABLE (1 VIEW)   Final Result      No acute cardiopulmonary abnormality. No interval change.      DX-CHEST-PORTABLE (1 VIEW)   Final Result      No acute cardiopulmonary disease.      CT-HEAD W/O   Final Result      1.  No evidence of acute intracranial process.      2.  There is again seen interstitial pulmonary edema and bibasilar atelectasis.      CT-HIP W/O PLUS RECONS LEFT   Final Result      1.  No evidence of acute fracture or malalignment. No evidence of hardware failure or complication.   2.  Postsurgical changes of the left femur with broken screw fragment redemonstrated.   3.  Demineralization.   4.  Scattered colonic diverticula.   5.  Atherosclerosis.   6.  Small fat-containing umbilical hernia.      EC-ECHOCARDIOGRAM COMPLETE W/O CONT   Final Result      DX-HIP-COMPLETE - UNILATERAL 2+ LEFT   Final Result      1.  No definite acute osseous abnormality. In setting of demineralization, an occult fracture is difficult to exclude. If there is strong clinical suspicion, CT or MRI could be obtained for further evaluation.   2.  Postsurgical changes of the left femur with broken screw fragment redemonstrated.      DX-CHEST-PORTABLE (1 VIEW)   Final Result      No acute cardiopulmonary process is seen.      Atherosclerotic plaque.           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         Essential hypertension- (present on admission)   Assessment & Plan    Comfort care       Comfort measures only status- (present on admission)   Assessment & Plan    In place     Advanced directives, counseling/discussion- (present on admission)   Assessment & Plan    Comfort care     Fall- (present on admission)   Assessment & Plan    fall precautions            VTE prophylaxis: none

## 2019-05-24 NOTE — CARE PLAN
Problem: Safety  Goal: Will remain free from injury  Outcome: PROGRESSING AS EXPECTED  Hourly rounds done. Call light within reach. Needs attended on a timely manner. Treaded socks on when OOB. Educated on the importance of calling for assistance when attempting to be OOB.  BED ALARM ON.    Problem: Venous Thromboembolism (VTW)/Deep Vein Thrombosis (DVT) Prevention:  Goal: Patient will participate in Venous Thrombosis (VTE)/Deep Vein Thrombosis (DVT)Prevention Measures  Outcome: PROGRESSING AS EXPECTED  Encourage early mobilization.     Problem: Mobility  Goal: Risk for activity intolerance will decrease  Outcome: PROGRESSING AS EXPECTED  Up with 1 assist with a 4ww.

## 2019-05-24 NOTE — DISCHARGE PLANNING
Anticipated Discharge Disposition: [facility]    Action: LSW met with [facility] owner Kellee who stated she thinks that they can take the Pt and has already discussed price with the Pt dtr. Kellee stated she will send paperwork to Pt dtr [place] and will follow up with this LSW.     Barriers to Discharge: Acceptance to [facility]    Plan: follow up with Kellee [facility] owner

## 2019-05-24 NOTE — CARE PLAN
Problem: Safety  Goal: Will remain free from falls    Intervention: Implement fall precautions  Pt calls appropriately, treaded socks in use. Personal belongings and call light with in reach and bed is locked in lowest position. Hourly rounding in place.      Problem: Skin Integrity  Goal: Risk for impaired skin integrity will decrease    Intervention: Assess risk factors for impaired skin integrity and/or pressure ulcers  Patient moves around in bed frequently and sits self up at EOB. Skin assessed and is intact.

## 2019-05-25 PROCEDURE — 770001 HCHG ROOM/CARE - MED/SURG/GYN PRIV*

## 2019-05-25 PROCEDURE — 99231 SBSQ HOSP IP/OBS SF/LOW 25: CPT | Performed by: INTERNAL MEDICINE

## 2019-05-25 PROCEDURE — A9270 NON-COVERED ITEM OR SERVICE: HCPCS | Performed by: HOSPITALIST

## 2019-05-25 PROCEDURE — 700102 HCHG RX REV CODE 250 W/ 637 OVERRIDE(OP): Performed by: HOSPITALIST

## 2019-05-25 RX ADMIN — FUROSEMIDE 20 MG: 20 TABLET ORAL at 06:24

## 2019-05-25 ASSESSMENT — ENCOUNTER SYMPTOMS
ABDOMINAL PAIN: 0
DIZZINESS: 0
PHOTOPHOBIA: 1
NAUSEA: 0
ORTHOPNEA: 0
DEPRESSION: 0
BACK PAIN: 1
CHILLS: 0
VOMITING: 0
PALPITATIONS: 0
FEVER: 0

## 2019-05-25 NOTE — CARE PLAN
Problem: Safety  Goal: Will remain free from injury  Outcome: PROGRESSING AS EXPECTED  Bed alarm on . Bed in low position. Upper bedrails up. Treaded socks on. Assistance with ambulation    Problem: Mobility  Goal: Risk for activity intolerance will decrease  Outcome: PROGRESSING AS EXPECTED  Up to the bathroom with 1 assist

## 2019-05-25 NOTE — PROGRESS NOTES
Pharmacy Pharmacotherapy Consult for LOS >30 days    Admit Date: 11/13/2018      Medications were reviewed for appropriateness and ongoing need.     Current Facility-Administered Medications   Medication Dose Route Frequency Provider Last Rate Last Dose   • benzocaine-menthol (CEPACOL) lozenge 1 Lozenge  1 Lozenge Mouth/Throat Q2HRS PRN Pa Jacinto M.D.   1 Lozenge at 02/23/19 1834   • guaiFENesin (ROBITUSSIN) 100 MG/5ML solution 200 mg  10 mL Oral Q4HRS PRN Pa Jacinto M.D.       • senna-docusate (PERICOLACE or SENOKOT S) 8.6-50 MG per tablet 2 Tab  2 Tab Oral BID Martinez Yi M.D.   2 Tab at 05/23/19 0808    And   • magnesium hydroxide (MILK OF MAGNESIA) suspension 30 mL  30 mL Oral QDAY PRN Martinez Yi M.D.   30 mL at 02/07/19 1608    And   • bisacodyl (DULCOLAX) suppository 10 mg  10 mg Rectal QDAY PRN Martinez Yi M.D.   10 mg at 03/24/19 2051   • ipratropium-albuterol (DUONEB) nebulizer solution  3 mL Nebulization Q4H PRN (RT) Asem RON Finn M.D.   3 mL at 12/15/18 0151   • LORazepam (ATIVAN) tablet 0.5 mg  0.5 mg Oral Q4HRS PRN Asem RON Finn M.D.   0.5 mg at 04/07/19 1002   • morphine (ROXANOL) 20 MG/ML oral conc 5-10 mg  5-10 mg Oral Q HOUR PRN Asem RON Finn M.D.       • acetaminophen (TYLENOL) tablet 500 mg  500 mg Oral Q6HRS PRN Asem RON Finn M.D.   500 mg at 03/23/19 1406   • diphenhydrAMINE-ZnAcetate (BENADRYL ITCH) 1-0.1 % cream   Topical 4X/DAY PRN Faustino York M.D.       • MD ALERT...adult comfort care   Other PRN Asem RON Finn M.D.       • atropine 1 % ophthalmic solution 2 Drop  2 Drop Sublingual Q4HRS PRN Caitlyn Finn M.D.       • furosemide (LASIX) tablet 20 mg  20 mg Oral Q DAY Caitlyn Finn M.D.   20 mg at 05/25/19 0624       Assessment/Recommendations:  1. Patient has not received either bisacodyl nor milk of magnesia in > 2 months - can consider discontinuing but reasonable to keep as needed.   2. Patient continues to refuse scheduled senna/docusate -  consider changing to prn.  3. No other recommendations - all other medications appear appropriate.      Olesya Hicks, PharmD., BCPS

## 2019-05-25 NOTE — CARE PLAN
Problem: Safety  Goal: Will remain free from injury  Outcome: PROGRESSING AS EXPECTED  Patient remains free from injury. Bed alarm in place. Patient cooperative with using call light when needing assistance.     Problem: Urinary Elimination:  Goal: Ability to reestablish a normal urinary elimination pattern will improve  Outcome: PROGRESSING AS EXPECTED  Patient voiding without difficult.

## 2019-05-25 NOTE — PROGRESS NOTES
[facility] Medicine Daily Progress Note    Date of Service  5/25/2019    Chief Complaint  89 y.o. female admitted 11/13/2018 with ground level fall, hip pain.     Hospital Course    83-year-old female past medical history of eye cancer, hypertension, glaucoma was admitted for hip pain, gorund level fall.  admission she was found she did have STEMI. She denies any intervention or treatment. She is comfort care. Pending placement. Her daughter is the POA. Pt capable to take medical decisions. She has severe dementia.       Interval Problem Update  No changes, pt has no complain. She is A&O x2 (self, age, not date, she does not know date of birth, and she is aware of the place). She tells me that she had slept well   05/25-no event overnight. No concern, slept overnight     Consultants/Specialty  Cardiology     Code Status  DNAR/DNI    Disposition  Comfort care. Possible placement to group home     Review of Systems  Review of Systems   Constitutional: Negative for chills and fever.   Eyes: Positive for photophobia.   Cardiovascular: Negative for chest pain, palpitations, orthopnea and leg swelling.   Gastrointestinal: Negative for abdominal pain, nausea and vomiting.   Genitourinary: Negative for dysuria.   Musculoskeletal: Positive for back pain.   Skin: Negative for rash.   Neurological: Negative for dizziness.   Psychiatric/Behavioral: Negative for depression.        Physical Exam  Temp:  [36.8 °C (98.3 °F)] 36.8 °C (98.3 °F)  Pulse:  [66] 66  Resp:  [17] 17  BP: (115)/(59) 115/59  SpO2:  [90 %] 90 %    Physical Exam   Constitutional: No distress.   frail   HENT:   Head: Normocephalic and atraumatic.   Nose: Nose normal.   Eyes: EOM are normal. No scleral icterus.   Cardiovascular: Normal rate.    Murmur heard.  Pulmonary/Chest: Breath sounds normal. No respiratory distress.   Abdominal: Soft. She exhibits no distension. There is no tenderness. There is no rebound.   Neurological:   A&O x2   Skin: Skin is warm. She  is not diaphoretic.   Psychiatric: She has a normal mood and affect.   no changes on physical examination 05/25/2019     Fluids    Intake/Output Summary (Last 24 hours) at 05/25/19 1522  Last data filed at 05/24/19 1751   Gross per 24 hour   Intake              180 ml   Output                0 ml   Net              180 ml       Laboratory      Imaging  DX-CHEST-PORTABLE (1 VIEW)   Final Result         1.  Hazy interstitial left pulmonary opacities suggests interstitial edema or infiltrate, similar to prior.   2.  Trace left pleural effusion   3.  Hyperexpansion of lungs favors changes of COPD.      DX-CHEST-PORTABLE (1 VIEW)   Final Result         1.  Interstitial infiltrates or edema, stable.   2.  Atherosclerosis      DX-CHEST-PORTABLE (1 VIEW)   Final Result         1.  Interstitial infiltrates or edema.   2.  Atherosclerosis      DX-CHEST-PORTABLE (1 VIEW)   Final Result      Moderate interstitial edema or interstitial lung disease and small left pleural effusion similar to previous.      DX-CHEST-PORTABLE (1 VIEW)   Final Result      Stable interstitial edema and/or interstitial lung disease with probable small pleural fluid      DX-CHEST-PORTABLE (1 VIEW)   Final Result      Ill-defined infrahilar interstitial opacities, atelectasis versus mild edema. No significant pleural effusions.      DX-CHEST-PORTABLE (1 VIEW)   Final Result      Mild left basilar atelectasis, improved since prior. No new consolidation or large pleural effusions.      DX-CHEST-PORTABLE (1 VIEW)   Final Result      1.  Left basilar opacification may be secondary to atelectasis. Developing pneumonia cannot be excluded.         DX-CHEST-PORTABLE (1 VIEW)   Final Result      1.  Unchanged mild interstitial pulmonary edema   2.  Unchanged bibasilar atelectasis, alveolar edema or pneumonia      DX-CHEST-PORTABLE (1 VIEW)   Final Result      1.  Decreased pulmonary edema   2.  Unchanged bibasilar atelectasis, alveolar edema or pneumonia       DX-CHEST-PORTABLE (1 VIEW)   Final Result      1.  There is diffuse interstitial and alveolar density in the right mid and lower lung zone. This is increased since the previous study. This may represent mild pulmonary edema/consolidation.   2.  Mildly enlarged cardiomediastinal silhouette when compared with the previous chest x-ray.      DX-CHEST-PORTABLE (1 VIEW)   Final Result      Stable mild pulmonary edema.   New left basilar atelectasis.      DX-CHEST-PORTABLE (1 VIEW)   Final Result      Stable chest appearance compared with 11/16.      DX-CHEST-PORTABLE (1 VIEW)   Final Result      No acute cardiopulmonary abnormality identified.      DX-CHEST-PORTABLE (1 VIEW)   Final Result      No acute cardiopulmonary abnormality. No interval change.      DX-CHEST-PORTABLE (1 VIEW)   Final Result      No acute cardiopulmonary disease.      CT-HEAD W/O   Final Result      1.  No evidence of acute intracranial process.      2.  There is again seen interstitial pulmonary edema and bibasilar atelectasis.      CT-HIP W/O PLUS RECONS LEFT   Final Result      1.  No evidence of acute fracture or malalignment. No evidence of hardware failure or complication.   2.  Postsurgical changes of the left femur with broken screw fragment redemonstrated.   3.  Demineralization.   4.  Scattered colonic diverticula.   5.  Atherosclerosis.   6.  Small fat-containing umbilical hernia.      EC-ECHOCARDIOGRAM COMPLETE W/O CONT   Final Result      DX-HIP-COMPLETE - UNILATERAL 2+ LEFT   Final Result      1.  No definite acute osseous abnormality. In setting of demineralization, an occult fracture is difficult to exclude. If there is strong clinical suspicion, CT or MRI could be obtained for further evaluation.   2.  Postsurgical changes of the left femur with broken screw fragment redemonstrated.      DX-CHEST-PORTABLE (1 VIEW)   Final Result      No acute cardiopulmonary process is seen.      Atherosclerotic plaque.            Assessment/Plan  * ST elevation myocardial infarction involving right coronary artery ([facility])- (present on admission)   Assessment & Plan    Comfort care       Tricuspid regurgitation- (present on admission)   Assessment & Plan    Severe  Lasix     Anemia- (present on admission)   Assessment & Plan    Comfort care. 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 No further testing      Essential hypertension- (present on admission)   Assessment & Plan    Comfort care       Comfort measures only status- (present on admission)   Assessment & Plan    In place     Advanced directives, counseling/discussion- (present on admission)   Assessment & Plan    Comfort care. Her daughter is POA      Fall- (present on admission)   Assessment & Plan    fall precautions            VTE prophylaxis: none

## 2019-05-26 PROCEDURE — 99231 SBSQ HOSP IP/OBS SF/LOW 25: CPT | Performed by: INTERNAL MEDICINE

## 2019-05-26 PROCEDURE — 770001 HCHG ROOM/CARE - MED/SURG/GYN PRIV*

## 2019-05-26 NOTE — CARE PLAN
Problem: Safety  Goal: Will remain free from injury  Outcome: PROGRESSING AS EXPECTED  Treaded socks in place, bed in the lowest position, bed alarm on, call light and belongings within reach, pt call for assistance appropriately    Problem: Venous Thromboembolism (VTW)/Deep Vein Thrombosis (DVT) Prevention:  Goal: Patient will participate in Venous Thrombosis (VTE)/Deep Vein Thrombosis (DVT)Prevention Measures  Outcome: PROGRESSING SLOWER THAN EXPECTED  Pt. refuses scds regardless of education.    Problem: Mobility  Goal: Risk for activity intolerance will decrease  Outcome: PROGRESSING AS EXPECTED  Pt. Up to commode x1 assist with FWW

## 2019-05-26 NOTE — CARE PLAN
Problem: Skin Integrity  Goal: Risk for impaired skin integrity will decrease  Outcome: PROGRESSING AS EXPECTED  Encouraged to turn while in bed. Ensure skin is dry. Elevate heels.    Problem: Mobility  Goal: Risk for activity intolerance will decrease  Outcome: PROGRESSING AS EXPECTED  Up to the bathroom standby assist.

## 2019-05-26 NOTE — PROGRESS NOTES
[facility] Medicine Daily Progress Note    Date of Service  5/26/2019    Chief Complaint  89 y.o. female admitted 11/13/2018 with ground level fall, hip pain.     Hospital Course    83-year-old female past medical history of eye cancer, hypertension, glaucoma was admitted for hip pain, gorund level fall.  admission she was found she did have STEMI. She denies any intervention or treatment. She is comfort care. Pending placement. Her daughter is the POA. Pt capable to take medical decisions. She has severe dementia.       Interval Problem Update  No changes, pt has no complain. She is A&O x2 (self, age, not date, she does not know date of birth, and she is aware of the place). She tells me that she had slept well   05/25-no event overnight. No concern, slept overnight   05/26-no event overnight. Sleeping well. No changes     Consultants/Specialty  Cardiology     Code Status  DNAR/DNI    Disposition  Comfort care. Possible placement to group home     Review of Systems  Review of Systems   Unable to perform ROS: Other    pt sleeping. Dementia +     Physical Exam  Temp:  [36.8 °C (98.3 °F)] 36.8 °C (98.3 °F)  Pulse:  [69] 69  Resp:  [18] 18  BP: (109)/(51) 109/51    Physical Exam   Constitutional: No distress.   Frail, sleeping    HENT:   Head: Normocephalic and atraumatic.   Nose: Nose normal.   Eyes: EOM are normal.   Pulmonary/Chest: Breath sounds normal. No respiratory distress.   Skin: Skin is warm. She is not diaphoretic.   no changes on physical examination 05/25/2019     Fluids    Intake/Output Summary (Last 24 hours) at 05/26/19 1446  Last data filed at 05/26/19 1000   Gross per 24 hour   Intake              120 ml   Output                0 ml   Net              120 ml       Laboratory      Imaging  DX-CHEST-PORTABLE (1 VIEW)   Final Result         1.  Hazy interstitial left pulmonary opacities suggests interstitial edema or infiltrate, similar to prior.   2.  Trace left pleural effusion   3.  Hyperexpansion of  lungs favors changes of COPD.      DX-CHEST-PORTABLE (1 VIEW)   Final Result         1.  Interstitial infiltrates or edema, stable.   2.  Atherosclerosis      DX-CHEST-PORTABLE (1 VIEW)   Final Result         1.  Interstitial infiltrates or edema.   2.  Atherosclerosis      DX-CHEST-PORTABLE (1 VIEW)   Final Result      Moderate interstitial edema or interstitial lung disease and small left pleural effusion similar to previous.      DX-CHEST-PORTABLE (1 VIEW)   Final Result      Stable interstitial edema and/or interstitial lung disease with probable small pleural fluid      DX-CHEST-PORTABLE (1 VIEW)   Final Result      Ill-defined infrahilar interstitial opacities, atelectasis versus mild edema. No significant pleural effusions.      DX-CHEST-PORTABLE (1 VIEW)   Final Result      Mild left basilar atelectasis, improved since prior. No new consolidation or large pleural effusions.      DX-CHEST-PORTABLE (1 VIEW)   Final Result      1.  Left basilar opacification may be secondary to atelectasis. Developing pneumonia cannot be excluded.         DX-CHEST-PORTABLE (1 VIEW)   Final Result      1.  Unchanged mild interstitial pulmonary edema   2.  Unchanged bibasilar atelectasis, alveolar edema or pneumonia      DX-CHEST-PORTABLE (1 VIEW)   Final Result      1.  Decreased pulmonary edema   2.  Unchanged bibasilar atelectasis, alveolar edema or pneumonia      DX-CHEST-PORTABLE (1 VIEW)   Final Result      1.  There is diffuse interstitial and alveolar density in the right mid and lower lung zone. This is increased since the previous study. This may represent mild pulmonary edema/consolidation.   2.  Mildly enlarged cardiomediastinal silhouette when compared with the previous chest x-ray.      DX-CHEST-PORTABLE (1 VIEW)   Final Result      Stable mild pulmonary edema.   New left basilar atelectasis.      DX-CHEST-PORTABLE (1 VIEW)   Final Result      Stable chest appearance compared with 11/16.      DX-CHEST-PORTABLE (1  VIEW)   Final Result      No acute cardiopulmonary abnormality identified.      DX-CHEST-PORTABLE (1 VIEW)   Final Result      No acute cardiopulmonary abnormality. No interval change.      DX-CHEST-PORTABLE (1 VIEW)   Final Result      No acute cardiopulmonary disease.      CT-HEAD W/O   Final Result      1.  No evidence of acute intracranial process.      2.  There is again seen interstitial pulmonary edema and bibasilar atelectasis.      CT-HIP W/O PLUS RECONS LEFT   Final Result      1.  No evidence of acute fracture or malalignment. No evidence of hardware failure or complication.   2.  Postsurgical changes of the left femur with broken screw fragment redemonstrated.   3.  Demineralization.   4.  Scattered colonic diverticula.   5.  Atherosclerosis.   6.  Small fat-containing umbilical hernia.      EC-ECHOCARDIOGRAM COMPLETE W/O CONT   Final Result      DX-HIP-COMPLETE - UNILATERAL 2+ LEFT   Final Result      1.  No definite acute osseous abnormality. In setting of demineralization, an occult fracture is difficult to exclude. If there is strong clinical suspicion, CT or MRI could be obtained for further evaluation.   2.  Postsurgical changes of the left femur with broken screw fragment redemonstrated.      DX-CHEST-PORTABLE (1 VIEW)   Final Result      No acute cardiopulmonary process is seen.      Atherosclerotic plaque.           Assessment/Plan  * ST elevation myocardial infarction involving right coronary artery (HCC)- (present on admission)   Assessment & Plan    Comfort care       Tricuspid regurgitation- (present on admission)   Assessment & Plan    Severe  Lasix     Anemia- (present on admission)   Assessment & Plan    Comfort care. No further testing      Rash- (present on admission)   Assessment & Plan    Benadryl cream prn  Resolved      Positive QuantiFERON-TB Gold test- (present on admission)   Assessment & Plan    AFB negative        Chronic kidney disease (CKD), stage III (moderate) ([facility])-  (present on admission)   Assessment & Plan    Comfort care. No further testing      Essential hypertension- (present on admission)   Assessment & Plan    Comfort care       Comfort measures only status- (present on admission)   Assessment & Plan    In place     Advanced directives, counseling/discussion- (present on admission)   Assessment & Plan    Comfort care. Her daughter is POA      Fall- (present on admission)   Assessment & Plan    fall precautions            VTE prophylaxis: none

## 2019-05-27 PROCEDURE — 99231 SBSQ HOSP IP/OBS SF/LOW 25: CPT | Performed by: INTERNAL MEDICINE

## 2019-05-27 PROCEDURE — 770001 HCHG ROOM/CARE - MED/SURG/GYN PRIV*

## 2019-05-27 NOTE — CARE PLAN
Problem: Safety  Goal: Will remain free from injury  Outcome: PROGRESSING AS EXPECTED  Patient remains free from injury. Patient has bed alarm and uses call light appropriately. Hourly rounding in place.    Problem: Respiratory:  Goal: Respiratory status will improve  Outcome: PROGRESSING AS EXPECTED  Patient on room air, tolerating well.

## 2019-05-27 NOTE — PROGRESS NOTES
[facility] Medicine Daily Progress Note    Date of Service  5/27/2019    Chief Complaint  89 y.o. female admitted 11/13/2018 with ground level fall, hip pain.     Hospital Course    83-year-old female past medical history of eye cancer, hypertension, glaucoma was admitted for hip pain, gorund level fall.  admission she was found she did have STEMI. She denies any intervention or treatment. She is comfort care. Pending placement. Her daughter is the POA. Pt capable to take medical decisions. She has severe dementia.       Interval Problem Update  No changes, pt has no complain. She is A&O x2 (self, age, not date, she does not know date of birth, and she is aware of the place). She tells me that she had slept well   05/25-no event overnight. No concern, slept overnight   05/26-no event overnight. Sleeping well. No changes   05/27-no changes. Pt has no complains. Eating well.    Consultants/Specialty  Cardiology     Code Status  DNAR/DNI    Disposition  Comfort care. Possible placement to group home     Review of Systems  Review of Systems   Unable to perform ROS: Other    pt sleeping. Dementia +     Physical Exam  Temp:  [36.4 °C (97.6 °F)-36.8 °C (98.3 °F)] 36.5 °C (97.7 °F)  Pulse:  [67-71] 67  Resp:  [16] 16  BP: (107-120)/(61-80) 120/61  SpO2:  [93 %-96 %] 93 %    Physical Exam   Constitutional: No distress.   Frail,   HENT:   Head: Normocephalic and atraumatic.   Nose: Nose normal.   Eyes: EOM are normal.   Cardiovascular: Normal rate and regular rhythm.  Exam reveals no gallop and no friction rub.    Murmur heard.  Pulmonary/Chest: Breath sounds normal. No respiratory distress.   Abdominal: Soft. She exhibits no distension. There is no tenderness. There is no rebound.   Musculoskeletal: She exhibits no edema.   Neurological:   A&O x1 (self)    Skin: Skin is warm. She is not diaphoretic.       Fluids    Intake/Output Summary (Last 24 hours) at 05/27/19 1421  Last data filed at 05/27/19 1000   Gross per 24 hour    Intake              550 ml   Output                0 ml   Net              550 ml       Laboratory      Imaging  DX-CHEST-PORTABLE (1 VIEW)   Final Result         1.  Hazy interstitial left pulmonary opacities suggests interstitial edema or infiltrate, similar to prior.   2.  Trace left pleural effusion   3.  Hyperexpansion of lungs favors changes of COPD.      DX-CHEST-PORTABLE (1 VIEW)   Final Result         1.  Interstitial infiltrates or edema, stable.   2.  Atherosclerosis      DX-CHEST-PORTABLE (1 VIEW)   Final Result         1.  Interstitial infiltrates or edema.   2.  Atherosclerosis      DX-CHEST-PORTABLE (1 VIEW)   Final Result      Moderate interstitial edema or interstitial lung disease and small left pleural effusion similar to previous.      DX-CHEST-PORTABLE (1 VIEW)   Final Result      Stable interstitial edema and/or interstitial lung disease with probable small pleural fluid      DX-CHEST-PORTABLE (1 VIEW)   Final Result      Ill-defined infrahilar interstitial opacities, atelectasis versus mild edema. No significant pleural effusions.      DX-CHEST-PORTABLE (1 VIEW)   Final Result      Mild left basilar atelectasis, improved since prior. No new consolidation or large pleural effusions.      DX-CHEST-PORTABLE (1 VIEW)   Final Result      1.  Left basilar opacification may be secondary to atelectasis. Developing pneumonia cannot be excluded.         DX-CHEST-PORTABLE (1 VIEW)   Final Result      1.  Unchanged mild interstitial pulmonary edema   2.  Unchanged bibasilar atelectasis, alveolar edema or pneumonia      DX-CHEST-PORTABLE (1 VIEW)   Final Result      1.  Decreased pulmonary edema   2.  Unchanged bibasilar atelectasis, alveolar edema or pneumonia      DX-CHEST-PORTABLE (1 VIEW)   Final Result      1.  There is diffuse interstitial and alveolar density in the right mid and lower lung zone. This is increased since the previous study. This may represent mild pulmonary edema/consolidation.    2.  Mildly enlarged cardiomediastinal silhouette when compared with the previous chest x-ray.      DX-CHEST-PORTABLE (1 VIEW)   Final Result      Stable mild pulmonary edema.   New left basilar atelectasis.      DX-CHEST-PORTABLE (1 VIEW)   Final Result      Stable chest appearance compared with 11/16.      DX-CHEST-PORTABLE (1 VIEW)   Final Result      No acute cardiopulmonary abnormality identified.      DX-CHEST-PORTABLE (1 VIEW)   Final Result      No acute cardiopulmonary abnormality. No interval change.      DX-CHEST-PORTABLE (1 VIEW)   Final Result      No acute cardiopulmonary disease.      CT-HEAD W/O   Final Result      1.  No evidence of acute intracranial process.      2.  There is again seen interstitial pulmonary edema and bibasilar atelectasis.      CT-HIP W/O PLUS RECONS LEFT   Final Result      1.  No evidence of acute fracture or malalignment. No evidence of hardware failure or complication.   2.  Postsurgical changes of the left femur with broken screw fragment redemonstrated.   3.  Demineralization.   4.  Scattered colonic diverticula.   5.  Atherosclerosis.   6.  Small fat-containing umbilical hernia.      EC-ECHOCARDIOGRAM COMPLETE W/O CONT   Final Result      DX-HIP-COMPLETE - UNILATERAL 2+ LEFT   Final Result      1.  No definite acute osseous abnormality. In setting of demineralization, an occult fracture is difficult to exclude. If there is strong clinical suspicion, CT or MRI could be obtained for further evaluation.   2.  Postsurgical changes of the left femur with broken screw fragment redemonstrated.      DX-CHEST-PORTABLE (1 VIEW)   Final Result      No acute cardiopulmonary process is seen.      Atherosclerotic plaque.           Assessment/Plan  * ST elevation myocardial infarction involving right coronary artery (HCC)- (present on admission)   Assessment & Plan    Comfort care       Tricuspid regurgitation- (present on admission)   Assessment & Plan    Severe  Lasix     Anemia-  (present on admission)   Assessment & Plan    Comfort care. No further testing      Rash- (present on admission)   Assessment & Plan    Benadryl cream prn  Resolved      Positive QuantiFERON-TB Gold test- (present on admission)   Assessment & Plan    AFB negative        Chronic kidney disease (CKD), stage III (moderate) (HCC)- (present on admission)   Assessment & Plan    Comfort care. No further testing      Essential hypertension- (present on admission)   Assessment & Plan    Comfort care       Comfort measures only status- (present on admission)   Assessment & Plan    In place     Advanced directives, counseling/discussion- (present on admission)   Assessment & Plan    Comfort care. Her daughter is POA      Fall- (present on admission)   Assessment & Plan    fall precautions            VTE prophylaxis: none

## 2019-05-27 NOTE — CARE PLAN
Problem: Safety  Goal: Will remain free from injury  Outcome: PROGRESSING AS EXPECTED  Hourly rounds done. Call light within reach. Needs attended on a timely manner. Treaded socks on when OOB. Educated on the importance of calling for assistance when attempting to be OOB.  BED ALARM ON.    Problem: Venous Thromboembolism (VTW)/Deep Vein Thrombosis (DVT) Prevention:  Goal: Patient will participate in Venous Thrombosis (VTE)/Deep Vein Thrombosis (DVT)Prevention Measures  Outcome: PROGRESSING AS EXPECTED  Encourage early mobilization.     Problem: Bowel/Gastric:  Goal: Normal bowel function is maintained or improved  Outcome: PROGRESSING AS EXPECTED  Last documented BM 5/24/19. Refusing stool softeners.    Problem: Discharge Barriers/Planning  Goal: Patient's continuum of care needs will be met  Outcome: PROGRESSING SLOWER THAN EXPECTED  SW and Case Management assisting with group home placement.

## 2019-05-28 PROCEDURE — 770001 HCHG ROOM/CARE - MED/SURG/GYN PRIV*

## 2019-05-28 PROCEDURE — 99231 SBSQ HOSP IP/OBS SF/LOW 25: CPT | Performed by: HOSPITALIST

## 2019-05-28 PROCEDURE — A9270 NON-COVERED ITEM OR SERVICE: HCPCS | Performed by: HOSPITALIST

## 2019-05-28 PROCEDURE — 700102 HCHG RX REV CODE 250 W/ 637 OVERRIDE(OP): Performed by: HOSPITALIST

## 2019-05-28 RX ADMIN — FUROSEMIDE 20 MG: 20 TABLET ORAL at 06:04

## 2019-05-28 RX ADMIN — ACETAMINOPHEN 500 MG: 500 TABLET ORAL at 06:09

## 2019-05-28 RX ADMIN — SENNOSIDES AND DOCUSATE SODIUM 2 TABLET: 8.6; 5 TABLET ORAL at 06:04

## 2019-05-28 NOTE — CARE PLAN
Problem: Skin Integrity  Goal: Risk for impaired skin integrity will decrease  Outcome: PROGRESSING AS EXPECTED  Pt skin remain free from breakdown, precautions in place to prevent/ minimize breakdown potential, continue to assess.

## 2019-05-28 NOTE — CARE PLAN
Problem: Safety  Goal: Will remain free from falls  Outcome: PROGRESSING AS EXPECTED  Treaded socks in place, bed in the lowest position, bed alarm on, call light and belongings within reach, pt call for assistance appropriately    Problem: Skin Integrity  Goal: Risk for impaired skin integrity will decrease  Outcome: PROGRESSING AS EXPECTED  Encouraged pt. To q2hr turns, pt. Up to commode. Pt. refused preventative mepelix to elbows, heels and sacrum regardless of education.

## 2019-05-28 NOTE — PROGRESS NOTES
[facility] Medicine Daily Progress Note    Date of Service  5/28/2019    Chief Complaint  89 y.o. female admitted 11/13/2018 with ground level fall, hip pain.     Hospital Course    83-year-old female past medical history of eye cancer, hypertension, glaucoma was admitted for hip pain, gorund level fall.  admission she was found she did have STEMI. She denies any intervention or treatment. She is comfort care. Pending placement. Her daughter is the POA. Pt capable to take medical decisions. She has severe dementia.       Interval Problem Update  In bed, no new complains, waiting for lunch, no pain.     Consultants/Specialty  Cardiology     Code Status  DNAR/DNI    Disposition  Comfort care. Possible placement to group home     Review of Systems  Review of Systems   Unable to perform ROS: Other    pt sleeping. Dementia +     Physical Exam  Temp:  [36.5 °C (97.7 °F)-36.7 °C (98.1 °F)] 36.5 °C (97.7 °F)  Pulse:  [72] 72  Resp:  [15-16] 16  BP: (118-128)/(59-65) 118/59  SpO2:  [92 %-93 %] 93 %    Physical Exam   Constitutional: No distress.   Frail,   HENT:   Head: Normocephalic and atraumatic.   Nose: Nose normal.   Eyes: EOM are normal.   Cardiovascular: Normal rate and regular rhythm.  Exam reveals no gallop and no friction rub.    Murmur heard.  Pulmonary/Chest: Breath sounds normal. No respiratory distress.   Abdominal: Soft. She exhibits no distension. There is no tenderness.   Musculoskeletal: She exhibits no edema.   Neurological:   A&O x1 (self)    Skin: Skin is warm.       Fluids    Intake/Output Summary (Last 24 hours) at 05/28/19 1648  Last data filed at 05/28/19 0958   Gross per 24 hour   Intake              300 ml   Output                0 ml   Net              300 ml       Laboratory      Imaging  DX-CHEST-PORTABLE (1 VIEW)   Final Result         1.  Hazy interstitial left pulmonary opacities suggests interstitial edema or infiltrate, similar to prior.   2.  Trace left pleural effusion   3.  Hyperexpansion  of lungs favors changes of COPD.      DX-CHEST-PORTABLE (1 VIEW)   Final Result         1.  Interstitial infiltrates or edema, stable.   2.  Atherosclerosis      DX-CHEST-PORTABLE (1 VIEW)   Final Result         1.  Interstitial infiltrates or edema.   2.  Atherosclerosis      DX-CHEST-PORTABLE (1 VIEW)   Final Result      Moderate interstitial edema or interstitial lung disease and small left pleural effusion similar to previous.      DX-CHEST-PORTABLE (1 VIEW)   Final Result      Stable interstitial edema and/or interstitial lung disease with probable small pleural fluid      DX-CHEST-PORTABLE (1 VIEW)   Final Result      Ill-defined infrahilar interstitial opacities, atelectasis versus mild edema. No significant pleural effusions.      DX-CHEST-PORTABLE (1 VIEW)   Final Result      Mild left basilar atelectasis, improved since prior. No new consolidation or large pleural effusions.      DX-CHEST-PORTABLE (1 VIEW)   Final Result      1.  Left basilar opacification may be secondary to atelectasis. Developing pneumonia cannot be excluded.         DX-CHEST-PORTABLE (1 VIEW)   Final Result      1.  Unchanged mild interstitial pulmonary edema   2.  Unchanged bibasilar atelectasis, alveolar edema or pneumonia      DX-CHEST-PORTABLE (1 VIEW)   Final Result      1.  Decreased pulmonary edema   2.  Unchanged bibasilar atelectasis, alveolar edema or pneumonia      DX-CHEST-PORTABLE (1 VIEW)   Final Result      1.  There is diffuse interstitial and alveolar density in the right mid and lower lung zone. This is increased since the previous study. This may represent mild pulmonary edema/consolidation.   2.  Mildly enlarged cardiomediastinal silhouette when compared with the previous chest x-ray.      DX-CHEST-PORTABLE (1 VIEW)   Final Result      Stable mild pulmonary edema.   New left basilar atelectasis.      DX-CHEST-PORTABLE (1 VIEW)   Final Result      Stable chest appearance compared with 11/16.      DX-CHEST-PORTABLE (1  VIEW)   Final Result      No acute cardiopulmonary abnormality identified.      DX-CHEST-PORTABLE (1 VIEW)   Final Result      No acute cardiopulmonary abnormality. No interval change.      DX-CHEST-PORTABLE (1 VIEW)   Final Result      No acute cardiopulmonary disease.      CT-HEAD W/O   Final Result      1.  No evidence of acute intracranial process.      2.  There is again seen interstitial pulmonary edema and bibasilar atelectasis.      CT-HIP W/O PLUS RECONS LEFT   Final Result      1.  No evidence of acute fracture or malalignment. No evidence of hardware failure or complication.   2.  Postsurgical changes of the left femur with broken screw fragment redemonstrated.   3.  Demineralization.   4.  Scattered colonic diverticula.   5.  Atherosclerosis.   6.  Small fat-containing umbilical hernia.      EC-ECHOCARDIOGRAM COMPLETE W/O CONT   Final Result      DX-HIP-COMPLETE - UNILATERAL 2+ LEFT   Final Result      1.  No definite acute osseous abnormality. In setting of demineralization, an occult fracture is difficult to exclude. If there is strong clinical suspicion, CT or MRI could be obtained for further evaluation.   2.  Postsurgical changes of the left femur with broken screw fragment redemonstrated.      DX-CHEST-PORTABLE (1 VIEW)   Final Result      No acute cardiopulmonary process is seen.      Atherosclerotic plaque.           Assessment/Plan  * ST elevation myocardial infarction involving right coronary artery (HCC)- (present on admission)   Assessment & Plan    Comfort care       Tricuspid regurgitation- (present on admission)   Assessment & Plan    Severe  Lasix     Anemia- (present on admission)   Assessment & Plan    Comfort care. No further testing .     Rash- (present on admission)   Assessment & Plan    Benadryl cream prn  Resolved      Positive QuantiFERON-TB Gold test- (present on admission)   Assessment & Plan    AFB negative        Chronic kidney disease (CKD), stage III (moderate) ([facility])-  (present on admission)   Assessment & Plan    Comfort care. No further testing      Essential hypertension- (present on admission)   Assessment & Plan    Comfort care .      Comfort measures only status- (present on admission)   Assessment & Plan    In place.     Advanced directives, counseling/discussion- (present on admission)   Assessment & Plan    Comfort care. Her daughter is POA      Fall- (present on admission)   Assessment & Plan    fall precautions            VTE prophylaxis: none

## 2019-05-28 NOTE — CARE PLAN
Problem: Safety  Goal: Will remain free from injury  Outcome: MET Date Met: 05/28/19  Pt has remain free from falls during shift. Pt compliant in calling for assistance up OOB, call light within reach. x1 assist with walker

## 2019-05-29 PROCEDURE — 99231 SBSQ HOSP IP/OBS SF/LOW 25: CPT | Performed by: HOSPITALIST

## 2019-05-29 PROCEDURE — 770001 HCHG ROOM/CARE - MED/SURG/GYN PRIV*

## 2019-05-29 PROCEDURE — 700102 HCHG RX REV CODE 250 W/ 637 OVERRIDE(OP): Performed by: HOSPITALIST

## 2019-05-29 PROCEDURE — A9270 NON-COVERED ITEM OR SERVICE: HCPCS | Performed by: HOSPITALIST

## 2019-05-29 RX ADMIN — FUROSEMIDE 20 MG: 20 TABLET ORAL at 05:58

## 2019-05-29 NOTE — PROGRESS NOTES
0730 assumed care. Bedside report form NOC RN. Resting in bed and in no distress. Bed in low position, call light w/in reach.

## 2019-05-29 NOTE — DISCHARGE PLANNING
Anticipated Discharge Disposition:     Action: LSW received fax from  owner Kellee who stated she had to mail the [facility] paperwork to the Pt dtr and will let this LSW know once she gets it back. LSW received paperwork for the attending doctor to fill out and gave it to Dr. Barry during rounds for completion.     Barriers to Discharge: dtr and doctor to complete paperwork    Plan: follow up with doctor and dtr

## 2019-05-29 NOTE — CARE PLAN
Problem: Bowel/Gastric:  Goal: Normal bowel function is maintained or improved  Outcome: PROGRESSING AS EXPECTED  Pt educated on need to maintain normal function, pt no longer refusing stool softeners. Pt had a BM 5/28

## 2019-05-29 NOTE — CARE PLAN
Problem: Safety  Goal: Will remain free from falls  Outcome: PROGRESSING AS EXPECTED  Pt remains free from falls this shift. Hourly rounding, call light within reach, pt call appropriately, bed in locked and low position.

## 2019-05-30 PROCEDURE — A9270 NON-COVERED ITEM OR SERVICE: HCPCS | Performed by: HOSPITALIST

## 2019-05-30 PROCEDURE — 700102 HCHG RX REV CODE 250 W/ 637 OVERRIDE(OP): Performed by: HOSPITALIST

## 2019-05-30 PROCEDURE — 99231 SBSQ HOSP IP/OBS SF/LOW 25: CPT | Performed by: HOSPITALIST

## 2019-05-30 PROCEDURE — 770001 HCHG ROOM/CARE - MED/SURG/GYN PRIV*

## 2019-05-30 RX ADMIN — FUROSEMIDE 20 MG: 20 TABLET ORAL at 06:14

## 2019-05-30 RX ADMIN — SENNOSIDES AND DOCUSATE SODIUM 2 TABLET: 8.6; 5 TABLET ORAL at 06:14

## 2019-05-30 NOTE — CARE PLAN
Problem: Respiratory:  Goal: Respiratory status will improve  Outcome: PROGRESSING AS EXPECTED  Pt remains on RA and maintains normal saturations.

## 2019-05-30 NOTE — CARE PLAN
Problem: Safety  Goal: Will remain free from falls  Outcome: PROGRESSING AS EXPECTED  Bed in the lowest locked position. Call light within reach. Bed alarm in use. Hourly rounding in place.     Problem: Mobility  Goal: Risk for activity intolerance will decrease  Outcome: PROGRESSING AS EXPECTED  Patient encouraged sitting up at [address] for meals

## 2019-05-30 NOTE — CARE PLAN
Problem: Safety  Goal: Will remain free from falls  Outcome: PROGRESSING AS EXPECTED  Pt remained free from falls this shift. Pt is compliant in calling for assistance, call light within reach

## 2019-05-30 NOTE — PROGRESS NOTES
[facility] Medicine Daily Progress Note    Date of Service  5/29/2019    Chief Complaint  89 y.o. female admitted 11/13/2018 with ground level fall, hip pain.     Hospital Course    83-year-old female past medical history of eye cancer, hypertension, glaucoma was admitted for hip pain, gorund level fall.  admission she was found she did have STEMI. She denies any intervention or treatment. She is comfort care. Pending placement. Her daughter is the POA. Pt capable to take medical decisions. She has severe dementia.       Interval Problem Update  Patient is resting in bed, no new complaints, no new events, tolerating diet.    Consultants/Specialty  Cardiology     Code Status  DNAR/DNI    Disposition  Comfort care. Possible placement to group home     Review of Systems  Review of Systems   Unable to perform ROS: Other        Physical Exam  Temp:  [36.7 °C (98 °F)-36.7 °C (98.1 °F)] 36.7 °C (98.1 °F)  Pulse:  [72-75] 75  Resp:  [16-17] 17  BP: (120-122)/(57-84) 120/84  SpO2:  [91 %] 91 %    Physical Exam   Constitutional: No distress.   Frail,   HENT:   Head: Normocephalic and atraumatic.   Nose: Nose normal.   Eyes: EOM are normal.   Neck: Normal range of motion.   Cardiovascular: Normal rate and regular rhythm.  Exam reveals no gallop and no friction rub.    Murmur heard.  Pulmonary/Chest: Breath sounds normal. No respiratory distress.   Abdominal: Soft. She exhibits no distension.   Musculoskeletal: She exhibits no edema.   Neurological:   A&O x1 (self)    Skin: Skin is warm.       Fluids    Intake/Output Summary (Last 24 hours) at 05/29/19 1822  Last data filed at 05/29/19 1000   Gross per 24 hour   Intake              540 ml   Output                2 ml   Net              538 ml       Laboratory      Imaging  DX-CHEST-PORTABLE (1 VIEW)   Final Result         1.  Hazy interstitial left pulmonary opacities suggests interstitial edema or infiltrate, similar to prior.   2.  Trace left pleural effusion   3.   Hyperexpansion of lungs favors changes of COPD.      DX-CHEST-PORTABLE (1 VIEW)   Final Result         1.  Interstitial infiltrates or edema, stable.   2.  Atherosclerosis      DX-CHEST-PORTABLE (1 VIEW)   Final Result         1.  Interstitial infiltrates or edema.   2.  Atherosclerosis      DX-CHEST-PORTABLE (1 VIEW)   Final Result      Moderate interstitial edema or interstitial lung disease and small left pleural effusion similar to previous.      DX-CHEST-PORTABLE (1 VIEW)   Final Result      Stable interstitial edema and/or interstitial lung disease with probable small pleural fluid      DX-CHEST-PORTABLE (1 VIEW)   Final Result      Ill-defined infrahilar interstitial opacities, atelectasis versus mild edema. No significant pleural effusions.      DX-CHEST-PORTABLE (1 VIEW)   Final Result      Mild left basilar atelectasis, improved since prior. No new consolidation or large pleural effusions.      DX-CHEST-PORTABLE (1 VIEW)   Final Result      1.  Left basilar opacification may be secondary to atelectasis. Developing pneumonia cannot be excluded.         DX-CHEST-PORTABLE (1 VIEW)   Final Result      1.  Unchanged mild interstitial pulmonary edema   2.  Unchanged bibasilar atelectasis, alveolar edema or pneumonia      DX-CHEST-PORTABLE (1 VIEW)   Final Result      1.  Decreased pulmonary edema   2.  Unchanged bibasilar atelectasis, alveolar edema or pneumonia      DX-CHEST-PORTABLE (1 VIEW)   Final Result      1.  There is diffuse interstitial and alveolar density in the right mid and lower lung zone. This is increased since the previous study. This may represent mild pulmonary edema/consolidation.   2.  Mildly enlarged cardiomediastinal silhouette when compared with the previous chest x-ray.      DX-CHEST-PORTABLE (1 VIEW)   Final Result      Stable mild pulmonary edema.   New left basilar atelectasis.      DX-CHEST-PORTABLE (1 VIEW)   Final Result      Stable chest appearance compared with 11/16.       DX-CHEST-PORTABLE (1 VIEW)   Final Result      No acute cardiopulmonary abnormality identified.      DX-CHEST-PORTABLE (1 VIEW)   Final Result      No acute cardiopulmonary abnormality. No interval change.      DX-CHEST-PORTABLE (1 VIEW)   Final Result      No acute cardiopulmonary disease.      CT-HEAD W/O   Final Result      1.  No evidence of acute intracranial process.      2.  There is again seen interstitial pulmonary edema and bibasilar atelectasis.      CT-HIP W/O PLUS RECONS LEFT   Final Result      1.  No evidence of acute fracture or malalignment. No evidence of hardware failure or complication.   2.  Postsurgical changes of the left femur with broken screw fragment redemonstrated.   3.  Demineralization.   4.  Scattered colonic diverticula.   5.  Atherosclerosis.   6.  Small fat-containing umbilical hernia.      EC-ECHOCARDIOGRAM COMPLETE W/O CONT   Final Result      DX-HIP-COMPLETE - UNILATERAL 2+ LEFT   Final Result      1.  No definite acute osseous abnormality. In setting of demineralization, an occult fracture is difficult to exclude. If there is strong clinical suspicion, CT or MRI could be obtained for further evaluation.   2.  Postsurgical changes of the left femur with broken screw fragment redemonstrated.      DX-CHEST-PORTABLE (1 VIEW)   Final Result      No acute cardiopulmonary process is seen.      Atherosclerotic plaque.           Assessment/Plan  * ST elevation myocardial infarction involving right coronary artery (HCC)- (present on admission)   Assessment & Plan    Comfort care       Tricuspid regurgitation- (present on admission)   Assessment & Plan    Severe  Lasix     Anemia- (present on admission)   Assessment & Plan    Comfort care. No further testing .     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 No further testing      Essential hypertension- (present on admission)   Assessment & Plan    Comfort care .      Comfort measures only status- (present on admission)   Assessment & Plan    In place.     Advanced directives, counseling/discussion- (present on admission)   Assessment & Plan    Comfort care. Her daughter is POA      Fall- (present on admission)   Assessment & Plan    fall precautions            VTE prophylaxis: none

## 2019-05-30 NOTE — PROGRESS NOTES
Report received. Assumed pt care. Pt sleeping no signs of acute distress noted. Bed alarm on and in use. Fall precaution in place, call light within reach. Hourly rounding in place.

## 2019-05-30 NOTE — PROGRESS NOTES
[facility] Medicine Daily Progress Note    Date of Service  5/30/2019    Chief Complaint  89 y.o. female admitted 11/13/2018 with ground level fall, hip pain.     Hospital Course    83-year-old female past medical history of eye cancer, hypertension, glaucoma was admitted for hip pain, gorund level fall.  admission she was found she did have STEMI. She denies any intervention or treatment. She is comfort care. Pending placement. Her daughter is the POA. Pt capable to take medical decisions. She has severe dementia.       Interval Problem Update  Patient is resting in bed, no new complains or events. No fever or chills, no pain.     Consultants/Specialty  Cardiology     Code Status  DNAR/DNI    Disposition  Comfort care. Possible placement to group home soon    Review of Systems  Review of Systems   Unable to perform ROS: Other        Physical Exam  Temp:  [36.4 °C (97.6 °F)-36.5 °C (97.7 °F)] 36.5 °C (97.7 °F)  Pulse:  [65-66] 65  Resp:  [16] 16  BP: (105-111)/(61-62) 111/62  SpO2:  [91 %-93 %] 91 %    Physical Exam   Constitutional: She appears cachectic. No distress.   HENT:   Head: Normocephalic and atraumatic.   Nose: Nose normal.   Eyes: EOM are normal.   Neck: Normal range of motion.   Cardiovascular: Normal rate and regular rhythm.  Exam reveals no gallop and no friction rub.    Murmur heard.  Pulmonary/Chest: Breath sounds normal. She has no wheezes.   Abdominal: Soft. There is no tenderness.   Musculoskeletal: She exhibits no edema.   Lymphadenopathy:     She has no cervical adenopathy.   Neurological:   A&O x1 (self)    Skin: Skin is warm.       Fluids    Intake/Output Summary (Last 24 hours) at 05/30/19 1638  Last data filed at 05/30/19 1400   Gross per 24 hour   Intake              300 ml   Output              400 ml   Net             -100 ml       Laboratory      Imaging  DX-CHEST-PORTABLE (1 VIEW)   Final Result         1.  Hazy interstitial left pulmonary opacities suggests interstitial edema or  infiltrate, similar to prior.   2.  Trace left pleural effusion   3.  Hyperexpansion of lungs favors changes of COPD.      DX-CHEST-PORTABLE (1 VIEW)   Final Result         1.  Interstitial infiltrates or edema, stable.   2.  Atherosclerosis      DX-CHEST-PORTABLE (1 VIEW)   Final Result         1.  Interstitial infiltrates or edema.   2.  Atherosclerosis      DX-CHEST-PORTABLE (1 VIEW)   Final Result      Moderate interstitial edema or interstitial lung disease and small left pleural effusion similar to previous.      DX-CHEST-PORTABLE (1 VIEW)   Final Result      Stable interstitial edema and/or interstitial lung disease with probable small pleural fluid      DX-CHEST-PORTABLE (1 VIEW)   Final Result      Ill-defined infrahilar interstitial opacities, atelectasis versus mild edema. No significant pleural effusions.      DX-CHEST-PORTABLE (1 VIEW)   Final Result      Mild left basilar atelectasis, improved since prior. No new consolidation or large pleural effusions.      DX-CHEST-PORTABLE (1 VIEW)   Final Result      1.  Left basilar opacification may be secondary to atelectasis. Developing pneumonia cannot be excluded.         DX-CHEST-PORTABLE (1 VIEW)   Final Result      1.  Unchanged mild interstitial pulmonary edema   2.  Unchanged bibasilar atelectasis, alveolar edema or pneumonia      DX-CHEST-PORTABLE (1 VIEW)   Final Result      1.  Decreased pulmonary edema   2.  Unchanged bibasilar atelectasis, alveolar edema or pneumonia      DX-CHEST-PORTABLE (1 VIEW)   Final Result      1.  There is diffuse interstitial and alveolar density in the right mid and lower lung zone. This is increased since the previous study. This may represent mild pulmonary edema/consolidation.   2.  Mildly enlarged cardiomediastinal silhouette when compared with the previous chest x-ray.      DX-CHEST-PORTABLE (1 VIEW)   Final Result      Stable mild pulmonary edema.   New left basilar atelectasis.      DX-CHEST-PORTABLE (1 VIEW)    Final Result      Stable chest appearance compared with 11/16.      DX-CHEST-PORTABLE (1 VIEW)   Final Result      No acute cardiopulmonary abnormality identified.      DX-CHEST-PORTABLE (1 VIEW)   Final Result      No acute cardiopulmonary abnormality. No interval change.      DX-CHEST-PORTABLE (1 VIEW)   Final Result      No acute cardiopulmonary disease.      CT-HEAD W/O   Final Result      1.  No evidence of acute intracranial process.      2.  There is again seen interstitial pulmonary edema and bibasilar atelectasis.      CT-HIP W/O PLUS RECONS LEFT   Final Result      1.  No evidence of acute fracture or malalignment. No evidence of hardware failure or complication.   2.  Postsurgical changes of the left femur with broken screw fragment redemonstrated.   3.  Demineralization.   4.  Scattered colonic diverticula.   5.  Atherosclerosis.   6.  Small fat-containing umbilical hernia.      EC-ECHOCARDIOGRAM COMPLETE W/O CONT   Final Result      DX-HIP-COMPLETE - UNILATERAL 2+ LEFT   Final Result      1.  No definite acute osseous abnormality. In setting of demineralization, an occult fracture is difficult to exclude. If there is strong clinical suspicion, CT or MRI could be obtained for further evaluation.   2.  Postsurgical changes of the left femur with broken screw fragment redemonstrated.      DX-CHEST-PORTABLE (1 VIEW)   Final Result      No acute cardiopulmonary process is seen.      Atherosclerotic plaque.           Assessment/Plan  * ST elevation myocardial infarction involving right coronary artery (HCC)- (present on admission)   Assessment & Plan    Comfort care       Tricuspid regurgitation- (present on admission)   Assessment & Plan    Severe  Lasix     Anemia- (present on admission)   Assessment & Plan    Comfort care. No further testing .     Rash- (present on admission)   Assessment & Plan    Benadryl cream prn  Resolved      Positive QuantiFERON-TB Gold test- (present on admission)   Assessment &  Plan    AFB negative        Chronic kidney disease (CKD), stage III (moderate) (HCC)- (present on admission)   Assessment & Plan    Comfort care. No further testing      Essential hypertension- (present on admission)   Assessment & Plan    Comfort care .      Comfort measures only status- (present on admission)   Assessment & Plan    In place.     Advanced directives, counseling/discussion- (present on admission)   Assessment & Plan    Comfort care. Her daughter is POA      Fall- (present on admission)   Assessment & Plan    fall precautions            VTE prophylaxis: none

## 2019-05-31 PROCEDURE — 99231 SBSQ HOSP IP/OBS SF/LOW 25: CPT | Performed by: HOSPITALIST

## 2019-05-31 PROCEDURE — 770001 HCHG ROOM/CARE - MED/SURG/GYN PRIV*

## 2019-05-31 NOTE — CARE PLAN
Problem: Safety  Goal: Will remain free from falls    Intervention: Implement fall precautions  Call light and belongings within reach, bed down and locked, hourly rounding in progress. Treaded socks in use, no DME at bedside. Pt calls appropriately, bed alarm in place.         Problem: Urinary Elimination:  Goal: Ability to reestablish a normal urinary elimination pattern will improve  Pt voiding using the commode without issue.

## 2019-05-31 NOTE — DISCHARGE INSTRUCTIONS
Discharge Instructions    Discharged to home by medical transportation with escort. Discharged via wheelchair, hospital escort: Yes.  Special equipment needed: Oxygen    Be sure to schedule a follow-up appointment with your primary care doctor or any specialists as instructed.     Discharge Plan:   You are going home on hospice.   Take your medication as ordered.  Remember to turn and reposition at least every 2 hours when in bed or sitting up.  If you have any questions or concerns please contact your hospice nurse at the number they provide you    Pneumococcal Vaccine Administered/Refused: Not given - Patient refused pneumococcal vaccine  Influenza Vaccine Indication: Patient Refuses    I understand that a diet low in cholesterol, fat, and sodium is recommended for good health. Unless I have been given specific instructions below for another diet, I accept this instruction as my diet prescription.   Other diet: Dysphagia    Special Instructions: Diagnosis:  Acute Coronary Syndrome (ACS) is a diagnosis that encompasses cardiac-related chest pain and heart attack. ACS occurs when the blood flow to the heart muscle is severely reduced or cut off completely due to a slow process called atherosclerosis.  Atherosclerosis is a disease in which the coronary arteries become narrow from a buildup of fat, cholesterol, and other substances that combine to form plaque. If the plaque breaks, a blood clot will form and block the blood flow to the heart muscle. This lack of blood flow can cause damage or death to the heart muscle which is called a heart attack or Myocardial Infarction (MI). There are two different types of MIs:  ST Elevation Myocardial Infarction or STEMI (the most severe type of heart attack) and Non-ST Elevation Myocardial Infarction or NSTEMI.    Treatment Plan:  · Cardiac Diet  - Low fat, low salt, low cholesterol   · Cardiac Rehab  - Your doctor has ordered you a referral to [facility].  Call [phone] to  schedule an appointment.  · Attend my follow-up appointment with my Cardiologist.  · Take my medications as prescribed by my doctor  · Exercise daily  · Reduce stress    Medications:  Certain medications are used to treat ACS.  Remember to always take medications as prescribed and never stop talking medications unless told by your doctor.    You have been prescribed the following medicatons:        · Is patient discharged on Warfarin / Coumadin?   No     Depression / Suicide Risk    As you are discharged from this [facility] facility, it is important to learn how to keep safe from harming yourself.    Recognize the warning signs:  · Abrupt changes in personality, positive or negative- including increase in energy   · Giving away possessions  · Change in eating patterns- significant weight changes-  positive or negative  · Change in sleeping patterns- unable to sleep or sleeping all the time   · Unwillingness or inability to communicate  · Depression  · Unusual sadness, discouragement and loneliness  · Talk of wanting to die  · Neglect of personal appearance   · Rebelliousness- reckless behavior  · Withdrawal from people/activities they love  · Confusion- inability to concentrate     If you or a loved one observes any of these behaviors or has concerns about self-harm, here's what you can do:  · Talk about it- your feelings and reasons for harming yourself  · Remove any means that you might use to hurt yourself (examples: pills, rope, extension cords, firearm)  · Get professional help from the community (Mental Health, Substance Abuse, psychological counseling)  · Do not be alone:Call your Safe Contact- someone whom you trust who will be there for you.  · Call your local CRISIS HOTLINE 333-8861 or 872-332-9071  · Call your local Children's Mobile Crisis Response Team [place] (197) 406-8051 or www.[employer]  · Call the toll free National Suicide Prevention Hotlines   · National Suicide Prevention  [facility] 936-556-SAMC (7731)  · [facility] 800-SUICIDE (886-9145)      Hospice  Hospice is a service that is designed to provide people who are terminally ill and their families with medical, spiritual, and psychological support. Its aim is to improve your quality of life by keeping you as alert and comfortable as possible. Hospice is performed by a team of health care professionals and volunteers who:  · Help keep you comfortable. Hospice can be provided in your home or in a homelike setting. The hospice staff works with your family and friends to help meet your needs. You will enjoy the support of loved ones by receiving much of your basic care from family and friends.  · Provide pain relief and manage your symptoms. The staff supply all necessary medicines and equipment.  · Provide companionship when you are alone.  · Allow you and your family to rest. They may do light housekeeping, prepare meals, and run errands.  · Provide counseling. They will make sure your emotional, spiritual, and social needs and those of your family are being met.  · Provide spiritual care. Spiritual care is individualized to meet your needs and your family's needs. It may involve helping you look at what death means to you, say goodbye, or perform a specific [employer] ceremony or ritual.  Hospice teams often include:  · A nurse.  · A doctor.  · Social workers.  · Rastafari leaders (such as a ).  · Trained volunteers.  WHEN SHOULD HOSPICE CARE BEGIN?  Most people who use hospice are believed to have fewer than 6 months to live. Your family and health care providers can help you decide when hospice services should begin. If your condition improves, you may discontinue the program.  WHAT SHOULD I CONSIDER BEFORE SELECTING A PROGRAM?  Most hospice programs are run by nonprofit, independent organizations. Some are affiliated with hospitals, nursing homes, or home health care agencies. Hospice programs can take place  in the home or at a hospice center, hospital, or skilled nursing facility. When choosing a hospice program, ask the following questions:  · What services are available to me?  · What services are offered to my loved ones?  · How involved are my loved ones?  · How involved is my health care provider?  · Who makes up the hospice care team? How are they trained or screened?  · How will my pain and symptoms be managed?  · If my circumstances change, can the services be provided in a different setting, such as my home or in the hospital?  · Is the program reviewed and licensed by the state or certified in some other way?  WHERE CAN I LEARN MORE ABOUT HOSPICE?  You can learn about existing hospice programs in your area from your health care providers. You can also read more about hospice online. The websites of the following organizations contain helpful information:  · The National Hospice and Palliative Care Organization (NHPCO).  · The Hospice Association of [place] (HAA).  · The Hospice Education [employer].  · The American Cancer Society (ACS).  · Hospice Net.  This information is not intended to replace advice given to you by your health care provider. Make sure you discuss any questions you have with your health care provider.  Document Released: 04/05/2005 Document Revised: 12/23/2014 Document Reviewed: 10/28/2014  Elsevier Interactive Patient Education © 2017 Elsevier Inc.    Fall Prevention in the Home  Introduction  Falls can cause injuries. They can happen to people of all ages. There are many things you can do to make your home safe and to help prevent falls.  What can I do on the outside of my home?  · Regularly fix the edges of walkways and driveways and fix any cracks.  · Remove anything that might make you trip as you walk through a door, such as a raised step or threshold.  · Trim any bushes or trees on the path to your home.  · Use bright outdoor lighting.  · Clear any walking paths of anything that  might make someone trip, such as rocks or tools.  · Regularly check to see if handrails are loose or broken. Make sure that both sides of any steps have handrails.  · Any raised decks and porches should have guardrails on the edges.  · Have any leaves, snow, or ice cleared regularly.  · Use sand or salt on walking paths during winter.  · Clean up any spills in your garage right away. This includes oil or grease spills.  What can I do in the bathroom?  · Use night lights.  · Install grab bars by the toilet and in the tub and shower. Do not use towel bars as grab bars.  · Use non-skid mats or decals in the tub or shower.  · If you need to sit down in the shower, use a plastic, non-slip stool.  · Keep the floor dry. Clean up any water that spills on the floor as soon as it happens.  · Remove soap buildup in the tub or shower regularly.  · Attach bath mats securely with double-sided non-slip rug tape.  · Do not have throw rugs and other things on the floor that can make you trip.  What can I do in the bedroom?  · Use night lights.  · Make sure that you have a light by your bed that is easy to reach.  · Do not use any sheets or blankets that are too big for your bed. They should not hang down onto the floor.  · Have a firm chair that has side arms. You can use this for support while you get dressed.  · Do not have throw rugs and other things on the floor that can make you trip.  What can I do in the kitchen?  · Clean up any spills right away.  · Avoid walking on wet floors.  · Keep items that you use a lot in easy-to-reach places.  · If you need to reach something above you, use a strong step stool that has a grab bar.  · Keep electrical cords out of the way.  · Do not use floor polish or wax that makes floors slippery. If you must use wax, use non-skid floor wax.  · Do not have throw rugs and other things on the floor that can make you trip.  What can I do with my stairs?  · Do not leave any items on the stairs.  · Make  sure that there are handrails on both sides of the stairs and use them. Fix handrails that are broken or loose. Make sure that handrails are as long as the stairways.  · Check any carpeting to make sure that it is firmly attached to the stairs. Fix any carpet that is loose or worn.  · Avoid having throw rugs at the top or bottom of the stairs. If you do have throw rugs, attach them to the floor with carpet tape.  · Make sure that you have a light switch at the top of the stairs and the bottom of the stairs. If you do not have them, ask someone to add them for you.  What else can I do to help prevent falls?  · Wear shoes that:  ¨ Do not have high heels.  ¨ Have rubber bottoms.  ¨ Are comfortable and fit you well.  ¨ Are closed at the toe. Do not wear sandals.  · If you use a stepladder:  ¨ Make sure that it is fully opened. Do not climb a closed stepladder.  ¨ Make sure that both sides of the stepladder are locked into place.  ¨ Ask someone to hold it for you, if possible.  · Clearly odalys and make sure that you can see:  ¨ Any grab bars or handrails.  ¨ First and last steps.  ¨ Where the edge of each step is.  · Use tools that help you move around (mobility aids) if they are needed. These include:  ¨ Canes.  ¨ Walkers.  ¨ Scooters.  ¨ Crutches.  · Turn on the lights when you go into a dark area. Replace any light bulbs as soon as they burn out.  · Set up your furniture so you have a clear path. Avoid moving your furniture around.  · If any of your floors are uneven, fix them.  · If there are any pets around you, be aware of where they are.  · Review your medicines with your doctor. Some medicines can make you feel dizzy. This can increase your chance of falling.  Ask your doctor what other things that you can do to help prevent falls.  This information is not intended to replace advice given to you by your health care provider. Make sure you discuss any questions you have with your health care provider.  Document  Released: 10/14/2010 Document Revised: 05/25/2017 Document Reviewed: 01/22/2016  © 2017 Elsevier

## 2019-05-31 NOTE — PROGRESS NOTES
Report received. Pt resting in bed. A&Ox4 forgetful at times. Assessment done. Partial linens change completed. Pt had small BM this AM. Fall precaution in place, call light within reach. Bed alarm on. Hourly rounding in place.

## 2019-05-31 NOTE — CARE PLAN
Problem: Safety  Goal: Will remain free from falls  Outcome: PROGRESSING AS EXPECTED  Bed in the lowest locked position. Call light within reach. Bed alarm in use. Hourly rounding in place.     Problem: Bowel/Gastric:  Goal: Normal bowel function is maintained or improved  Outcome: PROGRESSING AS EXPECTED      Problem: Mobility  Goal: Risk for activity intolerance will decrease  Outcome: PROGRESSING AS EXPECTED  Patient up 1 assist with four wheel walker

## 2019-05-31 NOTE — PROGRESS NOTES
[facility] Medicine Daily Progress Note    Date of Service  5/31/2019    Chief Complaint  89 y.o. female admitted 11/13/2018 with ground level fall, hip pain.     Hospital Course    83-year-old female past medical history of eye cancer, hypertension, glaucoma was admitted for hip pain, gorund level fall.  admission she was found she did have STEMI. She denies any intervention or treatment. She is comfort care. Pending placement. Her daughter is the POA. Pt capable to take medical decisions. She has severe dementia.       Interval Problem Update  Resting in bed, she states that she has heels pain no ulcers no skin changes noted, will need to rest her feet on a pillow.   No N/V/D/C    Consultants/Specialty  Cardiology     Code Status  DNAR/DNI    Disposition  Comfort care. Possible placement to group home soon    Review of Systems  Review of Systems   Unable to perform ROS: Other        Physical Exam  Temp:  [36.2 °C (97.2 °F)-36.7 °C (98.1 °F)] 36.2 °C (97.2 °F)  Pulse:  [63-70] 63  Resp:  [14-17] 17  BP: (101-108)/(57-59) 101/57  SpO2:  [92 %-93 %] 93 %    Physical Exam   Constitutional: She appears cachectic. No distress.   HENT:   Head: Normocephalic and atraumatic.   Nose: Nose normal.   Eyes: EOM are normal.   Neck: Normal range of motion.   Cardiovascular: Normal rate and regular rhythm.  Exam reveals no gallop and no friction rub.    Murmur heard.  Pulmonary/Chest: Breath sounds normal. No respiratory distress.   Abdominal: Soft. She exhibits no distension.   Musculoskeletal: She exhibits no edema.   Lymphadenopathy:     She has no cervical adenopathy.   Neurological:   A&O x1 (self)    Skin: Skin is warm.       Fluids    Intake/Output Summary (Last 24 hours) at 05/31/19 1640  Last data filed at 05/31/19 0730   Gross per 24 hour   Intake              460 ml   Output                0 ml   Net              460 ml       Laboratory      Imaging  DX-CHEST-PORTABLE (1 VIEW)   Final Result         1.  Hazy interstitial  left pulmonary opacities suggests interstitial edema or infiltrate, similar to prior.   2.  Trace left pleural effusion   3.  Hyperexpansion of lungs favors changes of COPD.      DX-CHEST-PORTABLE (1 VIEW)   Final Result         1.  Interstitial infiltrates or edema, stable.   2.  Atherosclerosis      DX-CHEST-PORTABLE (1 VIEW)   Final Result         1.  Interstitial infiltrates or edema.   2.  Atherosclerosis      DX-CHEST-PORTABLE (1 VIEW)   Final Result      Moderate interstitial edema or interstitial lung disease and small left pleural effusion similar to previous.      DX-CHEST-PORTABLE (1 VIEW)   Final Result      Stable interstitial edema and/or interstitial lung disease with probable small pleural fluid      DX-CHEST-PORTABLE (1 VIEW)   Final Result      Ill-defined infrahilar interstitial opacities, atelectasis versus mild edema. No significant pleural effusions.      DX-CHEST-PORTABLE (1 VIEW)   Final Result      Mild left basilar atelectasis, improved since prior. No new consolidation or large pleural effusions.      DX-CHEST-PORTABLE (1 VIEW)   Final Result      1.  Left basilar opacification may be secondary to atelectasis. Developing pneumonia cannot be excluded.         DX-CHEST-PORTABLE (1 VIEW)   Final Result      1.  Unchanged mild interstitial pulmonary edema   2.  Unchanged bibasilar atelectasis, alveolar edema or pneumonia      DX-CHEST-PORTABLE (1 VIEW)   Final Result      1.  Decreased pulmonary edema   2.  Unchanged bibasilar atelectasis, alveolar edema or pneumonia      DX-CHEST-PORTABLE (1 VIEW)   Final Result      1.  There is diffuse interstitial and alveolar density in the right mid and lower lung zone. This is increased since the previous study. This may represent mild pulmonary edema/consolidation.   2.  Mildly enlarged cardiomediastinal silhouette when compared with the previous chest x-ray.      DX-CHEST-PORTABLE (1 VIEW)   Final Result      Stable mild pulmonary edema.   New left  basilar atelectasis.      DX-CHEST-PORTABLE (1 VIEW)   Final Result      Stable chest appearance compared with 11/16.      DX-CHEST-PORTABLE (1 VIEW)   Final Result      No acute cardiopulmonary abnormality identified.      DX-CHEST-PORTABLE (1 VIEW)   Final Result      No acute cardiopulmonary abnormality. No interval change.      DX-CHEST-PORTABLE (1 VIEW)   Final Result      No acute cardiopulmonary disease.      CT-HEAD W/O   Final Result      1.  No evidence of acute intracranial process.      2.  There is again seen interstitial pulmonary edema and bibasilar atelectasis.      CT-HIP W/O PLUS RECONS LEFT   Final Result      1.  No evidence of acute fracture or malalignment. No evidence of hardware failure or complication.   2.  Postsurgical changes of the left femur with broken screw fragment redemonstrated.   3.  Demineralization.   4.  Scattered colonic diverticula.   5.  Atherosclerosis.   6.  Small fat-containing umbilical hernia.      EC-ECHOCARDIOGRAM COMPLETE W/O CONT   Final Result      DX-HIP-COMPLETE - UNILATERAL 2+ LEFT   Final Result      1.  No definite acute osseous abnormality. In setting of demineralization, an occult fracture is difficult to exclude. If there is strong clinical suspicion, CT or MRI could be obtained for further evaluation.   2.  Postsurgical changes of the left femur with broken screw fragment redemonstrated.      DX-CHEST-PORTABLE (1 VIEW)   Final Result      No acute cardiopulmonary process is seen.      Atherosclerotic plaque.           Assessment/Plan  * ST elevation myocardial infarction involving right coronary artery (HCC)- (present on admission)   Assessment & Plan    Comfort care       Tricuspid regurgitation- (present on admission)   Assessment & Plan    Severe  Lasix     Anemia- (present on admission)   Assessment & Plan    Comfort care. No further testing .     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 No further testing      Essential hypertension- (present on admission)   Assessment & Plan    Comfort care .      Comfort measures only status- (present on admission)   Assessment & Plan    In place.     Advanced directives, counseling/discussion- (present on admission)   Assessment & Plan    Comfort care. Her daughter is POA .     Fall- (present on admission)   Assessment & Plan    fall precautions            VTE prophylaxis: none

## 2019-06-01 PROCEDURE — 770001 HCHG ROOM/CARE - MED/SURG/GYN PRIV*

## 2019-06-01 PROCEDURE — 99231 SBSQ HOSP IP/OBS SF/LOW 25: CPT | Performed by: HOSPITALIST

## 2019-06-01 NOTE — CARE PLAN
Problem: Venous Thromboembolism (VTW)/Deep Vein Thrombosis (DVT) Prevention:  Goal: Patient will participate in Venous Thrombosis (VTE)/Deep Vein Thrombosis (DVT)Prevention Measures  Patient is comfort care, has no active order for SCDs.     Problem: Urinary Elimination:  Goal: Ability to reestablish a normal urinary elimination pattern will improve  Patient voiding without difficulty.     Problem: Pain Management  Goal: Pain level will decrease to patient's comfort goal  Patient denies any pain.

## 2019-06-01 NOTE — CARE PLAN
Problem: Venous Thromboembolism (VTW)/Deep Vein Thrombosis (DVT) Prevention:  Goal: Patient will participate in Venous Thrombosis (VTE)/Deep Vein Thrombosis (DVT)Prevention Measures    Intervention: Encourage patient to perform ankle flex, foot rotation, and knee flex exercises in addition to other prophylatic measures every hour while awake  Patient on comfort care, no SCDs ordered. Patient encouraged to do ankle flex exercises/move legs around when laying in bed for DVT prevention.      Problem: Bowel/Gastric:  Goal: Normal bowel function is maintained or improved  Outcome: PROGRESSING AS EXPECTED  Patient having regular bowel movements. Last BM 5/31. Stool softeners offered to help keep patient regular if needed.

## 2019-06-01 NOTE — PROGRESS NOTES
[facility] Medicine Daily Progress Note    Date of Service  6/1/2019    Chief Complaint  89 y.o. female admitted 11/13/2018 with ground level fall, hip pain.     Hospital Course    83-year-old female past medical history of eye cancer, hypertension, glaucoma was admitted for hip pain, gorund level fall.  admission she was found she did have STEMI. She denies any intervention or treatment. She is comfort care. Pending placement. Her daughter is the POA. Pt capable to take medical decisions. She has severe dementia.       Interval Problem Update  Resting in bed, cooperative, no new complains. No new events.     Consultants/Specialty  Cardiology     Code Status  DNAR/DNI    Disposition  Comfort care. Possible placement to group home soon    Review of Systems  Review of Systems   Unable to perform ROS: Other        Physical Exam  Temp:  [36.2 °C (97.1 °F)-36.7 °C (98.1 °F)] 36.2 °C (97.1 °F)  Pulse:  [60-70] 60  Resp:  [16-18] 16  BP: (116-117)/(48-53) 117/48  SpO2:  [92 %-93 %] 92 %    Physical Exam   Constitutional: She appears cachectic. No distress.   HENT:   Head: Normocephalic and atraumatic.   Nose: Nose normal.   Eyes: EOM are normal.   Neck: Normal range of motion. No thyromegaly present.   Cardiovascular: Normal rate and regular rhythm.  Exam reveals no gallop.    Murmur heard.  Pulmonary/Chest: Breath sounds normal. No respiratory distress.   Abdominal: Soft. She exhibits no distension.   Musculoskeletal: She exhibits no edema.   Neurological:   A&O x1 (self)    Skin: Skin is warm.       Fluids  No intake or output data in the 24 hours ending 06/01/19 1515    Laboratory      Imaging  DX-CHEST-PORTABLE (1 VIEW)   Final Result         1.  Hazy interstitial left pulmonary opacities suggests interstitial edema or infiltrate, similar to prior.   2.  Trace left pleural effusion   3.  Hyperexpansion of lungs favors changes of COPD.      DX-CHEST-PORTABLE (1 VIEW)   Final Result         1.  Interstitial infiltrates or  edema, stable.   2.  Atherosclerosis      DX-CHEST-PORTABLE (1 VIEW)   Final Result         1.  Interstitial infiltrates or edema.   2.  Atherosclerosis      DX-CHEST-PORTABLE (1 VIEW)   Final Result      Moderate interstitial edema or interstitial lung disease and small left pleural effusion similar to previous.      DX-CHEST-PORTABLE (1 VIEW)   Final Result      Stable interstitial edema and/or interstitial lung disease with probable small pleural fluid      DX-CHEST-PORTABLE (1 VIEW)   Final Result      Ill-defined infrahilar interstitial opacities, atelectasis versus mild edema. No significant pleural effusions.      DX-CHEST-PORTABLE (1 VIEW)   Final Result      Mild left basilar atelectasis, improved since prior. No new consolidation or large pleural effusions.      DX-CHEST-PORTABLE (1 VIEW)   Final Result      1.  Left basilar opacification may be secondary to atelectasis. Developing pneumonia cannot be excluded.         DX-CHEST-PORTABLE (1 VIEW)   Final Result      1.  Unchanged mild interstitial pulmonary edema   2.  Unchanged bibasilar atelectasis, alveolar edema or pneumonia      DX-CHEST-PORTABLE (1 VIEW)   Final Result      1.  Decreased pulmonary edema   2.  Unchanged bibasilar atelectasis, alveolar edema or pneumonia      DX-CHEST-PORTABLE (1 VIEW)   Final Result      1.  There is diffuse interstitial and alveolar density in the right mid and lower lung zone. This is increased since the previous study. This may represent mild pulmonary edema/consolidation.   2.  Mildly enlarged cardiomediastinal silhouette when compared with the previous chest x-ray.      DX-CHEST-PORTABLE (1 VIEW)   Final Result      Stable mild pulmonary edema.   New left basilar atelectasis.      DX-CHEST-PORTABLE (1 VIEW)   Final Result      Stable chest appearance compared with 11/16.      DX-CHEST-PORTABLE (1 VIEW)   Final Result      No acute cardiopulmonary abnormality identified.      DX-CHEST-PORTABLE (1 VIEW)   Final  Result      No acute cardiopulmonary abnormality. No interval change.      DX-CHEST-PORTABLE (1 VIEW)   Final Result      No acute cardiopulmonary disease.      CT-HEAD W/O   Final Result      1.  No evidence of acute intracranial process.      2.  There is again seen interstitial pulmonary edema and bibasilar atelectasis.      CT-HIP W/O PLUS RECONS LEFT   Final Result      1.  No evidence of acute fracture or malalignment. No evidence of hardware failure or complication.   2.  Postsurgical changes of the left femur with broken screw fragment redemonstrated.   3.  Demineralization.   4.  Scattered colonic diverticula.   5.  Atherosclerosis.   6.  Small fat-containing umbilical hernia.      EC-ECHOCARDIOGRAM COMPLETE W/O CONT   Final Result      DX-HIP-COMPLETE - UNILATERAL 2+ LEFT   Final Result      1.  No definite acute osseous abnormality. In setting of demineralization, an occult fracture is difficult to exclude. If there is strong clinical suspicion, CT or MRI could be obtained for further evaluation.   2.  Postsurgical changes of the left femur with broken screw fragment redemonstrated.      DX-CHEST-PORTABLE (1 VIEW)   Final Result      No acute cardiopulmonary process is seen.      Atherosclerotic plaque.           Assessment/Plan  * ST elevation myocardial infarction involving right coronary artery (HCC)- (present on admission)   Assessment & Plan    Comfort care       Tricuspid regurgitation- (present on admission)   Assessment & Plan    Severe  Lasix     Anemia- (present on admission)   Assessment & Plan    Comfort care. No further testing .     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 No further testing      Essential hypertension- (present on  admission)   Assessment & Plan    Comfort care .      Comfort measures only status- (present on admission)   Assessment & Plan    In place.     Advanced directives, counseling/discussion- (present on admission)   Assessment & Plan    Comfort care. Her daughter is POA .     Fall- (present on admission)   Assessment & Plan    fall precautions            VTE prophylaxis: none

## 2019-06-02 PROCEDURE — 700102 HCHG RX REV CODE 250 W/ 637 OVERRIDE(OP): Performed by: HOSPITALIST

## 2019-06-02 PROCEDURE — 770001 HCHG ROOM/CARE - MED/SURG/GYN PRIV*

## 2019-06-02 PROCEDURE — 99231 SBSQ HOSP IP/OBS SF/LOW 25: CPT | Performed by: HOSPITALIST

## 2019-06-02 PROCEDURE — A9270 NON-COVERED ITEM OR SERVICE: HCPCS | Performed by: HOSPITALIST

## 2019-06-02 RX ADMIN — SENNOSIDES AND DOCUSATE SODIUM 2 TABLET: 8.6; 5 TABLET ORAL at 07:34

## 2019-06-02 NOTE — PROGRESS NOTES
[facility] Medicine Daily Progress Note    Date of Service  6/2/2019    Chief Complaint  89 y.o. female admitted 11/13/2018 with ground level fall, hip pain.     Hospital Course    83-year-old female past medical history of eye cancer, hypertension, glaucoma was admitted for hip pain, gorund level fall.  admission she was found she did have STEMI. She denies any intervention or treatment. She is comfort care. Pending placement. Her daughter is the POA. Pt capable to take medical decisions. She has severe dementia.       Interval Problem Update  Patient is resting in bed, had chest pain earlier today now resolved, she stated that she does not want any cardiac medication o treatment she is ok with tylenol for pain.      Consultants/Specialty  Cardiology     Code Status  DNAR/DNI    Disposition  Comfort care. Possible placement to group home soon    Review of Systems  Review of Systems   Unable to perform ROS: Other        Physical Exam  Temp:  [36.4 °C (97.5 °F)-36.8 °C (98.3 °F)] 36.4 °C (97.5 °F)  Pulse:  [65-81] 65  Resp:  [15-17] 15  BP: (108-119)/(58-75) 108/60  SpO2:  [93 %-97 %] 93 %    Physical Exam   Constitutional: She appears cachectic. No distress.   HENT:   Head: Normocephalic and atraumatic.   Nose: Nose normal.   Eyes: EOM are normal.   Neck: Normal range of motion. No thyromegaly present.   Cardiovascular: Normal rate and regular rhythm.  Exam reveals no gallop.    Murmur heard.  Pulmonary/Chest: Breath sounds normal. She has no wheezes.   Abdominal: Soft. She exhibits no distension.   Musculoskeletal: She exhibits no edema.   Neurological:   A&O x1 (self)    Skin: Skin is warm.       Fluids  No intake or output data in the 24 hours ending 06/02/19 1542    Laboratory      Imaging  DX-CHEST-PORTABLE (1 VIEW)   Final Result         1.  Hazy interstitial left pulmonary opacities suggests interstitial edema or infiltrate, similar to prior.   2.  Trace left pleural effusion   3.  Hyperexpansion of lungs  favors changes of COPD.      DX-CHEST-PORTABLE (1 VIEW)   Final Result         1.  Interstitial infiltrates or edema, stable.   2.  Atherosclerosis      DX-CHEST-PORTABLE (1 VIEW)   Final Result         1.  Interstitial infiltrates or edema.   2.  Atherosclerosis      DX-CHEST-PORTABLE (1 VIEW)   Final Result      Moderate interstitial edema or interstitial lung disease and small left pleural effusion similar to previous.      DX-CHEST-PORTABLE (1 VIEW)   Final Result      Stable interstitial edema and/or interstitial lung disease with probable small pleural fluid      DX-CHEST-PORTABLE (1 VIEW)   Final Result      Ill-defined infrahilar interstitial opacities, atelectasis versus mild edema. No significant pleural effusions.      DX-CHEST-PORTABLE (1 VIEW)   Final Result      Mild left basilar atelectasis, improved since prior. No new consolidation or large pleural effusions.      DX-CHEST-PORTABLE (1 VIEW)   Final Result      1.  Left basilar opacification may be secondary to atelectasis. Developing pneumonia cannot be excluded.         DX-CHEST-PORTABLE (1 VIEW)   Final Result      1.  Unchanged mild interstitial pulmonary edema   2.  Unchanged bibasilar atelectasis, alveolar edema or pneumonia      DX-CHEST-PORTABLE (1 VIEW)   Final Result      1.  Decreased pulmonary edema   2.  Unchanged bibasilar atelectasis, alveolar edema or pneumonia      DX-CHEST-PORTABLE (1 VIEW)   Final Result      1.  There is diffuse interstitial and alveolar density in the right mid and lower lung zone. This is increased since the previous study. This may represent mild pulmonary edema/consolidation.   2.  Mildly enlarged cardiomediastinal silhouette when compared with the previous chest x-ray.      DX-CHEST-PORTABLE (1 VIEW)   Final Result      Stable mild pulmonary edema.   New left basilar atelectasis.      DX-CHEST-PORTABLE (1 VIEW)   Final Result      Stable chest appearance compared with 11/16.      DX-CHEST-PORTABLE (1 VIEW)    Final Result      No acute cardiopulmonary abnormality identified.      DX-CHEST-PORTABLE (1 VIEW)   Final Result      No acute cardiopulmonary abnormality. No interval change.      DX-CHEST-PORTABLE (1 VIEW)   Final Result      No acute cardiopulmonary disease.      CT-HEAD W/O   Final Result      1.  No evidence of acute intracranial process.      2.  There is again seen interstitial pulmonary edema and bibasilar atelectasis.      CT-HIP W/O PLUS RECONS LEFT   Final Result      1.  No evidence of acute fracture or malalignment. No evidence of hardware failure or complication.   2.  Postsurgical changes of the left femur with broken screw fragment redemonstrated.   3.  Demineralization.   4.  Scattered colonic diverticula.   5.  Atherosclerosis.   6.  Small fat-containing umbilical hernia.      EC-ECHOCARDIOGRAM COMPLETE W/O CONT   Final Result      DX-HIP-COMPLETE - UNILATERAL 2+ LEFT   Final Result      1.  No definite acute osseous abnormality. In setting of demineralization, an occult fracture is difficult to exclude. If there is strong clinical suspicion, CT or MRI could be obtained for further evaluation.   2.  Postsurgical changes of the left femur with broken screw fragment redemonstrated.      DX-CHEST-PORTABLE (1 VIEW)   Final Result      No acute cardiopulmonary process is seen.      Atherosclerotic plaque.           Assessment/Plan  * ST elevation myocardial infarction involving right coronary artery (HCC)- (present on admission)   Assessment & Plan    Comfort care.  Pain management.       Tricuspid regurgitation- (present on admission)   Assessment & Plan    Severe  Lasix     Anemia- (present on admission)   Assessment & Plan    Comfort care. No further testing .     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 No further testing      Essential hypertension- (present on admission)   Assessment & Plan    Comfort care .      Comfort measures only status- (present on admission)   Assessment & Plan    In place.     Advanced directives, counseling/discussion- (present on admission)   Assessment & Plan    Comfort care. Her daughter is POA .     Fall- (present on admission)   Assessment & Plan    fall precautions            VTE prophylaxis: none

## 2019-06-02 NOTE — CARE PLAN
Problem: Safety  Goal: Will remain free from injury  Outcome: PROGRESSING AS EXPECTED  Treaded socks in place, bed in the lowest position, bed alarm on, call light and belongings within reach, pt call for assistance appropriately    Problem: Venous Thromboembolism (VTW)/Deep Vein Thrombosis (DVT) Prevention:  Goal: Patient will participate in Venous Thrombosis (VTE)/Deep Vein Thrombosis (DVT)Prevention Measures  Outcome: PROGRESSING SLOWER THAN EXPECTED  Pt. refuses to use SCDs    Problem: Mobility  Goal: Risk for activity intolerance will decrease  Outcome: PROGRESSING AS EXPECTED  Pt. up to commode x1 assist with FWW

## 2019-06-02 NOTE — CARE PLAN
Problem: Respiratory:  Goal: Respiratory status will improve    Intervention: Educate and encourage coughing and deep breathing  Coughing/deep breathing encouraged while patient is in bed.       Problem: Urinary Elimination:  Goal: Ability to reestablish a normal urinary elimination pattern will improve    Intervention: Assist patient to sit on commode or toilet for voiding  Patient assisted to commode for voiding, does not use bed pan.

## 2019-06-03 PROCEDURE — 770001 HCHG ROOM/CARE - MED/SURG/GYN PRIV*

## 2019-06-03 PROCEDURE — 99231 SBSQ HOSP IP/OBS SF/LOW 25: CPT | Performed by: HOSPITALIST

## 2019-06-03 NOTE — DISCHARGE PLANNING
Anticipated Discharge Disposition: [facility]    Action: LSW faxed completed [facility] paperwork filled out by the doctor to Kellee at [facility].     Barriers to Discharge: Dtr to complete paperwork.     Plan: Follow up with Kellee

## 2019-06-03 NOTE — CARE PLAN
Problem: Communication  Goal: The ability to communicate needs accurately and effectively will improve  Outcome: PROGRESSING AS EXPECTED  Plan of care discussed. Expectations and limitations set during initial encounter.  Allowed pt to ask  Questions/ clarifications during discussion of POC.     Problem: Safety  Goal: Will remain free from injury  Outcome: PROGRESSING AS EXPECTED  Hourly rounds done. Call light within reach. Needs attended on a timely manner. Treaded socks on when OOB. Educated on the importance of calling for assistance when attempting to be OOB.  BED ALARM ON.    Problem: Venous Thromboembolism (VTW)/Deep Vein Thrombosis (DVT) Prevention:  Goal: Patient will participate in Venous Thrombosis (VTE)/Deep Vein Thrombosis (DVT)Prevention Measures  Outcome: PROGRESSING AS EXPECTED  Encourage early mobilization.

## 2019-06-03 NOTE — CARE PLAN
Problem: Safety  Goal: Will remain free from falls  Outcome: PROGRESSING AS EXPECTED   Treaded socks in place, bed in the lowest position, bed alarm on, call light and belongings within reach, pt call for assistance appropriately, bed alarm in place

## 2019-06-03 NOTE — CARE PLAN
Problem: Mobility  Goal: Risk for activity intolerance will decrease  Outcome: PROGRESSING AS EXPECTED  Pt ambulates intermittently with walker.

## 2019-06-04 PROCEDURE — 770001 HCHG ROOM/CARE - MED/SURG/GYN PRIV*

## 2019-06-04 PROCEDURE — 99231 SBSQ HOSP IP/OBS SF/LOW 25: CPT | Performed by: HOSPITALIST

## 2019-06-04 NOTE — PROGRESS NOTES
[facility] Medicine Daily Progress Note    Date of Service  6/4/2019    Chief Complaint  89 y.o. female admitted 11/13/2018 with ground level fall, hip pain.     Hospital Course    83-year-old female past medical history of eye cancer, hypertension, glaucoma was admitted for hip pain, gorund level fall.  admission she was found she did have STEMI. She denies any intervention or treatment. She is comfort care. Pending placement. Her daughter is the POA. Pt capable to take medical decisions. She has severe dementia.       Interval Problem Update  In beds.     Awake  Denies pain to me    Afebrile'    Case d./w floor rn    Consultants/Specialty  Cardiology     Code Status  DNAR/DNI    Disposition  Comfort care. Possible placement to group home soon    Review of Systems  Review of Systems   Unable to perform ROS: Dementia        Physical Exam  Temp:  [36.6 °C (97.8 °F)-36.7 °C (98 °F)] 36.6 °C (97.8 °F)  Pulse:  [62-76] 62  Resp:  [18] 18  BP: (106-116)/(59-66) 116/59  SpO2:  [95 %] 95 %    Physical Exam   Constitutional: She appears cachectic. No distress.   HENT:   Head: Normocephalic and atraumatic.   Nose: Nose normal.   Eyes: EOM are normal.   Neck: Normal range of motion. No thyromegaly present.   Cardiovascular: Normal rate and regular rhythm.  Exam reveals no gallop.    Murmur heard.  Pulmonary/Chest: Breath sounds normal. No respiratory distress.   Abdominal: Soft. She exhibits no distension.   Musculoskeletal: She exhibits no edema.   Neurological:   A&O x1 (self)    Skin: Skin is warm.       No change in exam since yesterday    Fluids    Intake/Output Summary (Last 24 hours) at 06/04/19 1215  Last data filed at 06/03/19 2200   Gross per 24 hour   Intake              440 ml   Output                0 ml   Net              440 ml       Laboratory      Imaging  DX-CHEST-PORTABLE (1 VIEW)   Final Result         1.  Hazy interstitial left pulmonary opacities suggests interstitial edema or infiltrate, similar to prior.    2.  Trace left pleural effusion   3.  Hyperexpansion of lungs favors changes of COPD.      DX-CHEST-PORTABLE (1 VIEW)   Final Result         1.  Interstitial infiltrates or edema, stable.   2.  Atherosclerosis      DX-CHEST-PORTABLE (1 VIEW)   Final Result         1.  Interstitial infiltrates or edema.   2.  Atherosclerosis      DX-CHEST-PORTABLE (1 VIEW)   Final Result      Moderate interstitial edema or interstitial lung disease and small left pleural effusion similar to previous.      DX-CHEST-PORTABLE (1 VIEW)   Final Result      Stable interstitial edema and/or interstitial lung disease with probable small pleural fluid      DX-CHEST-PORTABLE (1 VIEW)   Final Result      Ill-defined infrahilar interstitial opacities, atelectasis versus mild edema. No significant pleural effusions.      DX-CHEST-PORTABLE (1 VIEW)   Final Result      Mild left basilar atelectasis, improved since prior. No new consolidation or large pleural effusions.      DX-CHEST-PORTABLE (1 VIEW)   Final Result      1.  Left basilar opacification may be secondary to atelectasis. Developing pneumonia cannot be excluded.         DX-CHEST-PORTABLE (1 VIEW)   Final Result      1.  Unchanged mild interstitial pulmonary edema   2.  Unchanged bibasilar atelectasis, alveolar edema or pneumonia      DX-CHEST-PORTABLE (1 VIEW)   Final Result      1.  Decreased pulmonary edema   2.  Unchanged bibasilar atelectasis, alveolar edema or pneumonia      DX-CHEST-PORTABLE (1 VIEW)   Final Result      1.  There is diffuse interstitial and alveolar density in the right mid and lower lung zone. This is increased since the previous study. This may represent mild pulmonary edema/consolidation.   2.  Mildly enlarged cardiomediastinal silhouette when compared with the previous chest x-ray.      DX-CHEST-PORTABLE (1 VIEW)   Final Result      Stable mild pulmonary edema.   New left basilar atelectasis.      DX-CHEST-PORTABLE (1 VIEW)   Final Result      Stable chest  appearance compared with 11/16.      DX-CHEST-PORTABLE (1 VIEW)   Final Result      No acute cardiopulmonary abnormality identified.      DX-CHEST-PORTABLE (1 VIEW)   Final Result      No acute cardiopulmonary abnormality. No interval change.      DX-CHEST-PORTABLE (1 VIEW)   Final Result      No acute cardiopulmonary disease.      CT-HEAD W/O   Final Result      1.  No evidence of acute intracranial process.      2.  There is again seen interstitial pulmonary edema and bibasilar atelectasis.      CT-HIP W/O PLUS RECONS LEFT   Final Result      1.  No evidence of acute fracture or malalignment. No evidence of hardware failure or complication.   2.  Postsurgical changes of the left femur with broken screw fragment redemonstrated.   3.  Demineralization.   4.  Scattered colonic diverticula.   5.  Atherosclerosis.   6.  Small fat-containing umbilical hernia.      EC-ECHOCARDIOGRAM COMPLETE W/O CONT   Final Result      DX-HIP-COMPLETE - UNILATERAL 2+ LEFT   Final Result      1.  No definite acute osseous abnormality. In setting of demineralization, an occult fracture is difficult to exclude. If there is strong clinical suspicion, CT or MRI could be obtained for further evaluation.   2.  Postsurgical changes of the left femur with broken screw fragment redemonstrated.      DX-CHEST-PORTABLE (1 VIEW)   Final Result      No acute cardiopulmonary process is seen.      Atherosclerotic plaque.           Assessment/Plan  * ST elevation myocardial infarction involving right coronary artery (HCC)- (present on admission)   Assessment & Plan    Comfort care.  Pain management.       Tricuspid regurgitation- (present on admission)   Assessment & Plan    Severe  Lasix     Anemia- (present on admission)   Assessment & Plan    Comfort care. No further testing .     Rash- (present on admission)   Assessment & Plan      Resolved      Positive QuantiFERON-TB Gold test- (present on admission)   Assessment & Plan    AFB negative         Chronic kidney disease (CKD), stage III (moderate) (HCC)- (present on admission)   Assessment & Plan    Comfort care. No further testing      Essential hypertension- (present on admission)   Assessment & Plan    Comfort care .      Comfort measures only status- (present on admission)   Assessment & Plan    In place.     Advanced directives, counseling/discussion- (present on admission)   Assessment & Plan    Comfort care. Her daughter is POA .     Fall- (present on admission)   Assessment & Plan    fall precautions            VTE prophylaxis: none

## 2019-06-04 NOTE — PROGRESS NOTES
[facility] Medicine Daily Progress Note    Date of Service  6/3/2019    Chief Complaint  89 y.o. female admitted 11/13/2018 with ground level fall, hip pain.     Hospital Course    83-year-old female past medical history of eye cancer, hypertension, glaucoma was admitted for hip pain, gorund level fall.  admission she was found she did have STEMI. She denies any intervention or treatment. She is comfort care. Pending placement. Her daughter is the POA. Pt capable to take medical decisions. She has severe dementia.       Interval Problem Update  In bed, no complains, no fever or chills, no new events.     Consultants/Specialty  Cardiology     Code Status  DNAR/DNI    Disposition  Comfort care. Possible placement to group home soon    Review of Systems  Review of Systems   Unable to perform ROS: Other        Physical Exam  Temp:  [36.4 °C (97.6 °F)-36.8 °C (98.3 °F)] 36.8 °C (98.3 °F)  Pulse:  [66-79] 79  Resp:  [17] 17  BP: (110-115)/(56-70) 115/70  SpO2:  [92 %-94 %] 94 %    Physical Exam   Constitutional: She appears cachectic. No distress.   HENT:   Head: Normocephalic and atraumatic.   Nose: Nose normal.   Eyes: EOM are normal.   Neck: Normal range of motion. No thyromegaly present.   Cardiovascular: Normal rate and regular rhythm.  Exam reveals no gallop.    Murmur heard.  Pulmonary/Chest: Breath sounds normal. No respiratory distress.   Abdominal: Soft. She exhibits no distension.   Musculoskeletal: She exhibits no edema.   Neurological:   A&O x1 (self)    Skin: Skin is warm.       Fluids    Intake/Output Summary (Last 24 hours) at 06/03/19 3409  Last data filed at 06/03/19 1400   Gross per 24 hour   Intake              880 ml   Output                0 ml   Net              880 ml       Laboratory      Imaging  DX-CHEST-PORTABLE (1 VIEW)   Final Result         1.  Hazy interstitial left pulmonary opacities suggests interstitial edema or infiltrate, similar to prior.   2.  Trace left pleural effusion   3.   Hyperexpansion of lungs favors changes of COPD.      DX-CHEST-PORTABLE (1 VIEW)   Final Result         1.  Interstitial infiltrates or edema, stable.   2.  Atherosclerosis      DX-CHEST-PORTABLE (1 VIEW)   Final Result         1.  Interstitial infiltrates or edema.   2.  Atherosclerosis      DX-CHEST-PORTABLE (1 VIEW)   Final Result      Moderate interstitial edema or interstitial lung disease and small left pleural effusion similar to previous.      DX-CHEST-PORTABLE (1 VIEW)   Final Result      Stable interstitial edema and/or interstitial lung disease with probable small pleural fluid      DX-CHEST-PORTABLE (1 VIEW)   Final Result      Ill-defined infrahilar interstitial opacities, atelectasis versus mild edema. No significant pleural effusions.      DX-CHEST-PORTABLE (1 VIEW)   Final Result      Mild left basilar atelectasis, improved since prior. No new consolidation or large pleural effusions.      DX-CHEST-PORTABLE (1 VIEW)   Final Result      1.  Left basilar opacification may be secondary to atelectasis. Developing pneumonia cannot be excluded.         DX-CHEST-PORTABLE (1 VIEW)   Final Result      1.  Unchanged mild interstitial pulmonary edema   2.  Unchanged bibasilar atelectasis, alveolar edema or pneumonia      DX-CHEST-PORTABLE (1 VIEW)   Final Result      1.  Decreased pulmonary edema   2.  Unchanged bibasilar atelectasis, alveolar edema or pneumonia      DX-CHEST-PORTABLE (1 VIEW)   Final Result      1.  There is diffuse interstitial and alveolar density in the right mid and lower lung zone. This is increased since the previous study. This may represent mild pulmonary edema/consolidation.   2.  Mildly enlarged cardiomediastinal silhouette when compared with the previous chest x-ray.      DX-CHEST-PORTABLE (1 VIEW)   Final Result      Stable mild pulmonary edema.   New left basilar atelectasis.      DX-CHEST-PORTABLE (1 VIEW)   Final Result      Stable chest appearance compared with 11/16.       DX-CHEST-PORTABLE (1 VIEW)   Final Result      No acute cardiopulmonary abnormality identified.      DX-CHEST-PORTABLE (1 VIEW)   Final Result      No acute cardiopulmonary abnormality. No interval change.      DX-CHEST-PORTABLE (1 VIEW)   Final Result      No acute cardiopulmonary disease.      CT-HEAD W/O   Final Result      1.  No evidence of acute intracranial process.      2.  There is again seen interstitial pulmonary edema and bibasilar atelectasis.      CT-HIP W/O PLUS RECONS LEFT   Final Result      1.  No evidence of acute fracture or malalignment. No evidence of hardware failure or complication.   2.  Postsurgical changes of the left femur with broken screw fragment redemonstrated.   3.  Demineralization.   4.  Scattered colonic diverticula.   5.  Atherosclerosis.   6.  Small fat-containing umbilical hernia.      EC-ECHOCARDIOGRAM COMPLETE W/O CONT   Final Result      DX-HIP-COMPLETE - UNILATERAL 2+ LEFT   Final Result      1.  No definite acute osseous abnormality. In setting of demineralization, an occult fracture is difficult to exclude. If there is strong clinical suspicion, CT or MRI could be obtained for further evaluation.   2.  Postsurgical changes of the left femur with broken screw fragment redemonstrated.      DX-CHEST-PORTABLE (1 VIEW)   Final Result      No acute cardiopulmonary process is seen.      Atherosclerotic plaque.           Assessment/Plan  * ST elevation myocardial infarction involving right coronary artery (HCC)- (present on admission)   Assessment & Plan    Comfort care.  Pain management.       Tricuspid regurgitation- (present on admission)   Assessment & Plan    Severe  Lasix     Anemia- (present on admission)   Assessment & Plan    Comfort care. No further testing .     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 No further testing      Essential hypertension- (present on admission)   Assessment & Plan    Comfort care .      Comfort measures only status- (present on admission)   Assessment & Plan    In place.     Advanced directives, counseling/discussion- (present on admission)   Assessment & Plan    Comfort care. Her daughter is POA .     Fall- (present on admission)   Assessment & Plan    fall precautions            VTE prophylaxis: none

## 2019-06-04 NOTE — CARE PLAN
Problem: Safety  Goal: Will remain free from injury  Outcome: PROGRESSING AS EXPECTED  Bed in the lowest locked position. Call light within reach. Bed alarm in use. Hourly rounding in place.     Problem: Discharge Barriers/Planning  Goal: Patient's continuum of care needs will be met  Outcome: PROGRESSING SLOWER THAN EXPECTED  Pending Group home placement

## 2019-06-04 NOTE — PROGRESS NOTES
Report received assumed patient care. Patient resting in bed having dinner. Denies pain or SOB. No signs of acute discomfort noted. Fall precaution in place, call light within reach. Bed alarm on. Q2 hourly rounding in place.

## 2019-06-05 PROCEDURE — 700102 HCHG RX REV CODE 250 W/ 637 OVERRIDE(OP): Performed by: HOSPITALIST

## 2019-06-05 PROCEDURE — 99231 SBSQ HOSP IP/OBS SF/LOW 25: CPT | Performed by: HOSPITALIST

## 2019-06-05 PROCEDURE — A9270 NON-COVERED ITEM OR SERVICE: HCPCS | Performed by: HOSPITALIST

## 2019-06-05 PROCEDURE — 770001 HCHG ROOM/CARE - MED/SURG/GYN PRIV*

## 2019-06-05 RX ORDER — MORPHINE SULFATE 100 MG/5ML
5-10 SOLUTION ORAL
Qty: 30 ML | Refills: 0 | Status: SHIPPED | OUTPATIENT
Start: 2019-06-05 | End: 2019-06-10

## 2019-06-05 RX ORDER — FUROSEMIDE 20 MG/1
20 TABLET ORAL DAILY
Qty: 30 TAB | Refills: 0 | Status: SHIPPED | OUTPATIENT
Start: 2019-06-05

## 2019-06-05 RX ORDER — LATANOPROST 50 UG/ML
1 SOLUTION/ DROPS OPHTHALMIC DAILY
Qty: 1 BOTTLE | Refills: 0 | Status: SHIPPED | OUTPATIENT
Start: 2019-06-05

## 2019-06-05 RX ORDER — LISINOPRIL 2.5 MG/1
2.5 TABLET ORAL DAILY
Qty: 30 TAB | Refills: 3 | Status: SHIPPED | OUTPATIENT
Start: 2019-06-05

## 2019-06-05 RX ADMIN — SENNOSIDES AND DOCUSATE SODIUM 2 TABLET: 8.6; 5 TABLET ORAL at 18:24

## 2019-06-05 RX ADMIN — SENNOSIDES AND DOCUSATE SODIUM 2 TABLET: 8.6; 5 TABLET ORAL at 05:14

## 2019-06-05 RX ADMIN — FUROSEMIDE 20 MG: 20 TABLET ORAL at 05:14

## 2019-06-05 NOTE — CARE PLAN
Problem: Safety  Goal: Will remain free from injury  Outcome: PROGRESSING AS EXPECTED  bed alarm in place, call light with in reach, Nora calls appropriately for assistance.    Problem: Skin Integrity  Goal: Risk for impaired skin integrity will decrease  Outcome: PROGRESSING SLOWER THAN EXPECTED  Patient encouraged to turn and reposition frequently, does not want to be woken to turn and reposition. Education provided regarding skin breakdown risk. Patient receptive of education, but still does not want to be turn and reposition when she has found a comfortable position for herself or woken up.

## 2019-06-05 NOTE — DISCHARGE PLANNING
Anticipated Discharge Disposition:     Action: LSW spoke with  owner Kellee stating that the Pt dtr is agreeable for DC tomorrow. Kellee asked for scripts to be sent to [facility]. LSW [place] Text Dr. Ulloa about the need for scripts so that this LSW can fax them to [facility]. LSW spoke with Charge RN Marlys who stated that the Pt should be transported by [facility] as she is comfort care.     Barriers to Discharge: Scripts to be sent to [facility]    Plan: DC to  tomorrow.

## 2019-06-05 NOTE — PROGRESS NOTES
Assumed care of Nora at 07:00. Resting in bed at this time. Bed alarm in place, denies any discomfort at this time. Comfort care measures in place. Hourly rounding in place.

## 2019-06-05 NOTE — DISCHARGE PLANNING
Anticipated Discharge Disposition: [facility]    Action: RISHABH faxed scripts to [facility] (ph [phone] ip [phone])    Barriers to Discharge: None    Plan: DC tomorrow

## 2019-06-05 NOTE — DISCHARGE PLANNING
Anticipated Discharge Disposition: [facility]    Action: LSW received a VM from Kellee at [facility] asking to set up discharge for tomorrow. LSW left VM for Pt dtr [facility] asking for a call back to discuss possible DC for tomorrow. LSW spoke with Kellee who said she received the paperwork back from the dtr and that they can accept the Pt tomorrow afternoon but will need the Pt transported. LSW left VM for Ericka at [facility] informing her of the Pt GH acceptance and asked for a call back.       Barriers to Discharge: Dtr consent to DC tomorrow    Plan: Follow up with dtr, [facility] and Kellee.

## 2019-06-05 NOTE — PROGRESS NOTES
[facility] Medicine Daily Progress Note    Date of Service  6/5/2019    Chief Complaint  89 y.o. female admitted 11/13/2018 with ground level fall, hip pain.     Hospital Course    83-year-old female past medical history of eye cancer, hypertension, glaucoma was admitted for hip pain, gorund level fall.  admission she was found she did have STEMI. She denies any intervention or treatment. She is comfort care. Pending placement. Her daughter is the POA. Pt capable to take medical decisions. She has severe dementia.       Interval Problem Update  In bed.   In no distress    Facility available tomorrow      Consultants/Specialty  Cardiology     Code Status  DNAR/DNI    Disposition  Comfort care. Possible placement to group home soon    Review of Systems  Review of Systems   Unable to perform ROS: Dementia        Physical Exam  Temp:  [36.5 °C (97.7 °F)-36.7 °C (98 °F)] 36.5 °C (97.7 °F)  Pulse:  [60-64] 64  Resp:  [16] 16  BP: (109-124)/(60-66) 109/66  SpO2:  [91 %-93 %] 91 %    Physical Exam   Constitutional: She appears cachectic. No distress.   HENT:   Head: Normocephalic and atraumatic.   Nose: Nose normal.   Eyes: EOM are normal.   Neck: Normal range of motion. No thyromegaly present.   Cardiovascular: Normal rate and regular rhythm.  Exam reveals no gallop.    Murmur heard.  Pulmonary/Chest: Breath sounds normal. No respiratory distress.   Abdominal: Soft. She exhibits no distension.   Musculoskeletal: She exhibits no edema.   Neurological:   A&O x1 (self)    Skin: Skin is warm.       No change in exam since yesterday    Fluids    Intake/Output Summary (Last 24 hours) at 06/05/19 1500  Last data filed at 06/05/19 0600   Gross per 24 hour   Intake              440 ml   Output                0 ml   Net              440 ml       Laboratory      Imaging  DX-CHEST-PORTABLE (1 VIEW)   Final Result         1.  Hazy interstitial left pulmonary opacities suggests interstitial edema or infiltrate, similar to prior.   2.   Trace left pleural effusion   3.  Hyperexpansion of lungs favors changes of COPD.      DX-CHEST-PORTABLE (1 VIEW)   Final Result         1.  Interstitial infiltrates or edema, stable.   2.  Atherosclerosis      DX-CHEST-PORTABLE (1 VIEW)   Final Result         1.  Interstitial infiltrates or edema.   2.  Atherosclerosis      DX-CHEST-PORTABLE (1 VIEW)   Final Result      Moderate interstitial edema or interstitial lung disease and small left pleural effusion similar to previous.      DX-CHEST-PORTABLE (1 VIEW)   Final Result      Stable interstitial edema and/or interstitial lung disease with probable small pleural fluid      DX-CHEST-PORTABLE (1 VIEW)   Final Result      Ill-defined infrahilar interstitial opacities, atelectasis versus mild edema. No significant pleural effusions.      DX-CHEST-PORTABLE (1 VIEW)   Final Result      Mild left basilar atelectasis, improved since prior. No new consolidation or large pleural effusions.      DX-CHEST-PORTABLE (1 VIEW)   Final Result      1.  Left basilar opacification may be secondary to atelectasis. Developing pneumonia cannot be excluded.         DX-CHEST-PORTABLE (1 VIEW)   Final Result      1.  Unchanged mild interstitial pulmonary edema   2.  Unchanged bibasilar atelectasis, alveolar edema or pneumonia      DX-CHEST-PORTABLE (1 VIEW)   Final Result      1.  Decreased pulmonary edema   2.  Unchanged bibasilar atelectasis, alveolar edema or pneumonia      DX-CHEST-PORTABLE (1 VIEW)   Final Result      1.  There is diffuse interstitial and alveolar density in the right mid and lower lung zone. This is increased since the previous study. This may represent mild pulmonary edema/consolidation.   2.  Mildly enlarged cardiomediastinal silhouette when compared with the previous chest x-ray.      DX-CHEST-PORTABLE (1 VIEW)   Final Result      Stable mild pulmonary edema.   New left basilar atelectasis.      DX-CHEST-PORTABLE (1 VIEW)   Final Result      Stable chest  appearance compared with 11/16.      DX-CHEST-PORTABLE (1 VIEW)   Final Result      No acute cardiopulmonary abnormality identified.      DX-CHEST-PORTABLE (1 VIEW)   Final Result      No acute cardiopulmonary abnormality. No interval change.      DX-CHEST-PORTABLE (1 VIEW)   Final Result      No acute cardiopulmonary disease.      CT-HEAD W/O   Final Result      1.  No evidence of acute intracranial process.      2.  There is again seen interstitial pulmonary edema and bibasilar atelectasis.      CT-HIP W/O PLUS RECONS LEFT   Final Result      1.  No evidence of acute fracture or malalignment. No evidence of hardware failure or complication.   2.  Postsurgical changes of the left femur with broken screw fragment redemonstrated.   3.  Demineralization.   4.  Scattered colonic diverticula.   5.  Atherosclerosis.   6.  Small fat-containing umbilical hernia.      EC-ECHOCARDIOGRAM COMPLETE W/O CONT   Final Result      DX-HIP-COMPLETE - UNILATERAL 2+ LEFT   Final Result      1.  No definite acute osseous abnormality. In setting of demineralization, an occult fracture is difficult to exclude. If there is strong clinical suspicion, CT or MRI could be obtained for further evaluation.   2.  Postsurgical changes of the left femur with broken screw fragment redemonstrated.      DX-CHEST-PORTABLE (1 VIEW)   Final Result      No acute cardiopulmonary process is seen.      Atherosclerotic plaque.           Assessment/Plan  * ST elevation myocardial infarction involving right coronary artery (HCC)- (present on admission)   Assessment & Plan    Comfort care.  Pain management.       Tricuspid regurgitation- (present on admission)   Assessment & Plan    Severe  Lasix     Anemia- (present on admission)   Assessment & Plan    Comfort care. No further testing .     Rash- (present on admission)   Assessment & Plan      Resolved      Positive QuantiFERON-TB Gold test- (present on admission)   Assessment & Plan    AFB negative         Chronic kidney disease (CKD), stage III (moderate) (HCC)- (present on admission)   Assessment & Plan    Comfort care. No further testing      Essential hypertension- (present on admission)   Assessment & Plan    Comfort care .      Comfort measures only status- (present on admission)   Assessment & Plan    In place.     Advanced directives, counseling/discussion- (present on admission)   Assessment & Plan    Comfort care. Her daughter is POA .     Fall- (present on admission)   Assessment & Plan    fall precautions            VTE prophylaxis: none

## 2019-06-05 NOTE — DISCHARGE PLANNING
Anticipated Discharge Disposition: Home with Home Infusion    Action: GELYW spoke with Rivka from [facility] who confirmed that the Pt is good to go once she has her first dose of Dapto here. LSW informed bedside RN who stated she is still waiting for the Dapto dose.     Barriers to Discharge: none    Plan: DC home with Home Infusion by [facility]

## 2019-06-05 NOTE — DISCHARGE PLANNING
Anticipated Discharge Disposition:     Action: LSW received a call from Pt dtr Wild stating she is agreeable to Pt discharge tomorrow. LSW tiger Text Dr Ulloa Pt dtr agreement to [facility] tomorrow.     Barriers to Discharge: None    Plan: DC tomorrow to

## 2019-06-05 NOTE — CARE PLAN
Problem: Safety  Goal: Will remain free from injury  Outcome: PROGRESSING AS EXPECTED  Bed in the lowest locked position. Call light within reach. Bed alarm in use. Hourly rounding in place.     Problem: Pain Management  Goal: Pain level will decrease to patient's comfort goal  Outcome: MET Date Met: 06/04/19

## 2019-06-05 NOTE — DISCHARGE PLANNING
Received Transport Form @ 5723  Spoke to Pb MCNAIR    Transport is scheduled for 6/6 @1330 going to [facility].     RISHABH [place] notified.

## 2019-06-06 VITALS
WEIGHT: 129.85 LBS | BODY MASS INDEX: 19.68 KG/M2 | HEART RATE: 72 BPM | DIASTOLIC BLOOD PRESSURE: 91 MMHG | RESPIRATION RATE: 16 BRPM | TEMPERATURE: 97.6 F | SYSTOLIC BLOOD PRESSURE: 115 MMHG | OXYGEN SATURATION: 93 % | HEIGHT: 68 IN

## 2019-06-06 PROBLEM — Z71.89 ADVANCED DIRECTIVES, COUNSELING/DISCUSSION: Status: RESOLVED | Noted: 2018-11-13 | Resolved: 2019-06-06

## 2019-06-06 PROCEDURE — 99239 HOSP IP/OBS DSCHRG MGMT >30: CPT | Performed by: HOSPITALIST

## 2019-06-06 NOTE — DISCHARGE SUMMARY
Discharge Summary    CHIEF COMPLAINT ON ADMISSION  Chief Complaint   Patient presents with   • GLF       Reason for Admission  Other     Admission Date  11/13/2018    CODE STATUS  Comfort Care/DNR    HPI & HOSPITAL COURSE    This is a 89 y.o. female here with  essential hypertension, who presents with ground-level fall.  She stated that she has a bad left hip and fell because of that.  The patient denied palpitation, dizziness, syncope episode.  She presented to ER and was found to have acute ST elevation myocardial infarction with troponin over 50. She stated that she does not want any aggressive intervention including angiogram and wanted to be treated only medically.      She was started on a heparin drip and admitted to the intensive care unit.She was seen by cardiology she was also started on plavix and  Liptor.she was noted to have reactive leukocytosis which resolved. She was diuresed with lasix.  After the discussions with the family have led to a change in CODE STATUS to comfort care.    Comfort care measures were initiated.  She was referred to hospice.. She is being discharged to group home with hospice.  The patient was continued on Lasix for diuresis because of her tricuspid regurgitation.    Therefore, she is discharged in guarded and stable condition to hospice.    The patient met 2-midnight criteria for an inpatient stay at the time of discharge.    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  [facility]  [address]  [address]  [facility] [address]  [phone]  Schedule an appointment as soon as possible for a visit  Your doctor has referred you to cardiac rehab, which is important for your recovery.  Please call to make an appointment.    [facility]  [address]  [facility] [phone]          MEDICATIONS ON DISCHARGE     Medication List      START taking these medications      Instructions   furosemide 20 MG Tabs  Commonly known as:  LASIX   Take 1 Tab by mouth every day.  Dose:  20 mg     morphine 20 MG/ML Soln  Commonly known as:  ROXANOL   Take 0.25-0.5 mL by mouth every 1 hour as needed for up to 5 days.  Dose:  5-10 mg        CHANGE how you take these medications      Instructions   latanoprost 0.005 % Soln  What changed:  when to take this  Commonly known as:  XALATAN   Place 1 Drop in both eyes every day.  Dose:  1 Drop        CONTINUE taking these medications      Instructions   acetaminophen 500 MG Tabs  Commonly known as:  TYLENOL   Take 1,000 mg by mouth every 6 hours as needed for Moderate Pain.  Dose:  1000 mg     artificial tear ointment Oint  Commonly known as:  REFRESH,LACRI-LUBE   Place 1 Application in both eyes as needed (For Dry eye).  Dose:  1 Application     lisinopril 2.5 MG Tabs  Commonly known as:  PRINIVIL   Take 1 Tab by mouth every day.  Dose:  2.5 mg     MULTIVITAMIN PO   Take 1 Tab by mouth every day.  Dose:  1 Tab     vitamin D (Ergocalciferol) 63917 units Caps capsule  Commonly known as:  DRISDOL   Take 50,000 Units by mouth every 7 days. Can't remember what day  Dose:  40186 Units             Allergies  Allergies   Allergen Reactions   • Allopurinol Rash     Hot, red face   • Aspirin      Cannot take due to medications   • Codeine Itching and Nausea   • Latex Rash     To tape/bandages containing latex   • Tape Rash     adhessive in the tape, causes, swollen rash           DIET  Orders Placed This Encounter   Procedures   • Diet Order Regular     Standing Status:   Standing     Number of Occurrences:   1     Order Specific Question:   Diet:     Answer:   Regular [1]     Order Specific Question:   Consistency/Fluid modifications:     Answer:   Thin Liquids [3]       ACTIVITY  As tolerated.  Weight bearing as tolerated    CONSULTATIONS  Dr sierra pma    Dr ogden [facility]    PROCEDURES  [facility] IMAGING - ECHOCARDIOLOGY [address] [address] Nora Hope  MRN: [id number], : 1925, Sex: F  Encounter date: 2018   Patient Information     Patient Name  Nora Hope ([id number]) Sex  Female   1925   Code Status Current Location   COMFORT CARE 00   EC-ECHOCARDIOGRAM COMPLETE W/O CONT [[id number]]     Electronically signed by: Kiara Melo on 18 1347 Status: Completed   Ordering user: Kiara Melo 18 1347 Ordering provider: Guy G Gansert, M.D.   Authorized by: Guy G Gansert, M.D. Ordered during: ED to Hosp-Admission (Current) on 2018   Add Signature Requirement   Frequency: Once 18 1348 - 1  Occurrences   Questionnaire     Question Answer   Reason for exam? Abnormal EKG - .31   Order comments: Discussed with cardiology, Dr. Ogden, who has requested a stat echocardiogram   Screening Form     No screening form exists for this order.   Reprint Order Requisition     EC-ECHOCARDIOGRAM COMPLETE W/O CONT (Order #[id number]) on 18   Linked Documents     View SynapseCV Report   Last Resulted Time     3:37 PM   Images     Show images for EC-ECHOCARDIOGRAM COMPLETE W/O CONT   Imaging Result Status      Status: Final result (Exam End: 2018  2:42 PM)   Imaging Previous Results     Open Hard Copy Result Report (Order #[id number] - EC-ECHOCARDIOGRAM COMPLETE W/O CONT)   Narrative     Transthoracic  Echo Report      Echocardiography Laboratory    CONCLUSIONS  Prior echocardiogram 2017, EF now low normal, mild wall motion   abnormalities.  Low normal left ventricular systolic function.  Left ventricular ejection fraction is visually estimated to be 50%.  Hypokinesis of mid to apical anterior septum and basal to mid inferior   wall.  Abnormal septal motion consistent with conduction abnormality.  Aortic sclerosis without stenosis.  Severe tricuspid regurgitation.  Unable to estimate pulmonary artery pressure due to severe tricuspid   regurgitation.    DIAN RAMOS  Exam Date:         2018                      13:57  Exam Location:     Inpatient  Priority:          Routine    Ordering Physician:        GANSERT, GUY G  Referring Physician:  Sonographer:               Kiara Melo RDCS    Age:    89     Gender:    F  MRN:    [id number]  :    1925  BSA:    1.52   Ht (in):    68     Wt (lb):    99  Exam Type:     Complete    Indications:     Abnormal electrocardiogram ECG EKG  ICD Codes:           CPT Codes:       60916    BP:   112    /   67     HR:   90  Technical Quality:       Fair    MEASUREMENTS  (Male / Female) Normal Values  2D ECHO  LV Diastolic Diameter PLAX        3.8 cm                4.2 - 5.9 / 3.9 - 5.3   cm  LV Systolic Diameter PLAX         3 cm                  2.1 - 4.0 cm  IVS Diastolic Thickness           0.85 cm                 LVPW Diastolic Thickness          0.95 cm                 LVOT Diameter                     1.7 cm                  Estimated LV Ejection Fraction    50 %                    LV Ejection Fraction MOD BP       58.9 %                >= 55  %  LV Ejection Fraction MOD 4C       49.7 %                  LV Ejection Fraction MOD 2C       66.9 %                   IVC Diameter                      1.8 cm                    M-MODE  Aortic Root Diameter MM           3 cm                      DOPPLER  AV Peak Velocity                  0.91 m/s                AV Peak Gradient                  3.3 mmHg                AV Mean Gradient                  2.1 mmHg                LVOT Peak Velocity                0.81 m/s                AV Area Cont Eq vti               2.2 cm²                 Mitral E Point Velocity           0.6 m/s                 Mitral E to A Ratio               0.53                    Mitral A Duration                 136 ms                  MV Pressure Half Time             50.4 ms                 MV Area PHT                       4.4 cm²                 MV Deceleration Time              185 ms                  TR Peak Velocity                  187 cm/s                  * Indicates values subject to auto-interpretation  LV EF:  50    %    FINDINGS  Left Ventricle  Normal left ventricular chamber size. Normal left ventricular wall   thickness.  Low normal left ventricular systolic function. Left   ventricular ejection fraction is visually estimated to be 50%.   Hypokinesis of mid to apical anterior septum and basal to mid inferior   wall. Abnormal septal motion consistent with conduction abnormality.    Diastolic function is difficult to assess with arrhythmia.    Right Ventricle  The right ventricle was normal in size and function.    Right Atrium  Enlarged right atrium. Normal inferior vena cava size without   inspiratory collapse.    Left Atrium  The left atrium is normal in size. Left atrial volume index is 14 mL/sq   m.    Mitral Valve  Thickened mitral valve leaflets.  No mitral stenosis. Trace mitral   regurgitation.    Aortic Valve  Aortic sclerosis without stenosis. No aortic insufficiency.    Tricuspid Valve  No tricuspid stenosis. Severe tricuspid regurgitation. Unable to   estimate pulmonary artery pressure due to severe tricuspid    regurgitation.    Pulmonic Valve  Structurally normal pulmonic valve without significant stenosis or   regurgitation.    Pericardium  Normal pericardium without effusion.    Aorta  The aortic root is normal. Ascending aorta diameter is 3.3 cm.                      Zaida Mott MD     ----------------------------------------------------------  [facility] - [facility]NOST[facility] - [address] [address] Nora Hope  MRN: [id number], : 1925, Sex: F  Encounter date: 2018   Patient Information     Patient Name  Nora Hope ([id number]) Sex  Female   1925   Code Status Current Location   COMFORT CARE 00   DX-CHEST-PORTABLE (1 VIEW) [[id number]]     Electronically signed by: Bruce Ramirez M.D. on 18 Status: Discontinued   Ordering user: Bruce Ramirez M.D. 18 Ordering provider: Bruce Ramirez M.D.   Authorized by: Bruce Ramirez M.D. Ordered during: ED to Hosp-Admission (Current) on 2018   Add Signature Requirement   Frequency: RECURRING DAILY (0500) 18 0500 - Until Specified Discontinued by: Paul Dunn M.D. 18 1055 [* Physician Decision]   Questionnaire     Question Answer   Reason For Exam treating for stemi and rhabdo, assess volume status   Comments to Radiology treating for stemi, assess volume status   Order comments: DAILY IN AM For indication of respiratory failure   Reprint Order Requisition     DX-CHEST-PORTABLE (1 VIEW) (Order #[id number]) on 18   Last Resulted Time   Thu 2018  7:26 AM   Images     Show images for DX-CHEST-PORTABLE (1 VIEW)   Imaging Result Status     Status: Final result (Exam End: 2018  7:12 AM)   Imaging Previous Results     Open Hard Copy Result Report (Order #[id number] - DX-CHEST-PORTABLE (1 VIEW))   Narrative       2018 6:01 AM    HISTORY/REASON FOR EXAM: Treating for STEMI and rhabdo, assess volume status    TECHNIQUE/EXAM DESCRIPTION:  Single AP  view of the chest.    COMPARISON: November 28,    FINDINGS:    Cardiomegaly is observed.    Atherosclerotic calcification of the aorta is noted.  The central pulmonary vasculature appears normal.    The lungs appear hyperexpanded bilaterally with flattening of the diaphragms.  Bilateral lungs are clear.    Blunting of the left costophrenic angle is seen suggesting trace left effusion.    The bony structures appear age-appropriate.   Impression         1.  Hazy interstitial left pulmonary opacities suggests interstitial edema or infiltrate, similar to prior.  2.  Trace left pleural effusion  3.  Hyperexpansion of lungs favors changes of COPD.           LABORATORY  Lab Results   Component Value Date    SODIUM 138 11/26/2018    POTASSIUM 4.5 11/26/2018    CHLORIDE 107 11/26/2018    CO2 24 11/26/2018    GLUCOSE 100 (H) 11/26/2018    BUN 18 11/26/2018    CREATININE 1.22 11/26/2018        Lab Results   Component Value Date    WBC 9.2 11/24/2018    HEMOGLOBIN 9.7 (L) 11/24/2018    HEMATOCRIT 31.3 (L) 11/24/2018    PLATELETCT 265 11/24/2018        Total time of the discharge process exceeds 38 minutes.

## 2019-06-06 NOTE — CARE PLAN
Problem: Safety  Goal: Will remain free from injury  Outcome: PROGRESSING AS EXPECTED  Hourly rounds done. Call light within reach. Needs attended on a timely manner. Treaded socks on when OOB. Educated on the importance of calling for assistance when attempting to be OOB.  BED ALARM ON.    Problem: Venous Thromboembolism (VTW)/Deep Vein Thrombosis (DVT) Prevention:  Goal: Patient will participate in Venous Thrombosis (VTE)/Deep Vein Thrombosis (DVT)Prevention Measures  Outcome: PROGRESSING AS EXPECTED  Encourage early mobilization.     Problem: Bowel/Gastric:  Goal: Normal bowel function is maintained or improved  Outcome: PROGRESSING AS EXPECTED  Last Documented BM 6/3/19    Problem: Discharge Barriers/Planning  Goal: Patient's continuum of care needs will be met  Outcome: PROGRESSING AS EXPECTED  Planned for Discharge tomorrow to a  ( [facility]), transport arranged at 1330, Prescriptions faxed to pharmacy per LSWs latest notes.

## 2019-06-06 NOTE — PROGRESS NOTES
Call made to Wild (daughter) for 2 RN verbal consent for discharge, to go over discharge paperwork and to provide any information needed. Message left.

## 2019-06-06 NOTE — CARE PLAN
Problem: Safety  Goal: Will remain free from falls    Intervention: Implement fall precautions  Pt calls appropriately, personal shoes in use with each ambulation. Personal belongings and call light with in reach and bed is locked in lowest position. Hourly rounding in place      Problem: Bowel/Gastric:  Goal: Normal bowel function is maintained or improved  Outcome: PROGRESSING AS EXPECTED  Patient on stool softeners, baseline in every couple of days

## 2019-06-07 NOTE — PROGRESS NOTES
Pt discharged to [facility], escorted out by nursing. All personal belongings sent with the patient upon discharge. Discharge instructions and prescriptions explained with verbal understanding provided by Wild connell POA/decision maker as well as the patient. All needs met and all questions answered.

## 2019-06-08 ENCOUNTER — HOSPITAL ENCOUNTER (EMERGENCY)
Facility: MEDICAL CENTER | Age: 84
End: 2019-06-09
Attending: EMERGENCY MEDICINE
Payer: MEDICARE

## 2019-06-08 ENCOUNTER — APPOINTMENT (OUTPATIENT)
Dept: RADIOLOGY | Facility: MEDICAL CENTER | Age: 84
End: 2019-06-08
Attending: EMERGENCY MEDICINE
Payer: MEDICARE

## 2019-06-08 DIAGNOSIS — S80.02XA CONTUSION OF LEFT KNEE, INITIAL ENCOUNTER: ICD-10-CM

## 2019-06-08 PROCEDURE — 302875 HCHG BANDAGE ACE 4 OR 6""

## 2019-06-08 PROCEDURE — 99285 EMERGENCY DEPT VISIT HI MDM: CPT

## 2019-06-08 PROCEDURE — 73564 X-RAY EXAM KNEE 4 OR MORE: CPT | Mod: LT

## 2019-06-08 RX ORDER — ACETAMINOPHEN 325 MG/1
650 TABLET ORAL ONCE
Status: DISCONTINUED | OUTPATIENT
Start: 2019-06-08 | End: 2019-06-09 | Stop reason: HOSPADM

## 2019-06-09 VITALS
SYSTOLIC BLOOD PRESSURE: 121 MMHG | RESPIRATION RATE: 13 BRPM | HEIGHT: 68 IN | TEMPERATURE: 97.9 F | HEART RATE: 69 BPM | WEIGHT: 129 LBS | OXYGEN SATURATION: 94 % | BODY MASS INDEX: 19.55 KG/M2 | DIASTOLIC BLOOD PRESSURE: 38 MMHG

## 2019-06-09 NOTE — DISCHARGE PLANNING
Medical Social Work     MARINA received a call from the RN requesting MARINA assistance with [facility] transportation. MARINA was advised by the RN that the pt lives at [facility] the address is [address] [place] [address].     MARINA called [phone] and spoke with the group home owner Kristen and advised her of the pt being medically clear and being transported back to there facility. Kristen stated they would be waiting for the pt.     MARINA faxed a PCS to [facility] and set up transportation with [facility] for 0445. RN aware of transport time.     Plan: MARINA will remain available for pt support.

## 2019-06-09 NOTE — ED NOTES
Pt verbalizes understanding of discharge and follow-up instructions. Given Rx X0.  VSS.  All questions answered.  [facility] All appropriate paperwork given to EMS

## 2019-06-09 NOTE — ED TRIAGE NOTES
Chief Complaint   Patient presents with   • T-5000 GLF     slipped on her bathroom floor and fell onto left knee   • Knee Pain     left knee       Pt brought in by EMS for above complaint. Pt denies LOC before or after fall, says she remembers what happened though cannot remember where she lives.

## 2019-06-09 NOTE — ED NOTES
Attempting to contact daughter to establish where pt lives so she can be taken back.  contacted to assist in this.

## 2019-06-09 NOTE — ED PROVIDER NOTES
ED Provider Note    Scribed for Filiberto Tellez M.D. by Filiberto Tellez. 6/8/2019,  10:22 PM.    CHIEF COMPLAINT  Chief Complaint   Patient presents with   • T-5000 GLF     slipped on her bathroom floor and fell onto left knee   • Knee Pain     left knee       HPI  Nora Hope is a 89 y.o. female who presents to the Emergency Department brought in by EMS from her group home, with a chief complaint of left knee pain.  She said that shortly prior to arrival, she slipped on her bathroom floor and fell directly onto her anterior left knee.  She was not feeling weak or dizzy or having any particular illness prior to what sounds like a mechanical slip and fall.  She specifically denies fevers or chills or nausea or vomiting or chest pain or shortness of breath, vision changes, or confusion.  She denies pain in her leg anywhere other than the knee, and any pain anywhere else.      REVIEW OF SYSTEMS  See HPI for further details. All other systems are negative.     PAST MEDICAL HISTORY   has a past medical history of Arthritis; Cancer ([facility]) (2009); Essential hypertension (10/26/2017); and Glaucoma.    SOCIAL HISTORY  Social History     Social History Main Topics   • Smoking status: Never Smoker   • Smokeless tobacco: Never Used   • Alcohol use No   • Drug use: No   • Sexual activity: Not Currently     History   Drug Use No       SURGICAL HISTORY   has a past surgical history that includes eye surgery; other orthopedic surgery; and sinus trephine frontal.    CURRENT MEDICATIONS  Home Medications    **Home medications have not yet been reviewed for this encounter**         ALLERGIES  Allergies   Allergen Reactions   • Allopurinol Rash     Hot, red face   • Aspirin      Cannot take due to medications   • Codeine Itching and Nausea   • Latex Rash     To tape/bandages containing latex   • Tape Rash     adhessive in the tape, causes, swollen rash           PHYSICAL EXAM  VITAL SIGNS: BP (!) 121/38   Pulse 74   Temp  "36.6 °C (97.9 °F) (Temporal)   Resp 14   Ht 1.727 m (5' 8\")   Wt 58.5 kg (129 lb)   LMP 01/01/1988   SpO2 93%   BMI 19.61 kg/m²   Pulse ox interpretation: I interpret this pulse ox as normal.  Constitutional: Alert in no apparent distress.  HENT: No signs of trauma, Bilateral external ears normal, Nose normal.   Eyes: Pupils are equal and reactive, Conjunctiva normal, Non-icteric.   Neck: Normal range of motion, No tenderness, Supple, No stridor.    Cardiovascular: Normal peripheral perfusion  Thorax & Lungs: Unlabored respirations, equal chest expansion, no accessory muscle use  Abdomen: Non-distended  Skin:  No erythema, No rash.   Back: Normal alignment and ROM  Extremities: No gross deformity  Musculoskeletal: Good range of motion in all major joints except for the left knee.  Range of motion in the left knee is largely preserved, except the patient does complain of anterior knee pain with flexion past 90 degrees.  There is some mild edema to the medial joint line area.  There is mild diffuse tenderness to the patella and medial joint line.  There is no ligamentous laxity.  Neurologic: Alert, Normal motor function, No focal deficits noted.   Psychiatric: Affect normal, Judgment normal, Mood normal.      DIAGNOSTIC STUDIES / PROCEDURES    RADIOLOGY  DX-KNEE COMPLETE 4+ LEFT   Final Result         1.  No acute traumatic bony injury.   2.  Atherosclerosis        The radiologist's interpretation of all radiological studies have been reviewed by me.    COURSE & MEDICAL DECISION MAKING  Nursing notes, VS, PMSFHx reviewed in chart.     10:22 PM Patient seen and examined at bedside. Differential diagnosis includes but is not limited to knee sprain, knee contusion, fracture. Ordered for x-ray of the left knee to evaluate. Patient will be treated with Tylenol for her symptoms.     11:25 PM this patient's knee x-rays were normal, with no evidence of bony deformity on exam.  She will be placed on Ace wrap, discharged " to primary care follow-up, and given orthopedic follow-up contact information in case she is not feeling essentially back to normal in 7 to 10 days.     The patient will return for new or worsening symptoms and is stable at the time of discharge.    The patient is referred to a primary physician for blood pressure management, diabetic screening, and for all other preventative health concerns.      DISPOSITION:  Patient will be discharged home in stable condition.    FOLLOW UP:  Kori Cali M.D.  [address]  [address]  [place] [id number] [phone]    Schedule an appointment as soon as possible for a visit       Darrel Gallardo M.D.  [address] [place] [address]  [phone]    In 1 week  if you are not feeling back to normal.      OUTPATIENT MEDICATIONS:  New Prescriptions    No medications on file         FINAL IMPRESSION  1. Contusion of left knee, initial encounter

## 2019-06-09 NOTE — DISCHARGE INSTRUCTIONS
Your x-rays were normal.  You most likely just have a bruise of your knee.  Please use ice, and Tylenol or ibuprofen at home.  You can use the ace wrap that was applied here for extra support.  Please see your primary care doctor.  Follow-up with Dr. Gallardo, orthopedics, if you are not back to normal in 7 to 10 days.

## 2019-07-25 NOTE — DISCHARGE PLANNING
Patient denies pain and is resting comfortably.    Spoke with patient's daughter regarding the approval for [facility], [facility] Disability had said they were still needing some financial information from her. Daughter stated she would contact them today.

## 2021-04-02 NOTE — DISCHARGE PLANNING
LSW met with AQUILINO Johnson who asked if Pt was going to a SNF. LSW stated that during rounding yesterday they were going to have PT and OT re-eval the Pt to see if she could possibly return home. LSW checked therapy notes, eval is still pending.   no

## 2021-12-25 NOTE — CARE PLAN
Problem: Safety  Goal: Will remain free from injury  Outcome: PROGRESSING AS EXPECTED  Treaded socks in place, bed in the lowest position, bed alarm on, call light and belongings within reach, pt call for assistance appropriately    Problem: Mobility  Goal: Risk for activity intolerance will decrease  Outcome: PROGRESSING AS EXPECTED  Pt. up to commode x1 assist with FWW, unsteady at times.       No

## 2022-11-08 NOTE — ASSESSMENT & PLAN NOTE
Comfort care.  Pain management.     5 = Moderately Severe – e.g., a single (definite) encapsulated grandiose delusion, or multiple (definite) fragmentary grandiose delusions 4 = Moderate – e.g., inflated self-esteem clearly out of proportion to the circumstance, or suspected grandiose delusion(s)

## 2023-04-02 NOTE — PROGRESS NOTES
Received report from night shift RN. Assumed care of patient. Pt assessed and stable. VSS. Patient reports 0/10 pain at this time. Discussed plan of care for day with patient and received verbal understanding. Call light within reach, bed in low position.    no

## 2023-05-12 NOTE — ED NOTES
Lab at  for draw.  NS infusing.  Report to AQUILINO Borrero.    Clindamycin Pregnancy And Lactation Text: This medication can be used in pregnancy if certain situations. Clindamycin is also present in breast milk.

## 2023-06-09 NOTE — PROGRESS NOTES
Monitor Summary  Medical   Spoke with mom and mom is available to discuss with  about surgery today at 2:30pm. Both mom and  are aware that they will discuss about surgery today at 2:30pm. RN mentioned mom about next surgery available date is 6/21 and 7/5 except 7/19 which is not available at [facility].

## 2023-11-19 NOTE — CARE PLAN
[facility]   Physical Therapy Treatment Note      Name: Katalina Mclean  Clinic Number: [id number]    Therapy Diagnosis: No diagnosis found.  Physician: Lucy Anthony NP    Visit Date: 11/16/2023    Physician: Lucy Anthony NP    Physician Orders: PT Eval and Treat  Medical Diagnosis from Referral: Cervicalgia  Evaluation Date: 11/2/2023  Authorization Period Expiration: 01/02/2024  Plan of Care Expiration: 01/01/2024              Progress Update: 12/1/2023   Visit # / Visits authorized: 1 / 1          FOTO  Number Date Score    1 01/02/2023     2       3       4          PRECAUTIONS: Standard Precautions      Time In: 0802  Time Out: 0852  Total Appointment Time (timed & untimed codes): 50 minutes    PTA Visit #: 0/5       Subjective     Patient reports: neck pain.  She was compliant with home exercise program.  Response to previous treatment: N/A  Functional change: N/A    Pain: 5/10  Location: left neck      Objective      Objective Measures updated at progress report unless specified.     Treatment     Katalina received the treatments listed below:      therapeutic exercises to develop strength, ROM, and posture for --- minutes including:   Activity   Details                                             manual therapy techniques: Joint mobilizations, Manual traction, and Soft tissue Mobilization were applied to the: cervical and thoracic spine for 10 minutes, including:     Activity   Details    Cervical traction      Upper trap STM      Manual cervical traction                             neuromuscular re-education activities to improve: Balance, Kinesthetic, Sense, Proprioception, and Posture for 40 minutes. The following activities were included:     Activity   Details    Chin tucks  3x10    Thoracic ext over foam roll  3x10    Supine shoulder horizontal abd, snow angels on 1/2 roll  3x10    Standing shoulder extension  30# 3x10    Standing T  3x10 red band    Open  Problem: Safety  Goal: Will remain free from injury  Outcome: PROGRESSING AS EXPECTED  Bed alarm in place, call light with in reach, calls appropriately for assistance    Problem: Mobility  Goal: Risk for activity intolerance will decrease  Outcome: PROGRESSING SLOWER THAN EXPECTED  1 person hand held assist, fatigues with ambulation, prefers to use bed side commode       books  3x10    Cable rows  30# 3x10               therapeutic activities to improve functional performance for ---  minutes, including:   Activity   Details                                           Patient Education and Home Exercises       Education provided:   - Home program    Written Home Exercises Provided: Patient instructed to cont prior HEP. Exercises were reviewed and Katalina was able to demonstrate them prior to the end of the session.  Katalina demonstrated good  understanding of the education provided. See Electronic Medical Record under Patient Instructions for exercises provided during therapy sessions    Assessment     The patient was instructed in and performed cervical range of motion and postural strengthening.    Katalina Is progressing well towards her goals.   Patient prognosis is Good.     Patient will continue to benefit from skilled outpatient physical therapy to address the deficits listed in the problem list box on initial evaluation, provide pt/family education and to maximize pt's level of independence in the home and community environment.     Patient's spiritual, cultural and educational needs considered and pt agreeable to plan of care and goals.     Anticipated barriers to physical therapy: None    SHORT TERM GOALS:  4 weeks Progress Date Met   Recent signs and systems trend is improving in order to progress towards Long term goals.  [] Met  [] Not Met  [] Progressing     Patient will be independent with Home Exercise Program  in order to further progress and return to maximal function. [] Met  [] Not Met  [] Progressing     Pain rating at Worst: 5 /10 in order to progress towards increased independence with activity. [] Met  [] Not Met  [] Progressing     Patient will be able to correct postural deviations in sitting and standing, to decrease pain and promote postural awareness for injury prevention.  [] Met  [] Not Met  [] Progressing     Patient will improve functional outcome (FOTO)  score: by 5% to increase self-worth & perceived functional ability towards long term goals [] Met  [] Not Met  [] Progressing        LONG TERM GOALS: 8 weeks Progress Date Met   Patient will return to normal activites of daily living, recreational, and work related activities with less pain and limitation.  [] Met  [] Not Met  [] Progressing     Patient will improve range of motion  to stated goals in order to return to maximal functional potential.  [] Met  [] Not Met  [] Progressing     Patient will improve Strength to stated goals of appropriate musculature in order to improve functional independence.  [] Met  [] Not Met  [] Progressing     Pain Rating at Best: 1/10 to improve Quality of Life.  [] Met  [] Not Met  [] Progressing     Patient will meet predicted functional outcome (FOTO) score: 72% to increase self-worth & perceived functional ability. [] Met  [] Not Met  [] Progressing     Patient will have met/partially met personal goal of: improve pain and function [] Met  [] Not Met  [] Progressing              PLAN   Plan of care Certification: 11/2/2023 to 01/01/2024     Outpatient Physical Therapy 2 times weekly for 8 weeks to include any combination of the following interventions: virtual visits, dry needling, modalities, electrical stimulation (IFC, Pre-Mod, Attended with Functional Dry Needling), Electrical Stimulation , Manual Therapy, Moist Heat/ Ice, Neuromuscular Re-ed, Patient Education, Self Care, Therapeutic Exercise, Functional Training, and Therapeutic Activites     Deuce Flowers, PT